# Patient Record
Sex: MALE | Race: WHITE | NOT HISPANIC OR LATINO | Employment: UNEMPLOYED | URBAN - NONMETROPOLITAN AREA
[De-identification: names, ages, dates, MRNs, and addresses within clinical notes are randomized per-mention and may not be internally consistent; named-entity substitution may affect disease eponyms.]

---

## 2019-05-07 ENCOUNTER — OFFICE VISIT (OUTPATIENT)
Dept: UROLOGY | Facility: CLINIC | Age: 54
End: 2019-05-07

## 2019-05-07 VITALS — WEIGHT: 230 LBS | BODY MASS INDEX: 34.86 KG/M2 | HEIGHT: 68 IN

## 2019-05-07 DIAGNOSIS — R33.8 URINARY RETENTION DUE TO BENIGN PROSTATIC HYPERPLASIA: Primary | ICD-10-CM

## 2019-05-07 DIAGNOSIS — N40.1 URINARY RETENTION DUE TO BENIGN PROSTATIC HYPERPLASIA: Primary | ICD-10-CM

## 2019-05-07 PROCEDURE — 99203 OFFICE O/P NEW LOW 30 MIN: CPT | Performed by: UROLOGY

## 2019-05-07 RX ORDER — GABAPENTIN 300 MG/1
300 CAPSULE ORAL 3 TIMES DAILY
COMMUNITY

## 2019-05-07 RX ORDER — LISINOPRIL 20 MG/1
20 TABLET ORAL 2 TIMES DAILY
COMMUNITY
End: 2021-08-02 | Stop reason: ALTCHOICE

## 2019-05-07 RX ORDER — METFORMIN HYDROCHLORIDE 500 MG/1
TABLET, EXTENDED RELEASE ORAL
COMMUNITY
Start: 2019-03-28 | End: 2021-08-02 | Stop reason: ALTCHOICE

## 2019-05-07 RX ORDER — PANTOPRAZOLE SODIUM 40 MG/1
40 TABLET, DELAYED RELEASE ORAL DAILY
Refills: 2 | COMMUNITY
Start: 2019-04-08

## 2019-05-07 RX ORDER — HYDROCODONE BITARTRATE AND ACETAMINOPHEN 7.5; 325 MG/1; MG/1
1 TABLET ORAL EVERY 6 HOURS PRN
COMMUNITY
End: 2021-08-02 | Stop reason: DRUGHIGH

## 2019-05-07 RX ORDER — HUMAN INSULIN 100 [IU]/ML
INJECTION, SUSPENSION SUBCUTANEOUS
Refills: 0 | COMMUNITY
Start: 2019-05-01

## 2019-05-07 RX ORDER — WARFARIN SODIUM 2.5 MG/1
2.5 TABLET ORAL
COMMUNITY

## 2019-05-07 NOTE — PROGRESS NOTES
Chief Complaint:          Chief Complaint   Patient presents with   • Difficulty Urinating       HPI:   54 y.o. male.  54-year-old white male presents with urinary retention.  This is a second time in 2 months.  He has had no pre-retention incidences such as decongestants etc.  He went to emergency room got a Prado he has no allergies he was given Flomax and Cipro but has not started it yet he has had an upper GI and colon.  He is a diabetic and atrial fibrillation with significant degenerative joint disease he has no family history of prostate cancer or him to his catheter his exam is unremarkable I will defer PSA until he said time for the inflammation to resolve in the prostatic fossa and thus give us an artificial elevation.    Past Medical History:        Past Medical History:   Diagnosis Date   • A-fib (CMS/HCC)    • Diabetes (CMS/HCC)    • Heart attack (CMS/HCC)    • Hypertension          Current Meds:     Current Outpatient Medications   Medication Sig Dispense Refill   • gabapentin (NEURONTIN) 300 MG capsule Take 300 mg by mouth 3 (Three) Times a Day.     • HYDROcodone-acetaminophen (NORCO) 7.5-325 MG per tablet Take 1 tablet by mouth Every 6 (Six) Hours As Needed for Moderate Pain .     • lisinopril (PRINIVIL,ZESTRIL) 20 MG tablet Take 20 mg by mouth 2 (Two) Times a Day.     • metFORMIN (GLUCOPHAGE) 500 MG tablet Take 500 mg by mouth 2 (Two) Times a Day With Meals.     • metFORMIN ER (GLUCOPHAGE-XR) 500 MG 24 hr tablet      • NOVOLIN N 100 UNIT/ML injection INJECT 15 TO 25 UNITS SC HS AS DIRECTED  0   • pantoprazole (PROTONIX) 40 MG EC tablet Take 40 mg by mouth Daily.  2   • warfarin (COUMADIN) 2.5 MG tablet Take 2.5 mg by mouth Daily.       No current facility-administered medications for this visit.         Allergies:      No Known Allergies     Past Surgical History:     Past Surgical History:   Procedure Laterality Date   • COLONOSCOPY     • ENDOSCOPY           Social History:     Social History      Socioeconomic History   • Marital status: Unknown     Spouse name: Not on file   • Number of children: Not on file   • Years of education: Not on file   • Highest education level: Not on file   Tobacco Use   • Smoking status: Never Smoker   • Smokeless tobacco: Never Used   Substance and Sexual Activity   • Alcohol use: No     Frequency: Never   • Drug use: No   • Sexual activity: Not Currently       Family History:     Family History   Problem Relation Age of Onset   • Diabetes Father    • Kidney disease Father    • Heart disease Mother        Review of Systems:     Review of Systems   Constitutional: Negative.    HENT: Negative.    Eyes: Negative.    Respiratory: Negative.    Cardiovascular: Negative.    Gastrointestinal: Negative.    Endocrine: Negative.    Genitourinary: Positive for difficulty urinating.   Musculoskeletal: Negative.    Allergic/Immunologic: Negative.    Neurological: Negative.    Hematological: Negative.    Psychiatric/Behavioral: Negative.        Physical Exam:     Physical Exam   Constitutional: He is oriented to person, place, and time. He appears well-developed and well-nourished.   HENT:   Head: Normocephalic and atraumatic.   Eyes: Conjunctivae and EOM are normal. Pupils are equal, round, and reactive to light.   Neck: Normal range of motion.   Cardiovascular: Normal rate, regular rhythm, normal heart sounds and intact distal pulses.   Pulmonary/Chest: Effort normal and breath sounds normal.   Abdominal: Soft. Bowel sounds are normal.   Genitourinary:   Genitourinary Comments: Obese white male with a circumcised concealed phallus bilaterally descended testes good tone but an anal stricture and a smooth firm prostate about 30 g   Musculoskeletal: Normal range of motion.   Neurological: He is alert and oriented to person, place, and time. He has normal reflexes.   Skin: Skin is warm and dry.   Psychiatric: He has a normal mood and affect. His behavior is normal. Judgment and thought  content normal.   Nursing note and vitals reviewed.      I have reviewed the following portions of the patient's history: allergies, current medications, past family history, past medical history, past social history, past surgical history, problem list and ROS and confirm it's accurate.      Procedure:       Assessment/Plan:   Urinary retention secondary to BPH-catheter removed for an appropriate voiding trial    Patient's Body mass index is 34.97 kg/m². BMI is above normal parameters. Recommendations include: educational material.          This document has been electronically signed by GANESH BUSTOS MD May 7, 2019 1:27 PM

## 2019-05-08 PROBLEM — R33.8 URINARY RETENTION DUE TO BENIGN PROSTATIC HYPERPLASIA: Status: ACTIVE | Noted: 2019-05-08

## 2019-05-08 PROBLEM — N40.1 URINARY RETENTION DUE TO BENIGN PROSTATIC HYPERPLASIA: Status: ACTIVE | Noted: 2019-05-08

## 2019-05-09 ENCOUNTER — OFFICE VISIT (OUTPATIENT)
Dept: UROLOGY | Facility: CLINIC | Age: 54
End: 2019-05-09

## 2019-05-09 VITALS — BODY MASS INDEX: 34.86 KG/M2 | WEIGHT: 230 LBS | HEIGHT: 68 IN

## 2019-05-09 DIAGNOSIS — N40.1 URINARY RETENTION DUE TO BENIGN PROSTATIC HYPERPLASIA: ICD-10-CM

## 2019-05-09 DIAGNOSIS — R33.9 INCOMPLETE EMPTYING OF BLADDER: ICD-10-CM

## 2019-05-09 DIAGNOSIS — N42.9 DISORDER OF PROSTATE: Primary | ICD-10-CM

## 2019-05-09 DIAGNOSIS — R33.8 URINARY RETENTION DUE TO BENIGN PROSTATIC HYPERPLASIA: ICD-10-CM

## 2019-05-09 PROCEDURE — 99213 OFFICE O/P EST LOW 20 MIN: CPT | Performed by: UROLOGY

## 2019-05-09 PROCEDURE — 36415 COLL VENOUS BLD VENIPUNCTURE: CPT | Performed by: UROLOGY

## 2019-05-09 PROCEDURE — 51798 US URINE CAPACITY MEASURE: CPT | Performed by: UROLOGY

## 2019-05-09 RX ORDER — FINASTERIDE 5 MG/1
5 TABLET, FILM COATED ORAL DAILY
Qty: 30 TABLET | Refills: 5 | Status: SHIPPED | OUTPATIENT
Start: 2019-05-09 | End: 2019-12-05 | Stop reason: SDUPTHER

## 2019-05-09 NOTE — PROGRESS NOTES
Chief Complaint:          Chief Complaint   Patient presents with   • Benign Prostatic Hypertrophy     follow up        HPI:   54 y.o. male  presents with urinary retention.  This is a second time in 2 months.  He has had no pre-retention incidences such as decongestants etc.  He went to emergency room got a Prado he has no allergies he was given Flomax and Cipro but has not started it yet he has had an upper GI and colon.  He is a diabetic and atrial fibrillation with significant degenerative joint disease he has no family history of prostate cancer or him to his catheter his exam is unremarkable I will defer PSA until he said time for the inflammation to resolve in the prostatic fossa and thus give us an artificial elevation.  He returns today he is voiding well he has a good stream he gets up got up one time at night have a PSA pending and then added finasteride to his current regimen I will see him back in 3 months he is to call for additional problems      Past Medical History:        Past Medical History:   Diagnosis Date   • A-fib (CMS/[facility])    • Diabetes (CMS/[facility])    • Heart attack (CMS/[facility])    • Hypertension          Current Meds:     Current Outpatient Medications   Medication Sig Dispense Refill   • gabapentin (NEURONTIN) 300 MG capsule Take 300 mg by mouth 3 (Three) Times a Day.     • HYDROcodone-acetaminophen (NORCO) 7.5-325 MG per tablet Take 1 tablet by mouth Every 6 (Six) Hours As Needed for Moderate Pain .     • lisinopril (PRINIVIL,ZESTRIL) 20 MG tablet Take 20 mg by mouth 2 (Two) Times a Day.     • metFORMIN (GLUCOPHAGE) 500 MG tablet Take 500 mg by mouth 2 (Two) Times a Day With Meals.     • metFORMIN ER (GLUCOPHAGE-XR) 500 MG 24 hr tablet      • NOVOLIN N 100 UNIT/ML injection INJECT 15 TO 25 UNITS SC HS AS DIRECTED  0   • pantoprazole (PROTONIX) 40 MG EC tablet Take 40 mg by mouth Daily.  2   • warfarin (COUMADIN) 2.5 MG tablet Take 2.5 mg by mouth Daily.       No current facility-administered  medications for this visit.         Allergies:      No Known Allergies     Past Surgical History:     Past Surgical History:   Procedure Laterality Date   • COLONOSCOPY     • ENDOSCOPY           Social History:     Social History     Socioeconomic History   • Marital status: Unknown     Spouse name: Not on file   • Number of children: Not on file   • Years of education: Not on file   • Highest education level: Not on file   Tobacco Use   • Smoking status: Never Smoker   • Smokeless tobacco: Never Used   Substance and Sexual Activity   • Alcohol use: No     Frequency: Never   • Drug use: No   • Sexual activity: Not Currently       Family History:     Family History   Problem Relation Age of Onset   • Diabetes Father    • Kidney disease Father    • Heart disease Mother        Review of Systems:     Review of Systems   Constitutional: Negative.    HENT: Negative.    Eyes: Negative.    Respiratory: Negative.    Cardiovascular: Negative.    Gastrointestinal: Negative.    Endocrine: Negative.    Musculoskeletal: Negative.    Allergic/Immunologic: Negative.    Neurological: Negative.    Hematological: Negative.    Psychiatric/Behavioral: Negative.        Physical Exam:     Physical Exam   Constitutional: He is oriented to person, place, and time. He appears well-developed and well-nourished.   HENT:   Head: Normocephalic and atraumatic.   Eyes: Conjunctivae and EOM are normal. Pupils are equal, round, and reactive to light.   Neck: Normal range of motion.   Cardiovascular: Normal rate, regular rhythm, normal heart sounds and intact distal pulses.   Pulmonary/Chest: Effort normal and breath sounds normal.   Abdominal: Soft. Bowel sounds are normal.   Musculoskeletal: Normal range of motion.   Neurological: He is alert and oriented to person, place, and time. He has normal reflexes.   Skin: Skin is warm and dry.   Psychiatric: He has a normal mood and affect. His behavior is normal. Judgment and thought content normal.    Nursing note and vitals reviewed.      I have reviewed the following portions of the patient's history: allergies, current medications, past family history, past medical history, past social history, past surgical history, problem list and ROS and confirm it's accurate.      Procedure:       Assessment/Plan:   Urinary retention secondary to BPH-no voiding well with the use of alpha blockade able to make the addition of finasteride mostly because the caliber of his stream is only have a PSA pending I will see him back in 3 months    Patient's Body mass index is 34.97 kg/m². BMI is above normal parameters. Recommendations include: educational material.          This document has been electronically signed by GANESH BUSTOS MD May 9, 2019 8:33 AM

## 2019-05-10 LAB — PSA SERPL-MCNC: 0.4 NG/ML (ref 0–4)

## 2019-12-05 DIAGNOSIS — R33.8 URINARY RETENTION DUE TO BENIGN PROSTATIC HYPERPLASIA: ICD-10-CM

## 2019-12-05 DIAGNOSIS — N40.1 URINARY RETENTION DUE TO BENIGN PROSTATIC HYPERPLASIA: ICD-10-CM

## 2019-12-06 RX ORDER — FINASTERIDE 5 MG/1
TABLET, FILM COATED ORAL
Qty: 90 TABLET | Refills: 0 | Status: SHIPPED | OUTPATIENT
Start: 2019-12-06

## 2020-02-06 DIAGNOSIS — N40.1 URINARY RETENTION DUE TO BENIGN PROSTATIC HYPERPLASIA: Primary | ICD-10-CM

## 2020-02-06 DIAGNOSIS — R33.8 URINARY RETENTION DUE TO BENIGN PROSTATIC HYPERPLASIA: Primary | ICD-10-CM

## 2020-02-07 RX ORDER — TAMSULOSIN HYDROCHLORIDE 0.4 MG/1
CAPSULE ORAL
Qty: 90 CAPSULE | Refills: 3 | Status: SHIPPED | OUTPATIENT
Start: 2020-02-07 | End: 2020-04-13

## 2020-02-10 DIAGNOSIS — R33.8 URINARY RETENTION DUE TO BENIGN PROSTATIC HYPERPLASIA: ICD-10-CM

## 2020-02-10 DIAGNOSIS — N40.1 URINARY RETENTION DUE TO BENIGN PROSTATIC HYPERPLASIA: ICD-10-CM

## 2020-02-11 RX ORDER — FINASTERIDE 5 MG/1
TABLET, FILM COATED ORAL
Qty: 90 TABLET | Refills: 0 | OUTPATIENT
Start: 2020-02-11

## 2020-04-10 DIAGNOSIS — R33.8 URINARY RETENTION DUE TO BENIGN PROSTATIC HYPERPLASIA: ICD-10-CM

## 2020-04-10 DIAGNOSIS — N40.1 URINARY RETENTION DUE TO BENIGN PROSTATIC HYPERPLASIA: ICD-10-CM

## 2020-04-13 RX ORDER — TAMSULOSIN HYDROCHLORIDE 0.4 MG/1
CAPSULE ORAL
Qty: 90 CAPSULE | Refills: 3 | Status: SHIPPED | OUTPATIENT
Start: 2020-04-13 | End: 2020-06-04

## 2020-06-03 DIAGNOSIS — R33.8 URINARY RETENTION DUE TO BENIGN PROSTATIC HYPERPLASIA: ICD-10-CM

## 2020-06-03 DIAGNOSIS — N40.1 URINARY RETENTION DUE TO BENIGN PROSTATIC HYPERPLASIA: ICD-10-CM

## 2020-06-04 RX ORDER — TAMSULOSIN HYDROCHLORIDE 0.4 MG/1
CAPSULE ORAL
Qty: 90 CAPSULE | Refills: 3 | Status: SHIPPED | OUTPATIENT
Start: 2020-06-04 | End: 2021-03-22

## 2020-08-04 ENCOUNTER — TRANSCRIBE ORDERS (OUTPATIENT)
Dept: OTHER | Facility: OTHER | Age: 55
End: 2020-08-04

## 2020-08-04 ENCOUNTER — TRANSCRIBE ORDERS (OUTPATIENT)
Dept: ADMINISTRATIVE | Facility: HOSPITAL | Age: 55
End: 2020-08-04

## 2020-08-04 DIAGNOSIS — G45.9 TIA (TRANSIENT ISCHEMIC ATTACK): Primary | ICD-10-CM

## 2020-09-04 ENCOUNTER — HOSPITAL ENCOUNTER (OUTPATIENT)
Dept: MRI IMAGING | Facility: HOSPITAL | Age: 55
Discharge: HOME | End: 2020-09-04

## 2020-09-04 ENCOUNTER — HOSPITAL ENCOUNTER (OUTPATIENT)
Dept: CARDIOLOGY | Facility: HOSPITAL | Age: 55
Discharge: HOME | End: 2020-09-04
Admitting: FAMILY MEDICINE

## 2020-09-04 DIAGNOSIS — G45.9 TIA (TRANSIENT ISCHEMIC ATTACK): ICD-10-CM

## 2020-09-04 LAB
BH CV ECHO MEAS - % IVS THICK: 14.7 %
BH CV ECHO MEAS - % LVPW THICK: 57.3 %
BH CV ECHO MEAS - ACS: 2 CM
BH CV ECHO MEAS - AO MAX PG: 6 MMHG
BH CV ECHO MEAS - AO MEAN PG: 4 MMHG
BH CV ECHO MEAS - AO ROOT AREA (BSA CORRECTED): 1.5
BH CV ECHO MEAS - AO ROOT AREA: 8.6 CM^2
BH CV ECHO MEAS - AO ROOT DIAM: 3.3 CM
BH CV ECHO MEAS - AO V2 MAX: 122 CM/SEC
BH CV ECHO MEAS - AO V2 MEAN: 95 CM/SEC
BH CV ECHO MEAS - AO V2 VTI: 30.1 CM
BH CV ECHO MEAS - BSA(HAYCOCK): 2.3 M^2
BH CV ECHO MEAS - BSA: 2.2 M^2
BH CV ECHO MEAS - BZI_BMI: 35 KILOGRAMS/M^2
BH CV ECHO MEAS - BZI_METRIC_HEIGHT: 172.7 CM
BH CV ECHO MEAS - BZI_METRIC_WEIGHT: 104.3 KG
BH CV ECHO MEAS - EDV(CUBED): 139.8 ML
BH CV ECHO MEAS - EDV(MOD-SP4): 60.7 ML
BH CV ECHO MEAS - EDV(TEICH): 128.9 ML
BH CV ECHO MEAS - EF(CUBED): 74.1 %
BH CV ECHO MEAS - EF(MOD-SP4): 59.5 %
BH CV ECHO MEAS - EF(TEICH): 65.5 %
BH CV ECHO MEAS - ESV(CUBED): 36.3 ML
BH CV ECHO MEAS - ESV(MOD-SP4): 24.6 ML
BH CV ECHO MEAS - ESV(TEICH): 44.5 ML
BH CV ECHO MEAS - FS: 36.2 %
BH CV ECHO MEAS - IVS/LVPW: 0.57
BH CV ECHO MEAS - IVSD: 0.85 CM
BH CV ECHO MEAS - IVSS: 0.98 CM
BH CV ECHO MEAS - LA DIMENSION: 2.9 CM
BH CV ECHO MEAS - LA/AO: 0.88
BH CV ECHO MEAS - LV DIASTOLIC VOL/BSA (35-75): 28 ML/M^2
BH CV ECHO MEAS - LV MASS(C)D: 241.1 GRAMS
BH CV ECHO MEAS - LV MASS(C)DI: 111.1 GRAMS/M^2
BH CV ECHO MEAS - LV MASS(C)S: 214.7 GRAMS
BH CV ECHO MEAS - LV MASS(C)SI: 99 GRAMS/M^2
BH CV ECHO MEAS - LV SYSTOLIC VOL/BSA (12-30): 11.3 ML/M^2
BH CV ECHO MEAS - LVIDD: 5.2 CM
BH CV ECHO MEAS - LVIDS: 3.3 CM
BH CV ECHO MEAS - LVLD AP4: 6.8 CM
BH CV ECHO MEAS - LVLS AP4: 6.2 CM
BH CV ECHO MEAS - LVOT AREA (M): 2.5 CM^2
BH CV ECHO MEAS - LVOT AREA: 2.5 CM^2
BH CV ECHO MEAS - LVOT DIAM: 1.8 CM
BH CV ECHO MEAS - LVPWD: 1.5 CM
BH CV ECHO MEAS - LVPWS: 2.4 CM
BH CV ECHO MEAS - MV A MAX VEL: 51.4 CM/SEC
BH CV ECHO MEAS - MV E MAX VEL: 66.4 CM/SEC
BH CV ECHO MEAS - MV E/A: 1.3
BH CV ECHO MEAS - PA ACC TIME: 0.08 SEC
BH CV ECHO MEAS - PA PR(ACCEL): 44.4 MMHG
BH CV ECHO MEAS - RAP SYSTOLE: 10 MMHG
BH CV ECHO MEAS - RVSP: 25.7 MMHG
BH CV ECHO MEAS - SI(AO): 118.7 ML/M^2
BH CV ECHO MEAS - SI(CUBED): 47.7 ML/M^2
BH CV ECHO MEAS - SI(MOD-SP4): 16.6 ML/M^2
BH CV ECHO MEAS - SI(TEICH): 38.9 ML/M^2
BH CV ECHO MEAS - SV(AO): 257.4 ML
BH CV ECHO MEAS - SV(CUBED): 103.5 ML
BH CV ECHO MEAS - SV(MOD-SP4): 36.1 ML
BH CV ECHO MEAS - SV(TEICH): 84.5 ML
BH CV ECHO MEAS - TR MAX VEL: 197.5 CM/SEC
LV EF 2D ECHO EST: 55 %
MAXIMAL PREDICTED HEART RATE: 163 BPM
STRESS TARGET HR: 139 BPM

## 2020-09-04 PROCEDURE — 93306 TTE W/DOPPLER COMPLETE: CPT

## 2020-09-04 PROCEDURE — 70551 MRI BRAIN STEM W/O DYE: CPT | Performed by: RADIOLOGY

## 2020-09-04 PROCEDURE — 93306 TTE W/DOPPLER COMPLETE: CPT | Performed by: INTERNAL MEDICINE

## 2020-09-04 PROCEDURE — 70551 MRI BRAIN STEM W/O DYE: CPT

## 2021-03-19 DIAGNOSIS — N40.1 URINARY RETENTION DUE TO BENIGN PROSTATIC HYPERPLASIA: ICD-10-CM

## 2021-03-19 DIAGNOSIS — R33.8 URINARY RETENTION DUE TO BENIGN PROSTATIC HYPERPLASIA: ICD-10-CM

## 2021-03-22 RX ORDER — TAMSULOSIN HYDROCHLORIDE 0.4 MG/1
CAPSULE ORAL
Qty: 90 CAPSULE | Refills: 3 | Status: SHIPPED | OUTPATIENT
Start: 2021-03-22 | End: 2021-12-23

## 2021-03-23 ENCOUNTER — BULK ORDERING (OUTPATIENT)
Dept: CASE MANAGEMENT | Facility: OTHER | Age: 56
End: 2021-03-23

## 2021-03-23 DIAGNOSIS — Z23 IMMUNIZATION DUE: ICD-10-CM

## 2021-08-02 ENCOUNTER — OFFICE VISIT (OUTPATIENT)
Dept: CARDIOLOGY | Facility: CLINIC | Age: 56
End: 2021-08-02

## 2021-08-02 VITALS
RESPIRATION RATE: 16 BRPM | HEART RATE: 76 BPM | WEIGHT: 223.6 LBS | HEIGHT: 68 IN | DIASTOLIC BLOOD PRESSURE: 81 MMHG | SYSTOLIC BLOOD PRESSURE: 111 MMHG | TEMPERATURE: 97.1 F | BODY MASS INDEX: 33.89 KG/M2

## 2021-08-02 DIAGNOSIS — E78.5 DYSLIPIDEMIA: ICD-10-CM

## 2021-08-02 DIAGNOSIS — R00.2 PALPITATIONS: ICD-10-CM

## 2021-08-02 DIAGNOSIS — R55 SYNCOPE AND COLLAPSE: Primary | ICD-10-CM

## 2021-08-02 DIAGNOSIS — I48.0 PAROXYSMAL ATRIAL FIBRILLATION (HCC): ICD-10-CM

## 2021-08-02 DIAGNOSIS — Z79.4 TYPE 2 DIABETES MELLITUS WITHOUT COMPLICATION, WITH LONG-TERM CURRENT USE OF INSULIN (HCC): ICD-10-CM

## 2021-08-02 DIAGNOSIS — E11.9 TYPE 2 DIABETES MELLITUS WITHOUT COMPLICATION, WITH LONG-TERM CURRENT USE OF INSULIN (HCC): ICD-10-CM

## 2021-08-02 DIAGNOSIS — I10 ESSENTIAL HYPERTENSION: ICD-10-CM

## 2021-08-02 DIAGNOSIS — R07.2 PRECORDIAL PAIN: ICD-10-CM

## 2021-08-02 DIAGNOSIS — I95.1 ORTHOSTATIC HYPOTENSION: ICD-10-CM

## 2021-08-02 PROCEDURE — 93000 ELECTROCARDIOGRAM COMPLETE: CPT | Performed by: INTERNAL MEDICINE

## 2021-08-02 PROCEDURE — 99204 OFFICE O/P NEW MOD 45 MIN: CPT | Performed by: INTERNAL MEDICINE

## 2021-08-02 RX ORDER — AMLODIPINE BESYLATE AND BENAZEPRIL HYDROCHLORIDE 10; 20 MG/1; MG/1
1 CAPSULE ORAL DAILY
COMMUNITY

## 2021-08-02 RX ORDER — DULOXETIN HYDROCHLORIDE 60 MG/1
60 CAPSULE, DELAYED RELEASE ORAL DAILY
COMMUNITY

## 2021-08-02 RX ORDER — INDAPAMIDE 2.5 MG/1
2.5 TABLET, FILM COATED ORAL EVERY MORNING
COMMUNITY

## 2021-08-02 RX ORDER — HYDROCODONE BITARTRATE AND ACETAMINOPHEN 10; 325 MG/1; MG/1
1 TABLET ORAL EVERY 8 HOURS PRN
COMMUNITY

## 2021-08-02 RX ORDER — BUPROPION HYDROCHLORIDE 300 MG/1
300 TABLET ORAL DAILY
COMMUNITY

## 2021-08-02 RX ORDER — LEVOTHYROXINE SODIUM 0.03 MG/1
25 TABLET ORAL DAILY
COMMUNITY

## 2021-08-02 RX ORDER — SOTALOL HYDROCHLORIDE 160 MG/1
160 TABLET ORAL 2 TIMES DAILY
COMMUNITY
End: 2021-12-14 | Stop reason: DRUGHIGH

## 2021-08-02 RX ORDER — ROSUVASTATIN CALCIUM 10 MG/1
10 TABLET, COATED ORAL DAILY
COMMUNITY

## 2021-08-02 NOTE — PROGRESS NOTES
Edmundo Boston MD  Joel Galo  2021    Patient Active Problem List   Diagnosis   • Urinary retention due to benign prostatic hyperplasia       Dear Edmundo Boston MD:    Subjective     Joel Galo is a 56 y.o. male with the problems as listed above, presents    Chief complaint: Recurrent episodes of dizziness and near syncope.    History of Present Illness: Mr. Galo is a pleasant 56-year-old  male with no history of known heart disease, has history of paroxysmal atrial fibrillation for which she has been on sotalol and warfarin, presents with complaints of having recurrent episodes of significant dizziness and near syncope over the last couple of months.  These episodes seem to occur mostly when he is stands up but also have occurred while he is walking.  About a week ago he reportedly had an episode of syncope which lasted for a few seconds and regained consciousness spontaneously. This apparently separately happened while he was walking to the door at his house and he got dizzy and grabbed something for support and sat down and felt better.  He has had some recent vomitings but no diarrhea.  States he drinks a lot of water. He denies any fever or chills.  He denies any shortness of breath.  He has some intermittent sharp bilateral chest pains.  His orthostatic blood pressures reveal a significant drop in his blood pressure from lying to standing with a drop of about 29 mmHg with standing.        Joel Galo  Echo Complete w/Doppler and Color Flow  Order# [id number]  Reading physician: Blake Story MD Ordering physician: Edmundo Boston MD Study date: 20   Patient Information    Patient Name   Joel Galo MRN   [id number] Legal Sex   Male  (Age)   1965 (56 y.o.)       · Estimated EF = 55%.  · Left ventricular systolic function is normal.  · Moderate Aortic vavle sclerosis present  · No significant valvular abnormality noted  · No previous echo     No Known  "Allergies:      Current Outpatient Medications:   •  amLODIPine-benazepril (LOTREL) 10-20 MG per capsule, Take 1 capsule by mouth Daily., Disp: , Rfl:   •  buPROPion XL (WELLBUTRIN XL) 300 MG 24 hr tablet, Take 300 mg by mouth Daily., Disp: , Rfl:   •  DULoxetine (CYMBALTA) 60 MG capsule, Take 60 mg by mouth Daily., Disp: , Rfl:   •  empagliflozin (Jardiance) 25 MG tablet tablet, Take  by mouth Daily., Disp: , Rfl:   •  finasteride (PROSCAR) 5 MG tablet, TAKE 1 TABLET EVERY DAY, Disp: 90 tablet, Rfl: 0  •  HYDROcodone-acetaminophen (NORCO)  MG per tablet, Take 1 tablet by mouth Every 8 (Eight) Hours As Needed for Moderate Pain ., Disp: , Rfl:   •  indapamide (LOZOL) 2.5 MG tablet, Take 2.5 mg by mouth Every Morning., Disp: , Rfl:   •  insulin regular (humuLIN R,novoLIN R) 100 UNIT/ML injection, Inject  under the skin into the appropriate area as directed 3 (Three) Times a Day Before Meals., Disp: , Rfl:   •  levothyroxine (SYNTHROID, LEVOTHROID) 25 MCG tablet, Take 25 mcg by mouth Daily., Disp: , Rfl:   •  NOVOLIN N 100 UNIT/ML injection, INJECT 15 TO 25 UNITS SC HS AS DIRECTED, Disp: , Rfl: 0  •  rosuvastatin (CRESTOR) 10 MG tablet, Take 10 mg by mouth Daily., Disp: , Rfl:   •  Sotalol HCl, AF, 160 MG tablet, Take 160 mg by mouth 2 (two) times a day., Disp: , Rfl:   •  tamsulosin (FLOMAX) 0.4 MG capsule 24 hr capsule, TAKE 1 CAPSULE EVERY DAY, Disp: 90 capsule, Rfl: 3  •  warfarin (COUMADIN) 2.5 MG tablet, Take 2.5 mg by mouth Daily., Disp: , Rfl:   •  gabapentin (NEURONTIN) 300 MG capsule, Take 300 mg by mouth 3 (Three) Times a Day., Disp: , Rfl:   •  pantoprazole (PROTONIX) 40 MG EC tablet, Take 40 mg by mouth Daily., Disp: , Rfl: 2    Past Medical History:   Diagnosis Date   • A-fib (CMS/HCC)    • Diabetes (CMS/HCC)    • Heart attack (CMS/HCC)     \"5-6 years ago\"   • Hypertension      Past Surgical History:   Procedure Laterality Date   • COLONOSCOPY     • ENDOSCOPY       Family History   Problem Relation " "Age of Onset   • Diabetes Father    • Kidney disease Father    • Heart disease Mother      Social History     Tobacco Use   • Smoking status: Never Smoker   • Smokeless tobacco: Never Used   Substance Use Topics   • Alcohol use: No   • Drug use: No     Review of Systems   Constitutional: Positive for malaise/fatigue.   HENT: Positive for hearing loss and odynophagia.    Eyes: Positive for blurred vision and double vision.   Cardiovascular: Positive for chest pain, leg swelling and palpitations.   Respiratory: Positive for shortness of breath.    Endocrine: Positive for cold intolerance and heat intolerance.   Hematologic/Lymphatic: Bruises/bleeds easily.   Skin: Positive for dry skin, itching, poor wound healing and rash.   Musculoskeletal: Positive for back pain, joint pain, muscle cramps, muscle weakness and stiffness.   Gastrointestinal: Positive for constipation, nausea and vomiting.   Genitourinary: Positive for bladder incontinence, frequency and hesitancy.   Neurological: Positive for headaches, light-headedness, numbness, paresthesias and tremors.   Psychiatric/Behavioral: Positive for depression and memory loss. The patient has insomnia and is nervous/anxious.      Objective   Blood pressure 111/81, pulse 76, temperature 97.1 °F (36.2 °C), resp. rate 16, height 172.7 cm (68\"), weight 101 kg (223 lb 9.6 oz).  Body mass index is 34 kg/m².    Vitals reviewed.   Constitutional:       Appearance: Well-developed.   Eyes:      Conjunctiva/sclera: Conjunctivae normal.   HENT:      Head: Normocephalic.   Neck:      Thyroid: No thyromegaly.      Vascular: No JVD.      Trachea: No tracheal deviation.   Pulmonary:      Effort: No respiratory distress.      Breath sounds: Normal breath sounds. No wheezing. No rales.   Cardiovascular:      PMI at left midclavicular line. Normal rate. Regular rhythm. Normal S1. Normal S2.      Murmurs: There is no murmur.      No gallop. No click. No rub.   Pulses:     Intact distal " pulses.   Edema:     Peripheral edema absent.   Abdominal:      General: Bowel sounds are normal.      Palpations: Abdomen is soft. There is no abdominal mass.      Tenderness: There is no abdominal tenderness.   Musculoskeletal:      Cervical back: Normal range of motion and neck supple. Skin:     General: Skin is warm and dry.   Neurological:      Mental Status: Alert and oriented to person, place, and time.      Cranial Nerves: No cranial nerve deficit.            ECG 12 Lead    Date/Time: 8/2/2021 9:52 AM  Performed by: Tariq Chávez MD  Authorized by: Tariq Chávez MD   Previous ECG: no previous ECG available  Rhythm: sinus rhythm  BPM: 72  Conduction: conduction normal  ST Segments: ST segments normal  T Waves: T waves normal    Clinical impression: normal ECG  Comments: QTc: 427 ms.                Assessment/Plan :   Diagnosis Plan   1. Syncope and collapse  Cardiac Event Monitor,CMP<CBC   2. Palpitations     3. Paroxysmal atrial fibrillation (CMS/HCC)     4. Orthostatic hypotension     5. Precordial pain  Stress Test With Myocardial Perfusion (1 Day)   6. Essential hypertension     7. Type 2 diabetes mellitus, with long-term current use of insulin.    8. Dyslipidemia         Recommendations:  Orders Placed This Encounter   Procedures   • Comprehensive Metabolic Panel   • CBC (No Diff)   • Cardiac Event Monitor   • Stress Test With Myocardial Perfusion (1 Day)   • ECG 12 Lead      1. We will go ahead and discontinue the indapamide which could potentially be contributing to orthostatic hypotension.  2. We will evaluate his dizziness and syncope with an event monitor.  3. We will evaluate his chest pains with a Lexiscan sestamibi study.  4. I have asked him to drink more water.  5. We will check CMP and CBC.    Return in about 4 weeks (around 8/30/2021).    As always, Edmundo Boston MD  I appreciate very much the opportunity to participate in the cardiovascular care of your patients. Please do not  hesitate to call me with any questions with regards to Joel Galo's evaluation and management.       With Best Regards,        Tariq Chávez MD, FACC    Please note that portions of this note were completed with a voice recognition program.

## 2021-08-06 ENCOUNTER — CLINICAL SUPPORT (OUTPATIENT)
Dept: CARDIOLOGY | Facility: CLINIC | Age: 56
End: 2021-08-06

## 2021-08-13 ENCOUNTER — TELEPHONE (OUTPATIENT)
Dept: CARDIOLOGY | Facility: CLINIC | Age: 56
End: 2021-08-13

## 2021-08-13 DIAGNOSIS — R55 SYNCOPE AND COLLAPSE: ICD-10-CM

## 2021-08-13 NOTE — TELEPHONE ENCOUNTER
I did not see any significant dysrhythmias during that time if he has the symptoms again he needs to check his blood pressure.

## 2021-08-13 NOTE — TELEPHONE ENCOUNTER
Called pt and he stated he did check BP and it was 113 /78. I advised him to check it every time that happens. He expressed understanding.

## 2021-08-13 NOTE — TELEPHONE ENCOUNTER
Called pt to see if he was doing okay due to him selecting he passed out on his event monitor he is wearing.   He stated that he had got really dizzy and felt like he was going to pass out but he sat down and the feeling went away.

## 2021-08-26 ENCOUNTER — APPOINTMENT (OUTPATIENT)
Dept: CARDIOLOGY | Facility: HOSPITAL | Age: 56
End: 2021-08-26

## 2021-08-26 ENCOUNTER — APPOINTMENT (OUTPATIENT)
Dept: NUCLEAR MEDICINE | Facility: HOSPITAL | Age: 56
End: 2021-08-26

## 2021-09-03 ENCOUNTER — HOSPITAL ENCOUNTER (OUTPATIENT)
Dept: NUCLEAR MEDICINE | Facility: HOSPITAL | Age: 56
Discharge: HOME | End: 2021-09-03

## 2021-09-03 ENCOUNTER — HOSPITAL ENCOUNTER (OUTPATIENT)
Dept: CARDIOLOGY | Facility: HOSPITAL | Age: 56
Discharge: HOME | End: 2021-09-03

## 2021-09-03 DIAGNOSIS — R07.2 PRECORDIAL PAIN: ICD-10-CM

## 2021-09-03 PROCEDURE — 93018 CV STRESS TEST I&R ONLY: CPT | Performed by: INTERNAL MEDICINE

## 2021-09-03 PROCEDURE — A9500 TC99M SESTAMIBI: HCPCS | Performed by: INTERNAL MEDICINE

## 2021-09-03 PROCEDURE — 0 TECHNETIUM SESTAMIBI: Performed by: INTERNAL MEDICINE

## 2021-09-03 PROCEDURE — 93017 CV STRESS TEST TRACING ONLY: CPT

## 2021-09-03 PROCEDURE — 78452 HT MUSCLE IMAGE SPECT MULT: CPT

## 2021-09-03 PROCEDURE — 25010000002 REGADENOSON 0.4 MG/5ML SOLUTION: Performed by: INTERNAL MEDICINE

## 2021-09-03 PROCEDURE — 78452 HT MUSCLE IMAGE SPECT MULT: CPT | Performed by: INTERNAL MEDICINE

## 2021-09-03 RX ADMIN — REGADENOSON 0.4 MG: 0.08 INJECTION, SOLUTION INTRAVENOUS at 09:30

## 2021-09-03 RX ADMIN — TECHNETIUM TC 99M SESTAMIBI 1 DOSE: 1 INJECTION INTRAVENOUS at 09:30

## 2021-09-03 RX ADMIN — TECHNETIUM TC 99M SESTAMIBI 1 DOSE: 1 INJECTION INTRAVENOUS at 07:58

## 2021-09-08 LAB
BH CV NUCLEAR PRIOR STUDY: 3
BH CV REST NUCLEAR ISOTOPE DOSE: 10.1 MCI
BH CV STRESS BP STAGE 1: NORMAL
BH CV STRESS BP STAGE 2: NORMAL
BH CV STRESS COMMENTS STAGE 1: NORMAL
BH CV STRESS COMMENTS STAGE 2: NORMAL
BH CV STRESS DOSE REGADENOSON STAGE 1: 0.4
BH CV STRESS DURATION MIN STAGE 1: 0
BH CV STRESS DURATION MIN STAGE 2: 4
BH CV STRESS DURATION SEC STAGE 1: 10
BH CV STRESS DURATION SEC STAGE 2: 0
BH CV STRESS HR STAGE 1: 69
BH CV STRESS HR STAGE 2: 75
BH CV STRESS NUCLEAR ISOTOPE DOSE: 30.1 MCI
BH CV STRESS PROTOCOL 1: NORMAL
BH CV STRESS RECOVERY BP: NORMAL MMHG
BH CV STRESS RECOVERY HR: 67 BPM
BH CV STRESS STAGE 1: 1
BH CV STRESS STAGE 2: 2
LV EF NUC BP: 55 %
MAXIMAL PREDICTED HEART RATE: 162 BPM
PERCENT MAX PREDICTED HR: 46.3 %
STRESS BASELINE BP: NORMAL MMHG
STRESS BASELINE HR: 63 BPM
STRESS PERCENT HR: 54 %
STRESS POST PEAK BP: NORMAL MMHG
STRESS POST PEAK HR: 75 BPM
STRESS TARGET HR: 138 BPM

## 2021-09-10 ENCOUNTER — TRANSCRIBE ORDERS (OUTPATIENT)
Dept: ADMINISTRATIVE | Facility: HOSPITAL | Age: 56
End: 2021-09-10

## 2021-09-10 DIAGNOSIS — Z01.818 OTHER SPECIFIED PRE-OPERATIVE EXAMINATION: Primary | ICD-10-CM

## 2021-09-13 ENCOUNTER — TREATMENT (OUTPATIENT)
Dept: CARDIOLOGY | Facility: CLINIC | Age: 56
End: 2021-09-13

## 2021-09-13 DIAGNOSIS — R55 SYNCOPE AND COLLAPSE: Primary | ICD-10-CM

## 2021-09-13 PROCEDURE — 93228 REMOTE 30 DAY ECG REV/REPORT: CPT | Performed by: INTERNAL MEDICINE

## 2021-09-14 ENCOUNTER — OFFICE VISIT (OUTPATIENT)
Dept: CARDIOLOGY | Facility: CLINIC | Age: 56
End: 2021-09-14

## 2021-09-14 VITALS
BODY MASS INDEX: 34.22 KG/M2 | DIASTOLIC BLOOD PRESSURE: 82 MMHG | TEMPERATURE: 97.5 F | WEIGHT: 225.8 LBS | HEART RATE: 74 BPM | RESPIRATION RATE: 16 BRPM | SYSTOLIC BLOOD PRESSURE: 132 MMHG | HEIGHT: 68 IN

## 2021-09-14 DIAGNOSIS — I10 ESSENTIAL HYPERTENSION: ICD-10-CM

## 2021-09-14 DIAGNOSIS — R94.39 ABNORMAL NUCLEAR STRESS TEST: ICD-10-CM

## 2021-09-14 DIAGNOSIS — E11.9 TYPE 2 DIABETES MELLITUS WITHOUT COMPLICATION, WITH LONG-TERM CURRENT USE OF INSULIN (HCC): ICD-10-CM

## 2021-09-14 DIAGNOSIS — R55 SYNCOPE AND COLLAPSE: ICD-10-CM

## 2021-09-14 DIAGNOSIS — Z79.4 TYPE 2 DIABETES MELLITUS WITHOUT COMPLICATION, WITH LONG-TERM CURRENT USE OF INSULIN (HCC): ICD-10-CM

## 2021-09-14 DIAGNOSIS — I48.0 PAROXYSMAL ATRIAL FIBRILLATION (HCC): ICD-10-CM

## 2021-09-14 PROCEDURE — 99214 OFFICE O/P EST MOD 30 MIN: CPT | Performed by: INTERNAL MEDICINE

## 2021-09-14 NOTE — PROGRESS NOTES
Edmundo Boston MD  Joel Galo  2021    Patient Active Problem List   Diagnosis   • Urinary retention due to benign prostatic hyperplasia       Dear Edmundo Boston MD:    Subjective     Joel Galo is a 56 y.o. male with the problems as listed above, presents    Chief Complaint   Patient presents with   • Results      stress test and event monitor findings   • Shortness of Breath     routine activity   • Med Management     verbal     History of Present Illness: Mr. Galo is a pleasant 56-year-old  male with history of paroxysmal atrial fibrillation for which she has been on sotalol and warfarin and history of recurrent episodes of dizziness and near syncope over the last few months.  He was recently evaluated with an event monitor and is here to review the results of the same.  He also was complaining of some chest pains during the last visit for which he underwent stress sestamibi study.  On today's visit he also complains of intermittent dizziness but no syncope or near syncope.  His event monitor revealed predominantly sinus rhythm with heart rate ranging from 58- 108 bpm with occasional PACs and PVCs.  Although patient heart rate documented on the event monitor he on further questioning indicated that he has had dizzy spells during the monitoring although no significant tachycardia or bradycardia arrhythmias were documented.  His Lexiscan sestamibi study revealed small size, mild to moderate degree of lateral myocardial ischemia.  He denies any chest pains since last visit.  He denies any significant dyspnea, PND, orthopnea or pedal edema.      Joel Galo  Regadenoson Stress Test With Myocardial Perfusion SPECT (Multi Study)  Order# [id number]  Reading physician: Tariq Chávez MD Ordering physician: Tariq Chávez MD Study date: 9/3/21   Patient Information    Patient Name   Joel Galo MRN   [id number] Legal Sex   Male  (Age)   1965 (56 y.o.)   Interpretation  Summary    · A pharmacological stress test was performed using regadenoson without low-level exercise.  · Findings consistent with a normal ECG stress test.  · Myocardial perfusion imaging indicates a small-sized, mild-to-moderate degree of ischemia located in the lateral wall.  · Normal LV cavity size. Normal LV wall motion noted.  · Left ventricular ejection fraction is normal. (Calculated EF = 55%).  · Impressions are consistent with a low to intermediate risk study.        Joel Galo  Echo Complete w/Doppler and Color Flow  Order# [id number]  Reading physician: Blake Story MD Ordering physician: Edmundo Boston MD Study date: 20   Patient Information    Patient Name   Joel Galo MRN   [id number] Legal Sex   Male  (Age)   1965 (56 y.o.)   Interpretation Summary    · Estimated EF = 55%.  · Left ventricular systolic function is normal.  · Moderate Aortic vavle sclerosis present  · No significant valvular abnormality noted  · No previous echo     No Known Allergies:      Current Outpatient Medications:   •  buPROPion XL (WELLBUTRIN XL) 300 MG 24 hr tablet, Take 300 mg by mouth Daily., Disp: , Rfl:   •  DULoxetine (CYMBALTA) 60 MG capsule, Take 60 mg by mouth Daily., Disp: , Rfl:   •  empagliflozin (Jardiance) 25 MG tablet tablet, Take  by mouth Daily., Disp: , Rfl:   •  finasteride (PROSCAR) 5 MG tablet, TAKE 1 TABLET EVERY DAY, Disp: 90 tablet, Rfl: 0  •  HYDROcodone-acetaminophen (NORCO)  MG per tablet, Take 1 tablet by mouth Every 8 (Eight) Hours As Needed for Moderate Pain ., Disp: , Rfl:   •  insulin regular (humuLIN R,novoLIN R) 100 UNIT/ML injection, Inject  under the skin into the appropriate area as directed 3 (Three) Times a Day Before Meals., Disp: , Rfl:   •  levothyroxine (SYNTHROID, LEVOTHROID) 25 MCG tablet, Take 25 mcg by mouth Daily., Disp: , Rfl:   •  NOVOLIN N 100 UNIT/ML injection, INJECT 15 TO 25 UNITS SC HS AS DIRECTED, Disp: , Rfl: 0  •  rosuvastatin (CRESTOR) 10 MG  "tablet, Take 10 mg by mouth Daily., Disp: , Rfl:   •  Sotalol HCl, AF, 160 MG tablet, Take 160 mg by mouth 2 (two) times a day., Disp: , Rfl:   •  tamsulosin (FLOMAX) 0.4 MG capsule 24 hr capsule, TAKE 1 CAPSULE EVERY DAY, Disp: 90 capsule, Rfl: 3  •  warfarin (COUMADIN) 2.5 MG tablet, Take 2.5 mg by mouth Daily., Disp: , Rfl:   •  amLODIPine-benazepril (LOTREL) 10-20 MG per capsule, Take 1 capsule by mouth Daily., Disp: , Rfl:   •  gabapentin (NEURONTIN) 300 MG capsule, Take 300 mg by mouth 3 (Three) Times a Day., Disp: , Rfl:   •  indapamide (LOZOL) 2.5 MG tablet, Take 2.5 mg by mouth Every Morning., Disp: , Rfl:   •  pantoprazole (PROTONIX) 40 MG EC tablet, Take 40 mg by mouth Daily., Disp: , Rfl: 2      The following portions of the patient's history were reviewed and updated as appropriate: allergies, current medications, past family history, past medical history, past social history, past surgical history and problem list.    Social History     Tobacco Use   • Smoking status: Never Smoker   • Smokeless tobacco: Never Used   Substance Use Topics   • Alcohol use: No   • Drug use: No       Review of Systems   Constitutional: Negative for chills and fever.   HENT: Negative for nosebleeds and sore throat.    Respiratory: Negative for cough, hemoptysis and wheezing.    Gastrointestinal: Negative for abdominal pain, hematemesis, hematochezia, melena, nausea and vomiting.   Genitourinary: Negative for dysuria and hematuria.   Neurological: Positive for dizziness. Negative for headaches.       Objective   Vitals:    09/14/21 1416   BP: 132/82   Pulse: 74   Resp: 16   Temp: 97.5 °F (36.4 °C)   Weight: 102 kg (225 lb 12.8 oz)   Height: 172.7 cm (68\")     Body mass index is 34.33 kg/m².    Vitals reviewed.   Constitutional:       Appearance: Well-developed.   Eyes:      Conjunctiva/sclera: Conjunctivae normal.   HENT:      Head: Normocephalic.   Neck:      Thyroid: No thyromegaly.      Vascular: No JVD.      Trachea: No " tracheal deviation.   Pulmonary:      Effort: No respiratory distress.      Breath sounds: Normal breath sounds. No wheezing. No rales.   Cardiovascular:      PMI at left midclavicular line. Normal rate. Regular rhythm. Normal S1. Normal S2.      Murmurs: There is no murmur.      No gallop. No click. No rub.   Pulses:     Intact distal pulses.   Edema:     Peripheral edema absent.   Abdominal:      General: Bowel sounds are normal.      Palpations: Abdomen is soft. There is no abdominal mass.      Tenderness: There is no abdominal tenderness.   Musculoskeletal:      Cervical back: Normal range of motion and neck supple. Skin:     General: Skin is warm and dry.   Neurological:      Mental Status: Alert and oriented to person, place, and time.      Cranial Nerves: No cranial nerve deficit.         Assessment/Plan :   Diagnosis Plan   1. Syncope and collapse     2. Paroxysmal atrial fibrillation (CMS/HCC)     3. Abnormal nuclear stress test     4. Essential hypertension     5. Type 2 diabetes mellitus without complication, with long-term current use of insulin (CMS/HCC)            Recommendations:  1. I reviewed the nuclear stress test and event monitor results with the patient.  2. For his abnormal nuclear stress test, will add low-dose aspirin 81 mg daily and continue with warfarin for stroke prevention.  Target INR of 2-2.5.  3. If he has any recurrent episodes of syncope will consider internal loop recorder.  I have discussed this with Mr. Galo and he seems agreeable.  4. If he has recurrent significant anginal symptoms then will consider further cardiac evaluation with cardiac catheterization.    Return in about 3 months (around 12/14/2021) for or sooner if needed.    As always, Edmundo Boston MD  I appreciate very much the opportunity to participate in the cardiovascular care of your patients. Please do not hesitate to call me with any questions with regards to Joel Galo's evaluation and management.        With Best Regards,        Tariq Chávez MD, [facility]    Please note that portions of this note were completed with a voice recognition program.

## 2021-12-14 ENCOUNTER — OFFICE VISIT (OUTPATIENT)
Dept: CARDIOLOGY | Facility: CLINIC | Age: 56
End: 2021-12-14

## 2021-12-14 VITALS
HEIGHT: 68 IN | HEART RATE: 78 BPM | DIASTOLIC BLOOD PRESSURE: 78 MMHG | BODY MASS INDEX: 35.01 KG/M2 | TEMPERATURE: 97.1 F | SYSTOLIC BLOOD PRESSURE: 127 MMHG | WEIGHT: 231 LBS

## 2021-12-14 DIAGNOSIS — Z87.898 HISTORY OF SYNCOPE: Primary | ICD-10-CM

## 2021-12-14 DIAGNOSIS — R42 DIZZINESS: ICD-10-CM

## 2021-12-14 DIAGNOSIS — I48.0 PAROXYSMAL ATRIAL FIBRILLATION (HCC): ICD-10-CM

## 2021-12-14 DIAGNOSIS — I10 ESSENTIAL HYPERTENSION: ICD-10-CM

## 2021-12-14 DIAGNOSIS — I20.8 CHRONIC STABLE ANGINA (HCC): ICD-10-CM

## 2021-12-14 DIAGNOSIS — R94.39 ABNORMAL NUCLEAR STRESS TEST: ICD-10-CM

## 2021-12-14 PROCEDURE — 99214 OFFICE O/P EST MOD 30 MIN: CPT | Performed by: INTERNAL MEDICINE

## 2021-12-14 PROCEDURE — 93000 ELECTROCARDIOGRAM COMPLETE: CPT | Performed by: INTERNAL MEDICINE

## 2021-12-14 RX ORDER — ASPIRIN 81 MG/1
81 TABLET ORAL DAILY
COMMUNITY

## 2021-12-14 NOTE — PROGRESS NOTES
Edmundo Boston MD  Joel Galo  1965 12/14/2021    Patient Active Problem List   Diagnosis   • Urinary retention due to benign prostatic hyperplasia       Dear Edmundo Boston MD:    Subjective     Joel Galo is a 56 y.o. male with the problems as listed above, presents    Chief complaint: Follow-up of history of syncope and abnormal nuclear stress test.    History of Present Illness: Mr. Galo is a pleasant 56-year-old  male with history of syncope for which she has had an event monitor placed in September 2021 which revealed predominantly sinus rhythm with occasional PACs and PVCs with no supraventricular ventricle tachycardia.  There were no significant bradycardia arrhythmias noted either.  He also had a abnormal Lexiscan sestamibi study on 9/3/2021 that revealed small size, mild to moderate degree of lateral myocardial ischemia.  He is here for regular cardiology follow-up.  On today's visit he denies any complaints of chest pain or shortness of breath.  He has intermittent dizziness on ambulation but notes no syncope.    No Known Allergies:      Current Outpatient Medications:   •  amLODIPine-benazepril (LOTREL) 10-20 MG per capsule, Take 1 capsule by mouth Daily., Disp: , Rfl:   •  aspirin 81 MG EC tablet, Take 81 mg by mouth Daily., Disp: , Rfl:   •  buPROPion XL (WELLBUTRIN XL) 300 MG 24 hr tablet, Take 300 mg by mouth Daily., Disp: , Rfl:   •  DULoxetine (CYMBALTA) 60 MG capsule, Take 60 mg by mouth Daily., Disp: , Rfl:   •  empagliflozin (Jardiance) 25 MG tablet tablet, Take  by mouth Daily., Disp: , Rfl:   •  finasteride (PROSCAR) 5 MG tablet, TAKE 1 TABLET EVERY DAY, Disp: 90 tablet, Rfl: 0  •  HYDROcodone-acetaminophen (NORCO)  MG per tablet, Take 1 tablet by mouth Every 8 (Eight) Hours As Needed for Moderate Pain ., Disp: , Rfl:   •  insulin regular (humuLIN R,novoLIN R) 100 UNIT/ML injection, Inject  under the skin into the appropriate area as directed 3 (Three)  "Times a Day Before Meals., Disp: , Rfl:   •  levothyroxine (SYNTHROID, LEVOTHROID) 25 MCG tablet, Take 25 mcg by mouth Daily., Disp: , Rfl:   •  NOVOLIN N 100 UNIT/ML injection, INJECT 15 TO 25 UNITS SC HS AS DIRECTED, Disp: , Rfl: 0  •  rosuvastatin (CRESTOR) 10 MG tablet, Take 10 mg by mouth Daily., Disp: , Rfl:   •  tamsulosin (FLOMAX) 0.4 MG capsule 24 hr capsule, TAKE 1 CAPSULE EVERY DAY, Disp: 90 capsule, Rfl: 3  •  warfarin (COUMADIN) 2.5 MG tablet, Take 2.5 mg by mouth Daily., Disp: , Rfl:   •  gabapentin (NEURONTIN) 300 MG capsule, Take 300 mg by mouth 3 (Three) Times a Day., Disp: , Rfl:   •  indapamide (LOZOL) 2.5 MG tablet, Take 2.5 mg by mouth Every Morning., Disp: , Rfl:   •  pantoprazole (PROTONIX) 40 MG EC tablet, Take 40 mg by mouth Daily., Disp: , Rfl: 2  •  Sotalol HCl AF 80 MG tablet, Take 1.5 tablets by mouth 2 (Two) Times a Day., Disp: 270 tablet, Rfl: 3      The following portions of the patient's history were reviewed and updated as appropriate: allergies, current medications, past family history, past medical history, past social history, past surgical history and problem list.    Social History     Tobacco Use   • Smoking status: Never Smoker   • Smokeless tobacco: Never Used   Substance Use Topics   • Alcohol use: No   • Drug use: No       Review of Systems   Constitutional: Negative for fever.   Cardiovascular: Negative for chest pain.   Respiratory: Negative for shortness of breath.    Neurological: Positive for dizziness.     Objective   Vitals:    12/14/21 1043   BP: 127/78   Pulse: 78   Temp: 97.1 °F (36.2 °C)   Weight: 105 kg (231 lb)   Height: 172.7 cm (68\")     Body mass index is 35.12 kg/m².    Vitals reviewed.   Constitutional:       Appearance: Well-developed.   Eyes:      Conjunctiva/sclera: Conjunctivae normal.   HENT:      Head: Normocephalic.   Neck:      Thyroid: No thyromegaly.      Vascular: No JVD.      Trachea: No tracheal deviation.   Pulmonary:      Effort: No " respiratory distress.      Breath sounds: Normal breath sounds. No wheezing. No rales.   Cardiovascular:      PMI at left midclavicular line. Normal rate. Regular rhythm. Normal S1. Normal S2.      Murmurs: There is no murmur.      No gallop. No click. No rub.   Pulses:     Intact distal pulses.   Edema:     Peripheral edema absent.   Abdominal:      General: Bowel sounds are normal.      Palpations: Abdomen is soft. There is no abdominal mass.      Tenderness: There is no abdominal tenderness.   Musculoskeletal:      Cervical back: Normal range of motion and neck supple. Skin:     General: Skin is warm and dry.   Neurological:      Mental Status: Alert and oriented to person, place, and time.      Cranial Nerves: No cranial nerve deficit.         Lab Results   Component Value Date    PSA 0.404 05/09/2019        ECG 12 Lead    Date/Time: 12/14/2021 12:42 PM  Performed by: Tariq Chávez MD  Authorized by: Tarqi Chávez MD   Comparison: compared with previous ECG from 8/2/2021  Similar to previous ECG  Rhythm: sinus rhythm  Conduction: conduction normal  ST Segments: ST segments normal  T Waves: T waves normal  Other findings: prolonged QTc interval  Comments: QTc: 488 ms            Assessment/Plan  :   Diagnosis Plan   1. History of syncope     2. Dizziness     3. Abnormal nuclear stress test with mild degree of small to medium size lateral myocardial ischemia on 9/3/2021.     4. Paroxysmal atrial fibrillation (HCC)     5. Chronic stable angina (HCC)     6. Essential hypertension, controlled.            Recommendations:  1. In view of recurrent dizziness, I discussed with him about the option of internal loop recorder implantation to monitor for any infrequent cardiac arrhythmias causing his dizziness and previous history of syncope.  Patient wants to think about it and decide and let us know.  2. Continue with aspirin and warfarin..  3. We will continue with sotalol but decrease the dose to 120 mg p.o. twice  daily due to prolonged QTC of 488 ms.    Return in about 3 months (around 3/14/2022) for or sooner if needed.    As always, Edmundo Boston MD  I appreciate very much the opportunity to participate in the cardiovascular care of your patients. Please do not hesitate to call me with any questions with regards to Joel Galo's evaluation and management.       With Best Regards,        Tariq Chávez MD, FACC    Please note that portions of this note were completed with a voice recognition program.

## 2021-12-16 LAB
BUN SERPL-MCNC: 19 MG/DL (ref 6–24)
BUN/CREAT SERPL: 20 (ref 9–20)
CALCIUM SERPL-MCNC: 9.4 MG/DL (ref 8.7–10.2)
CHLORIDE SERPL-SCNC: 103 MMOL/L (ref 96–106)
CO2 SERPL-SCNC: 20 MMOL/L (ref 20–29)
CREAT SERPL-MCNC: 0.97 MG/DL (ref 0.76–1.27)
GLUCOSE SERPL-MCNC: 223 MG/DL (ref 65–99)
POTASSIUM SERPL-SCNC: 4.3 MMOL/L (ref 3.5–5.2)
SODIUM SERPL-SCNC: 139 MMOL/L (ref 134–144)

## 2021-12-22 DIAGNOSIS — R33.8 URINARY RETENTION DUE TO BENIGN PROSTATIC HYPERPLASIA: ICD-10-CM

## 2021-12-22 DIAGNOSIS — N40.1 URINARY RETENTION DUE TO BENIGN PROSTATIC HYPERPLASIA: ICD-10-CM

## 2021-12-23 RX ORDER — TAMSULOSIN HYDROCHLORIDE 0.4 MG/1
CAPSULE ORAL
Qty: 90 CAPSULE | Refills: 3 | Status: SHIPPED | OUTPATIENT
Start: 2021-12-23 | End: 2022-10-25

## 2022-10-24 DIAGNOSIS — R33.8 URINARY RETENTION DUE TO BENIGN PROSTATIC HYPERPLASIA: ICD-10-CM

## 2022-10-24 DIAGNOSIS — N40.1 URINARY RETENTION DUE TO BENIGN PROSTATIC HYPERPLASIA: ICD-10-CM

## 2022-10-25 RX ORDER — TAMSULOSIN HYDROCHLORIDE 0.4 MG/1
CAPSULE ORAL
Qty: 90 CAPSULE | Refills: 3 | Status: SHIPPED | OUTPATIENT
Start: 2022-10-25

## 2023-02-21 ENCOUNTER — OFFICE VISIT (OUTPATIENT)
Dept: CARDIOLOGY | Facility: CLINIC | Age: 58
End: 2023-02-21
Payer: COMMERCIAL

## 2023-02-21 VITALS
BODY MASS INDEX: 34.43 KG/M2 | HEART RATE: 79 BPM | DIASTOLIC BLOOD PRESSURE: 80 MMHG | WEIGHT: 227.2 LBS | SYSTOLIC BLOOD PRESSURE: 115 MMHG | OXYGEN SATURATION: 97 % | HEIGHT: 68 IN

## 2023-02-21 DIAGNOSIS — I48.0 PAROXYSMAL ATRIAL FIBRILLATION: ICD-10-CM

## 2023-02-21 DIAGNOSIS — R55 SYNCOPE AND COLLAPSE: ICD-10-CM

## 2023-02-21 DIAGNOSIS — R42 DIZZINESS: Primary | ICD-10-CM

## 2023-02-21 PROCEDURE — 99214 OFFICE O/P EST MOD 30 MIN: CPT | Performed by: NURSE PRACTITIONER

## 2023-02-21 NOTE — PROGRESS NOTES
[facility]             [facility] ZIA Parks Shawn Shadell, MD  Joel Galo  1965 02/21/2023    Patient Active Problem List   Diagnosis   • Urinary retention due to benign prostatic hyperplasia   • Dizziness   • Syncope and collapse   • Paroxysmal atrial fibrillation (HCC)       Dear Edmundo Boston MD:    Subjective     Chief Complaint   Patient presents with   • Follow-up     ROUTINE       HPI:     This is a 57 y.o. male with known past medical history of syncope, paroxysmal atrial fibrillation and abnormal stress test.    Joel Galo presents today for routine cardiology follow up.  Patient states he continues to have dizziness on a daily basis with intermittent syncope from time to time.  Does have a burn on his left arm from recent syncopal episode.  He states he can get up from his chair and walk a small distance and feel okay but then when he stops he becomes very dizzy and his legs get weak and he falls.  He does not necessarily blackout or pass out on a daily basis he merely gets weak and falls.  Denies any associated palpitations, chest pain or shortness of breath.  Blood pressure controlled in the office today.  States he has been taking meclizine but feels this has not improved his symptoms.  No recent lab work on file by his PCP.  He does take Coumadin which is managed by his PCP for his atrial fibrillation.  Also takes sotalol.  He had a cardiac event monitor in August 2021 which showed normal sinus rhythm with no arrhythmias noted.  It was discussed at last visit about possible loop recorder implantation however patient wanted to think about this.    • Diagnostic Testing  1. Echocardiogram 9/2020: EF 55% moderate aortic valve sclerosis  2. Cardiac event monitor 8/2021: Predominantly normal sinus rhythm with no arrhythmias noted  3. Nuclear stress test 9/2021: Small size mild to moderate degree of ischemia in the lateral wall     All  other systems were reviewed and were negative.    Patient Active Problem List   Diagnosis   • Urinary retention due to benign prostatic hyperplasia   • Dizziness   • Syncope and collapse   • Paroxysmal atrial fibrillation (HCC)       family history includes Diabetes in his father; Heart disease in his mother; Kidney disease in his father.     reports that he has never smoked. He has never used smokeless tobacco. He reports that he does not drink alcohol and does not use drugs.    No Known Allergies      Current Outpatient Medications:   •  amLODIPine-benazepril (LOTREL) 10-20 MG per capsule, Take 1 capsule by mouth Daily., Disp: , Rfl:   •  aspirin 81 MG EC tablet, Take 81 mg by mouth Daily., Disp: , Rfl:   •  buPROPion XL (WELLBUTRIN XL) 300 MG 24 hr tablet, Take 300 mg by mouth Daily., Disp: , Rfl:   •  DULoxetine (CYMBALTA) 60 MG capsule, Take 60 mg by mouth Daily., Disp: , Rfl:   •  empagliflozin (JARDIANCE) 25 MG tablet tablet, Take  by mouth Daily., Disp: , Rfl:   •  finasteride (PROSCAR) 5 MG tablet, TAKE 1 TABLET EVERY DAY, Disp: 90 tablet, Rfl: 0  •  HYDROcodone-acetaminophen (NORCO)  MG per tablet, Take 1 tablet by mouth Every 8 (Eight) Hours As Needed for Moderate Pain ., Disp: , Rfl:   •  levothyroxine (SYNTHROID, LEVOTHROID) 25 MCG tablet, Take 25 mcg by mouth Daily., Disp: , Rfl:   •  NOVOLIN N 100 UNIT/ML injection, INJECT 15 TO 25 UNITS SC HS AS DIRECTED, Disp: , Rfl: 0  •  rosuvastatin (CRESTOR) 10 MG tablet, Take 10 mg by mouth Daily., Disp: , Rfl:   •  Sotalol HCl AF 80 MG tablet, Take 1.5 tablets by mouth 2 (Two) Times a Day., Disp: 270 tablet, Rfl: 3  •  warfarin (COUMADIN) 2.5 MG tablet, Take 2.5 mg by mouth Daily., Disp: , Rfl:   •  gabapentin (NEURONTIN) 300 MG capsule, Take 300 mg by mouth 3 (Three) Times a Day., Disp: , Rfl:   •  indapamide (LOZOL) 2.5 MG tablet, Take 2.5 mg by mouth Every Morning., Disp: , Rfl:   •  insulin regular (humuLIN R,novoLIN R) 100 UNIT/ML injection, Inject   under the skin into the appropriate area as directed 3 (Three) Times a Day Before Meals., Disp: , Rfl:   •  pantoprazole (PROTONIX) 40 MG EC tablet, Take 40 mg by mouth Daily., Disp: , Rfl: 2  •  tamsulosin (FLOMAX) 0.4 MG capsule 24 hr capsule, TAKE 1 CAPSULE EVERY DAY, Disp: 90 capsule, Rfl: 3      Physical Exam:  I have reviewed the patient's current vital signs as documented in the patient's EMR.   Vitals:    02/21/23 1006   BP: 115/80   Pulse: 79   SpO2: 97%     Body mass index is 34.55 kg/m².       02/21/23  1006   Weight: 103 kg (227 lb 3.2 oz)      Physical Exam  Constitutional:       General: He is not in acute distress.     Appearance: Normal appearance. He is well-developed.   HENT:      Head: Normocephalic and atraumatic.      Mouth/Throat:      Mouth: Mucous membranes are moist.   Eyes:      Extraocular Movements: Extraocular movements intact.      Pupils: Pupils are equal, round, and reactive to light.   Neck:      Vascular: No JVD.   Cardiovascular:      Rate and Rhythm: Normal rate and regular rhythm.      Pulses:           Dorsalis pedis pulses are 2+ on the right side and 2+ on the left side.        Posterior tibial pulses are 2+ on the right side and 2+ on the left side.      Heart sounds: Normal heart sounds. No murmur heard.    No S3 or S4 sounds.   Pulmonary:      Effort: Pulmonary effort is normal. No respiratory distress.      Breath sounds: Normal breath sounds. No wheezing.   Abdominal:      General: Bowel sounds are normal. There is no distension.      Palpations: Abdomen is soft. There is no hepatomegaly.      Tenderness: There is no abdominal tenderness.   Musculoskeletal:         General: Normal range of motion.      Cervical back: Normal range of motion and neck supple.   Skin:     General: Skin is warm and dry.      Coloration: Skin is not jaundiced or pale.   Neurological:      General: No focal deficit present.      Mental Status: He is alert and oriented to person, place, and time.  Mental status is at baseline.   Psychiatric:         Mood and Affect: Mood normal.         Behavior: Behavior normal.         Thought Content: Thought content normal.         Judgment: Judgment normal.          DATA REVIEWED:     TTE/DARIAN:  Results for orders placed during the hospital encounter of 09/04/20    Adult Transthoracic Echo Complete W/ Cont if Necessary Per Protocol    Interpretation Summary  · Estimated EF = 55%.  · Left ventricular systolic function is normal.  · Moderate Aortic vavle sclerosis present  · No significant valvular abnormality noted  · No previous echo      Laboratory evaluations:    Lab Results   Component Value Date    GLUCOSE 223 (H) 12/15/2021    BUN 19 12/15/2021    CREATININE 0.97 12/15/2021    EGFRIFNONA 86 12/15/2021    EGFRIFAFRI 99 12/15/2021    BCR 20 12/15/2021    K 4.3 12/15/2021    CO2 20 12/15/2021    CALCIUM 9.4 12/15/2021     No results found for: WBC, HGB, HCT, MCV, PLT  No results found for: CHOL, CHLPL, TRIG, HDL, LDL, LDLDIRECT  No results found for: TSH, C9TTDPF, Z1RVSZO, THYROIDAB  No results found for: HGBA1C  No results found for: ALT  No results found for: HGBA1C  Lab Results   Component Value Date    CREATININE 0.97 12/15/2021     No results found for: IRON, TIBC, FERRITIN  No results found for: INR, PROTIME        --------------------------------------------------------------------------------------------------------------------------    ASSESSMENT/PLAN:      Diagnosis Plan   1. Dizziness  US Carotid Bilateral    Adult Transthoracic Echo Complete w/ Color, Spectral and Contrast if necessary per protocol    Case Request Cath Lab: Loop insertion      2. Syncope and collapse        3. Paroxysmal atrial fibrillation (HCC)            1. Dizziness  2. Syncope  • Patient has had persistent dizziness with intermittent syncope.  Was started on meclizine by PCP which did not improve his symptoms.  Will obtain carotid ultrasound and repeat echocardiogram to rule out causes  of dizziness.  At this time it is unclear exactly what is causing patient's dizziness and syncope.  Discussed further about loop recorder insertion for longer monitoring period to evaluate for any arrhythmias which may be causing his symptoms.  He is agreeable for this therefore we will go ahead and schedule loop recorder insertion.  I have asked him to coordinate with his PCP regarding the need for holding his Coumadin prior to the procedure.    3. Paroxysmal atrial fibrillation  • Currently in normal sinus rhythm.  Continue with warfarin for stroke prevention which is managed by his PCP.  Continue sotalol at current dosing      This document has been @Electronically signed by ZIA Melnedez, 02/21/23, 9:45 AM EST.       Dictated Utilizing Dragon Dictation: Part of this note may be an electronic transcription/translation of spoken language to printed text using the Dragon Dictation System.    Follow-up appointment and medication changes provided in hand delivered After Visit Summary as well as reviewed in the room.

## 2023-02-27 ENCOUNTER — TELEPHONE (OUTPATIENT)
Dept: CARDIOLOGY | Facility: CLINIC | Age: 58
End: 2023-02-27

## 2023-02-27 NOTE — TELEPHONE ENCOUNTER
Requested.         ----- Message from ZIA Flynn sent at 2/21/2023 10:29 AM EST -----  Please request labs from PCP

## 2023-03-10 ENCOUNTER — HOSPITAL ENCOUNTER (OUTPATIENT)
Dept: CARDIOLOGY | Facility: HOSPITAL | Age: 58
Discharge: HOME | End: 2023-03-10
Payer: MEDICARE

## 2023-03-10 DIAGNOSIS — R42 DIZZINESS: ICD-10-CM

## 2023-03-10 LAB
BH CV ECHO MEAS - AO MAX PG: 4.8 MMHG
BH CV ECHO MEAS - AO MEAN PG: 2 MMHG
BH CV ECHO MEAS - AO ROOT DIAM: 2.9 CM
BH CV ECHO MEAS - AO V2 MAX: 110 CM/SEC
BH CV ECHO MEAS - AO V2 VTI: 20.6 CM
BH CV ECHO MEAS - EDV(CUBED): 64 ML
BH CV ECHO MEAS - EDV(MOD-SP4): 42.6 ML
BH CV ECHO MEAS - EF(MOD-SP4): 61.5 %
BH CV ECHO MEAS - ESV(CUBED): 42.9 ML
BH CV ECHO MEAS - ESV(MOD-SP4): 16.4 ML
BH CV ECHO MEAS - FS: 12.5 %
BH CV ECHO MEAS - IVS/LVPW: 0.94 CM
BH CV ECHO MEAS - IVSD: 1.6 CM
BH CV ECHO MEAS - LA DIMENSION: 4.3 CM
BH CV ECHO MEAS - LAT PEAK E' VEL: 9.5 CM/SEC
BH CV ECHO MEAS - LV DIASTOLIC VOL/BSA (35-75): 19.8 CM2
BH CV ECHO MEAS - LV MASS(C)D: 271 GRAMS
BH CV ECHO MEAS - LV SYSTOLIC VOL/BSA (12-30): 7.6 CM2
BH CV ECHO MEAS - LVIDD: 4 CM
BH CV ECHO MEAS - LVIDS: 3.5 CM
BH CV ECHO MEAS - LVOT AREA: 3.8 CM2
BH CV ECHO MEAS - LVOT DIAM: 2.2 CM
BH CV ECHO MEAS - LVPWD: 1.7 CM
BH CV ECHO MEAS - MED PEAK E' VEL: 7.2 CM/SEC
BH CV ECHO MEAS - MV A MAX VEL: 70.3 CM/SEC
BH CV ECHO MEAS - MV E MAX VEL: 74.2 CM/SEC
BH CV ECHO MEAS - MV E/A: 1.06
BH CV ECHO MEAS - PA ACC TIME: 0.11 SEC
BH CV ECHO MEAS - PA PR(ACCEL): 31.3 MMHG
BH CV ECHO MEAS - RAP SYSTOLE: 10 MMHG
BH CV ECHO MEAS - RVSP: 35.6 MMHG
BH CV ECHO MEAS - SI(MOD-SP4): 12.1 ML/M2
BH CV ECHO MEAS - SV(MOD-SP4): 26.2 ML
BH CV ECHO MEAS - TAPSE (>1.6): 2.18 CM
BH CV ECHO MEAS - TR MAX PG: 25.6 MMHG
BH CV ECHO MEAS - TR MAX VEL: 253 CM/SEC
BH CV ECHO MEASUREMENTS AVERAGE E/E' RATIO: 8.89
LEFT ATRIUM VOLUME INDEX: 18.9 ML/M2
MAXIMAL PREDICTED HEART RATE: 161 BPM
STRESS TARGET HR: 137 BPM

## 2023-03-10 PROCEDURE — 93880 EXTRACRANIAL BILAT STUDY: CPT | Performed by: RADIOLOGY

## 2023-03-10 PROCEDURE — 93880 EXTRACRANIAL BILAT STUDY: CPT

## 2023-03-10 PROCEDURE — 93306 TTE W/DOPPLER COMPLETE: CPT | Performed by: SPECIALIST

## 2023-03-10 PROCEDURE — 93306 TTE W/DOPPLER COMPLETE: CPT

## 2023-03-16 ENCOUNTER — TELEPHONE (OUTPATIENT)
Dept: CARDIOLOGY | Facility: CLINIC | Age: 58
End: 2023-03-16
Payer: MEDICARE

## 2023-03-16 NOTE — TELEPHONE ENCOUNTER
Hub staff attempted to follow warm transfer process and was unsuccessful     Caller: MALATHI    Relationship to patient: [employer]    Best call back number: [phone]    Patient is needing: [employer] CALLING TO GET DATE OF PATIENT'S LOOP RECORDER IMPLANT SO THEY CAN ADVISE PATIENT WHEN TO STOP BLOOD THINNERS.

## 2023-03-22 DIAGNOSIS — R42 DIZZINESS: Primary | ICD-10-CM

## 2023-03-22 DIAGNOSIS — R55 SYNCOPE AND COLLAPSE: ICD-10-CM

## 2023-03-29 NOTE — TELEPHONE ENCOUNTER
Hub can read.     Called Natasha to advise her that the loop removal is schedule for 4/5 no answer LM.

## 2023-04-05 ENCOUNTER — HOSPITAL ENCOUNTER (OUTPATIENT)
Facility: HOSPITAL | Age: 58
Setting detail: HOSPITAL OUTPATIENT SURGERY
Discharge: HOME | End: 2023-04-05
Attending: INTERNAL MEDICINE | Admitting: INTERNAL MEDICINE
Payer: MEDICARE

## 2023-04-05 VITALS
WEIGHT: 230 LBS | HEART RATE: 75 BPM | TEMPERATURE: 98.6 F | HEIGHT: 68 IN | OXYGEN SATURATION: 98 % | DIASTOLIC BLOOD PRESSURE: 94 MMHG | BODY MASS INDEX: 34.86 KG/M2 | RESPIRATION RATE: 18 BRPM | SYSTOLIC BLOOD PRESSURE: 144 MMHG

## 2023-04-05 DIAGNOSIS — R42 DIZZINESS: ICD-10-CM

## 2023-04-05 PROCEDURE — 99222 1ST HOSP IP/OBS MODERATE 55: CPT | Performed by: INTERNAL MEDICINE

## 2023-04-05 PROCEDURE — 33285 INSJ SUBQ CAR RHYTHM MNTR: CPT | Performed by: INTERNAL MEDICINE

## 2023-04-05 PROCEDURE — C1764 EVENT RECORDER, CARDIAC: HCPCS | Performed by: INTERNAL MEDICINE

## 2023-04-05 DEVICE — ICM LP/RECRD JOT/DX DM4500: Type: IMPLANTABLE DEVICE | Site: CHEST | Status: FUNCTIONAL

## 2023-04-05 RX ORDER — POLYETHYLENE GLYCOL 3350 17 G/17G
17 POWDER, FOR SOLUTION ORAL DAILY
COMMUNITY

## 2023-04-05 RX ORDER — SODIUM CHLORIDE 9 MG/ML
INJECTION, SOLUTION INTRAVENOUS
Status: DISCONTINUED | OUTPATIENT
Start: 2023-04-05 | End: 2023-04-05 | Stop reason: HOSPADM

## 2023-04-05 RX ORDER — LIDOCAINE HYDROCHLORIDE 20 MG/ML
INJECTION, SOLUTION INFILTRATION; PERINEURAL
Status: DISCONTINUED | OUTPATIENT
Start: 2023-04-05 | End: 2023-04-05 | Stop reason: HOSPADM

## 2023-04-05 NOTE — H&P
"[facility]   HISTORY AND PHYSICAL    Patient Name: Joel Galo  : 1965  MRN: [id number]  Primary Care Physician:  Edmundo Boston MD  Date of admission: 2023    Subjective   Subjective     Chief Complaint: Recurrent dizziness and syncope    History of Present Illness Mr. Galo is a 57-year-old  male who is being followed in our office for complaints of recurrent episodes of dizziness and syncope of unclear etiology.  So far his cardiac evaluation has been unremarkable.  Hence he was given the option of insertion of internal loop recorder to look for any evidence of intermittent significant tachycardia or bradycardia causing his dizziness and syncope.  The procedure and potential risks and benefits were explained to the patient and patient is agreeable.  He is brought in today for the same.    Review of Systems is mainly positive for recurrent episodes of dizziness and syncope.  No complaints of chest pains or shortness of breath.  All other systems reviewed and negative.    Personal History     Past Medical History:   Diagnosis Date   • A-fib    • Diabetes    • Heart attack     \"5-6 years ago\"   • Hypertension        Past Surgical History:   Procedure Laterality Date   • COLONOSCOPY     • ENDOSCOPY         Family History: family history includes Diabetes in his father; Heart disease in his mother; Kidney disease in his father. Otherwise pertinent FHx was reviewed and not pertinent to current issue.    Social History:  reports that he has never smoked. He has never used smokeless tobacco. He reports that he does not drink alcohol and does not use drugs.    Home Medications:  DULoxetine, HYDROcodone-acetaminophen, Sotalol HCl AF, amLODIPine-benazepril, aspirin, buPROPion XL, empagliflozin, finasteride, gabapentin, indapamide, insulin NPH, insulin regular, levothyroxine, pantoprazole, rosuvastatin, tamsulosin, and warfarin    Allergies:  No Known Allergies    Objective    Objective "     Vitals:  Per nurses notes.       Physical Exam  Constitutional:       Appearance: He is well-developed.   HENT:      Head: Normocephalic and atraumatic.   Eyes:      General:         Right eye: No discharge.         Left eye: No discharge.   Neck:      Thyroid: No thyromegaly.      Vascular: No JVD.      Trachea: No tracheal deviation.   Cardiovascular:      Chest Wall: PMI is not displaced.      Heart sounds: S1 normal and S2 normal. Murmur heard.    Systolic murmur is present with a grade of 2/6.    No friction rub. No gallop.   Pulmonary:      Effort: No respiratory distress.      Breath sounds: No stridor. No wheezing or rales.   Chest:      Chest wall: No tenderness.   Abdominal:      General: There is no distension.      Palpations: Abdomen is soft. There is no mass.      Tenderness: There is no abdominal tenderness. There is no guarding.   Skin:     General: Skin is warm and dry.      Findings: No erythema.   Neurological:      Mental Status: He is alert.       Assessment / Plan     Recurrent episodes of dizziness and syncope of unclear etiology.     Plan: Implantation of internal loop recorder.  The procedure, potential risks and benefits were explained to the patient and he expressed understanding of same and is wanting to proceed.      Tariq Chávez MD

## 2023-04-05 NOTE — NURSING NOTE
Patient/Family given discharge instructions, verbalizes understanding. Patient taken to POV per [facility] staff.

## 2023-04-26 ENCOUNTER — TELEPHONE (OUTPATIENT)
Dept: CARDIOLOGY | Facility: CLINIC | Age: 58
End: 2023-04-26
Payer: MEDICARE

## 2023-04-26 NOTE — TELEPHONE ENCOUNTER
I called Joel olaf this morning after reviewing his ILR transmission that included a patient triggered event claiming dizziness and fainted.  The episode starts as NSR with Trigeminal PACs, progresses a few seconds of A fib, and Back to NSR with Frequent PACS.  I wanted to ensure he did not loose consciousness during the episode and he stated that he never passed out, but felt as if he was going to.  He states that he is compliant with his current medication regimen, including Coumadin for A fib. He did not check his blood pressure after the incident so unsure of vital signs at the time. Other than that loop recorder sarabia show 5 episodes of A Fib RVR, longest episode lasting 11min 24sec, however patient does not report any symptoms with these episodes. He has an upcoming appointment 5/8/23. Full report is in [facility] for Dr Diana review.

## 2023-05-10 ENCOUNTER — OFFICE VISIT (OUTPATIENT)
Dept: CARDIOLOGY | Facility: CLINIC | Age: 58
End: 2023-05-10
Payer: MEDICARE

## 2023-05-10 VITALS
HEIGHT: 68 IN | OXYGEN SATURATION: 95 % | HEART RATE: 74 BPM | SYSTOLIC BLOOD PRESSURE: 117 MMHG | WEIGHT: 233.2 LBS | BODY MASS INDEX: 35.34 KG/M2 | DIASTOLIC BLOOD PRESSURE: 76 MMHG

## 2023-05-10 DIAGNOSIS — R55 SYNCOPE AND COLLAPSE: Primary | ICD-10-CM

## 2023-05-10 DIAGNOSIS — I48.0 PAROXYSMAL ATRIAL FIBRILLATION: ICD-10-CM

## 2023-05-10 DIAGNOSIS — R42 DIZZINESS: ICD-10-CM

## 2023-05-10 DIAGNOSIS — I95.1 AUTONOMIC ORTHOSTATIC HYPOTENSION: ICD-10-CM

## 2023-05-10 DIAGNOSIS — G47.33 OBSTRUCTIVE SLEEP APNEA: ICD-10-CM

## 2023-05-10 DIAGNOSIS — I49.1 PREMATURE ATRIAL CONTRACTION: ICD-10-CM

## 2023-05-10 NOTE — PROGRESS NOTES
[facility]             [facility] ZIA Parks Shawn Shadell, MD  Joel Galo  1965  05/10/2023    Patient Active Problem List   Diagnosis   • Urinary retention due to benign prostatic hyperplasia   • Dizziness   • Syncope and collapse   • Paroxysmal atrial fibrillation   • Obstructive sleep apnea   • Autonomic orthostatic hypotension       Nelyr Edmundo Boston MD:    Subjective     Chief Complaint   Patient presents with   • Follow-up     Loop recorder, echo, U/S, 8 week     • Med Management     List        HPI:     This is a 58 y.o. male with known past medical history of syncope, paroxysmal atrial fibrillation and abnormal stress test.     Joel Galo presents today for routine cardiology follow up.  Patient had loop recorder insertion with [employer] device in April 2023.  Echocardiogram showed normal LV function.  Carotid ultrasound was negative for any acute findings.  He did have a patient triggered event climbing dizziness and fainted.  Monitor correlated with normal sinus rhythm with trigeminal PACs which progresses to a few seconds of atrial fibrillation and back to normal sinus rhythm with frequent PACs.  Patient states that he has had a couple different episodes of syncope and dizziness particularly when he gets up from sitting and walks.  Is noted to have burn marks on his arm where he fell into his stove.  Orthostatic vitals were obtained which showed drop in systolic blood pressure from 118-90.         • Diagnostic Testing  1. Echocardiogram 9/2020: EF 55% moderate aortic valve sclerosis  2. Cardiac event monitor 8/2021: Predominantly normal sinus rhythm with no arrhythmias noted  3. Nuclear stress test 9/2021: Small size mild to moderate degree of ischemia in the lateral wall  4. Carotid ultrasound 3/2023: No acute findings  5. Echocardiogram 3/2023: EF 61 to 65%  6. Loop recorder insertion with Potter device 4/2023     All other systems  were reviewed and were negative.    Patient Active Problem List   Diagnosis   • Urinary retention due to benign prostatic hyperplasia   • Dizziness   • Syncope and collapse   • Paroxysmal atrial fibrillation   • Obstructive sleep apnea   • Autonomic orthostatic hypotension       family history includes Diabetes in his father; Heart disease in his mother; Kidney disease in his father.     reports that he has never smoked. He has never used smokeless tobacco. He reports that he does not drink alcohol and does not use drugs.    No Known Allergies      Current Outpatient Medications:   •  aspirin 81 MG EC tablet, Take 1 tablet by mouth Daily., Disp: , Rfl:   •  buPROPion XL (WELLBUTRIN XL) 300 MG 24 hr tablet, Take 1 tablet by mouth Daily., Disp: , Rfl:   •  DULoxetine (CYMBALTA) 60 MG capsule, Take 1 capsule by mouth Daily., Disp: , Rfl:   •  empagliflozin (JARDIANCE) 25 MG tablet tablet, Take  by mouth Daily., Disp: , Rfl:   •  finasteride (PROSCAR) 5 MG tablet, TAKE 1 TABLET EVERY DAY, Disp: 90 tablet, Rfl: 0  •  gabapentin (NEURONTIN) 300 MG capsule, Take 1 capsule by mouth 3 (Three) Times a Day., Disp: , Rfl:   •  HYDROcodone-acetaminophen (NORCO)  MG per tablet, Take 1 tablet by mouth Every 8 (Eight) Hours As Needed for Moderate Pain., Disp: , Rfl:   •  indapamide (LOZOL) 2.5 MG tablet, Take 1 tablet by mouth Every Morning., Disp: , Rfl:   •  insulin regular (humuLIN R,novoLIN R) 100 UNIT/ML injection, Inject  under the skin into the appropriate area as directed 3 (Three) Times a Day Before Meals., Disp: , Rfl:   •  levothyroxine (SYNTHROID, LEVOTHROID) 25 MCG tablet, Take 1 tablet by mouth Daily., Disp: , Rfl:   •  NOVOLIN N 100 UNIT/ML injection, INJECT 15 TO 25 UNITS SC HS AS DIRECTED, Disp: , Rfl: 0  •  pantoprazole (PROTONIX) 40 MG EC tablet, Take 1 tablet by mouth Daily., Disp: , Rfl: 2  •  polyethylene glycol (MIRALAX) 17 g packet, Take 17 g by mouth Daily., Disp: , Rfl:   •  rosuvastatin (CRESTOR) 10  MG tablet, Take 1 tablet by mouth Daily., Disp: , Rfl:   •  Sotalol HCl AF 80 MG tablet, Take 1.5 tablets by mouth 2 (Two) Times a Day., Disp: 270 tablet, Rfl: 3  •  tamsulosin (FLOMAX) 0.4 MG capsule 24 hr capsule, TAKE 1 CAPSULE EVERY DAY, Disp: 90 capsule, Rfl: 3  •  warfarin (COUMADIN) 2.5 MG tablet, Take 1 tablet by mouth Daily., Disp: , Rfl:       Physical Exam:  I have reviewed the patient's current vital signs as documented in the patient's EMR.   Vitals:    05/10/23 1018   BP: 117/76   Pulse: 74   SpO2: 95%     Body mass index is 35.46 kg/m².       05/10/23  1018   Weight: 106 kg (233 lb 3.2 oz)      Physical Exam  Constitutional:       General: He is not in acute distress.     Appearance: Normal appearance. He is well-developed.   HENT:      Head: Normocephalic and atraumatic.      Mouth/Throat:      Mouth: Mucous membranes are moist.   Eyes:      Extraocular Movements: Extraocular movements intact.      Pupils: Pupils are equal, round, and reactive to light.   Neck:      Vascular: No JVD.   Cardiovascular:      Rate and Rhythm: Normal rate and regular rhythm.      Heart sounds: Normal heart sounds. No murmur heard.    No S3 or S4 sounds.   Pulmonary:      Effort: Pulmonary effort is normal. No respiratory distress.      Breath sounds: Normal breath sounds. No wheezing.   Abdominal:      General: Bowel sounds are normal. There is no distension.      Palpations: Abdomen is soft. There is no hepatomegaly.      Tenderness: There is no abdominal tenderness.   Musculoskeletal:         General: Normal range of motion.      Cervical back: Normal range of motion and neck supple.   Skin:     General: Skin is warm and dry.      Coloration: Skin is not jaundiced or pale.   Neurological:      General: No focal deficit present.      Mental Status: He is alert and oriented to person, place, and time. Mental status is at baseline.   Psychiatric:         Mood and Affect: Mood normal.         Behavior: Behavior normal.          Thought Content: Thought content normal.         Judgment: Judgment normal.            DATA REVIEWED:     TTE/DARIAN:  Results for orders placed during the hospital encounter of 03/10/23    Adult Transthoracic Echo Complete w/ Color, Spectral and Contrast if necessary per protocol    Interpretation Summary  •  Left ventricular systolic function is normal. Left ventricular ejection fraction appears to be 61 - 65%.  •  Left ventricular wall thickness is consistent with mild to moderate concentric hypertrophy. Sigmoid-shaped ventricular septum is present.  •  Left ventricular diastolic function is consistent with (grade I) impaired relaxation.  •  Estimated right ventricular systolic pressure from tricuspid regurgitation is mildly elevated (35-45 mmHg).      Laboratory evaluations:    Lab Results   Component Value Date    GLUCOSE 223 (H) 12/15/2021    BUN 19 12/15/2021    CREATININE 0.97 12/15/2021    EGFRIFNONA 86 12/15/2021    EGFRIFAFRI 99 12/15/2021    BCR 20 12/15/2021    K 4.3 12/15/2021    CO2 20 12/15/2021    CALCIUM 9.4 12/15/2021     No results found for: WBC, HGB, HCT, MCV, PLT  No results found for: CHOL, CHLPL, TRIG, HDL, LDL, LDLDIRECT  No results found for: TSH, Q9EWNBY, Y5KZARQ, THYROIDAB  No results found for: HGBA1C  No results found for: ALT  No results found for: HGBA1C  Lab Results   Component Value Date    CREATININE 0.97 12/15/2021     No results found for: IRON, TIBC, FERRITIN  No results found for: INR, PROTIME          ECG 12 Lead    Date/Time: 5/10/2023 1:06 PM  Performed by: Blossom Parks APRN  Authorized by: Blossom Parks APRN   Comparison: compared with previous ECG   Rhythm: sinus rhythm  Ectopy: atrial premature contractions  Other findings: non-specific ST-T wave changes    Clinical impression: non-specific ECG          --------------------------------------------------------------------------------------------------------------------------    ASSESSMENT/PLAN:       Diagnosis Plan   1. Syncope and collapse        2. Paroxysmal atrial fibrillation  Ambulatory Referral to Cardiac Electrophysiology      3. Dizziness        4. Premature atrial contraction  Ambulatory Referral to Cardiac Electrophysiology      5. Obstructive sleep apnea        6. Autonomic orthostatic hypotension            1. Dizziness  2. Syncope  3. Orthostatic hypotension  • Orthostatic vitals obtained in the office today which show systolic blood pressure dropping from 118-90.  This may be contributing to patient's dizziness.  I have advised him to use compression stockings, increase water intake and also take his time when going from lying to sitting and sitting to standing or with any position change.  Temporarily stop Lotrel as this may be worsening his hypotension.    4. Frequent PACs  5. Paroxysmal atrial fibrillation  • Patient had triggered episode on loop recorder interrogation of dizziness and presyncope showing frequent PACs and intermittent runs of atrial fibrillation.  I did discuss with Dr. Pinto who recommended referral to electrophysiology given he is on high-dose antiarrhythmic with sotalol 120 mg twice daily with also his contributing symptoms of dizziness.  Referral placed to EP.  • Patient currently on Coumadin which is managed by his PCP.  I will check on cost of Eliquis or Xarelto to see if this is affordable and switch him if it is.    6.  Obstructive sleep apnea  · Unable to tolerate CPAP.      This document has been @Electronically signed by ZIA Melendez, 05/10/23, 10:26 AM EDT.       Dictated Utilizing Dragon Dictation: Part of this note may be an electronic transcription/translation of spoken language to printed text using the Dragon Dictation System.    Follow-up appointment and medication changes provided in hand delivered After Visit Summary as well as reviewed in the room.

## 2023-05-11 ENCOUNTER — TELEPHONE (OUTPATIENT)
Dept: CARDIOLOGY | Facility: CLINIC | Age: 58
End: 2023-05-11
Payer: MEDICARE

## 2023-05-11 NOTE — TELEPHONE ENCOUNTER
Called [facility] and the co-pay for the medications.   Eliquis:  $10.35  Xarelto:  $10.35 this one has to be sent in for a 90 day supply.       ----- Message from BlossomZIA Cruz sent at 5/10/2023 10:46 AM EDT -----  Please check on cost of eliquis and xarelto

## 2023-07-06 PROBLEM — K21.9 GERD (GASTROESOPHAGEAL REFLUX DISEASE): Status: ACTIVE | Noted: 2023-07-06

## 2023-07-06 PROBLEM — R42 DIZZINESS: Status: RESOLVED | Noted: 2023-02-21 | Resolved: 2023-07-06

## 2023-07-06 PROBLEM — R94.39 ABNORMAL NUCLEAR STRESS TEST: Status: ACTIVE | Noted: 2023-07-06

## 2023-07-06 PROBLEM — Z79.01 CHRONIC ANTICOAGULATION: Status: ACTIVE | Noted: 2023-07-06

## 2023-07-06 PROBLEM — E03.9 HYPOTHYROIDISM (ACQUIRED): Status: ACTIVE | Noted: 2023-07-06

## 2023-07-06 PROBLEM — E11.9 DM (DIABETES MELLITUS), TYPE 2: Status: ACTIVE | Noted: 2023-07-06

## 2023-07-06 PROBLEM — E78.2 MIXED HYPERLIPIDEMIA: Status: ACTIVE | Noted: 2023-07-06

## 2023-07-06 PROBLEM — Z79.899 LONG TERM CURRENT USE OF ANTIARRHYTHMIC DRUG: Status: ACTIVE | Noted: 2023-07-06

## 2023-08-10 ENCOUNTER — TELEPHONE (OUTPATIENT)
Dept: CARDIOLOGY | Facility: CLINIC | Age: 58
End: 2023-08-10
Payer: MEDICARE

## 2023-09-18 ENCOUNTER — TELEPHONE (OUTPATIENT)
Dept: CARDIOLOGY | Facility: CLINIC | Age: 58
End: 2023-09-18
Payer: MEDICARE

## 2023-09-18 NOTE — TELEPHONE ENCOUNTER
Caller: BRENDA LISHA    Relationship: Emergency Contact    Best call back number: [phone]     What medications are you currently taking:   Current Outpatient Medications on File Prior to Visit   Medication Sig Dispense Refill    aspirin 81 MG EC tablet Take 1 tablet by mouth Daily.      buPROPion XL (WELLBUTRIN XL) 300 MG 24 hr tablet Take 1 tablet by mouth Daily.      dabigatran etexilate (Pradaxa) 150 MG capsu Take 1 capsule by mouth 2 (Two) Times a Day. 180 capsule 3    DULoxetine (CYMBALTA) 60 MG capsule Take 1 capsule by mouth Daily.      empagliflozin (JARDIANCE) 25 MG tablet tablet Take  by mouth Daily.      finasteride (PROSCAR) 5 MG tablet TAKE 1 TABLET EVERY DAY 90 tablet 0    gabapentin (NEURONTIN) 300 MG capsule Take 1 capsule by mouth 3 (Three) Times a Day.      glimepiride (AMARYL) 2 MG tablet Take 1 tablet by mouth 2 (Two) Times a Day.      HYDROcodone-acetaminophen (NORCO)  MG per tablet Take 1 tablet by mouth Every 8 (Eight) Hours As Needed for Moderate Pain.      indapamide (LOZOL) 2.5 MG tablet Take 1 tablet by mouth Every Morning. (Patient not taking: Reported on 7/7/2023)      insulin regular (humuLIN R,novoLIN R) 100 UNIT/ML injection Inject  under the skin into the appropriate area as directed 3 (Three) Times a Day Before Meals.      levothyroxine (SYNTHROID, LEVOTHROID) 25 MCG tablet Take 1 tablet by mouth Daily.      NOVOLIN N 100 UNIT/ML injection INJECT 15 TO 25 UNITS SC HS AS DIRECTED (Patient not taking: Reported on 7/7/2023)  0    polyethylene glycol (MIRALAX) 17 g packet Take 17 g by mouth Daily.      rosuvastatin (CRESTOR) 10 MG tablet Take 1 tablet by mouth Daily.      Sotalol HCl AF 80 MG tablet Take 1.5 tablets by mouth 2 (Two) Times a Day. 270 tablet 3    tamsulosin (FLOMAX) 0.4 MG capsule 24 hr capsule TAKE 1 CAPSULE EVERY DAY 90 capsule 3     No current facility-administered medications on file prior to visit.       Which medication are you concerned about: PARDAXA/  NORRIS     Who prescribed you this medication: JENNIFER     What are your concerns: PT WAS PUT ON PRADAXA AND IT HAS BEEN CHANGED TO ELIQUIS DUE TO INSURANCE NOT APPROVING THE PARDAXA . IS THIS SAFE FOR THE PT TO TAKE? PLEASE ADVISE.

## 2023-09-18 NOTE — TELEPHONE ENCOUNTER
Patient's wife is concerned because she went to the pharmacy and they gave him Eliquis instead of Pradaxa.    She said that Blossom LIZARRAGA had prescribed the Eliquis.    I instructed her to call their office because she saw them last. I let her know that she needs to clarify with them which medication that they want her to be on because Dr. Fishman sent in Pradaxa for him to take on 7/7/23.    She agreed and verbalized understanding.

## 2023-09-19 ENCOUNTER — TELEPHONE (OUTPATIENT)
Dept: CARDIOLOGY | Facility: CLINIC | Age: 58
End: 2023-09-19
Payer: MEDICARE

## 2023-09-19 NOTE — TELEPHONE ENCOUNTER
Called patient to inform them we had not received the remote transmission from their loop recorder.  The monitor had powered itself off.  They will plug up and turn monitor back on.

## 2023-10-20 PROCEDURE — 93298 REM INTERROG DEV EVAL SCRMS: CPT | Performed by: INTERNAL MEDICINE

## 2023-10-20 PROCEDURE — G2066 INTER DEVC REMOTE 30D: HCPCS | Performed by: INTERNAL MEDICINE

## 2023-10-26 NOTE — TELEPHONE ENCOUNTER
Caller: Joel Galo    Relationship: Self    Best call back number: [phone]    Requested Prescriptions:   Requested Prescriptions     Pending Prescriptions Disp Refills    Sotalol HCl AF 80 MG tablet 270 tablet 3     Sig: Take 1.5 tablets by mouth 2 (Two) Times a Day.        Pharmacy where request should be sent: [id number] [address] AT [facility] [address] - [phone] PH - [phone] FX     Last office visit with prescribing clinician: 7/10/2023   Last telemedicine visit with prescribing clinician: Visit date not found   Next office visit with prescribing clinician: 11/9/2023     Additional details provided by patient: PATIENT IS OUT OF MEDICATION    Does the patient have less than a 3 day supply:  [x] Yes  [] No    Would you like a call back once the refill request has been completed: [] Yes [] No    If the office needs to give you a call back, can they leave a voicemail: [] Yes [] No    Sandra Alcala Rep   10/26/23 11:20 EDT

## 2023-11-16 ENCOUNTER — OFFICE VISIT (OUTPATIENT)
Dept: CARDIOLOGY | Facility: CLINIC | Age: 58
End: 2023-11-16
Payer: MEDICARE

## 2023-11-16 VITALS
SYSTOLIC BLOOD PRESSURE: 145 MMHG | RESPIRATION RATE: 18 BRPM | HEART RATE: 75 BPM | OXYGEN SATURATION: 98 % | WEIGHT: 231 LBS | HEIGHT: 68 IN | DIASTOLIC BLOOD PRESSURE: 89 MMHG | BODY MASS INDEX: 35.01 KG/M2

## 2023-11-16 DIAGNOSIS — Z79.01 CHRONIC ANTICOAGULATION: ICD-10-CM

## 2023-11-16 DIAGNOSIS — R42 DIZZINESS: ICD-10-CM

## 2023-11-16 DIAGNOSIS — I48.0 PAROXYSMAL ATRIAL FIBRILLATION: Primary | ICD-10-CM

## 2023-11-16 DIAGNOSIS — G47.33 OBSTRUCTIVE SLEEP APNEA: ICD-10-CM

## 2023-11-16 DIAGNOSIS — I95.1 AUTONOMIC ORTHOSTATIC HYPOTENSION: ICD-10-CM

## 2023-11-16 NOTE — PROGRESS NOTES
[facility]             [facility] ZIA Parks Shawn Shadell, MD Paul ADAN Iraj  1965 11/16/2023    Patient Active Problem List   Diagnosis    Urinary retention due to benign prostatic hyperplasia    Dizziness    Syncope and collapse    Paroxysmal atrial fibrillation    Obstructive sleep apnea    Autonomic orthostatic hypotension    Hypothyroidism (acquired)    GERD (gastroesophageal reflux disease)    Chronic anticoagulation    Long term current use of antiarrhythmic drug    DM (diabetes mellitus), type 2    Mixed hyperlipidemia    Abnormal nuclear stress test       Dear Edmundo Boston MD:    Subjective     Chief Complaint   Patient presents with    Follow-up     ROUTINE       HPI:     This is a 58 y.o. male with known past medical history of syncope, paroxysmal atrial fibrillation and abnormal stress test.      Joel Galo presents today for routine cardiology follow up.  Patient states he is doing overall well since his last visit.  Continues to have dizziness when he bends over or changes positions however denies any syncope.  Denies any chest pain or shortness of breath.  Is not on Eliquis due to not being able to afford Pradaxa.  Denies any bleeding issues.  Remote monitoring from October 2023 showed some PACs but overall stable.    Diagnostic Testing  Echocardiogram 9/2020: EF 55% moderate aortic valve sclerosis  Cardiac event monitor 8/2021: Predominantly normal sinus rhythm with no arrhythmias noted  Nuclear stress test 9/2021: Small size mild to moderate degree of ischemia in the lateral wall  Carotid ultrasound 3/2023: No acute findings  Echocardiogram 3/2023: EF 61 to 65%  Loop recorder insertion with [employer] device 4/2023     All other systems were reviewed and were negative.    Patient Active Problem List   Diagnosis    Urinary retention due to benign prostatic hyperplasia    Dizziness    Syncope and collapse    Paroxysmal atrial  fibrillation    Obstructive sleep apnea    Autonomic orthostatic hypotension    Hypothyroidism (acquired)    GERD (gastroesophageal reflux disease)    Chronic anticoagulation    Long term current use of antiarrhythmic drug    DM (diabetes mellitus), type 2    Mixed hyperlipidemia    Abnormal nuclear stress test       family history includes Diabetes in his father; Heart disease in his mother; Kidney disease in his father.     reports that he has never smoked. He has never used smokeless tobacco. He reports that he does not drink alcohol and does not use drugs.    No Known Allergies      Current Outpatient Medications:     apixaban (ELIQUIS) 5 MG tablet tablet, Take 1 tablet by mouth 2 (Two) Times a Day., Disp: 60 tablet, Rfl: 4    aspirin 81 MG EC tablet, Take 1 tablet by mouth Daily., Disp: , Rfl:     buPROPion XL (WELLBUTRIN XL) 300 MG 24 hr tablet, Take 1 tablet by mouth Daily., Disp: , Rfl:     DULoxetine (CYMBALTA) 60 MG capsule, Take 1 capsule by mouth Daily., Disp: , Rfl:     empagliflozin (JARDIANCE) 25 MG tablet tablet, Take  by mouth Daily., Disp: , Rfl:     finasteride (PROSCAR) 5 MG tablet, TAKE 1 TABLET EVERY DAY, Disp: 90 tablet, Rfl: 0    gabapentin (NEURONTIN) 300 MG capsule, Take 1 capsule by mouth 3 (Three) Times a Day., Disp: , Rfl:     glimepiride (AMARYL) 2 MG tablet, Take 1 tablet by mouth 2 (Two) Times a Day., Disp: , Rfl:     HYDROcodone-acetaminophen (NORCO)  MG per tablet, Take 1 tablet by mouth Every 8 (Eight) Hours As Needed for Moderate Pain., Disp: , Rfl:     insulin lispro protamine-insulin lispro (humaLOG 75-25) (75-25) 100 UNIT/ML suspension injection, Inject 32 Units under the skin into the appropriate area as directed 2 (Two) Times a Day With Meals., Disp: , Rfl:     levothyroxine (SYNTHROID, LEVOTHROID) 25 MCG tablet, Take 1 tablet by mouth Daily., Disp: , Rfl:     polyethylene glycol (MIRALAX) 17 g packet, Take 17 g by mouth Daily., Disp: , Rfl:     rosuvastatin (CRESTOR)  10 MG tablet, Take 1 tablet by mouth Daily., Disp: , Rfl:     Sotalol HCl AF 80 MG tablet, Take 1.5 tablets by mouth 2 (Two) Times a Day., Disp: 270 tablet, Rfl: 3    tamsulosin (FLOMAX) 0.4 MG capsule 24 hr capsule, TAKE 1 CAPSULE EVERY DAY, Disp: 90 capsule, Rfl: 3    indapamide (LOZOL) 2.5 MG tablet, Take 1 tablet by mouth Every Morning. (Patient not taking: Reported on 7/7/2023), Disp: , Rfl:     insulin regular (humuLIN R,novoLIN R) 100 UNIT/ML injection, Inject  under the skin into the appropriate area as directed 3 (Three) Times a Day Before Meals. (Patient not taking: Reported on 11/16/2023), Disp: , Rfl:     NOVOLIN N 100 UNIT/ML injection, INJECT 15 TO 25 UNITS SC HS AS DIRECTED (Patient not taking: Reported on 7/7/2023), Disp: , Rfl: 0      Physical Exam:  I have reviewed the patient's current vital signs as documented in the patient's EMR.   Vitals:    11/16/23 0837   BP: 145/89   Pulse: 75   Resp: 18   SpO2: 98%     Body mass index is 35.12 kg/m².       11/16/23  0837   Weight: 105 kg (231 lb)      Physical Exam  Constitutional:       General: He is not in acute distress.     Appearance: Normal appearance. He is well-developed.   HENT:      Head: Normocephalic and atraumatic.      Mouth/Throat:      Mouth: Mucous membranes are moist.   Eyes:      Extraocular Movements: Extraocular movements intact.      Pupils: Pupils are equal, round, and reactive to light.   Neck:      Vascular: No JVD.   Cardiovascular:      Rate and Rhythm: Normal rate and regular rhythm.      Heart sounds: Normal heart sounds. No murmur heard.     No S3 or S4 sounds.   Pulmonary:      Effort: Pulmonary effort is normal. No respiratory distress.      Breath sounds: Normal breath sounds. No wheezing.   Abdominal:      General: Bowel sounds are normal. There is no distension.      Palpations: Abdomen is soft. There is no hepatomegaly.      Tenderness: There is no abdominal tenderness.   Musculoskeletal:         General: Normal range  "of motion.      Cervical back: Normal range of motion and neck supple.   Skin:     General: Skin is warm and dry.      Coloration: Skin is not jaundiced or pale.   Neurological:      General: No focal deficit present.      Mental Status: He is alert and oriented to person, place, and time. Mental status is at baseline.   Psychiatric:         Mood and Affect: Mood normal.         Behavior: Behavior normal.         Thought Content: Thought content normal.         Judgment: Judgment normal.          DATA REVIEWED:     TTE/DARIAN:  Results for orders placed during the hospital encounter of 03/10/23    Adult Transthoracic Echo Complete w/ Color, Spectral and Contrast if necessary per protocol    Interpretation Summary    Left ventricular systolic function is normal. Left ventricular ejection fraction appears to be 61 - 65%.    Left ventricular wall thickness is consistent with mild to moderate concentric hypertrophy. Sigmoid-shaped ventricular septum is present.    Left ventricular diastolic function is consistent with (grade I) impaired relaxation.    Estimated right ventricular systolic pressure from tricuspid regurgitation is mildly elevated (35-45 mmHg).      Laboratory evaluations:    Lab Results   Component Value Date    GLUCOSE 223 (H) 12/15/2021    BUN 19 12/15/2021    CREATININE 0.97 12/15/2021    EGFRIFNONA 86 12/15/2021    EGFRIFAFRI 99 12/15/2021    BCR 20 12/15/2021    K 4.3 12/15/2021    CO2 20 12/15/2021    CALCIUM 9.4 12/15/2021     No results found for: \"WBC\", \"HGB\", \"HCT\", \"MCV\", \"PLT\"  No results found for: \"CHOL\", \"CHLPL\", \"TRIG\", \"HDL\", \"LDL\", \"LDLDIRECT\"  No results found for: \"TSH\", \"I5CDNKI\", \"Q6XFXYG\", \"THYROIDAB\"  No results found for: \"HGBA1C\"  No results found for: \"ALT\"  No results found for: \"HGBA1C\"  Lab Results   Component Value Date    CREATININE 0.97 12/15/2021     No results found for: \"IRON\", \"TIBC\", \"FERRITIN\"  No results found for: \"INR\", \"PROTIME\"    "     --------------------------------------------------------------------------------------------------------------------------    ASSESSMENT/PLAN:      Diagnosis Plan   1. Paroxysmal atrial fibrillation        2. Autonomic orthostatic hypotension        3. Obstructive sleep apnea        4. Chronic anticoagulation        5. Dizziness            Dizziness  Autonomic orthostatic hypotension  Continues to have intermittent dizziness particularly with position changes.  Does have history of orthostatic hypotension.  Advised him to use compression stockings, increased water intake and to take his time when going from sitting to standing or with any position change.  Also advised him to increase water intake and the importance of this.    Obstructive sleep apnea  Paroxysmal atrial fibrillation  Was recently seen by EP who started him on Pradaxa given he was on Coumadin with difficult to maintain INRs however he was unable to afford this and was placed on Eliquis with no issues.  He was also discussed about possible ablation given he is having breakthrough episodes on high-dose sotalol.  Recent remote monitoring stable.  He states he is thinking about the procedure and will call Dr. Estrada's office if he agrees to do this.  Continue with sotalol at current dosing.  Recent lab work shows stable kidney function.  Reports compliance with CPAP.      This document has been @Electronically signed by ZIA Melendez, 11/16/23, 8:41 AM EST.       Dictated Utilizing Dragon Dictation: Part of this note may be an electronic transcription/translation of spoken language to printed text using the Dragon Dictation System.    Follow-up appointment and medication changes provided in hand delivered After Visit Summary as well as reviewed in the room.

## 2023-12-05 ENCOUNTER — TELEPHONE (OUTPATIENT)
Dept: CARDIOLOGY | Facility: CLINIC | Age: 58
End: 2023-12-05
Payer: MEDICARE

## 2023-12-05 NOTE — TELEPHONE ENCOUNTER
Called patient to let them know that their insurance will be out of network for [facility] as of Jan 1, 2024 for device follow up and remote monitoring.  The last day to change their insurance plan is Dec 7th, 2023.  The patient plans to change to [employer] starting Jan 1, 2024

## 2024-01-08 ENCOUNTER — TELEPHONE (OUTPATIENT)
Dept: CARDIOLOGY | Facility: CLINIC | Age: 59
End: 2024-01-08
Payer: MEDICARE

## 2024-01-08 NOTE — TELEPHONE ENCOUNTER
I called the patient to let them know that their scheduled remote device transmission did not come through.  Patient will attempt to send one in, and will call for assistance if needed.  I left my return call number

## 2024-01-25 ENCOUNTER — TELEPHONE (OUTPATIENT)
Dept: CARDIOLOGY | Facility: CLINIC | Age: 59
End: 2024-01-25
Payer: MEDICARE

## 2024-01-25 NOTE — TELEPHONE ENCOUNTER
Per remote transmission, patient had 3 Tachy episodes with rates running 180 to 200 bpm on his loop recorder. All episodes occurred 01/24/24 between 11:00 and 12:30 pm. Longest duration was 6 mins 26 seconds. The rhythm is slightly irregular. I suspect he is having afib with rvr. He is in sinus rhythm in his presenting EGM. I called patient, no answer at either listed number. No voicemail to leave message. Called wifes listed numbers and it reports that calls can't be completed to this number. Report is in [employer] for review.

## 2024-01-26 NOTE — TELEPHONE ENCOUNTER
HUB TO RELAY  Attempted to call pt and let him know that loop recorder was showing possible afib and ask if he has been feeling symptomatic with it. If he has been he will need to follow up with EP to discuss possibility of needing ablation. Also wanted to let pt know that he will need to schedule with us and EP.

## 2024-01-29 NOTE — TELEPHONE ENCOUNTER
Pt has been symptomatic, made an appointment for 2/1, advised pt that he will need an appointment with EP and gave him a number to call.

## 2024-02-01 ENCOUNTER — OFFICE VISIT (OUTPATIENT)
Dept: CARDIOLOGY | Facility: CLINIC | Age: 59
End: 2024-02-01
Payer: MEDICARE

## 2024-02-01 VITALS
DIASTOLIC BLOOD PRESSURE: 89 MMHG | RESPIRATION RATE: 18 BRPM | WEIGHT: 229 LBS | SYSTOLIC BLOOD PRESSURE: 139 MMHG | OXYGEN SATURATION: 97 % | HEIGHT: 68 IN | BODY MASS INDEX: 34.71 KG/M2 | HEART RATE: 78 BPM

## 2024-02-01 DIAGNOSIS — I48.0 PAROXYSMAL ATRIAL FIBRILLATION: Primary | ICD-10-CM

## 2024-02-01 DIAGNOSIS — G47.33 OBSTRUCTIVE SLEEP APNEA: ICD-10-CM

## 2024-02-01 DIAGNOSIS — R00.2 PALPITATIONS: ICD-10-CM

## 2024-02-01 PROCEDURE — 1159F MED LIST DOCD IN RCRD: CPT | Performed by: NURSE PRACTITIONER

## 2024-02-01 PROCEDURE — 1160F RVW MEDS BY RX/DR IN RCRD: CPT | Performed by: NURSE PRACTITIONER

## 2024-02-01 PROCEDURE — 99214 OFFICE O/P EST MOD 30 MIN: CPT | Performed by: NURSE PRACTITIONER

## 2024-02-01 PROCEDURE — 93000 ELECTROCARDIOGRAM COMPLETE: CPT | Performed by: NURSE PRACTITIONER

## 2024-02-22 PROBLEM — R00.2 PALPITATIONS: Status: RESOLVED | Noted: 2024-02-01 | Resolved: 2024-02-22

## 2024-02-22 PROBLEM — R42 DIZZINESS: Status: RESOLVED | Noted: 2023-02-21 | Resolved: 2024-02-22

## 2024-02-23 ENCOUNTER — TELEPHONE (OUTPATIENT)
Dept: CARDIOLOGY | Facility: CLINIC | Age: 59
End: 2024-02-23

## 2024-02-23 NOTE — TELEPHONE ENCOUNTER
Name: Iraj Joel ARELLANO    Relationship: Self    Best Callback Number:     Incoming call to the Hub, requesting to  Reschedule their Device Check appointment on 2-23-24.     Per Hub workflow, further review is needed before the task can be completed.    Result of Call: Attempted warm transfer

## 2024-03-14 ENCOUNTER — TELEPHONE (OUTPATIENT)
Dept: CARDIOLOGY | Facility: CLINIC | Age: 59
End: 2024-03-14
Payer: MEDICARE

## 2024-03-14 NOTE — TELEPHONE ENCOUNTER
I called the patient to let them know that their scheduled remote device transmission did not come through.  They were not home but will try to send one once they arrive home, and will call for assistance if needed.  I left my return call number

## 2024-04-23 ENCOUNTER — TELEPHONE (OUTPATIENT)
Dept: CARDIOLOGY | Facility: CLINIC | Age: 59
End: 2024-04-23
Payer: MEDICARE

## 2024-04-24 ENCOUNTER — TELEPHONE (OUTPATIENT)
Dept: CARDIOLOGY | Facility: CLINIC | Age: 59
End: 2024-04-24
Payer: MEDICARE

## 2024-04-24 NOTE — TELEPHONE ENCOUNTER
"\"Leanna\" from [facility] called to ask if would be agreeable to dispense Sotalol HCL for a transfer rx they received from [facility] pharmacy which stated Sotalol HCL AF.   Per Blossom Parks, after discussion of this matter, I told pharmacist this would be ok.  "

## 2024-05-01 ENCOUNTER — TELEPHONE (OUTPATIENT)
Dept: CARDIOLOGY | Facility: CLINIC | Age: 59
End: 2024-05-01
Payer: MEDICARE

## 2024-05-01 ENCOUNTER — OFFICE VISIT (OUTPATIENT)
Dept: CARDIOLOGY | Facility: CLINIC | Age: 59
End: 2024-05-01
Payer: MEDICARE

## 2024-05-01 VITALS
OXYGEN SATURATION: 96 % | DIASTOLIC BLOOD PRESSURE: 88 MMHG | SYSTOLIC BLOOD PRESSURE: 134 MMHG | HEART RATE: 81 BPM | WEIGHT: 233 LBS | HEIGHT: 68 IN | BODY MASS INDEX: 35.31 KG/M2

## 2024-05-01 DIAGNOSIS — R94.39 ABNORMAL NUCLEAR STRESS TEST: Primary | ICD-10-CM

## 2024-05-01 DIAGNOSIS — I48.0 PAROXYSMAL ATRIAL FIBRILLATION: ICD-10-CM

## 2024-05-01 DIAGNOSIS — I95.1 AUTONOMIC ORTHOSTATIC HYPOTENSION: ICD-10-CM

## 2024-05-01 DIAGNOSIS — E78.2 MIXED HYPERLIPIDEMIA: ICD-10-CM

## 2024-05-01 DIAGNOSIS — Z79.899 LONG TERM CURRENT USE OF ANTIARRHYTHMIC DRUG: ICD-10-CM

## 2024-05-01 PROCEDURE — 99214 OFFICE O/P EST MOD 30 MIN: CPT | Performed by: NURSE PRACTITIONER

## 2024-05-01 PROCEDURE — 1159F MED LIST DOCD IN RCRD: CPT | Performed by: NURSE PRACTITIONER

## 2024-05-01 PROCEDURE — 1160F RVW MEDS BY RX/DR IN RCRD: CPT | Performed by: NURSE PRACTITIONER

## 2024-05-01 RX ORDER — LOSARTAN POTASSIUM 25 MG/1
25 TABLET ORAL DAILY
COMMUNITY

## 2024-05-01 RX ORDER — AMLODIPINE BESYLATE 2.5 MG/1
2.5 TABLET ORAL DAILY
COMMUNITY

## 2024-05-01 RX ORDER — ERGOCALCIFEROL (VITAMIN D2) 10 MCG
400 TABLET ORAL DAILY
COMMUNITY

## 2024-05-01 NOTE — PROGRESS NOTES
[facility]             [facility] ZIA Parks Shawn Shadell, MD  Joel Galo  1965 05/01/2024    Patient Active Problem List   Diagnosis    Urinary retention due to benign prostatic hyperplasia    Syncope and collapse    Paroxysmal atrial fibrillation    Obstructive sleep apnea    Autonomic orthostatic hypotension    Hypothyroidism (acquired)    GERD (gastroesophageal reflux disease)    Chronic anticoagulation    Long term current use of antiarrhythmic drug    DM (diabetes mellitus), type 2    Mixed hyperlipidemia    Abnormal nuclear stress test       Dear Edmundo Boston MD:    Subjective     Chief Complaint   Patient presents with    Med Management    Follow-up     Routine       HPI:     This is a 58 y.o. male with known past medical history of syncope, autonomic orthostatic hypotension, paroxysmal atrial fibrillation and abnormal stress test.        Joel Galo presents today for routine cardiology follow up.  Patient states he has been doing overall stable since his last visit.  Continues to have episodes of dizziness.  Loop recorder remote transmissions did show some intermittent episodes of atrial fibrillation with RVR.  Patient does report intermittent palpitations at times.  Blood pressure overall stable.  Is scheduled to see EP in 2 days.  Had recent lab work by PCP which is not available for review today    Diagnostic Testing  Echocardiogram 9/2020: EF 55% moderate aortic valve sclerosis  Cardiac event monitor 8/2021: Predominantly normal sinus rhythm with no arrhythmias noted  Nuclear stress test 9/2021: Small size mild to moderate degree of ischemia in the lateral wall  Carotid ultrasound 3/2023: No acute findings  Echocardiogram 3/2023: EF 61 to 65%  Loop recorder insertion with [employer] SA device 4/2023     All other systems were reviewed and were negative.    Patient Active Problem List   Diagnosis    Urinary retention due to benign  prostatic hyperplasia    Syncope and collapse    Paroxysmal atrial fibrillation    Obstructive sleep apnea    Autonomic orthostatic hypotension    Hypothyroidism (acquired)    GERD (gastroesophageal reflux disease)    Chronic anticoagulation    Long term current use of antiarrhythmic drug    DM (diabetes mellitus), type 2    Mixed hyperlipidemia    Abnormal nuclear stress test       family history includes Diabetes in his father; Heart disease in his mother; Kidney disease in his father.     reports that he has never smoked. He has never used smokeless tobacco. He reports that he does not drink alcohol and does not use drugs.    No Known Allergies      Current Outpatient Medications:     amLODIPine (NORVASC) 2.5 MG tablet, Take 1 tablet by mouth Daily., Disp: , Rfl:     apixaban (ELIQUIS) 5 MG tablet tablet, Take 1 tablet by mouth 2 (Two) Times a Day., Disp: 60 tablet, Rfl: 4    aspirin 81 MG EC tablet, Take 1 tablet by mouth Daily., Disp: , Rfl:     buPROPion XL (WELLBUTRIN XL) 300 MG 24 hr tablet, Take 1 tablet by mouth Daily., Disp: , Rfl:     DULoxetine (CYMBALTA) 60 MG capsule, Take 1 capsule by mouth Daily., Disp: , Rfl:     empagliflozin (JARDIANCE) 25 MG tablet tablet, Take  by mouth Daily., Disp: , Rfl:     finasteride (PROSCAR) 5 MG tablet, TAKE 1 TABLET EVERY DAY, Disp: 90 tablet, Rfl: 0    gabapentin (NEURONTIN) 300 MG capsule, Take 1 capsule by mouth 3 (Three) Times a Day., Disp: , Rfl:     glimepiride (AMARYL) 2 MG tablet, Take 1 tablet by mouth 2 (Two) Times a Day., Disp: , Rfl:     HYDROcodone-acetaminophen (NORCO)  MG per tablet, Take 1 tablet by mouth Every 8 (Eight) Hours As Needed for Moderate Pain., Disp: , Rfl:     indapamide (LOZOL) 2.5 MG tablet, Take 1 tablet by mouth Every Morning., Disp: , Rfl:     insulin lispro protamine-insulin lispro (humaLOG 75-25) (75-25) 100 UNIT/ML suspension injection, Inject 32 Units under the skin into the appropriate area as directed 2 (Two) Times a Day  With Meals., Disp: , Rfl:     insulin regular (humuLIN R,novoLIN R) 100 UNIT/ML injection, Inject  under the skin into the appropriate area as directed 3 (Three) Times a Day Before Meals., Disp: , Rfl:     levothyroxine (SYNTHROID, LEVOTHROID) 25 MCG tablet, Take 1 tablet by mouth Daily., Disp: , Rfl:     losartan (COZAAR) 25 MG tablet, Take 1 tablet by mouth Daily., Disp: , Rfl:     NOVOLIN N 100 UNIT/ML injection, , Disp: , Rfl: 0    polyethylene glycol (MIRALAX) 17 g packet, Take 17 g by mouth Daily., Disp: , Rfl:     rosuvastatin (CRESTOR) 10 MG tablet, Take 1 tablet by mouth Daily., Disp: , Rfl:     Sotalol HCl AF 80 MG tablet, Take 1.5 tablets by mouth 2 (Two) Times a Day., Disp: 270 tablet, Rfl: 3    tamsulosin (FLOMAX) 0.4 MG capsule 24 hr capsule, TAKE 1 CAPSULE EVERY DAY, Disp: 90 capsule, Rfl: 3    Vitamin D, Cholecalciferol, (CHOLECALCIFEROL) 10 MCG (400 UNIT) tablet, Take 1 tablet by mouth Daily., Disp: , Rfl:       Physical Exam:  I have reviewed the patient's current vital signs as documented in the patient's EMR.   Vitals:    05/01/24 0921   BP: 134/88   Pulse: 81   SpO2: 96%     Body mass index is 35.43 kg/m².       05/01/24  0921   Weight: 106 kg (233 lb)      Physical Exam  Constitutional:       General: He is not in acute distress.     Appearance: Normal appearance. He is well-developed.   HENT:      Head: Normocephalic and atraumatic.      Mouth/Throat:      Mouth: Mucous membranes are moist.   Eyes:      Extraocular Movements: Extraocular movements intact.      Pupils: Pupils are equal, round, and reactive to light.   Neck:      Vascular: No JVD.   Cardiovascular:      Rate and Rhythm: Normal rate and regular rhythm.      Heart sounds: Normal heart sounds. No murmur heard.     No S3 or S4 sounds.   Pulmonary:      Effort: Pulmonary effort is normal. No respiratory distress.      Breath sounds: Normal breath sounds. No wheezing.   Abdominal:      General: Bowel sounds are normal. There is no  "distension.      Palpations: Abdomen is soft. There is no hepatomegaly.      Tenderness: There is no abdominal tenderness.   Musculoskeletal:         General: Normal range of motion.      Cervical back: Normal range of motion and neck supple.   Skin:     General: Skin is warm and dry.      Coloration: Skin is not jaundiced or pale.   Neurological:      General: No focal deficit present.      Mental Status: He is alert and oriented to person, place, and time. Mental status is at baseline.   Psychiatric:         Mood and Affect: Mood normal.         Behavior: Behavior normal.         Thought Content: Thought content normal.         Judgment: Judgment normal.            DATA REVIEWED:     TTE/DARIAN:  Results for orders placed during the hospital encounter of 03/10/23    Adult Transthoracic Echo Complete w/ Color, Spectral and Contrast if necessary per protocol    Interpretation Summary    Left ventricular systolic function is normal. Left ventricular ejection fraction appears to be 61 - 65%.    Left ventricular wall thickness is consistent with mild to moderate concentric hypertrophy. Sigmoid-shaped ventricular septum is present.    Left ventricular diastolic function is consistent with (grade I) impaired relaxation.    Estimated right ventricular systolic pressure from tricuspid regurgitation is mildly elevated (35-45 mmHg).      Laboratory evaluations:    Lab Results   Component Value Date    GLUCOSE 223 (H) 12/15/2021    BUN 19 12/15/2021    CREATININE 0.97 12/15/2021    EGFRIFNONA 86 12/15/2021    EGFRIFAFRI 99 12/15/2021    BCR 20 12/15/2021    K 4.3 12/15/2021    CO2 20 12/15/2021    CALCIUM 9.4 12/15/2021     No results found for: \"WBC\", \"HGB\", \"HCT\", \"MCV\", \"PLT\"  No results found for: \"CHOL\", \"CHLPL\", \"TRIG\", \"HDL\", \"LDL\", \"LDLDIRECT\"  No results found for: \"TSH\", \"F5RHLSU\", \"E6OIWOO\", \"THYROIDAB\"  No results found for: \"HGBA1C\"  No results found for: \"ALT\"  No results found for: \"HGBA1C\"  Lab Results " "  Component Value Date    CREATININE 0.97 12/15/2021     No results found for: \"IRON\", \"TIBC\", \"FERRITIN\"  No results found for: \"INR\", \"PROTIME\"        --------------------------------------------------------------------------------------------------------------------------    ASSESSMENT/PLAN:      Diagnosis Plan   1. Abnormal nuclear stress test        2. Autonomic orthostatic hypotension        3. Long term current use of antiarrhythmic drug        4. Paroxysmal atrial fibrillation        5. Mixed hyperlipidemia            Paroxysmal atrial fibrillation  Patient had remote transmission from loop recorder showing intermittent episodes of atrial fibrillation with RVR. He is scheduled to have an appointment with EP on Friday for further discussion given he continues to have episodes of atrial fibrillation on high-dose sotalol.  Continue with Eliquis for stroke prevention.    Abnormal nuclear stress test  He did have mildly abnormal nuclear stress test in 2021.  Overall asymptomatic.  Continue to monitor.    3.  Hypertension with autonomic orthostatic hypotension  Overall controlled.  Labs requested from PCP.      This document has been @Electronically signed by ZIA Melendez, 05/01/24, 8:22 AM EDT.       Dictated Utilizing Dragon Dictation: Part of this note may be an electronic transcription/translation of spoken language to printed text using the Dragon Dictation System.    Follow-up appointment and medication changes provided in hand delivered After Visit Summary as well as reviewed in the room.     "

## 2024-05-01 NOTE — TELEPHONE ENCOUNTER
Requested  ----- Message from Jennifer BUI sent at 5/1/2024  9:46 AM EDT -----  Please request labs from PCP

## 2024-05-03 ENCOUNTER — OFFICE VISIT (OUTPATIENT)
Dept: CARDIOLOGY | Facility: CLINIC | Age: 59
End: 2024-05-03
Payer: MEDICARE

## 2024-05-03 VITALS
HEIGHT: 68 IN | BODY MASS INDEX: 35.16 KG/M2 | OXYGEN SATURATION: 94 % | HEART RATE: 80 BPM | WEIGHT: 232 LBS | DIASTOLIC BLOOD PRESSURE: 70 MMHG | SYSTOLIC BLOOD PRESSURE: 128 MMHG

## 2024-05-03 DIAGNOSIS — I10 PRIMARY HYPERTENSION: ICD-10-CM

## 2024-05-03 DIAGNOSIS — Z79.01 CHRONIC ANTICOAGULATION: ICD-10-CM

## 2024-05-03 DIAGNOSIS — Z79.899 LONG TERM CURRENT USE OF ANTIARRHYTHMIC DRUG: ICD-10-CM

## 2024-05-03 DIAGNOSIS — I48.0 PAROXYSMAL ATRIAL FIBRILLATION: Primary | ICD-10-CM

## 2024-05-03 RX ORDER — INSULIN HUMAN 100 [IU]/ML
INJECTION, SUSPENSION SUBCUTANEOUS
COMMUNITY
Start: 2024-04-17

## 2024-05-03 NOTE — PROGRESS NOTES
Encounter Date:05/03/2024      Patient ID: Joel Galo is a 59 y.o. male.    Edmundo Boston MD    Cheif Complaint EP: Atrial Fibrillation    PROBLEM LIST:  Patient Active Problem List    Diagnosis Date Noted    Syncope and collapse 02/21/2023     Priority: High    Paroxysmal atrial fibrillation 02/21/2023     Priority: High     Note Last Updated: 7/6/2023     Echo, 3/10/2023: EF 60-65%.  Grade 1 diastolic dysfunction.  Normal valvular morphology.      Abnormal nuclear stress test 07/06/2023     Priority: Medium     Note Last Updated: 7/6/2023     MPS, 9/3/2021: Normal LV systolic function.  MPS imaging indicates a small sized, mild to moderate degree of ischemia located in the lateral wall.  Impressions consistent with low to intermediate risk study      Autonomic orthostatic hypotension 05/10/2023     Priority: Medium    Primary hypertension 05/03/2024     Priority: Low    Chronic anticoagulation 07/06/2023     Priority: Low    Long term current use of antiarrhythmic drug 07/06/2023     Priority: Low    Obstructive sleep apnea 05/10/2023     Priority: Low    Hypothyroidism (acquired) 07/06/2023    GERD (gastroesophageal reflux disease) 07/06/2023    DM (diabetes mellitus), type 2 07/06/2023    Mixed hyperlipidemia 07/06/2023    Urinary retention due to benign prostatic hyperplasia 05/08/2019               History of Present Illness  Patient presents today for follow-up with a history of     No Known Allergies    Current Outpatient Medications   Medication Instructions    amLODIPine (NORVASC) 2.5 mg, Oral, Daily    apixaban (ELIQUIS) 5 mg, Oral, 2 Times Daily    aspirin 81 mg, Oral, Daily    buPROPion XL (WELLBUTRIN XL) 300 mg, Oral, Daily    DULoxetine (CYMBALTA) 60 mg, Oral, Daily    empagliflozin (JARDIANCE) 25 MG tablet tablet Oral, Daily    finasteride (PROSCAR) 5 MG tablet TAKE 1 TABLET EVERY DAY    gabapentin (NEURONTIN) 300 mg, Oral, 3 Times Daily    glimepiride (AMARYL) 2 mg, Oral, 2 Times  "Daily    HumuLIN 70/30 KwikPen (70-30) 100 UNIT/ML suspension pen-injector     HYDROcodone-acetaminophen (NORCO)  MG per tablet 1 tablet, Oral, Every 8 Hours PRN    indapamide (LOZOL) 2.5 mg, Oral, Every Morning    insulin lispro protamine-insulin lispro (humaLOG 75-25) (75-25) 100 UNIT/ML suspension injection 32 Units, Subcutaneous, 2 Times Daily With Meals    insulin regular (humuLIN R,novoLIN R) 100 UNIT/ML injection Subcutaneous, 3 Times Daily Before Meals    levothyroxine (SYNTHROID, LEVOTHROID) 25 mcg, Oral, Daily    losartan (COZAAR) 25 mg, Oral, Daily    NOVOLIN N 100 UNIT/ML injection     polyethylene glycol (MIRALAX) 17 g, Oral, Daily    rosuvastatin (CRESTOR) 10 mg, Oral, Daily    Sotalol HCl  mg, Oral, 2 Times Daily    tamsulosin (FLOMAX) 0.4 MG capsule 24 hr capsule TAKE 1 CAPSULE EVERY DAY    Vitamin D (Cholecalciferol) (CHOLECALCIFEROL) 400 Units, Oral, Daily       .    Objective:     /70 (BP Location: Left arm, Patient Position: Sitting, Cuff Size: Adult)   Pulse 80   Ht 172.7 cm (68\")   Wt 105 kg (232 lb)   SpO2 94%   BMI 35.28 kg/m²    Body mass index is 35.28 kg/m².     Constitutional:       Appearance: Well-developed.   Pulmonary:      Effort: Pulmonary effort is normal. No respiratory distress.      Breath sounds: Normal breath sounds. No wheezing. No rales.      Comments: Bases clear  Chest:      Chest wall: Not tender to palpatation.   Cardiovascular:      Normal rate. Regular rhythm.      Murmurs: There is no murmur.      No gallop.  No click. No rub.   Pulses:     Intact distal pulses.   Edema:     Peripheral edema absent.   Musculoskeletal: Normal range of motion.       Lab Review:           Procedures               Assessment:      Diagnosis Plan   1. Paroxysmal atrial fibrillation        2. Long term current use of antiarrhythmic drug        3. Chronic anticoagulation        4. Primary hypertension          Plan:     Advance Care Planning   {Advance Directive " Status:72076}           Stable cardiac status.  Continue current medications.   {CARDIO RETURN TO CLINIC:20259}, sooner as needed.  Thank you for allowing us to participate in the care of your patient.     ***

## 2024-05-03 NOTE — PROGRESS NOTES
"                   Cardiac Electrophysiology Outpatient Follow Up Note            [facility]    Follow Up Office Visit      Joel Galo  [id number]  05/03/2024  [unfilled]  [unfilled]    Primary Care Physician: Edmundo Boston MD    Referred By: No ref. provider found    Subjective     Chief Complaint:   Diagnoses and all orders for this visit:    1. Paroxysmal atrial fibrillation (Primary)    2. Long term current use of antiarrhythmic drug    3. Chronic anticoagulation    4. Primary hypertension      No chief complaint on file.      History of Present Illness:   Joel Galo is a 59 y.o. male who presents to my electrophysiology clinic for follow up of recurrent A-fib symptoms make him feel tired and fatigued.  It is happening more frequently.      Past Medical History:   Past Medical History:   Diagnosis Date    A-fib     Diabetes     Heart attack     \"5-6 years ago\"    Hypertension        Past Surgical History:   Past Surgical History:   Procedure Laterality Date    CARDIAC ELECTROPHYSIOLOGY PROCEDURE N/A 4/5/2023    Procedure: Loop insertion;  Surgeon: Tariq Chávez MD;  Location: [facility] INVASIVE LOCATION;  Service: Cardiovascular;  Laterality: N/A;    COLONOSCOPY      ENDOSCOPY         Family History:   Family History   Problem Relation Age of Onset    Diabetes Father     Kidney disease Father     Heart disease Mother        Social History:   Social History     Socioeconomic History    Marital status:    Tobacco Use    Smoking status: Never    Smokeless tobacco: Never   Vaping Use    Vaping status: Never Used   Substance and Sexual Activity    Alcohol use: No    Drug use: No    Sexual activity: Not Currently       Medications:     Current Outpatient Medications:     amLODIPine (NORVASC) 2.5 MG tablet, Take 1 tablet by mouth Daily., Disp: , Rfl:     apixaban (ELIQUIS) 5 MG tablet tablet, Take 1 tablet by mouth 2 (Two) Times a Day., Disp: 60 tablet, " Rfl: 4    aspirin 81 MG EC tablet, Take 1 tablet by mouth Daily., Disp: , Rfl:     buPROPion XL (WELLBUTRIN XL) 300 MG 24 hr tablet, Take 1 tablet by mouth Daily., Disp: , Rfl:     DULoxetine (CYMBALTA) 60 MG capsule, Take 1 capsule by mouth Daily., Disp: , Rfl:     empagliflozin (JARDIANCE) 25 MG tablet tablet, Take  by mouth Daily., Disp: , Rfl:     finasteride (PROSCAR) 5 MG tablet, TAKE 1 TABLET EVERY DAY, Disp: 90 tablet, Rfl: 0    gabapentin (NEURONTIN) 300 MG capsule, Take 1 capsule by mouth 3 (Three) Times a Day., Disp: , Rfl:     glimepiride (AMARYL) 2 MG tablet, Take 1 tablet by mouth 2 (Two) Times a Day., Disp: , Rfl:     HumuLIN 70/30 KwikPen (70-30) 100 UNIT/ML suspension pen-injector, , Disp: , Rfl:     HYDROcodone-acetaminophen (NORCO)  MG per tablet, Take 1 tablet by mouth Every 8 (Eight) Hours As Needed for Moderate Pain., Disp: , Rfl:     indapamide (LOZOL) 2.5 MG tablet, Take 1 tablet by mouth Every Morning., Disp: , Rfl:     insulin lispro protamine-insulin lispro (humaLOG 75-25) (75-25) 100 UNIT/ML suspension injection, Inject 32 Units under the skin into the appropriate area as directed 2 (Two) Times a Day With Meals., Disp: , Rfl:     insulin regular (humuLIN R,novoLIN R) 100 UNIT/ML injection, Inject  under the skin into the appropriate area as directed 3 (Three) Times a Day Before Meals., Disp: , Rfl:     levothyroxine (SYNTHROID, LEVOTHROID) 25 MCG tablet, Take 1 tablet by mouth Daily., Disp: , Rfl:     losartan (COZAAR) 25 MG tablet, Take 1 tablet by mouth Daily., Disp: , Rfl:     NOVOLIN N 100 UNIT/ML injection, , Disp: , Rfl: 0    polyethylene glycol (MIRALAX) 17 g packet, Take 17 g by mouth Daily., Disp: , Rfl:     rosuvastatin (CRESTOR) 10 MG tablet, Take 1 tablet by mouth Daily., Disp: , Rfl:     Sotalol HCl AF 80 MG tablet, Take 1.5 tablets by mouth 2 (Two) Times a Day., Disp: 270 tablet, Rfl: 3    tamsulosin (FLOMAX) 0.4 MG capsule 24 hr capsule, TAKE 1 CAPSULE EVERY DAY,  "Disp: 90 capsule, Rfl: 3    Vitamin D, Cholecalciferol, (CHOLECALCIFEROL) 10 MCG (400 UNIT) tablet, Take 1 tablet by mouth Daily., Disp: , Rfl:     Allergies:   No Known Allergies    Objective   Vital Signs:   Vitals:    05/03/24 1023   BP: 128/70   BP Location: Left arm   Patient Position: Sitting   Cuff Size: Adult   Pulse: 80   SpO2: 94%   Weight: 105 kg (232 lb)   Height: 172.7 cm (68\")       PHYSICAL EXAM  General appearance: Awake, alert, cooperative  Head: Normocephalic, without obvious abnormality, atraumatic  Eyes: Conjunctivae/corneas clear, EOMs intact  Neck: no adenopathy, no carotid bruit, no JVD, and thyroid: not enlarged  Lungs: clear to auscultation bilaterally and no rhonchi or crackles\", ' symmetric  Heart: regular rate and rhythm, S1, S2 normal, no murmur, click, rub or gallop  Abdomen: Soft, non-tender, bowel sounds normal,  no organomegaly  Extremities: extremities normal, atraumatic, no cyanosis or edema  Skin: Skin color, turgor normal, no rashes or lesions  Neurologic: Grossly normal     Lab Results   Component Value Date    GLUCOSE 223 (H) 12/15/2021    CALCIUM 9.4 12/15/2021     12/15/2021    K 4.3 12/15/2021    CO2 20 12/15/2021     12/15/2021    BUN 19 12/15/2021    CREATININE 0.97 12/15/2021    EGFRIFAFRI 99 12/15/2021    EGFRIFNONA 86 12/15/2021    BCR 20 12/15/2021     No results found for: \"WBC\", \"HGB\", \"HCT\", \"MCV\", \"PLT\"  No results found for: \"INR\", \"PROTIME\"  No results found for: \"TSH\", \"R7EHAAA\", \"P1KTHZG\", \"THYROIDAB\"    Cardiac Testing:     I personally viewed and interpreted the patient's EKG/Telemetry/lab data    Procedures    Tobacco Cessation: N/A  Obstructive Sleep Apnea Screening: Completed    Advance Care Planning   ACP discussion was declined by the patient. Patient does not have an advance directive, declines further assistance.       Assessment & Plan    Diagnoses and all orders for this visit:    1. Paroxysmal atrial fibrillation (Primary)    2. Long " term current use of antiarrhythmic drug    3. Chronic anticoagulation    4. Primary hypertension         Diagnosis Plan   1. Paroxysmal atrial fibrillation  Highly symptomatic recurrent paroxysmal atrial fibrillation.  He has failed antiarrhythmic drug therapy with sotalol.    We had previously discussed the option of catheter ablation.    I reviewed the specific risk-benefit profile the procedure.  I quoted the patient a 1 to 2% chance of significant procedure complication including but not limited to bleeding at the groin, cardiac perforation, tamponade, stroke, myocardial infarction, death phrenic nerve injury, pulmonary vein stenosis, catheter entrapment of the mitral valve annulus, esophageal injury as well as fistula and other potential complications.    He wishes to proceed.    Rhythmia  General anesthesia  Preop CT  Do not hold anticoagulation  Hold antiarrhythmic drug for 3 days        2. Long term current use of antiarrhythmic drug  We will stop sotalol at the time of his ablation.      3. Chronic anticoagulation  Lifelong anticoagulation recommended.      4. Primary hypertension  Blood pressure well-controlled.        Body mass index is 35.28 kg/m².    I spent 35 minutes in consultation with this patient which included more than 65% of this time in direct face-to-face counseling, physical examination and discussion of my assessment and findings and this shared decision making with the patient.  The remainder of the time not spent face-to-face was performing one, some or all of the following actions: preparing to see the patient (e.g. reviewing tests, prior clinicians' notes, etc), ordering medications, tests or procedures, coordination of care, discussion of the plan with other healthcare providers, documenting clinical information in epic as well as independently interpreting results and communication of these results to the patient family and/or caregiver(s).  Please note that this explicitly excludes  time spent on other separate billable services such as performing procedures or test interpretation, when applicable.      Follow Up:       Thank you for allowing me to participate in the care of your patient. Please to not hesitate to contact me with additional questions or concerns.      Musa Fishman DO, FACC, RS  Cardiac Electrophysiologist  [facility] / [facility]

## 2024-05-08 DIAGNOSIS — I48.0 PAROXYSMAL ATRIAL FIBRILLATION: Primary | ICD-10-CM

## 2024-05-29 ENCOUNTER — PREP FOR SURGERY (OUTPATIENT)
Dept: OTHER | Facility: HOSPITAL | Age: 59
End: 2024-05-29
Payer: MEDICARE

## 2024-05-29 DIAGNOSIS — I48.0 PAROXYSMAL ATRIAL FIBRILLATION: Primary | ICD-10-CM

## 2024-05-29 RX ORDER — ACETAMINOPHEN 325 MG/1
650 TABLET ORAL EVERY 4 HOURS PRN
OUTPATIENT
Start: 2024-05-29

## 2024-05-29 RX ORDER — ONDANSETRON 2 MG/ML
4 INJECTION INTRAMUSCULAR; INTRAVENOUS EVERY 6 HOURS PRN
OUTPATIENT
Start: 2024-05-29

## 2024-05-29 RX ORDER — SODIUM CHLORIDE 9 MG/ML
40 INJECTION, SOLUTION INTRAVENOUS AS NEEDED
OUTPATIENT
Start: 2024-05-29

## 2024-05-29 RX ORDER — NITROGLYCERIN 0.4 MG/1
0.4 TABLET SUBLINGUAL
OUTPATIENT
Start: 2024-05-29

## 2024-05-30 DIAGNOSIS — E66.01 SEVERE OBESITY (BMI 35.0-39.9) WITH COMORBIDITY: ICD-10-CM

## 2024-05-30 DIAGNOSIS — I48.0 PAROXYSMAL ATRIAL FIBRILLATION: Primary | ICD-10-CM

## 2024-06-26 ENCOUNTER — HOSPITAL ENCOUNTER (OUTPATIENT)
Dept: NUTRITION | Facility: HOSPITAL | Age: 59
Setting detail: RECURRING SERIES
Discharge: HOME | End: 2024-06-26

## 2024-06-26 PROCEDURE — 97802 MEDICAL NUTRITION INDIV IN: CPT

## 2024-06-26 NOTE — CONSULTS
[facility]          Initial 60 Minute Nutrition Visit    Date: 2024   Patient Name: Joel Galo  : 1965   MRN: [id number]   Referring Provider: Musa Fishman DO    Reason for Visit: weight loss  Visit Format: Phone    Nutrition Assessment       Social History:   Social History     Socioeconomic History    Marital status:    Tobacco Use    Smoking status: Never    Smokeless tobacco: Never   Vaping Use    Vaping status: Never Used   Substance and Sexual Activity    Alcohol use: No    Drug use: No    Sexual activity: Not Currently     Active Problem List:   Patient Active Problem List    Diagnosis     Primary hypertension [I10]     Hypothyroidism (acquired) [E03.9]     GERD (gastroesophageal reflux disease) [K21.9]     Chronic anticoagulation [Z79.01]     Long term current use of antiarrhythmic drug [Z79.899]     DM (diabetes mellitus), type 2 [E11.9]     Mixed hyperlipidemia [E78.2]     Abnormal nuclear stress test [R94.39]     Obstructive sleep apnea [G47.33]     Autonomic orthostatic hypotension [I95.1]     Syncope and collapse [R55]     Paroxysmal atrial fibrillation [I48.0]     Urinary retention due to benign prostatic hyperplasia [N40.1, R33.8]       Current Medications:   Current Outpatient Medications:     amLODIPine (NORVASC) 2.5 MG tablet, Take 1 tablet by mouth Daily., Disp: , Rfl:     apixaban (ELIQUIS) 5 MG tablet tablet, Take 1 tablet by mouth 2 (Two) Times a Day., Disp: 60 tablet, Rfl: 4    aspirin 81 MG EC tablet, Take 1 tablet by mouth Daily., Disp: , Rfl:     buPROPion XL (WELLBUTRIN XL) 300 MG 24 hr tablet, Take 1 tablet by mouth Daily., Disp: , Rfl:     DULoxetine (CYMBALTA) 60 MG capsule, Take 1 capsule by mouth Daily., Disp: , Rfl:     empagliflozin (JARDIANCE) 25 MG tablet tablet, Take  by mouth Daily., Disp: , Rfl:     finasteride (PROSCAR) 5 MG tablet, TAKE 1 TABLET EVERY DAY, Disp: 90 tablet, Rfl: 0    gabapentin (NEURONTIN) 300 MG  capsule, Take 1 capsule by mouth 3 (Three) Times a Day., Disp: , Rfl:     glimepiride (AMARYL) 2 MG tablet, Take 1 tablet by mouth 2 (Two) Times a Day., Disp: , Rfl:     HumuLIN 70/30 KwikPen (70-30) 100 UNIT/ML suspension pen-injector, , Disp: , Rfl:     HYDROcodone-acetaminophen (NORCO)  MG per tablet, Take 1 tablet by mouth Every 8 (Eight) Hours As Needed for Moderate Pain., Disp: , Rfl:     indapamide (LOZOL) 2.5 MG tablet, Take 1 tablet by mouth Every Morning., Disp: , Rfl:     insulin lispro protamine-insulin lispro (humaLOG 75-25) (75-25) 100 UNIT/ML suspension injection, Inject 32 Units under the skin into the appropriate area as directed 2 (Two) Times a Day With Meals., Disp: , Rfl:     insulin regular (humuLIN R,novoLIN R) 100 UNIT/ML injection, Inject  under the skin into the appropriate area as directed 3 (Three) Times a Day Before Meals., Disp: , Rfl:     levothyroxine (SYNTHROID, LEVOTHROID) 25 MCG tablet, Take 1 tablet by mouth Daily., Disp: , Rfl:     losartan (COZAAR) 25 MG tablet, Take 1 tablet by mouth Daily., Disp: , Rfl:     NOVOLIN N 100 UNIT/ML injection, , Disp: , Rfl: 0    polyethylene glycol (MIRALAX) 17 g packet, Take 17 g by mouth Daily., Disp: , Rfl:     rosuvastatin (CRESTOR) 10 MG tablet, Take 1 tablet by mouth Daily., Disp: , Rfl:     Sotalol HCl AF 80 MG tablet, Take 1.5 tablets by mouth 2 (Two) Times a Day., Disp: 270 tablet, Rfl: 3    tamsulosin (FLOMAX) 0.4 MG capsule 24 hr capsule, TAKE 1 CAPSULE EVERY DAY, Disp: 90 capsule, Rfl: 3    Vitamin D, Cholecalciferol, (CHOLECALCIFEROL) 10 MCG (400 UNIT) tablet, Take 1 tablet by mouth Daily., Disp: , Rfl:     Labs: n/a    Hunger Vital Sign Food Insecurity Assessment:  Within the past 12 months I/we worried whether our food would run out before I/we got money to buy more: no   Within the past 12 months the food I/we bought just didn't last and I/we didn't have money to get more: no   Use of food assistance programs (WIC, food  "stamps, food napier) no       Food & Nutrition Related History       Food Allergies: none  Food Intolerances: none  Food Behavior: n/a  Nutrition Impact Symptoms:  none  Gastrointestinal conditions that impact intake or food choices: none  Details at home: n/a  Who prepares most meals: spouse  Who does grocery shopping: spouse  How many meals are purchased from fast food/sit down restaurants per week: 2  Difficulty chewin - Normal  Difficulty swallowin - Normal  Diet requirement related to personal preference or cultural belief: diabetic diet  History of eating disorder/disordered eating habits: None  Language/communication details: English  Barriers to learning: No barriers identified at this time    24 Hour Recall:   Time Food/beverages consumed    Orange Juice    Hamburger and fries     Cereal                            Anthropometrics      Height:   Ht Readings from Last 1 Encounters:   24 172.7 cm (68\")     Weight:   Wt Readings from Last 3 Encounters:   24 105 kg (232 lb)   24 106 kg (233 lb)   24 104 kg (229 lb)     BMI: There is no height or weight on file to calculate BMI.   Weight Change: n/a     Physical Activity         Physical activity comments: Daily Activities      Estimated Needs     Estimated Energy Needs: n/a    Estimated Protein Needs: n/a     Estimated Fluid Needs: n/a     Discussion / Education      Patient is a 59 year old male referred for weight loss. Patient states he has type 2 diabetes. He states that he has received nutrition education but it has been a few year sago. He states that he does experience hypoglycemia often. He states that he does get dizzy and fatigued often. He states that he does skip meals and will mostly snack. He will eat a candy bar or a snack cake to bring his blood sugar levels back up. He states that his wife does most of the cooking. He primarily drinks water and diet soda. He states that he will occasionally walk but does not do " much exercise because he gets dizzy and fatigued quickly. Discussed the importance of consistent carbohydrate intake with consistent meals.     Education provided today primarily focused on weight management and diabetes management. Educated patient on the three macronutrients and what each do for our body. Discussed the difference between simple and complex carbohydrates and how they affect blood sugar levels. Discussed how to build a healthy plate by using the plate method. Explained the importance of portion control and balanced meals. Discussed the importance of fiber for GI health, satiety, and lowering cholesterol levels. Encouraged patient to incorporate a protein and a carbohydrate food with each meal and snack. Discussed healthy snack options and quick meal ideas. Recommended 45-60 grams of carbohydrates per meal with 25-30 grams of protein per meal. Encouraged adding physical activity to daily routine and gave examples of how to incorporate that into routine. Encouraged patient to reach out with any additional questions or concerns.     Assessment of patient engagement: Engaged    Measurement of understanding: Patient verbalized understanding    Resources Provided: 3 Macronutrients, weight management packet, high fiber foods     Goal (s)      Goal 1: consistent meals with consistent carbohydrate intake          Plan of Care     PES Statement:   Overweight / Obesity related to diet and lifestye as evidenced by BMI.     Follow Up Visit      Follow Up:   No f/u scheduled     Total of 60 minutes spent with patient on nutrition counseling. Education based on Academy of Nutrition and Dietetics guidelines. Patient was provided with RD's contact information. Thank you for this referral.

## 2024-07-24 RX ORDER — SOTALOL HYDROCHLORIDE 80 MG/1
TABLET ORAL
Qty: 270 TABLET | Refills: 0 | Status: SHIPPED | OUTPATIENT
Start: 2024-07-24

## 2024-07-25 ENCOUNTER — TELEPHONE (OUTPATIENT)
Dept: CARDIOLOGY | Facility: CLINIC | Age: 59
End: 2024-07-25
Payer: MEDICARE

## 2024-07-25 NOTE — TELEPHONE ENCOUNTER
Missed remote transmission. Called patient, he going to open application and attempt to send a symptom report to see if we can get the application transmitting again.

## 2024-07-30 ENCOUNTER — OFFICE VISIT (OUTPATIENT)
Dept: CARDIOLOGY | Facility: CLINIC | Age: 59
End: 2024-07-30
Payer: MEDICARE

## 2024-07-30 VITALS
SYSTOLIC BLOOD PRESSURE: 124 MMHG | BODY MASS INDEX: 35.58 KG/M2 | OXYGEN SATURATION: 93 % | HEART RATE: 77 BPM | DIASTOLIC BLOOD PRESSURE: 79 MMHG | WEIGHT: 234.8 LBS | HEIGHT: 68 IN

## 2024-07-30 DIAGNOSIS — R94.39 ABNORMAL NUCLEAR STRESS TEST: ICD-10-CM

## 2024-07-30 DIAGNOSIS — Z79.01 CHRONIC ANTICOAGULATION: ICD-10-CM

## 2024-07-30 DIAGNOSIS — I48.0 PAROXYSMAL ATRIAL FIBRILLATION: ICD-10-CM

## 2024-07-30 DIAGNOSIS — R07.2 PRECORDIAL PAIN: Primary | ICD-10-CM

## 2024-07-30 DIAGNOSIS — I95.1 AUTONOMIC ORTHOSTATIC HYPOTENSION: ICD-10-CM

## 2024-07-30 RX ORDER — NITROGLYCERIN 0.4 MG/1
0.4 TABLET SUBLINGUAL
OUTPATIENT
Start: 2024-07-30 | End: 2024-07-30

## 2024-07-30 RX ORDER — SODIUM CHLORIDE 0.9 % (FLUSH) 0.9 %
10 SYRINGE (ML) INJECTION AS NEEDED
OUTPATIENT
Start: 2024-07-30

## 2024-07-30 RX ORDER — METOPROLOL TARTRATE 50 MG/1
50 TABLET, FILM COATED ORAL ONCE
Qty: 1 TABLET | Refills: 0 | Status: SHIPPED | OUTPATIENT
Start: 2024-07-30 | End: 2024-07-30

## 2024-07-30 RX ORDER — SODIUM CHLORIDE 9 MG/ML
40 INJECTION, SOLUTION INTRAVENOUS AS NEEDED
OUTPATIENT
Start: 2024-07-30

## 2024-07-30 RX ORDER — SODIUM CHLORIDE 0.9 % (FLUSH) 0.9 %
10 SYRINGE (ML) INJECTION EVERY 12 HOURS SCHEDULED
OUTPATIENT
Start: 2024-07-30

## 2024-07-30 NOTE — PROGRESS NOTES
[facility]             [facility] ZIA Parks Shawn Shadell, MD  Joel Galo  1965 07/30/2024    Patient Active Problem List   Diagnosis    Urinary retention due to benign prostatic hyperplasia    Syncope and collapse    Paroxysmal atrial fibrillation    Obstructive sleep apnea    Autonomic orthostatic hypotension    Hypothyroidism (acquired)    GERD (gastroesophageal reflux disease)    Chronic anticoagulation    Long term current use of antiarrhythmic drug    DM (diabetes mellitus), type 2    Mixed hyperlipidemia    Abnormal nuclear stress test    Primary hypertension       Dear Edmundo Boston MD:    Subjective     Chief Complaint   Patient presents with    Follow-up     Abnormal pacer       HPI:     This is a 59 y.o. male with known past medical history of syncope, autonomic orthostatic hypotension, paroxysmal atrial fibrillation and abnormal stress test.        Joel Galo presents today for routine cardiology follow up.  Patient followed up with EP in May 2024 for which he was scheduled for ablation however patient said due to scheduling complex he was unable to make it to his procedure.  Continues to have some intermittent palpitations but states they are overall stable.  Continues to take sotalol.  Had loop recorder remote transmission which showed 3% A-fib burden.  Also reports persistent dizziness along with intermittent chest pain that he describes as a aching sensation in the left side of his chest that occurs for couple minutes a couple times a month.  Blood pressure controlled.  Reports recent lab work by PCP which not available for review today.    Diagnostic Testing  Echocardiogram 9/2020: EF 55% moderate aortic valve sclerosis  Cardiac event monitor 8/2021: Predominantly normal sinus rhythm with no arrhythmias noted  Nuclear stress test 9/2021: Small size mild to moderate degree of ischemia in the lateral wall  Carotid ultrasound  3/2023: No acute findings  Echocardiogram 3/2023: EF 61 to 65%  Loop recorder insertion with Potter device 4/2023     All other systems were reviewed and were negative.    Patient Active Problem List   Diagnosis    Urinary retention due to benign prostatic hyperplasia    Syncope and collapse    Paroxysmal atrial fibrillation    Obstructive sleep apnea    Autonomic orthostatic hypotension    Hypothyroidism (acquired)    GERD (gastroesophageal reflux disease)    Chronic anticoagulation    Long term current use of antiarrhythmic drug    DM (diabetes mellitus), type 2    Mixed hyperlipidemia    Abnormal nuclear stress test    Primary hypertension       family history includes Diabetes in his father; Heart disease in his mother; Kidney disease in his father.     reports that he has never smoked. He has never used smokeless tobacco. He reports that he does not drink alcohol and does not use drugs.    No Known Allergies      Current Outpatient Medications:     amLODIPine (NORVASC) 2.5 MG tablet, Take 1 tablet by mouth Daily., Disp: , Rfl:     aspirin 81 MG EC tablet, Take 1 tablet by mouth Daily., Disp: , Rfl:     buPROPion XL (WELLBUTRIN XL) 300 MG 24 hr tablet, Take 1 tablet by mouth Daily., Disp: , Rfl:     DULoxetine (CYMBALTA) 60 MG capsule, Take 1 capsule by mouth Daily., Disp: , Rfl:     empagliflozin (JARDIANCE) 25 MG tablet tablet, Take  by mouth Daily., Disp: , Rfl:     finasteride (PROSCAR) 5 MG tablet, TAKE 1 TABLET EVERY DAY, Disp: 90 tablet, Rfl: 0    gabapentin (NEURONTIN) 300 MG capsule, Take 1 capsule by mouth 3 (Three) Times a Day., Disp: , Rfl:     glimepiride (AMARYL) 2 MG tablet, Take 1 tablet by mouth 2 (Two) Times a Day., Disp: , Rfl:     HumuLIN 70/30 KwikPen (70-30) 100 UNIT/ML suspension pen-injector, , Disp: , Rfl:     HYDROcodone-acetaminophen (NORCO)  MG per tablet, Take 1 tablet by mouth Every 8 (Eight) Hours As Needed for Moderate Pain., Disp: , Rfl:     insulin lispro protamine-insulin  lispro (humaLOG 75-25) (75-25) 100 UNIT/ML suspension injection, Inject 32 Units under the skin into the appropriate area as directed 2 (Two) Times a Day With Meals., Disp: , Rfl:     insulin regular (humuLIN R,novoLIN R) 100 UNIT/ML injection, Inject  under the skin into the appropriate area as directed 3 (Three) Times a Day Before Meals., Disp: , Rfl:     levothyroxine (SYNTHROID, LEVOTHROID) 25 MCG tablet, Take 1 tablet by mouth Daily., Disp: , Rfl:     losartan (COZAAR) 25 MG tablet, Take 1 tablet by mouth Daily., Disp: , Rfl:     NOVOLIN N 100 UNIT/ML injection, , Disp: , Rfl: 0    polyethylene glycol (MIRALAX) 17 g packet, Take 17 g by mouth Daily., Disp: , Rfl:     rosuvastatin (CRESTOR) 10 MG tablet, Take 1 tablet by mouth Daily., Disp: , Rfl:     sotalol (BETAPACE) 80 MG tablet, TAKE ONE AND A HALF (1 & 1/2) TABLETS BY MOUTH TWICE DAILY, Disp: 270 tablet, Rfl: 0    Sotalol HCl AF 80 MG tablet, Take 1.5 tablets by mouth 2 (Two) Times a Day., Disp: 270 tablet, Rfl: 3    tamsulosin (FLOMAX) 0.4 MG capsule 24 hr capsule, TAKE 1 CAPSULE EVERY DAY, Disp: 90 capsule, Rfl: 3    Vitamin D, Cholecalciferol, (CHOLECALCIFEROL) 10 MCG (400 UNIT) tablet, Take 1 tablet by mouth Daily., Disp: , Rfl:     apixaban (ELIQUIS) 5 MG tablet tablet, Take 1 tablet by mouth 2 (Two) Times a Day., Disp: 60 tablet, Rfl: 4    indapamide (LOZOL) 2.5 MG tablet, Take 1 tablet by mouth Every Morning. (Patient not taking: Reported on 7/30/2024), Disp: , Rfl:     metoprolol tartrate (LOPRESSOR) 50 MG tablet, Take 1 tablet by mouth 1 (One) Time for 1 dose. Take 50 mg One (1) Hour Prior to Coronary CTA, Disp: 1 tablet, Rfl: 0      Physical Exam:  I have reviewed the patient's current vital signs as documented in the patient's EMR.   Vitals:    07/30/24 0943   BP: 124/79   Pulse: 77   SpO2: 93%     Body mass index is 35.71 kg/m².       07/30/24  0943   Weight: 107 kg (234 lb 12.8 oz)      Physical Exam  Constitutional:       General: He is not  in acute distress.     Appearance: Normal appearance. He is well-developed.   HENT:      Head: Normocephalic and atraumatic.      Mouth/Throat:      Mouth: Mucous membranes are moist.   Eyes:      Extraocular Movements: Extraocular movements intact.      Pupils: Pupils are equal, round, and reactive to light.   Neck:      Vascular: No JVD.   Cardiovascular:      Rate and Rhythm: Normal rate and regular rhythm.      Heart sounds: Normal heart sounds. No murmur heard.     No S3 or S4 sounds.   Pulmonary:      Effort: Pulmonary effort is normal. No respiratory distress.      Breath sounds: Normal breath sounds. No wheezing.   Abdominal:      General: Bowel sounds are normal. There is no distension.      Palpations: Abdomen is soft. There is no hepatomegaly.      Tenderness: There is no abdominal tenderness.   Musculoskeletal:         General: Normal range of motion.      Cervical back: Normal range of motion and neck supple.   Skin:     General: Skin is warm and dry.      Coloration: Skin is not jaundiced or pale.   Neurological:      General: No focal deficit present.      Mental Status: He is alert and oriented to person, place, and time. Mental status is at baseline.   Psychiatric:         Mood and Affect: Mood normal.         Behavior: Behavior normal.         Thought Content: Thought content normal.         Judgment: Judgment normal.            DATA REVIEWED:     TTE/DARIAN:  Results for orders placed during the hospital encounter of 03/10/23    Adult Transthoracic Echo Complete w/ Color, Spectral and Contrast if necessary per protocol    Interpretation Summary    Left ventricular systolic function is normal. Left ventricular ejection fraction appears to be 61 - 65%.    Left ventricular wall thickness is consistent with mild to moderate concentric hypertrophy. Sigmoid-shaped ventricular septum is present.    Left ventricular diastolic function is consistent with (grade I) impaired relaxation.    Estimated right  "ventricular systolic pressure from tricuspid regurgitation is mildly elevated (35-45 mmHg).      Laboratory evaluations:    Lab Results   Component Value Date    GLUCOSE 223 (H) 12/15/2021    BUN 19 12/15/2021    CREATININE 0.97 12/15/2021    EGFRIFNONA 86 12/15/2021    EGFRIFAFRI 99 12/15/2021    BCR 20 12/15/2021    K 4.3 12/15/2021    CO2 20 12/15/2021    CALCIUM 9.4 12/15/2021     No results found for: \"WBC\", \"HGB\", \"HCT\", \"MCV\", \"PLT\"  No results found for: \"CHOL\", \"CHLPL\", \"TRIG\", \"HDL\", \"LDL\", \"LDLDIRECT\"  No results found for: \"TSH\", \"C1RGHFI\", \"C1ESGUO\", \"THYROIDAB\"  No results found for: \"HGBA1C\"  No results found for: \"ALT\"  No results found for: \"HGBA1C\"  Lab Results   Component Value Date    CREATININE 0.97 12/15/2021     No results found for: \"IRON\", \"TIBC\", \"FERRITIN\"  No results found for: \"INR\", \"PROTIME\"        --------------------------------------------------------------------------------------------------------------------------    ASSESSMENT/PLAN:      Diagnosis Plan   1. Precordial pain  CT Angiogram Coronary    nitroglycerin (NITROSTAT) SL tablet 0.4 mg    No Caffeine or Nicotine 4 Hours Prior to CTA Appointment    Nothing to Eat or Drink 4 Hours Prior to CTA Appointment    Obtain Informed Consent - Computed Tomography Angiography of Chest - Angiogram of Coronary Arteries    Vital Signs Upon Arrival    Cardiac Monitoring    Verify NPO Status - Patient to Be NPO at Least 4 Hours Prior to CTA    Notify CT After Administration of metoprolol tartrate (LOPRESSOR) tablet    Notify Provider If Total Metoprolol Given Equals 300mg & Heart Rate Not At Goal    Notify Provider Prior to Administration of Nitroglycerin if Patient SBP <80    POC Creatinine    Insert Peripheral IV    Saline Lock & Maintain IV Access    sodium chloride 0.9 % flush 10 mL    sodium chloride 0.9 % flush 10 mL    sodium chloride 0.9 % infusion 40 mL    Vital Signs - See Instructions    Hold Medication Metformin (Glucophage, " Glucophage XR, Fortament, Glumetza);  Metglip (metformin/glipizide);  Glucovance (metformin/glyburide); Avandamet (metformin/rosiglitazone)    Patient May Discharge Home After Procedure Complete (If Stable)    metoprolol tartrate (LOPRESSOR) 50 MG tablet      2. Paroxysmal atrial fibrillation        3. Autonomic orthostatic hypotension        4. Chronic anticoagulation            Paroxysmal atrial fibrillation  Patient followed up with EP in May 2024 for which she was scheduled for ablation however was unable to make this appointment due to scheduling conflicts.  Had remote transmission rating which showed 3% A-fib burden.  We did discuss that if patient wants to proceed with ablation and to contact EP office to get this set up.  For now continue with sotalol and Eliquis at current dosing.    Chest pain  Patient with complaints of intermittent chest pain.  Had a mildly abnormal stress test in 2021 showing small size mild to moderate degree of ischemia in the lateral wall.  We will obtain CT coronary angiogram to evaluate for further ischemia.      This document has been @Electronically signed by ZIA Melendez, 07/30/24, 10:22 AM EDT.       Dictated Utilizing Dragon Dictation: Part of this note may be an electronic transcription/translation of spoken language to printed text using the Dragon Dictation System.    Follow-up appointment and medication changes provided in hand delivered After Visit Summary as well as reviewed in the room.

## 2024-08-02 ENCOUNTER — TELEPHONE (OUTPATIENT)
Dept: CARDIOLOGY | Facility: CLINIC | Age: 59
End: 2024-08-02
Payer: MEDICARE

## 2024-08-02 NOTE — TELEPHONE ENCOUNTER
Requested.       ----- Message from Bolssom Parks sent at 7/30/2024 10:29 AM EDT -----  Please request labs from PCP

## 2024-08-10 ENCOUNTER — HOSPITAL ENCOUNTER (OUTPATIENT)
Dept: CT IMAGING | Facility: HOSPITAL | Age: 59
Discharge: HOME | End: 2024-08-10
Payer: MEDICARE

## 2024-08-10 VITALS — DIASTOLIC BLOOD PRESSURE: 56 MMHG | SYSTOLIC BLOOD PRESSURE: 102 MMHG | HEART RATE: 66 BPM

## 2024-08-10 DIAGNOSIS — R07.2 PRECORDIAL PAIN: ICD-10-CM

## 2024-08-10 PROCEDURE — 63710000001 NITROGLYCERIN 0.4 MG SUBLINGUAL TABLET: Performed by: NURSE PRACTITIONER

## 2024-08-10 PROCEDURE — 75574 CT ANGIO HRT W/3D IMAGE: CPT

## 2024-08-10 PROCEDURE — A9270 NON-COVERED ITEM OR SERVICE: HCPCS | Performed by: NURSE PRACTITIONER

## 2024-08-10 PROCEDURE — 25510000001 IOPAMIDOL PER 1 ML: Performed by: NURSE PRACTITIONER

## 2024-08-10 RX ORDER — NITROGLYCERIN 0.4 MG/1
0.4 TABLET SUBLINGUAL
Status: DISCONTINUED | OUTPATIENT
Start: 2024-08-10 | End: 2024-08-11 | Stop reason: HOSPADM

## 2024-08-10 RX ADMIN — IOPAMIDOL 90 ML: 755 INJECTION, SOLUTION INTRAVENOUS at 10:31

## 2024-08-10 RX ADMIN — NITROGLYCERIN 0.4 MG: 0.4 TABLET, ORALLY DISINTEGRATING SUBLINGUAL at 10:18

## 2024-08-13 DIAGNOSIS — R07.2 PRECORDIAL PAIN: ICD-10-CM

## 2024-08-13 DIAGNOSIS — R94.39 ABNORMAL NUCLEAR STRESS TEST: Primary | ICD-10-CM

## 2024-08-16 ENCOUNTER — OFFICE VISIT (OUTPATIENT)
Dept: CARDIOLOGY | Facility: CLINIC | Age: 59
End: 2024-08-16
Payer: MEDICARE

## 2024-08-16 VITALS
BODY MASS INDEX: 35.43 KG/M2 | HEIGHT: 68 IN | SYSTOLIC BLOOD PRESSURE: 138 MMHG | OXYGEN SATURATION: 94 % | HEART RATE: 69 BPM | WEIGHT: 233.8 LBS | DIASTOLIC BLOOD PRESSURE: 71 MMHG

## 2024-08-16 DIAGNOSIS — I48.0 PAROXYSMAL ATRIAL FIBRILLATION: Primary | ICD-10-CM

## 2024-08-16 DIAGNOSIS — R07.9 CHRONIC CHEST PAIN WITH HIGH RISK FOR CAD: ICD-10-CM

## 2024-08-16 DIAGNOSIS — Z91.89 CHRONIC CHEST PAIN WITH HIGH RISK FOR CAD: ICD-10-CM

## 2024-08-16 DIAGNOSIS — G89.29 CHRONIC CHEST PAIN WITH HIGH RISK FOR CAD: ICD-10-CM

## 2024-08-16 DIAGNOSIS — I10 PRIMARY HYPERTENSION: ICD-10-CM

## 2024-08-16 DIAGNOSIS — E78.2 MIXED HYPERLIPIDEMIA: ICD-10-CM

## 2024-08-16 PROCEDURE — 1159F MED LIST DOCD IN RCRD: CPT | Performed by: INTERNAL MEDICINE

## 2024-08-16 PROCEDURE — 93000 ELECTROCARDIOGRAM COMPLETE: CPT | Performed by: INTERNAL MEDICINE

## 2024-08-16 PROCEDURE — 3075F SYST BP GE 130 - 139MM HG: CPT | Performed by: INTERNAL MEDICINE

## 2024-08-16 PROCEDURE — 1160F RVW MEDS BY RX/DR IN RCRD: CPT | Performed by: INTERNAL MEDICINE

## 2024-08-16 PROCEDURE — 99214 OFFICE O/P EST MOD 30 MIN: CPT | Performed by: INTERNAL MEDICINE

## 2024-08-16 PROCEDURE — 3078F DIAST BP <80 MM HG: CPT | Performed by: INTERNAL MEDICINE

## 2024-08-16 RX ORDER — ISOSORBIDE MONONITRATE 30 MG/1
30 TABLET, EXTENDED RELEASE ORAL DAILY
Qty: 30 TABLET | Refills: 11 | Status: SHIPPED | OUTPATIENT
Start: 2024-08-16

## 2024-08-16 RX ORDER — ISOSORBIDE MONONITRATE 30 MG/1
30 TABLET, EXTENDED RELEASE ORAL DAILY
Qty: 30 TABLET | Refills: 11 | Status: SHIPPED | OUTPATIENT
Start: 2024-08-16 | End: 2024-08-16

## 2024-08-16 NOTE — PROGRESS NOTES
Office Note    Subjective     Joel Galo is a 59 y.o. male who presents to day for evaluation for cath      Active Problems:  Problem List Items Addressed This Visit          Cardiac and Vasculature    Paroxysmal atrial fibrillation - Primary    Overview     Echo, 3/10/2023: EF 60-65%.  Grade 1 diastolic dysfunction.  Normal valvular morphology.         Relevant Medications    isosorbide mononitrate (IMDUR) 30 MG 24 hr tablet    Other Relevant Orders    Case Request Cath Lab: Left Heart Cath (Completed)    Mixed hyperlipidemia    Relevant Orders    Case Request Cath Lab: Left Heart Cath (Completed)    Primary hypertension    Relevant Orders    Case Request Cath Lab: Left Heart Cath (Completed)     Other Visit Diagnoses       Chronic chest pain with high risk for CAD        Relevant Orders    Case Request Cath Lab: Left Heart Cath (Completed)            HPI  Patient is a pleasant 59-year-old gentleman past medical history significant for paroxysmal atrial fibrillation, history of hypertension, history of diabetes, hyperlipidemia patient has been having some discomfort in the chest off and on and underwent a CTA coronaries on August 10, 2024.  It showed a coronary calcium score of 686 and LAD with with a 50% stenosis in the LAD and 75% in the right coronary artery, 75% stenosis in the circumflex artery noted also.    PRIOR MEDS  Current Outpatient Medications on File Prior to Visit   Medication Sig Dispense Refill    amLODIPine (NORVASC) 2.5 MG tablet Take 1 tablet by mouth Daily.      apixaban (ELIQUIS) 5 MG tablet tablet Take 1 tablet by mouth 2 (Two) Times a Day. 60 tablet 4    aspirin 81 MG EC tablet Take 1 tablet by mouth Daily.      buPROPion XL (WELLBUTRIN XL) 300 MG 24 hr tablet Take 1 tablet by mouth Daily.      DULoxetine (CYMBALTA) 60 MG capsule Take 1 capsule by mouth Daily.      empagliflozin (JARDIANCE) 25 MG tablet tablet Take  by mouth Daily.      finasteride (PROSCAR) 5 MG tablet TAKE 1 TABLET EVERY  DAY 90 tablet 0    gabapentin (NEURONTIN) 300 MG capsule Take 1 capsule by mouth 3 (Three) Times a Day.      glimepiride (AMARYL) 2 MG tablet Take 1 tablet by mouth 2 (Two) Times a Day.      HumuLIN 70/30 KwikPen (70-30) 100 UNIT/ML suspension pen-injector 45 units in the a.m. and 20mg in the p.m.      HYDROcodone-acetaminophen (NORCO)  MG per tablet Take 1 tablet by mouth Every 8 (Eight) Hours As Needed for Moderate Pain.      levothyroxine (SYNTHROID, LEVOTHROID) 25 MCG tablet Take 1 tablet by mouth Daily.      losartan (COZAAR) 25 MG tablet Take 1 tablet by mouth Daily.      polyethylene glycol (MIRALAX) 17 g packet Take 17 g by mouth Daily.      rosuvastatin (CRESTOR) 10 MG tablet Take 1 tablet by mouth Daily.      Sotalol HCl AF 80 MG tablet Take 1.5 tablets by mouth 2 (Two) Times a Day. 270 tablet 3    tamsulosin (FLOMAX) 0.4 MG capsule 24 hr capsule TAKE 1 CAPSULE EVERY DAY 90 capsule 3    Vitamin D, Cholecalciferol, (CHOLECALCIFEROL) 10 MCG (400 UNIT) tablet Take 1 tablet by mouth Daily.      [DISCONTINUED] indapamide (LOZOL) 2.5 MG tablet Take 1 tablet by mouth Every Morning. (Patient not taking: Reported on 7/30/2024)      [DISCONTINUED] insulin lispro protamine-insulin lispro (humaLOG 75-25) (75-25) 100 UNIT/ML suspension injection Inject 32 Units under the skin into the appropriate area as directed 2 (Two) Times a Day With Meals. (Patient not taking: Reported on 8/16/2024)      [DISCONTINUED] insulin regular (humuLIN R,novoLIN R) 100 UNIT/ML injection Inject  under the skin into the appropriate area as directed 3 (Three) Times a Day Before Meals. (Patient not taking: Reported on 8/16/2024)      [DISCONTINUED] metoprolol tartrate (LOPRESSOR) 50 MG tablet Take 1 tablet by mouth 1 (One) Time for 1 dose. Take 50 mg One (1) Hour Prior to Coronary CTA (Patient not taking: Reported on 8/16/2024) 1 tablet 0    [DISCONTINUED] NOVOLIN N 100 UNIT/ML injection  (Patient not taking: Reported on 8/16/2024)  0     "[DISCONTINUED] sotalol (BETAPACE) 80 MG tablet TAKE ONE AND A HALF (1 & 1/2) TABLETS BY MOUTH TWICE DAILY (Patient not taking: Reported on 8/16/2024) 270 tablet 0     No current facility-administered medications on file prior to visit.       ALLERGIES  Patient has no known allergies.    HISTORY  Past Medical History:   Diagnosis Date    A-fib     Diabetes     Heart attack     \"5-6 years ago\"    Hypertension        Social History     Socioeconomic History    Marital status:    Tobacco Use    Smoking status: Never    Smokeless tobacco: Never   Vaping Use    Vaping status: Never Used   Substance and Sexual Activity    Alcohol use: No    Drug use: No    Sexual activity: Not Currently       Family History   Problem Relation Age of Onset    Diabetes Father     Kidney disease Father     Heart disease Mother        Review of Systems   Respiratory:  Positive for chest tightness and shortness of breath.    Cardiovascular:  Positive for chest pain. Negative for palpitations.   Neurological:  Negative for dizziness, tremors, syncope, speech difficulty, weakness and light-headedness.       Objective     VITALS: /71 (BP Location: Left arm, Patient Position: Sitting, Cuff Size: Large Adult)   Pulse 69   Ht 172.7 cm (68\")   Wt 106 kg (233 lb 12.8 oz)   SpO2 94%   BMI 35.55 kg/m²     LABS:   No visits with results within 3 Month(s) from this visit.   Latest known visit with results is:   Hospital Outpatient Visit on 03/10/2023   Component Date Value Ref Range Status    Target HR (85%) 03/10/2023 137  bpm Final    Max. Pred. HR (100%) 03/10/2023 161  bpm Final    LVIDd 03/10/2023 4.0  cm Final    LVIDs 03/10/2023 3.5  cm Final    IVSd 03/10/2023 1.60  cm Final    LVPWd 03/10/2023 1.70  cm Final    FS 03/10/2023 12.5  % Final    IVS/LVPW 03/10/2023 0.94  cm Final    ESV(cubed) 03/10/2023 42.9  ml Final    LV Sys Vol (BSA corrected) 03/10/2023 7.6  cm2 Final    EDV(cubed) 03/10/2023 64.0  ml Final    LV Collazo Vol (BSA " corrected) 03/10/2023 19.8  cm2 Final    LVOT area 03/10/2023 3.8  cm2 Final    LV mass(C)d 03/10/2023 271.0  grams Final    LVOT diam 03/10/2023 2.20  cm Final    EDV(MOD-sp4) 03/10/2023 42.6  ml Final    ESV(MOD-sp4) 03/10/2023 16.4  ml Final    SV(MOD-sp4) 03/10/2023 26.2  ml Final    SVi(MOD-SP4) 03/10/2023 12.1  ml/m2 Final    EF(MOD-sp4) 03/10/2023 61.5  % Final    MV E max derian 03/10/2023 74.2  cm/sec Final    MV A max derian 03/10/2023 70.3  cm/sec Final    MV E/A 03/10/2023 1.06   Final    LA ESV Index (BP) 03/10/2023 18.9  ml/m2 Final    Med Peak E' Derian 03/10/2023 7.2  cm/sec Final    Lat Peak E' Derian 03/10/2023 9.5  cm/sec Final    Avg E/e' ratio 03/10/2023 8.89   Final    TAPSE (>1.6) 03/10/2023 2.18  cm Final    LA dimension (2D)  03/10/2023 4.3  cm Final    Ao pk derian 03/10/2023 110.0  cm/sec Final    Ao max PG 03/10/2023 4.8  mmHg Final    Ao mean PG 03/10/2023 2.00  mmHg Final    Ao V2 VTI 03/10/2023 20.6  cm Final    TR max derian 03/10/2023 253.0  cm/sec Final    TR max PG 03/10/2023 25.6  mmHg Final    RVSP(TR) 03/10/2023 35.6  mmHg Final    RAP systole 03/10/2023 10.0  mmHg Final    PA acc time 03/10/2023 0.11  sec Final    PA pr(Accel) 03/10/2023 31.3  mmHg Final    Ao root diam 03/10/2023 2.9  cm Final         IMAGING:   CT Angiogram Coronary    Result Date: 8/12/2024  1.  The LAD demonstrates such extensive mid-distal calcification that the examination is very limited with also some possible high risk plaque causing about 50% stenosis in the mid aspect of the LAD. 2.  Question proximal circumflex origin high risk plaque causing about 75% stenosis. 3.  Mixed calcific and high risk plaque of proximal RCA possibly contribute up to about 75% or greater stenosis and should be further evaluated with ICA.  ASSESSMENT:   CAD RADS N, P4 but overall concerning for significant multifocal disease in ICA should be considered. Impression.   This report was finalized on 8/12/2024 4:16 PM by Dr. Reg Garcia MD.       Results for orders placed during the hospital encounter of 09/03/21    Stress Test With Myocardial Perfusion (1 Day)    Interpretation Summary  · A pharmacological stress test was performed using regadenoson without low-level exercise.  · Findings consistent with a normal ECG stress test.  · Myocardial perfusion imaging indicates a small-sized, mild-to-moderate degree of ischemia located in the lateral wall.  · Normal LV cavity size. Normal LV wall motion noted.  · Left ventricular ejection fraction is normal. (Calculated EF = 55%).  · Impressions are consistent with a low to intermediate risk study.     No results found for this or any previous visit.          EXAM:  Constitutional:       General: Awake.      Appearance: Healthy appearance. Not in distress.   Eyes:      Conjunctiva/sclera: Conjunctivae normal.   HENT:      Head: Normocephalic and atraumatic.      Nose: Nose normal.    Mouth/Throat:      Pharynx: Oropharynx is clear.   Neck:      Thyroid: Thyroid normal.      Vascular: No carotid bruit or JVD.   Pulmonary:      Effort: Pulmonary effort is normal.      Breath sounds: Normal breath sounds.   Chest:      Chest wall: Not tender to palpatation.   Cardiovascular:      PMI at left midclavicular line. Normal rate. Regular rhythm. Normal S1 with normal intensity. Normal S2 with normal intensity.       Murmurs: There is no murmur.      No gallop.  No rub.   Pulses:     Intact distal pulses.   Edema:     Peripheral edema absent.   Abdominal:      General: Bowel sounds are normal. There is no distension.      Palpations: Abdomen is soft. There is no hepatomegaly.      Tenderness: There is no abdominal tenderness.   Musculoskeletal: Normal range of motion.      Cervical back: Normal range of motion. Skin:     General: Skin is warm and dry. There is no cyanosis.      Coloration: Skin is not jaundiced.   Neurological:      Mental Status: Alert and oriented to person, place and time.      Motor: Motor function  is intact.      Gait: Gait is intact.   Psychiatric:         Attention and Perception: Attention and perception normal.         Speech: Speech normal.         Behavior: Behavior is cooperative.         Cognition and Memory: Cognition and memory normal.         Judgment: Judgment normal.          Procedure     ECG 12 Lead    Date/Time: 8/16/2024 11:55 AM  Performed by: Julio Neff MD    Authorized by: Julio Neff MD  Comparison: compared with previous ECG from 2/1/2024  Similar to previous ECG  Comments: Sinus rhythm at 70, left axis, normal intervals             Assessment & Plan     Diagnoses and all orders for this visit:    1. Paroxysmal atrial fibrillation (Primary)  -     Case Request Cath Lab: Left Heart Cath    2. Mixed hyperlipidemia  -     Case Request Cath Lab: Left Heart Cath    3. Primary hypertension  -     Case Request Cath Lab: Left Heart Cath    4. Chronic chest pain with high risk for CAD  -     Case Request Cath Lab: Left Heart Cath    Other orders  -     ECG 12 Lead  -     Discontinue: isosorbide mononitrate (IMDUR) 30 MG 24 hr tablet; Take 1 tablet by mouth Daily.  Dispense: 30 tablet; Refill: 11  -     isosorbide mononitrate (IMDUR) 30 MG 24 hr tablet; Take 1 tablet by mouth Daily.  Dispense: 30 tablet; Refill: 11          PLAN  #1 cardiac.  Patient with multiple risk factors for heart disease.  Worsening angina symptoms with abnormal CT coronaries.  Will schedule for a cath in next couple of weeks.  Continue current medications.  Patient Eliquis would need to be on hold for at least 36 hours prior to the procedure  #2 paroxysmal atrial fibrillation.  Currently sinus rhythm.  Continue sotalol  #3 hyperlipidemia.  Continue Crestor  #4 hypertension.  Continue current medication.  Blood pressure stable           MEDS ORDERED DURING VISIT:    Medications Discontinued During This Encounter   Medication Reason    indapamide (LOZOL) 2.5 MG tablet *Therapy completed    isosorbide mononitrate  (IMDUR) 30 MG 24 hr tablet     insulin lispro protamine-insulin lispro (humaLOG 75-25) (75-25) 100 UNIT/ML suspension injection *Therapy completed    insulin regular (humuLIN R,novoLIN R) 100 UNIT/ML injection *Therapy completed    metoprolol tartrate (LOPRESSOR) 50 MG tablet *Therapy completed    NOVOLIN N 100 UNIT/ML injection *Therapy completed    sotalol (BETAPACE) 80 MG tablet *Therapy completed        New Medications Ordered This Visit   Medications    isosorbide mononitrate (IMDUR) 30 MG 24 hr tablet     Sig: Take 1 tablet by mouth Daily.     Dispense:  30 tablet     Refill:  11         Follow Up:   Return in about 6 weeks (around 9/27/2024) for Recheck.    Patient was given instructions and counseling regarding his condition or for health maintenance advice. Please see specific information pulled into the AVS if appropriate.   As always, Edmundo Boston MD  I appreciate very much the opportunity to participate in the cardiovascular care of your patients. Please do not hesitate to call me with any questions with regards to Joel Galo evaluation and management.         This document has been electronically signed by Julio Neff MD [facility], Interventional Cardiology  August 16, 2024 11:59 EDT    Dictated Utilizing Dragon Dictation: Part of this note may be an electronic transcription/translation of spoken language to printed text using the Dragon Dictation System.

## 2024-08-26 ENCOUNTER — LAB (OUTPATIENT)
Dept: LAB | Facility: HOSPITAL | Age: 59
End: 2024-08-26
Payer: MEDICARE

## 2024-08-26 DIAGNOSIS — I48.0 PAROXYSMAL ATRIAL FIBRILLATION: ICD-10-CM

## 2024-08-26 PROBLEM — R07.9 CHRONIC CHEST PAIN WITH HIGH RISK FOR CAD: Status: ACTIVE | Noted: 2024-08-16

## 2024-08-26 PROBLEM — G89.29 CHRONIC CHEST PAIN WITH HIGH RISK FOR CAD: Status: ACTIVE | Noted: 2024-08-16

## 2024-08-26 PROBLEM — Z91.89 CHRONIC CHEST PAIN WITH HIGH RISK FOR CAD: Status: ACTIVE | Noted: 2024-08-16

## 2024-08-26 LAB
ANION GAP SERPL CALCULATED.3IONS-SCNC: 10.3 MMOL/L (ref 5–15)
BUN SERPL-MCNC: 11 MG/DL (ref 8–23)
BUN/CREAT SERPL: 12.9 (ref 7–25)
CALCIUM SPEC-SCNC: 9 MG/DL (ref 8.6–10.5)
CHLORIDE SERPL-SCNC: 100 MMOL/L (ref 98–107)
CO2 SERPL-SCNC: 24.7 MMOL/L (ref 22–29)
CREAT SERPL-MCNC: 0.85 MG/DL (ref 0.76–1.27)
DEPRECATED RDW RBC AUTO: 43 FL (ref 37–54)
EGFRCR SERPLBLD CKD-EPI 2021: 98.9 ML/MIN/1.73
ERYTHROCYTE [DISTWIDTH] IN BLOOD BY AUTOMATED COUNT: 12.8 % (ref 12.3–15.4)
GLUCOSE SERPL-MCNC: 343 MG/DL (ref 65–99)
HCT VFR BLD AUTO: 43.7 % (ref 37.5–51)
HGB BLD-MCNC: 14.6 G/DL (ref 13–17.7)
MCH RBC QN AUTO: 30.7 PG (ref 26.6–33)
MCHC RBC AUTO-ENTMCNC: 33.4 G/DL (ref 31.5–35.7)
MCV RBC AUTO: 91.8 FL (ref 79–97)
PLATELET # BLD AUTO: 178 10*3/MM3 (ref 140–450)
PMV BLD AUTO: 9.5 FL (ref 6–12)
POTASSIUM SERPL-SCNC: 4.5 MMOL/L (ref 3.5–5.2)
RBC # BLD AUTO: 4.76 10*6/MM3 (ref 4.14–5.8)
SODIUM SERPL-SCNC: 135 MMOL/L (ref 136–145)
WBC NRBC COR # BLD AUTO: 6.82 10*3/MM3 (ref 3.4–10.8)

## 2024-08-26 PROCEDURE — 36415 COLL VENOUS BLD VENIPUNCTURE: CPT

## 2024-08-26 PROCEDURE — 85027 COMPLETE CBC AUTOMATED: CPT

## 2024-08-26 PROCEDURE — 80048 BASIC METABOLIC PNL TOTAL CA: CPT

## 2024-08-28 ENCOUNTER — HOSPITAL ENCOUNTER (OUTPATIENT)
Facility: HOSPITAL | Age: 59
Discharge: HOME | End: 2024-08-28
Attending: INTERNAL MEDICINE | Admitting: INTERNAL MEDICINE
Payer: MEDICARE

## 2024-08-28 VITALS
WEIGHT: 232 LBS | BODY MASS INDEX: 35.16 KG/M2 | HEART RATE: 71 BPM | SYSTOLIC BLOOD PRESSURE: 116 MMHG | DIASTOLIC BLOOD PRESSURE: 91 MMHG | OXYGEN SATURATION: 92 % | HEIGHT: 68 IN | RESPIRATION RATE: 18 BRPM | TEMPERATURE: 97.6 F

## 2024-08-28 DIAGNOSIS — I10 PRIMARY HYPERTENSION: ICD-10-CM

## 2024-08-28 DIAGNOSIS — Z91.89 CHRONIC CHEST PAIN WITH HIGH RISK FOR CAD: ICD-10-CM

## 2024-08-28 DIAGNOSIS — R07.9 CHRONIC CHEST PAIN WITH HIGH RISK FOR CAD: ICD-10-CM

## 2024-08-28 DIAGNOSIS — G89.29 CHRONIC CHEST PAIN WITH HIGH RISK FOR CAD: ICD-10-CM

## 2024-08-28 DIAGNOSIS — E78.2 MIXED HYPERLIPIDEMIA: ICD-10-CM

## 2024-08-28 DIAGNOSIS — I48.0 PAROXYSMAL ATRIAL FIBRILLATION: ICD-10-CM

## 2024-08-28 PROBLEM — I25.10 CAD (CORONARY ARTERY DISEASE): Status: ACTIVE | Noted: 2024-08-28

## 2024-08-28 LAB — CATH EF ESTIMATED: 55 %

## 2024-08-28 PROCEDURE — 25010000002 HEPARIN (PORCINE) PER 1000 UNITS: Performed by: INTERNAL MEDICINE

## 2024-08-28 PROCEDURE — S0260 H&P FOR SURGERY: HCPCS | Performed by: INTERNAL MEDICINE

## 2024-08-28 PROCEDURE — 99214 OFFICE O/P EST MOD 30 MIN: CPT | Performed by: INTERNAL MEDICINE

## 2024-08-28 PROCEDURE — 25010000002 MIDAZOLAM PER 1 MG: Performed by: INTERNAL MEDICINE

## 2024-08-28 PROCEDURE — 25510000001 IOPAMIDOL PER 1 ML: Performed by: INTERNAL MEDICINE

## 2024-08-28 PROCEDURE — C1769 GUIDE WIRE: HCPCS | Performed by: INTERNAL MEDICINE

## 2024-08-28 PROCEDURE — 93458 L HRT ARTERY/VENTRICLE ANGIO: CPT | Performed by: INTERNAL MEDICINE

## 2024-08-28 PROCEDURE — C1894 INTRO/SHEATH, NON-LASER: HCPCS | Performed by: INTERNAL MEDICINE

## 2024-08-28 PROCEDURE — 25010000002 FENTANYL CITRATE (PF) 50 MCG/ML SOLUTION: Performed by: INTERNAL MEDICINE

## 2024-08-28 PROCEDURE — 25810000003 SODIUM CHLORIDE 0.9 % SOLUTION: Performed by: INTERNAL MEDICINE

## 2024-08-28 RX ORDER — FENTANYL CITRATE 50 UG/ML
INJECTION, SOLUTION INTRAMUSCULAR; INTRAVENOUS
Status: DISCONTINUED | OUTPATIENT
Start: 2024-08-28 | End: 2024-08-28 | Stop reason: HOSPADM

## 2024-08-28 RX ORDER — SODIUM CHLORIDE 9 MG/ML
INJECTION, SOLUTION INTRAVENOUS
Status: DISCONTINUED | OUTPATIENT
Start: 2024-08-28 | End: 2024-08-28 | Stop reason: HOSPADM

## 2024-08-28 RX ORDER — MIDAZOLAM HYDROCHLORIDE 1 MG/ML
INJECTION INTRAMUSCULAR; INTRAVENOUS
Status: DISCONTINUED | OUTPATIENT
Start: 2024-08-28 | End: 2024-08-28 | Stop reason: HOSPADM

## 2024-08-28 RX ORDER — ACETAMINOPHEN 325 MG/1
650 TABLET ORAL EVERY 4 HOURS PRN
Status: DISCONTINUED | OUTPATIENT
Start: 2024-08-28 | End: 2024-08-28 | Stop reason: HOSPADM

## 2024-08-28 RX ORDER — VERAPAMIL HYDROCHLORIDE 2.5 MG/ML
INJECTION, SOLUTION INTRAVENOUS
Status: DISCONTINUED | OUTPATIENT
Start: 2024-08-28 | End: 2024-08-28 | Stop reason: HOSPADM

## 2024-08-28 RX ORDER — HEPARIN SODIUM 1000 [USP'U]/ML
INJECTION, SOLUTION INTRAVENOUS; SUBCUTANEOUS
Status: DISCONTINUED | OUTPATIENT
Start: 2024-08-28 | End: 2024-08-28 | Stop reason: HOSPADM

## 2024-08-28 RX ORDER — SODIUM CHLORIDE 9 MG/ML
1 INJECTION, SOLUTION INTRAVENOUS CONTINUOUS
Status: DISCONTINUED | OUTPATIENT
Start: 2024-08-28 | End: 2024-08-28 | Stop reason: HOSPADM

## 2024-08-28 RX ORDER — IOPAMIDOL 755 MG/ML
INJECTION, SOLUTION INTRAVASCULAR
Status: DISCONTINUED | OUTPATIENT
Start: 2024-08-28 | End: 2024-08-28 | Stop reason: HOSPADM

## 2024-08-28 RX ORDER — LIDOCAINE HYDROCHLORIDE 20 MG/ML
INJECTION, SOLUTION INFILTRATION; PERINEURAL
Status: DISCONTINUED | OUTPATIENT
Start: 2024-08-28 | End: 2024-08-28 | Stop reason: HOSPADM

## 2024-08-28 RX ORDER — NITROGLYCERIN 0.4 MG/1
0.4 TABLET SUBLINGUAL
Status: DISCONTINUED | OUTPATIENT
Start: 2024-08-28 | End: 2024-08-28 | Stop reason: HOSPADM

## 2024-08-28 NOTE — Clinical Note
Hemostasis started on the right radial artery. R-Band was used in achieving hemostasis. Radial compression device applied to vessel. Hemostasis achieved successfully. Closure device additional comment: 10ml of air

## 2024-08-28 NOTE — NURSING NOTE
Medications: Patient/Family given discharge instructions, verbalizes understanding. Patient taken to POV per [facility] staff.

## 2024-08-28 NOTE — NURSING NOTE
Order received for Cardiac Rehab Consultation.     Patient being referred to [facility] for possible further intervention. He is aware if intervention is   necessary [facility] will send a referral for Cardiac Rehab and staff will contact him. Then he will be scheduled for the program.    Information discussed with: Patient      Thank you for the referral. Please contact the Cardiac Rehab Dept. (ext. [phone]) with any further questions or concerns.

## 2024-08-28 NOTE — DISCHARGE SUMMARY
.Patient Identification:  Name:  Joel Galo  Age:  59 y.o.  Sex:  male  :  1965  MRN:  [id number]  Visit Number:  [id number]    Date of Admission: 2024  Date of Discharge:       PCP: Edmundo Boston MD    DISCHARGE DIAGNOSIS  Coronary artery disease    CONSULTS       PROCEDURES PERFORMED  Left heart cardiac catheterization with LV gram.  Patient with significant disease noted multiple vessels.  Patient will be referred for bypass surgery    HOSPITAL COURSE  Patient is a 59 y.o. male presented to [facility] complaining of chest pains with abnormal CTA coronaries.  Please see the admitting history and physical for further details.      VITAL SIGNS:      24  0949   Weight: 105 kg (232 lb)     Body mass index is 35.28 kg/m².    PHYSICAL EXAM:  Constitutional:  Well-developed and well-nourished.  No respiratory distress.      HENT:  Head: Normocephalic and atraumatic.  Mouth:  Moist mucous membranes.    Eyes:  Conjunctivae and EOM are normal.  Pupils are equal, round, and reactive to light.  No scleral icterus.  Neck:  Neck supple.  No JVD present.    Cardiovascular:  Normal rate, regular rhythm and normal heart sounds with no murmur.  Pulmonary/Chest:  No respiratory distress, no wheezes, no crackles, with normal breath sounds and good air movement.  Abdominal:  Soft.  Bowel sounds are normal.  No distension and no tenderness.   Musculoskeletal:  No edema, no tenderness, and no deformity.  No red or swollen joints anywhere.    Neurological:  Alert and oriented to person, place, and time.  No cranial nerve deficit.  No tongue deviation.  No facial droop.  No slurred speech.   Skin:  Skin is warm and dry.  No rash noted.  No pallor.   Psychiatric:  Normal mood and affect.  Behavior is normal.  Judgment and thought content normal.   Peripheral vascular:  No edema and strong pulses on all 4 extremities.    DISCHARGE DISPOSITION   Stable    DISCHARGE MEDICATIONS:     Discharge  Medications        Continue These Medications        Instructions Start Date   amLODIPine 2.5 MG tablet  Commonly known as: NORVASC   2.5 mg, Oral, Daily      apixaban 5 MG tablet tablet  Commonly known as: ELIQUIS   5 mg, Oral, 2 Times Daily      aspirin 81 MG EC tablet   81 mg, Oral, Daily      buPROPion  MG 24 hr tablet  Commonly known as: WELLBUTRIN XL   300 mg, Oral, Daily      DULoxetine 60 MG capsule  Commonly known as: CYMBALTA   60 mg, Oral, Daily      empagliflozin 25 MG tablet tablet  Commonly known as: JARDIANCE   Oral, Daily      finasteride 5 MG tablet  Commonly known as: PROSCAR   TAKE 1 TABLET EVERY DAY      gabapentin 300 MG capsule  Commonly known as: NEURONTIN   300 mg, Oral, 3 Times Daily      glimepiride 2 MG tablet  Commonly known as: AMARYL   2 mg, Oral, 2 Times Daily      HumuLIN 70/30 KwikPen (70-30) 100 UNIT/ML suspension pen-injector  Generic drug: Insulin NPH Isophane & Regular   45 units in the a.m. and 20mg in the p.m.      HYDROcodone-acetaminophen  MG per tablet  Commonly known as: NORCO   1 tablet, Oral, Every 8 Hours PRN      isosorbide mononitrate 30 MG 24 hr tablet  Commonly known as: IMDUR   30 mg, Oral, Daily      levothyroxine 25 MCG tablet  Commonly known as: SYNTHROID, LEVOTHROID   25 mcg, Oral, Daily      losartan 25 MG tablet  Commonly known as: COZAAR   25 mg, Oral, Daily      polyethylene glycol 17 g packet  Commonly known as: MIRALAX   17 g, Oral, Daily      rosuvastatin 10 MG tablet  Commonly known as: CRESTOR   10 mg, Oral, Daily      Sotalol HCl AF 80 MG tablet   120 mg, Oral, 2 Times Daily      tamsulosin 0.4 MG capsule 24 hr capsule  Commonly known as: FLOMAX   TAKE 1 CAPSULE EVERY DAY      Vitamin D (Cholecalciferol) 10 MCG (400 UNIT) tablet  Commonly known as: CHOLECALCIFEROL   400 Units, Oral, Daily                Results for orders placed during the hospital encounter of 03/10/23    Adult Transthoracic Echo Complete w/ Color, Spectral and Contrast if  necessary per protocol    Interpretation Summary    Left ventricular systolic function is normal. Left ventricular ejection fraction appears to be 61 - 65%.    Left ventricular wall thickness is consistent with mild to moderate concentric hypertrophy. Sigmoid-shaped ventricular septum is present.    Left ventricular diastolic function is consistent with (grade I) impaired relaxation.    Estimated right ventricular systolic pressure from tricuspid regurgitation is mildly elevated (35-45 mmHg).   Results for orders placed during the hospital encounter of 09/03/21    Stress Test With Myocardial Perfusion (1 Day)    Interpretation Summary  · A pharmacological stress test was performed using regadenoson without low-level exercise.  · Findings consistent with a normal ECG stress test.  · Myocardial perfusion imaging indicates a small-sized, mild-to-moderate degree of ischemia located in the lateral wall.  · Normal LV cavity size. Normal LV wall motion noted.  · Left ventricular ejection fraction is normal. (Calculated EF = 55%).  · Impressions are consistent with a low to intermediate risk study.   Results for orders placed during the hospital encounter of 09/03/21    Stress Test With Myocardial Perfusion (1 Day)    Interpretation Summary  · A pharmacological stress test was performed using regadenoson without low-level exercise.  · Findings consistent with a normal ECG stress test.  · Myocardial perfusion imaging indicates a small-sized, mild-to-moderate degree of ischemia located in the lateral wall.  · Normal LV cavity size. Normal LV wall motion noted.  · Left ventricular ejection fraction is normal. (Calculated EF = 55%).  · Impressions are consistent with a low to intermediate risk study.     Procedures   Left heart cardiac catheterization with LV gram    Your Scheduled Appointments      Sep 03, 2024 9:45 AM  Follow Up with ZIA Flynn  [facility] CARDIOLOGY ([place]) [address]  [place] [address]  [phone]   -Bring photo ID, insurance card, and list of medications to appointment  -If testing was completed outside of [facility] then patient must bring images on a disc  -Copay will be collected at time of appointment  -Established patients should arrive 10 minutes prior to appointment         Sep 06, 2024 2:00 PM  Follow Up with Musa Fishman [facility] CARDIOLOGY ([place]) [place] [address] [phone]   -Bring photo ID, insurance card, and list of medications to appointment  -If testing was completed outside of [facility] then patient must bring images on a disc  -Copay will be collected at time of appointment  -Established patients should arrive 15 minutes prior to appointment         Dec 06, 2024 1:45 PM  Follow Up with Musa Fishman DO  [facility] CARDIOLOGY ([place]) [place] [address] [phone]   -Bring photo ID, insurance card, and list of medications to appointment  -If testing was completed outside of [facility] then patient must bring images on a disc  -Copay will be collected at time of appointment  -Established patients should arrive 15 minutes prior to appointment         Jun 06, 2025 1:45 PM  Follow Up with Musa Fishman [facility] CARDIOLOGY ([place]) [place] [id number] [phone]   -Bring photo ID, insurance card, and list of medications to appointment  -If testing was completed outside of [facility] then patient must bring images on a disc  -Copay will be collected at time of appointment  -Established patients should arrive 15 minutes prior to appointment                 Follow-up Information       Blossom Parks APRN Follow up in 1 week(s).    Specialty: Cardiology  Why: As needed  Contact information:  [address] [place] [address]  [phone]               Edmundo Boston MD .    Specialty:  Family Medicine  Contact information:  [address] [facility] [place] [address] [phone]                                This document has been electronically signed by Julio Neff MD [facility], Interventional Cardiology  August 28, 2024 11:27 EDT    Please note that this discharge summary required more than 30 minutes to complete.

## 2024-08-28 NOTE — H&P
".      Patient Identification:  Name:  Joel Galo  Age:  59 y.o.  Sex:  male  :  1965  MRN:  [id number]   Visit Number:  [id number]  Primary Care Physician:  Edmundo Boston MD    Chief complaint:   Chest pains  History of presenting illness:    Patient is a 59-year-old gentleman past medical history significant for paroxysmal atrial fibrillation on Eliquis, history of hypertension, diabetes, history of hyperlipidemia patient had abnormal CTA coronaries in 2024.  Moderate disease in the LAD and 75% stenosis in the circumflex and right coronary artery noted.  Patient referred for left heart cath.    ROS: All systems reviewed and negative except as mentioned above.     ---------------------------------------------------------------------------------------------------------------------   Past Medical History:   Diagnosis Date    A-fib     Diabetes     Disease of thyroid gland     Heart attack     \"5-6 years ago\"    Hyperlipidemia     Hypertension      Past Surgical History:   Procedure Laterality Date    CARDIAC ELECTROPHYSIOLOGY PROCEDURE N/A 2023    Procedure: Loop insertion;  Surgeon: Tariq Chávez MD;  Location: [facility] INVASIVE LOCATION;  Service: Cardiovascular;  Laterality: N/A;    CATARACT EXTRACTION Left     left eye    COLONOSCOPY      ENDOSCOPY       Family History   Problem Relation Age of Onset    Diabetes Father     Kidney disease Father     Heart disease Mother      Social History     Socioeconomic History    Marital status:    Tobacco Use    Smoking status: Never    Smokeless tobacco: Never   Vaping Use    Vaping status: Never Used   Substance and Sexual Activity    Alcohol use: No    Drug use: No    Sexual activity: Not Currently     ---------------------------------------------------------------------------------------------------------------------   Allergies:  Patient has no known " allergies.  ---------------------------------------------------------------------------------------------------------------------   Prior to Admission Medications       Prescriptions Last Dose Informant Patient Reported? Taking?    amLODIPine (NORVASC) 2.5 MG tablet 8/27/2024  Yes Yes    Take 1 tablet by mouth Daily.    aspirin 81 MG EC tablet 8/27/2024  Yes Yes    Take 1 tablet by mouth Daily.    buPROPion XL (WELLBUTRIN XL) 300 MG 24 hr tablet 8/27/2024  Yes Yes    Take 1 tablet by mouth Daily.    DULoxetine (CYMBALTA) 60 MG capsule 8/27/2024  Yes Yes    Take 1 capsule by mouth Daily.    empagliflozin (JARDIANCE) 25 MG tablet tablet 8/28/2024  Yes Yes    Take  by mouth Daily.    finasteride (PROSCAR) 5 MG tablet 8/28/2024  No Yes    TAKE 1 TABLET EVERY DAY    gabapentin (NEURONTIN) 300 MG capsule 8/28/2024  Yes Yes    Take 1 capsule by mouth 3 (Three) Times a Day.    glimepiride (AMARYL) 2 MG tablet 8/28/2024  Yes Yes    Take 1 tablet by mouth 2 (Two) Times a Day.    HumuLIN 70/30 KwikPen (70-30) 100 UNIT/ML suspension pen-injector 8/27/2024  Yes Yes    45 units in the a.m. and 20mg in the p.m.    HYDROcodone-acetaminophen (NORCO)  MG per tablet 8/27/2024  Yes Yes    Take 1 tablet by mouth Every 8 (Eight) Hours As Needed for Moderate Pain.    isosorbide mononitrate (IMDUR) 30 MG 24 hr tablet 8/28/2024  No Yes    Take 1 tablet by mouth Daily.    levothyroxine (SYNTHROID, LEVOTHROID) 25 MCG tablet 8/28/2024  Yes Yes    Take 1 tablet by mouth Daily.    losartan (COZAAR) 25 MG tablet 8/28/2024  Yes Yes    Take 1 tablet by mouth Daily.    polyethylene glycol (MIRALAX) 17 g packet 8/27/2024  Yes Yes    Take 17 g by mouth Daily.    rosuvastatin (CRESTOR) 10 MG tablet 8/27/2024  Yes Yes    Take 1 tablet by mouth Daily.    Sotalol HCl AF 80 MG tablet 8/28/2024  No Yes    Take 1.5 tablets by mouth 2 (Two) Times a Day.    tamsulosin (FLOMAX) 0.4 MG capsule 24 hr capsule 8/28/2024  No Yes    TAKE 1 CAPSULE EVERY DAY     Vitamin D, Cholecalciferol, (CHOLECALCIFEROL) 10 MCG (400 UNIT) tablet 8/28/2024  Yes Yes    Take 1 tablet by mouth Daily.    apixaban (ELIQUIS) 5 MG tablet tablet 8/26/2024  No No    Take 1 tablet by mouth 2 (Two) Times a Day.          Hospital Scheduled Meds:     sodium chloride, , Last Rate: 75 mL/hr (08/28/24 1039)      ---------------------------------------------------------------------------------------------------------------------   Vital Signs:  Temp:  [97.6 °F (36.4 °C)] 97.6 °F (36.4 °C)  Heart Rate:  [71] 71  Resp:  [18] 18  BP: (125)/(81) 125/81      08/28/24  0949   Weight: 105 kg (232 lb)     Body mass index is 35.28 kg/m².  ---------------------------------------------------------------------------------------------------------------------   Physical Exam:  Constitutional:  Well-developed and well-nourished.  No respiratory distress.      HENT:  Head: Normocephalic and atraumatic.  Mouth:  Moist mucous membranes.    Eyes:  Conjunctivae and EOM are normal.  Pupils are equal, round, and reactive to light.  No scleral icterus.  Neck:  Neck supple.  No JVD present.    Cardiovascular:  Normal rate, regular rhythm and normal heart sounds with no murmur.  Pulmonary/Chest:  No respiratory distress, no wheezes, no crackles, with normal breath sounds and good air movement.  Abdominal:  Soft.  Bowel sounds are normal.  No distension and no tenderness.   Musculoskeletal:  No edema, no tenderness, and no deformity.  No red or swollen joints anywhere.    Neurological:  Alert and oriented to person, place, and time.  No cranial nerve deficit.  No tongue deviation.  No facial droop.  No slurred speech.   Skin:  Skin is warm and dry.  No rash noted.  No pallor.   Psychiatric:  Normal mood and affect.  Behavior is normal.  Judgment and thought content normal.   Peripheral vascular:  No edema and strong pulses on all 4  "extremities.  ---------------------------------------------------------------------------------------------------------------------  EKG: EKG from August 16, 2024 showed sinus rhythm, LVH  Telemetry: Sinus rhythm  I have personally looked at both the EKG and the telemetry strips.  ---------------------------------------------------------------------------------------------------------------------   Results from last 7 days   Lab Units 08/26/24  1121   WBC 10*3/mm3 6.82   HEMOGLOBIN g/dL 14.6   HEMATOCRIT % 43.7   MCV fL 91.8   MCHC g/dL 33.4   PLATELETS 10*3/mm3 178         Results from last 7 days   Lab Units 08/26/24  1121   SODIUM mmol/L 135*   POTASSIUM mmol/L 4.5   CHLORIDE mmol/L 100   CO2 mmol/L 24.7   BUN mg/dL 11   CREATININE mg/dL 0.85   CALCIUM mg/dL 9.0   GLUCOSE mg/dL 343*   Estimated Creatinine Clearance: 107.1 mL/min (by C-G formula based on SCr of 0.85 mg/dL).  No results found for: \"AMMONIA\"          Lab Results   Component Value Date    HGBA1C 12.6 (A) 08/27/2024     No results found for: \"TSH\", \"FREET4\"  No results found for: \"PREGTESTUR\", \"PREGSERUM\", \"HCG\", \"HCGQUANT\"  Pain Management Panel           No data to display              No results found for: \"BLOODCX\"  No results found for: \"URINECX\"  No results found for: \"WOUNDCX\"  No results found for: \"STOOLCX\"      ---------------------------------------------------------------------------------------------------------------------  Imaging Results (Last 7 Days)       ** No results found for the last 168 hours. **            I have personally reviewed the radiology images and read the final radiology report.  ---------------------------------------------------------------------------------------------------------------------  Assessment and Plan:   #1 cardiac with patient with multiple risk factors for heart disease.  Patient abnormal CTA coronaries.  Referred for cath.  Will schedule same.          This document has been electronically signed by " Julio Neff MD [facility], Interventional Cardiology  August 28, 2024 10:43 EDT

## 2024-08-30 PROBLEM — R94.39 ABNORMAL NUCLEAR STRESS TEST: Status: RESOLVED | Noted: 2023-07-06 | Resolved: 2024-08-30

## 2024-09-03 ENCOUNTER — OFFICE VISIT (OUTPATIENT)
Dept: CARDIOLOGY | Facility: CLINIC | Age: 59
End: 2024-09-03
Payer: MEDICARE

## 2024-09-03 VITALS
SYSTOLIC BLOOD PRESSURE: 114 MMHG | HEART RATE: 82 BPM | WEIGHT: 234.6 LBS | OXYGEN SATURATION: 96 % | DIASTOLIC BLOOD PRESSURE: 75 MMHG | RESPIRATION RATE: 18 BRPM | BODY MASS INDEX: 35.55 KG/M2 | HEIGHT: 68 IN

## 2024-09-03 DIAGNOSIS — I48.0 PAROXYSMAL ATRIAL FIBRILLATION: ICD-10-CM

## 2024-09-03 DIAGNOSIS — E78.2 MIXED HYPERLIPIDEMIA: ICD-10-CM

## 2024-09-03 DIAGNOSIS — I25.10 ASCVD (ARTERIOSCLEROTIC CARDIOVASCULAR DISEASE): Primary | ICD-10-CM

## 2024-09-03 PROCEDURE — 1159F MED LIST DOCD IN RCRD: CPT | Performed by: NURSE PRACTITIONER

## 2024-09-03 PROCEDURE — 3078F DIAST BP <80 MM HG: CPT | Performed by: NURSE PRACTITIONER

## 2024-09-03 PROCEDURE — 3074F SYST BP LT 130 MM HG: CPT | Performed by: NURSE PRACTITIONER

## 2024-09-03 PROCEDURE — 1160F RVW MEDS BY RX/DR IN RCRD: CPT | Performed by: NURSE PRACTITIONER

## 2024-09-03 PROCEDURE — 99214 OFFICE O/P EST MOD 30 MIN: CPT | Performed by: NURSE PRACTITIONER

## 2024-09-03 RX ORDER — ROSUVASTATIN CALCIUM 20 MG/1
20 TABLET, COATED ORAL DAILY
Qty: 90 TABLET | Refills: 1 | Status: SHIPPED | OUTPATIENT
Start: 2024-09-03

## 2024-09-03 NOTE — PROGRESS NOTES
[facility]             [facility] Shawn Shadell, MD  Joel Galo  1965 09/03/2024    Patient Active Problem List   Diagnosis    Urinary retention due to benign prostatic hyperplasia    Syncope and collapse    Paroxysmal atrial fibrillation    Obstructive sleep apnea    Autonomic orthostatic hypotension    Hypothyroidism (acquired)    GERD (gastroesophageal reflux disease)    Chronic anticoagulation    Long term current use of antiarrhythmic drug    DM (diabetes mellitus), type 2    Mixed hyperlipidemia    Primary hypertension    ASCVD (arteriosclerotic cardiovascular disease)       Dear Edmundo Boston MD:    Subjective     Chief Complaint   Patient presents with    Follow-up     6 week CTA fu       HPI:     This is a 59 y.o. male with known past medical history of  syncope, autonomic orthostatic hypotension, paroxysmal atrial fibrillation and abnormal stress test.       Joel Galo presents today for routine cardiology follow up.  Patient underwent left heart catheterization which showed multivessel disease.  Patient being referred for bypass surgery and is awaiting that appointment.  Blood pressure stable.  Recent lab work showed hemoglobin A1c at 12.  Does have some intermittent chest pain and shortness of breath.    Diagnostic Testing  Echocardiogram 9/2020: EF 55% moderate aortic valve sclerosis  Cardiac event monitor 8/2021: Predominantly normal sinus rhythm with no arrhythmias noted  Nuclear stress test 9/2021: Small size mild to moderate degree of ischemia in the lateral wall  Carotid ultrasound 3/2023: No acute findings  Echocardiogram 3/2023: EF 61 to 65%  Loop recorder insertion with [employer] device 4/2023  \   All other systems were reviewed and were negative.    Patient Active Problem List   Diagnosis    Urinary retention due to benign prostatic hyperplasia    Syncope and collapse    Paroxysmal atrial fibrillation     Obstructive sleep apnea    Autonomic orthostatic hypotension    Hypothyroidism (acquired)    GERD (gastroesophageal reflux disease)    Chronic anticoagulation    Long term current use of antiarrhythmic drug    DM (diabetes mellitus), type 2    Mixed hyperlipidemia    Primary hypertension    ASCVD (arteriosclerotic cardiovascular disease)       family history includes Diabetes in his father; Heart disease in his mother; Kidney disease in his father.     reports that he has never smoked. He has never used smokeless tobacco. He reports that he does not drink alcohol and does not use drugs.    No Known Allergies      Current Outpatient Medications:     amLODIPine (NORVASC) 2.5 MG tablet, Take 1 tablet by mouth Daily., Disp: , Rfl:     apixaban (ELIQUIS) 5 MG tablet tablet, Take 1 tablet by mouth 2 (Two) Times a Day., Disp: 60 tablet, Rfl: 4    aspirin 81 MG EC tablet, Take 1 tablet by mouth Daily., Disp: , Rfl:     buPROPion XL (WELLBUTRIN XL) 300 MG 24 hr tablet, Take 1 tablet by mouth Daily., Disp: , Rfl:     DULoxetine (CYMBALTA) 60 MG capsule, Take 1 capsule by mouth Daily., Disp: , Rfl:     empagliflozin (JARDIANCE) 25 MG tablet tablet, Take  by mouth Daily., Disp: , Rfl:     finasteride (PROSCAR) 5 MG tablet, TAKE 1 TABLET EVERY DAY, Disp: 90 tablet, Rfl: 0    gabapentin (NEURONTIN) 300 MG capsule, Take 1 capsule by mouth 3 (Three) Times a Day., Disp: , Rfl:     glimepiride (AMARYL) 2 MG tablet, Take 1 tablet by mouth 2 (Two) Times a Day., Disp: , Rfl:     HumuLIN 70/30 KwikPen (70-30) 100 UNIT/ML suspension pen-injector, 45 units in the a.m. and 20mg in the p.m., Disp: , Rfl:     HYDROcodone-acetaminophen (NORCO)  MG per tablet, Take 1 tablet by mouth Every 8 (Eight) Hours As Needed for Moderate Pain., Disp: , Rfl:     isosorbide mononitrate (IMDUR) 30 MG 24 hr tablet, Take 1 tablet by mouth Daily., Disp: 30 tablet, Rfl: 11    levothyroxine (SYNTHROID, LEVOTHROID) 25 MCG tablet, Take 1 tablet by mouth  Daily., Disp: , Rfl:     losartan (COZAAR) 25 MG tablet, Take 1 tablet by mouth Daily., Disp: , Rfl:     polyethylene glycol (MIRALAX) 17 g packet, Take 17 g by mouth Daily., Disp: , Rfl:     rosuvastatin (CRESTOR) 20 MG tablet, Take 1 tablet by mouth Daily., Disp: 90 tablet, Rfl: 1    Sotalol HCl AF 80 MG tablet, Take 1.5 tablets by mouth 2 (Two) Times a Day., Disp: 270 tablet, Rfl: 3    tamsulosin (FLOMAX) 0.4 MG capsule 24 hr capsule, TAKE 1 CAPSULE EVERY DAY, Disp: 90 capsule, Rfl: 3    Vitamin D, Cholecalciferol, (CHOLECALCIFEROL) 10 MCG (400 UNIT) tablet, Take 1 tablet by mouth Daily., Disp: , Rfl:       Physical Exam:  I have reviewed the patient's current vital signs as documented in the patient's EMR.   Vitals:    09/03/24 0911   BP: 114/75   Pulse: 82   Resp: 18   SpO2: 96%     Body mass index is 35.67 kg/m².       09/03/24  0911   Weight: 106 kg (234 lb 9.6 oz)      Physical Exam  Constitutional:       General: He is not in acute distress.     Appearance: Normal appearance. He is well-developed.   HENT:      Head: Normocephalic and atraumatic.      Mouth/Throat:      Mouth: Mucous membranes are moist.   Eyes:      Extraocular Movements: Extraocular movements intact.      Pupils: Pupils are equal, round, and reactive to light.   Neck:      Vascular: No JVD.   Cardiovascular:      Rate and Rhythm: Normal rate and regular rhythm.      Heart sounds: Normal heart sounds. No murmur heard.     No S3 or S4 sounds.   Pulmonary:      Effort: Pulmonary effort is normal. No respiratory distress.      Breath sounds: Normal breath sounds. No wheezing.   Abdominal:      General: Bowel sounds are normal. There is no distension.      Palpations: Abdomen is soft. There is no hepatomegaly.      Tenderness: There is no abdominal tenderness.   Musculoskeletal:         General: Normal range of motion.      Cervical back: Normal range of motion and neck supple.   Skin:     General: Skin is warm and dry.      Coloration: Skin is  "not jaundiced or pale.   Neurological:      General: No focal deficit present.      Mental Status: He is alert and oriented to person, place, and time. Mental status is at baseline.   Psychiatric:         Mood and Affect: Mood normal.         Behavior: Behavior normal.         Thought Content: Thought content normal.         Judgment: Judgment normal.          DATA REVIEWED:     TTE/DARIAN:  Results for orders placed during the hospital encounter of 03/10/23    Adult Transthoracic Echo Complete w/ Color, Spectral and Contrast if necessary per protocol    Interpretation Summary    Left ventricular systolic function is normal. Left ventricular ejection fraction appears to be 61 - 65%.    Left ventricular wall thickness is consistent with mild to moderate concentric hypertrophy. Sigmoid-shaped ventricular septum is present.    Left ventricular diastolic function is consistent with (grade I) impaired relaxation.    Estimated right ventricular systolic pressure from tricuspid regurgitation is mildly elevated (35-45 mmHg).      Laboratory evaluations:    Lab Results   Component Value Date    GLUCOSE 343 (H) 08/26/2024    BUN 11 08/26/2024    CREATININE 0.85 08/26/2024    EGFRIFNONA 86 12/15/2021    EGFRIFAFRI 99 12/15/2021    BCR 12.9 08/26/2024    K 4.5 08/26/2024    CO2 24.7 08/26/2024    CALCIUM 9.0 08/26/2024     Lab Results   Component Value Date    WBC 6.82 08/26/2024    HGB 14.6 08/26/2024    HCT 43.7 08/26/2024    MCV 91.8 08/26/2024     08/26/2024     No results found for: \"CHOL\", \"CHLPL\", \"TRIG\", \"HDL\", \"LDL\", \"LDLDIRECT\"  No results found for: \"TSH\", \"X3TLKQN\", \"P3SIPSY\", \"THYROIDAB\"  Lab Results   Component Value Date    HGBA1C 12.6 (A) 08/27/2024     No results found for: \"ALT\"  Lab Results   Component Value Date    HGBA1C 12.6 (A) 08/27/2024     Lab Results   Component Value Date    CREATININE 0.85 08/26/2024     No results found for: \"IRON\", \"TIBC\", \"FERRITIN\"  No results found for: \"INR\", \"PROTIME\"    "     --------------------------------------------------------------------------------------------------------------------------    ASSESSMENT/PLAN:      Diagnosis Plan   1. ASCVD (arteriosclerotic cardiovascular disease)        2. Paroxysmal atrial fibrillation        3. Mixed hyperlipidemia            ASCVD  Hyperlipidemia  Patient had recent left heart catheterization showing multivessel disease.  He is scheduled to see CT surgery on September 24 for discussion about bypass.  Again advised him that if chest pain worsens to seek care in the ED.  Continue aspirin, Jardiance, Imdur, losartan and Crestor  Recent lipid panel showed LDL in the 70s with triglycerides in the 700s.  Increase Crestor to 20 mg daily    Paroxysmal atrial fibrillation  Patient followed up with EP in May 2024 for which he was scheduled for ablation however was unable to make his appointment due to scheduling conflicts.  Had remote transmission which showed 3% A-fib burden.  Patient states he has decided he does not want to go through with ablation at this time.  Currently normal sinus rhythm and overall asymptomatic.  Continue with sotalol and Eliquis at current dosing        This document has been @Electronically signed by ZIA Melendez, 09/03/24, 9:19 AM EDT.       Dictated Utilizing Dragon Dictation: Part of this note may be an electronic transcription/translation of spoken language to printed text using the Dragon Dictation System.    Follow-up appointment and medication changes provided in hand delivered After Visit Summary as well as reviewed in the room.

## 2024-09-11 ENCOUNTER — TELEPHONE (OUTPATIENT)
Dept: CARDIOLOGY | Facility: CLINIC | Age: 59
End: 2024-09-11
Payer: MEDICARE

## 2024-09-11 DIAGNOSIS — I48.0 PAROXYSMAL ATRIAL FIBRILLATION: Primary | ICD-10-CM

## 2024-09-11 RX ORDER — NITROGLYCERIN 0.4 MG/1
TABLET SUBLINGUAL
Qty: 100 TABLET | Refills: 11 | Status: CANCELLED | OUTPATIENT
Start: 2024-09-11

## 2024-09-11 RX ORDER — DIGOXIN 125 MCG
125 TABLET ORAL DAILY
Qty: 30 TABLET | Refills: 3 | Status: SHIPPED | OUTPATIENT
Start: 2024-09-11

## 2024-09-11 RX ORDER — NITROGLYCERIN 0.4 MG/1
TABLET SUBLINGUAL
Qty: 30 TABLET | Refills: 3 | Status: SHIPPED | OUTPATIENT
Start: 2024-09-11

## 2024-09-11 NOTE — TELEPHONE ENCOUNTER
Called pt and advised him. He expressed understanding. He wanted to know if we could send in some nitro for him to have.

## 2024-09-11 NOTE — TELEPHONE ENCOUNTER
Received remote transmission for patient triggered symptom of shortness of breath and fainting that occurred at 3:11 a.m. Rhythm was AFIB with RVR at a rate of 130 to 150 bpm at time of symptom trigger. I called patient who reports that he had gotten up out of bed to use the restroom. He had just finished urinating when his heart started racing. He became flushed, dizzy, short of breath, and his legs felt very weak. He had to prop himself up against the bathroom wall to keep from falling.  He denies syncope. He reports it was sometime before he was able to gain his composure and make his way back to the bed. His presenting rhythm at time of transmission is afib with a rate of 100 bpm. He denies any symptoms this morning. He is taking Eliquis, Sotalol and his other medications as prescribed. He denies missing any doses. Report is in [employer] for review.

## 2024-09-24 ENCOUNTER — OFFICE VISIT (OUTPATIENT)
Dept: CARDIAC SURGERY | Facility: CLINIC | Age: 59
End: 2024-09-24
Payer: MEDICARE

## 2024-09-24 VITALS
DIASTOLIC BLOOD PRESSURE: 80 MMHG | WEIGHT: 229.2 LBS | TEMPERATURE: 96.9 F | SYSTOLIC BLOOD PRESSURE: 140 MMHG | BODY MASS INDEX: 34.74 KG/M2 | HEIGHT: 68 IN | OXYGEN SATURATION: 98 % | HEART RATE: 75 BPM

## 2024-09-24 DIAGNOSIS — I25.10 CORONARY ARTERY DISEASE INVOLVING NATIVE CORONARY ARTERY OF NATIVE HEART, UNSPECIFIED WHETHER ANGINA PRESENT: Primary | ICD-10-CM

## 2024-09-24 DIAGNOSIS — I75.89 ATHEROEMBOLISM OF OTHER SITE: ICD-10-CM

## 2024-09-24 DIAGNOSIS — I25.10 ASCVD (ARTERIOSCLEROTIC CARDIOVASCULAR DISEASE): Primary | ICD-10-CM

## 2024-09-24 RX ORDER — DOCUSATE SODIUM 100 MG/1
100 CAPSULE, LIQUID FILLED ORAL 2 TIMES DAILY
COMMUNITY

## 2024-09-26 DIAGNOSIS — I25.10 CORONARY ARTERY DISEASE INVOLVING NATIVE CORONARY ARTERY OF NATIVE HEART, UNSPECIFIED WHETHER ANGINA PRESENT: Primary | ICD-10-CM

## 2024-09-30 ENCOUNTER — TELEPHONE (OUTPATIENT)
Dept: CARDIOLOGY | Facility: CLINIC | Age: 59
End: 2024-09-30
Payer: MEDICARE

## 2024-09-30 NOTE — TELEPHONE ENCOUNTER
Patient called and wanted to let you know he is scheduled for open heart surgery the end of October.

## 2024-10-10 PROBLEM — I25.10 CORONARY ARTERY DISEASE INVOLVING NATIVE CORONARY ARTERY OF NATIVE HEART: Status: ACTIVE | Noted: 2024-09-26

## 2024-10-11 ENCOUNTER — PREP FOR SURGERY (OUTPATIENT)
Dept: OTHER | Facility: HOSPITAL | Age: 59
End: 2024-10-11
Payer: MEDICARE

## 2024-10-11 DIAGNOSIS — I25.10 ASCVD (ARTERIOSCLEROTIC CARDIOVASCULAR DISEASE): Primary | ICD-10-CM

## 2024-10-11 DIAGNOSIS — R79.9 ABNORMAL FINDING OF BLOOD CHEMISTRY, UNSPECIFIED: ICD-10-CM

## 2024-10-11 DIAGNOSIS — Z51.81 ENCOUNTER FOR THERAPEUTIC DRUG LEVEL MONITORING: ICD-10-CM

## 2024-10-11 DIAGNOSIS — R79.1 ABNORMAL COAGULATION PROFILE: ICD-10-CM

## 2024-10-15 RX ORDER — SOTALOL HYDROCHLORIDE 80 MG/1
TABLET ORAL
Qty: 270 TABLET | Refills: 0 | Status: SHIPPED | OUTPATIENT
Start: 2024-10-15

## 2024-10-17 ENCOUNTER — HOSPITAL ENCOUNTER (OUTPATIENT)
Dept: ULTRASOUND IMAGING | Facility: HOSPITAL | Age: 59
Discharge: HOME | End: 2024-10-17
Payer: MEDICARE

## 2024-10-17 ENCOUNTER — HOSPITAL ENCOUNTER (OUTPATIENT)
Dept: CARDIOLOGY | Facility: HOSPITAL | Age: 59
Discharge: HOME | End: 2024-10-17
Payer: MEDICARE

## 2024-10-17 DIAGNOSIS — I25.10 CORONARY ARTERY DISEASE INVOLVING NATIVE CORONARY ARTERY OF NATIVE HEART, UNSPECIFIED WHETHER ANGINA PRESENT: ICD-10-CM

## 2024-10-17 DIAGNOSIS — I75.89 ATHEROEMBOLISM OF OTHER SITE: ICD-10-CM

## 2024-10-17 PROCEDURE — 93306 TTE W/DOPPLER COMPLETE: CPT

## 2024-10-17 PROCEDURE — 93880 EXTRACRANIAL BILAT STUDY: CPT

## 2024-10-20 LAB
BH CV ECHO MEAS - AO MAX PG: 4.6 MMHG
BH CV ECHO MEAS - AO MEAN PG: 2 MMHG
BH CV ECHO MEAS - AO ROOT DIAM: 2.6 CM
BH CV ECHO MEAS - AO V2 MAX: 107 CM/SEC
BH CV ECHO MEAS - AO V2 VTI: 20.8 CM
BH CV ECHO MEAS - EDV(MOD-SP4): 51.5 ML
BH CV ECHO MEAS - EF(MOD-SP4): 73 %
BH CV ECHO MEAS - ESV(MOD-SP4): 13.9 ML
BH CV ECHO MEAS - LA DIMENSION: 3.9 CM
BH CV ECHO MEAS - LAT PEAK E' VEL: 9.3 CM/SEC
BH CV ECHO MEAS - LV DIASTOLIC VOL/BSA (35-75): 23.8 CM2
BH CV ECHO MEAS - LV SYSTOLIC VOL/BSA (12-30): 6.4 CM2
BH CV ECHO MEAS - LVOT AREA: 3.5 CM2
BH CV ECHO MEAS - LVOT DIAM: 2.1 CM
BH CV ECHO MEAS - MED PEAK E' VEL: 6.6 CM/SEC
BH CV ECHO MEAS - MV A MAX VEL: 63.4 CM/SEC
BH CV ECHO MEAS - MV E MAX VEL: 87 CM/SEC
BH CV ECHO MEAS - MV E/A: 1.37
BH CV ECHO MEAS - PA ACC TIME: 0.1 SEC
BH CV ECHO MEAS - SV(MOD-SP4): 37.6 ML
BH CV ECHO MEAS - SVI(MOD-SP4): 17.4 ML/M2
BH CV ECHO MEAS - TAPSE (>1.6): 1.98 CM
BH CV ECHO MEASUREMENTS AVERAGE E/E' RATIO: 10.94
LEFT ATRIUM VOLUME INDEX: 19.9 ML/M2

## 2024-10-21 RX ORDER — CHLORHEXIDINE GLUCONATE ORAL RINSE 1.2 MG/ML
15 SOLUTION DENTAL ONCE
Status: CANCELLED | OUTPATIENT
Start: 2024-10-28 | End: 2024-10-28

## 2024-10-21 RX ORDER — CHLORHEXIDINE GLUCONATE 500 MG/1
1 CLOTH TOPICAL EVERY 12 HOURS PRN
Status: CANCELLED | OUTPATIENT
Start: 2024-10-28

## 2024-10-21 RX ORDER — NITROGLYCERIN 0.4 MG/1
0.4 TABLET SUBLINGUAL
Status: CANCELLED | OUTPATIENT
Start: 2024-10-28

## 2024-10-21 RX ORDER — CHLORHEXIDINE GLUCONATE 500 MG/1
CLOTH TOPICAL EVERY 12 HOURS PRN
Status: CANCELLED | OUTPATIENT
Start: 2024-10-28

## 2024-10-21 RX ORDER — ASPIRIN 325 MG
325 TABLET ORAL NIGHTLY
Status: CANCELLED | OUTPATIENT
Start: 2024-10-25 | End: 2024-10-26

## 2024-10-21 RX ORDER — GABAPENTIN 300 MG/1
300 CAPSULE ORAL ONCE
Status: CANCELLED | OUTPATIENT
Start: 2024-10-21 | End: 2024-10-21

## 2024-10-25 ENCOUNTER — PRE-ADMISSION TESTING (OUTPATIENT)
Dept: PREADMISSION TESTING | Facility: HOSPITAL | Age: 59
End: 2024-10-25
Payer: MEDICARE

## 2024-10-25 ENCOUNTER — HOSPITAL ENCOUNTER (OUTPATIENT)
Dept: PULMONOLOGY | Facility: HOSPITAL | Age: 59
Discharge: HOME | End: 2024-10-25
Payer: MEDICARE

## 2024-10-25 ENCOUNTER — HOSPITAL ENCOUNTER (OUTPATIENT)
Dept: GENERAL RADIOLOGY | Facility: HOSPITAL | Age: 59
Discharge: HOME | End: 2024-10-25
Payer: MEDICARE

## 2024-10-25 VITALS — WEIGHT: 230.82 LBS | HEIGHT: 68 IN | BODY MASS INDEX: 34.98 KG/M2 | OXYGEN SATURATION: 93 %

## 2024-10-25 DIAGNOSIS — R79.9 ABNORMAL FINDING OF BLOOD CHEMISTRY, UNSPECIFIED: ICD-10-CM

## 2024-10-25 DIAGNOSIS — R79.1 ABNORMAL COAGULATION PROFILE: ICD-10-CM

## 2024-10-25 DIAGNOSIS — Z51.81 ENCOUNTER FOR THERAPEUTIC DRUG LEVEL MONITORING: ICD-10-CM

## 2024-10-25 DIAGNOSIS — I25.10 ASCVD (ARTERIOSCLEROTIC CARDIOVASCULAR DISEASE): ICD-10-CM

## 2024-10-25 LAB
ABO GROUP BLD: NORMAL
ALBUMIN SERPL-MCNC: 4 G/DL (ref 3.5–5.2)
ALBUMIN/GLOB SERPL: 1.3 G/DL
ALP SERPL-CCNC: 132 U/L (ref 39–117)
ALT SERPL W P-5'-P-CCNC: 16 U/L (ref 1–41)
ANION GAP SERPL CALCULATED.3IONS-SCNC: 11 MMOL/L (ref 5–15)
APTT PPP: 22 SECONDS (ref 22–39)
AST SERPL-CCNC: 24 U/L (ref 1–40)
BILIRUB SERPL-MCNC: 0.2 MG/DL (ref 0–1.2)
BLD GP AB SCN SERPL QL: NEGATIVE
BUN SERPL-MCNC: 10 MG/DL (ref 8–23)
BUN/CREAT SERPL: 11 (ref 7–25)
CALCIUM SPEC-SCNC: 9.5 MG/DL (ref 8.6–10.5)
CHLORIDE SERPL-SCNC: 99 MMOL/L (ref 98–107)
CO2 SERPL-SCNC: 26 MMOL/L (ref 22–29)
CREAT SERPL-MCNC: 0.91 MG/DL (ref 0.76–1.27)
EGFRCR SERPLBLD CKD-EPI 2021: 95.9 ML/MIN/1.73
GLOBULIN UR ELPH-MCNC: 3.1 GM/DL
GLUCOSE SERPL-MCNC: 391 MG/DL (ref 65–99)
HBA1C MFR BLD: 11.2 % (ref 4.8–5.6)
INR PPP: 1.06 (ref 0.89–1.12)
MAGNESIUM SERPL-MCNC: 2.6 MG/DL (ref 1.6–2.4)
PA ADP PRP-ACNC: 183 PRU
POTASSIUM SERPL-SCNC: 4.7 MMOL/L (ref 3.5–5.2)
PROT SERPL-MCNC: 7.1 G/DL (ref 6–8.5)
PROTHROMBIN TIME: 13.9 SECONDS (ref 12.2–14.5)
RH BLD: NEGATIVE
SODIUM SERPL-SCNC: 136 MMOL/L (ref 136–145)
T&S EXPIRATION DATE: NORMAL

## 2024-10-25 PROCEDURE — 86850 RBC ANTIBODY SCREEN: CPT

## 2024-10-25 PROCEDURE — 71046 X-RAY EXAM CHEST 2 VIEWS: CPT

## 2024-10-25 PROCEDURE — 86923 COMPATIBILITY TEST ELECTRIC: CPT

## 2024-10-25 PROCEDURE — 94010 BREATHING CAPACITY TEST: CPT

## 2024-10-25 PROCEDURE — 83036 HEMOGLOBIN GLYCOSYLATED A1C: CPT

## 2024-10-25 PROCEDURE — 83735 ASSAY OF MAGNESIUM: CPT

## 2024-10-25 PROCEDURE — 85610 PROTHROMBIN TIME: CPT

## 2024-10-25 PROCEDURE — 86900 BLOOD TYPING SEROLOGIC ABO: CPT

## 2024-10-25 PROCEDURE — 85730 THROMBOPLASTIN TIME PARTIAL: CPT

## 2024-10-25 PROCEDURE — 80053 COMPREHEN METABOLIC PANEL: CPT

## 2024-10-25 PROCEDURE — 36415 COLL VENOUS BLD VENIPUNCTURE: CPT

## 2024-10-25 PROCEDURE — 86901 BLOOD TYPING SEROLOGIC RH(D): CPT

## 2024-10-25 PROCEDURE — 85576 BLOOD PLATELET AGGREGATION: CPT

## 2024-10-25 RX ORDER — ASPIRIN 325 MG
325 TABLET ORAL NIGHTLY
Status: ACTIVE | OUTPATIENT
Start: 2024-10-25 | End: 2024-10-26

## 2024-10-25 RX ORDER — CHLORHEXIDINE GLUCONATE 500 MG/1
1 CLOTH TOPICAL EVERY 12 HOURS PRN
Status: ACTIVE | OUTPATIENT
Start: 2024-10-28

## 2024-10-25 NOTE — PAT
Unable to void during PAT visit for Urine drug screen.  Patient attempted x 3.    Please obtain in pre op.

## 2024-10-25 NOTE — PAT
Patient viewed general PAT education video as instructed in their preoperative information received from their surgeon.  Patient stated the general PAT education video was viewed in its entirety and survey completed.  Copies of PAT general education handouts (Incentive Spirometry, Meds to Beds Program, Patient Belongings, Pre-op skin preparation instructions, Blood Glucose testing, Visitor policy, Surgery FAQ, Code H) distributed to patient if not printed. Education related to the PAT pass and skin preparation for surgery (if applicable) completed in PAT as a reinforcement to PAT education video. Patient instructed to return PAT pass provided today as well as completed skin preparation sheet (if applicable) on the day of procedure.     Additionally if patient had not viewed video yet but intended to view it at home or in our waiting area, then referred them to the handout with QR code/link provided during PAT visit.  Encouraged patient/family to read [facility] general education handouts thoroughly and notify PAT staff with any questions or concerns. Patient verbalized understanding of all information and priority content.    An arrival time for procedure was not provided during PAT visit. If patient had any questions or concerns about their arrival time, they were instructed to contact their surgeon/physician.  Additionally, if the patient referred to an arrival time that was acquired from their my chart account, patient was encouraged to verify that time with their surgeon/physician. Arrival times are NOT provided in Pre Admission Testing Department.  Patient to apply Chlorhexadine wipes  to surgical area (as instructed) the night before procedure and the AM of procedure. Wipes provided.    Bactroban to be applied to each nostril during PAT visit if surgery the following day.  If surgery NOT the following day, then Bactroban supplied to patient with instructions both written and verbally to insert Bactroban into each  nares the night before surgery.    Blood bank bracelet applied to patient during Pre Admission Testing visit.  Patient instructed not to remove from arm until after procedure and they are discharged from the hospital.  Explained to patient that they may shower and get the bracelet wet, but not to immerse under water for longer periods (bathing, swimming, hand dishwashing, etc).  Patient verbalized understanding.    Instructed patient to take 325 mg of Asprin (or four tablets of the 81 mg strength) the night before heart surgery as per CT surgeon's order.  Patient and/or family verbalized understanding.    Per Anesthesia Request, patient instructed not to take their ACE/ARB medications on the AM of surgery.    Patient directed to Radiology Department for CXR after Pre Admission Testing Appointment.     Patient directed to Respiratory Department after Pre Admission Testing visit for Pulmonary Function Test.    EKG IN EPIC FROM 8/16/24. PATIENT DENIES CHEST PAIN AND SHORTNESS OF BREATH    ANTOINETTE AT DR CALZADA'S OFFICE INSTRUCTED PATIENT TO HOLD ELIQUIS AFTER 10/25/24. PATIENT VERBALIZES UNDERSTANDING    MULTIPLE DRY/SCALY PATCHES NOTED TO BILATERAL FOREARMS. PATIENT STATES HE IS HEALING FROM MITES BUT THERE ARE NO ACTIVE MITES IN HIS ARMS. NO SIGNS OF INFECTION NOTED.

## 2024-10-25 NOTE — STS RISK SCORE
Procedure Type: Isolated CABG  Perioperative Outcome Estimate %  Operative Mortality 0.67%  Morbidity & Mortality 4.28%  Stroke 0.549%  Renal Failure 0.758%  Reoperation 1.42%  Prolonged Ventilation 2.02%  Deep Sternal Wound Infection 0.344%  Long Hospital Stay (>14 days) 1.94%  Short Hospital Stay (<6 days)* 56.1%        Clinical Summary  Planned Surgery: Isolated CABG, Elective, First cardiovascular surgery  Demographics: 59 year old, male, 104kg, 173cm, BMI: 34.8 kg/m²  Lab Values: Creatinine: 0.85 mg/dL, Hematocrit: 44%, WBC Count: 7 10³/?L, Platelet Count: 485733 cells/?L  PreOp Medications: ACE Inhibitors/ARBs <= 48 hrs, Insulin diabetes control  Substance Abuse: Never smoker  Risk Factors / Comorbidities: Insulin-dependent Diabetes Mellitus, Hypertension, Family Hx of CAD  Pulmonary RF: Unknown CLD  Cardiac Status: Ejection Fraction = 60%  Coronary Artery Disease: 3 vessels diseased, Unstable Angina, MI: > 21 Days  Arrhythmia: Recent A-fib, Paroxysmal

## 2024-10-27 ENCOUNTER — ANESTHESIA EVENT (OUTPATIENT)
Dept: PERIOP | Facility: HOSPITAL | Age: 59
End: 2024-10-27
Payer: MEDICARE

## 2024-10-27 RX ORDER — FAMOTIDINE 10 MG/ML
20 INJECTION, SOLUTION INTRAVENOUS ONCE
Status: CANCELLED | OUTPATIENT
Start: 2024-10-27 | End: 2024-10-27

## 2024-10-28 ENCOUNTER — ANCILLARY PROCEDURE (OUTPATIENT)
Dept: PERIOP | Facility: HOSPITAL | Age: 59
End: 2024-10-28
Payer: MEDICARE

## 2024-10-28 ENCOUNTER — ANESTHESIA EVENT CONVERTED (OUTPATIENT)
Dept: ANESTHESIOLOGY | Facility: HOSPITAL | Age: 59
End: 2024-10-28
Payer: MEDICARE

## 2024-10-28 ENCOUNTER — HOSPITAL ENCOUNTER (INPATIENT)
Facility: HOSPITAL | Age: 59
LOS: 9 days | Discharge: NURSING, REHAB OR LONG-TERM CARE | End: 2024-11-06
Attending: THORACIC SURGERY (CARDIOTHORACIC VASCULAR SURGERY) | Admitting: THORACIC SURGERY (CARDIOTHORACIC VASCULAR SURGERY)
Payer: MEDICARE

## 2024-10-28 ENCOUNTER — APPOINTMENT (OUTPATIENT)
Dept: GENERAL RADIOLOGY | Facility: HOSPITAL | Age: 59
End: 2024-10-28
Payer: MEDICARE

## 2024-10-28 ENCOUNTER — ANESTHESIA (OUTPATIENT)
Dept: PERIOP | Facility: HOSPITAL | Age: 59
End: 2024-10-28
Payer: MEDICARE

## 2024-10-28 DIAGNOSIS — I25.10 ASCVD (ARTERIOSCLEROTIC CARDIOVASCULAR DISEASE): ICD-10-CM

## 2024-10-28 DIAGNOSIS — I25.10 CORONARY ARTERY DISEASE INVOLVING NATIVE CORONARY ARTERY OF NATIVE HEART, UNSPECIFIED WHETHER ANGINA PRESENT: Primary | ICD-10-CM

## 2024-10-28 LAB
ABO GROUP BLD: NORMAL
ALBUMIN SERPL-MCNC: 3.3 G/DL (ref 3.5–5.2)
ALBUMIN SERPL-MCNC: 4 G/DL (ref 3.5–5.2)
AMPHET+METHAMPHET UR QL: NEGATIVE
AMPHETAMINES UR QL: NEGATIVE
ANION GAP SERPL CALCULATED.3IONS-SCNC: 11 MMOL/L (ref 5–15)
ANION GAP SERPL CALCULATED.3IONS-SCNC: 11 MMOL/L (ref 5–15)
APTT PPP: 30.4 SECONDS (ref 22–39)
ARTERIAL PATENCY WRIST A: ABNORMAL
ATMOSPHERIC PRESS: ABNORMAL MM[HG]
BARBITURATES UR QL SCN: NEGATIVE
BASE EXCESS BLDA CALC-SCNC: 0.1 MMOL/L (ref 0–2)
BDY SITE: ABNORMAL
BENZODIAZ UR QL SCN: NEGATIVE
BODY TEMPERATURE: 37
BUN SERPL-MCNC: 11 MG/DL (ref 8–23)
BUN SERPL-MCNC: 11 MG/DL (ref 8–23)
BUN/CREAT SERPL: 12.8 (ref 7–25)
BUN/CREAT SERPL: 13.1 (ref 7–25)
BUPRENORPHINE SERPL-MCNC: NEGATIVE NG/ML
CA-I SERPL ISE-MCNC: 1.25 MMOL/L (ref 1.15–1.3)
CALCIUM SPEC-SCNC: 9.1 MG/DL (ref 8.6–10.5)
CALCIUM SPEC-SCNC: 9.3 MG/DL (ref 8.6–10.5)
CANNABINOIDS SERPL QL: NEGATIVE
CHLORIDE SERPL-SCNC: 109 MMOL/L (ref 98–107)
CHLORIDE SERPL-SCNC: 110 MMOL/L (ref 98–107)
CO2 BLDA-SCNC: 23.5 MMOL/L (ref 22–33)
CO2 SERPL-SCNC: 22 MMOL/L (ref 22–29)
CO2 SERPL-SCNC: 22 MMOL/L (ref 22–29)
COCAINE UR QL: NEGATIVE
COHGB MFR BLD: 1 % (ref 0–2)
CREAT SERPL-MCNC: 0.84 MG/DL (ref 0.76–1.27)
CREAT SERPL-MCNC: 0.86 MG/DL (ref 0.76–1.27)
DEPRECATED RDW RBC AUTO: 41.1 FL (ref 37–54)
DEPRECATED RDW RBC AUTO: 42.5 FL (ref 37–54)
EGFRCR SERPLBLD CKD-EPI 2021: 98.5 ML/MIN/1.73
EGFRCR SERPLBLD CKD-EPI 2021: 99.2 ML/MIN/1.73
ERYTHROCYTE [DISTWIDTH] IN BLOOD BY AUTOMATED COUNT: 12.9 % (ref 12.3–15.4)
ERYTHROCYTE [DISTWIDTH] IN BLOOD BY AUTOMATED COUNT: 13.1 % (ref 12.3–15.4)
FENTANYL UR-MCNC: NEGATIVE NG/ML
GLUCOSE BLDC GLUCOMTR-MCNC: 108 MG/DL (ref 70–130)
GLUCOSE BLDC GLUCOMTR-MCNC: 113 MG/DL (ref 70–130)
GLUCOSE BLDC GLUCOMTR-MCNC: 113 MG/DL (ref 70–130)
GLUCOSE BLDC GLUCOMTR-MCNC: 128 MG/DL (ref 70–130)
GLUCOSE BLDC GLUCOMTR-MCNC: 135 MG/DL (ref 70–130)
GLUCOSE BLDC GLUCOMTR-MCNC: 171 MG/DL (ref 70–130)
GLUCOSE BLDC GLUCOMTR-MCNC: 176 MG/DL (ref 70–130)
GLUCOSE BLDC GLUCOMTR-MCNC: 194 MG/DL (ref 70–130)
GLUCOSE BLDC GLUCOMTR-MCNC: 278 MG/DL (ref 70–130)
GLUCOSE SERPL-MCNC: 117 MG/DL (ref 65–99)
GLUCOSE SERPL-MCNC: 138 MG/DL (ref 65–99)
HCO3 BLDA-SCNC: 22.6 MMOL/L (ref 20–26)
HCT VFR BLD AUTO: 29 % (ref 37.5–51)
HCT VFR BLD AUTO: 29.7 % (ref 37.5–51)
HCT VFR BLD CALC: 29.8 % (ref 38–51)
HGB BLD-MCNC: 10 G/DL (ref 13–17.7)
HGB BLD-MCNC: 10.1 G/DL (ref 13–17.7)
HGB BLDA-MCNC: 9.7 G/DL (ref 13.5–17.5)
INHALED O2 CONCENTRATION: 100 %
INR PPP: 1.43 (ref 0.89–1.12)
MAGNESIUM SERPL-MCNC: 2.1 MG/DL (ref 1.6–2.4)
MAGNESIUM SERPL-MCNC: 2.2 MG/DL (ref 1.6–2.4)
MCH RBC QN AUTO: 30.3 PG (ref 26.6–33)
MCH RBC QN AUTO: 30.9 PG (ref 26.6–33)
MCHC RBC AUTO-ENTMCNC: 33.7 G/DL (ref 31.5–35.7)
MCHC RBC AUTO-ENTMCNC: 34.8 G/DL (ref 31.5–35.7)
MCV RBC AUTO: 88.7 FL (ref 79–97)
MCV RBC AUTO: 90 FL (ref 79–97)
METHADONE UR QL SCN: NEGATIVE
METHGB BLD QL: 0.4 % (ref 0–1.5)
MODALITY: ABNORMAL
OPIATES UR QL: POSITIVE
OXYCODONE UR QL SCN: NEGATIVE
OXYHGB MFR BLDV: ABNORMAL %
PCO2 BLDA: 28.5 MM HG (ref 35–45)
PCO2 TEMP ADJ BLD: 28.5 MM HG (ref 35–48)
PCP UR QL SCN: NEGATIVE
PEEP RESPIRATORY: 5 CM[H2O]
PH BLDA: 7.51 PH UNITS (ref 7.35–7.45)
PH, TEMP CORRECTED: 7.51 PH UNITS
PHOSPHATE SERPL-MCNC: 3.3 MG/DL (ref 2.5–4.5)
PHOSPHATE SERPL-MCNC: 3.5 MG/DL (ref 2.5–4.5)
PLATELET # BLD AUTO: 115 10*3/MM3 (ref 140–450)
PLATELET # BLD AUTO: 125 10*3/MM3 (ref 140–450)
PMV BLD AUTO: 10.1 FL (ref 6–12)
PMV BLD AUTO: 9.7 FL (ref 6–12)
PO2 BLDA: 304 MM HG (ref 83–108)
PO2 TEMP ADJ BLD: 304 MM HG (ref 83–108)
POTASSIUM SERPL-SCNC: 3.2 MMOL/L (ref 3.5–5.2)
POTASSIUM SERPL-SCNC: 4.3 MMOL/L (ref 3.5–5.2)
POTASSIUM SERPL-SCNC: 4.5 MMOL/L (ref 3.5–5.2)
PROTHROMBIN TIME: 17.6 SECONDS (ref 12.2–14.5)
PSV: 10 CMH2O
RBC # BLD AUTO: 3.27 10*6/MM3 (ref 4.14–5.8)
RBC # BLD AUTO: 3.3 10*6/MM3 (ref 4.14–5.8)
RH BLD: NEGATIVE
SET MECH RESP RATE: 14
SODIUM SERPL-SCNC: 142 MMOL/L (ref 136–145)
SODIUM SERPL-SCNC: 143 MMOL/L (ref 136–145)
TOTAL RATE: 14 BREATHS/MINUTE
TRICYCLICS UR QL SCN: NEGATIVE
VENTILATOR MODE: ABNORMAL
VT ON VENT VENT: 680 ML
WBC NRBC COR # BLD AUTO: 7.24 10*3/MM3 (ref 3.4–10.8)
WBC NRBC COR # BLD AUTO: 7.42 10*3/MM3 (ref 3.4–10.8)

## 2024-10-28 PROCEDURE — 25010000002 ACETAMINOPHEN 10 MG/ML SOLUTION: Performed by: PHYSICIAN ASSISTANT

## 2024-10-28 PROCEDURE — 33508 ENDOSCOPIC VEIN HARVEST: CPT | Performed by: THORACIC SURGERY (CARDIOTHORACIC VASCULAR SURGERY)

## 2024-10-28 PROCEDURE — 5A1221Z PERFORMANCE OF CARDIAC OUTPUT, CONTINUOUS: ICD-10-PCS | Performed by: THORACIC SURGERY (CARDIOTHORACIC VASCULAR SURGERY)

## 2024-10-28 PROCEDURE — 33533 CABG ARTERIAL SINGLE: CPT | Performed by: PHYSICIAN ASSISTANT

## 2024-10-28 PROCEDURE — 99291 CRITICAL CARE FIRST HOUR: CPT | Performed by: INTERNAL MEDICINE

## 2024-10-28 PROCEDURE — 84132 ASSAY OF SERUM POTASSIUM: CPT | Performed by: THORACIC SURGERY (CARDIOTHORACIC VASCULAR SURGERY)

## 2024-10-28 PROCEDURE — 25010000002 HEPARIN (PORCINE) PER 1000 UNITS: Performed by: ANESTHESIOLOGY

## 2024-10-28 PROCEDURE — C2618 PROBE/NEEDLE, CRYO: HCPCS | Performed by: THORACIC SURGERY (CARDIOTHORACIC VASCULAR SURGERY)

## 2024-10-28 PROCEDURE — 25810000003 LACTATED RINGERS PER 1000 ML: Performed by: ANESTHESIOLOGY

## 2024-10-28 PROCEDURE — 82330 ASSAY OF CALCIUM: CPT

## 2024-10-28 PROCEDURE — 71045 X-RAY EXAM CHEST 1 VIEW: CPT

## 2024-10-28 PROCEDURE — 25010000002 CEFAZOLIN PER 500 MG: Performed by: PHYSICIAN ASSISTANT

## 2024-10-28 PROCEDURE — 25810000003 SODIUM CHLORIDE PER 500 ML: Performed by: THORACIC SURGERY (CARDIOTHORACIC VASCULAR SURGERY)

## 2024-10-28 PROCEDURE — 25010000002 ALBUMIN HUMAN 5% PER 50 ML: Performed by: PHYSICIAN ASSISTANT

## 2024-10-28 PROCEDURE — 93318 ECHO TRANSESOPHAGEAL INTRAOP: CPT | Performed by: ANESTHESIOLOGY

## 2024-10-28 PROCEDURE — 82330 ASSAY OF CALCIUM: CPT | Performed by: PHYSICIAN ASSISTANT

## 2024-10-28 PROCEDURE — 83735 ASSAY OF MAGNESIUM: CPT | Performed by: PHYSICIAN ASSISTANT

## 2024-10-28 PROCEDURE — 25010000002 MIDAZOLAM PER 1 MG: Performed by: ANESTHESIOLOGY

## 2024-10-28 PROCEDURE — 25010000002 GLYCOPYRROLATE 1 MG/5ML SOLUTION: Performed by: ANESTHESIOLOGY

## 2024-10-28 PROCEDURE — 33259 ABLATE ATRIA W/BYPASS ADD-ON: CPT | Performed by: PHYSICIAN ASSISTANT

## 2024-10-28 PROCEDURE — 82805 BLOOD GASES W/O2 SATURATION: CPT

## 2024-10-28 PROCEDURE — 85014 HEMATOCRIT: CPT

## 2024-10-28 PROCEDURE — 25010000002 FENTANYL CITRATE (PF) 1000 MCG/20ML SOLUTION: Performed by: ANESTHESIOLOGY

## 2024-10-28 PROCEDURE — P9041 ALBUMIN (HUMAN),5%, 50ML: HCPCS | Performed by: PHYSICIAN ASSISTANT

## 2024-10-28 PROCEDURE — A4648 IMPLANTABLE TISSUE MARKER: HCPCS | Performed by: THORACIC SURGERY (CARDIOTHORACIC VASCULAR SURGERY)

## 2024-10-28 PROCEDURE — 25010000002 PROTAMINE SULFATE PER 10 MG: Performed by: ANESTHESIOLOGY

## 2024-10-28 PROCEDURE — 02580ZZ DESTRUCTION OF CONDUCTION MECHANISM, OPEN APPROACH: ICD-10-PCS | Performed by: THORACIC SURGERY (CARDIOTHORACIC VASCULAR SURGERY)

## 2024-10-28 PROCEDURE — 021209W BYPASS CORONARY ARTERY, THREE ARTERIES FROM AORTA WITH AUTOLOGOUS VENOUS TISSUE, OPEN APPROACH: ICD-10-PCS | Performed by: THORACIC SURGERY (CARDIOTHORACIC VASCULAR SURGERY)

## 2024-10-28 PROCEDURE — 33533 CABG ARTERIAL SINGLE: CPT | Performed by: THORACIC SURGERY (CARDIOTHORACIC VASCULAR SURGERY)

## 2024-10-28 PROCEDURE — 80069 RENAL FUNCTION PANEL: CPT | Performed by: PHYSICIAN ASSISTANT

## 2024-10-28 PROCEDURE — C9248 INJ, CLEVIDIPINE BUTYRATE: HCPCS | Performed by: ANESTHESIOLOGY

## 2024-10-28 PROCEDURE — 25010000002 HEPARIN (PORCINE) PER 1000 UNITS: Performed by: THORACIC SURGERY (CARDIOTHORACIC VASCULAR SURGERY)

## 2024-10-28 PROCEDURE — 84132 ASSAY OF SERUM POTASSIUM: CPT

## 2024-10-28 PROCEDURE — 33519 CABG ARTERY-VEIN THREE: CPT | Performed by: THORACIC SURGERY (CARDIOTHORACIC VASCULAR SURGERY)

## 2024-10-28 PROCEDURE — 86901 BLOOD TYPING SEROLOGIC RH(D): CPT

## 2024-10-28 PROCEDURE — 33259 ABLATE ATRIA W/BYPASS ADD-ON: CPT | Performed by: THORACIC SURGERY (CARDIOTHORACIC VASCULAR SURGERY)

## 2024-10-28 PROCEDURE — 25010000002 CEFAZOLIN PER 500 MG

## 2024-10-28 PROCEDURE — 94761 N-INVAS EAR/PLS OXIMETRY MLT: CPT

## 2024-10-28 PROCEDURE — 86900 BLOOD TYPING SEROLOGIC ABO: CPT

## 2024-10-28 PROCEDURE — 25010000002 ALBUMIN HUMAN 5% PER 50 ML: Performed by: ANESTHESIOLOGY

## 2024-10-28 PROCEDURE — 02L70CK OCCLUSION OF LEFT ATRIAL APPENDAGE WITH EXTRALUMINAL DEVICE, OPEN APPROACH: ICD-10-PCS | Performed by: THORACIC SURGERY (CARDIOTHORACIC VASCULAR SURGERY)

## 2024-10-28 PROCEDURE — P9041 ALBUMIN (HUMAN),5%, 50ML: HCPCS

## 2024-10-28 PROCEDURE — 94002 VENT MGMT INPAT INIT DAY: CPT

## 2024-10-28 PROCEDURE — 94799 UNLISTED PULMONARY SVC/PX: CPT

## 2024-10-28 PROCEDURE — 93005 ELECTROCARDIOGRAM TRACING: CPT | Performed by: PHYSICIAN ASSISTANT

## 2024-10-28 PROCEDURE — 25010000002 MORPHINE PER 10 MG: Performed by: THORACIC SURGERY (CARDIOTHORACIC VASCULAR SURGERY)

## 2024-10-28 PROCEDURE — 99221 1ST HOSP IP/OBS SF/LOW 40: CPT | Performed by: NURSE PRACTITIONER

## 2024-10-28 PROCEDURE — 82947 ASSAY GLUCOSE BLOOD QUANT: CPT

## 2024-10-28 PROCEDURE — 25010000002 PROPOFOL 10 MG/ML EMULSION: Performed by: PHYSICIAN ASSISTANT

## 2024-10-28 PROCEDURE — 25010000002 CLEVIDIPINE BUTYRATE PER 1 MG: Performed by: ANESTHESIOLOGY

## 2024-10-28 PROCEDURE — 83050 HGB METHEMOGLOBIN QUAN: CPT

## 2024-10-28 PROCEDURE — 80307 DRUG TEST PRSMV CHEM ANLYZR: CPT | Performed by: THORACIC SURGERY (CARDIOTHORACIC VASCULAR SURGERY)

## 2024-10-28 PROCEDURE — 82375 ASSAY CARBOXYHB QUANT: CPT

## 2024-10-28 PROCEDURE — 82803 BLOOD GASES ANY COMBINATION: CPT

## 2024-10-28 PROCEDURE — C1889 IMPLANT/INSERT DEVICE, NOC: HCPCS | Performed by: THORACIC SURGERY (CARDIOTHORACIC VASCULAR SURGERY)

## 2024-10-28 PROCEDURE — 85027 COMPLETE CBC AUTOMATED: CPT | Performed by: PHYSICIAN ASSISTANT

## 2024-10-28 PROCEDURE — 33519 CABG ARTERY-VEIN THREE: CPT | Performed by: PHYSICIAN ASSISTANT

## 2024-10-28 PROCEDURE — 84295 ASSAY OF SERUM SODIUM: CPT

## 2024-10-28 PROCEDURE — C1751 CATH, INF, PER/CENT/MIDLINE: HCPCS | Performed by: ANESTHESIOLOGY

## 2024-10-28 PROCEDURE — 25010000002 PAPAVERINE PER 60 MG: Performed by: THORACIC SURGERY (CARDIOTHORACIC VASCULAR SURGERY)

## 2024-10-28 PROCEDURE — 25810000003 DEXTROSE 5 % WITH KCL 20 MEQ 20 MEQ/L SOLUTION: Performed by: PHYSICIAN ASSISTANT

## 2024-10-28 PROCEDURE — 25010000002 PROPOFOL 1000 MG/ML EMULSION: Performed by: ANESTHESIOLOGY

## 2024-10-28 PROCEDURE — 85347 COAGULATION TIME ACTIVATED: CPT

## 2024-10-28 PROCEDURE — 33508 ENDOSCOPIC VEIN HARVEST: CPT | Performed by: PHYSICIAN ASSISTANT

## 2024-10-28 PROCEDURE — 82948 REAGENT STRIP/BLOOD GLUCOSE: CPT

## 2024-10-28 PROCEDURE — P9041 ALBUMIN (HUMAN),5%, 50ML: HCPCS | Performed by: ANESTHESIOLOGY

## 2024-10-28 PROCEDURE — 85730 THROMBOPLASTIN TIME PARTIAL: CPT | Performed by: PHYSICIAN ASSISTANT

## 2024-10-28 PROCEDURE — 85610 PROTHROMBIN TIME: CPT | Performed by: PHYSICIAN ASSISTANT

## 2024-10-28 PROCEDURE — 25010000002 ALBUMIN HUMAN 5% PER 50 ML

## 2024-10-28 PROCEDURE — 06BP4ZZ EXCISION OF RIGHT SAPHENOUS VEIN, PERCUTANEOUS ENDOSCOPIC APPROACH: ICD-10-PCS | Performed by: THORACIC SURGERY (CARDIOTHORACIC VASCULAR SURGERY)

## 2024-10-28 PROCEDURE — 93010 ELECTROCARDIOGRAM REPORT: CPT | Performed by: INTERNAL MEDICINE

## 2024-10-28 PROCEDURE — 02100Z9 BYPASS CORONARY ARTERY, ONE ARTERY FROM LEFT INTERNAL MAMMARY, OPEN APPROACH: ICD-10-PCS | Performed by: THORACIC SURGERY (CARDIOTHORACIC VASCULAR SURGERY)

## 2024-10-28 PROCEDURE — 25010000002 POTASSIUM CHLORIDE PER 2 MEQ: Performed by: THORACIC SURGERY (CARDIOTHORACIC VASCULAR SURGERY)

## 2024-10-28 DEVICE — LAA EXCLUSION SYSTEM, ACH235
Type: IMPLANTABLE DEVICE | Site: HEART | Status: FUNCTIONAL
Brand: ATRICLIP® FLEX GILLINOV-COSGROVE LAA EXCLUSION SYSTEM

## 2024-10-28 DEVICE — DISK-SHAPED STYLE, SILICONE (1 PER STERILE PKG)
Type: IMPLANTABLE DEVICE | Site: HEART | Status: FUNCTIONAL
Brand: SCANLAN® RADIOMARK® GRAFT MARKERS

## 2024-10-28 RX ORDER — CHLORHEXIDINE GLUCONATE ORAL RINSE 1.2 MG/ML
15 SOLUTION DENTAL ONCE
Status: COMPLETED | OUTPATIENT
Start: 2024-10-28 | End: 2024-10-28

## 2024-10-28 RX ORDER — CALCIUM CHLORIDE 100 MG/ML
INJECTION INTRAVENOUS; INTRAVENTRICULAR AS NEEDED
Status: DISCONTINUED | OUTPATIENT
Start: 2024-10-28 | End: 2024-10-28 | Stop reason: SURG

## 2024-10-28 RX ORDER — MEPERIDINE HYDROCHLORIDE 25 MG/ML
25 INJECTION INTRAMUSCULAR; INTRAVENOUS; SUBCUTANEOUS EVERY 4 HOURS PRN
Status: ACTIVE | OUTPATIENT
Start: 2024-10-28 | End: 2024-10-29

## 2024-10-28 RX ORDER — SODIUM CHLORIDE, SODIUM LACTATE, POTASSIUM CHLORIDE, CALCIUM CHLORIDE 600; 310; 30; 20 MG/100ML; MG/100ML; MG/100ML; MG/100ML
9 INJECTION, SOLUTION INTRAVENOUS CONTINUOUS
Status: ACTIVE | OUTPATIENT
Start: 2024-10-29 | End: 2024-10-30

## 2024-10-28 RX ORDER — NITROGLYCERIN 20 MG/100ML
5-200 INJECTION INTRAVENOUS CONTINUOUS PRN
Status: DISCONTINUED | OUTPATIENT
Start: 2024-10-28 | End: 2024-11-01

## 2024-10-28 RX ORDER — AMINOCAPROIC ACID 250 MG/ML
INJECTION, SOLUTION INTRAVENOUS AS NEEDED
Status: DISCONTINUED | OUTPATIENT
Start: 2024-10-28 | End: 2024-10-28 | Stop reason: SURG

## 2024-10-28 RX ORDER — ONDANSETRON 2 MG/ML
4 INJECTION INTRAMUSCULAR; INTRAVENOUS EVERY 6 HOURS PRN
Status: DISCONTINUED | OUTPATIENT
Start: 2024-10-28 | End: 2024-11-06 | Stop reason: HOSPADM

## 2024-10-28 RX ORDER — SODIUM CHLORIDE 0.9 % (FLUSH) 0.9 %
10 SYRINGE (ML) INJECTION EVERY 12 HOURS SCHEDULED
Status: DISCONTINUED | OUTPATIENT
Start: 2024-10-28 | End: 2024-10-28 | Stop reason: HOSPADM

## 2024-10-28 RX ORDER — POTASSIUM CHLORIDE, DEXTROSE MONOHYDRATE 150; 5 MG/100ML; G/100ML
60 INJECTION, SOLUTION INTRAVENOUS CONTINUOUS
Status: DISCONTINUED | OUTPATIENT
Start: 2024-10-28 | End: 2024-10-28

## 2024-10-28 RX ORDER — GABAPENTIN 100 MG/1
100 CAPSULE ORAL EVERY 8 HOURS
Status: DISPENSED | OUTPATIENT
Start: 2024-10-28 | End: 2024-11-02

## 2024-10-28 RX ORDER — CHLORHEXIDINE GLUCONATE 500 MG/1
CLOTH TOPICAL EVERY 12 HOURS PRN
Status: DISCONTINUED | OUTPATIENT
Start: 2024-10-28 | End: 2024-10-28 | Stop reason: HOSPADM

## 2024-10-28 RX ORDER — HYDROCODONE BITARTRATE AND ACETAMINOPHEN 7.5; 325 MG/1; MG/1
1 TABLET ORAL EVERY 4 HOURS PRN
Status: DISPENSED | OUTPATIENT
Start: 2024-10-28 | End: 2024-11-04

## 2024-10-28 RX ORDER — VECURONIUM BROMIDE 1 MG/ML
INJECTION, POWDER, FOR SOLUTION INTRAVENOUS AS NEEDED
Status: DISCONTINUED | OUTPATIENT
Start: 2024-10-28 | End: 2024-10-28 | Stop reason: SURG

## 2024-10-28 RX ORDER — NOREPINEPHRINE BITARTRATE 0.03 MG/ML
.02-.3 INJECTION, SOLUTION INTRAVENOUS CONTINUOUS PRN
Status: DISCONTINUED | OUTPATIENT
Start: 2024-10-28 | End: 2024-11-01

## 2024-10-28 RX ORDER — POTASSIUM CHLORIDE 29.8 MG/ML
20 INJECTION INTRAVENOUS
Status: COMPLETED | OUTPATIENT
Start: 2024-10-28 | End: 2024-10-28

## 2024-10-28 RX ORDER — SODIUM CHLORIDE, SODIUM LACTATE, POTASSIUM CHLORIDE, CALCIUM CHLORIDE 600; 310; 30; 20 MG/100ML; MG/100ML; MG/100ML; MG/100ML
INJECTION, SOLUTION INTRAVENOUS CONTINUOUS PRN
Status: DISCONTINUED | OUTPATIENT
Start: 2024-10-28 | End: 2024-10-28 | Stop reason: SURG

## 2024-10-28 RX ORDER — DEXTROSE MONOHYDRATE 25 G/50ML
10-50 INJECTION, SOLUTION INTRAVENOUS
Status: DISCONTINUED | OUTPATIENT
Start: 2024-10-28 | End: 2024-10-29

## 2024-10-28 RX ORDER — AMOXICILLIN 250 MG
2 CAPSULE ORAL 2 TIMES DAILY
Status: DISCONTINUED | OUTPATIENT
Start: 2024-10-28 | End: 2024-11-06 | Stop reason: HOSPADM

## 2024-10-28 RX ORDER — PAPAVERINE HYDROCHLORIDE 30 MG/ML
INJECTION INTRAMUSCULAR; INTRAVENOUS AS NEEDED
Status: DISCONTINUED | OUTPATIENT
Start: 2024-10-28 | End: 2024-10-28 | Stop reason: HOSPADM

## 2024-10-28 RX ORDER — ALBUTEROL SULFATE 0.83 MG/ML
2.5 SOLUTION RESPIRATORY (INHALATION) EVERY 4 HOURS PRN
Status: ACTIVE | OUTPATIENT
Start: 2024-10-28 | End: 2024-10-29

## 2024-10-28 RX ORDER — MAGNESIUM HYDROXIDE 1200 MG/15ML
LIQUID ORAL AS NEEDED
Status: DISCONTINUED | OUTPATIENT
Start: 2024-10-28 | End: 2024-10-28 | Stop reason: HOSPADM

## 2024-10-28 RX ORDER — ALBUMIN, HUMAN INJ 5% 5 %
SOLUTION INTRAVENOUS
Status: COMPLETED
Start: 2024-10-28 | End: 2024-10-28

## 2024-10-28 RX ORDER — LIDOCAINE HYDROCHLORIDE 10 MG/ML
0.5 INJECTION, SOLUTION EPIDURAL; INFILTRATION; INTRACAUDAL; PERINEURAL ONCE AS NEEDED
Status: DISCONTINUED | OUTPATIENT
Start: 2024-10-28 | End: 2024-10-28 | Stop reason: HOSPADM

## 2024-10-28 RX ORDER — POTASSIUM CHLORIDE, DEXTROSE MONOHYDRATE 150; 5 MG/100ML; G/100ML
30 INJECTION, SOLUTION INTRAVENOUS CONTINUOUS
Status: ACTIVE | OUTPATIENT
Start: 2024-10-28 | End: 2024-10-29

## 2024-10-28 RX ORDER — NICOTINE POLACRILEX 4 MG
15 LOZENGE BUCCAL
Status: DISCONTINUED | OUTPATIENT
Start: 2024-10-28 | End: 2024-10-29

## 2024-10-28 RX ORDER — FAMOTIDINE 20 MG/1
20 TABLET, FILM COATED ORAL ONCE
Status: COMPLETED | OUTPATIENT
Start: 2024-10-28 | End: 2024-10-28

## 2024-10-28 RX ORDER — PROTAMINE SULFATE 10 MG/ML
50 INJECTION, SOLUTION INTRAVENOUS ONCE
Status: DISCONTINUED | OUTPATIENT
Start: 2024-10-28 | End: 2024-11-01

## 2024-10-28 RX ORDER — SODIUM CHLORIDE 0.9 % (FLUSH) 0.9 %
10 SYRINGE (ML) INJECTION AS NEEDED
Status: DISCONTINUED | OUTPATIENT
Start: 2024-10-28 | End: 2024-10-28 | Stop reason: HOSPADM

## 2024-10-28 RX ORDER — ROCURONIUM BROMIDE 10 MG/ML
INJECTION, SOLUTION INTRAVENOUS AS NEEDED
Status: DISCONTINUED | OUTPATIENT
Start: 2024-10-28 | End: 2024-10-28 | Stop reason: SURG

## 2024-10-28 RX ORDER — MORPHINE SULFATE 2 MG/ML
2 INJECTION, SOLUTION INTRAMUSCULAR; INTRAVENOUS
Status: DISCONTINUED | OUTPATIENT
Start: 2024-10-28 | End: 2024-11-01

## 2024-10-28 RX ORDER — OXYCODONE HYDROCHLORIDE 10 MG/1
10 TABLET ORAL EVERY 4 HOURS PRN
Status: DISPENSED | OUTPATIENT
Start: 2024-10-28 | End: 2024-11-04

## 2024-10-28 RX ORDER — FENTANYL CITRATE 50 UG/ML
25 INJECTION, SOLUTION INTRAMUSCULAR; INTRAVENOUS
Status: DISCONTINUED | OUTPATIENT
Start: 2024-10-28 | End: 2024-10-29

## 2024-10-28 RX ORDER — CHLORHEXIDINE GLUCONATE ORAL RINSE 1.2 MG/ML
15 SOLUTION DENTAL EVERY 12 HOURS SCHEDULED
Status: DISCONTINUED | OUTPATIENT
Start: 2024-10-28 | End: 2024-11-01

## 2024-10-28 RX ORDER — ASPIRIN 325 MG
325 TABLET, DELAYED RELEASE (ENTERIC COATED) ORAL DAILY
Status: DISCONTINUED | OUTPATIENT
Start: 2024-10-29 | End: 2024-11-06 | Stop reason: HOSPADM

## 2024-10-28 RX ORDER — NITROGLYCERIN 0.4 MG/1
0.4 TABLET SUBLINGUAL
Status: DISCONTINUED | OUTPATIENT
Start: 2024-10-28 | End: 2024-10-28 | Stop reason: HOSPADM

## 2024-10-28 RX ORDER — PROTAMINE SULFATE 10 MG/ML
INJECTION, SOLUTION INTRAVENOUS AS NEEDED
Status: DISCONTINUED | OUTPATIENT
Start: 2024-10-28 | End: 2024-10-28 | Stop reason: SURG

## 2024-10-28 RX ORDER — FENTANYL CITRATE 0.05 MG/ML
INJECTION, SOLUTION INTRAMUSCULAR; INTRAVENOUS AS NEEDED
Status: DISCONTINUED | OUTPATIENT
Start: 2024-10-28 | End: 2024-10-28 | Stop reason: SURG

## 2024-10-28 RX ORDER — IBUPROFEN 600 MG/1
1 TABLET ORAL
Status: DISCONTINUED | OUTPATIENT
Start: 2024-10-28 | End: 2024-10-29

## 2024-10-28 RX ORDER — ACETAMINOPHEN 10 MG/ML
1000 INJECTION, SOLUTION INTRAVENOUS ONCE
Status: COMPLETED | OUTPATIENT
Start: 2024-10-28 | End: 2024-10-28

## 2024-10-28 RX ORDER — ACETAMINOPHEN 325 MG/1
650 TABLET ORAL EVERY 8 HOURS
Status: DISCONTINUED | OUTPATIENT
Start: 2024-10-28 | End: 2024-10-28

## 2024-10-28 RX ORDER — GABAPENTIN 300 MG/1
300 CAPSULE ORAL ONCE
Status: COMPLETED | OUTPATIENT
Start: 2024-10-28 | End: 2024-10-28

## 2024-10-28 RX ORDER — BISACODYL 10 MG
10 SUPPOSITORY, RECTAL RECTAL DAILY PRN
Status: DISCONTINUED | OUTPATIENT
Start: 2024-10-28 | End: 2024-11-06 | Stop reason: HOSPADM

## 2024-10-28 RX ORDER — SODIUM CHLORIDE 9 MG/ML
INJECTION, SOLUTION INTRAVENOUS AS NEEDED
Status: DISCONTINUED | OUTPATIENT
Start: 2024-10-28 | End: 2024-10-28 | Stop reason: HOSPADM

## 2024-10-28 RX ORDER — ETOMIDATE 2 MG/ML
INJECTION INTRAVENOUS AS NEEDED
Status: DISCONTINUED | OUTPATIENT
Start: 2024-10-28 | End: 2024-10-28 | Stop reason: SURG

## 2024-10-28 RX ORDER — ASPIRIN 325 MG
325 TABLET ORAL ONCE
Status: COMPLETED | OUTPATIENT
Start: 2024-10-28 | End: 2024-10-28

## 2024-10-28 RX ORDER — DEXMEDETOMIDINE HYDROCHLORIDE 4 UG/ML
.2-1.5 INJECTION, SOLUTION INTRAVENOUS CONTINUOUS PRN
Status: DISCONTINUED | OUTPATIENT
Start: 2024-10-28 | End: 2024-10-29

## 2024-10-28 RX ORDER — ACETAMINOPHEN 325 MG/1
650 TABLET ORAL EVERY 8 HOURS
Status: DISCONTINUED | OUTPATIENT
Start: 2024-10-28 | End: 2024-11-01

## 2024-10-28 RX ORDER — HEPARIN SODIUM 1000 [USP'U]/ML
INJECTION, SOLUTION INTRAVENOUS; SUBCUTANEOUS AS NEEDED
Status: DISCONTINUED | OUTPATIENT
Start: 2024-10-28 | End: 2024-10-28 | Stop reason: SURG

## 2024-10-28 RX ORDER — ALBUMIN, HUMAN INJ 5% 5 %
250 SOLUTION INTRAVENOUS AS NEEDED
Status: ACTIVE | OUTPATIENT
Start: 2024-10-28 | End: 2024-11-01

## 2024-10-28 RX ORDER — MIDAZOLAM HYDROCHLORIDE 1 MG/ML
1 INJECTION, SOLUTION INTRAMUSCULAR; INTRAVENOUS
Status: DISCONTINUED | OUTPATIENT
Start: 2024-10-28 | End: 2024-10-28 | Stop reason: HOSPADM

## 2024-10-28 RX ORDER — NOREPINEPHRINE BITARTRATE 0.03 MG/ML
INJECTION, SOLUTION INTRAVENOUS CONTINUOUS PRN
Status: DISCONTINUED | OUTPATIENT
Start: 2024-10-28 | End: 2024-10-28 | Stop reason: SURG

## 2024-10-28 RX ORDER — POLYETHYLENE GLYCOL 3350 17 G/17G
17 POWDER, FOR SOLUTION ORAL DAILY PRN
Status: DISCONTINUED | OUTPATIENT
Start: 2024-10-28 | End: 2024-11-06 | Stop reason: HOSPADM

## 2024-10-28 RX ORDER — ALBUMIN, HUMAN INJ 5% 5 %
SOLUTION INTRAVENOUS CONTINUOUS PRN
Status: DISCONTINUED | OUTPATIENT
Start: 2024-10-28 | End: 2024-10-28 | Stop reason: SURG

## 2024-10-28 RX ORDER — ATORVASTATIN CALCIUM 40 MG/1
40 TABLET, FILM COATED ORAL NIGHTLY
Status: DISCONTINUED | OUTPATIENT
Start: 2024-10-28 | End: 2024-11-04

## 2024-10-28 RX ORDER — DOPAMINE HYDROCHLORIDE 160 MG/100ML
2-20 INJECTION, SOLUTION INTRAVENOUS CONTINUOUS PRN
Status: DISCONTINUED | OUTPATIENT
Start: 2024-10-28 | End: 2024-11-01

## 2024-10-28 RX ORDER — METOPROLOL TARTRATE 1 MG/ML
2.5 INJECTION, SOLUTION INTRAVENOUS EVERY 6 HOURS SCHEDULED
Status: ACTIVE | OUTPATIENT
Start: 2024-10-28 | End: 2024-10-29

## 2024-10-28 RX ORDER — NITROGLYCERIN 0.4 MG/1
0.4 TABLET SUBLINGUAL
Status: DISCONTINUED | OUTPATIENT
Start: 2024-10-28 | End: 2024-11-06 | Stop reason: HOSPADM

## 2024-10-28 RX ORDER — DOBUTAMINE HYDROCHLORIDE 100 MG/100ML
2-20 INJECTION INTRAVENOUS CONTINUOUS PRN
Status: DISCONTINUED | OUTPATIENT
Start: 2024-10-28 | End: 2024-11-01

## 2024-10-28 RX ORDER — GLYCOPYRROLATE 0.2 MG/ML
INJECTION INTRAMUSCULAR; INTRAVENOUS AS NEEDED
Status: DISCONTINUED | OUTPATIENT
Start: 2024-10-28 | End: 2024-10-28 | Stop reason: SURG

## 2024-10-28 RX ORDER — MIDAZOLAM HYDROCHLORIDE 1 MG/ML
INJECTION, SOLUTION INTRAMUSCULAR; INTRAVENOUS AS NEEDED
Status: DISCONTINUED | OUTPATIENT
Start: 2024-10-28 | End: 2024-10-28 | Stop reason: SURG

## 2024-10-28 RX ADMIN — VECURONIUM BROMIDE 6 MG: 1 INJECTION, POWDER, LYOPHILIZED, FOR SOLUTION INTRAVENOUS at 09:09

## 2024-10-28 RX ADMIN — ATORVASTATIN CALCIUM 40 MG: 40 TABLET, FILM COATED ORAL at 21:47

## 2024-10-28 RX ADMIN — DEXMEDETOMIDINE HYDROCHLORIDE 0.4 MCG/KG/HR: 4 INJECTION, SOLUTION INTRAVENOUS at 20:02

## 2024-10-28 RX ADMIN — ETOMIDATE 18 MG: 2 INJECTION INTRAVENOUS at 07:12

## 2024-10-28 RX ADMIN — SODIUM CHLORIDE 2000 MG: 900 INJECTION INTRAVENOUS at 19:32

## 2024-10-28 RX ADMIN — OXYCODONE HYDROCHLORIDE 10 MG: 10 TABLET ORAL at 16:04

## 2024-10-28 RX ADMIN — ALBUMIN (HUMAN) 250 ML: 12.5 INJECTION, SOLUTION INTRAVENOUS at 14:23

## 2024-10-28 RX ADMIN — GABAPENTIN 100 MG: 100 CAPSULE ORAL at 23:40

## 2024-10-28 RX ADMIN — MUPIROCIN 1 APPLICATION: 20 OINTMENT TOPICAL at 06:16

## 2024-10-28 RX ADMIN — SODIUM CHLORIDE, POTASSIUM CHLORIDE, SODIUM LACTATE AND CALCIUM CHLORIDE: 600; 310; 30; 20 INJECTION, SOLUTION INTRAVENOUS at 07:12

## 2024-10-28 RX ADMIN — DEXMEDETOMIDINE HYDROCHLORIDE 0.6 MCG/KG/HR: 4 INJECTION, SOLUTION INTRAVENOUS at 23:37

## 2024-10-28 RX ADMIN — GLYCOPYRROLATE 0.4 MCG: 1 INJECTION INTRAMUSCULAR; INTRAVENOUS at 12:50

## 2024-10-28 RX ADMIN — HEPARIN SODIUM 41000 UNITS: 1000 INJECTION INTRAVENOUS; SUBCUTANEOUS at 08:59

## 2024-10-28 RX ADMIN — ASPIRIN 325 MG: 325 TABLET ORAL at 16:04

## 2024-10-28 RX ADMIN — CHLORHEXIDINE GLUCONATE ORAL RINSE 15 ML: 1.2 SOLUTION DENTAL at 21:47

## 2024-10-28 RX ADMIN — FAMOTIDINE 20 MG: 20 TABLET, FILM COATED ORAL at 06:14

## 2024-10-28 RX ADMIN — INSULIN HUMAN 3.4 UNITS/HR: 1 INJECTION, SOLUTION INTRAVENOUS at 08:13

## 2024-10-28 RX ADMIN — POTASSIUM CHLORIDE 20 MEQ: 29.8 INJECTION, SOLUTION INTRAVENOUS at 15:28

## 2024-10-28 RX ADMIN — ROCURONIUM BROMIDE 100 MG: 10 INJECTION INTRAVENOUS at 07:12

## 2024-10-28 RX ADMIN — ACETAMINOPHEN 1000 MG: 10 INJECTION INTRAVENOUS at 14:57

## 2024-10-28 RX ADMIN — FENTANYL CITRATE 250 MCG: 0.05 INJECTION, SOLUTION INTRAMUSCULAR; INTRAVENOUS at 11:39

## 2024-10-28 RX ADMIN — FENTANYL CITRATE 500 MCG: 0.05 INJECTION, SOLUTION INTRAMUSCULAR; INTRAVENOUS at 08:04

## 2024-10-28 RX ADMIN — ALBUMIN (HUMAN) 250 ML: 12.5 INJECTION, SOLUTION INTRAVENOUS at 14:11

## 2024-10-28 RX ADMIN — MIDAZOLAM 2 MG: 1 INJECTION INTRAMUSCULAR; INTRAVENOUS at 11:39

## 2024-10-28 RX ADMIN — GLYCOPYRROLATE 0.4 MCG: 1 INJECTION INTRAMUSCULAR; INTRAVENOUS at 11:40

## 2024-10-28 RX ADMIN — FENTANYL CITRATE 250 MCG: 0.05 INJECTION, SOLUTION INTRAMUSCULAR; INTRAVENOUS at 07:12

## 2024-10-28 RX ADMIN — MIDAZOLAM 3 MG: 1 INJECTION INTRAMUSCULAR; INTRAVENOUS at 07:12

## 2024-10-28 RX ADMIN — PROPOFOL 25 MCG/KG/MIN: 10 INJECTION, EMULSION INTRAVENOUS at 12:04

## 2024-10-28 RX ADMIN — CHLORHEXIDINE GLUCONATE 0.12% ORAL RINSE 15 ML: 1.2 LIQUID ORAL at 06:19

## 2024-10-28 RX ADMIN — DEXTROSE MONOHYDRATE AND POTASSIUM CHLORIDE INJECTION, SOLUTION 30 ML/HR: 5; .149 INJECTION, SOLUTION INTRAVENOUS at 14:11

## 2024-10-28 RX ADMIN — ALBUMIN (HUMAN): 12.5 INJECTION, SOLUTION INTRAVENOUS at 12:18

## 2024-10-28 RX ADMIN — POTASSIUM CHLORIDE 20 MEQ: 29.8 INJECTION, SOLUTION INTRAVENOUS at 16:43

## 2024-10-28 RX ADMIN — ACETAMINOPHEN 650 MG: 325 TABLET, FILM COATED ORAL at 21:47

## 2024-10-28 RX ADMIN — SODIUM CHLORIDE 2 G: 900 INJECTION INTRAVENOUS at 07:39

## 2024-10-28 RX ADMIN — CALCIUM CHLORIDE 1 G: 100 INJECTION INTRAVENOUS; INTRAVENTRICULAR at 11:49

## 2024-10-28 RX ADMIN — PROTAMINE SULFATE 400 MG: 10 INJECTION, SOLUTION INTRAVENOUS at 11:49

## 2024-10-28 RX ADMIN — MORPHINE SULFATE 2 MG: 2 INJECTION, SOLUTION INTRAMUSCULAR; INTRAVENOUS at 19:49

## 2024-10-28 RX ADMIN — AMINOCAPROIC ACID 10 G: 250 INJECTION, SOLUTION INTRAVENOUS at 07:40

## 2024-10-28 RX ADMIN — GABAPENTIN 100 MG: 100 CAPSULE ORAL at 16:04

## 2024-10-28 RX ADMIN — SENNOSIDES AND DOCUSATE SODIUM 2 TABLET: 50; 8.6 TABLET ORAL at 21:47

## 2024-10-28 RX ADMIN — ALBUMIN (HUMAN) 250 ML: 12.5 INJECTION, SOLUTION INTRAVENOUS at 14:14

## 2024-10-28 RX ADMIN — NOREPINEPHRINE BITARTRATE 0.02 MCG/KG/MIN: 0.03 INJECTION, SOLUTION INTRAVENOUS at 12:44

## 2024-10-28 RX ADMIN — SODIUM CHLORIDE 2 G: 900 INJECTION INTRAVENOUS at 11:39

## 2024-10-28 RX ADMIN — GABAPENTIN 300 MG: 300 CAPSULE ORAL at 06:14

## 2024-10-28 RX ADMIN — PROPOFOL 50 MCG/KG/MIN: 10 INJECTION, EMULSION INTRAVENOUS at 15:30

## 2024-10-28 RX ADMIN — MUPIROCIN 1 APPLICATION: 20 OINTMENT TOPICAL at 21:47

## 2024-10-28 RX ADMIN — CLEVIPIDINE 2 MG/HR: 0.5 EMULSION INTRAVENOUS at 08:23

## 2024-10-28 NOTE — ANESTHESIA PROCEDURE NOTES
Central Line      Patient reassessed immediately prior to procedure    Patient location during procedure: OR  Indications: vascular access  Preanesthetic Checklist  Completed: patient identified, IV checked, site marked, risks and benefits discussed, surgical consent, monitors and equipment checked, pre-op evaluation and timeout performed  Central Line Prep  Sterile Tech:cap, gloves, gown, mask and sterile barriers  Prep: chloraprep  Patient monitoring: blood pressure monitoring, continuous pulse oximetry and EKG  Central Line Procedure  Laterality:right  Location:internal jugular  Catheter Type:Cordis  Catheter Size:9 Fr  Guidance:ultrasound guided  PROCEDURE NOTE/ULTRASOUND INTERPRETATION.  Using ultrasound guidance the potential vascular sites for insertion of the catheter were visualized to determine the patency of the vessel to be used for vascular access.  After selecting the appropriate site for insertion, the needle was visualized under ultrasound being inserted into the internal jugular vein, followed by ultrasound confirmation of wire and catheter placement. There were no abnormalities seen on ultrasound; an image was taken; and the patient tolerated the procedure with no complications.   Assessment  Post procedure:biopatch applied, line sutured, occlusive dressing applied and secured with tape  Assessement:blood return through all ports, free fluid flow, chest x-ray ordered and Antoni Test  Complications:no  Patient Tolerance:patient tolerated the procedure well with no apparent complications

## 2024-10-28 NOTE — H&P
Pre-Op H&P  Joel Galo  [id number]  1965      Chief complaint: Chest pain      Subjective:  Patient is a 59 y.o.male presents for scheduled surgery by Dr. Max. He anticipates a CORONARY ARTERY BYPASS GRAFTING; ENDOSCOPIC VEIN HARVEST; RADIAL ARTERY HARVEST; ATRIAL APPENDAGE EXCLUSION LEFT WITH TRANSESOPHAGEAL ECHOCARDIOGRAM  today. He reports intermittent episodes of shortness of breath and chest pain. He had abnormal CTA coronaries. He had cardiac cath 8/28/24 that showed mid LAD 75% stenosis, distal LAD 95% stenosis, first marginal 90% stenosis, third marginal 99% stenosis, ostial RCA 70% stenosis, mid RCA 70% stenosis, and RV branch 99% stenosis. Left ventriculogram during cardiac cath noted EF 55%.       Review of Systems:  Constitutional-- No fever, chills or sweats. + fatigue.  CV-- No palpitation or syncope. +HTN, HLD, CAD, afib, chest pain  Resp-- No cough, hemoptysis. +SOB, MAGDALENA no cpap  Skin--No rashes or lesions      Allergies: No Known Allergies      Home Meds:  Medications Prior to Admission   Medication Sig Dispense Refill Last Dose/Taking    amLODIPine (NORVASC) 2.5 MG tablet Take 1 tablet by mouth Daily.   10/27/2024 Evening    aspirin 81 MG EC tablet Take 1 tablet by mouth Daily.   10/27/2024 Evening    buPROPion XL (WELLBUTRIN XL) 150 MG 24 hr tablet Take 1 tablet by mouth Daily.   10/27/2024 Evening    docusate sodium (COLACE) 100 MG capsule Take 1 capsule by mouth 2 (Two) Times a Day.   10/27/2024 Evening    DULoxetine (CYMBALTA) 60 MG capsule Take 1 capsule by mouth Daily.   10/27/2024 Morning    empagliflozin (JARDIANCE) 25 MG tablet tablet Take 1 tablet by mouth Daily.   10/27/2024 Morning    finasteride (PROSCAR) 5 MG tablet TAKE 1 TABLET EVERY DAY (Patient taking differently: Take 1 tablet by mouth Daily.) 90 tablet 0 10/27/2024 Morning    gabapentin (NEURONTIN) 300 MG capsule Take 1 capsule by mouth 3 (Three) Times a Day. TAKES 2OOMG IN AM AND 300MG IN PM USUALLY   10/27/2024     HumuLIN 70/30 KwikPen (70-30) 100 UNIT/ML suspension pen-injector 45 units in the a.m. and 20mg in the p.m.   10/27/2024    HYDROcodone-acetaminophen (NORCO)  MG per tablet Take 1 tablet by mouth Every 8 (Eight) Hours As Needed for Moderate Pain.   10/27/2024 Evening    isosorbide mononitrate (IMDUR) 30 MG 24 hr tablet Take 1 tablet by mouth Daily. 30 tablet 11 10/27/2024 Evening    levothyroxine (SYNTHROID, LEVOTHROID) 50 MCG tablet Take 1 tablet by mouth Every Morning.   10/27/2024 Morning    losartan (COZAAR) 25 MG tablet Take 1 tablet by mouth Daily.   10/27/2024 Morning    nitroglycerin (NITROSTAT) 0.4 MG SL tablet 1 under the tongue as needed for angina, may repeat q5mins for up three doses (Patient taking differently: Place 1 tablet under the tongue Every 5 (Five) Minutes As Needed for Chest Pain. 1 under the tongue as needed for angina, may repeat q5mins for up three doses) 30 tablet 3 Past Week    polyethylene glycol (MIRALAX) 17 g packet Take 17 g by mouth Daily.   10/27/2024    rosuvastatin (CRESTOR) 20 MG tablet Take 1 tablet by mouth Daily. (Patient taking differently: Take 1 tablet by mouth Every Night.) 90 tablet 1 10/27/2024 Evening    sotalol (BETAPACE) 80 MG tablet TAKE ONE AND A HALF (1 & 1/2) TABLETS BY MOUTH TWICE DAILY 270 tablet 0 10/27/2024 Evening    Sotalol HCl AF 80 MG tablet Take 1.5 tablets by mouth 2 (Two) Times a Day. 270 tablet 3 10/27/2024 at  8:30 PM    tamsulosin (FLOMAX) 0.4 MG capsule 24 hr capsule TAKE 1 CAPSULE EVERY DAY (Patient taking differently: 1 capsule Daily. Swallow whole. Do not crush or chew.) 90 capsule 3 10/27/2024 Morning    Vitamin D, Cholecalciferol, (CHOLECALCIFEROL) 10 MCG (400 UNIT) tablet Take 1 tablet by mouth Daily.   10/27/2024 Morning    apixaban (ELIQUIS) 5 MG tablet tablet Take 1 tablet by mouth 2 (Two) Times a Day. (Patient taking differently: Take 1 tablet by mouth 2 (Two) Times a Day. LAST DOSE WILL BE ON 10/25/24) 60 tablet 4 10/25/2024     "glimepiride (AMARYL) 2 MG tablet Take 1 tablet by mouth 2 (Two) Times a Day.            PMH:   Past Medical History:   Diagnosis Date    A-fib     Anxiety     Arthritis     DDD (degenerative disc disease), lumbar     Deep vein thrombosis     Depression     Diabetes     Disease of thyroid gland     HYPO    Heart attack     \"5-6 years ago\"    Hyperlipidemia     Hypertension     Kidney stone     Neuropathy     Sleep apnea     DOES NOT USE CPAP    UTI (urinary tract infection)      PSH:    Past Surgical History:   Procedure Laterality Date    CARDIAC CATHETERIZATION N/A 08/28/2024    Procedure: Left Heart Cath;  Surgeon: Julio Neff MD;  Location:  [facility];  Service: Cardiology;  Laterality: N/A;    CARDIAC ELECTROPHYSIOLOGY PROCEDURE N/A 04/05/2023    Procedure: Loop insertion;  Surgeon: Tariq Chávez MD;  Location:  [facility];  Service: Cardiovascular;  Laterality: N/A;    CATARACT EXTRACTION Bilateral     COLONOSCOPY      ENDOSCOPY      TEETH EXTRACTION         Immunization History:  Influenza: No  Pneumococcal: No  Tetanus: UTD    Social History:   Tobacco:   Social History     Tobacco Use   Smoking Status Never   Smokeless Tobacco Never      Alcohol:     Social History     Substance and Sexual Activity   Alcohol Use No         Physical Exam:/98 (BP Location: Left arm, Patient Position: Lying)   Pulse 75   Temp 98.1 °F (36.7 °C) (Temporal)   Resp 18   SpO2 93%       General Appearance:    Alert, cooperative, no distress, appears stated age   Head:    Normocephalic, without obvious abnormality, atraumatic   Lungs:     Clear to auscultation bilaterally, respirations unlabored    Heart:   Regular rate and rhythm, S1 and S2 normal    Abdomen:    Soft without tenderness   Extremities:   Extremities normal, atraumatic, no cyanosis or edema   Skin:   Skin color, texture, turgor normal, no rashes or lesions   Neurologic:   Grossly intact     Results Review: "     LABS:  Lab Results   Component Value Date    WBC 6.82 08/26/2024    HGB 14.6 08/26/2024    HCT 43.7 08/26/2024    MCV 91.8 08/26/2024     08/26/2024    GLUCOSE 391 (H) 10/25/2024    BUN 10 10/25/2024    CREATININE 0.91 10/25/2024    EGFRIFNONA 86 12/15/2021    EGFRIFAFRI 99 12/15/2021     10/25/2024    K 4.7 10/25/2024    CL 99 10/25/2024    CO2 26.0 10/25/2024    MG 2.6 (H) 10/25/2024    CALCIUM 9.5 10/25/2024    ALBUMIN 4.0 10/25/2024    AST 24 10/25/2024    ALT 16 10/25/2024    BILITOT 0.2 10/25/2024      Latest Reference Range & Units 10/25/24 13:46   Hemoglobin A1C 4.80 - 5.60 % 11.20 (H)   (H): Data is abnormally high    RADIOLOGY:  ECHO 10/17/24:  Clinical Indication    Other Reasons; Additional Reasons; Reasons Uncertain in Most Circumstances; Other (Please Comment) - CAD/ PREOP CABG   Dx: Coronary artery disease involving native coronary artery of native heart, unspecified whether angina present [I25.10 (ICD-10-CM)]     Interpretation Summary         Normal left ventricular cavity size and wall thickness noted. All left ventricular wall segments contract normally.    Left ventricular ejection fraction appears to be 61 - 65%.    The aortic valve is not well visualized. No aortic valve regurgitation or stenosis is present. The aortic valve is grossly normal in structure.    The mitral valve is structurally normal with no regurgitation or significant stenosis present.    There is no evidence of pericardial effusion. .    8/28/24 heart cath:    Indications    Paroxysmal atrial fibrillation [I48.0 (ICD-10-CM)]   Mixed hyperlipidemia [E78.2 (ICD-10-CM)]   Primary hypertension [I10 (ICD-10-CM)]   Chronic chest pain with high risk for CAD [R07.9, G89.29, Z91.89 (ICD-10-CM)]     Pre Procedure Diagnosis    Coronary artery disease    Post Procedure Diagnosis    Coronary artery disease      Conclusion         Normal systolic function.    Mid LAD lesion is 75% stenosed.    Dist LAD lesion is 95%  stenosed.    1st Mrg lesion is 90% stenosed.    3rd Mrg lesion is 99% stenosed.    Ost RCA lesion is 70% stenosed.    Mid RCA lesion is 70% stenosed.    RV Branch lesion is 99% stenosed.     1.  Cardiac.  Patient with significant coronary artery disease involving multiple vessels.  Patient will be referred for bypass surgery.    Study Result    Narrative & Impression   EXAM:    CT Angiography Heart With Intravenous Contrast     EXAM DATE:    8/10/2024 9:49 AM     CLINICAL HISTORY:    ; R07.2-Precordial pain     TECHNIQUE:    Axial computed tomographic angiography images of the coronary arteries  with intravenous contrast.  Sublingual nitroglycerine for coronary  vasodilation and IV metoprolol to reduce heart rate were administered as  needed.  This CT exam was performed using one or more of the following  dose reduction techniques:  automated exposure control, adjustment of  the mA and/or kV according to patient size, and/or use of iterative  reconstruction technique.    MIP reconstructed images were created and reviewed.     COMPARISON:    None.     FINDINGS:    LEFT MAIN CORONARY ARTERY:  Left main calcium score 48.    LEFT ANTERIOR DESCENDING ARTERY:  The LAD demonstrates such extensive  mid-distal calcification that the examination is very limited with also  some possible high risk plaque causing about 50% stenosis in the mid  aspect of the LAD.  LAD calcium score of 686.    LEFT CIRCUMFLEX ARTERY:  Question proximal circumflex origin high risk  plaque causing about 75% stenosis.  Circumflex calcium score 62.    RIGHT CORONARY ARTERY:  Mixed calcific and high risk plaque of  proximal RCA possibly contribute up to about 75% or greater stenosis and  should be further evaluated with ICA.  RCA calcium score of 331.       CARDIAC VALVES:  Unremarkable as visualized.  No evidence of  calcification in the aortic or mitral valve leaflets.    PERICARDIUM:  Unremarkable as visualized.  Contour is preserved with  no  effusion, thickening or calcification.       AORTA:  No acute findings.  No thoracic aortic aneurysm.  No  dissection.    PULMONARY ARTERIES:  Unremarkable as visualized.  No pulmonary  embolism.    LUNGS AND PLEURAL SPACES:  Unremarkable as visualized.  No mass.  No  consolidation or edema.  No pleural effusion or thickening.  No  pneumothorax.    MEDIASTINUM:  Unremarkable as visualized.  No mass.    OTHER FINDINGS:  Total calcium score of 1895.     IMPRESSION:  1.  The LAD demonstrates such extensive mid-distal calcification that  the examination is very limited with also some possible high risk plaque  causing about 50% stenosis in the mid aspect of the LAD.  2.  Question proximal circumflex origin high risk plaque causing about  75% stenosis.  3.  Mixed calcific and high risk plaque of proximal RCA possibly  contribute up to about 75% or greater stenosis and should be further  evaluated with ICA.     ASSESSMENT:    CAD RADS N, P4 but overall concerning for significant multifocal  disease in ICA should be considered. Impression.       I reviewed the patient's new clinical results.    Cancer Staging (if applicable)  Cancer Patient: __ yes __no __unknown; If yes, clinical stage T:__ N:__M:__, stage group or __N/A      Impression: Coronary artery disease involving native coronary artery of native heart       Plan: CORONARY ARTERY BYPASS GRAFTING; ENDOSCOPIC VEIN HARVEST; RADIAL ARTERY HARVEST; ATRIAL APPENDAGE EXCLUSION LEFT WITH TRANSESOPHAGEAL ECHOCARDIOGRAM       Manuela Sprague, APRN   10/28/2024   06:39 EDT

## 2024-10-28 NOTE — ANESTHESIA PROCEDURE NOTES
Intra-Op Anesthesia DARIAN    Procedure Performed: Intra-Op Anesthesia DARIAN       Start Time:        End Time:      Preanesthesia Checklist:  Patient identified, IV assessed, risks and benefits discussed, monitors and equipment assessed, procedure being performed at surgeon's request and anesthesia consent obtained.    General Procedure Information  Diagnostic Indications for Echo:  assessment of ascending aorta, assessment of surgical repair and hemodynamic monitoring  Physician Requesting Echo: Alex Max MD  Location performed:  OR  Intubated  Bite block not placed  Heart visualized  Probe Insertion:  Easy  Probe Type:  Multiplane  Modalities:  Color flow mapping, continuous wave Doppler and pulse wave Doppler    Echocardiographic and Doppler Measurements    Ventricles    Right Ventricle:  Thrombus not present.  Global function normal.    Left Ventricle:  Cavity size normal.  Diastolic dimension 1.6 cm.  Thrombus not present.  Global Function normal.  Ejection Fraction 55%.          Valves    Aortic Valve:  Annulus normal.  Stenosis not present.  Area: 2.2 cm².  Mean Gradient: 2 mmHg.  Regurgitation absent.  Leaflets normal.  Leaflet motions normal.      Mitral Valve:  Annulus normal.  Stenosis not present.  Area: 2.4 cm².  Mean Gradient: 1 mmHg.  Regurgitation trace.  Leaflets normal.  Leaflet motions normal.      Tricuspid Valve:  Annulus normal.  Stenosis not present.  Leaflets normal.  Leaflet motions normal.    Pulmonic Valve:  Annulus normal.        Aorta    Ascending Aorta:  Size normal.  Diameter 3 cm.  Dissection not present.  Plaque thickness less than 3 mm.  Mobile plaque not present.    Aortic Arch:  Size normal.  Dissection not present.  Plaque thickness less than 3 mm.  Mobile plaque not present.    Descending Aorta:  Size normal.  Dissection not present.  Plaque thickness less than 3 mm.  Mobile plaque not present.        Atria    Right Atrium:  Size dilated.  Spontaneous echo contrast not  present.  Thrombus not present.  Tumor not present.  Device present.    Left Atrium:  Size dilated.  Spontaneous echo contrast not present.  Thrombus not present.  Tumor not present.  Left atrial appendage normal.      Septa        Ventricular Septum:  Intra-ventricular septum morphology normal.      Diastolic Function Measurements:  Diastolic Dysfunction Grade=  E=  ms  A=  ms  E/A Ratio=  1.7  DT=  ms  S/D=   IVRT=    Other Findings  Pericardium:  normal  Pleural Effusion:  none  Pulmonary Arteries:  normal  Pulmonary Venous Flow:  normal    Anesthesia Information  Performed Personally  Anesthesiologist:  George Morrow MD      Echocardiogram Comments:       Informed consent was obtained preoperatively.  Yahir probe passed without difficulty.  Post CABG, MAZE and TORSTEN ligation:   EF unchanged, no NWMA, obliterartion of TORSTEN by clip

## 2024-10-28 NOTE — CONSULTS
Pulmonary / Critical Care Consult Note        Patient Name: Joel Galo  : 1965  MRN: [id number]  Primary Care Physician:  Edmundo Boston MD  Referring Physician: Alex Max MD  Date of admission: 10/28/2024    Subjective   Subjective     Reason for Consult/ Chief Complaint:   Shortness of breath/chest pain    HPI:  Joel Galo is a 59 y.o. male with medical history significant for paroxysmal A-fib, diabetes mellitus, poorly-controlled with hemoglobin A1c of 11.20, hypertension who recently presented with intermittent episodes of chest pain with associated shortness of breath.  Patient had a series of tests that culminated in a left heart cath on 24 that showed mid LAD 75% stenosis, distal LAD 95% stenosis, first marginal 90% stenosis, third marginal 99% stenosis, ostial RCA 70% stenosis, mid RCA 70% stenosis, and RV branch 99% stenosis. Left ventriculogram during cardiac cath noted EF 55%.  Today was taken in for four-vessel CABG, maze procedure as well as exclusion of atrial appendage.  Status post surgery patient remains intubated and sedated and brought to the ICU.  The intensivist was asked to evaluate the patient.  Patient seen and examined at bedside.  All the labs and diagnostic imaging studies were extensively reviewed by me and findings as above..  Review of patient's labs showed a urine toxicology screen that is positive for opiates.  Chest x-ray of 1025 showed bilateral opacity which could represent pulmonary edema versus pneumonia.  WBC count is within normal limits.  Patient is requiring minimal dose of Levophed and vasopressin      Review of system:  Review of system was not possible because patient is intubated and sedated        Personal History     Past Medical History:   Diagnosis Date    A-fib     Anxiety     Arthritis     DDD (degenerative disc disease), lumbar     Deep vein thrombosis     Depression     Diabetes     Disease of thyroid gland     HYPO    Heart attack   "   \"5-6 years ago\"    Hyperlipidemia     Hypertension     Kidney stone     Neuropathy     Sleep apnea     DOES NOT USE CPAP    UTI (urinary tract infection)        Past Surgical History:   Procedure Laterality Date    CARDIAC CATHETERIZATION N/A 08/28/2024    Procedure: Left Heart Cath;  Surgeon: Julio Neff MD;  Location:  [facility];  Service: Cardiology;  Laterality: N/A;    CARDIAC ELECTROPHYSIOLOGY PROCEDURE N/A 04/05/2023    Procedure: Loop insertion;  Surgeon: Traiq Chávez MD;  Location:  [facility];  Service: Cardiovascular;  Laterality: N/A;    CATARACT EXTRACTION Bilateral     COLONOSCOPY      ENDOSCOPY      TEETH EXTRACTION         Family History: family history includes Coronary artery disease in his father; Diabetes in his father; Heart disease in his mother; Kidney disease in his father. Otherwise pertinent FHx was reviewed and not pertinent to current issue.    Social History:  reports that he has never smoked. He has never used smokeless tobacco. He reports that he does not drink alcohol and does not use drugs.    Home Medications:  DULoxetine, HYDROcodone-acetaminophen, Insulin NPH Isophane & Regular, Sotalol HCl AF, Vitamin D (Cholecalciferol), amLODIPine, apixaban, aspirin, buPROPion XL, docusate sodium, empagliflozin, finasteride, gabapentin, glimepiride, isosorbide mononitrate, levothyroxine, losartan, nitroglycerin, polyethylene glycol, rosuvastatin, sotalol, and tamsulosin    Allergies:  No Known Allergies    Objective    Objective     Vitals:   Temp:  [98.1 °F (36.7 °C)] 98.1 °F (36.7 °C)  Heart Rate:  [75] 75  Resp:  [18] 18  BP: (141-159)/(96-98) 141/98    Physical Exam:  Vital Signs Reviewed     General: An elderly  male intubated and sedated    HEENT: Pupils equal and reactive to light     Respiratory: Air entry is diminished bilaterally    Cardiovascular: First and Second heart sounds heard     Neurological: Awake, alert and fully " oriented. No focal neurologic deficit     Gastrointestinal: Abdomen is soft, nondistended. No palpable organomegally    Extremities: No pedal edema     Skin: Intact      Result Review    Result Review:  I have personally reviewed the results from the time of this admission to 10/28/2024 13:28 EDT and agree with these findings:  [x]  Laboratory  []  Microbiology  [x]  Radiology  []  EKG/Telemetry   []  Cardiology/Vascular   []  Pathology  []  Old records  []  Other:  Most notable findings include: Reviewed    Assessment & Plan   Assessment / Plan     Active Hospital Problems:  Active Hospital Problems    Diagnosis     **Coronary artery disease involving native coronary artery of native heart     CAD (coronary artery disease)    Coronary artery disease status post CABG  Acute hypoxic respiratory failure  Paroxysmal atrial fibrillation status post maze procedure  Diabetes mellitus:,Poorly controlled  Cardiogenic shock  Hypertension  Obstructive sleep apnea  Dyslipidemia    Plan  Obtain postintubation ABG and chest x-ray  Adjust vent settings accordingly.  Titrate FiO2 to sat goal of 95% or above  Sedation to RASS goal -2  Vent weaning as per post CABG protocol  On insulin drip.  Q. hourly Accu-Check  Titrate vasopressor to MAP of 60 mmHg  Pain control with Demerol 25 mg Q4 as needed and fentanyl 25 mcg IV Q1 hourly as needed  N.p.o.  DVT prophylaxis: Will resume chemical prophylaxis when okay by CT surgery  Further plan as per surgery      The patient is critically ill in the ICU. Multidisciplinary bedside critical care rounds were performed with nursing staff, respiratory therapy, pharmacy, nutritional services, social work. I have personally reviewed the chart, labs and any pertinent imaging available.  I have spent 48 minutes of critical care time, excluding procedures, in the care of this patient.

## 2024-10-28 NOTE — PLAN OF CARE
Goal Outcome Evaluation:           Progress: improving     Pt s/p CABG w/ Dr. Max 10/28/24  Neuro: GARNICA=, +  and LE movement; sedation off since 1700, pt remains drowsy  Resp: Vent setting: Mode: SIMV VC+, RR: 14, TV: 550, FiO2: 40%, PEEP: 5; Lung sounds clear-diminished in bases; Spontaneous trial attempted x2- unsuccessful x2 d/t pt LOC/apneic episodes; CT outputs: 1/2: 330ml, 3: 140ml  Cardiac: SB-SR w/ PVCs, K+ replaced, SBP <140, 1L Albumin given  GI/: Lt NGT @ 70cm, +BS, -BM; UOP: 570  T Max: 36*C  Pain managed w/ PRNs  Family updated @ bedside    Current gtts:  Levo @ 0.01 mcg/kg/min  Insulin @ 0.5 units/hr

## 2024-10-28 NOTE — OP NOTE
DATE OF PROCEDURE: 10/28/2024     PREOPERATIVE DIAGNOSES:  1. Multivessel coronary artery disease  2. Unstable angina  3. Hypertension  4. Hyperlipidemia  5. Poorly controlled diabetes (HgbA1c 11.2)  6. Paroxysmal atrial fibrillation     POSTOPERATIVE DIAGNOSES:    1. Multivessel coronary artery disease  2. Unstable angina  3. Hypertension  4. Hyperlipidemia  5. Poorly controlled diabetes (HgbA1c 11.2)  6. Paroxysmal atrial fibrillation     PROCEDURES PERFORMED:    1. Coronary artery bypass graft x 4  2. Endoscopic right greater saphenous vein harvest    3. Varela IV Modified biatrial MAZE  4. Left atrial appendage ligation (35 mm Atricure clip)    SURGEON: Alex Max MD       ASSISTANT:  Chris Vilchis PA was responsible for performing the following activities: Retraction, Suction, Closing, Placing Dressing, and Harvesting of Vessels and their skilled assistance was necessary for the success of this case.    Circulator: Rebecca Pinon RN; Aurora Carreon RN  Perfusionist: Madisyn Moran  Scrub Person: Carmen Cherry; Christopher Aranda  Nursing Assistant: Ad Brock; Henrietta Chandler; Loree Phillisp  Assistant: Chris Vilchis PA     ANESTHESIA: General endotracheal anesthesia with Dr. George Mororw MD     ESTIMATED BLOOD LOSS: 500 mL     CROSSCLAMP TIME: 116 minutes       TOTAL CARDIOPULMONARY BYPASS TIME: 142 minutes      DISTAL BYPASS TARGETS:    1. LIMA to LAD  2. Greater saphenous vein to OM1  3. Greater saphenous vein to OM4  4. Greater saphenous vein to OM5    INDICATIONS: 59-year-old  male with a history of hypertension, hyperlipidemia, diabetes mellitus, paroxysmal atrial fibrillation and coronary artery disease who presented with intermittent chest pain and dyspnea on exertion.  He was found to have multivessel coronary artery disease and was felt to warrant surgical revascularization. The risks and benefits of surgery were discussed with the patient including  pain, bleeding, infection, renal failure, stroke and death along with the STS risk score. The patient understood these risks and wished to proceed with surgery.      DESCRIPTION OF PROCEDURE: The patient was taken to the operating room and placed under general endotracheal anesthesia. A central line, radial arterial line, and Prado catheter were placed intraoperatively. The patient was prepped and draped in the usual sterile fashion and a timeout was performed verifying the patient's name, procedure, consent, beta blockade administration and antibiotics.    The left radial artery was not harvested given the extensive calcifications on the vessel seen with ultrasound.  This was also seen on the right radial artery and is a contraindication to vessel harvest.  The right greater saphenous vein was harvested from the groin to the ankle using endoscopic technique. Subcutaneous tissues were closed with a running 3-0 Vicryl suture and 4-0 Monocryl subcuticular stitch.  Simultaneously, a median sternotomy incision was made and electrocautery was utilized to gain access to the sternum. A midline sternotomy was performed after lung desufflation and hemostasis was achieved with electrocautery.    Attention was turned to the left internal mammary artery, which was taken down using electrocautery and small clips. Dissection was performed proximally to the subclavian vein and distally to the bifurcation. The bifurcation was ligated with 2 large clips to each branch and sharply divided. This revealed excellent flow within the mammary artery which was ligated distally using small clips and irrigated with papaverine solution and placed in a soaked Ray-Carlos sponge in the left pleural space. The pericardium was opened and stay sutures were placed to create a pericardial well. Next, 3-0 Prolene sutures were placed in the ascending aorta and systemic heparin was administered. Additional cannulation sutures were placed in the right atrial  appendage, ascending aorta, and right atrium. After verification of satisfactory activated clotting time, the arterial cannula was placed and connected to the cardiopulmonary bypass circuit after being de-aired. The line was tested and a wrap was performed. The venous cannula was inserted followed by antegrade and retrograde cardioplegia lines. The vein was inspected and found to be of appropriate caliber and quality for bypass grafting. A slit within the pericardial well along the cephalad portion was created for appropriate transition of the mammary pedicle into the mediastinum. Cardiopulmonary bypass was initiated and the patient was allowed to drift in temperature and distal bypass targets were verified.  It should be noted that all coronary arteries were diffusely calcified with extensive plaques.  The coronary arteries were very small in caliber and marginal targets.    The DARIAN probe was pulled back to 20 cm.  The right and left pulmonary veins were tested and found to have electrical activity.  The right pulmonary veins were encircled and eight separate ablations were performed medial to the confluence using the radiofrequency clamp.  No electrical activity was subsequently found.  The ligament of Jonhie was divided with electrocautery.  The left pulmonary veins were encircled and eight separate ablations were performed medial to the confluence using the radiofrequency clamp.  No electrical activity was subsequently found.    Bypass flow was dropped and the aortic crossclamp was applied. Cardioplegia was administered in an antegrade fashion with immediate cessation of cardiac activity. There was an excellent septal temperature response.  The right atrium was ablated using the cryoprobe for 2 minutes using an ablation line from the superior vena cava to the right atrial appendage tip.  At this juncture there was a problem with the Cryoice probe temperature sensor and the device had a malfunction at the very  end of the cryo cycle.  A new probe and gas tank were obtained to ensure proper function.  Additional ablation was performed for 2 minutes using the cryoprobe from the inferior vena cava to the right atrial appendage tip.     The phrenic nerves were protected and the roof line to the left atrium was ablated using the cryoprobe for 2 minutes.  The floor was subsequently cryoablated for 2 minutes.  The left atrial appendage was sized at 35 mm.  The tip of the appendage was trimmed and radiofrequency ablation performed from the appendage down to the pulmonary vein ablation line for four separate ablations.  The 35 mm Atricure clip was then applied at the base of the left atrial appendage.  The tip of the appendage was ligated with two large clips.    It should be noted that the PDA was a small less than 1 mm vessel that was not an acceptable bypass target.  This was the small distal vessel seen on the cardiac catheterization instead of a posterolateral branch.  The right ventricular or acute marginal branch had diffuse calcific disease to the distal most portion of the vessel and was not an acceptable target.  At this juncture, I performed an arteriotomy on a vessel that appeared to be a posterolateral branch based on counting the obtuse marginal branches and the position of the vessel on the heart.  This was discovered to be the sixth obtuse marginal branch and not a posterolateral branch on the right coronary distribution when probing the vessel. Decision was made to graft the vessel given its proximal disease.  The sixth obtuse marginal branch was 1.25 mm in diameter and an arteriotomy was made on the proximal portion of this vessel and extended proximally and distally. An end-to-side anastomosis was performed with running 7-0 Prolene suture and tied down. Cardioplegia was given down the anastomosis via hand injection with no evidence of leak and good blood flow. Verification of vein length was obtained by filling  the heart and the vein graft was trimmed to the appropriate length. The fifth obtuse marginal branch with proximal vessel subtotal occlusion was that largest of the obtuse marginal branches measuring 1.5 mm in diameter and an arteriotomy was made on the mid portion of this vessel and extended proximally and distally. An end-to-side anastomosis was performed with running 7-0 Prolene suture and tied down. Cardioplegia was given down the anastomosis via hand injection with no evidence of leak and good blood flow. Verification of vein length was obtained by filling the heart and the vein graft was trimmed to the appropriate length. The first obtuse marginal branch was 1.5 mm in diameter and an arteriotomy was made on the mid portion of this vessel and extended proximally and distally. This vessel had to be exposed well into its travel under the myocardium due to extensive proximal calcifications.  An end-to-side anastomosis was performed with running 7-0 Prolene suture and tied down. Cardioplegia was given down the anastomosis via hand injection with no evidence of leak and good blood flow. Verification of vein length was obtained by filling the heart and the vein graft was trimmed to the appropriate length. The LAD was inspected and found to have extensive calcific disease with a porcelain vessel all the way from the mid vessel down to the very apex.  There was no break in this calcium for a mid to distal anastomosis and an endarterectomy was not felt to be reasonable given the extensive length of disease down the entire vessel.  An arteriotomy was made on the mid LAD where the artery was first free of calcium and extended proximally and distally on this 1.75 mm diameter vessel. This tapered immediately down to a 1 mm vessel that would not accommodate a probe.  The internal mammary artery was then prepared for grafting by ligating the 2 venous branches along the pedicle using small clips. The mammary artery was trimmed  proximal to the bifurcation and beveled for grafting. An end-to-side anastomosis with an 8-0 Prolene suture was constructed and tied down. The bulldog clamp was removed and there was flow within the vessel and adequate filling of the LAD. The underlying mammary fascia was secured to the epicardium using two 6-0 Prolene sutures. Three aortotomies were then made on the proximal ascending aorta and end-to-side proximal anastomoses were carried out using 6-0 Prolene suture and labeled with a radiopaque washers. A hotshot of cardioplegia was given down the ascending aorta after bulldog clamps were applied to the vein grafts. The veins were de-aired and the patient was placed in steep Trendelenburg position. The root vent was turned on high suction and cardiopulmonary bypass flow was turned down with crossclamp subsequently removed. Bypass flows were returned to normal and the bulldog clamps were removed. The heart returned to spontaneous sinus rhythm without fibrillation. The proximal and distal anastomoses were then inspected and found to be hemostatic.   The patient was subsequently weaned from cardiopulmonary bypass and decannulation was successfully carried out. All cannulation sites were reinforced with additional 4-0 Prolene suture and inspected for hemostasis. Doppler examination of bypass grafts revealed excellent flow within the mammary and vein grafts.  Ventricular pacing wires were placed and secured using 0 silk suture. Three chest tubes were then placed within the left and right pleural spaces and mediastinum. These were secured using a 0 Ethibond suture. The sternum was reapproximated with #7 stainless steel wire and the linea alba was closed with a running 0 Vicryl suture. Subcutaneous tissues were closed with a 2-0 Vicryl suture and the inferior aspect of the incision was closed with additional interrupted 2-0 Vicryl sutures in the dermal layer. The skin was reapproximated with a 4-0 Monocryl subcuticular  stitch and overlying skin glue was applied to the incision. Gauze and tape were applied to the chest tube sites and the patient was subsequently transported to cardiac ICU in stable condition, intubated.

## 2024-10-28 NOTE — ANESTHESIA POSTPROCEDURE EVALUATION
Patient: Joel Galo    Procedure Summary       Date: 10/28/24 Room / [facility]:  [facility]    Anesthesia Start: 0705 Anesthesia Stop: 1328    Procedures:       MEDIAN STERNOTOMY, CORONARY ARTERY BYPASS GRAFTING X4 UTILIZING THE LEFT INTERNAL MAMMARY ARTERY GRAFT (Chest)      ENDOSCOPIC VEIN HARVEST OF THE GREATER RIGHT SAPHENOUS VEIN      ATRIAL APPENDAGE EXCLUSION LEFT WITH TRANSESOPHAGEAL ECHOCARDIOGRAM (Chest)      MAZE PROCEDURE Diagnosis:       Coronary artery disease involving native coronary artery of native heart, unspecified whether angina present      (Coronary artery disease involving native coronary artery of native heart, unspecified whether angina present [I25.10])    Surgeons: Alex Max MD Provider: George Morrow MD    Anesthesia Type: general, [place], CVL ASA Status: 4            Anesthesia Type: general, [place], CVL    Vitals  Vitals Value Taken Time   BP     Temp     Pulse 61 10/28/24 1328   Resp     SpO2 100 % 10/28/24 1328   Vitals shown include unfiled device data.        Post Anesthesia Care and Evaluation    Patient location during evaluation: ICU  Patient participation: complete - patient cannot participate  Level of consciousness: obtunded/minimal responses  Anesthetic complications: No anesthetic complications    Cardiovascular status: acceptable and hemodynamically stable  Respiratory status: ETT, intubated and ventilator  Hydration status: acceptable

## 2024-10-28 NOTE — ANESTHESIA PREPROCEDURE EVALUATION
Anesthesia Evaluation     Patient summary reviewed and Nursing notes reviewed   NPO Solid Status: > 8 hours  NPO Liquid Status: > 2 hours           Airway   Mallampati: I  TM distance: >3 FB  Neck ROM: full  No difficulty expected  Dental    (+) poor dentition    Pulmonary     breath sounds clear to auscultation  (+) ,sleep apnea  Cardiovascular     ECG reviewed  Rhythm: regular  Rate: normal    (+) hypertension, past MI , CAD, dysrhythmias Atrial Fib, hyperlipidemia      Neuro/Psych  (+) psychiatric history Depression  GI/Hepatic/Renal/Endo    (+) GERD, renal disease-, diabetes mellitus poorly controlled using insulin, thyroid problem hypothyroidism    Musculoskeletal     (+) back pain, chronic pain, neck pain  Abdominal    Substance History      OB/GYN          Other   arthritis,         Phys Exam Other: Few remaining teeth.  Ones remaining are broken and carious.            Anesthesia Plan    ASA 4     general, [place] and CVL     Postoperative Plan: Expected vent after surgery  Anesthetic plan, risks, benefits, and alternatives have been provided, discussed and informed consent has been obtained with: patient and spouse/significant other.    CODE STATUS:

## 2024-10-28 NOTE — CONSULTS
Joel Galo  [id number]  1965   LOS: 0 days   Patient Care Team:  Edmundo Boston MD as PCP - General (Family Medicine)    Mr. Galo is a 59-year-old  white male from [place], [place].    Chief Complaint: Follow-up on CAD, hypertension, PAF    Problem List:  Coronary artery disease  Abnormal CTA coronaries 8/20/2024:Moderate disease in the LAD and 75% stenosis in the circumflex and right coronary artery noted. Patient referred for left heart cath.   Left heart catheterization 8/28/2024:Normal systolic function.  Mid LAD lesion is 75% stenosed. Dist LAD lesion is 95% stenosed. 1st Mrg lesion is 90% stenosed. 3rd Mrg lesion is 99% stenosed. Ost RCA lesion is 70% stenosed. Mid RCA lesion is 70% stenosed. RV Branch lesion is 99% stenosed.  Recommendations for CABG  Echocardiogram 10/17/2024: LVEF 61 to 65%, no significant valvular abnormalities  IntraOp DARIAN 10/28/2024: LVEF 55%, obliteration of TORSTEN by clip   CABG x 4 vessels/maze/TORSTEN ligation 10/28/2024  Paroxysmal atrial fibrillation  CHADsVasc 3, anticoagulated with Eliquis  Previously on sotalol  CABG x 4 vessels/maze/TORSTEN ligation 10/28/2024  Hypertension  Hyperlipidemia  Type 2 diabetes mellitus; hemoglobin A1c 12.6% August 2024, 11.2% October 2024  Moderate obesity: BMI 35.1  Remote dizziness/syncope of unclear etiology, now status post ILR April 2023  Hypothyroidism  Carotid artery disease: Carotid duplex 10/17/2024 showing mild to moderate plaque R>L but no occlusion, antegrade flow noted in both vertebral arteries  Surgical history:  ILR April 2023  Left cataract extraction  [facility]   CABG x 4 vessels/maze/TORSTEN ligation      No Known Allergies  Medications Prior to Admission   Medication Sig Dispense Refill Last Dose/Taking    amLODIPine (NORVASC) 2.5 MG tablet Take 1 tablet by mouth Daily.   10/27/2024 Evening    aspirin 81 MG EC tablet Take 1 tablet by mouth Daily.   10/27/2024 Evening    buPROPion XL (WELLBUTRIN XL) 150 MG 24 hr tablet Take  1 tablet by mouth Daily.   10/27/2024 Evening    docusate sodium (COLACE) 100 MG capsule Take 1 capsule by mouth 2 (Two) Times a Day.   10/27/2024 Evening    DULoxetine (CYMBALTA) 60 MG capsule Take 1 capsule by mouth Daily.   10/27/2024 Morning    empagliflozin (JARDIANCE) 25 MG tablet tablet Take 1 tablet by mouth Daily.   10/27/2024 Morning    finasteride (PROSCAR) 5 MG tablet TAKE 1 TABLET EVERY DAY (Patient taking differently: Take 1 tablet by mouth Daily.) 90 tablet 0 10/27/2024 Morning    gabapentin (NEURONTIN) 300 MG capsule Take 1 capsule by mouth 3 (Three) Times a Day. TAKES 2OOMG IN AM AND 300MG IN PM USUALLY   10/27/2024    HumuLIN 70/30 KwikPen (70-30) 100 UNIT/ML suspension pen-injector 45 units in the a.m. and 20mg in the p.m.   10/27/2024    HYDROcodone-acetaminophen (NORCO)  MG per tablet Take 1 tablet by mouth Every 8 (Eight) Hours As Needed for Moderate Pain.   10/27/2024 Evening    isosorbide mononitrate (IMDUR) 30 MG 24 hr tablet Take 1 tablet by mouth Daily. 30 tablet 11 10/27/2024 Evening    levothyroxine (SYNTHROID, LEVOTHROID) 50 MCG tablet Take 1 tablet by mouth Every Morning.   10/27/2024 Morning    losartan (COZAAR) 25 MG tablet Take 1 tablet by mouth Daily.   10/27/2024 Morning    nitroglycerin (NITROSTAT) 0.4 MG SL tablet 1 under the tongue as needed for angina, may repeat q5mins for up three doses (Patient taking differently: Place 1 tablet under the tongue Every 5 (Five) Minutes As Needed for Chest Pain. 1 under the tongue as needed for angina, may repeat q5mins for up three doses) 30 tablet 3 Past Week    polyethylene glycol (MIRALAX) 17 g packet Take 17 g by mouth Daily.   10/27/2024    rosuvastatin (CRESTOR) 20 MG tablet Take 1 tablet by mouth Daily. (Patient taking differently: Take 1 tablet by mouth Every Night.) 90 tablet 1 10/27/2024 Evening    sotalol (BETAPACE) 80 MG tablet TAKE ONE AND A HALF (1 & 1/2) TABLETS BY MOUTH TWICE DAILY 270 tablet 0 10/27/2024 Evening     Sotalol HCl AF 80 MG tablet Take 1.5 tablets by mouth 2 (Two) Times a Day. 270 tablet 3 10/27/2024 at  8:30 PM    tamsulosin (FLOMAX) 0.4 MG capsule 24 hr capsule TAKE 1 CAPSULE EVERY DAY (Patient taking differently: 1 capsule Daily. Swallow whole. Do not crush or chew.) 90 capsule 3 10/27/2024 Morning    Vitamin D, Cholecalciferol, (CHOLECALCIFEROL) 10 MCG (400 UNIT) tablet Take 1 tablet by mouth Daily.   10/27/2024 Morning    apixaban (ELIQUIS) 5 MG tablet tablet Take 1 tablet by mouth 2 (Two) Times a Day. (Patient taking differently: Take 1 tablet by mouth 2 (Two) Times a Day. LAST DOSE WILL BE ON 10/25/24) 60 tablet 4 10/25/2024    glimepiride (AMARYL) 2 MG tablet Take 1 tablet by mouth 2 (Two) Times a Day.        Scheduled Meds:acetaminophen, 1,000 mg, Intravenous, Once  acetaminophen, 650 mg, Nasogastric, Q8H  albumin human, , ,   [START ON 10/29/2024] aspirin, 325 mg, Oral, Daily  aspirin, 325 mg, Oral, Once  atorvastatin, 40 mg, Oral, Nightly  ceFAZolin, 2,000 mg, Intravenous, Q8H  chlorhexidine, 15 mL, Mouth/Throat, Q12H  gabapentin, 100 mg, Oral, Q8H  [START ON 10/29/2024] metoprolol tartrate, 12.5 mg, Oral, Q12H  metoprolol tartrate, 2.5 mg, Intravenous, Q6H  mupirocin, 1 Application, Each Nare, BID  pharmacy consult - MTM, , Does not apply, Daily  protamine, 50 mg, Intravenous, Once  senna-docusate sodium, 2 tablet, Oral, BID      Continuous Infusions:dexmedetomidine, 0.2-1.5 mcg/kg/hr  dextrose 5 % with KCl 20 mEq, 60 mL/hr  dextrose 5 % with KCl 20 mEq, 30 mL/hr  DOBUTamine, 2-20 mcg/kg/min  DOPamine, 2-20 mcg/kg/min  EPINEPHrine, 0.02-0.3 mcg/kg/min  insulin, 0-100 Units/hr  [START ON 10/29/2024] lactated ringers, 9 mL/hr  niCARdipine, 5-15 mg/hr  nitroglycerin, 5-200 mcg/min  norepinephrine, 0.02-0.3 mcg/kg/min  phenylephrine, 0.5-3 mcg/kg/min  propofol, 5-50 mcg/kg/min             History of Present Illness:   This is a 59-year-old white male who had abnormal CTA coronaries, left heart  catheterization with MV CAD with recommendations for CABG.  Patient had CABG x 4 vessels/maze/TORSTEN ligation 10/28/2024, POD #0.  Patient currently intubated and sedated so no ROS able to be obtained.  Per RN, no blood products were given intraoperatively.    Cardiac risk factors: advanced age (older than 55 for men, 65 for women), diabetes mellitus, dyslipidemia, hypertension, male gender, obesity (BMI >= 30 kg/m2), and sedentary lifestyle.    Social History     Socioeconomic History    Marital status:     Number of children: 1   Tobacco Use    Smoking status: Never    Smokeless tobacco: Never   Vaping Use    Vaping status: Never Used   Substance and Sexual Activity    Alcohol use: No    Drug use: No    Sexual activity: Not Currently     Family History   Problem Relation Age of Onset    Heart disease Mother     Coronary artery disease Father     Diabetes Father     Kidney disease Father        Review of Systems  10 point review of systems was completed, positives outlined in the HPI, and otherwise all other systems are negative.      Objective:       Physical Exam  /98 (BP Location: Left arm, Patient Position: Lying)   Pulse 62   Temp 98.1 °F (36.7 °C) (Temporal)   Resp 18   SpO2 100%   There were no vitals filed for this visit.  There is no height or weight on file to calculate BMI.    Intake/Output Summary (Last 24 hours) at 10/28/2024 1350  Last data filed at 10/28/2024 1328  Gross per 24 hour   Intake 2775 ml   Output 750 ml   Net 2025 ml       General Appearance:  Sedated and intubated, no distress, appears stated age, RIJ CVL, NG, Pr[place], right radial art line   Head:  Normocephalic, without obvious abnormality, atraumatic   Neck: Supple, symmetrical, trachea midline, no adenopathy, thyroid: not enlarged, symmetric, no tenderness/mass/nodules, no carotid bruit or JVD   Lungs:   Clear to auscultation bilaterally, respirations unlabored, mechanically ventilated, chest tubes in place   Heart:   Regular rate and rhythm with occasional PVCs, S1, S2 normal, no murmur, rub or gallop   Abdomen:   Soft, nontender, no masses, no organomegaly, bowel sounds audible x4   Extremities: No edema, normal range of motion, pedal SCDs in place   Pulses: 2+ and symmetric   Skin: Sternal incision CDI   Neurologic: Sedated and intubated       Cardiographics  EKG 10/28/2024:  Normal sinus rhythm  Prolonged QT (511 ms)  Abnormal ECG  No previous ECGs available    Imaging  Chest x-ray 10/28/2024:Postsurgical chest with lines/tubes in expected position, as above.     Lab Review   Results from last 7 days   Lab Units 10/25/24  1346   SODIUM mmol/L 136   POTASSIUM mmol/L 4.7   CHLORIDE mmol/L 99   CO2 mmol/L 26.0   BUN mg/dL 10   CREATININE mg/dL 0.91   GLUCOSE mg/dL 391*   CALCIUM mg/dL 9.5     Results from last 7 days   Lab Units 10/28/24  1339   WBC 10*3/mm3 7.42   HEMOGLOBIN g/dL 10.1*   HEMATOCRIT % 29.0*   PLATELETS 10*3/mm3 115*         Results from last 7 days   Lab Units 10/25/24  1346   HEMOGLOBIN A1C % 11.20*   Drips:  Insulin at 1.1 units/h  Propofol at 50 mcg/kg/min  Dextrose 5% with KCl 20 mEq/L at 30 mL/h    Albumin 250 mL  Levophed at 0.02 mcg/kg/min      Assessment:   Patient is status post CABG x 4 vessels/maze/TORSTEN ligation, POD #0.  Continued ICU support/treatment.  Would try and wean off Levophed gtt as blood pressure allows.          Plan:   Avoid QTc prolonging agents, repeat ECG in morning  Continued ICU support/treatment  Would try and wean off Levophed gtt as blood pressure allows.  Dr Jones to resume care in morning    Electronically signed by ZIA Dias, 10/28/24, 2:43 PM EDT.

## 2024-10-28 NOTE — ANESTHESIA PROCEDURE NOTES
Airway  Urgency: elective    Date/Time: 10/28/2024 7:12 AM  Airway not difficult    General Information and Staff    Patient location during procedure: OR    Indications and Patient Condition  Indications for airway management: airway protection    Preoxygenated: yes  MILS not maintained throughout  Mask difficulty assessment: 1 - vent by mask    Final Airway Details  Final airway type: endotracheal airway      Successful airway: ETT  Cuffed: yes   Successful intubation technique: direct laryngoscopy  Endotracheal tube insertion site: oral  Blade: Sathish  Blade size: 3  ETT size (mm): 7.5  Cormack-Lehane Classification: grade I - full view of glottis  Placement verified by: chest auscultation and capnometry   Measured from: lips  ETT/EBT  to lips (cm): 20  Number of attempts at approach: 1  Assessment: lips, teeth, and gum same as pre-op and atraumatic intubation    Additional Comments  Negative epigastric sounds, Breath sound equal bilaterally with symmetric chest rise and fall

## 2024-10-29 ENCOUNTER — APPOINTMENT (OUTPATIENT)
Dept: GENERAL RADIOLOGY | Facility: HOSPITAL | Age: 59
End: 2024-10-29
Payer: MEDICARE

## 2024-10-29 PROBLEM — I95.1 AUTONOMIC ORTHOSTATIC HYPOTENSION: Status: RESOLVED | Noted: 2023-05-10 | Resolved: 2024-10-29

## 2024-10-29 PROBLEM — N40.1 URINARY RETENTION DUE TO BENIGN PROSTATIC HYPERPLASIA: Status: RESOLVED | Noted: 2019-05-08 | Resolved: 2024-10-29

## 2024-10-29 PROBLEM — I25.10 ASCVD (ARTERIOSCLEROTIC CARDIOVASCULAR DISEASE): Status: RESOLVED | Noted: 2024-08-28 | Resolved: 2024-10-29

## 2024-10-29 PROBLEM — E78.5 HYPERLIPIDEMIA LDL GOAL <70: Status: ACTIVE | Noted: 2023-07-06

## 2024-10-29 PROBLEM — R55 SYNCOPE AND COLLAPSE: Status: RESOLVED | Noted: 2023-02-21 | Resolved: 2024-10-29

## 2024-10-29 PROBLEM — Z79.899 LONG TERM CURRENT USE OF ANTIARRHYTHMIC DRUG: Status: RESOLVED | Noted: 2023-07-06 | Resolved: 2024-10-29

## 2024-10-29 PROBLEM — I25.10 CAD (CORONARY ARTERY DISEASE): Status: RESOLVED | Noted: 2024-10-28 | Resolved: 2024-10-29

## 2024-10-29 PROBLEM — R33.8 URINARY RETENTION DUE TO BENIGN PROSTATIC HYPERPLASIA: Status: RESOLVED | Noted: 2019-05-08 | Resolved: 2024-10-29

## 2024-10-29 LAB
ACT BLD: 122 SECONDS (ref 82–152)
ACT BLD: 134 SECONDS (ref 82–152)
ACT BLD: 544 SECONDS (ref 82–152)
ACT BLD: 654 SECONDS (ref 82–152)
ACT BLD: 788 SECONDS (ref 82–152)
ALBUMIN SERPL-MCNC: 3.8 G/DL (ref 3.5–5.2)
ALBUMIN/GLOB SERPL: 2 G/DL
ALP SERPL-CCNC: 47 U/L (ref 39–117)
ALT SERPL W P-5'-P-CCNC: 8 U/L (ref 1–41)
ANION GAP SERPL CALCULATED.3IONS-SCNC: 10 MMOL/L (ref 5–15)
ARTERIAL PATENCY WRIST A: ABNORMAL
AST SERPL-CCNC: 56 U/L (ref 1–40)
ATMOSPHERIC PRESS: ABNORMAL MM[HG]
BASE EXCESS BLDA CALC-SCNC: -2.8 MMOL/L (ref 0–2)
BASE EXCESS BLDA CALC-SCNC: 1 MMOL/L (ref -5–5)
BASE EXCESS BLDA CALC-SCNC: 2 MMOL/L (ref -5–5)
BASE EXCESS BLDA CALC-SCNC: 2 MMOL/L (ref -5–5)
BASE EXCESS BLDA CALC-SCNC: 3 MMOL/L (ref -5–5)
BASE EXCESS BLDA CALC-SCNC: 3 MMOL/L (ref -5–5)
BASE EXCESS BLDA CALC-SCNC: 4 MMOL/L (ref -5–5)
BASOPHILS # BLD AUTO: 0.02 10*3/MM3 (ref 0–0.2)
BASOPHILS NFR BLD AUTO: 0.3 % (ref 0–1.5)
BDY SITE: ABNORMAL
BH BB BLOOD EXPIRATION DATE: NORMAL
BH BB BLOOD EXPIRATION DATE: NORMAL
BH BB BLOOD TYPE BARCODE: 1700
BH BB BLOOD TYPE BARCODE: 9500
BH BB DISPENSE STATUS: NORMAL
BH BB DISPENSE STATUS: NORMAL
BH BB PRODUCT CODE: NORMAL
BH BB PRODUCT CODE: NORMAL
BH BB UNIT NUMBER: NORMAL
BH BB UNIT NUMBER: NORMAL
BILIRUB SERPL-MCNC: 0.5 MG/DL (ref 0–1.2)
BODY TEMPERATURE: 37
BUN SERPL-MCNC: 12 MG/DL (ref 8–23)
BUN/CREAT SERPL: 15.6 (ref 7–25)
CA-I BLDA-SCNC: 0.89 MMOL/L (ref 1.2–1.32)
CA-I BLDA-SCNC: 1 MMOL/L (ref 1.2–1.32)
CA-I BLDA-SCNC: 1.05 MMOL/L (ref 1.2–1.32)
CA-I BLDA-SCNC: 1.08 MMOL/L (ref 1.2–1.32)
CA-I BLDA-SCNC: 1.09 MMOL/L (ref 1.2–1.32)
CA-I BLDA-SCNC: 1.09 MMOL/L (ref 1.2–1.32)
CA-I BLDA-SCNC: 1.13 MMOL/L (ref 1.2–1.32)
CA-I BLDA-SCNC: 1.17 MMOL/L (ref 1.2–1.32)
CA-I BLDA-SCNC: 1.5 MMOL/L (ref 1.2–1.32)
CALCIUM SPEC-SCNC: 8.5 MG/DL (ref 8.6–10.5)
CHLORIDE SERPL-SCNC: 108 MMOL/L (ref 98–107)
CO2 BLDA-SCNC: 24.5 MMOL/L (ref 22–33)
CO2 BLDA-SCNC: 27 MMOL/L (ref 24–29)
CO2 BLDA-SCNC: 27 MMOL/L (ref 24–29)
CO2 BLDA-SCNC: 28 MMOL/L (ref 24–29)
CO2 BLDA-SCNC: 29 MMOL/L (ref 24–29)
CO2 BLDA-SCNC: 30 MMOL/L (ref 24–29)
CO2 BLDA-SCNC: 31 MMOL/L (ref 24–29)
CO2 SERPL-SCNC: 22 MMOL/L (ref 22–29)
COHGB MFR BLD: 1.3 % (ref 0–2)
CREAT SERPL-MCNC: 0.77 MG/DL (ref 0.76–1.27)
CROSSMATCH INTERPRETATION: NORMAL
CROSSMATCH INTERPRETATION: NORMAL
DEPRECATED RDW RBC AUTO: 45 FL (ref 37–54)
EGFRCR SERPLBLD CKD-EPI 2021: 101.9 ML/MIN/1.73
EOSINOPHIL # BLD AUTO: 0.03 10*3/MM3 (ref 0–0.4)
EOSINOPHIL NFR BLD AUTO: 0.5 % (ref 0.3–6.2)
EPAP: 0
ERYTHROCYTE [DISTWIDTH] IN BLOOD BY AUTOMATED COUNT: 13.3 % (ref 12.3–15.4)
GLOBULIN UR ELPH-MCNC: 1.9 GM/DL
GLUCOSE BLDC GLUCOMTR-MCNC: 140 MG/DL (ref 70–130)
GLUCOSE BLDC GLUCOMTR-MCNC: 148 MG/DL (ref 70–130)
GLUCOSE BLDC GLUCOMTR-MCNC: 154 MG/DL (ref 70–130)
GLUCOSE BLDC GLUCOMTR-MCNC: 157 MG/DL (ref 70–130)
GLUCOSE BLDC GLUCOMTR-MCNC: 161 MG/DL (ref 70–130)
GLUCOSE BLDC GLUCOMTR-MCNC: 165 MG/DL (ref 70–130)
GLUCOSE BLDC GLUCOMTR-MCNC: 170 MG/DL (ref 70–130)
GLUCOSE BLDC GLUCOMTR-MCNC: 173 MG/DL (ref 70–130)
GLUCOSE BLDC GLUCOMTR-MCNC: 174 MG/DL (ref 70–130)
GLUCOSE BLDC GLUCOMTR-MCNC: 179 MG/DL (ref 70–130)
GLUCOSE BLDC GLUCOMTR-MCNC: 180 MG/DL (ref 70–130)
GLUCOSE BLDC GLUCOMTR-MCNC: 180 MG/DL (ref 70–130)
GLUCOSE BLDC GLUCOMTR-MCNC: 181 MG/DL (ref 70–130)
GLUCOSE BLDC GLUCOMTR-MCNC: 183 MG/DL (ref 70–130)
GLUCOSE BLDC GLUCOMTR-MCNC: 183 MG/DL (ref 70–130)
GLUCOSE BLDC GLUCOMTR-MCNC: 219 MG/DL (ref 70–130)
GLUCOSE BLDC GLUCOMTR-MCNC: 229 MG/DL (ref 70–130)
GLUCOSE BLDC GLUCOMTR-MCNC: 276 MG/DL (ref 70–130)
GLUCOSE SERPL-MCNC: 180 MG/DL (ref 65–99)
HCO3 BLDA-SCNC: 23.1 MMOL/L (ref 20–26)
HCO3 BLDA-SCNC: 25.8 MMOL/L (ref 22–26)
HCO3 BLDA-SCNC: 26 MMOL/L (ref 22–26)
HCO3 BLDA-SCNC: 26.6 MMOL/L (ref 22–26)
HCO3 BLDA-SCNC: 27.5 MMOL/L (ref 22–26)
HCO3 BLDA-SCNC: 28.2 MMOL/L (ref 22–26)
HCO3 BLDA-SCNC: 28.3 MMOL/L (ref 22–26)
HCO3 BLDA-SCNC: 28.4 MMOL/L (ref 22–26)
HCO3 BLDA-SCNC: 28.4 MMOL/L (ref 22–26)
HCO3 BLDA-SCNC: 29.2 MMOL/L (ref 22–26)
HCT VFR BLD AUTO: 31.3 % (ref 37.5–51)
HCT VFR BLD CALC: 34.1 % (ref 38–51)
HCT VFR BLDA CALC: 29 % (ref 38–51)
HCT VFR BLDA CALC: 30 % (ref 38–51)
HCT VFR BLDA CALC: 37 % (ref 38–51)
HCT VFR BLDA CALC: 38 % (ref 38–51)
HGB BLD-MCNC: 10.2 G/DL (ref 13–17.7)
HGB BLDA-MCNC: 10.2 G/DL (ref 12–17)
HGB BLDA-MCNC: 11.1 G/DL (ref 13.5–17.5)
HGB BLDA-MCNC: 12.6 G/DL (ref 12–17)
HGB BLDA-MCNC: 12.9 G/DL (ref 12–17)
HGB BLDA-MCNC: 9.9 G/DL (ref 12–17)
IMM GRANULOCYTES # BLD AUTO: 0.05 10*3/MM3 (ref 0–0.05)
IMM GRANULOCYTES NFR BLD AUTO: 0.8 % (ref 0–0.5)
INHALED O2 CONCENTRATION: 35 %
INR PPP: 1.29 (ref 0.89–1.12)
IPAP: 0
LYMPHOCYTES # BLD AUTO: 0.77 10*3/MM3 (ref 0.7–3.1)
LYMPHOCYTES NFR BLD AUTO: 11.9 % (ref 19.6–45.3)
MAGNESIUM SERPL-MCNC: 1.9 MG/DL (ref 1.6–2.4)
MAGNESIUM SERPL-MCNC: 2.4 MG/DL (ref 1.6–2.4)
MCH RBC QN AUTO: 30.1 PG (ref 26.6–33)
MCHC RBC AUTO-ENTMCNC: 32.6 G/DL (ref 31.5–35.7)
MCV RBC AUTO: 92.3 FL (ref 79–97)
METHGB BLD QL: 0.6 % (ref 0–1.5)
MODALITY: ABNORMAL
MONOCYTES # BLD AUTO: 0.48 10*3/MM3 (ref 0.1–0.9)
MONOCYTES NFR BLD AUTO: 7.4 % (ref 5–12)
NEUTROPHILS NFR BLD AUTO: 5.1 10*3/MM3 (ref 1.7–7)
NEUTROPHILS NFR BLD AUTO: 79.1 % (ref 42.7–76)
NRBC BLD AUTO-RTO: 0 /100 WBC (ref 0–0.2)
OXYHGB MFR BLDV: 91.7 % (ref 94–99)
PAW @ PEAK INSP FLOW SETTING VENT: 0 CMH2O
PCO2 BLDA: 38.9 MM HG (ref 35–45)
PCO2 BLDA: 41.7 MM HG (ref 35–45)
PCO2 BLDA: 41.8 MM HG (ref 35–45)
PCO2 BLDA: 42.7 MM HG (ref 35–45)
PCO2 BLDA: 43.8 MM HG (ref 35–45)
PCO2 BLDA: 44.1 MM HG (ref 35–45)
PCO2 BLDA: 45.3 MM HG (ref 35–45)
PCO2 BLDA: 46.1 MM HG (ref 35–45)
PCO2 BLDA: 46.9 MM HG (ref 35–45)
PCO2 BLDA: 49.3 MM HG (ref 35–45)
PCO2 TEMP ADJ BLD: 43.8 MM HG (ref 35–48)
PEEP RESPIRATORY: 5 CM[H2O]
PH BLDA: 7.33 PH UNITS (ref 7.35–7.45)
PH BLDA: 7.38 PH UNITS (ref 7.35–7.6)
PH BLDA: 7.38 PH UNITS (ref 7.35–7.6)
PH BLDA: 7.39 PH UNITS (ref 7.35–7.6)
PH BLDA: 7.4 PH UNITS (ref 7.35–7.6)
PH BLDA: 7.4 PH UNITS (ref 7.35–7.6)
PH BLDA: 7.41 PH UNITS (ref 7.35–7.6)
PH BLDA: 7.43 PH UNITS (ref 7.35–7.6)
PH, TEMP CORRECTED: 7.33 PH UNITS
PLATELET # BLD AUTO: 113 10*3/MM3 (ref 140–450)
PMV BLD AUTO: 10.4 FL (ref 6–12)
PO2 BLDA: 220 MMHG (ref 80–105)
PO2 BLDA: 341 MMHG (ref 80–105)
PO2 BLDA: 376 MMHG (ref 80–105)
PO2 BLDA: 382 MMHG (ref 80–105)
PO2 BLDA: 40 MMHG (ref 80–105)
PO2 BLDA: 403 MMHG (ref 80–105)
PO2 BLDA: 424 MMHG (ref 80–105)
PO2 BLDA: 431 MMHG (ref 80–105)
PO2 BLDA: 70.5 MM HG (ref 83–108)
PO2 BLDA: 76 MMHG (ref 80–105)
PO2 TEMP ADJ BLD: 70.5 MM HG (ref 83–108)
POTASSIUM BLDA-SCNC: 3.1 MMOL/L (ref 3.5–4.9)
POTASSIUM BLDA-SCNC: 3.2 MMOL/L (ref 3.5–4.9)
POTASSIUM BLDA-SCNC: 3.4 MMOL/L (ref 3.5–4.9)
POTASSIUM BLDA-SCNC: 3.6 MMOL/L (ref 3.5–4.9)
POTASSIUM BLDA-SCNC: 3.6 MMOL/L (ref 3.5–4.9)
POTASSIUM BLDA-SCNC: 3.8 MMOL/L (ref 3.5–4.9)
POTASSIUM BLDA-SCNC: 3.9 MMOL/L (ref 3.5–4.9)
POTASSIUM BLDA-SCNC: 3.9 MMOL/L (ref 3.5–4.9)
POTASSIUM BLDA-SCNC: 4.1 MMOL/L (ref 3.5–4.9)
POTASSIUM SERPL-SCNC: 4.7 MMOL/L (ref 3.5–5.2)
PROT SERPL-MCNC: 5.7 G/DL (ref 6–8.5)
PROTHROMBIN TIME: 16.2 SECONDS (ref 12.2–14.5)
PSV: 10 CMH2O
QT INTERVAL: 408 MS
QT INTERVAL: 500 MS
QTC INTERVAL: 452 MS
QTC INTERVAL: 511 MS
RBC # BLD AUTO: 3.39 10*6/MM3 (ref 4.14–5.8)
SAO2 % BLDA: 100 % (ref 95–98)
SAO2 % BLDA: 76 % (ref 95–98)
SAO2 % BLDA: 95 % (ref 95–98)
SODIUM BLD-SCNC: 138 MMOL/L (ref 138–146)
SODIUM BLD-SCNC: 139 MMOL/L (ref 138–146)
SODIUM BLD-SCNC: 140 MMOL/L (ref 138–146)
SODIUM BLD-SCNC: 140 MMOL/L (ref 138–146)
SODIUM BLD-SCNC: 141 MMOL/L (ref 138–146)
SODIUM BLD-SCNC: 142 MMOL/L (ref 138–146)
SODIUM SERPL-SCNC: 140 MMOL/L (ref 136–145)
TOTAL RATE: 21 BREATHS/MINUTE
UNIT  ABO: NORMAL
UNIT  ABO: NORMAL
UNIT  RH: NORMAL
UNIT  RH: NORMAL
VENTILATOR MODE: ABNORMAL
WBC NRBC COR # BLD AUTO: 6.45 10*3/MM3 (ref 3.4–10.8)

## 2024-10-29 PROCEDURE — 94799 UNLISTED PULMONARY SVC/PX: CPT

## 2024-10-29 PROCEDURE — 85025 COMPLETE CBC W/AUTO DIFF WBC: CPT | Performed by: PHYSICIAN ASSISTANT

## 2024-10-29 PROCEDURE — 85610 PROTHROMBIN TIME: CPT | Performed by: PHYSICIAN ASSISTANT

## 2024-10-29 PROCEDURE — 25010000002 MAGNESIUM SULFATE 4 GM/100ML SOLUTION: Performed by: THORACIC SURGERY (CARDIOTHORACIC VASCULAR SURGERY)

## 2024-10-29 PROCEDURE — 82805 BLOOD GASES W/O2 SATURATION: CPT

## 2024-10-29 PROCEDURE — 83735 ASSAY OF MAGNESIUM: CPT | Performed by: THORACIC SURGERY (CARDIOTHORACIC VASCULAR SURGERY)

## 2024-10-29 PROCEDURE — 82375 ASSAY CARBOXYHB QUANT: CPT

## 2024-10-29 PROCEDURE — 94003 VENT MGMT INPAT SUBQ DAY: CPT

## 2024-10-29 PROCEDURE — 93005 ELECTROCARDIOGRAM TRACING: CPT | Performed by: NURSE PRACTITIONER

## 2024-10-29 PROCEDURE — 97116 GAIT TRAINING THERAPY: CPT

## 2024-10-29 PROCEDURE — 99291 CRITICAL CARE FIRST HOUR: CPT | Performed by: INTERNAL MEDICINE

## 2024-10-29 PROCEDURE — 99232 SBSQ HOSP IP/OBS MODERATE 35: CPT | Performed by: NURSE PRACTITIONER

## 2024-10-29 PROCEDURE — 82948 REAGENT STRIP/BLOOD GLUCOSE: CPT

## 2024-10-29 PROCEDURE — 25010000002 CEFAZOLIN PER 500 MG: Performed by: PHYSICIAN ASSISTANT

## 2024-10-29 PROCEDURE — 71045 X-RAY EXAM CHEST 1 VIEW: CPT

## 2024-10-29 PROCEDURE — 25010000002 MORPHINE PER 10 MG: Performed by: THORACIC SURGERY (CARDIOTHORACIC VASCULAR SURGERY)

## 2024-10-29 PROCEDURE — 25010000002 HYDROMORPHONE PER 4 MG: Performed by: NURSE PRACTITIONER

## 2024-10-29 PROCEDURE — 25010000002 HYDROMORPHONE PER 4 MG: Performed by: INTERNAL MEDICINE

## 2024-10-29 PROCEDURE — 63710000001 INSULIN GLARGINE PER 5 UNITS: Performed by: INTERNAL MEDICINE

## 2024-10-29 PROCEDURE — 99024 POSTOP FOLLOW-UP VISIT: CPT

## 2024-10-29 PROCEDURE — 97162 PT EVAL MOD COMPLEX 30 MIN: CPT

## 2024-10-29 PROCEDURE — 83735 ASSAY OF MAGNESIUM: CPT | Performed by: PHYSICIAN ASSISTANT

## 2024-10-29 PROCEDURE — 63710000001 INSULIN LISPRO (HUMAN) PER 5 UNITS: Performed by: INTERNAL MEDICINE

## 2024-10-29 PROCEDURE — 83050 HGB METHEMOGLOBIN QUAN: CPT

## 2024-10-29 PROCEDURE — 80053 COMPREHEN METABOLIC PANEL: CPT | Performed by: PHYSICIAN ASSISTANT

## 2024-10-29 RX ORDER — DULOXETIN HYDROCHLORIDE 60 MG/1
60 CAPSULE, DELAYED RELEASE ORAL DAILY
Status: DISCONTINUED | OUTPATIENT
Start: 2024-10-29 | End: 2024-11-06 | Stop reason: HOSPADM

## 2024-10-29 RX ORDER — BUPROPION HYDROCHLORIDE 150 MG/1
150 TABLET ORAL DAILY
Status: DISCONTINUED | OUTPATIENT
Start: 2024-10-29 | End: 2024-11-06 | Stop reason: HOSPADM

## 2024-10-29 RX ORDER — MAGNESIUM SULFATE HEPTAHYDRATE 40 MG/ML
4 INJECTION, SOLUTION INTRAVENOUS ONCE
Status: COMPLETED | OUTPATIENT
Start: 2024-10-29 | End: 2024-10-29

## 2024-10-29 RX ORDER — INSULIN LISPRO 100 [IU]/ML
3-14 INJECTION, SOLUTION INTRAVENOUS; SUBCUTANEOUS
Status: DISCONTINUED | OUTPATIENT
Start: 2024-10-29 | End: 2024-11-03

## 2024-10-29 RX ORDER — DEXTROSE MONOHYDRATE 25 G/50ML
25 INJECTION, SOLUTION INTRAVENOUS
Status: DISCONTINUED | OUTPATIENT
Start: 2024-10-29 | End: 2024-11-06 | Stop reason: HOSPADM

## 2024-10-29 RX ORDER — IBUPROFEN 600 MG/1
1 TABLET ORAL
Status: DISCONTINUED | OUTPATIENT
Start: 2024-10-29 | End: 2024-11-06 | Stop reason: HOSPADM

## 2024-10-29 RX ORDER — INSULIN LISPRO 100 [IU]/ML
2-7 INJECTION, SOLUTION INTRAVENOUS; SUBCUTANEOUS
Status: DISCONTINUED | OUTPATIENT
Start: 2024-10-29 | End: 2024-10-29

## 2024-10-29 RX ORDER — NICOTINE POLACRILEX 4 MG
15 LOZENGE BUCCAL
Status: DISCONTINUED | OUTPATIENT
Start: 2024-10-29 | End: 2024-11-06 | Stop reason: HOSPADM

## 2024-10-29 RX ORDER — TAMSULOSIN HYDROCHLORIDE 0.4 MG/1
0.4 CAPSULE ORAL DAILY
Status: DISCONTINUED | OUTPATIENT
Start: 2024-10-29 | End: 2024-11-06 | Stop reason: HOSPADM

## 2024-10-29 RX ORDER — DEXTROSE MONOHYDRATE 25 G/50ML
25 INJECTION, SOLUTION INTRAVENOUS
Status: DISCONTINUED | OUTPATIENT
Start: 2024-10-29 | End: 2024-10-29

## 2024-10-29 RX ORDER — LEVOTHYROXINE SODIUM 50 UG/1
50 TABLET ORAL
Status: DISCONTINUED | OUTPATIENT
Start: 2024-10-29 | End: 2024-11-06 | Stop reason: HOSPADM

## 2024-10-29 RX ORDER — IBUPROFEN 600 MG/1
1 TABLET ORAL
Status: DISCONTINUED | OUTPATIENT
Start: 2024-10-29 | End: 2024-10-29

## 2024-10-29 RX ORDER — HYDROMORPHONE HYDROCHLORIDE 1 MG/ML
0.5 INJECTION, SOLUTION INTRAMUSCULAR; INTRAVENOUS; SUBCUTANEOUS ONCE
Status: COMPLETED | OUTPATIENT
Start: 2024-10-29 | End: 2024-10-29

## 2024-10-29 RX ORDER — NICOTINE POLACRILEX 4 MG
15 LOZENGE BUCCAL
Status: DISCONTINUED | OUTPATIENT
Start: 2024-10-29 | End: 2024-10-29

## 2024-10-29 RX ADMIN — ASPIRIN 325 MG: 325 TABLET, COATED ORAL at 08:03

## 2024-10-29 RX ADMIN — INSULIN LISPRO 3 UNITS: 100 INJECTION, SOLUTION INTRAVENOUS; SUBCUTANEOUS at 20:33

## 2024-10-29 RX ADMIN — MORPHINE SULFATE 2 MG: 2 INJECTION, SOLUTION INTRAMUSCULAR; INTRAVENOUS at 17:37

## 2024-10-29 RX ADMIN — MORPHINE SULFATE 2 MG: 2 INJECTION, SOLUTION INTRAMUSCULAR; INTRAVENOUS at 21:15

## 2024-10-29 RX ADMIN — HYDROCODONE BITARTRATE AND ACETAMINOPHEN 1 TABLET: 7.5; 325 TABLET ORAL at 20:19

## 2024-10-29 RX ADMIN — CHLORHEXIDINE GLUCONATE ORAL RINSE 15 ML: 1.2 SOLUTION DENTAL at 20:14

## 2024-10-29 RX ADMIN — SODIUM CHLORIDE 2000 MG: 900 INJECTION INTRAVENOUS at 04:10

## 2024-10-29 RX ADMIN — SODIUM CHLORIDE 2000 MG: 900 INJECTION INTRAVENOUS at 20:14

## 2024-10-29 RX ADMIN — HYDROMORPHONE HYDROCHLORIDE 0.5 MG: 1 INJECTION, SOLUTION INTRAMUSCULAR; INTRAVENOUS; SUBCUTANEOUS at 15:09

## 2024-10-29 RX ADMIN — HYDROMORPHONE HYDROCHLORIDE 0.5 MG: 1 INJECTION, SOLUTION INTRAMUSCULAR; INTRAVENOUS; SUBCUTANEOUS at 22:27

## 2024-10-29 RX ADMIN — DULOXETINE HYDROCHLORIDE 60 MG: 60 CAPSULE, DELAYED RELEASE ORAL at 10:19

## 2024-10-29 RX ADMIN — CHLORHEXIDINE GLUCONATE ORAL RINSE 15 ML: 1.2 SOLUTION DENTAL at 08:03

## 2024-10-29 RX ADMIN — MUPIROCIN 1 APPLICATION: 20 OINTMENT TOPICAL at 20:14

## 2024-10-29 RX ADMIN — TAMSULOSIN HYDROCHLORIDE 0.4 MG: 0.4 CAPSULE ORAL at 10:19

## 2024-10-29 RX ADMIN — INSULIN GLARGINE 20 UNITS: 100 INJECTION, SOLUTION SUBCUTANEOUS at 10:19

## 2024-10-29 RX ADMIN — OXYCODONE HYDROCHLORIDE 10 MG: 10 TABLET ORAL at 17:37

## 2024-10-29 RX ADMIN — LEVOTHYROXINE SODIUM 50 MCG: 0.05 TABLET ORAL at 10:18

## 2024-10-29 RX ADMIN — MORPHINE SULFATE 2 MG: 2 INJECTION, SOLUTION INTRAMUSCULAR; INTRAVENOUS at 05:08

## 2024-10-29 RX ADMIN — Medication 12.5 MG: at 08:03

## 2024-10-29 RX ADMIN — SENNOSIDES AND DOCUSATE SODIUM 2 TABLET: 50; 8.6 TABLET ORAL at 08:03

## 2024-10-29 RX ADMIN — INSULIN LISPRO 8 UNITS: 100 INJECTION, SOLUTION INTRAVENOUS; SUBCUTANEOUS at 12:22

## 2024-10-29 RX ADMIN — SENNOSIDES AND DOCUSATE SODIUM 2 TABLET: 50; 8.6 TABLET ORAL at 20:14

## 2024-10-29 RX ADMIN — MORPHINE SULFATE 2 MG: 2 INJECTION, SOLUTION INTRAMUSCULAR; INTRAVENOUS at 13:12

## 2024-10-29 RX ADMIN — ATORVASTATIN CALCIUM 40 MG: 40 TABLET, FILM COATED ORAL at 20:14

## 2024-10-29 RX ADMIN — MORPHINE SULFATE 2 MG: 2 INJECTION, SOLUTION INTRAMUSCULAR; INTRAVENOUS at 02:13

## 2024-10-29 RX ADMIN — MAGNESIUM SULFATE HEPTAHYDRATE 4 G: 40 INJECTION, SOLUTION INTRAVENOUS at 05:59

## 2024-10-29 RX ADMIN — SODIUM CHLORIDE 2000 MG: 900 INJECTION INTRAVENOUS at 12:22

## 2024-10-29 RX ADMIN — MUPIROCIN 1 APPLICATION: 20 OINTMENT TOPICAL at 08:03

## 2024-10-29 RX ADMIN — ACETAMINOPHEN 650 MG: 325 TABLET, FILM COATED ORAL at 22:27

## 2024-10-29 RX ADMIN — BUPROPION HYDROCHLORIDE 150 MG: 150 TABLET, EXTENDED RELEASE ORAL at 10:19

## 2024-10-29 RX ADMIN — HYDROCODONE BITARTRATE AND ACETAMINOPHEN 1 TABLET: 7.5; 325 TABLET ORAL at 13:18

## 2024-10-29 RX ADMIN — HYDROCODONE BITARTRATE AND ACETAMINOPHEN 1 TABLET: 7.5; 325 TABLET ORAL at 08:03

## 2024-10-29 RX ADMIN — GABAPENTIN 100 MG: 100 CAPSULE ORAL at 06:39

## 2024-10-29 RX ADMIN — OXYCODONE HYDROCHLORIDE 10 MG: 10 TABLET ORAL at 10:19

## 2024-10-29 RX ADMIN — Medication 12.5 MG: at 20:14

## 2024-10-29 RX ADMIN — ACETAMINOPHEN 650 MG: 325 TABLET, FILM COATED ORAL at 15:09

## 2024-10-29 RX ADMIN — GABAPENTIN 100 MG: 100 CAPSULE ORAL at 15:09

## 2024-10-29 RX ADMIN — MORPHINE SULFATE 2 MG: 2 INJECTION, SOLUTION INTRAMUSCULAR; INTRAVENOUS at 08:03

## 2024-10-29 RX ADMIN — INSULIN LISPRO 3 UNITS: 100 INJECTION, SOLUTION INTRAVENOUS; SUBCUTANEOUS at 17:28

## 2024-10-29 RX ADMIN — GABAPENTIN 100 MG: 100 CAPSULE ORAL at 22:27

## 2024-10-29 RX ADMIN — OXYCODONE HYDROCHLORIDE 10 MG: 10 TABLET ORAL at 04:58

## 2024-10-29 NOTE — PROGRESS NOTES
Order received for Phase II Cardiac Rehab. Patient falling asleep while staff was discussing benefits of Phase II Cardiac Rehab. Staff will follow up with patient at a later time.

## 2024-10-29 NOTE — PLAN OF CARE
Neuro  AO X4   Drowsy   Equal /movements  Precedex gtt titrated to reduce agitation for extubation, d/c'd after exubation  Respiratory  Extubated at 0248 after multiple attempts due to high RR and agitation   Clear/Dim LS  3L NC  Cardiac  Normal Sinus rhythm with infrequent PVC's   Levo titrated off at 0447  BP's remained WDL after levo off  V-wires maintained  GI/  Hypoactive BS  Adequate UOP  NG tube out after dysphagia pass and tolerating PO meds and water  Diet advanced to clears  Pain  Multiple doses of morphine for chest and back pain  One dose of mena for pain this am   Other  MT sites redressed   PT remains on insulin gtt

## 2024-10-29 NOTE — PROGRESS NOTES
"Enter Query Response Below   By virtue of being on mechanical ventilation would qualify the patient as acute respiratory failure, even  though induced by anesthesia             If applicable, please update the problem list.   Patient: Joel Galo        : 1965  Account: [id number]           Admit Date:         How to Respond to this query:       a. Click New Note     b. Answer query within the yellow box.                c. Update the Problem List, if applicable.      If you have any questions about this query contact me at: [email or web address]     :     Patient with a RA O2 sat of 93% and a RR of 18 on admission. Electively intubated for CABG surgery on 10/28. Extubated in the early am of POD 1 to 4LNC.  Pre-extubation ABG showed a PH of 7.33, PCO2 43, PO2 70, HCO3 23, Sat 91% on 35% FIO2. He has been maintained on 3-4LNC since extubation with oxygen sats ranging from 90-94% and RRs of 18-21. There has been no accessory muscle use, labored breathing, tachypnea or acute distress noted. \"Acute hypoxic respiratory failure\" is documented in the Intensivist consult note on 10/28.    After study, was acute hypoxic respiratory failure clinically supported during this admission?    Acute hypoxic respiratory failure was supported with additional clinical indicators:____________  Acute hypoxic respiratory failure was not supported  Other- specify_____________  Unable to determine    By submitting this query, we are merely seeking further clarification of documentation to accurately reflect all conditions that you are monitoring, evaluating, treating or that extend the hospitalization or utilize additional resources of care. Please utilize your independent clinical judgment when addressing the question(s) above.     This query and your response, once completed, will be entered into the legal medical record.    Sincerely,  Monica Fiore RN  Clinical Documentation Integrity Program     "

## 2024-10-29 NOTE — PLAN OF CARE
Goal Outcome Evaluation:  Plan of Care Reviewed With: patient           Outcome Evaluation: PT evaluation completed. Pt presenting below baseline levels of mobility with deficits in strength, balance and coordination, and activity tolerance warranting IP PT services to address deficits and facilitate return to baseline. Recommend IRF following d/c for best functional outcomes.    Anticipated Discharge Disposition (PT): inpatient rehabilitation facility

## 2024-10-29 NOTE — CASE MANAGEMENT/SOCIAL WORK
Discharge Planning Assessment  [facility]     Patient Name: Joel Galo  MRN: [id number]  Today's Date: 10/29/2024    Admit Date: 10/28/2024    Plan: IDP   Discharge Needs Assessment       Row Name 10/29/24 1401       Living Environment    People in Home spouse    Name(s) of People in Home Madisyn    Current Living Arrangements other (see comments)  Mobile    Potentially Unsafe Housing Conditions none    In the past 12 months has the electric, gas, oil, or water company threatened to shut off services in your home? No    Primary Care Provided by self    Provides Primary Care For no one    Family Caregiver if Needed spouse    Family Caregiver Names Madisyn    Quality of Family Relationships unable to assess    Able to Return to Prior Arrangements yes       Resource/Environmental Concerns    Resource/Environmental Concerns none    Transportation Concerns none       Transportation Needs    In the past 12 months, has lack of transportation kept you from medical appointments or from getting medications? no    In the past 12 months, has lack of transportation kept you from meetings, work, or from getting things needed for daily living? No       Food Insecurity    Within the past 12 months, you worried that your food would run out before you got the money to buy more. Never true    Within the past 12 months, the food you bought just didn't last and you didn't have money to get more. Never true       Transition Planning    Patient/Family Anticipates Transition to inpatient rehabilitation facility    Patient/Family Anticipated Services at Transition none    Transportation Anticipated family or friend will provide       Discharge Needs Assessment    Readmission Within the Last 30 Days no previous admission in last 30 days    Equipment Currently Used at Home cane, straight    Concerns to be Addressed no discharge needs identified;denies needs/concerns at this time    Do you want help finding or keeping work or a job? I do not  need or want help    Do you want help with school or training? For example, starting or completing job training or getting a high school diploma, GED or equivalent No    Anticipated Changes Related to Illness none    Equipment Needed After Discharge none                   Discharge Plan       Row Name 10/29/24 1404       Plan    Plan IDP    Patient/Family in Agreement with Plan yes    Plan Comments Spoke with patient at bedside for IDP. Lives in [place] with spouse. IADL. Cane. States uses no HH/OPPT. PCP Edmundo Boston. [place] Medicare Adv. Pt drowsy r/t recent medication for pain. Therapy currently recommeds SNF. Pt choice list left at bedside, will attempt to discuss wqith pt again tomorrow.    Final Discharge Disposition Code 01 - home or self-care                  Continued Care and Services - Admitted Since 10/28/2024    No active coordination exists for this encounter.          Demographic Summary       Row Name 10/29/24 1402       General Information    Admission Type inpatient    Arrived From home    Referral Source admission list    Reason for Consult discharge planning    Preferred Language English                   Functional Status       Row Name 10/29/24 1406       Functional Status    Usual Activity Tolerance good    Current Activity Tolerance good       Physical Activity    On average, how many days per week do you engage in moderate to strenuous exercise (like a brisk walk)? 0 days    On average, how many minutes do you engage in exercise at this level? 0 min    Number of minutes of exercise per week 0       Functional Status, IADL    Medications independent    Meal Preparation independent    Housekeeping independent    Laundry independent    Shopping independent    If for any reason you need help with day-to-day activities such as bathing, preparing meals, shopping, managing finances, etc., do you get the help you need? I get all the help I need                   Psychosocial    No documentation.                   Abuse/Neglect    No documentation.                  Legal    No documentation.                  Substance Abuse    No documentation.                  Patient Forms    No documentation.                     Aneta Grijalva RN

## 2024-10-29 NOTE — PLAN OF CARE
Goal Outcome Evaluation:  Plan of Care Reviewed With: spouse           Outcome Evaluation: Pt intact.  Pt ambulated in hallway. UOP-850ml.  CT drainage #1/2-260ml #3-90ml.  Pain meds given for discomfort.  will continue to monitor.  at 10/29/2024 1376

## 2024-10-29 NOTE — PROGRESS NOTES
Pulmonary / Critical Care Progress Note      Patient Name: Joel Galo  : 1965  MRN: [id number]  Attending:  Alex Max MD   Date of admission: 10/28/2024    Joel Galo is a 59 y.o. male with medical history significant for paroxysmal A-fib, diabetes mellitus, poorly-controlled with hemoglobin A1c of 11.20, hypertension who recently presented with intermittent episodes of chest pain with associated shortness of breath.  Patient had a series of tests that culminated in a left heart cath on 24 that showed mid LAD 75% stenosis, distal LAD 95% stenosis, first marginal 90% stenosis, third marginal 99% stenosis, ostial RCA 70% stenosis, mid RCA 70% stenosis, and RV branch 99% stenosis. Left ventriculogram during cardiac cath noted EF 55%.  Today was taken in for four-vessel CABG, maze procedure as well as exclusion of atrial appendage.  Status post surgery patient remains intubated and sedated and brought to the ICU.  The intensivist was asked to evaluate the patient.  Patient seen and examined at bedside.  All the labs and diagnostic imaging studies were extensively reviewed by me and findings as above..  Review of patient's labs showed a urine toxicology screen that is positive for opiates.  Chest x-ray of 1025 showed bilateral opacity which could represent pulmonary edema versus pneumonia.  WBC count is within normal limits.  Patient is requiring minimal dose of Levophed and vasopressin     Subjective   Subjective   Patient seen and examined at bedside.  Overnight no acute event.  He was successfully extubated earlier this morning.  Last blood gas on SIMV shows pH 7.33 pCO2 43 pO2 70 bicarb 23.  This was on 35% FiO2.  Chest x-ray this morning showed likely bibasilar atelectasis.  WBC count is within normal.  Patient complained about significant pain at the operative site.  He is currently on as needed morphine as well as Norco.  Blood sugars running 170s to 180s on the average.  Will start the  patient on Lantus/insulin sliding scale and then discontinue insulin drip.  Patient is off vasopressors and on 3 L of nasal cannula oxygen.         Objective   Objective     Vitals:   Vital signs for last 24 hours:  Temp:  [94.6 °F (34.8 °C)-100.6 °F (38.1 °C)] 100.6 °F (38.1 °C)  Heart Rate:  [57-78] 76  Resp:  [14-21] 18  BP: ()/() 138/86  Arterial Line BP: ()/(28-90) 109/60  FiO2 (%):  [35 %-100 %] 35 %    Intake/Output last 3 shifts:  I/O last 3 completed shifts:  In: 4702.8 [I.V.:2597.8; Blood:225; NG/GT:30; IV Piggyback:1850]  Out: 3155 [Urine:2295; Chest Tube:860]  Intake/Output this shift:  No intake/output data recorded.    Vent settings for last 24 hours:  Mode: standby  FiO2 (%):  [35 %-100 %] 35 %  S RR:  [10-14] 14  S VT:  [550 mL-680 mL] 550 mL  PEEP/CPAP (cm H2O):  [5 cm H20] 5 cm H20  NE SUP:  [10 cm H20] 10 cm H20  MAP (cm H2O):  [7.5-12] 9    Hemodynamic parameters for last 24 hours:  CO:  [3.9 L/min-8.7 L/min] 6.1 L/min    Physical Exam   Vital Signs Reviewed   General:  WDWN, Alert, NAD.    HEENT:  PERRL, EOMI.  OP, nares clear  Chest:  good aeration.  Skin incision looks clean.  Chest tubes in position  CV: RRR, no MGR, pulses 2+, equal.  Abd:  Soft, NT, ND, + BS, no HSM  EXT:  no clubbing, no cyanosis, no edema  Neuro:  A&Ox3, CN grossly intact, no focal deficits.  Skin: No rashes or lesions noted        Result Review    Result Review:  I have personally reviewed the results from the time of this admission to 10/29/2024 08:36 EDT and agree with these findings:  [x]  Laboratory  []  Microbiology  [x]  Radiology  []  EKG/Telemetry   []  Cardiology/Vascular   []  Pathology  []  Old records  []  Other:  Most notable findings include: Reviewed    Assessment & Plan   Assessment / Plan     Active Hospital Problems:  Active Hospital Problems    Diagnosis     **CAD s/p CABG x 4, MAZE, LAAL 10/28/24     Primary hypertension     DM (diabetes mellitus), type 2     Hyperlipidemia LDL goal  <70     Paroxysmal atrial fibrillation          Impression:  Coronary artery disease involving native coronary artery of native heart       CAD (coronary artery disease)     Coronary artery disease status post CABG  Acute hypoxic respiratory failure  Paroxysmal atrial fibrillation status post maze procedure  Diabetes mellitus:,Poorly controlled  Cardiogenic shock: Resolved  Hypertension  Obstructive sleep apnea  Dyslipidemia    Plan:  Plan  Extubated  Titrate FiO2 to a sat goal of 95%  On insulin drip.  Q. hourly Accu-Check  Off vasopressor  Pain control with Demerol 25 mg Q4 as needed and fentanyl 25 mcg IV Q1 hourly as needed  Diet when okay by CT surgery  Out of bed to chair.  PT  Start patient on Lantus 20 units subcu at daily.  Insulin sliding scale.  To discontinue insulin drip  DVT prophylaxis: Will resume chemical prophylaxis when okay by CT surgery         VTE Prophylaxis:  Mechanical VTE prophylaxis orders are present.        CODE STATUS:   Code Status (Patient has no pulse and is not breathing): CPR (Attempt to Resuscitate)  Medical Interventions (Patient has pulse or is breathing): Full Support          The patient is critically ill in the ICU. Multidisciplinary bedside critical care rounds were performed with nursing staff, respiratory therapy, pharmacy, nutritional services, social work. I have personally reviewed the chart, labs and any pertinent imaging available.  I have spent 414 minutes of critical care time, excluding procedures, in the care of this patient.

## 2024-10-29 NOTE — THERAPY EVALUATION
"Patient Name: Joel Galo  : 1965    MRN: [id number]                              Today's Date: 10/29/2024       Admit Date: 10/28/2024    Visit Dx:     ICD-10-CM ICD-9-CM   1. ASCVD (arteriosclerotic cardiovascular disease)  I25.10 429.2     440.9     Patient Active Problem List   Diagnosis    Paroxysmal atrial fibrillation    Obstructive sleep apnea    Hypothyroidism (acquired)    GERD (gastroesophageal reflux disease)    Chronic anticoagulation    DM (diabetes mellitus), type 2    Hyperlipidemia LDL goal <70    Primary hypertension    CAD s/p CABG x 4, MAZE, LAAL 10/28/24     Past Medical History:   Diagnosis Date    A-fib     Anxiety     Arthritis     DDD (degenerative disc disease), lumbar     Deep vein thrombosis     Depression     Diabetes     Disease of thyroid gland     HYPO    Heart attack     \"5-6 years ago\"    Hyperlipidemia     Hypertension     Kidney stone     Neuropathy     Sleep apnea     DOES NOT USE CPAP    UTI (urinary tract infection)      Past Surgical History:   Procedure Laterality Date    ATRIAL APPENDAGE EXCLUSION LEFT WITH TRANSESOPHAGEAL ECHOCARDIOGRAM N/A 10/28/2024    Procedure: ATRIAL APPENDAGE EXCLUSION LEFT WITH TRANSESOPHAGEAL ECHOCARDIOGRAM;  Surgeon: Alex Max MD;  Location: [facility] OR;  Service: Cardiothoracic;  Laterality: N/A;    CARDIAC CATHETERIZATION N/A 2024    Procedure: Left Heart Cath;  Surgeon: Julio Neff MD;  Location:  [facility];  Service: Cardiology;  Laterality: N/A;    CARDIAC ELECTROPHYSIOLOGY PROCEDURE N/A 2023    Procedure: Loop insertion;  Surgeon: Tariq Chávez MD;  Location:  [facility] LOCATION;  Service: Cardiovascular;  Laterality: N/A;    CATARACT EXTRACTION Bilateral     COLONOSCOPY      CORONARY ARTERY BYPASS GRAFT N/A 10/28/2024    Procedure: MEDIAN STERNOTOMY, CORONARY ARTERY BYPASS GRAFTING X4 UTILIZING THE LEFT INTERNAL MAMMARY ARTERY GRAFT, ENDOSCOPIC VEIN HARVEST OF THE GREATER RIGHT " SAPHENOUS VEIN;  Surgeon: Alex Max MD;  Location:  [facility] OR;  Service: Cardiothoracic;  Laterality: N/A;    ENDOSCOPY      MAZE PROCEDURE N/A 10/28/2024    Procedure: MAZE PROCEDURE;  Surgeon: Alex Max MD;  Location:  [facility] OR;  Service: Cardiothoracic;  Laterality: N/A;    TEETH EXTRACTION        General Information       Row Name 10/29/24 1014          Physical Therapy Time and Intention    Document Type evaluation  -ND     Mode of Treatment physical therapy  -ND       Row Name 10/29/24 1014          General Information    Patient Profile Reviewed yes  -ND     Prior Level of Function gait;transfer;bed mobility;all household mobility  Pt questionable historian secondary to drowsiness. Reports SPC use as needed at baseline.  -ND     Existing Precautions/Restrictions fall;cardiac;sternal  -ND     Barriers to Rehab medically complex;cognitive status  -ND       Row Name 10/29/24 1014          Living Environment    People in Home spouse  -ND       Row Name 10/29/24 1014          Home Main Entrance    Number of Stairs, Main Entrance other (see comments)  Ramp  -ND       Row Name 10/29/24 1014          Stairs Within Home, Primary    Stairs, Within Home, Primary Pt endorses single level home after reporting having to go up/down stairs. Will clarify at later date.  -ND     Number of Stairs, Within Home, Primary none  -ND       Row Name 10/29/24 1014          Cognition    Orientation Status (Cognition) oriented to;person;place;time;verbal cues/prompts needed for orientation  -ND       Row Name 10/29/24 1014          Safety Issues/Impairments Affecting Functional Mobility    Safety Issues Affecting Function (Mobility) awareness of need for assistance;insight into deficits/self-awareness;judgment;safety precaution awareness;safety precautions follow-through/compliance;sequencing abilities  -ND     Impairments Affecting Function (Mobility) balance;coordination;endurance/activity tolerance;pain;postural/trunk  control;strength;shortness of breath  -ND               User Key  (r) = Recorded By, (t) = Taken By, (c) = Cosigned By      Initials Name Provider Type    Cristina Cheema PT Physical Therapist                   Mobility       Row Name 10/29/24 1016          Bed Mobility    Comment, (Bed Mobility) Pt found and left sitting UIC.  -ND       Row Name 10/29/24 1016          Sit-Stand Transfer    Sit-Stand [place] (Transfers) moderate assist (50% patient effort);2 person assist;verbal cues;nonverbal cues (demo/gesture)  -ND     Assistive Device (Sit-Stand Transfers) other (see comments)  -ND     Comment, (Sit-Stand Transfer) x1 from chair, cues for hand placement to maintain sternal precautions.  -ND       Row Name 10/29/24 1016          Gait/Stairs (Locomotion)    [place] Level (Gait) moderate assist (50% patient effort);2 person assist;verbal cues;nonverbal cues (demo/gesture);1 person to manage equipment  -ND     Assistive Device (Gait) other (see comments)  BUE support on tripod monitor.  -ND     Patient was able to Ambulate yes  -ND     Distance in Feet (Gait) 40  -ND     Deviations/Abnormal Patterns (Gait) right sided deviations;base of support, wide;lesley decreased;gait speed decreased;stride length decreased  -ND     Bilateral Gait Deviations forward flexed posture;heel strike decreased  -ND     Right Sided Gait Deviations leans right  -ND     Comment, (Gait/Stairs) Pt ambulates 40' with slow lesley and forward flexed posture with progressively increasing R sided lean. Pt noted to have R sided lean in sitting but also presents with R sided lean that increases with fatigue while ambulating. Cues provided for sequencing of task and upright posture with close chair follow at all times. Pt rolled back to room in chair.  -ND               User Key  (r) = Recorded By, (t) = Taken By, (c) = Cosigned By      Initials Name Provider Type    Cristina Cheema PT Physical Therapist                    Obj/Interventions       Row Name 10/29/24 1019          Range of Motion Comprehensive    General Range of Motion bilateral lower extremity ROM WFL  -[facility] Name 10/29/24 1019          Strength Comprehensive (MMT)    General Manual Muscle Testing (MMT) Assessment lower extremity strength deficits identified  -ND     Comment, General Manual Muscle Testing (MMT) Assessment Bilateral knee extension MMT 4-/5.  -ND       Row Name 10/29/24 1019          Balance    Balance Assessment sitting static balance;sitting dynamic balance;standing static balance;standing dynamic balance  -ND     Static Sitting Balance contact guard  -ND     Dynamic Sitting Balance minimal assist  -ND     Position, Sitting Balance supported;sitting in chair  -ND     Static Standing Balance moderate assist;2-person assist;verbal cues;non-verbal cues (demo/gesture)  -ND     Dynamic Standing Balance moderate assist;2-person assist;verbal cues;non-verbal cues (demo/gesture)  -ND     Position/Device Used, Standing Balance supported  -ND     Balance Interventions sitting;standing;sit to stand;supported;static;dynamic  -ND     Comment, Balance R sided lean in sitting, standing, and while ambulating.  -ND       Row Name 10/29/24 1019          Sensory Assessment (Somatosensory)    Sensory Assessment (Somatosensory) LE sensation intact  -ND               User Key  (r) = Recorded By, (t) = Taken By, (c) = Cosigned By      Initials Name Provider Type    ND Cristina Varela, PT Physical Therapist                   Goals/Plan       [facility] Name 10/29/24 1023          Bed Mobility Goal 1 (PT)    Activity/Assistive Device (Bed Mobility Goal 1, PT) sit to supine/supine to sit  -ND     [place] Level/Cues Needed (Bed Mobility Goal 1, PT) minimum assist (75% or more patient effort)  -ND     Time Frame (Bed Mobility Goal 1, PT) short term goal (STG);3 days  -ND       Row Name 10/29/24 1023          Transfer Goal 1 (PT)    Activity/Assistive Device (Transfer Goal  1, PT) sit-to-stand/stand-to-sit;bed-to-chair/chair-to-bed  -ND     [place] Level/Cues Needed (Transfer Goal 1, PT) contact guard required  -ND     Time Frame (Transfer Goal 1, PT) long term goal (LTG);1 week  -ND       Row Name 10/29/24 1023          Gait Training Goal 1 (PT)    Activity/Assistive Device (Gait Training Goal 1, PT) gait (walking locomotion);increase endurance/gait distance;decrease fall risk  -ND     [place] Level (Gait Training Goal 1, PT) contact guard required  -ND     Distance (Gait Training Goal 1, PT) 150  -ND     Time Frame (Gait Training Goal 1, PT) long term goal (LTG);1 week  -ND       Row Name 10/29/24 1023          Therapy Assessment/Plan (PT)    Planned Therapy Interventions (PT) balance training;bed mobility training;gait training;home exercise program;patient/family education;transfer training;postural re-education;stair training;ROM (range of motion);strengthening  -ND               User Key  (r) = Recorded By, (t) = Taken By, (c) = Cosigned By      Initials Name Provider Type    ND Cristina Varela, PT Physical Therapist                   Clinical Impression       Row Name 10/29/24 1020          Pain    Pain Location chest  -ND     Pain Side/Orientation other (see comments)  Incisional  -ND     Additional Documentation Pain Scale: FACES Pre/Post-Treatment (Group)  -ND       Row Name 10/29/24 1020          Pain Scale: FACES Pre/Post-Treatment    Pain: FACES Scale, Pretreatment 4-->hurts little more  -ND     Posttreatment Pain Rating 6-->hurts even more  -ND       Row Name 10/29/24 1020          Plan of Care Review    Plan of Care Reviewed With patient  -ND     Outcome Evaluation PT evaluation completed. Pt presenting below baseline levels of mobility with deficits in strength, balance and coordination, and activity tolerance warranting IP PT services to address deficits and facilitate return to baseline. Recommend IRF following d/c for best functional outcomes.  -ND        Row Name 10/29/24 1020          Therapy Assessment/Plan (PT)    Patient/Family Therapy Goals Statement (PT) Return to baseline.  -ND     Rehab Potential (PT) fair  -ND     Criteria for Skilled Interventions Met (PT) yes;meets criteria;skilled treatment is necessary  -ND     Therapy Frequency (PT) daily  -ND     Predicted Duration of Therapy Intervention (PT) 1 week.  -ND       Row Name 10/29/24 1020          Vital Signs    Pre Systolic BP Rehab 152  -ND     Pre Treatment Diastolic BP 93  -ND     Post Systolic BP Rehab 140  -ND     Post Treatment Diastolic BP 80  -ND     Pretreatment Heart Rate (beats/min) 74  -ND     Posttreatment Heart Rate (beats/min) 76  -ND     Pre SpO2 (%) 94  -ND     O2 Delivery Pre Treatment nasal cannula  -ND     O2 Delivery Intra Treatment nasal cannula  -ND     Post SpO2 (%) 90  -ND     O2 Delivery Post Treatment nasal cannula  -ND     Pre Patient Position Sitting  -ND     Intra Patient Position Standing  -ND     Post Patient Position Sitting  -ND       Row Name 10/29/24 1020          Positioning and Restraints    Pre-Treatment Position sitting in chair/recliner  -ND     Post Treatment Position chair  -ND     In Chair notified nsg;reclined;legs elevated;call light within reach;encouraged to call for assist;exit alarm on;RUE elevated;LUE elevated;with nsg  -ND               User Key  (r) = Recorded By, (t) = Taken By, (c) = Cosigned By      Initials Name Provider Type    Cristina Cheema, PT Physical Therapist                   Outcome Measures       Row Name 10/29/24 1024          How much help from another person do you currently need...    Turning from your back to your side while in flat bed without using bedrails? 2  -ND     Moving from lying on back to sitting on the side of a flat bed without bedrails? 2  -ND     Moving to and from a bed to a chair (including a wheelchair)? 2  -ND     Standing up from a chair using your arms (e.g., wheelchair, bedside chair)? 2  -ND     Climbing  3-5 steps with a railing? 1  -ND     To walk in hospital room? 2  -ND     AM-PAC 6 Clicks Score (PT) 11  -ND     Highest Level of Mobility Goal 4 --> Transfer to chair/commode  -ND       Row Name 10/29/24 1024          Functional Assessment    Outcome Measure Options AM-PAC 6 Clicks Basic Mobility (PT)  -ND               User Key  (r) = Recorded By, (t) = Taken By, (c) = Cosigned By      Initials Name Provider Type    ND Cristina Varela PT Physical Therapist                                 Physical Therapy Education       Title: PT OT SLP Therapies (In Progress)       Topic: Physical Therapy (In Progress)       Point: Mobility training (In Progress)       Learning Progress Summary            Patient Acceptance, E, NR by ND at 10/29/2024 1024                      Point: Home exercise program (Not Started)       Learner Progress:  Not documented in this visit.              Point: Body mechanics (In Progress)       Learning Progress Summary            Patient Acceptance, E, NR by ND at 10/29/2024 1024                      Point: Precautions (In Progress)       Learning Progress Summary            Patient Acceptance, E, NR by ND at 10/29/2024 1024                                      User Key       Initials Effective Dates Name Provider Type Discipline    ND 11/16/23 -  Cristina Varela, MAEGAN Physical Therapist PT                  PT Recommendation and Plan  Planned Therapy Interventions (PT): balance training, bed mobility training, gait training, home exercise program, patient/family education, transfer training, postural re-education, stair training, ROM (range of motion), strengthening  Outcome Evaluation: PT evaluation completed. Pt presenting below baseline levels of mobility with deficits in strength, balance and coordination, and activity tolerance warranting IP PT services to address deficits and facilitate return to baseline. Recommend IRF following d/c for best functional outcomes.     Time Calculation:    PT Evaluation Complexity  History, PT Evaluation Complexity: 3 or more personal factors and/or comorbidities  Examination of Body Systems (PT Eval Complexity): total of 4 or more elements  Clinical Presentation (PT Evaluation Complexity): evolving  Clinical Decision Making (PT Evaluation Complexity): moderate complexity  Overall Complexity (PT Evaluation Complexity): moderate complexity     PT Charges       Row Name 10/29/24 1024             Time Calculation    Start Time 0843  -ND      PT Received On 10/29/24  -ND      PT Goal Re-Cert Due Date 11/08/24  -ND         Timed Charges    72152 - Gait Training Minutes  10  -ND         Untimed Charges    PT Eval/Re-eval Minutes 32  -ND         Total Minutes    Timed Charges Total Minutes 10  -ND      Untimed Charges Total Minutes 32  -ND       Total Minutes 42  -ND                User Key  (r) = Recorded By, (t) = Taken By, (c) = Cosigned By      Initials Name Provider Type    ND Cristina Varela, PT Physical Therapist                  Therapy Charges for Today       Code Description Service Date Service Provider Modifiers Qty    38638797334 HC GAIT TRAINING EA 15 MIN 10/29/2024 Cristina Varela, PT GP 1    31023492695 HC PT EVAL MOD COMPLEXITY 3 10/29/2024 Cristina Varela, PT GP 1            PT G-Codes  Outcome Measure Options: AM-PAC 6 Clicks Basic Mobility (PT)  AM-PAC 6 Clicks Score (PT): 11  PT Discharge Summary  Anticipated Discharge Disposition (PT): inpatient rehabilitation facility    Cristina Varela PT  10/29/2024

## 2024-10-29 NOTE — PROGRESS NOTES
[facility] Cardiothoracic Surgery In-Patient Progress Note     LOS: 1 day     POD # 1 s/p CABG x 4, MAZE, LAAL    Subjective  Extubated. Off of vasoactive drips. Sitting up in chair. On 3L NC.    Objective  Vital Signs  Temp:  [94.6 °F (34.8 °C)-100.6 °F (38.1 °C)] 100.6 °F (38.1 °C)  Heart Rate:  [57-78] 76  Resp:  [14-21] 18  BP: ()/() 138/86  Arterial Line BP: ()/(28-90) 109/60  FiO2 (%):  [35 %-100 %] 35 %    Physical Exam:   General Appearance: alert, appears stated age and cooperative   Lungs: clear to auscultation, respirations regular, respirations even, and respirations unlabored   Heart: regular rhythm & normal rate, normal S1, S2, no murmur, no gallop, no rub, and no click   Skin: Incision c/d/I   Sternum: Stable   Output by Drain (mL) 10/28/24 0701 - 10/28/24 1900 10/28/24 1901 - 10/29/24 0700 10/29/24 0701 - 10/29/24 0709 Range Total   Chest Tube 3 Anterior Mediastinal 140 50  190   Y Chest Tube 1 and 2 Anterior Mediastinal 330 340  670        Results     Results from last 7 days   Lab Units 10/29/24  0332   WBC 10*3/mm3 6.45   HEMOGLOBIN g/dL 10.2*   HEMATOCRIT % 31.3*   PLATELETS 10*3/mm3 113*     Results from last 7 days   Lab Units 10/29/24  0332   SODIUM mmol/L 140   POTASSIUM mmol/L 4.7   CHLORIDE mmol/L 108*   CO2 mmol/L 22.0   BUN mg/dL 12   CREATININE mg/dL 0.77   GLUCOSE mg/dL 180*   CALCIUM mg/dL 8.5*     Imaging Results (Last 24 Hours)       Procedure Component Value Units Date/Time    XR Chest 1 View [[id number]] Resulted: 10/29/24 0434     Updated: 10/29/24 0435    XR Chest 1 View [[id number]] Collected: 10/28/24 1405     Updated: 10/28/24 1412    Narrative:      XR CHEST 1 VW    Date of Exam: 10/28/2024 1:38 PM EDT    Indication: Post-Op Check Line & Tube Placement    Comparison: Chest radiograph 10/25/2024.    Findings:  Endotracheal tube tip overlies the midthoracic trachea around 2 cm above the thomas. Right IJ approach central venous catheter tip overlies the  superior vena cava. Nasogastric tube tip overlies the stomach. Mediastinal and pleural drains in place.   Sternotomy, CABG, left atrial appendage clip. Implantable cardiac device. Mildly low lung volumes. Scattered subsegmental atelectasis. No sizable pleural effusion. No distinct pneumothorax. No acute osseous abnormality.      Impression:      Impression:  Postsurgical chest with lines/tubes in expected position, as above.      Electronically Signed: Tal Pimentel MD    10/28/2024 2:09 PM EDT    Workstation ID: [id number]            Assessment  POD # 1 s/p CABG x 4, MAZE, LAAL    Plan   Continue chest tubes and wires  Wean oxygen as tolerated  Ambulate  Pulmonary toilet  ASA, statin, BB    Alex Max MD  10/29/24  07:08 EDT

## 2024-10-29 NOTE — PROGRESS NOTES
Cardiology Progress Note  Primary cardiologist: Dr. Neff    Reason for visit:    Coronary artery disease status post CABG  Paroxysmal atrial fibrillaton    IDENTIFICATION: 59-year-old gentleman who resides in [facility] Problems    Diagnosis  POA    **CAD s/p CABG x 4, MAZE, LAAL 10/28/24 [I25.10]  Yes     Priority: High     Abnormal CTA (8/20/2024):Moderate disease in the LAD and 75% stenosis in the circumflex and right coronary artery noted. Patient referred for left heart cath.   Cardiac cath (8/28/2024): Multivessel CAD.  Recommend CABG  Echo (10/17/2024): LVEF 61-65%.  No valvular abnormality  CABG/maze/left atrial pended to ligation (10/28/2024):  Intraoperative DARIAN (10/28/2024): LVEF 55%.  Obliteration of TORSTEN by clip.  LIMA to LAD.  SVG to OM1.  SVG to OM 4, SVG to OM 5       DM (diabetes mellitus), type 2 [E11.9]  Yes     Priority: High     hemoglobin A1c 12.6% August 2024, 11.2% October 2024       Primary hypertension [I10]  Yes     Priority: Medium    Paroxysmal atrial fibrillation [I48.0]  Yes     Priority: Medium     Echo (3/10/2023): EF 60-65%.  Grade 1 diastolic dysfunction.  Normal valvular morphology.  CHADsVasc 3  Previously on sotalol  Left atrial appendage clipping performed at time of CABG 10/28/2024      Hyperlipidemia LDL goal <70 [E78.5]  Yes     Priority: Low            Patient is sitting up in the chair getting ready to ambulate with physical therapy.  He is maintaining normal sinus rhythm.  IV Levophed drip is off.           Vital Sign Min/Max for last 24 hours  Temp  Min: 94.6 °F (34.8 °C)  Max: 100.6 °F (38.1 °C)   BP  Min: 84/61  Max: 142/89   Pulse  Min: 57  Max: 78   Resp  Min: 14  Max: 21   SpO2  Min: 92 %  Max: 100 %   Flow (L/min) (Oxygen Therapy)  Min: 3  Max: 4      Intake/Output Summary (Last 24 hours) at 10/29/2024 0801  Last data filed at 10/29/2024 0600  Gross per 24 hour   Intake 4602.76 ml   Output 3155 ml   Net 1447.76 ml           Physical  "Exam  Constitutional:       General: He is awake.   Cardiovascular:      Rate and Rhythm: Normal rate and regular rhythm.      Comments: Trace lower extremity edema  Pulmonary:      Effort: Pulmonary effort is normal.      Breath sounds: Decreased breath sounds present.   Skin:     General: Skin is warm and dry.   Neurological:      Mental Status: He is alert.   Psychiatric:         Behavior: Behavior is cooperative.         Tele: Normal sinus rhythm    Results Review (reviewed the patient's recent labs in the electronic medical record):     EKG (10/29/2024): Normal sinus rhythm    CXR (10/29/2024): Interval extubation.  Left atrial appendage clipping noted    ECHO (10/20/2024): EF 61-65%.  No significant valvular abnormality    Results from last 7 days   Lab Units 10/29/24  0636 10/29/24  0332 10/28/24  2254 10/28/24  1802 10/28/24  1339 10/25/24  1346   SODIUM mmol/L  --  140  --  143 142 136   POTASSIUM mmol/L  --  4.7 4.5 4.3 3.2* 4.7   CHLORIDE mmol/L  --  108*  --  110* 109* 99   BUN mg/dL  --  12  --  11 11 10   CREATININE mg/dL  --  0.77  --  0.84 0.86 0.91   MAGNESIUM mg/dL 2.4 1.9  --  2.2 2.1 2.6*         Results from last 7 days   Lab Units 10/29/24  0332 10/28/24  1802 10/28/24  1339   WBC 10*3/mm3 6.45 7.24 7.42   HEMOGLOBIN g/dL 10.2* 10.0* 10.1*   HEMATOCRIT % 31.3* 29.7* 29.0*   PLATELETS 10*3/mm3 113* 125* 115*       Lab Results   Component Value Date    HGBA1C 11.20 (H) 10/25/2024       No results found for: \"CHOL\", \"CHLPL\", \"TRIG\", \"HDL\", \"LDL\", \"LDLDIRECT\"           Coronary artery disease  Status post CABG with Dr. Max 10/28  Aspirin 325 mg daily    Paroxysmal atrial fibrillaton  Historically on sotalol and Eliquis  Maze and left atrial appendage ligation performed at time of CABG 10/28  Maintaining normal sinus rhythm    Type 2 diabetes mellitus  Historically uncontrolled with hemoglobin A1c 11 and 12  Was on Jardiance 25 mg daily at home    Hypertension/hypotension  Current BP " 138/86  Metoprolol tartrate 12.5 mg twice daily  Was on amlodipine and losartan at home but currently on hold  IV Levophed drip off    Hyperlipidemia  Lipitor 40 mg daily           Continue routine postoperative care  Continue aspirin, statin and beta-blocker  Tomorrow will start home dose Jardiance    Electronically signed by ZIA Poole, 10/29/24, 8:01 AM EDT.

## 2024-10-30 ENCOUNTER — APPOINTMENT (OUTPATIENT)
Dept: GENERAL RADIOLOGY | Facility: HOSPITAL | Age: 59
End: 2024-10-30
Payer: MEDICARE

## 2024-10-30 LAB
ANION GAP SERPL CALCULATED.3IONS-SCNC: 14 MMOL/L (ref 5–15)
BUN SERPL-MCNC: 20 MG/DL (ref 8–23)
BUN/CREAT SERPL: 22.7 (ref 7–25)
CALCIUM SPEC-SCNC: 9 MG/DL (ref 8.6–10.5)
CHLORIDE SERPL-SCNC: 112 MMOL/L (ref 98–107)
CO2 SERPL-SCNC: 19 MMOL/L (ref 22–29)
CREAT SERPL-MCNC: 0.88 MG/DL (ref 0.76–1.27)
DEPRECATED RDW RBC AUTO: 47.4 FL (ref 37–54)
EGFRCR SERPLBLD CKD-EPI 2021: 97.8 ML/MIN/1.73
ERYTHROCYTE [DISTWIDTH] IN BLOOD BY AUTOMATED COUNT: 13.6 % (ref 12.3–15.4)
GLUCOSE BLDC GLUCOMTR-MCNC: 167 MG/DL (ref 70–130)
GLUCOSE BLDC GLUCOMTR-MCNC: 198 MG/DL (ref 70–130)
GLUCOSE BLDC GLUCOMTR-MCNC: 279 MG/DL (ref 70–130)
GLUCOSE BLDC GLUCOMTR-MCNC: 294 MG/DL (ref 70–130)
GLUCOSE SERPL-MCNC: 169 MG/DL (ref 65–99)
HCT VFR BLD AUTO: 33.1 % (ref 37.5–51)
HGB BLD-MCNC: 10.6 G/DL (ref 13–17.7)
MCH RBC QN AUTO: 30.5 PG (ref 26.6–33)
MCHC RBC AUTO-ENTMCNC: 32 G/DL (ref 31.5–35.7)
MCV RBC AUTO: 95.1 FL (ref 79–97)
PLATELET # BLD AUTO: 150 10*3/MM3 (ref 140–450)
PMV BLD AUTO: 10.2 FL (ref 6–12)
POTASSIUM SERPL-SCNC: 4.6 MMOL/L (ref 3.5–5.2)
QT INTERVAL: 390 MS
QTC INTERVAL: 453 MS
RBC # BLD AUTO: 3.48 10*6/MM3 (ref 4.14–5.8)
SODIUM SERPL-SCNC: 145 MMOL/L (ref 136–145)
WBC NRBC COR # BLD AUTO: 14.3 10*3/MM3 (ref 3.4–10.8)

## 2024-10-30 PROCEDURE — 25010000002 CEFAZOLIN PER 500 MG: Performed by: PHYSICIAN ASSISTANT

## 2024-10-30 PROCEDURE — 82948 REAGENT STRIP/BLOOD GLUCOSE: CPT

## 2024-10-30 PROCEDURE — 71045 X-RAY EXAM CHEST 1 VIEW: CPT

## 2024-10-30 PROCEDURE — 85027 COMPLETE CBC AUTOMATED: CPT | Performed by: PHYSICIAN ASSISTANT

## 2024-10-30 PROCEDURE — 93005 ELECTROCARDIOGRAM TRACING: CPT | Performed by: THORACIC SURGERY (CARDIOTHORACIC VASCULAR SURGERY)

## 2024-10-30 PROCEDURE — 63710000001 INSULIN GLARGINE PER 5 UNITS: Performed by: INTERNAL MEDICINE

## 2024-10-30 PROCEDURE — 97530 THERAPEUTIC ACTIVITIES: CPT

## 2024-10-30 PROCEDURE — 63710000001 INSULIN LISPRO (HUMAN) PER 5 UNITS: Performed by: INTERNAL MEDICINE

## 2024-10-30 PROCEDURE — 97166 OT EVAL MOD COMPLEX 45 MIN: CPT

## 2024-10-30 PROCEDURE — 93010 ELECTROCARDIOGRAM REPORT: CPT | Performed by: INTERNAL MEDICINE

## 2024-10-30 PROCEDURE — 99291 CRITICAL CARE FIRST HOUR: CPT | Performed by: INTERNAL MEDICINE

## 2024-10-30 PROCEDURE — 25010000002 BUMETANIDE PER 0.5 MG: Performed by: PHYSICIAN ASSISTANT

## 2024-10-30 PROCEDURE — 80048 BASIC METABOLIC PNL TOTAL CA: CPT | Performed by: PHYSICIAN ASSISTANT

## 2024-10-30 PROCEDURE — 93005 ELECTROCARDIOGRAM TRACING: CPT | Performed by: PHYSICIAN ASSISTANT

## 2024-10-30 PROCEDURE — 25010000002 KETOROLAC TROMETHAMINE PER 15 MG: Performed by: INTERNAL MEDICINE

## 2024-10-30 PROCEDURE — 25010000002 MORPHINE PER 10 MG: Performed by: THORACIC SURGERY (CARDIOTHORACIC VASCULAR SURGERY)

## 2024-10-30 PROCEDURE — 99232 SBSQ HOSP IP/OBS MODERATE 35: CPT | Performed by: NURSE PRACTITIONER

## 2024-10-30 RX ORDER — BUMETANIDE 0.25 MG/ML
2 INJECTION, SOLUTION INTRAMUSCULAR; INTRAVENOUS ONCE
Status: COMPLETED | OUTPATIENT
Start: 2024-10-30 | End: 2024-10-30

## 2024-10-30 RX ORDER — LOSARTAN POTASSIUM 25 MG/1
25 TABLET ORAL
Status: DISCONTINUED | OUTPATIENT
Start: 2024-10-30 | End: 2024-11-03

## 2024-10-30 RX ORDER — KETOROLAC TROMETHAMINE 15 MG/ML
15 INJECTION, SOLUTION INTRAMUSCULAR; INTRAVENOUS EVERY 6 HOURS PRN
Status: DISPENSED | OUTPATIENT
Start: 2024-10-30 | End: 2024-10-31

## 2024-10-30 RX ORDER — DEXMEDETOMIDINE HYDROCHLORIDE 4 UG/ML
.2-1.5 INJECTION, SOLUTION INTRAVENOUS
Status: DISCONTINUED | OUTPATIENT
Start: 2024-10-30 | End: 2024-11-06

## 2024-10-30 RX ORDER — SOTALOL HYDROCHLORIDE 120 MG/1
120 TABLET ORAL EVERY 12 HOURS SCHEDULED
Status: DISCONTINUED | OUTPATIENT
Start: 2024-10-30 | End: 2024-11-06 | Stop reason: HOSPADM

## 2024-10-30 RX ORDER — METOPROLOL TARTRATE 25 MG/1
25 TABLET, FILM COATED ORAL EVERY 12 HOURS SCHEDULED
Status: DISCONTINUED | OUTPATIENT
Start: 2024-10-30 | End: 2024-10-30

## 2024-10-30 RX ADMIN — GABAPENTIN 100 MG: 100 CAPSULE ORAL at 22:01

## 2024-10-30 RX ADMIN — SENNOSIDES AND DOCUSATE SODIUM 2 TABLET: 50; 8.6 TABLET ORAL at 08:15

## 2024-10-30 RX ADMIN — DEXMEDETOMIDINE HYDROCHLORIDE 1 MCG/KG/HR: 400 INJECTION, SOLUTION INTRAVENOUS at 20:00

## 2024-10-30 RX ADMIN — MUPIROCIN 1 APPLICATION: 20 OINTMENT TOPICAL at 08:16

## 2024-10-30 RX ADMIN — ACETAMINOPHEN 650 MG: 325 TABLET, FILM COATED ORAL at 05:42

## 2024-10-30 RX ADMIN — CHLORHEXIDINE GLUCONATE ORAL RINSE 15 ML: 1.2 SOLUTION DENTAL at 21:00

## 2024-10-30 RX ADMIN — MORPHINE SULFATE 2 MG: 2 INJECTION, SOLUTION INTRAMUSCULAR; INTRAVENOUS at 01:38

## 2024-10-30 RX ADMIN — MUPIROCIN 1 APPLICATION: 20 OINTMENT TOPICAL at 20:55

## 2024-10-30 RX ADMIN — DEXMEDETOMIDINE HYDROCHLORIDE 0.2 MCG/KG/HR: 400 INJECTION, SOLUTION INTRAVENOUS at 15:17

## 2024-10-30 RX ADMIN — BUPROPION HYDROCHLORIDE 150 MG: 150 TABLET, EXTENDED RELEASE ORAL at 08:15

## 2024-10-30 RX ADMIN — MORPHINE SULFATE 2 MG: 2 INJECTION, SOLUTION INTRAMUSCULAR; INTRAVENOUS at 06:40

## 2024-10-30 RX ADMIN — MORPHINE SULFATE 2 MG: 2 INJECTION, SOLUTION INTRAMUSCULAR; INTRAVENOUS at 15:16

## 2024-10-30 RX ADMIN — MORPHINE SULFATE 2 MG: 2 INJECTION, SOLUTION INTRAMUSCULAR; INTRAVENOUS at 05:42

## 2024-10-30 RX ADMIN — OXYCODONE HYDROCHLORIDE 10 MG: 10 TABLET ORAL at 11:55

## 2024-10-30 RX ADMIN — INSULIN LISPRO 8 UNITS: 100 INJECTION, SOLUTION INTRAVENOUS; SUBCUTANEOUS at 18:37

## 2024-10-30 RX ADMIN — ACETAMINOPHEN 650 MG: 325 TABLET, FILM COATED ORAL at 11:16

## 2024-10-30 RX ADMIN — ATORVASTATIN CALCIUM 40 MG: 40 TABLET, FILM COATED ORAL at 20:55

## 2024-10-30 RX ADMIN — DULOXETINE HYDROCHLORIDE 60 MG: 60 CAPSULE, DELAYED RELEASE ORAL at 08:15

## 2024-10-30 RX ADMIN — BUMETANIDE 2 MG: 0.25 INJECTION INTRAMUSCULAR; INTRAVENOUS at 08:14

## 2024-10-30 RX ADMIN — OXYCODONE HYDROCHLORIDE 10 MG: 10 TABLET ORAL at 20:54

## 2024-10-30 RX ADMIN — TAMSULOSIN HYDROCHLORIDE 0.4 MG: 0.4 CAPSULE ORAL at 08:15

## 2024-10-30 RX ADMIN — SODIUM CHLORIDE 2000 MG: 900 INJECTION INTRAVENOUS at 03:40

## 2024-10-30 RX ADMIN — KETOROLAC TROMETHAMINE 15 MG: 15 INJECTION, SOLUTION INTRAMUSCULAR; INTRAVENOUS at 14:57

## 2024-10-30 RX ADMIN — ACETAMINOPHEN 650 MG: 325 TABLET, FILM COATED ORAL at 21:05

## 2024-10-30 RX ADMIN — SENNOSIDES AND DOCUSATE SODIUM 2 TABLET: 50; 8.6 TABLET ORAL at 20:55

## 2024-10-30 RX ADMIN — DEXMEDETOMIDINE HYDROCHLORIDE 1.5 MCG/KG/HR: 400 INJECTION, SOLUTION INTRAVENOUS at 21:56

## 2024-10-30 RX ADMIN — HYDROCODONE BITARTRATE AND ACETAMINOPHEN 1 TABLET: 7.5; 325 TABLET ORAL at 08:16

## 2024-10-30 RX ADMIN — MORPHINE SULFATE 2 MG: 2 INJECTION, SOLUTION INTRAMUSCULAR; INTRAVENOUS at 11:49

## 2024-10-30 RX ADMIN — SOTALOL HYDROCHLORIDE 120 MG: 120 TABLET ORAL at 12:45

## 2024-10-30 RX ADMIN — SOTALOL HYDROCHLORIDE 120 MG: 120 TABLET ORAL at 21:55

## 2024-10-30 RX ADMIN — INSULIN LISPRO 3 UNITS: 100 INJECTION, SOLUTION INTRAVENOUS; SUBCUTANEOUS at 08:15

## 2024-10-30 RX ADMIN — CHLORHEXIDINE GLUCONATE ORAL RINSE 15 ML: 1.2 SOLUTION DENTAL at 08:15

## 2024-10-30 RX ADMIN — Medication 12.5 MG: at 08:15

## 2024-10-30 RX ADMIN — GABAPENTIN 100 MG: 100 CAPSULE ORAL at 06:40

## 2024-10-30 RX ADMIN — INSULIN LISPRO 8 UNITS: 100 INJECTION, SOLUTION INTRAVENOUS; SUBCUTANEOUS at 21:00

## 2024-10-30 RX ADMIN — ASPIRIN 325 MG: 325 TABLET, COATED ORAL at 08:15

## 2024-10-30 RX ADMIN — MORPHINE SULFATE 2 MG: 2 INJECTION, SOLUTION INTRAMUSCULAR; INTRAVENOUS at 14:36

## 2024-10-30 RX ADMIN — LOSARTAN POTASSIUM 25 MG: 25 TABLET, FILM COATED ORAL at 11:16

## 2024-10-30 RX ADMIN — HYDROCODONE BITARTRATE AND ACETAMINOPHEN 1 TABLET: 7.5; 325 TABLET ORAL at 01:38

## 2024-10-30 RX ADMIN — EMPAGLIFLOZIN 25 MG: 25 TABLET, FILM COATED ORAL at 11:15

## 2024-10-30 RX ADMIN — KETOROLAC TROMETHAMINE 15 MG: 15 INJECTION, SOLUTION INTRAMUSCULAR; INTRAVENOUS at 20:54

## 2024-10-30 RX ADMIN — LEVOTHYROXINE SODIUM 50 MCG: 0.05 TABLET ORAL at 05:47

## 2024-10-30 RX ADMIN — INSULIN GLARGINE 20 UNITS: 100 INJECTION, SOLUTION SUBCUTANEOUS at 08:15

## 2024-10-30 RX ADMIN — KETOROLAC TROMETHAMINE 15 MG: 15 INJECTION, SOLUTION INTRAMUSCULAR; INTRAVENOUS at 09:26

## 2024-10-30 NOTE — THERAPY TREATMENT NOTE
"Patient Name: Joel Galo  : 1965    MRN: [id number]                              Today's Date: 10/30/2024       Admit Date: 10/28/2024    Visit Dx:     ICD-10-CM ICD-9-CM   1. ASCVD (arteriosclerotic cardiovascular disease)  I25.10 429.2     440.9     Patient Active Problem List   Diagnosis    Paroxysmal atrial fibrillation    Obstructive sleep apnea    Hypothyroidism (acquired)    GERD (gastroesophageal reflux disease)    Chronic anticoagulation    DM (diabetes mellitus), type 2    Hyperlipidemia LDL goal <70    Primary hypertension    CAD s/p CABG x 4, MAZE, LAAL 10/28/24     Past Medical History:   Diagnosis Date    A-fib     Anxiety     Arthritis     DDD (degenerative disc disease), lumbar     Deep vein thrombosis     Depression     Diabetes     Disease of thyroid gland     HYPO    Heart attack     \"5-6 years ago\"    Hyperlipidemia     Hypertension     Kidney stone     Neuropathy     Sleep apnea     DOES NOT USE CPAP    UTI (urinary tract infection)      Past Surgical History:   Procedure Laterality Date    ATRIAL APPENDAGE EXCLUSION LEFT WITH TRANSESOPHAGEAL ECHOCARDIOGRAM N/A 10/28/2024    Procedure: ATRIAL APPENDAGE EXCLUSION LEFT WITH TRANSESOPHAGEAL ECHOCARDIOGRAM;  Surgeon: Alex Max MD;  Location: [facility];  Service: Cardiothoracic;  Laterality: N/A;    CARDIAC CATHETERIZATION N/A 2024    Procedure: Left Heart Cath;  Surgeon: Julio Neff MD;  [facility]:  [facility];  Service: Cardiology;  Laterality: N/A;    CARDIAC ELECTROPHYSIOLOGY PROCEDURE N/A 2023    Procedure: Loop insertion;  Surgeon: Tariq Chávez MD;  Location:  [facility];  Service: Cardiovascular;  Laterality: N/A;    CATARACT EXTRACTION Bilateral     COLONOSCOPY      CORONARY ARTERY BYPASS GRAFT N/A 10/28/2024    Procedure: MEDIAN STERNOTOMY, CORONARY ARTERY BYPASS GRAFTING X4 UTILIZING THE LEFT INTERNAL MAMMARY ARTERY GRAFT, ENDOSCOPIC VEIN HARVEST OF THE GREATER RIGHT " SAPHENOUS VEIN;  Surgeon: Alex Max MD;  Location:  [facility];  Service: Cardiothoracic;  Laterality: N/A;    ENDOSCOPY      MAZE PROCEDURE N/A 10/28/2024    Procedure: MAZE PROCEDURE;  Surgeon: Alex Max MD;  Location:  [facility];  Service: Cardiothoracic;  Laterality: N/A;    TEETH EXTRACTION        General Information       Row Name 10/30/24 1112          Physical Therapy Time and Intention    Document Type therapy note (daily note)  -ND     Mode of Treatment physical therapy  -ND       Row Name 10/30/24 1112          General Information    Patient Profile Reviewed yes  -ND     Existing Precautions/Restrictions sternal;fall;cardiac  -ND     Barriers to Rehab medically complex;cognitive status  -ND       Row Name 10/30/24 1112          Cognition    Orientation Status (Cognition) oriented to;person;situation;disoriented to;place;time  -ND       Row Name 10/30/24 1112          Safety Issues/Impairments Affecting Functional Mobility    Safety Issues Affecting Function (Mobility) ability to follow commands;impulsivity;insight into deficits/self-awareness;judgment;problem-solving;safety precaution awareness;safety precautions follow-through/compliance;sequencing abilities  -ND     Impairments Affecting Function (Mobility) balance;cognition;coordination;endurance/activity tolerance;pain;postural/trunk control;strength  -ND     Cognitive Impairments, Mobility Safety/Performance attention;impulsivity;insight into deficits/self-awareness;judgment;problem-solving/reasoning;safety precaution awareness;safety precaution follow-through;sequencing abilities  -ND               User Key  (r) = Recorded By, (t) = Taken By, (c) = Cosigned By      Initials Name Provider Type    ND Cristina Varela PT Physical Therapist                   Mobility       Row Name 10/30/24 1113          Bed Mobility    Bed Mobility sit-supine  -ND     Sit-Supine [place] (Bed Mobility) maximum assist (25% patient effort);2 person  assist;verbal cues;nonverbal cues (demo/gesture)  -ND     Comment, (Bed Mobility) Pt found sitting UIC with OT. Pt wishes to return to bed at end of session.  -ND       Row Name 10/30/24 1113          Transfers    Comment, (Transfers) Following 1st STS pt demo's bilateral knee buckling and returns to sitting without warning. Pt endorses reason for doing so is due to pain. Cues provided t/o session for hand placement to maintain sternal precautions.  -ND       Row Name 10/30/24 1113          Bed-Chair Transfer    Bed-Chair [place] (Transfers) maximum assist (25% patient effort);2 person assist;nonverbal cues (demo/gesture);verbal cues  -ND     Assistive Device (Bed-Chair Transfers) other (see comments)  BUE support  -ND     Comment, (Bed-Chair Transfer) Pt with decreased command follow and increased time for task t/o transfer.  -ND       Row Name 10/30/24 1113          Sit-Stand Transfer    Sit-Stand [place] (Transfers) moderate assist (50% patient effort);2 person assist;verbal cues;nonverbal cues (demo/gesture)  -ND     Assistive Device (Sit-Stand Transfers) other (see comments)  BUE support on tripod monitor.  -ND     Comment, (Sit-Stand Transfer) x3 from chair: 1st attempt to returns to sitting without warning, 2nd STS with attempt to initiate ambulation but pt with decreased command follow and cued to return to sitting, 3rd attempt initiate SPT to return to EOB.  -ND       Row Name 10/30/24 1113          Gait/Stairs (Locomotion)    [place] Level (Gait) unable to assess  -ND     Comment, (Gait/Stairs) Attempted to initiate gait this date but pt with decreased command following.  -ND               User Key  (r) = Recorded By, (t) = Taken By, (c) = Cosigned By      Initials Name Provider Type    Cristina Cheema PT Physical Therapist                   Obj/Interventions       Row Name 10/30/24 1120          Balance    Balance Assessment sitting static balance;sitting dynamic balance;standing  static balance;standing dynamic balance  -ND     Static Sitting Balance minimal assist;verbal cues;non-verbal cues (demo/gesture)  -ND     Dynamic Sitting Balance moderate assist;2-person assist;verbal cues;non-verbal cues (demo/gesture)  -ND     Position, Sitting Balance unsupported;supported;sitting in chair  -ND     Static Standing Balance moderate assist;2-person assist;verbal cues;non-verbal cues (demo/gesture)  -ND     Dynamic Standing Balance maximum assist;2-person assist;verbal cues;non-verbal cues (demo/gesture)  -ND     Position/Device Used, Standing Balance supported;other (see comments)  BUE support and tripod monitor.  -ND     Balance Interventions sitting;standing;sit to stand;supported;static;dynamic  -ND     Comment, Balance R sided lean noted in sitting and standing, cues to correct to midline but pt unable to correct.  -ND               User Key  (r) = Recorded By, (t) = Taken By, (c) = Cosigned By      Initials Name Provider Type    ND Cristina Varela, PT Physical Therapist                   Goals/Plan    No documentation.                  Clinical Impression       Row Name 10/30/24 1121          Pain    Additional Documentation Pain Scale: FACES Pre/Post-Treatment (Group)  -ND       Row Name 10/30/24 1121          Pain Scale: FACES Pre/Post-Treatment    Pain: FACES Scale, Pretreatment 2-->hurts little bit  -ND     Posttreatment Pain Rating 4-->hurts little more  -ND     Pre/Posttreatment Pain Comment RN present and managing.  -ND       Row Name 10/30/24 1121          Plan of Care Review    Plan of Care Reviewed With spouse  -ND     Progress declining  -ND     Outcome Evaluation Pt limited in functional mobility this date by increased pain and decreased command following. Pt would benefit from continued skilled therapy to address strength, balance and coordination, sequencing, and activity tolerance deficits and facilitate improved functional strength and mobility. Recommend IRF following d/c  for best functional outcome.  -ND       Row Name 10/30/24 1121          Vital Signs    Pre Systolic BP Rehab 160  -ND     Pre Treatment Diastolic   -ND     Post Systolic BP Rehab 165  -ND     Post Treatment Diastolic BP 87  -ND     Pretreatment Heart Rate (beats/min) 92  -ND     Posttreatment Heart Rate (beats/min) 93  -ND     Pre SpO2 (%) 92  -ND     O2 Delivery Pre Treatment nasal cannula  -ND     O2 Delivery Intra Treatment nasal cannula  -ND     Post SpO2 (%) 91  -ND     O2 Delivery Post Treatment nasal cannula  -ND     Pre Patient Position Sitting  -ND     Intra Patient Position Standing  -ND     Post Patient Position Supine  -ND       Row Name 10/30/24 1121          Positioning and Restraints    Pre-Treatment Position sitting in chair/recliner  -ND     Post Treatment Position bed  -ND     In Bed notified nsg;supine;with nsg;call light within reach;encouraged to call for assist;exit alarm on;side rails up x3;RUE elevated;LUE elevated  -ND               User Key  (r) = Recorded By, (t) = Taken By, (c) = Cosigned By      Initials Name Provider Type    ND Cristina Varela, PT Physical Therapist                   Outcome Measures       Row Name 10/30/24 1124          How much help from another person do you currently need...    Turning from your back to your side while in flat bed without using bedrails? 2  -ND     Moving from lying on back to sitting on the side of a flat bed without bedrails? 2  -ND     Moving to and from a bed to a chair (including a wheelchair)? 2  -ND     Standing up from a chair using your arms (e.g., wheelchair, bedside chair)? 2  -ND     Climbing 3-5 steps with a railing? 1  -ND     To walk in hospital room? 1  -ND     AM-PAC 6 Clicks Score (PT) 10  -ND     Highest Level of Mobility Goal 4 --> Transfer to chair/commode  -ND       Row Name 10/30/24 1124 10/30/24 0808       Functional Assessment    Outcome Measure Options AM-PAC 6 Clicks Basic Mobility (PT)  -ND AM-PAC 6 Clicks Daily  Activity (OT)  -TA              User Key  (r) = Recorded By, (t) = Taken By, (c) = Cosigned By      Initials Name Provider Type    TA Marbin Benjamin, OT Occupational Therapist    Cristina Cheema, PT Physical Therapist                                 Physical Therapy Education       Title: PT OT SLP Therapies (In Progress)       Topic: Physical Therapy (In Progress)       Point: Mobility training (In Progress)       Learning Progress Summary            Patient Acceptance, E, NR by ND at 10/30/2024 1124    Acceptance, E, NR by ND at 10/29/2024 1024                      Point: Home exercise program (Not Started)       Learner Progress:  Not documented in this visit.              Point: Body mechanics (In Progress)       Learning Progress Summary            Patient Acceptance, E, NR by ND at 10/30/2024 1124    Acceptance, E, NR by ND at 10/29/2024 1024                      Point: Precautions (In Progress)       Learning Progress Summary            Patient Acceptance, E, NR by ND at 10/30/2024 1124    Acceptance, E, NR by ND at 10/29/2024 1024                                      User Key       Initials Effective Dates Name Provider Type Discipline    ND 11/16/23 -  Cristina Varela, PT Physical Therapist PT                  PT Recommendation and Plan  Planned Therapy Interventions (PT): balance training, bed mobility training, gait training, home exercise program, patient/family education, transfer training, postural re-education, stair training, ROM (range of motion), strengthening  Progress: declining  Outcome Evaluation: Pt limited in functional mobility this date by increased pain and decreased command following. Pt would benefit from continued skilled therapy to address strength, balance and coordination, sequencing, and activity tolerance deficits and facilitate improved functional strength and mobility. Recommend IRF following d/c for best functional outcome.     Time Calculation:   PT Evaluation  Complexity  History, PT Evaluation Complexity: 3 or more personal factors and/or comorbidities  Examination of Body Systems (PT Eval Complexity): total of 4 or more elements  Clinical Presentation (PT Evaluation Complexity): evolving  Clinical Decision Making (PT Evaluation Complexity): moderate complexity  Overall Complexity (PT Evaluation Complexity): moderate complexity     PT Charges       Row Name 10/30/24 1124             Time Calculation    Start Time 0824  -ND      PT Received On 10/30/24  -ND         Timed Charges    48628 - PT Therapeutic Activity Minutes 11 -ND         Total Minutes    Timed Charges Total Minutes 11 -ND       Total Minutes 11 -ND                User Key  (r) = Recorded By, (t) = Taken By, (c) = Cosigned By      Initials Name Provider Type    ND Cristina Varela, PT Physical Therapist                  Therapy Charges for Today       Code Description Service Date Service Provider Modifiers Qty    42895460404 HC GAIT TRAINING EA 15 MIN 10/29/2024 Cristina Varela, PT GP 1    98910084775 HC PT EVAL MOD COMPLEXITY 3 10/29/2024 Cristina Varela, PT GP 1    90413652834 HC PT THERAPEUTIC ACT EA 15 MIN 10/30/2024 Cristina Varela, PT GP 1            PT G-Codes  Outcome Measure Options: AM-PAC 6 Clicks Basic Mobility (PT)  AM-PAC 6 Clicks Score (PT): 10  AM-PAC 6 Clicks Score (OT): 9  PT Discharge Summary  Anticipated Discharge Disposition (PT): inpatient rehabilitation facility    Cristina Varela PT  10/30/2024

## 2024-10-30 NOTE — PROGRESS NOTES
Cardiology Progress Note      Reason for visit:    Coronary artery disease status post CABG  Paroxysmal atrial fibrillaton    IDENTIFICATION: 59-year-old gentleman who resides in [facility] Problems    Diagnosis  POA    **CAD s/p CABG x 4, MAZE, LAAL 10/28/24 [I25.10]  Yes     Priority: High     Abnormal CTA (8/20/2024):Moderate disease in the LAD and 75% stenosis in the circumflex and right coronary artery noted. Patient referred for left heart cath.   Cardiac cath (8/28/2024): Multivessel CAD.  Recommend CABG  Echo (10/17/2024): LVEF 61-65%.  No valvular abnormality  CABG/maze/left atrial pended to ligation (10/28/2024):  Intraoperative DARIAN (10/28/2024): LVEF 55%.  Obliteration of TORSTEN by clip.  LIMA to LAD.  SVG to OM1.  SVG to OM 4, SVG to OM 5       DM (diabetes mellitus), type 2 [E11.9]  Yes     Priority: High     hemoglobin A1c 12.6% August 2024, 11.2% October 2024       Primary hypertension [I10]  Yes     Priority: Medium    Paroxysmal atrial fibrillation [I48.0]  Yes     Priority: Medium     Echo (3/10/2023): EF 60-65%.  Grade 1 diastolic dysfunction.  Normal valvular morphology.  CHADsVasc 3  Previously on sotalol  Left atrial appendage clipping performed at time of CABG 10/28/2024      Hyperlipidemia LDL goal <70 [E78.5]  Yes     Priority: Low            Chest tubes and wires were discontinued earlier.  He is maintaining normal sinus rhythm on telemetry.  He is sitting in the bed appears drowsy.  He is oriented to self but is somewhat confused.  He was unable to tell me the year.  According to the nurse he has been receiving quite a bit of pain medication.  No family is at bedside.           Vital Sign Min/Max for last 24 hours  Temp  Min: 99.1 °F (37.3 °C)  Max: 100.4 °F (38 °C)   BP  Min: 113/83  Max: 160/110   Pulse  Min: 72  Max: 92   Resp  Min: 16  Max: 24   SpO2  Min: 91 %  Max: 96 %   Flow (L/min) (Oxygen Therapy)  Min: 3  Max: 3      Intake/Output Summary (Last 24 hours) at  "10/30/2024 1012  Last data filed at 10/30/2024 0600  Gross per 24 hour   Intake 566 ml   Output 2280 ml   Net -1714 ml           Physical Exam  Constitutional:       General: He is awake.   Cardiovascular:      Rate and Rhythm: Normal rate and regular rhythm.      Heart sounds: No murmur heard.     Comments: Trace lower extremity edema and left hand edema  Pulmonary:      Effort: Pulmonary effort is normal.      Breath sounds: Decreased breath sounds present.   Skin:     General: Skin is warm and dry.   Psychiatric:         Behavior: Behavior is cooperative.         Cognition and Memory: Memory is impaired.         Tele: Normal sinus rhythm    Results Review (reviewed the patient's recent labs in the electronic medical record):     EKG (10/30/2024): Normal sinus rhythm    CXR (10/30/2024): New small right pneumothorax      ECHO (10/20/2024): EF 61-65%.  No significant valvular abnormality     Results from last 7 days   Lab Units 10/30/24  0307 10/29/24  0636 10/29/24  0332 10/28/24  2254 10/28/24  1802 10/28/24  1339 10/25/24  1346   SODIUM mmol/L 145  --  140  --  143 142 136   POTASSIUM mmol/L 4.6  --  4.7 4.5 4.3 3.2* 4.7   CHLORIDE mmol/L 112*  --  108*  --  110* 109* 99   BUN mg/dL 20  --  12  --  11 11 10   CREATININE mg/dL 0.88  --  0.77  --  0.84 0.86 0.91   MAGNESIUM mg/dL  --  2.4 1.9  --  2.2 2.1 2.6*         Results from last 7 days   Lab Units 10/30/24  0307 10/29/24  0332 10/28/24  1802   WBC 10*3/mm3 14.30* 6.45 7.24   HEMOGLOBIN g/dL 10.6* 10.2* 10.0*   HEMATOCRIT % 33.1* 31.3* 29.7*   PLATELETS 10*3/mm3 150 113* 125*       Lab Results   Component Value Date    HGBA1C 11.20 (H) 10/25/2024       No results found for: \"CHOL\", \"CHLPL\", \"TRIG\", \"HDL\", \"LDL\", \"LDLDIRECT\"           Coronary artery disease  Status post CABG with Dr. Max 10/28  Aspirin 325 mg daily     Paroxysmal atrial fibrillaton  Historically on sotalol and Eliquis  Maze and left atrial appendage ligation performed at time of CABG " 10/28.  Intraoperative DARIAN shows obliteration of TORSTEN by clip  Maintaining normal sinus rhythm     Type 2 diabetes mellitus  Historically uncontrolled with hemoglobin A1c 11 and 12  Was on Jardiance 25 mg daily at home     Hypertension/hypotension  Current /110  Metoprolol tartrate 12.5 mg twice daily  Was on amlodipine and losartan at home but currently on hold       Hyperlipidemia  Lipitor 40 mg daily             Increase metoprolol to tartrate to 25 mg twice daily  Defer NOAC due to left atrial appendage clipping  Start home dose losartan 25 mg daily  Start home dose Jardiance 25 mg daily  Agree with dose of IV Bumex    Electronically signed by ZIA Poole, 10/30/24, 10:12 AM EDT.

## 2024-10-30 NOTE — DISCHARGE PLACEMENT REQUEST
"Joel Gutierrez (59 y.o. Male)     Aneta [phone]      Date of Birth   1965    Social Security Number       Address   [address] [place] [address]    Home Phone   [phone]    MRN   [id number]       Confucianism   Gnosticism    Marital Status                               Admission Date   10/28/24    Admission Type   Elective    Admitting Provider   Alex Max MD    Attending Provider   Alex Max MD    Department, Room/Bed   [facility]       Discharge Date       Discharge Disposition       Discharge Destination                                 Attending Provider: Alex Max MD    Allergies: No Known Allergies    Isolation: None   Infection: None   Code Status: CPR    Ht: 172.7 cm (68\")   Wt: 105 kg (230 lb 13.2 oz)    Admission Cmt: None   Principal Problem: CAD s/p CABG x 4, LAURY CLINE 10/28/24 [I25.10]                   Active Insurance as of 10/28/2024       Primary Coverage       Payor Plan Insurance Group Employer/Plan Group    ANTH MEDICARE REPLACEMENT ANTHEM MEDICARE ADVANTAGE [id number]       Payor Plan Address Payor Plan Phone Number Payor Plan Fax Number Effective Dates    [address] [phone]  1/1/2024 - None Entered    [facility] [id number]         Subscriber Name Subscriber Birth Date Member ID       JOEL GUTIERREZ 1965 [id number]                     Emergency Contacts        (Rel.) Home Phone Work Phone Mobile Phone    LISHA GUTIERREZ (Spouse) [phone] -- [phone]                 History & Physical        Manuela Sprague APRN at 10/28/24 0639       Attestation signed by Alex Max MD at 10/28/24 1720    I have reviewed this documentation and agree.  Plan for ANAND ALANIZ                    Pre-Op H&P  Jeol Gutierrez  [id number]  1965      Chief complaint: Chest pain      Subjective:  Patient is a 59 y.o.male presents for scheduled surgery by Dr. Max. He anticipates a CORONARY " ARTERY BYPASS GRAFTING; ENDOSCOPIC VEIN HARVEST; RADIAL ARTERY HARVEST; ATRIAL APPENDAGE EXCLUSION LEFT WITH TRANSESOPHAGEAL ECHOCARDIOGRAM  today. He reports intermittent episodes of shortness of breath and chest pain. He had abnormal CTA coronaries. He had cardiac cath 8/28/24 that showed mid LAD 75% stenosis, distal LAD 95% stenosis, first marginal 90% stenosis, third marginal 99% stenosis, ostial RCA 70% stenosis, mid RCA 70% stenosis, and RV branch 99% stenosis. Left ventriculogram during cardiac cath noted EF 55%.       Review of Systems:  Constitutional-- No fever, chills or sweats. + fatigue.  CV-- No palpitation or syncope. +HTN, HLD, CAD, afib, chest pain  Resp-- No cough, hemoptysis. +SOB, MAGDALENA no cpap  Skin--No rashes or lesions      Allergies: No Known Allergies      Home Meds:  Medications Prior to Admission   Medication Sig Dispense Refill Last Dose/Taking    amLODIPine (NORVASC) 2.5 MG tablet Take 1 tablet by mouth Daily.   10/27/2024 Evening    aspirin 81 MG EC tablet Take 1 tablet by mouth Daily.   10/27/2024 Evening    buPROPion XL (WELLBUTRIN XL) 150 MG 24 hr tablet Take 1 tablet by mouth Daily.   10/27/2024 Evening    docusate sodium (COLACE) 100 MG capsule Take 1 capsule by mouth 2 (Two) Times a Day.   10/27/2024 Evening    DULoxetine (CYMBALTA) 60 MG capsule Take 1 capsule by mouth Daily.   10/27/2024 Morning    empagliflozin (JARDIANCE) 25 MG tablet tablet Take 1 tablet by mouth Daily.   10/27/2024 Morning    finasteride (PROSCAR) 5 MG tablet TAKE 1 TABLET EVERY DAY (Patient taking differently: Take 1 tablet by mouth Daily.) 90 tablet 0 10/27/2024 Morning    gabapentin (NEURONTIN) 300 MG capsule Take 1 capsule by mouth 3 (Three) Times a Day. TAKES 2OOMG IN AM AND 300MG IN PM USUALLY   10/27/2024    HumuLIN 70/30 KwikPen (70-30) 100 UNIT/ML suspension pen-injector 45 units in the a.m. and 20mg in the p.m.   10/27/2024    HYDROcodone-acetaminophen (NORCO)  MG per tablet Take 1 tablet by  mouth Every 8 (Eight) Hours As Needed for Moderate Pain.   10/27/2024 Evening    isosorbide mononitrate (IMDUR) 30 MG 24 hr tablet Take 1 tablet by mouth Daily. 30 tablet 11 10/27/2024 Evening    levothyroxine (SYNTHROID, LEVOTHROID) 50 MCG tablet Take 1 tablet by mouth Every Morning.   10/27/2024 Morning    losartan (COZAAR) 25 MG tablet Take 1 tablet by mouth Daily.   10/27/2024 Morning    nitroglycerin (NITROSTAT) 0.4 MG SL tablet 1 under the tongue as needed for angina, may repeat q5mins for up three doses (Patient taking differently: Place 1 tablet under the tongue Every 5 (Five) Minutes As Needed for Chest Pain. 1 under the tongue as needed for angina, may repeat q5mins for up three doses) 30 tablet 3 Past Week    polyethylene glycol (MIRALAX) 17 g packet Take 17 g by mouth Daily.   10/27/2024    rosuvastatin (CRESTOR) 20 MG tablet Take 1 tablet by mouth Daily. (Patient taking differently: Take 1 tablet by mouth Every Night.) 90 tablet 1 10/27/2024 Evening    sotalol (BETAPACE) 80 MG tablet TAKE ONE AND A HALF (1 & 1/2) TABLETS BY MOUTH TWICE DAILY 270 tablet 0 10/27/2024 Evening    Sotalol HCl AF 80 MG tablet Take 1.5 tablets by mouth 2 (Two) Times a Day. 270 tablet 3 10/27/2024 at  8:30 PM    tamsulosin (FLOMAX) 0.4 MG capsule 24 hr capsule TAKE 1 CAPSULE EVERY DAY (Patient taking differently: 1 capsule Daily. Swallow whole. Do not crush or chew.) 90 capsule 3 10/27/2024 Morning    Vitamin D, Cholecalciferol, (CHOLECALCIFEROL) 10 MCG (400 UNIT) tablet Take 1 tablet by mouth Daily.   10/27/2024 Morning    apixaban (ELIQUIS) 5 MG tablet tablet Take 1 tablet by mouth 2 (Two) Times a Day. (Patient taking differently: Take 1 tablet by mouth 2 (Two) Times a Day. LAST DOSE WILL BE ON 10/25/24) 60 tablet 4 10/25/2024    glimepiride (AMARYL) 2 MG tablet Take 1 tablet by mouth 2 (Two) Times a Day.            PMH:   Past Medical History:   Diagnosis Date    A-fib     Anxiety     Arthritis     DDD (degenerative disc  "disease), lumbar     Deep vein thrombosis     Depression     Diabetes     Disease of thyroid gland     HYPO    Heart attack     \"5-6 years ago\"    Hyperlipidemia     Hypertension     Kidney stone     Neuropathy     Sleep apnea     DOES NOT USE CPAP    UTI (urinary tract infection)      PSH:    Past Surgical History:   Procedure Laterality Date    CARDIAC CATHETERIZATION N/A 08/28/2024    Procedure: Left Heart Cath;  Surgeon: Julio Neff MD;  [facility];  Service: Cardiology;  Laterality: N/A;    CARDIAC ELECTROPHYSIOLOGY PROCEDURE N/A 04/05/2023    Procedure: Loop insertion;  Surgeon: Tariq Chávez MD;  [facility]  [facility];  Service: Cardiovascular;  Laterality: N/A;    CATARACT EXTRACTION Bilateral     COLONOSCOPY      ENDOSCOPY      TEETH EXTRACTION         Immunization History:  Influenza: No  Pneumococcal: No  Tetanus: UTD    Social History:   Tobacco:   Social History     Tobacco Use   Smoking Status Never   Smokeless Tobacco Never      Alcohol:     Social History     Substance and Sexual Activity   Alcohol Use No         Physical Exam:/98 (BP Location: Left arm, Patient Position: Lying)   Pulse 75   Temp 98.1 °F (36.7 °C) (Temporal)   Resp 18   SpO2 93%       General Appearance:    Alert, cooperative, no distress, appears stated age   Head:    Normocephalic, without obvious abnormality, atraumatic   Lungs:     Clear to auscultation bilaterally, respirations unlabored    Heart:   Regular rate and rhythm, S1 and S2 normal    Abdomen:    Soft without tenderness   Extremities:   Extremities normal, atraumatic, no cyanosis or edema   Skin:   Skin color, texture, turgor normal, no rashes or lesions   Neurologic:   Grossly intact     Results Review:     LABS:  Lab Results   Component Value Date    WBC 6.82 08/26/2024    HGB 14.6 08/26/2024    HCT 43.7 08/26/2024    MCV 91.8 08/26/2024     08/26/2024    GLUCOSE 391 (H) 10/25/2024    BUN 10 " 10/25/2024    CREATININE 0.91 10/25/2024    EGFRIFNONA 86 12/15/2021    EGFRIFAFRI 99 12/15/2021     10/25/2024    K 4.7 10/25/2024    CL 99 10/25/2024    CO2 26.0 10/25/2024    MG 2.6 (H) 10/25/2024    CALCIUM 9.5 10/25/2024    ALBUMIN 4.0 10/25/2024    AST 24 10/25/2024    ALT 16 10/25/2024    BILITOT 0.2 10/25/2024      Latest Reference Range & Units 10/25/24 13:46   Hemoglobin A1C 4.80 - 5.60 % 11.20 (H)   (H): Data is abnormally high    RADIOLOGY:  ECHO 10/17/24:  Clinical Indication    Other Reasons; Additional Reasons; Reasons Uncertain in Most Circumstances; Other (Please Comment) - CAD/ PREOP CABG   Dx: Coronary artery disease involving native coronary artery of native heart, unspecified whether angina present [I25.10 (ICD-10-CM)]     Interpretation Summary         Normal left ventricular cavity size and wall thickness noted. All left ventricular wall segments contract normally.    Left ventricular ejection fraction appears to be 61 - 65%.    The aortic valve is not well visualized. No aortic valve regurgitation or stenosis is present. The aortic valve is grossly normal in structure.    The mitral valve is structurally normal with no regurgitation or significant stenosis present.    There is no evidence of pericardial effusion. .    8/28/24 heart cath:    Indications    Paroxysmal atrial fibrillation [I48.0 (ICD-10-CM)]   Mixed hyperlipidemia [E78.2 (ICD-10-CM)]   Primary hypertension [I10 (ICD-10-CM)]   Chronic chest pain with high risk for CAD [R07.9, G89.29, Z91.89 (ICD-10-CM)]     Pre Procedure Diagnosis    Coronary artery disease    Post Procedure Diagnosis    Coronary artery disease      Conclusion         Normal systolic function.    Mid LAD lesion is 75% stenosed.    Dist LAD lesion is 95% stenosed.    1st Mrg lesion is 90% stenosed.    3rd Mrg lesion is 99% stenosed.    Ost RCA lesion is 70% stenosed.    Mid RCA lesion is 70% stenosed.    RV Branch lesion is 99% stenosed.     1.  Cardiac.   Patient with significant coronary artery disease involving multiple vessels.  Patient will be referred for bypass surgery.    Study Result    Narrative & Impression   EXAM:    CT Angiography Heart With Intravenous Contrast     EXAM DATE:    8/10/2024 9:49 AM     CLINICAL HISTORY:    ; R07.2-Precordial pain     TECHNIQUE:    Axial computed tomographic angiography images of the coronary arteries  with intravenous contrast.  Sublingual nitroglycerine for coronary  vasodilation and IV metoprolol to reduce heart rate were administered as  needed.  This CT exam was performed using one or more of the following  dose reduction techniques:  automated exposure control, adjustment of  the mA and/or kV according to patient size, and/or use of iterative  reconstruction technique.    MIP reconstructed images were created and reviewed.     COMPARISON:    None.     FINDINGS:    LEFT MAIN CORONARY ARTERY:  Left main calcium score 48.    LEFT ANTERIOR DESCENDING ARTERY:  The LAD demonstrates such extensive  mid-distal calcification that the examination is very limited with also  some possible high risk plaque causing about 50% stenosis in the mid  aspect of the LAD.  LAD calcium score of 686.    LEFT CIRCUMFLEX ARTERY:  Question proximal circumflex origin high risk  plaque causing about 75% stenosis.  Circumflex calcium score 62.    RIGHT CORONARY ARTERY:  Mixed calcific and high risk plaque of  proximal RCA possibly contribute up to about 75% or greater stenosis and  should be further evaluated with ICA.  RCA calcium score of 331.       CARDIAC VALVES:  Unremarkable as visualized.  No evidence of  calcification in the aortic or mitral valve leaflets.    PERICARDIUM:  Unremarkable as visualized.  Contour is preserved with  no effusion, thickening or calcification.       AORTA:  No acute findings.  No thoracic aortic aneurysm.  No  dissection.    PULMONARY ARTERIES:  Unremarkable as visualized.  No pulmonary  embolism.    LUNGS AND  PLEURAL SPACES:  Unremarkable as visualized.  No mass.  No  consolidation or edema.  No pleural effusion or thickening.  No  pneumothorax.    MEDIASTINUM:  Unremarkable as visualized.  No mass.    OTHER FINDINGS:  Total calcium score of 1895.     IMPRESSION:  1.  The LAD demonstrates such extensive mid-distal calcification that  the examination is very limited with also some possible high risk plaque  causing about 50% stenosis in the mid aspect of the LAD.  2.  Question proximal circumflex origin high risk plaque causing about  75% stenosis.  3.  Mixed calcific and high risk plaque of proximal RCA possibly  contribute up to about 75% or greater stenosis and should be further  evaluated with ICA.     ASSESSMENT:    CAD RADS N, P4 but overall concerning for significant multifocal  disease in ICA should be considered. Impression.       I reviewed the patient's new clinical results.    Cancer Staging (if applicable)  Cancer Patient: __ yes __no __unknown; If yes, clinical stage T:__ N:__M:__, stage group or __N/A      Impression: Coronary artery disease involving native coronary artery of native heart       Plan: CORONARY ARTERY BYPASS GRAFTING; ENDOSCOPIC VEIN HARVEST; RADIAL ARTERY HARVEST; ATRIAL APPENDAGE EXCLUSION LEFT WITH TRANSESOPHAGEAL ECHOCARDIOGRAM       ZIA Montgomery   10/28/2024   06:39 EDT    Electronically signed by Alex Max MD at 10/28/24 1720       Prior to Admission Medications       Prescriptions Last Dose Informant Patient Reported? Taking?    amLODIPine (NORVASC) 2.5 MG tablet Past Week Self Yes Yes    Take 1 tablet by mouth Daily.    aspirin 81 MG EC tablet 10/27/2024 Self Yes Yes    Take 1 tablet by mouth Daily.    buPROPion XL (WELLBUTRIN XL) 150 MG 24 hr tablet 10/27/2024 Self Yes Yes    Take 1 tablet by mouth Daily.    docusate sodium (COLACE) 100 MG capsule 10/27/2024 Self Yes Yes    Take 1 capsule by mouth 2 (Two) Times a Day.    DULoxetine (CYMBALTA) 60 MG capsule 10/27/2024  Self Yes Yes    Take 1 capsule by mouth Daily.    empagliflozin (JARDIANCE) 25 MG tablet tablet 10/27/2024 Self Yes Yes    Take 1 tablet by mouth Daily.    finasteride (PROSCAR) 5 MG tablet  Self No Yes    TAKE 1 TABLET EVERY DAY    gabapentin (NEURONTIN) 300 MG capsule 10/27/2024 Self Yes Yes    Take 1 capsule by mouth 2 (Two) Times a Day. TAKES 200MG IN AM AND 300MG IN PM USUALLY    HumuLIN 70/30 KwikPen (70-30) 100 UNIT/ML suspension pen-injector 10/27/2024 Self Yes Yes    45 units in the a.m. and 20mg in the p.m.    HYDROcodone-acetaminophen (NORCO)  MG per tablet 10/27/2024 Self Yes Yes    Take 1 tablet by mouth Every 8 (Eight) Hours As Needed for Moderate Pain.    isosorbide mononitrate (IMDUR) 30 MG 24 hr tablet 10/27/2024 Self No Yes    Take 1 tablet by mouth Daily.    levothyroxine (SYNTHROID, LEVOTHROID) 50 MCG tablet 10/27/2024 Self Yes Yes    Take 1 tablet by mouth Every Morning.    losartan (COZAAR) 25 MG tablet 10/27/2024 Self Yes Yes    Take 1 tablet by mouth Daily.    nitroglycerin (NITROSTAT) 0.4 MG SL tablet Past Week Self No Yes    1 under the tongue as needed for angina, may repeat q5mins for up three doses    polyethylene glycol (MIRALAX) 17 g packet 10/27/2024 Self Yes Yes    Take 17 g by mouth Daily.    rosuvastatin (CRESTOR) 20 MG tablet 10/27/2024 Self No Yes    Take 1 tablet by mouth Daily.    sotalol (BETAPACE) 80 MG tablet 10/27/2024  No Yes    TAKE ONE AND A HALF (1 & 1/2) TABLETS BY MOUTH TWICE DAILY    tamsulosin (FLOMAX) 0.4 MG capsule 24 hr capsule 10/27/2024 Self No Yes    TAKE 1 CAPSULE EVERY DAY    Vitamin D, Cholecalciferol, (CHOLECALCIFEROL) 10 MCG (400 UNIT) tablet 10/27/2024 Self Yes Yes    Take 1 tablet by mouth Daily.    apixaban (ELIQUIS) 5 MG tablet tablet 10/25/2024 Self No No    Take 1 tablet by mouth 2 (Two) Times a Day.    Patient taking differently:  Take 1 tablet by mouth 2 (Two) Times a Day. LAST DOSE WILL BE ON 10/25/24    glimepiride (AMARYL) 2 MG tablet   Self Yes No    Take 1 tablet by mouth 2 (Two) Times a Day.             Physician Progress Notes (most recent note)        Leda Gtz APRN at 10/30/24 1011          Cardiology Progress Note      Reason for visit:    Coronary artery disease status post CABG  Paroxysmal atrial fibrillaton    IDENTIFICATION: 59-year-old gentleman who resides in [facility] Problems    Diagnosis  POA    **CAD s/p CABG x 4, MAZE, LAAL 10/28/24 [I25.10]  Yes     Priority: High     Abnormal CTA (8/20/2024):Moderate disease in the LAD and 75% stenosis in the circumflex and right coronary artery noted. Patient referred for left heart cath.   Cardiac cath (8/28/2024): Multivessel CAD.  Recommend CABG  Echo (10/17/2024): LVEF 61-65%.  No valvular abnormality  CABG/maze/left atrial pended to ligation (10/28/2024):  Intraoperative DARIAN (10/28/2024): LVEF 55%.  Obliteration of TORSTEN by clip.  LIMA to LAD.  SVG to OM1.  SVG to OM 4, SVG to OM 5       DM (diabetes mellitus), type 2 [E11.9]  Yes     Priority: High     hemoglobin A1c 12.6% August 2024, 11.2% October 2024       Primary hypertension [I10]  Yes     Priority: Medium    Paroxysmal atrial fibrillation [I48.0]  Yes     Priority: Medium     Echo (3/10/2023): EF 60-65%.  Grade 1 diastolic dysfunction.  Normal valvular morphology.  CHADsVasc 3  Previously on sotalol  Left atrial appendage clipping performed at time of CABG 10/28/2024      Hyperlipidemia LDL goal <70 [E78.5]  Yes     Priority: Low       Subjective     Chest tubes and wires were discontinued earlier.  He is maintaining normal sinus rhythm on telemetry.  He is sitting in the bed appears drowsy.  He is oriented to self but is somewhat confused.  He was unable to tell me the year.  According to the nurse he has been receiving quite a bit of pain medication.  No family is at bedside.        Objective   Vital Sign Min/Max for last 24 hours  Temp  Min: 99.1 °F (37.3 °C)  Max: 100.4 °F (38 °C)   BP  Min:  "113/83  Max: 160/110   Pulse  Min: 72  Max: 92   Resp  Min: 16  Max: 24   SpO2  Min: 91 %  Max: 96 %   Flow (L/min) (Oxygen Therapy)  Min: 3  Max: 3      Intake/Output Summary (Last 24 hours) at 10/30/2024 1012  Last data filed at 10/30/2024 0600  Gross per 24 hour   Intake 566 ml   Output 2280 ml   Net -1714 ml           Physical Exam  Constitutional:       General: He is awake.   Cardiovascular:      Rate and Rhythm: Normal rate and regular rhythm.      Heart sounds: No murmur heard.     Comments: Trace lower extremity edema and left hand edema  Pulmonary:      Effort: Pulmonary effort is normal.      Breath sounds: Decreased breath sounds present.   Skin:     General: Skin is warm and dry.   Psychiatric:         Behavior: Behavior is cooperative.         Cognition and Memory: Memory is impaired.         Tele: Normal sinus rhythm    Results Review (reviewed the patient's recent labs in the electronic medical record):     EKG (10/30/2024): Normal sinus rhythm    CXR (10/30/2024): New small right pneumothorax      ECHO (10/20/2024): EF 61-65%.  No significant valvular abnormality     Results from last 7 days   Lab Units 10/30/24  0307 10/29/24  0636 10/29/24  0332 10/28/24  2254 10/28/24  1802 10/28/24  1339 10/25/24  1346   SODIUM mmol/L 145  --  140  --  143 142 136   POTASSIUM mmol/L 4.6  --  4.7 4.5 4.3 3.2* 4.7   CHLORIDE mmol/L 112*  --  108*  --  110* 109* 99   BUN mg/dL 20  --  12  --  11 11 10   CREATININE mg/dL 0.88  --  0.77  --  0.84 0.86 0.91   MAGNESIUM mg/dL  --  2.4 1.9  --  2.2 2.1 2.6*         Results from last 7 days   Lab Units 10/30/24  0307 10/29/24  0332 10/28/24  1802   WBC 10*3/mm3 14.30* 6.45 7.24   HEMOGLOBIN g/dL 10.6* 10.2* 10.0*   HEMATOCRIT % 33.1* 31.3* 29.7*   PLATELETS 10*3/mm3 150 113* 125*       Lab Results   Component Value Date    HGBA1C 11.20 (H) 10/25/2024       No results found for: \"CHOL\", \"CHLPL\", \"TRIG\", \"HDL\", \"LDL\", \"LDLDIRECT\"      Assessment     Coronary artery " "disease  Status post CABG with Dr. Max 10/28  Aspirin 325 mg daily     Paroxysmal atrial fibrillaton  Historically on sotalol and Eliquis  Maze and left atrial appendage ligation performed at time of CABG 10/28.  Intraoperative DARIAN shows obliteration of TORSTEN by clip  Maintaining normal sinus rhythm     Type 2 diabetes mellitus  Historically uncontrolled with hemoglobin A1c 11 and 12  Was on Jardiance 25 mg daily at home     Hypertension/hypotension  Current /110  Metoprolol tartrate 12.5 mg twice daily  Was on amlodipine and losartan at home but currently on hold       Hyperlipidemia  Lipitor 40 mg daily        Plan     Increase metoprolol to tartrate to 25 mg twice daily  Defer NOAC due to left atrial appendage clipping  Start home dose losartan 25 mg daily  Start home dose Jardiance 25 mg daily  Agree with dose of IV Bumex    Electronically signed by ZIA Poole, 10/30/24, 10:12 AM EDT.    Electronically signed by Leda Gtz APRN at 10/30/24 1140          Physical Therapy Notes (most recent note)        Cristina Varela, PT at 10/30/24 0887  Version 1 of 1         Patient Name: Joel Galo  : 1965    MRN: [id number]                              Today's Date: 10/30/2024       Admit Date: 10/28/2024    Visit Dx:     ICD-10-CM ICD-9-CM   1. ASCVD (arteriosclerotic cardiovascular disease)  I25.10 429.2     440.9     Patient Active Problem List   Diagnosis    Paroxysmal atrial fibrillation    Obstructive sleep apnea    Hypothyroidism (acquired)    GERD (gastroesophageal reflux disease)    Chronic anticoagulation    DM (diabetes mellitus), type 2    Hyperlipidemia LDL goal <70    Primary hypertension    CAD s/p CABG x 4, MAZE, LAAL 10/28/24     Past Medical History:   Diagnosis Date    A-fib     Anxiety     Arthritis     DDD (degenerative disc disease), lumbar     Deep vein thrombosis     Depression     Diabetes     Disease of thyroid gland     HYPO    Heart attack     \"5-6 years ago\" "    Hyperlipidemia     Hypertension     Kidney stone     Neuropathy     Sleep apnea     DOES NOT USE CPAP    UTI (urinary tract infection)      Past Surgical History:   Procedure Laterality Date    ATRIAL APPENDAGE EXCLUSION LEFT WITH TRANSESOPHAGEAL ECHOCARDIOGRAM N/A 10/28/2024    Procedure: ATRIAL APPENDAGE EXCLUSION LEFT WITH TRANSESOPHAGEAL ECHOCARDIOGRAM;  Surgeon: Alex Max MD;  Location:  [facility];  Service: Cardiothoracic;  Laterality: N/A;    CARDIAC CATHETERIZATION N/A 08/28/2024    Procedure: Left Heart Cath;  Surgeon: Julio Neff MD;  [facility]:  [facility];  Service: Cardiology;  Laterality: N/A;    CARDIAC ELECTROPHYSIOLOGY PROCEDURE N/A 04/05/2023    Procedure: Loop insertion;  Surgeon: Tariq Chávez MD;  Location:  [facility];  Service: Cardiovascular;  Laterality: N/A;    CATARACT EXTRACTION Bilateral     COLONOSCOPY      CORONARY ARTERY BYPASS GRAFT N/A 10/28/2024    Procedure: MEDIAN STERNOTOMY, CORONARY ARTERY BYPASS GRAFTING X4 UTILIZING THE LEFT INTERNAL MAMMARY ARTERY GRAFT, ENDOSCOPIC VEIN HARVEST OF THE GREATER RIGHT SAPHENOUS VEIN;  Surgeon: Alex Max MD;  Location:  [facility];  Service: Cardiothoracic;  Laterality: N/A;    ENDOSCOPY      MAZE PROCEDURE N/A 10/28/2024    Procedure: MAZE PROCEDURE;  Surgeon: Alex Max MD;  Location:  [facility];  Service: Cardiothoracic;  Laterality: N/A;    TEETH EXTRACTION        General Information       Row Name 10/30/24 1112          Physical Therapy Time and Intention    Document Type therapy note (daily note)  -ND     Mode of Treatment physical therapy  -ND       Row Name 10/30/24 1112          General Information    Patient Profile Reviewed yes  -ND     Existing Precautions/Restrictions sternal;fall;cardiac  -ND     Barriers to Rehab medically complex;cognitive status  -ND       Row Name 10/30/24 1112          Cognition    Orientation Status (Cognition) oriented to;person;situation;disoriented  to;place;time  -ND       Row Name 10/30/24 1112          Safety Issues/Impairments Affecting Functional Mobility    Safety Issues Affecting Function (Mobility) ability to follow commands;impulsivity;insight into deficits/self-awareness;judgment;problem-solving;safety precaution awareness;safety precautions follow-through/compliance;sequencing abilities  -ND     Impairments Affecting Function (Mobility) balance;cognition;coordination;endurance/activity tolerance;pain;postural/trunk control;strength  -ND     Cognitive Impairments, Mobility Safety/Performance attention;impulsivity;insight into deficits/self-awareness;judgment;problem-solving/reasoning;safety precaution awareness;safety precaution follow-through;sequencing abilities  -ND               User Key  (r) = Recorded By, (t) = Taken By, (c) = Cosigned By      Initials Name Provider Type    ND Cristina Varela, PT Physical Therapist                   Mobility       Row Name 10/30/24 1113          Bed Mobility    Bed Mobility sit-supine  -ND     Sit-Supine [place] (Bed Mobility) maximum assist (25% patient effort);2 person assist;verbal cues;nonverbal cues (demo/gesture)  -ND     Comment, (Bed Mobility) Pt found sitting [facility]IC with OT. Pt wishes to return to bed at end of session.  -ND       Row Name 10/30/24 1113          Transfers    Comment, (Transfers) Following 1st STS pt demo's bilateral knee buckling and returns to sitting without warning. Pt endorses reason for doing so is due to pain. Cues provided t/o session for hand placement to maintain sternal precautions.  -ND       Row Name 10/30/24 1113          Bed-Chair Transfer    Bed-Chair [place] (Transfers) maximum assist (25% patient effort);2 person assist;nonverbal cues (demo/gesture);verbal cues  -ND     Assistive Device (Bed-Chair Transfers) other (see comments)  BUE support  -ND     Comment, (Bed-Chair Transfer) Pt with decreased command follow and increased time for task t/o transfer.  -ND        Row Name 10/30/24 1113          Sit-Stand Transfer    Sit-Stand [place] (Transfers) moderate assist (50% patient effort);2 person assist;verbal cues;nonverbal cues (demo/gesture)  -ND     Assistive Device (Sit-Stand Transfers) other (see comments)  BUE support on tripod monitor.  -ND     Comment, (Sit-Stand Transfer) x3 from chair: 1st attempt to returns to sitting without warning, 2nd STS with attempt to initiate ambulation but pt with decreased command follow and cued to return to sitting, 3rd attempt initiate SPT to return to EOB.  -ND       Row Name 10/30/24 1113          Gait/Stairs (Locomotion)    [place] Level (Gait) unable to assess  -ND     Comment, (Gait/Stairs) Attempted to initiate gait this date but pt with decreased command following.  -ND               User Key  (r) = Recorded By, (t) = Taken By, (c) = Cosigned By      Initials Name Provider Type    Cristina Cheema, PT Physical Therapist                   Obj/Interventions       Row Name 10/30/24 1120          Balance    Balance Assessment sitting static balance;sitting dynamic balance;standing static balance;standing dynamic balance  -ND     Static Sitting Balance minimal assist;verbal cues;non-verbal cues (demo/gesture)  -ND     Dynamic Sitting Balance moderate assist;2-person assist;verbal cues;non-verbal cues (demo/gesture)  -ND     Position, Sitting Balance unsupported;supported;sitting in chair  -ND     Static Standing Balance moderate assist;2-person assist;verbal cues;non-verbal cues (demo/gesture)  -ND     Dynamic Standing Balance maximum assist;2-person assist;verbal cues;non-verbal cues (demo/gesture)  -ND     Position/Device Used, Standing Balance supported;other (see comments)  BUE support and tripod monitor.  -ND     Balance Interventions sitting;standing;sit to stand;supported;static;dynamic  -ND     Comment, Balance R sided lean noted in sitting and standing, cues to correct to midline but pt unable to correct.   -ND               User Key  (r) = Recorded By, (t) = Taken By, (c) = Cosigned By      Initials Name Provider Type    ND Cristina Varela, PT Physical Therapist                   Goals/Plan    No documentation.                  Clinical Impression       Row Name 10/30/24 1121          Pain    Additional Documentation Pain Scale: FACES Pre/Post-Treatment (Group)  -ND       Row Name 10/30/24 1121          Pain Scale: FACES Pre/Post-Treatment    Pain: FACES Scale, Pretreatment 2-->hurts little bit  -ND     Posttreatment Pain Rating 4-->hurts little more  -ND     Pre/Posttreatment Pain Comment RN present and managing.  -ND       Row Name 10/30/24 1121          Plan of Care Review    Plan of Care Reviewed With spouse  -ND     Progress declining  -ND     Outcome Evaluation Pt limited in functional mobility this date by increased pain and decreased command following. Pt would benefit from continued skilled therapy to address strength, balance and coordination, sequencing, and activity tolerance deficits and facilitate improved functional strength and mobility. Recommend IRF following d/c for best functional outcome.  -ND       Row Name 10/30/24 1121          Vital Signs    Pre Systolic BP Rehab 160  -ND     Pre Treatment Diastolic   -ND     Post Systolic BP Rehab 165  -ND     Post Treatment Diastolic BP 87  -ND     Pretreatment Heart Rate (beats/min) 92  -ND     Posttreatment Heart Rate (beats/min) 93  -ND     Pre SpO2 (%) 92  -ND     O2 Delivery Pre Treatment nasal cannula  -ND     O2 Delivery Intra Treatment nasal cannula  -ND     Post SpO2 (%) 91  -ND     O2 Delivery Post Treatment nasal cannula  -ND     Pre Patient Position Sitting  -ND     Intra Patient Position Standing  -ND     Post Patient Position Supine  -ND       Row Name 10/30/24 1121          Positioning and Restraints    Pre-Treatment Position sitting in chair/recliner  -ND     Post Treatment Position bed  -ND     In Bed notified nsg;supine;with  nsg;call light within reach;encouraged to call for assist;exit alarm on;side rails up x3;RUE elevated;LUE elevated  -ND               User Key  (r) = Recorded By, (t) = Taken By, (c) = Cosigned By      Initials Name Provider Type    Cristina Cheema, MAEGAN Physical Therapist                   Outcome Measures       Row Name 10/30/24 1124          How much help from another person do you currently need...    Turning from your back to your side while in flat bed without using bedrails? 2  -ND     Moving from lying on back to sitting on the side of a flat bed without bedrails? 2  -ND     Moving to and from a bed to a chair (including a wheelchair)? 2  -ND     Standing up from a chair using your arms (e.g., wheelchair, bedside chair)? 2  -ND     Climbing 3-5 steps with a railing? 1  -ND     To walk in hospital room? 1  -ND     AM-PAC 6 Clicks Score (PT) 10  -ND     Highest Level of Mobility Goal 4 --> Transfer to chair/commode  -ND       Row Name 10/30/24 1124 10/30/24 0808       Functional Assessment    Outcome Measure Options AM-PAC 6 Clicks Basic Mobility (PT)  -ND AM-PAC 6 Clicks Daily Activity (OT)  -TA              User Key  (r) = Recorded By, (t) = Taken By, (c) = Cosigned By      Initials Name Provider Type    Marbin Elias OT Occupational Therapist    Cristina Cheema, MAEGAN Physical Therapist                                 Physical Therapy Education       Title: PT OT SLP Therapies (In Progress)       Topic: Physical Therapy (In Progress)       Point: Mobility training (In Progress)       Learning Progress Summary            Patient Acceptance, E, NR by ND at 10/30/2024 1124    Acceptance, E, NR by ND at 10/29/2024 1024                      Point: Home exercise program (Not Started)       Learner Progress:  Not documented in this visit.              Point: Body mechanics (In Progress)       Learning Progress Summary            Patient Acceptance, E, NR by ND at 10/30/2024 1124    Acceptance, E,  NR by ND at 10/29/2024 1024                      Point: Precautions (In Progress)       Learning Progress Summary            Patient Acceptance, E, NR by ND at 10/30/2024 1124    Acceptance, E, NR by ND at 10/29/2024 1024                                      User Key       Initials Effective Dates Name Provider Type [facility] 11/16/23 -  Cristina Varela, MAEGAN Physical Therapist PT                  PT Recommendation and Plan  Planned Therapy Interventions (PT): balance training, bed mobility training, gait training, home exercise program, patient/family education, transfer training, postural re-education, stair training, ROM (range of motion), strengthening  Progress: declining  Outcome Evaluation: Pt limited in functional mobility this date by increased pain and decreased command following. Pt would benefit from continued skilled therapy to address strength, balance and coordination, sequencing, and activity tolerance deficits and facilitate improved functional strength and mobility. Recommend IRF following d/c for best functional outcome.     Time Calculation:   PT Evaluation Complexity  History, PT Evaluation Complexity: 3 or more personal factors and/or comorbidities  Examination of Body Systems (PT Eval Complexity): total of 4 or more elements  Clinical Presentation (PT Evaluation Complexity): evolving  Clinical Decision Making (PT Evaluation Complexity): moderate complexity  Overall Complexity (PT Evaluation Complexity): moderate complexity     PT Charges       Row Name 10/30/24 1124             Time Calculation    Start Time 0824  -ND      PT Received On 10/30/24  -ND         Timed Charges    34814 - PT Therapeutic Activity Minutes 11 -ND         Total Minutes    Timed Charges Total Minutes 11 -ND       Total Minutes 11 -ND                User Key  (r) = Recorded By, (t) = Taken By, (c) = Cosigned By      Initials Name Provider Type    Cristina Cheema, MAEGAN Physical Therapist               "    Therapy Charges for Today       Code Description Service Date Service Provider Modifiers Qty    50758572899  GAIT TRAINING EA 15 MIN 10/29/2024 Cristina Varela, PT GP 1    49967599671 HC PT EVAL MOD COMPLEXITY 3 10/29/2024 Cristina Varela, PT GP 1    [id number] HC PT THERAPEUTIC ACT EA 15 MIN 10/30/2024 Cristina Varela, PT GP 1            PT G-Codes  Outcome Measure Options: AM-PAC 6 Clicks Basic Mobility (PT)  AM-PAC 6 Clicks Score (PT): 10  AM-PAC 6 Clicks Score (OT): 9  PT Discharge Summary  Anticipated Discharge Disposition (PT): inpatient rehabilitation facility    Cristina Varela PT  10/30/2024      Electronically signed by Cristina Varela, PT at 10/30/24 1126          Occupational Therapy Notes (most recent note)        Marbin Benjamin, OT at 10/30/24 0808          Patient Name: Joel Galo  : 1965    MRN: [id number]                              Today's Date: 10/30/2024       Admit Date: 10/28/2024    Visit Dx:     ICD-10-CM ICD-9-CM   1. ASCVD (arteriosclerotic cardiovascular disease)  I25.10 429.2     440.9     Patient Active Problem List   Diagnosis    Paroxysmal atrial fibrillation    Obstructive sleep apnea    Hypothyroidism (acquired)    GERD (gastroesophageal reflux disease)    Chronic anticoagulation    DM (diabetes mellitus), type 2    Hyperlipidemia LDL goal <70    Primary hypertension    CAD s/p CABG x 4, MAZE, LAAL 10/28/24     Past Medical History:   Diagnosis Date    A-fib     Anxiety     Arthritis     DDD (degenerative disc disease), lumbar     Deep vein thrombosis     Depression     Diabetes     Disease of thyroid gland     HYPO    Heart attack     \"5-6 years ago\"    Hyperlipidemia     Hypertension     Kidney stone     Neuropathy     Sleep apnea     DOES NOT USE CPAP    UTI (urinary tract infection)      Past Surgical History:   Procedure Laterality Date    ATRIAL APPENDAGE EXCLUSION LEFT WITH TRANSESOPHAGEAL ECHOCARDIOGRAM N/A 10/28/2024    Procedure: ATRIAL " APPENDAGE EXCLUSION LEFT WITH TRANSESOPHAGEAL ECHOCARDIOGRAM;  Surgeon: Alex Max MD;  Location:  [facility];  Service: Cardiothoracic;  Laterality: N/A;    CARDIAC CATHETERIZATION N/A 08/28/2024    Procedure: Left Heart Cath;  Surgeon: Julio Neff MD;  [facility]:  [facility];  Service: Cardiology;  Laterality: N/A;    CARDIAC ELECTROPHYSIOLOGY PROCEDURE N/A 04/05/2023    Procedure: Loop insertion;  Surgeon: Tariq Chávez MD;  Location:  [facility];  Service: Cardiovascular;  Laterality: N/A;    CATARACT EXTRACTION Bilateral     COLONOSCOPY      CORONARY ARTERY BYPASS GRAFT N/A 10/28/2024    Procedure: MEDIAN STERNOTOMY, CORONARY ARTERY BYPASS GRAFTING X4 UTILIZING THE LEFT INTERNAL MAMMARY ARTERY GRAFT, ENDOSCOPIC VEIN HARVEST OF THE GREATER RIGHT SAPHENOUS VEIN;  Surgeon: Alex Max MD;  Location:  [facility];  Service: Cardiothoracic;  Laterality: N/A;    ENDOSCOPY      MAZE PROCEDURE N/A 10/28/2024    Procedure: MAZE PROCEDURE;  Surgeon: Alex Max MD;  Location:  [facility];  Service: Cardiothoracic;  Laterality: N/A;    TEETH EXTRACTION        General Information       Row Name 10/30/24 0808          OT Time and Intention    Document Type evaluation  -TA     Mode of Treatment occupational therapy  -TA       Row Name 10/30/24 0808          General Information    Patient Profile Reviewed yes  -TA     Prior Level of Function all household mobility;gait;transfer;ADL's  Chart review indicates pt was ambulating with SPC, will clarify fx; level of ADLs when more oriented.  -TA     Existing Precautions/Restrictions cardiac;fall;sternal  -TA     Barriers to Rehab medically complex;cognitive status  -TA       Row Name 10/30/24 0808          Occupational Profile    Reason for Services/Referral (Occupational Profile) fxl decline from PLOF  -TA     Patient Goals (Occupational Profile) none stated  -TA       Row Name 10/30/24 0808          Living Environment    People in  Home spouse  -TA       Row Name 10/30/24 0808          Home Main Entrance    Number of Stairs, Main Entrance none  ramp at entrance  -TA       Row Name 10/30/24 0808          Stairs Within Home, Primary    Number of Stairs, Within Home, Primary none  -TA       Row Name 10/30/24 0808          Cognition    Orientation Status (Cognition) oriented to;person;situation;disoriented to;place;time  very lethargic and difficulty maintaining eyes open  -TA       Row Name 10/30/24 0808          Safety Issues/Impairments Affecting Functional Mobility    Safety Issues Affecting Function (Mobility) safety precautions follow-through/compliance;impulsivity;safety precaution awareness;insight into deficits/self-awareness;ability to follow commands;awareness of need for assistance  -TA     Impairments Affecting Function (Mobility) balance;cognition;coordination;endurance/activity tolerance;grasp;pain;strength  -TA               User Key  (r) = Recorded By, (t) = Taken By, (c) = Cosigned By      Initials Name Provider Type    TA Marbin Benjamin OT Occupational Therapist                     Mobility/ADL's       Row Name 10/30/24 0849          Transfers    Transfers sit-stand transfer;bed-chair transfer  -TA     Comment, (Transfers) STS x 2 trials  -TA       Row Name 10/30/24 0849          Bed-Chair Transfer    Bed-Chair [place] (Transfers) maximum assist (25% patient effort);2 person assist;verbal cues  -TA     Assistive Device (Bed-Chair Transfers) --  BUE support/gait belt  -TA       Row Name 10/30/24 0849          Sit-Stand Transfer    Sit-Stand [place] (Transfers) moderate assist (50% patient effort);2 person assist;verbal cues  -TA     Assistive Device (Sit-Stand Transfers) walker, front-wheeled  -TA       Row Name 10/30/24 0849          Activities of Daily Living    BADL Assessment/Intervention lower body dressing;grooming;toileting  -TA       Row Name 10/30/24 0849          Lower Body Dressing Assessment/Training     [place] Level (Lower Body Dressing) lower body dressing skills;dependent (less than 25% patient effort)  clinical projection of fxl level  -TA       Row Name 10/30/24 0849          Grooming Assessment/Training    [place] Level (Grooming) maximum assist (25% patient effort);wash face, hands  [place] assist  -TA     Position (Grooming) supported sitting  -TA       Row Name 10/30/24 0849          Toileting Assessment/Training    [place] Level (Toileting) toileting skills;dependent (less than 25% patient effort)  clinical projection of fxl level  -TA               User Key  (r) = Recorded By, (t) = Taken By, (c) = Cosigned By      Initials Name Provider Type    Marbin Elias, OT Occupational Therapist                   Obj/Interventions       Row Name 10/30/24 0808          Sensory Assessment (Somatosensory)    Sensory Assessment (Somatosensory) bilateral UE;sensation intact  appears grossly intact  -TA       Row Name 10/30/24 0808          Vision Assessment/Intervention    Visual Impairment/Limitations WFL;unable/difficult to assess  appears geossly intact  -TA       Row Name 10/30/24 0808          Range of Motion Comprehensive    General Range of Motion no range of motion deficits identified  -TA     Comment, General Range of Motion AAROM within sternal precautions  -TA       Row Name 10/30/24 0808          Strength Comprehensive (MMT)    General Manual Muscle Testing (MMT) Assessment upper extremity strength deficits identified  -TA       Row Name 10/30/24 0808          Upper Extremity (Manual Muscle Testing)    Comment, MMT: Upper Extremity BUE 3-/5  -TA       Row Name 10/30/24 0808          Shoulder (Therapeutic Exercise)    Shoulder (Therapeutic Exercise) AAROM (active assistive range of motion)  -TA     Shoulder AAROM (Therapeutic Exercise) bilateral;flexion;extension;5 repetitions;sitting  within sternal precautions  -TA       Row Name 10/30/24 0808          Elbow/Forearm (Therapeutic  Exercise)    Elbow/Forearm (Therapeutic Exercise) AAROM (active assistive range of motion)  -TA     Elbow/Forearm AAROM (Therapeutic Exercise) bilateral;flexion;extension;10 repetitions;sitting  -TA       Row Name 10/30/24 0808          Hand (Therapeutic Exercise)    Hand (Therapeutic Exercise) AROM (active range of motion)  -TA     Hand AROM/AAROM (Therapeutic Exercise) AAROM (active assistive range of motion);finger flexion;finger extension;10 repetitions  -TA       Row Name 10/30/24 0808          Motor Skills    Therapeutic Exercise shoulder;elbow/forearm;hand  -TA       Row Name 10/30/24 0808          Balance    Balance Assessment sitting static balance;standing static balance  -TA     Static Sitting Balance moderate assist;contact guard  fluctuating assist, tends to lean to R side  -TA     Position, Sitting Balance sitting in chair  -TA     Static Standing Balance moderate assist;2-person assist;verbal cues  -TA     Position/Device Used, Standing Balance supported;other (see comments)  tele tripod  -TA     Balance Interventions sit to stand;UE activity with balance activity;weight shifting activity;occupation based/functional task  -TA               User Key  (r) = Recorded By, (t) = Taken By, (c) = Cosigned By      Initials Name Provider Type    TA Marbin Benjamin OT Occupational Therapist                   Goals/Plan       Row Name 10/30/24 0808          Bed Mobility Goal 1 (OT)    Activity/Assistive Device (Bed Mobility Goal 1, OT) supine to sit;sit to supine  -TA     [place] Level/Cues Needed (Bed Mobility Goal 1, OT) moderate assist (50-74% patient effort);verbal cues required  -TA     Time Frame (Bed Mobility Goal 1, OT) short term goal (STG)  -TA     Progress/Outcomes (Bed Mobility Goal 1, OT) goal ongoing  -TA       Row Name 10/30/24 0808          Transfer Goal 1 (OT)    Activity/Assistive Device (Transfer Goal 1, OT) sit-to-stand/stand-to-sit;bed-to-chair/chair-to-bed;toilet;commode, bedside  without drop arms  -TA     [place] Level/Cues Needed (Transfer Goal 1, OT) moderate assist (50-74% patient effort);verbal cues required  -TA     Time Frame (Transfer Goal 1, OT) long term goal (LTG)  -TA     Progress/Outcome (Transfer Goal 1, OT) goal ongoing  -TA       Row Name 10/30/24 0808          Toileting Goal 1 (OT)    Activity/Device (Toileting Goal 1, OT) adjust/manage clothing;perform perineal hygiene  -TA     [place] Level/Cues Needed (Toileting Goal 1, OT) moderate assist (50-74% patient effort)  -TA     Time Frame (Toileting Goal 1, OT) long term goal (LTG)  -TA     Progress/Outcome (Toileting Goal 1, OT) goal ongoing  -TA       Row Name 10/30/24 0808          Therapy Assessment/Plan (OT)    Planned Therapy Interventions (OT) activity tolerance training;BADL retraining;functional balance retraining;occupation/activity based interventions;patient/caregiver education/training;ROM/therapeutic exercise;strengthening exercise;transfer/mobility retraining  -TA               User Key  (r) = Recorded By, (t) = Taken By, (c) = Cosigned By      Initials Name Provider Type    Marbin Elias OT Occupational Therapist                   Clinical Impression       Row Name 10/30/24 0808          Pain Assessment    Additional Documentation Pain Scale: FACES Pre/Post-Treatment (Group)  -TA       Row Name 10/30/24 0808          Pain Scale: FACES Pre/Post-Treatment    Pain: FACES Scale, Pretreatment 2-->hurts little bit  -TA     Posttreatment Pain Rating 2-->hurts little bit  -TA     Pre/Posttreatment Pain Comment RN present and aware  -TA       Row Name 10/30/24 0808          Plan of Care Review    Plan of Care Reviewed With patient  -TA     Outcome Evaluation Pt presents with fxl decline from PLOF, deficits in ADL performance, fxl mobility, occupational endurance. Pt limited by lethargy, confusion, impulsivity, generalized weakness, decreased balance. Pt required [place] grooming, anticipate  dependent for LBD and toileting. Pt will benefit from skilled OT services to address deficits, facilitate increased fxl I. Recommend IRF at discharge.  -TA       Row Name 10/30/24 0808          Therapy Assessment/Plan (OT)    Patient/Family Therapy Goal Statement (OT) nobe stated  -TA     Rehab Potential (OT) good  -TA     Criteria for Skilled Therapeutic Interventions Met (OT) yes;skilled treatment is necessary  -TA     Therapy Frequency (OT) daily  -TA     Predicted Duration of Therapy Intervention (OT) 1 week  -TA       Row Name 10/30/24 0808          Therapy Plan Review/Discharge Plan (OT)    Anticipated Discharge Disposition (OT) inpatient rehabilitation facility  -TA       Row Name 10/30/24 0808          Vital Signs    Pre Systolic BP Rehab 160  -TA     Pre Treatment Diastolic   -TA     Post Systolic BP Rehab 165  -TA     Post Treatment Diastolic BP 87  -TA     Pre SpO2 (%) 92  -TA     O2 Delivery Pre Treatment supplemental O2  -TA     Intra SpO2 (%) 90  -TA     O2 Delivery Intra Treatment supplemental O2  -TA     Post SpO2 (%) 91  -TA     O2 Delivery Post Treatment supplemental O2  -TA     Pre Patient Position Sitting  -TA     Intra Patient Position Standing  -TA     Post Patient Position Supine  -TA       Row Name 10/30/24 0808          Positioning and Restraints    Pre-Treatment Position sitting in chair/recliner  -TA     Post Treatment Position bed  -TA     In Bed notified nsg;supine;call light within reach;encouraged to call for assist;exit alarm on;side rails up x3;RUE elevated;LUE elevated;with nsg;legs elevated  -TA               User Key  (r) = Recorded By, (t) = Taken By, (c) = Cosigned By      Initials Name Provider Type    Marbin Elias, OT Occupational Therapist                   Outcome Measures       Row Name 10/30/24 0808          How much help from another is currently needed...    Putting on and taking off regular lower body clothing? 1  -TA     Bathing (including washing,  rinsing, and drying) 1  -TA     Toileting (which includes using toilet bed pan or urinal) 1  -TA     Putting on and taking off regular upper body clothing 2  -TA     Taking care of personal grooming (such as brushing teeth) 2  -TA     Eating meals 2  -TA     AM-PAC 6 Clicks Score (OT) 9  -TA       Row Name 10/30/24 0808          Functional Assessment    Outcome Measure Options AM-PAC 6 Clicks Daily Activity (OT)  -TA               User Key  (r) = Recorded By, (t) = Taken By, (c) = Cosigned By      Initials Name Provider Type    Marbin Elias OT Occupational Therapist                    Occupational Therapy Education       Title: PT OT SLP Therapies (In Progress)       Topic: Occupational Therapy (In Progress)       Point: ADL training (Not Started)       Description:   Instruct learner(s) on proper safety adaptation and remediation techniques during self care or transfers.   Instruct in proper use of assistive devices.                  Learner Progress:  Not documented in this visit.              Point: Home exercise program (Not Started)       Description:   Instruct learner(s) on appropriate technique for monitoring, assisting and/or progressing therapeutic exercises/activities.                  Learner Progress:  Not documented in this visit.              Point: Precautions (In Progress)       Description:   Instruct learner(s) on prescribed precautions during self-care and functional transfers.                  Learning Progress Summary            Patient Acceptance, E,D, NR by TA at 10/30/2024 0905                      Point: Body mechanics (In Progress)       Description:   Instruct learner(s) on proper positioning and spine alignment during self-care, functional mobility activities and/or exercises.                  Learning Progress Summary            Patient Acceptance, E,D, NR by TA at 10/30/2024 0905                                      User Key       Initials Effective Dates Name Provider Type  Discipline    TA 06/16/21 -  Marbin Benjamin OT Occupational Therapist OT                  OT Recommendation and Plan  Planned Therapy Interventions (OT): activity tolerance training, BADL retraining, functional balance retraining, occupation/activity based interventions, patient/caregiver education/training, ROM/therapeutic exercise, strengthening exercise, transfer/mobility retraining  Therapy Frequency (OT): daily  Plan of Care Review  Plan of Care Reviewed With: patient  Outcome Evaluation: Pt presents with fxl decline from PLOF, deficits in ADL performance, fxl mobility, occupational endurance. Pt limited by lethargy, confusion, impulsivity, generalized weakness, decreased balance. Pt required [place] Max A grooming, anticipate dependent for LBD and toileting. Pt will benefit from skilled OT services to address deficits, facilitate increased fxl I. Recommend IRF at discharge.     Time Calculation:   Evaluation Complexity (OT)  Review Occupational Profile/Medical/Therapy History Complexity: expanded/moderate complexity  Assessment, Occupational Performance/Identification of Deficit Complexity: 3-5 performance deficits  Clinical Decision Making Complexity (OT): detailed assessment/moderate complexity  Overall Complexity of Evaluation (OT): moderate complexity     Time Calculation- OT       Row Name 10/30/24 0808             Time Calculation- OT    OT Start Time 0808  ttc 0 minutes  -TA      Total Timed Code Minutes- OT 0 minute(s)  -TA      OT Received On 10/30/24  -TA      OT Goal Re-Cert Due Date 11/09/24  -TA         Untimed Charges    OT Eval/Re-eval Minutes 55  -TA         Total Minutes    Untimed Charges Total Minutes 55  -TA       Total Minutes 55  -TA                User Key  (r) = Recorded By, (t) = Taken By, (c) = Cosigned By      Initials Name Provider Type    Marbin Elias OT Occupational Therapist                  Therapy Charges for Today       Code Description Service Date Service  Provider Modifiers Qty    77689633540 HC OT EVAL MOD COMPLEXITY 4 10/30/2024 Marbin Benjamin, OT GO 1                 Marbin Benjamin OT  10/30/2024    Electronically signed by Marbin Benjamin, OT at 10/30/24 0907

## 2024-10-30 NOTE — PLAN OF CARE
Goal Outcome Evaluation:  Plan of Care Reviewed With: spouse        Progress: declining  Outcome Evaluation: Pt limited in functional mobility this date by increased pain and decreased command following. Pt would benefit from continued skilled therapy to address strength, balance and coordination, sequencing, and activity tolerance deficits and facilitate improved functional strength and mobility. Recommend IRF following d/c for best functional outcome.    Anticipated Discharge Disposition (PT): inpatient rehabilitation facility

## 2024-10-30 NOTE — CASE MANAGEMENT/SOCIAL WORK
Continued Stay Note  [facility]     Patient Name: Joel Galo  MRN: [id number]  Today's Date: 10/30/2024    Admit Date: 10/28/2024    Plan: SNF   Discharge Plan       Row Name 10/30/24 7870       Plan    Plan SNF    Patient/Family in Agreement with Plan yes    Plan Comments Discussed in MDR. Attempt made to talk to patient at bedside without success r/t continued to fall asleep. Verbal given that cm can call spouse to discuss SNF. Attempt made to x2 to contact spouse via phone without success. Referrals faxed out to facilities with 20 mile radius of home address to assess for SNF acceptance. CM will cont to follow.    Final Discharge Disposition Code 03 - skilled nursing facility (SNF)                   Discharge Codes    No documentation.                       Aneta Grijalva RN

## 2024-10-30 NOTE — PLAN OF CARE
Problem: Adult Inpatient Plan of Care  Goal: Plan of Care Review  Recent Flowsheet Documentation  Taken 10/30/2024 0808 by Marbin Benjamin, OT  Outcome Evaluation: Pt presents with fxl decline from PLOF, deficits in ADL performance, fxl mobility, occupational endurance. Pt limited by lethargy, confusion, impulsivity, generalized weakness, decreased balance. Pt required [place] Max A grooming, anticipate dependent for LBD and toileting. Pt will benefit from skilled OT services to address deficits, facilitate increased fxl I. Recommend IRF at discharge.   Goal Outcome Evaluation:  Plan of Care Reviewed With: patient           Outcome Evaluation: Pt presents with fxl decline from PLOF, deficits in ADL performance, fxl mobility, occupational endurance. Pt limited by lethargy, confusion, impulsivity, generalized weakness, decreased balance. Pt required [place] Max A grooming, anticipate dependent for LBD and toileting. Pt will benefit from skilled OT services to address deficits, facilitate increased fxl I. Recommend IRF at discharge.    Anticipated Discharge Disposition (OT): inpatient rehabilitation facility

## 2024-10-30 NOTE — THERAPY EVALUATION
"Patient Name: Joel Galo  : 1965    MRN: [id number]                              Today's Date: 10/30/2024       Admit Date: 10/28/2024    Visit Dx:     ICD-10-CM ICD-9-CM   1. ASCVD (arteriosclerotic cardiovascular disease)  I25.10 429.2     440.9     Patient Active Problem List   Diagnosis    Paroxysmal atrial fibrillation    Obstructive sleep apnea    Hypothyroidism (acquired)    GERD (gastroesophageal reflux disease)    Chronic anticoagulation    DM (diabetes mellitus), type 2    Hyperlipidemia LDL goal <70    Primary hypertension    CAD s/p CABG x 4, MAZE, LAAL 10/28/24     Past Medical History:   Diagnosis Date    A-fib     Anxiety     Arthritis     DDD (degenerative disc disease), lumbar     Deep vein thrombosis     Depression     Diabetes     Disease of thyroid gland     HYPO    Heart attack     \"5-6 years ago\"    Hyperlipidemia     Hypertension     Kidney stone     Neuropathy     Sleep apnea     DOES NOT USE CPAP    UTI (urinary tract infection)      Past Surgical History:   Procedure Laterality Date    ATRIAL APPENDAGE EXCLUSION LEFT WITH TRANSESOPHAGEAL ECHOCARDIOGRAM N/A 10/28/2024    Procedure: ATRIAL APPENDAGE EXCLUSION LEFT WITH TRANSESOPHAGEAL ECHOCARDIOGRAM;  Surgeon: Alex Max MD;  Location: [facility];  Service: Cardiothoracic;  Laterality: N/A;    CARDIAC CATHETERIZATION N/A 2024    Procedure: Left Heart Cath;  Surgeon: Julio Neff MD;  [facility]  [facility];  Service: Cardiology;  Laterality: N/A;    CARDIAC ELECTROPHYSIOLOGY PROCEDURE N/A 2023    Procedure: Loop insertion;  Surgeon: Tariq Chávez MD;  Location:  [facility];  Service: Cardiovascular;  Laterality: N/A;    CATARACT EXTRACTION Bilateral     COLONOSCOPY      CORONARY ARTERY BYPASS GRAFT N/A 10/28/2024    Procedure: MEDIAN STERNOTOMY, CORONARY ARTERY BYPASS GRAFTING X4 UTILIZING THE LEFT INTERNAL MAMMARY ARTERY GRAFT, ENDOSCOPIC VEIN HARVEST OF THE GREATER RIGHT " SAPHENOUS VEIN;  Surgeon: Alex Max MD;  Location:  [facility];  Service: Cardiothoracic;  Laterality: N/A;    ENDOSCOPY      MAZE PROCEDURE N/A 10/28/2024    Procedure: MAZE PROCEDURE;  Surgeon: Alex Max MD;  Location:  [facility];  Service: Cardiothoracic;  Laterality: N/A;    TEETH EXTRACTION        General Information       Row Name 10/30/24 08          OT Time and Intention    Document Type evaluation  -TA     Mode of Treatment occupational therapy  -TA       Row Name 10/30/24 08          General Information    Patient Profile Reviewed yes  -TA     Prior Level of Function all household mobility;gait;transfer;ADL's  Chart review indicates pt was ambulating with SPC, will clarify fx; level of ADLs when more oriented.  -TA     Existing Precautions/Restrictions cardiac;fall;sternal  -TA     Barriers to Rehab medically complex;cognitive status  -TA       Row Name 10/30/24 08          Occupational Profile    Reason for Services/Referral (Occupational Profile) fxl decline from PLOF  -TA     Patient Goals (Occupational Profile) none stated  -TA       Row Name 10/30/24 08          Living Environment    People in Home spouse  -TA       Row Name 10/30/24 [facility]          Home Main Entrance    Number of Stairs, Main Entrance none  ramp at entrance  -TA       Row Name 10/30/24 08          Stairs Within Home, Primary    Number of Stairs, Within Home, Primary none  -TA       Row Name 10/30/24 08          Cognition    Orientation Status (Cognition) oriented to;person;situation;disoriented to;place;time  very lethargic and difficulty maintaining eyes open  -TA       Row Name 10/30/24 0808          Safety Issues/Impairments Affecting Functional Mobility    Safety Issues Affecting Function (Mobility) safety precautions follow-through/compliance;impulsivity;safety precaution awareness;insight into deficits/self-awareness;ability to follow commands;awareness of need for assistance  -TA     Impairments Affecting  Function (Mobility) balance;cognition;coordination;endurance/activity tolerance;grasp;pain;strength  -TA               User Key  (r) = Recorded By, (t) = Taken By, (c) = Cosigned By      Initials Name Provider Type    Marbin Elias OT Occupational Therapist                     Mobility/ADL's       Row Name 10/30/24 0849          Transfers    Transfers sit-stand transfer;bed-chair transfer  -TA     Comment, (Transfers) STS x 2 trials  -TA       Row Name 10/30/24 0849          Bed-Chair Transfer    Bed-Chair [place] (Transfers) maximum assist (25% patient effort);2 person assist;verbal cues  -TA     Assistive Device (Bed-Chair Transfers) --  BUE support/gait belt  -TA       Row Name 10/30/24 0849          Sit-Stand Transfer    Sit-Stand [place] (Transfers) moderate assist (50% patient effort);2 person assist;verbal cues  -TA     Assistive Device (Sit-Stand Transfers) walker, front-wheeled  -TA       Row Name 10/30/24 0849          Activities of Daily Living    BADL Assessment/Intervention lower body dressing;grooming;toileting  -TA       Row Name 10/30/24 0849          Lower Body Dressing Assessment/Training    [place] Level (Lower Body Dressing) lower body dressing skills;dependent (less than 25% patient effort)  clinical projection of fxl level  -TA       Row Name 10/30/24 0849          Grooming Assessment/Training    [place] Level (Grooming) maximum assist (25% patient effort);wash face, hands  [place] assist  -TA     Position (Grooming) supported sitting  -TA       Row Name 10/30/24 0849          Toileting Assessment/Training    [place] Level (Toileting) toileting skills;dependent (less than 25% patient effort)  clinical projection of fxl level  -TA               User Key  (r) = Recorded By, (t) = Taken By, (c) = Cosigned By      Initials Name Provider Type    Marbin Elias OT Occupational Therapist                   Obj/Interventions       Row Name 10/30/24 0886           Sensory Assessment (Somatosensory)    Sensory Assessment (Somatosensory) bilateral UE;sensation intact  appears grossly intact  -TA       Row Name 10/30/24 0808          Vision Assessment/Intervention    Visual Impairment/Limitations WFL;unable/difficult to assess  appears geossly intact  -TA       Row Name 10/30/24 0808          Range of Motion Comprehensive    General Range of Motion no range of motion deficits identified  -TA     Comment, General Range of Motion AAROM within sternal precautions  -TA       Row Name 10/30/24 0808          Strength Comprehensive (MMT)    General Manual Muscle Testing (MMT) Assessment upper extremity strength deficits identified  -TA       Row Name 10/30/24 0808          Upper Extremity (Manual Muscle Testing)    Comment, MMT: Upper Extremity BUE 3-/5  -TA       Row Name 10/30/24 0808          Shoulder (Therapeutic Exercise)    Shoulder (Therapeutic Exercise) AAROM (active assistive range of motion)  -TA     Shoulder AAROM (Therapeutic Exercise) bilateral;flexion;extension;5 repetitions;sitting  within sternal precautions  -TA       Row Name 10/30/24 0808          Elbow/Forearm (Therapeutic Exercise)    Elbow/Forearm (Therapeutic Exercise) AAROM (active assistive range of motion)  -TA     Elbow/Forearm AAROM (Therapeutic Exercise) bilateral;flexion;extension;10 repetitions;sitting  -TA       Row Name 10/30/24 0808          Hand (Therapeutic Exercise)    Hand (Therapeutic Exercise) AROM (active range of motion)  -TA     Hand AROM/AAROM (Therapeutic Exercise) AAROM (active assistive range of motion);finger flexion;finger extension;10 repetitions  -TA       Row Name 10/30/24 0808          Motor Skills    Therapeutic Exercise shoulder;elbow/forearm;hand  -TA       Row Name 10/30/24 0808          Balance    Balance Assessment sitting static balance;standing static balance  -TA     Static Sitting Balance moderate assist;contact guard  fluctuating assist, tends to lean to R side  -TA      Position, Sitting Balance sitting in chair  -TA     Static Standing Balance moderate assist;2-person assist;verbal cues  -TA     Position/Device Used, Standing Balance supported;other (see comments)  tele tripod  -TA     Balance Interventions sit to stand;UE activity with balance activity;weight shifting activity;occupation based/functional task  -TA               User Key  (r) = Recorded By, (t) = Taken By, (c) = Cosigned By      Initials Name Provider Type    TA Marbin Benjamin, OT Occupational Therapist                   Goals/Plan       Row Name 10/30/24 0808          Bed Mobility Goal 1 (OT)    Activity/Assistive Device (Bed Mobility Goal 1, OT) supine to sit;sit to supine  -TA     [place] Level/Cues Needed (Bed Mobility Goal 1, OT) moderate assist (50-74% patient effort);verbal cues required  -TA     Time Frame (Bed Mobility Goal 1, OT) short term goal (STG)  -TA     Progress/Outcomes (Bed Mobility Goal 1, OT) goal ongoing  -TA       Row Name 10/30/24 0808          Transfer Goal 1 (OT)    Activity/Assistive Device (Transfer Goal 1, OT) sit-to-stand/stand-to-sit;bed-to-chair/chair-to-bed;toilet;commode, bedside without drop arms  -TA     [place] Level/Cues Needed (Transfer Goal 1, OT) moderate assist (50-74% patient effort);verbal cues required  -TA     Time Frame (Transfer Goal 1, OT) long term goal (LTG)  -TA     Progress/Outcome (Transfer Goal 1, OT) goal ongoing  -TA       Row Name 10/30/24 0808          Toileting Goal 1 (OT)    Activity/Device (Toileting Goal 1, OT) adjust/manage clothing;perform perineal hygiene  -TA     [place] Level/Cues Needed (Toileting Goal 1, OT) moderate assist (50-74% patient effort)  -TA     Time Frame (Toileting Goal 1, OT) long term goal (LTG)  -TA     Progress/Outcome (Toileting Goal 1, OT) goal ongoing  -TA       Row Name 10/30/24 0808          Therapy Assessment/Plan (OT)    Planned Therapy Interventions (OT) activity tolerance training;BADL  retraining;functional balance retraining;occupation/activity based interventions;patient/caregiver education/training;ROM/therapeutic exercise;strengthening exercise;transfer/mobility retraining  -TA               User Key  (r) = Recorded By, (t) = Taken By, (c) = Cosigned By      Initials Name Provider Type    Marbin Elias, OT Occupational Therapist                   Clinical Impression       Row Name 10/30/24 0808          Pain Assessment    Additional Documentation Pain Scale: FACES Pre/Post-Treatment (Group)  -TA       Row Name 10/30/24 0808          Pain Scale: FACES Pre/Post-Treatment    Pain: FACES Scale, Pretreatment 2-->hurts little bit  -TA     Posttreatment Pain Rating 2-->hurts little bit  -TA     Pre/Posttreatment Pain Comment RN present and aware  -TA       Row Name 10/30/24 0808          Plan of Care Review    Plan of Care Reviewed With patient  -TA     Outcome Evaluation Pt presents with fxl decline from PLOF, deficits in ADL performance, fxl mobility, occupational endurance. Pt limited by lethargy, confusion, impulsivity, generalized weakness, decreased balance. Pt required [place] Max A grooming, anticipate dependent for LBD and toileting. Pt will benefit from skilled OT services to address deficits, facilitate increased fxl I. Recommend IRF at discharge.  -TA       Row Name 10/30/24 0808          Therapy Assessment/Plan (OT)    Patient/Family Therapy Goal Statement (OT) nobe stated  -TA     Rehab Potential (OT) good  -TA     Criteria for Skilled Therapeutic Interventions Met (OT) yes;skilled treatment is necessary  -TA     Therapy Frequency (OT) daily  -TA     Predicted Duration of Therapy Intervention (OT) 1 week  -TA       Row Name 10/30/24 0808          Therapy Plan Review/Discharge Plan (OT)    Anticipated Discharge Disposition (OT) inpatient rehabilitation facility  -TA       Row Name 10/30/24 0808          Vital Signs    Pre Systolic BP Rehab 160  -TA     Pre Treatment Diastolic    -TA     Post Systolic BP Rehab 165  -TA     Post Treatment Diastolic BP 87  -TA     Pre SpO2 (%) 92  -TA     O2 Delivery Pre Treatment supplemental O2  -TA     Intra SpO2 (%) 90  -TA     O2 Delivery Intra Treatment supplemental O2  -TA     Post SpO2 (%) 91  -TA     O2 Delivery Post Treatment supplemental O2  -TA     Pre Patient Position Sitting  -TA     Intra Patient Position Standing  -TA     Post Patient Position Supine  -TA       Row Name 10/30/24 [facility]          Positioning and Restraints    Pre-Treatment Position sitting in chair/recliner  -TA     Post Treatment Position bed  -TA     In Bed notified nsg;supine;call light within reach;encouraged to call for assist;exit alarm on;side rails up x3;RUE elevated;LUE elevated;with nsg;legs elevated  -TA               User Key  (r) = Recorded By, (t) = Taken By, (c) = Cosigned By      Initials Name Provider Type    Marbin Elias OT Occupational Therapist                   Outcome Measures       Row Name 10/30/24 [facility]          How much help from another is currently needed...    Putting on and taking off regular lower body clothing? 1  -TA     Bathing (including washing, rinsing, and drying) 1  -TA     Toileting (which includes using toilet bed pan or urinal) 1  -TA     Putting on and taking off regular upper body clothing 2  -TA     Taking care of personal grooming (such as brushing teeth) 2  -TA     Eating meals 2  -TA     AM-PAC 6 Clicks Score (OT) 9  -TA       Row Name 10/30/24 [facility]          Functional Assessment    Outcome Measure Options AM-PAC 6 Clicks Daily Activity (OT)  -TA               User Key  (r) = Recorded By, (t) = Taken By, (c) = Cosigned By      Initials Name Provider Type    Marbin Elias OT Occupational Therapist                    Occupational Therapy Education       Title: PT OT SLP Therapies (In Progress)       Topic: Occupational Therapy (In Progress)       Point: ADL training (Not Started)       Description:   Instruct  learner(s) on proper safety adaptation and remediation techniques during self care or transfers.   Instruct in proper use of assistive devices.                  Learner Progress:  Not documented in this visit.              Point: Home exercise program (Not Started)       Description:   Instruct learner(s) on appropriate technique for monitoring, assisting and/or progressing therapeutic exercises/activities.                  Learner Progress:  Not documented in this visit.              Point: Precautions (In Progress)       Description:   Instruct learner(s) on prescribed precautions during self-care and functional transfers.                  Learning Progress Summary            Patient Acceptance, E,D, NR by TA at 10/30/2024 0905                      Point: Body mechanics (In Progress)       Description:   Instruct learner(s) on proper positioning and spine alignment during self-care, functional mobility activities and/or exercises.                  Learning Progress Summary            Patient Acceptance, E,D, NR by TA at 10/30/2024 0905                                      User Key       Initials Effective Dates Name Provider Type Discipline    YESY 06/16/21 -  Marbin Benjamin, OT Occupational Therapist OT                  OT Recommendation and Plan  Planned Therapy Interventions (OT): activity tolerance training, BADL retraining, functional balance retraining, occupation/activity based interventions, patient/caregiver education/training, ROM/therapeutic exercise, strengthening exercise, transfer/mobility retraining  Therapy Frequency (OT): daily  Plan of Care Review  Plan of Care Reviewed With: patient  Outcome Evaluation: Pt presents with fxl decline from PLOF, deficits in ADL performance, fxl mobility, occupational endurance. Pt limited by lethargy, confusion, impulsivity, generalized weakness, decreased balance. Pt required [place] Max A grooming, anticipate dependent for LBD and toileting. Pt will benefit from  skilled OT services to address deficits, facilitate increased fxl I. Recommend IRF at discharge.     Time Calculation:   Evaluation Complexity (OT)  Review Occupational Profile/Medical/Therapy History Complexity: expanded/moderate complexity  Assessment, Occupational Performance/Identification of Deficit Complexity: 3-5 performance deficits  Clinical Decision Making Complexity (OT): detailed assessment/moderate complexity  Overall Complexity of Evaluation (OT): moderate complexity     Time Calculation- OT       Row Name 10/30/24 0808             Time Calculation- OT    OT Start Time 0808  ttc 0 minutes  -TA      Total Timed Code Minutes- OT 0 minute(s)  -TA      OT Received On 10/30/24  -TA      OT Goal Re-Cert Due Date 11/09/24  -TA         Untimed Charges    OT Eval/Re-eval Minutes 55  -TA         Total Minutes    Untimed Charges Total Minutes 55  -TA       Total Minutes 55  -TA                User Key  (r) = Recorded By, (t) = Taken By, (c) = Cosigned By      Initials Name Provider Type    TA Marbin Benjamin, OT Occupational Therapist                  Therapy Charges for Today       Code Description Service Date Service Provider Modifiers Qty    45979529877 HC OT EVAL MOD COMPLEXITY 4 10/30/2024 Marbin Benjamin OT GO 1                 Marbin Benjamin OT  10/30/2024

## 2024-10-30 NOTE — PROGRESS NOTES
Pulmonary / Critical Care Progress Note      Patient Name: Joel Galo  : 1965  MRN: [id number]  Attending:  Alex Max MD   Date of admission: 10/28/2024    Joel Galo is a 59 y.o. male with medical history significant for paroxysmal A-fib, diabetes mellitus, poorly-controlled with hemoglobin A1c of 11.20, hypertension who recently presented with intermittent episodes of chest pain with associated shortness of breath.  Patient had a series of tests that culminated in a left heart cath on 24 that showed mid LAD 75% stenosis, distal LAD 95% stenosis, first marginal 90% stenosis, third marginal 99% stenosis, ostial RCA 70% stenosis, mid RCA 70% stenosis, and RV branch 99% stenosis. Left ventriculogram during cardiac cath noted EF 55%.  Today was taken in for four-vessel CABG, maze procedure as well as exclusion of atrial appendage.  Status post surgery patient remains intubated and sedated and brought to the ICU.  The intensivist was asked to evaluate the patient.  Patient seen and examined at bedside.  All the labs and diagnostic imaging studies were extensively reviewed by me and findings as above..  Review of patient's labs showed a urine toxicology screen that is positive for opiates.  Chest x-ray of 1025 showed bilateral opacity which could represent pulmonary edema versus pneumonia.  WBC count is within normal limits.  Patient is requiring minimal dose of Levophed and vasopressin     Subjective   Subjective   Patient seen and examined at bedside.  Overnight no acute event.  He continues to moan for pain.  He is currently on Norco 7.5/325 mg 1 tab every 4 hours as needed for pain and morphine 2 mg IV every 30 minutes as needed for pain.  CT surgery discontinued mediastinal tubes and wires.  Also Prado central line and arterial line.  Urine output was about 850 mL.    Objective   Objective     Vitals:   Vital signs for last 24 hours:  Temp:  [99.1 °F (37.3 °C)-100.4 °F (38 °C)] 100.4 °F (38  °C)  Heart Rate:  [72-92] 92  Resp:  [16-24] 20  BP: (113-160)/() 160/110  Arterial Line BP: ()/(50-72) 128/55    Intake/Output last 3 shifts:  I/O last 3 completed shifts:  In: 1027.8 [I.V.:1027.8]  Out: 4105 [Urine:3175; Chest Tube:930]  Intake/Output this shift:  No intake/output data recorded.    Vent settings for last 24 hours:       Hemodynamic parameters for last 24 hours:       Physical Exam   Vital Signs Reviewed   General:  WDWN, Alert, NAD.    HEENT:  PERRL, EOMI.  OP, nares clear  Chest:  good aeration.  Skin incision looks clean.  Chest tubes in position  CV: RRR, no MGR, pulses 2+, equal.  Abd:  Soft, NT, ND, + BS, no HSM  EXT:  no clubbing, no cyanosis, no edema  Neuro:  A&Ox3, CN grossly intact, no focal deficits.  Skin: No rashes or lesions noted        Result Review    Result Review:  I have personally reviewed the results from the time of this admission to 10/30/2024 08:28 EDT and agree with these findings:  [x]  Laboratory  []  Microbiology  [x]  Radiology  []  EKG/Telemetry   []  Cardiology/Vascular   []  Pathology  []  Old records  []  Other:  Most notable findings include: Reviewed    Assessment & Plan   Assessment / Plan     Active Hospital Problems:  Active Hospital Problems    Diagnosis     **CAD s/p CABG x 4, MAZE, LAAL 10/28/24     Primary hypertension     DM (diabetes mellitus), type 2     Hyperlipidemia LDL goal <70     Paroxysmal atrial fibrillation          Impression:  Coronary artery disease involving native coronary artery of native heart       CAD (coronary artery disease)     Coronary artery disease status post CABG  Acute hypoxic respiratory failure  Paroxysmal atrial fibrillation status post maze procedure  Diabetes mellitus:,Poorly controlled  Cardiogenic shock: Resolved  Hypertension  Obstructive sleep apnea  Dyslipidemia    Plan:  Plan  Extubated  Titrate FiO2 to a sat goal of 95%  On Lantus 20 units SQ daily.  Accu-Chek with sliding scale insulin coverage  Pain  control with Norco and morphine  Give Toradol 15 mg IV every 6 hourly as needed for pain x 24 hours  Cardiac diet advanced as tolerated  Out of bed to chair.  PT  DVT prophylaxis: Will resume chemical prophylaxis when okay by CT surgery         VTE Prophylaxis:  Mechanical VTE prophylaxis orders are present.        CODE STATUS:   Code Status (Patient has no pulse and is not breathing): CPR (Attempt to Resuscitate)  Medical Interventions (Patient has pulse or is breathing): Full Support          The patient is critically ill in the ICU. Multidisciplinary bedside critical care rounds were performed with nursing staff, respiratory therapy, pharmacy, nutritional services, social work. I have personally reviewed the chart, labs and any pertinent imaging available.  I have spent 36 minutes of critical care time, excluding procedures, in the care of this patient.

## 2024-10-30 NOTE — PROGRESS NOTES
[facility] Cardiothoracic Surgery In-Patient Progress Note     LOS: 2 days     POD # 2 s/p CABG x 4, MAZE, LAAL    Subjective  Sitting up in chair. No complaints    Objective  Vital Signs  Temp:  [99.1 °F (37.3 °C)-100.6 °F (38.1 °C)] 100.4 °F (38 °C)  Heart Rate:  [72-83] 81  Resp:  [16-24] 20  BP: (113-152)/(71-95) 147/85  Arterial Line BP: ()/(50-72) 128/55    Physical Exam:   General Appearance: alert, appears stated age and cooperative   Lungs: clear to auscultation, respirations regular, respirations even, and respirations unlabored   Heart: regular rhythm & normal rate, normal S1, S2, no murmur, no gallop, no rub, and no click   Skin: Incision c/d/I   Sternum: Stable   Output by Drain (mL) 10/29/24 0701 - 10/29/24 1900 10/29/24 1901 - 10/30/24 0700 10/30/24 0701 - 10/30/24 0721 Range Total   Chest Tube 3 Anterior Mediastinal 90 20  110   Y Chest Tube 1 and 2 Anterior Mediastinal 270 160  430        Results     Results from last 7 days   Lab Units 10/30/24  0307   WBC 10*3/mm3 14.30*   HEMOGLOBIN g/dL 10.6*   HEMATOCRIT % 33.1*   PLATELETS 10*3/mm3 150     Results from last 7 days   Lab Units 10/30/24  0307   SODIUM mmol/L 145   POTASSIUM mmol/L 4.6   CHLORIDE mmol/L 112*   CO2 mmol/L 19.0*   BUN mg/dL 20   CREATININE mg/dL 0.88   GLUCOSE mg/dL 169*   CALCIUM mg/dL 9.0     Imaging Results (Last 24 Hours)       Procedure Component Value Units Date/Time    XR Chest 1 View [[id number]] Resulted: 10/30/24 0204     Updated: 10/30/24 0303    XR Chest 1 View [[id number]] Collected: 10/29/24 0812     Updated: 10/29/24 0816    Narrative:      XR CHEST 1 VW    Date of Exam: 10/29/2024 4:34 AM EDT    Indication: Post-Op Heart Surgery    Comparison 10/28/2024.    Findings:  The ET tube has been removed. Other support lines appear stable in position. Heart and pulmonary vessels appear within normal limits for technique. There is mild left basilar atelectasis. There is implantable cardiac device. There are sternal  suture   wires and left atrial appendage clip.      Impression:      Impression:  Interval extubation. Mild left basilar atelectasis.      Electronically Signed: Tenisha David MD    10/29/2024 8:13 AM EDT    Workstation ID: [id number]          Assessment  POD # 2 s/p CABG x 4, MAZE, LAAL    Plan   D/C mediastinal chest tube and wires  Keep pleural tubes   Diuresis  D/C [place]  D/C central line  D/C A-line  Wean oxygen as tolerated  Ambulate  Pulmonary toilet  ASA, statin, BB  Transfer to telemetry    Alex Max MD  10/30/24  07:21 EDT

## 2024-10-31 ENCOUNTER — APPOINTMENT (OUTPATIENT)
Dept: GENERAL RADIOLOGY | Facility: HOSPITAL | Age: 59
End: 2024-10-31
Payer: MEDICARE

## 2024-10-31 LAB
ABO GROUP BLD: NORMAL
ANION GAP SERPL CALCULATED.3IONS-SCNC: 12 MMOL/L (ref 5–15)
BLD GP AB SCN SERPL QL: NEGATIVE
BUN SERPL-MCNC: 32 MG/DL (ref 8–23)
BUN/CREAT SERPL: 37.6 (ref 7–25)
CALCIUM SPEC-SCNC: 8.9 MG/DL (ref 8.6–10.5)
CHLORIDE SERPL-SCNC: 112 MMOL/L (ref 98–107)
CO2 SERPL-SCNC: 24 MMOL/L (ref 22–29)
CREAT SERPL-MCNC: 0.85 MG/DL (ref 0.76–1.27)
DEPRECATED RDW RBC AUTO: 45.7 FL (ref 37–54)
EGFRCR SERPLBLD CKD-EPI 2021: 98.9 ML/MIN/1.73
ERYTHROCYTE [DISTWIDTH] IN BLOOD BY AUTOMATED COUNT: 13.3 % (ref 12.3–15.4)
GLUCOSE BLDC GLUCOMTR-MCNC: 169 MG/DL (ref 70–130)
GLUCOSE BLDC GLUCOMTR-MCNC: 175 MG/DL (ref 70–130)
GLUCOSE BLDC GLUCOMTR-MCNC: 216 MG/DL (ref 70–130)
GLUCOSE SERPL-MCNC: 175 MG/DL (ref 65–99)
HCT VFR BLD AUTO: 31.9 % (ref 37.5–51)
HGB BLD-MCNC: 10.2 G/DL (ref 13–17.7)
MCH RBC QN AUTO: 29.9 PG (ref 26.6–33)
MCHC RBC AUTO-ENTMCNC: 32 G/DL (ref 31.5–35.7)
MCV RBC AUTO: 93.5 FL (ref 79–97)
PLATELET # BLD AUTO: 135 10*3/MM3 (ref 140–450)
PMV BLD AUTO: 10.1 FL (ref 6–12)
POTASSIUM SERPL-SCNC: 4.3 MMOL/L (ref 3.5–5.2)
RBC # BLD AUTO: 3.41 10*6/MM3 (ref 4.14–5.8)
RH BLD: NEGATIVE
SODIUM SERPL-SCNC: 148 MMOL/L (ref 136–145)
T&S EXPIRATION DATE: NORMAL
WBC NRBC COR # BLD AUTO: 9.97 10*3/MM3 (ref 3.4–10.8)

## 2024-10-31 PROCEDURE — 86901 BLOOD TYPING SEROLOGIC RH(D): CPT | Performed by: THORACIC SURGERY (CARDIOTHORACIC VASCULAR SURGERY)

## 2024-10-31 PROCEDURE — 86900 BLOOD TYPING SEROLOGIC ABO: CPT | Performed by: THORACIC SURGERY (CARDIOTHORACIC VASCULAR SURGERY)

## 2024-10-31 PROCEDURE — 63710000001 INSULIN GLARGINE PER 5 UNITS: Performed by: INTERNAL MEDICINE

## 2024-10-31 PROCEDURE — 97110 THERAPEUTIC EXERCISES: CPT

## 2024-10-31 PROCEDURE — 86850 RBC ANTIBODY SCREEN: CPT | Performed by: THORACIC SURGERY (CARDIOTHORACIC VASCULAR SURGERY)

## 2024-10-31 PROCEDURE — 63710000001 INSULIN LISPRO (HUMAN) PER 5 UNITS: Performed by: INTERNAL MEDICINE

## 2024-10-31 PROCEDURE — 99291 CRITICAL CARE FIRST HOUR: CPT | Performed by: INTERNAL MEDICINE

## 2024-10-31 PROCEDURE — 25010000002 KETOROLAC TROMETHAMINE PER 15 MG: Performed by: INTERNAL MEDICINE

## 2024-10-31 PROCEDURE — 93005 ELECTROCARDIOGRAM TRACING: CPT | Performed by: NURSE PRACTITIONER

## 2024-10-31 PROCEDURE — 85027 COMPLETE CBC AUTOMATED: CPT | Performed by: PHYSICIAN ASSISTANT

## 2024-10-31 PROCEDURE — 97530 THERAPEUTIC ACTIVITIES: CPT

## 2024-10-31 PROCEDURE — 80048 BASIC METABOLIC PNL TOTAL CA: CPT | Performed by: PHYSICIAN ASSISTANT

## 2024-10-31 PROCEDURE — 25010000002 MORPHINE PER 10 MG: Performed by: THORACIC SURGERY (CARDIOTHORACIC VASCULAR SURGERY)

## 2024-10-31 PROCEDURE — 93010 ELECTROCARDIOGRAM REPORT: CPT | Performed by: INTERNAL MEDICINE

## 2024-10-31 PROCEDURE — 99232 SBSQ HOSP IP/OBS MODERATE 35: CPT | Performed by: NURSE PRACTITIONER

## 2024-10-31 PROCEDURE — 71045 X-RAY EXAM CHEST 1 VIEW: CPT

## 2024-10-31 PROCEDURE — 82948 REAGENT STRIP/BLOOD GLUCOSE: CPT

## 2024-10-31 RX ADMIN — KETOROLAC TROMETHAMINE 15 MG: 15 INJECTION, SOLUTION INTRAMUSCULAR; INTRAVENOUS at 08:23

## 2024-10-31 RX ADMIN — INSULIN LISPRO 3 UNITS: 100 INJECTION, SOLUTION INTRAVENOUS; SUBCUTANEOUS at 18:18

## 2024-10-31 RX ADMIN — DEXMEDETOMIDINE HYDROCHLORIDE 1.5 MCG/KG/HR: 400 INJECTION, SOLUTION INTRAVENOUS at 03:31

## 2024-10-31 RX ADMIN — SENNOSIDES AND DOCUSATE SODIUM 2 TABLET: 50; 8.6 TABLET ORAL at 08:23

## 2024-10-31 RX ADMIN — SOTALOL HYDROCHLORIDE 120 MG: 120 TABLET ORAL at 20:48

## 2024-10-31 RX ADMIN — GABAPENTIN 100 MG: 100 CAPSULE ORAL at 21:48

## 2024-10-31 RX ADMIN — SENNOSIDES AND DOCUSATE SODIUM 2 TABLET: 50; 8.6 TABLET ORAL at 20:48

## 2024-10-31 RX ADMIN — MUPIROCIN 1 APPLICATION: 20 OINTMENT TOPICAL at 08:24

## 2024-10-31 RX ADMIN — ACETAMINOPHEN 650 MG: 325 TABLET, FILM COATED ORAL at 05:38

## 2024-10-31 RX ADMIN — BUPROPION HYDROCHLORIDE 150 MG: 150 TABLET, EXTENDED RELEASE ORAL at 08:24

## 2024-10-31 RX ADMIN — INSULIN LISPRO 3 UNITS: 100 INJECTION, SOLUTION INTRAVENOUS; SUBCUTANEOUS at 08:21

## 2024-10-31 RX ADMIN — ASPIRIN 325 MG: 325 TABLET, COATED ORAL at 08:23

## 2024-10-31 RX ADMIN — MUPIROCIN 1 APPLICATION: 20 OINTMENT TOPICAL at 20:48

## 2024-10-31 RX ADMIN — DULOXETINE HYDROCHLORIDE 60 MG: 60 CAPSULE, DELAYED RELEASE ORAL at 08:24

## 2024-10-31 RX ADMIN — CHLORHEXIDINE GLUCONATE ORAL RINSE 15 ML: 1.2 SOLUTION DENTAL at 08:23

## 2024-10-31 RX ADMIN — GABAPENTIN 100 MG: 100 CAPSULE ORAL at 08:22

## 2024-10-31 RX ADMIN — EMPAGLIFLOZIN 25 MG: 25 TABLET, FILM COATED ORAL at 08:25

## 2024-10-31 RX ADMIN — ACETAMINOPHEN 650 MG: 325 TABLET, FILM COATED ORAL at 21:49

## 2024-10-31 RX ADMIN — SOTALOL HYDROCHLORIDE 120 MG: 120 TABLET ORAL at 08:23

## 2024-10-31 RX ADMIN — OXYCODONE HYDROCHLORIDE 10 MG: 10 TABLET ORAL at 21:49

## 2024-10-31 RX ADMIN — MORPHINE SULFATE 2 MG: 2 INJECTION, SOLUTION INTRAMUSCULAR; INTRAVENOUS at 05:38

## 2024-10-31 RX ADMIN — MORPHINE SULFATE 2 MG: 2 INJECTION, SOLUTION INTRAMUSCULAR; INTRAVENOUS at 21:49

## 2024-10-31 RX ADMIN — INSULIN LISPRO 5 UNITS: 100 INJECTION, SOLUTION INTRAVENOUS; SUBCUTANEOUS at 12:31

## 2024-10-31 RX ADMIN — LOSARTAN POTASSIUM 25 MG: 25 TABLET, FILM COATED ORAL at 08:24

## 2024-10-31 RX ADMIN — DEXMEDETOMIDINE HYDROCHLORIDE 1.5 MCG/KG/HR: 400 INJECTION, SOLUTION INTRAVENOUS at 00:41

## 2024-10-31 RX ADMIN — MORPHINE SULFATE 2 MG: 2 INJECTION, SOLUTION INTRAMUSCULAR; INTRAVENOUS at 20:48

## 2024-10-31 RX ADMIN — INSULIN GLARGINE 20 UNITS: 100 INJECTION, SOLUTION SUBCUTANEOUS at 08:21

## 2024-10-31 RX ADMIN — TAMSULOSIN HYDROCHLORIDE 0.4 MG: 0.4 CAPSULE ORAL at 08:24

## 2024-10-31 RX ADMIN — GABAPENTIN 100 MG: 100 CAPSULE ORAL at 15:59

## 2024-10-31 RX ADMIN — OXYCODONE HYDROCHLORIDE 10 MG: 10 TABLET ORAL at 15:59

## 2024-10-31 RX ADMIN — OXYCODONE HYDROCHLORIDE 10 MG: 10 TABLET ORAL at 04:43

## 2024-10-31 RX ADMIN — LEVOTHYROXINE SODIUM 50 MCG: 0.05 TABLET ORAL at 05:39

## 2024-10-31 RX ADMIN — HYDROCODONE BITARTRATE AND ACETAMINOPHEN 1 TABLET: 7.5; 325 TABLET ORAL at 00:40

## 2024-10-31 RX ADMIN — ATORVASTATIN CALCIUM 40 MG: 40 TABLET, FILM COATED ORAL at 20:47

## 2024-10-31 NOTE — PLAN OF CARE
Goal Outcome Evaluation:  Plan of Care Reviewed With: patient        Progress: improving  Outcome Evaluation: Pt ambulated 10ft with modA x2 +1 and B UE support on tripod monitor. Chair follow for safety. Pt demonstrated very slow lesley with wide BAILEY and short, shuffled steps. Required max cueing for continued forward ambulation. Pt very unsteady and required total assistance to steer monitor. Pt with poor command following and inability to respond to questions. Returned to room in chair. Deferred additional mobility due to safety concerns. Continue to recommend PT skilled care and D/C to SNF.    Anticipated Discharge Disposition (PT): skilled nursing facility

## 2024-10-31 NOTE — PROGRESS NOTES
Pulmonary / Critical Care Progress Note      Patient Name: Joel Galo  : 1965  MRN: [id number]  Attending:  Alex Max MD   Date of admission: 10/28/2024    Joel Galo is a 59 y.o. male with medical history significant for paroxysmal A-fib, diabetes mellitus, poorly-controlled with hemoglobin A1c of 11.20, hypertension who recently presented with intermittent episodes of chest pain with associated shortness of breath.  Patient had a series of tests that culminated in a left heart cath on 24 that showed mid LAD 75% stenosis, distal LAD 95% stenosis, first marginal 90% stenosis, third marginal 99% stenosis, ostial RCA 70% stenosis, mid RCA 70% stenosis, and RV branch 99% stenosis. Left ventriculogram during cardiac cath noted EF 55%.  Today was taken in for four-vessel CABG, maze procedure as well as exclusion of atrial appendage.  Status post surgery patient remains intubated and sedated and brought to the ICU.  The intensivist was asked to evaluate the patient.  Patient seen and examined at bedside.  All the labs and diagnostic imaging studies were extensively reviewed by me and findings as above..  Review of patient's labs showed a urine toxicology screen that is positive for opiates.  Chest x-ray of 1025 showed bilateral opacity which could represent pulmonary edema versus pneumonia.  WBC count is within normal limits.  Patient is requiring minimal dose of Levophed and vasopressin     Subjective   Subjective   Patient seen and examined at bedside.  Overnight no acute event.  Precedex has been discontinued and patient still admitted somnolent but otherwise he is not howling out in pain unlike before.  Cardiology follow-up appreciated asked to increase metoprolol to 25 mg twice daily.  To DC pleural tubes Prado NG tube and transfer to telemetry per thoracic surgery.  Ambulation encouraged, PT/OT.      Objective   Objective     Vitals:   Vital signs for last 24 hours:  Temp:  [98.4 °F (36.9  °C)-100.8 °F (38.2 °C)] 98.6 °F (37 °C)  Heart Rate:  [] 65  Resp:  [14-24] 20  BP: (107-161)/() 116/74    Intake/Output last 3 shifts:  I/O last 3 completed shifts:  In: 830 [I.V.:830]  Out: 5455 [Urine:4725; Emesis/NG output:250; Chest Tube:480]  Intake/Output this shift:  No intake/output data recorded.    Vent settings for last 24 hours:       Hemodynamic parameters for last 24 hours:       Physical Exam   Vital Signs Reviewed   General:  WDWN, Alert, NAD.    HEENT:  PERRL, EOMI.  OP, nares clear  Chest:  good aeration.  Skin incision looks clean.  Chest tubes in position  CV: RRR, no MGR, pulses 2+, equal.  Abd:  Soft, NT, ND, + BS, no HSM  EXT:  no clubbing, no cyanosis, no edema  Neuro:  A&Ox3, CN grossly intact, no focal deficits.  Skin: No rashes or lesions noted        Result Review    Result Review:  I have personally reviewed the results from the time of this admission to 10/31/2024 08:23 EDT and agree with these findings:  [x]  Laboratory  []  Microbiology  [x]  Radiology  []  EKG/Telemetry   []  Cardiology/Vascular   []  Pathology  []  Old records  []  Other:  Most notable findings include: Reviewed    Assessment & Plan   Assessment / Plan     Active Hospital Problems:  Active Hospital Problems    Diagnosis     **CAD s/p CABG x 4, MAZE, LAAL 10/28/24     Primary hypertension     DM (diabetes mellitus), type 2     Hyperlipidemia LDL goal <70     Paroxysmal atrial fibrillation          Impression:  Coronary artery disease involving native coronary artery of native heart       CAD (coronary artery disease)     Coronary artery disease status post CABG  Acute hypoxic respiratory failure  Paroxysmal atrial fibrillation status post maze procedure  Diabetes mellitus:,Poorly controlled  Cardiogenic shock: Resolved  Hypertension  Obstructive sleep apnea  Dyslipidemia    Plan:  Plan  Extubated  Titrate FiO2 to a sat goal of 95%  On insulin drip.  Q. hourly Accu-Check  Off vasopressor  On sotalol 120  mg p.o. every 12 hourly  Pain control with Demerol 25 mg Q4 as needed and fentanyl 25 mcg IV Q1 hourly as needed  Diet when okay by CT surgery  Out of bed to chair.  PT  Start patient on Lantus 20 units subcu at daily.  Insulin sliding scale.  To discontinue insulin drip  DVT prophylaxis: Will resume chemical prophylaxis when okay by CT surgery         VTE Prophylaxis:  Mechanical VTE prophylaxis orders are present.        CODE STATUS:   Code Status (Patient has no pulse and is not breathing): CPR (Attempt to Resuscitate)  Medical Interventions (Patient has pulse or is breathing): Full Support          The patient is critically ill in the ICU. Multidisciplinary bedside critical care rounds were performed with nursing staff, respiratory therapy, pharmacy, nutritional services, social work. I have personally reviewed the chart, labs and any pertinent imaging available.  I have spent 35 minutes of critical care time, excluding procedures, in the care of this patient.

## 2024-10-31 NOTE — PROGRESS NOTES
Cardiology Progress Note      Reason for visit:    Coronary artery disease status post CABG  Paroxysmal atrial fibrillation    IDENTIFICATION: 59-year-old gentleman who resides in [facility] Hospital Problems    Diagnosis  POA    **CAD s/p CABG x 4, MAZE, LAAL 10/28/24 [I25.10]  Yes     Priority: High     Abnormal CTA (8/20/2024):Moderate disease in the LAD and 75% stenosis in the circumflex and right coronary artery noted. Patient referred for left heart cath.   Cardiac cath (8/28/2024): Multivessel CAD.  Recommend CABG  Echo (10/17/2024): LVEF 61-65%.  No valvular abnormality  CABG/maze/left atrial pended to ligation (10/28/2024):  Intraoperative DARIAN (10/28/2024): LVEF 55%.  Obliteration of TORSTEN by clip.  LIMA to LAD.  SVG to OM1.  SVG to OM 4, SVG to OM 5       DM (diabetes mellitus), type 2 [E11.9]  Yes     Priority: High     hemoglobin A1c 12.6% August 2024, 11.2% October 2024       Primary hypertension [I10]  Yes     Priority: Medium    Paroxysmal atrial fibrillation [I48.0]  Yes     Priority: Medium     Echo (3/10/2023): EF 60-65%.  Grade 1 diastolic dysfunction.  Normal valvular morphology.  CHADsVasc 3  Previously on sotalol  Left atrial appendage clipping performed at time of CABG 10/28/2024      Hyperlipidemia LDL goal <70 [E78.5]  Yes     Priority: Low            Patient is sitting up in the chair sleeping.  Arouses easily but is very drowsy and unable to stay awake during conversation.  No family is at bedside.  Difficult to ascertain his orientation level due to his drowsiness.  Yesterday I was paged that he went into atrial fibrillation with RVR.  His metoprolol was discontinued and he was started on his home dose sotalol.  He converted back to normal sinus rhythm.  He is currently in normal sinus rhythm.           Vital Sign Min/Max for last 24 hours  Temp  Min: 98.4 °F (36.9 °C)  Max: 100.8 °F (38.2 °C)   BP  Min: 107/70  Max: 161/103   Pulse  Min: 65  Max: 120   Resp  Min: 14  Max:  24   SpO2  Min: 82 %  Max: 100 %   Flow (L/min) (Oxygen Therapy)  Min: 2  Max: 2      Intake/Output Summary (Last 24 hours) at 10/31/2024 0837  Last data filed at 10/31/2024 0600  Gross per 24 hour   Intake 430 ml   Output 3605 ml   Net -3175 ml           Physical Exam  Constitutional:       Comments: Drowsy, arousable but dozes off during conversation.  Difficult to ascertain orientation   Cardiovascular:      Rate and Rhythm: Normal rate and regular rhythm.      Heart sounds: No murmur heard.  Pulmonary:      Effort: Pulmonary effort is normal.      Breath sounds: Decreased breath sounds and rhonchi present.   Musculoskeletal:      Right lower le+ Pitting Edema present.      Left lower le+ Pitting Edema present.   Skin:     General: Skin is warm and dry.   Neurological:      Mental Status: He is easily aroused.         Tele: Normal sinus rhythm     Results Review (reviewed the patient's recent labs in the electronic medical record):      EKG (10/30/2024): Normal sinus rhythm     CXR (10/31/2024): Small right apical pneumothorax.  Stable borderline cardiomegaly.  Left atrial pended closure device in place        ECHO (10/20/2024): EF 61-65%.  No significant valvular abnormality       Results from last 7 days   Lab Units 10/31/24  0343 10/30/24  0307 10/29/24  0636 10/29/24  0332 10/28/24  2254 10/28/24  1802 10/28/24  1339 10/25/24  1346   SODIUM mmol/L 148* 145  --  140  --  143 142 136   POTASSIUM mmol/L 4.3 4.6  --  4.7   < > 4.3 3.2* 4.7   CHLORIDE mmol/L 112* 112*  --  108*  --  110* 109* 99   BUN mg/dL 32* 20  --  12  --  11 11 10   CREATININE mg/dL 0.85 0.88  --  0.77  --  0.84 0.86 0.91   MAGNESIUM mg/dL  --   --  2.4 1.9  --  2.2 2.1 2.6*    < > = values in this interval not displayed.         Results from last 7 days   Lab Units 10/31/24  0343 10/30/24  0307 10/29/24  0332   WBC 10*3/mm3 9.97 14.30* 6.45   HEMOGLOBIN g/dL 10.2* 10.6* 10.2*   HEMATOCRIT % 31.9* 33.1* 31.3*   PLATELETS 10*3/mm3 135*  "150 113*       Lab Results   Component Value Date    HGBA1C 11.20 (H) 10/25/2024       No results found for: \"CHOL\", \"CHLPL\", \"TRIG\", \"HDL\", \"LDL\", \"LDLDIRECT\"           Coronary artery disease  Status post CABG with Dr. Max 10/28  Aspirin 325 mg daily     Paroxysmal atrial fibrillaton  Historically on sotalol and Eliquis  Maze and left atrial appendage ligation performed at time of CABG 10/28.  Intraoperative DARIAN shows obliteration of TORSTEN by clip  Went into atrial fibrillation with RVR 10/30/2024  Started on home dose sotalol 120 mg twice daily     Type 2 diabetes mellitus  Historically uncontrolled with hemoglobin A1c 11 and 12  Was on Jardiance 25 mg daily at home     Hypertension/hypotension  Current /80  Losartan 25 mg daily        Hyperlipidemia  Lipitor 40 mg daily                      Defer NOAC due to success of left atrial appendage clip at time of CABG  Continue aspirin, statin, Jardiance and sotalol  Back off administration of pain medication due to drowsiness  Continue to work with physical therapy  Okay for transfer to telemetry    Electronically signed by ZIA Poole, 10/31/24, 8:37 AM EDT.  "

## 2024-10-31 NOTE — THERAPY TREATMENT NOTE
"Patient Name: Joel Galo  : 1965    MRN: [id number]                              Today's Date: 10/31/2024       Admit Date: 10/28/2024    Visit Dx:     ICD-10-CM ICD-9-CM   1. ASCVD (arteriosclerotic cardiovascular disease)  I25.10 429.2     440.9     Patient Active Problem List   Diagnosis    Paroxysmal atrial fibrillation    Obstructive sleep apnea    Hypothyroidism (acquired)    GERD (gastroesophageal reflux disease)    Chronic anticoagulation    DM (diabetes mellitus), type 2    Hyperlipidemia LDL goal <70    Primary hypertension    CAD s/p CABG x 4, MAZE, LAAL 10/28/24     Past Medical History:   Diagnosis Date    A-fib     Anxiety     Arthritis     DDD (degenerative disc disease), lumbar     Deep vein thrombosis     Depression     Diabetes     Disease of thyroid gland     HYPO    Heart attack     \"5-6 years ago\"    Hyperlipidemia     Hypertension     Kidney stone     Neuropathy     Sleep apnea     DOES NOT USE CPAP    UTI (urinary tract infection)      Past Surgical History:   Procedure Laterality Date    ATRIAL APPENDAGE EXCLUSION LEFT WITH TRANSESOPHAGEAL ECHOCARDIOGRAM N/A 10/28/2024    Procedure: ATRIAL APPENDAGE EXCLUSION LEFT WITH TRANSESOPHAGEAL ECHOCARDIOGRAM;  Surgeon: Alex Max MD;  Location: [facility];  Service: Cardiothoracic;  Laterality: N/A;    CARDIAC CATHETERIZATION N/A 2024    Procedure: Left Heart Cath;  Surgeon: Julio Neff MD;  [facility]:  [facility];  Service: Cardiology;  Laterality: N/A;    CARDIAC ELECTROPHYSIOLOGY PROCEDURE N/A 2023    Procedure: Loop insertion;  Surgeon: Tariq Chávez MD;  Location:  [facility];  Service: Cardiovascular;  Laterality: N/A;    CATARACT EXTRACTION Bilateral     COLONOSCOPY      CORONARY ARTERY BYPASS GRAFT N/A 10/28/2024    Procedure: MEDIAN STERNOTOMY, CORONARY ARTERY BYPASS GRAFTING X4 UTILIZING THE LEFT INTERNAL MAMMARY ARTERY GRAFT, ENDOSCOPIC VEIN HARVEST OF THE GREATER RIGHT " SAPHENOUS VEIN;  Surgeon: Alex Max MD;  Location:  [facility];  Service: Cardiothoracic;  Laterality: N/A;    ENDOSCOPY      MAZE PROCEDURE N/A 10/28/2024    Procedure: MAZE PROCEDURE;  Surgeon: Alex Max MD;  Location:  [facility];  Service: Cardiothoracic;  Laterality: N/A;    TEETH EXTRACTION        General Information       Row Name 10/31/24 1426          Physical Therapy Time and Intention    Document Type therapy note (daily note)  -KG     Mode of Treatment physical therapy  -KG       Row Name 10/31/24 1426          General Information    Existing Precautions/Restrictions cardiac;fall;sternal;other (see comments)  NG; confusion; poor command following; lethargic with minimal or no response  -KG     Barriers to Rehab medically complex;previous functional deficit;cognitive status  -KG       Row Name 10/31/24 1426          Cognition    Orientation Status (Cognition) oriented to;person  -KG       Row Name 10/31/24 1426          Safety Issues/Impairments Affecting Functional Mobility    Safety Issues Affecting Function (Mobility) ability to follow commands;awareness of need for assistance;insight into deficits/self-awareness;judgment;safety precaution awareness;safety precautions follow-through/compliance;sequencing abilities  -KG     Impairments Affecting Function (Mobility) balance;cognition;coordination;endurance/activity tolerance;postural/trunk control;strength  -KG     Cognitive Impairments, Mobility Safety/Performance attention;awareness, need for assistance;insight into deficits/self-awareness;safety precaution awareness;safety precaution follow-through;sequencing abilities  -KG               User Key  (r) = Recorded By, (t) = Taken By, (c) = Cosigned By      Initials Name Provider Type    KG Lois Santos, PT Physical Therapist                   Mobility       Row Name 10/31/24 1427          Bed Mobility    Comment, (Bed Mobility) UIC  -KG       Row Name 10/31/24 1427          Transfers     Comment, (Transfers) VC's for sequencing and technique. Max cues for safe hand placement to maintain sternal precautions. Pt very unsteady upon initial stand.  -KG       Row Name 10/31/24 1422          Sit-Stand Transfer    Sit-Stand [place] (Transfers) moderate assist (50% patient effort);2 person assist;verbal cues  -KG       Row Name 10/31/24 1427          Gait/Stairs (Locomotion)    [place] Level (Gait) moderate assist (50% patient effort);2 person assist;1 person to manage equipment;verbal cues  -KG     Assistive Device (Gait) other (see comments)  B UE support on tripod monitor  -KG     Distance in Feet (Gait) 10  -KG     Deviations/Abnormal Patterns (Gait) bilateral deviations;base of support, wide;lesley decreased;festinating/shuffling;stride length decreased  -KG     Bilateral Gait Deviations forward flexed posture;heel strike decreased  -KG     Comment, (Gait/Stairs) Pt demonstrated step to gait pattern with very slow and very shuffled steps. Frequent pauses in between strides where pt required verbal and tactile cues to begin taking steps again. Pt very unsteady; required total assistance to steer monitor. Unable to avoid obstacles. Required max cueing for continued forward ambulation. Unable to follow commands or respond to questions. Returned to room in chair and further mobility deferred due to safety.  -KG               User Key  (r) = Recorded By, (t) = Taken By, (c) = Cosigned By      Initials Name Provider Type    KG Lois Santos, PT Physical Therapist                   Obj/Interventions       Row Name 10/31/24 1424          Balance    Balance Assessment sitting static balance;standing static balance;standing dynamic balance  -KG     Static Sitting Balance moderate assist  -KG     Position, Sitting Balance supported;sitting in chair  -KG     Static Standing Balance moderate assist;2-person assist  -KG     Dynamic Standing Balance maximum assist;2-person assist  -KG      Position/Device Used, Standing Balance supported  -KG               User Key  (r) = Recorded By, (t) = Taken By, (c) = Cosigned By      Initials Name Provider Type    KG Lois Santos, PT Physical Therapist                   Goals/Plan    No documentation.                  Clinical Impression       Row Name 10/31/24 1429          Pain    Additional Documentation Pain Scale: FACES Pre/Post-Treatment (Group)  -KG       Row Name 10/31/24 1422          Pain Scale: FACES Pre/Post-Treatment    Pain: FACES Scale, Pretreatment 2-->hurts little bit  -KG     Posttreatment Pain Rating 2-->hurts little bit  -KG       Row Name 10/31/24 1425          Plan of Care Review    Plan of Care Reviewed With patient  -KG     Progress improving  -KG     Outcome Evaluation Pt ambulated 10ft with modA x2 +1 and B UE support on tripod monitor. Chair follow for safety. Pt demonstrated very slow lesley with wide BAILEY and short, shuffled steps. Required max cueing for continued forward ambulation. Pt very unsteady and required total assistance to steer monitor. Pt with poor command following and inability to respond to questions. Returned to room in chair. Deferred additional mobility due to safety concerns. Continue to recommend PT skilled care and D/C to SNF.  -KG       Row Name 10/31/24 1421          Vital Signs    Pre Systolic BP Rehab 134  -KG     Pre Treatment Diastolic BP 88  -KG     Pretreatment Heart Rate (beats/min) 67  -KG     Posttreatment Heart Rate (beats/min) 67  -KG     Pre SpO2 (%) 94  -KG     O2 Delivery Pre Treatment room air  -KG     Post SpO2 (%) 91  -KG     O2 Delivery Post Treatment room air  -KG     Pre Patient Position Sitting  -KG     Intra Patient Position Standing  -KG     Post Patient Position Sitting  -KG       Row Name 10/31/24 1421          Positioning and Restraints    Pre-Treatment Position sitting in chair/recliner  -KG     Post Treatment Position chair  -KG     In Chair reclined;call light within  reach;encouraged to call for assist;exit alarm on;with nsg;RUE elevated;LUE elevated;legs elevated  -KG               User Key  (r) = Recorded By, (t) = Taken By, (c) = Cosigned By      Initials Name Provider Type    Lois Kamara, PT Physical Therapist                   Outcome Measures       Row Name 10/31/24 1432 10/31/24 0800       How much help from another person do you currently need...    Turning from your back to your side while in flat bed without using bedrails? 2  -KG 1  -SW    Moving from lying on back to sitting on the side of a flat bed without bedrails? 2  -KG 1  -SW    Moving to and from a bed to a chair (including a wheelchair)? 2  -KG 1  -SW    Standing up from a chair using your arms (e.g., wheelchair, bedside chair)? 2  -KG 1  -SW    Climbing 3-5 steps with a railing? 1  -KG 1  -SW    To walk in hospital room? 2  -KG 1  -SW    AM-PAC 6 Clicks Score (PT) 11  -KG 6  -SW    Highest Level of Mobility Goal 4 --> Transfer to chair/commode  -KG 2 --> Bed activities/dependent transfer  -SW      Row Name 10/31/24 1432 10/31/24 0816       Functional Assessment    Outcome Measure Options AM-PAC 6 Clicks Basic Mobility (PT)  -KG AM-PAC 6 Clicks Daily Activity (OT)  -TA              User Key  (r) = Recorded By, (t) = Taken By, (c) = Cosigned By      Initials Name Provider Type    Ming Brown RN Registered Nurse    Marbin Elias, OT Occupational Therapist    Lois Kamara, PT Physical Therapist                                 Physical Therapy Education       Title: PT OT SLP Therapies (In Progress)       Topic: Physical Therapy (In Progress)       Point: Mobility training (In Progress)       Learning Progress Summary            Patient Acceptance, E, NR by KG at 10/31/2024 0959    Acceptance, E, NR by ND at 10/30/2024 1124    Acceptance, E, NR by ND at 10/29/2024 1024                      Point: Home exercise program (In Progress)       Learning Progress Summary             Patient Acceptance, E, NR by KG at 10/31/2024 0959                      Point: Body mechanics (In Progress)       Learning Progress Summary            Patient Acceptance, E, NR by KG at 10/31/2024 0959    Acceptance, E, NR by ND at 10/30/2024 1124    Acceptance, E, NR by ND at 10/29/2024 1024                      Point: Precautions (In Progress)       Learning Progress Summary            Patient Acceptance, E, NR by KG at 10/31/2024 0959    Acceptance, E, NR by ND at 10/30/2024 1124    Acceptance, E, NR by ND at 10/29/2024 1024                                      User Key       Initials Effective Dates Name Provider Type Discipline    KG 05/22/20 -  Lois Santos PT Physical Therapist PT    ND 11/16/23 -  Cristina Varela PT Physical Therapist PT                  PT Recommendation and Plan     Progress: improving  Outcome Evaluation: Pt ambulated 10ft with modA x2 +1 and B UE support on tripod monitor. Chair follow for safety. Pt demonstrated very slow lesley with wide BAILEY and short, shuffled steps. Required max cueing for continued forward ambulation. Pt very unsteady and required total assistance to steer monitor. Pt with poor command following and inability to respond to questions. Returned to room in chair. Deferred additional mobility due to safety concerns. Continue to recommend PT skilled care and D/C to SNF.     Time Calculation:         PT Charges       Row Name 10/31/24 0959             Time Calculation    Start Time 0959  -KG      PT Received On 10/31/24  -KG      PT Goal Re-Cert Due Date 11/08/24  -KG         Time Calculation- PT    Total Timed Code Minutes- PT 15 minute(s)  -KG         Timed Charges    55460 - PT Therapeutic Activity Minutes 15  -KG         Total Minutes    Timed Charges Total Minutes 15  -KG       Total Minutes 15  -KG                User Key  (r) = Recorded By, (t) = Taken By, (c) = Cosigned By      Initials Name Provider Type    KG Lois Santos, PT Physical  Therapist                  Therapy Charges for Today       Code Description Service Date Service Provider Modifiers Qty    [id number]  PT THERAPEUTIC ACT EA 15 MIN 10/31/2024 Lois Santos, PT GP 1            PT G-Codes  Outcome Measure Options: AM-PAC 6 Clicks Basic Mobility (PT)  AM-PAC 6 Clicks Score (PT): 11  AM-PAC 6 Clicks Score (OT): 8  PT Discharge Summary  Anticipated Discharge Disposition (PT): skilled nursing facility    Yvette Santos, PT  10/31/2024

## 2024-10-31 NOTE — PROGRESS NOTES
[facility] Cardiothoracic Surgery In-Patient Progress Note     LOS: 3 days     POD # 3 s/p CABG x 4, MAZE, LAAL    Subjective  Sitting up in chair. No complaints other than pain    Objective  Vital Signs  Temp:  [98.4 °F (36.9 °C)-100.8 °F (38.2 °C)] 98.6 °F (37 °C)  Heart Rate:  [] 65  Resp:  [14-24] 20  BP: (107-161)/() 116/74  Arterial Line BP: (165)/(63) 165/63    Physical Exam:   General Appearance: alert, appears stated age and cooperative   Lungs: clear to auscultation, respirations regular, respirations even, and respirations unlabored   Heart: regular rhythm & normal rate, normal S1, S2, no murmur, no gallop, no rub, and no click   Skin: Incision c/d/I   Sternum: Stable   Output by Drain (mL) 10/30/24 0701 - 10/30/24 1900 10/30/24 1901 - 10/31/24 0700 10/31/24 0701 - 10/31/24 0737 Range Total   Y Chest Tube 1 and 2 Anterior Mediastinal 200 100  300        Results     Results from last 7 days   Lab Units 10/31/24  0343   WBC 10*3/mm3 9.97   HEMOGLOBIN g/dL 10.2*   HEMATOCRIT % 31.9*   PLATELETS 10*3/mm3 135*     Results from last 7 days   Lab Units 10/31/24  0343   SODIUM mmol/L 148*   POTASSIUM mmol/L 4.3   CHLORIDE mmol/L 112*   CO2 mmol/L 24.0   BUN mg/dL 32*   CREATININE mg/dL 0.85   GLUCOSE mg/dL 175*   CALCIUM mg/dL 8.9     Imaging Results (Last 24 Hours)       Procedure Component Value Units Date/Time    XR Chest 1 View [[id number]] Resulted: 10/31/24 0348     Updated: 10/31/24 0521    XR Chest 1 View [[id number]] Collected: 10/30/24 1526     Updated: 10/30/24 1531    Narrative:      XR CHEST 1 VW    Date of Exam: 10/30/2024 2:51 PM EDT    Indication: pneumothorax follow up    Comparison: Chest radiograph 10/30/2024    Findings:  Pleural line visualized at the right apex suggesting pneumothorax measuring up to 1.3 cm less conspicuous from prior, previously 1.8 cm. Antegrade oriented gastric tube terminates over the mid stomach. Prior median sternotomy, CABG and left atrial   appendage  closure device. Right IJ central catheter has been removed. Cardiomediastinal silhouette stable. Lung volumes are low with streaky perihilar and bibasilar opacities favoring atelectasis. Blunted costophrenic angle difficult to exclude trace   effusions. No significant effusion or left pneumothorax. No worsening edema. Degenerative related osseous changes. Loop recorder noted.      Impression:      Impression:  1. Small less conspicuous right apical pneumothorax.  2. Removed right IJ central catheter. Antegrade oriented gastric tube terminates over mid stomach.  3. Radiographically stable hypoventilation changes and presumed atelectasis.      Electronically Signed: Preston Qunin MD    10/30/2024 3:28 PM EDT    Workstation ID: [id number]          Assessment  POD # 3 s/p CABG x 4, MAZE, LAAL    Plan   D/C pleural chest tubes  Diuresis  D/C [place]  D/C NG tube  Wean oxygen as tolerated  Ambulate  Pulmonary toilet  ASA, statin, BB  Transfer to telemetry    Alex Max MD  10/31/24  07:37 EDT

## 2024-10-31 NOTE — THERAPY TREATMENT NOTE
"Patient Name: Joel Galo  : 1965    MRN: [id number]                              Today's Date: 10/31/2024       Admit Date: 10/28/2024    Visit Dx:     ICD-10-CM ICD-9-CM   1. ASCVD (arteriosclerotic cardiovascular disease)  I25.10 429.2     440.9     Patient Active Problem List   Diagnosis    Paroxysmal atrial fibrillation    Obstructive sleep apnea    Hypothyroidism (acquired)    GERD (gastroesophageal reflux disease)    Chronic anticoagulation    DM (diabetes mellitus), type 2    Hyperlipidemia LDL goal <70    Primary hypertension    CAD s/p CABG x 4, MAZE, LAAL 10/28/24     Past Medical History:   Diagnosis Date    A-fib     Anxiety     Arthritis     DDD (degenerative disc disease), lumbar     Deep vein thrombosis     Depression     Diabetes     Disease of thyroid gland     HYPO    Heart attack     \"5-6 years ago\"    Hyperlipidemia     Hypertension     Kidney stone     Neuropathy     Sleep apnea     DOES NOT USE CPAP    UTI (urinary tract infection)      Past Surgical History:   Procedure Laterality Date    ATRIAL APPENDAGE EXCLUSION LEFT WITH TRANSESOPHAGEAL ECHOCARDIOGRAM N/A 10/28/2024    Procedure: ATRIAL APPENDAGE EXCLUSION LEFT WITH TRANSESOPHAGEAL ECHOCARDIOGRAM;  Surgeon: Alex Max MD;  Location: [facility];  Service: Cardiothoracic;  Laterality: N/A;    CARDIAC CATHETERIZATION N/A 2024    Procedure: Left Heart Cath;  Surgeon: Julio Neff MD;  [facility]:  [facility];  Service: Cardiology;  Laterality: N/A;    CARDIAC ELECTROPHYSIOLOGY PROCEDURE N/A 2023    Procedure: Loop insertion;  Surgeon: Tariq Chávez MD;  Location:  [facility];  Service: Cardiovascular;  Laterality: N/A;    CATARACT EXTRACTION Bilateral     COLONOSCOPY      CORONARY ARTERY BYPASS GRAFT N/A 10/28/2024    Procedure: MEDIAN STERNOTOMY, CORONARY ARTERY BYPASS GRAFTING X4 UTILIZING THE LEFT INTERNAL MAMMARY ARTERY GRAFT, ENDOSCOPIC VEIN HARVEST OF THE GREATER RIGHT " SAPHENOUS VEIN;  Surgeon: Alex Max MD;  Location:  [facility];  Service: Cardiothoracic;  Laterality: N/A;    ENDOSCOPY      MAZE PROCEDURE N/A 10/28/2024    Procedure: MAZE PROCEDURE;  Surgeon: Alex Max MD;  Location:  [facility];  Service: Cardiothoracic;  Laterality: N/A;    TEETH EXTRACTION        General Information       Row Name 10/31/24 0816          OT Time and Intention    Document Type therapy note (daily note)  -TA     Mode of Treatment occupational therapy  -TA       Row Name 10/31/24 08          General Information    Patient Profile Reviewed yes  -TA     Existing Precautions/Restrictions cardiac;fall;sternal;other (see comments)  Current cognitive level  -TA     Barriers to Rehab medically complex;previous functional deficit;cognitive status  -TA       Row Name 10/31/24 08          Occupational Profile    Reason for Services/Referral (Occupational Profile) fxl decline from PLOF  -TA     Patient Goals (Occupational Profile) none stated  -TA       Row Name 10/31/24 0816          Cognition    Orientation Status (Cognition) oriented to;person;disoriented to;place;situation;time  Follows 40% of 1 step commands  -TA       Row Name 10/31/24 0816          Safety Issues/Impairments Affecting Functional Mobility    Safety Issues Affecting Function (Mobility) safety precautions follow-through/compliance;safety precaution awareness;insight into deficits/self-awareness;ability to follow commands;impulsivity;awareness of need for assistance;problem-solving;sequencing abilities  -TA     Impairments Affecting Function (Mobility) balance;cognition;endurance/activity tolerance;pain;strength;shortness of breath  -TA     Cognitive Impairments, Mobility Safety/Performance attention;awareness, need for assistance;impulsivity;insight into deficits/self-awareness;judgment;problem-solving/reasoning;safety precaution awareness;safety precaution follow-through  -TA               User Key  (r) = Recorded By, (t) =  Taken By, (c) = Cosigned By      Initials Name Provider Type    Marbin Elias, RAFAEL Occupational Therapist                     Mobility/ADL's       Row Name 10/31/24 0816          Bed Mobility    Comment, (Bed Mobility) Pt UIC  -TA       Row Name 10/31/24 0816          Activities of Daily Living    BADL Assessment/Intervention grooming  -TA       Row Name 10/31/24 0816          Grooming Assessment/Training    [place] Level (Grooming) wash face, hands;maximum assist (25% patient effort);nonverbal cues (demo/gesture);verbal cues  Support at R elbow to reach face and consistent VCs to advance through task  -TA     Position (Grooming) supported sitting  -TA               User Key  (r) = Recorded By, (t) = Taken By, (c) = Cosigned By      Initials Name Provider Type    Marbin Elias OT Occupational Therapist                   Obj/Interventions       Row Name 10/31/24 0816          Shoulder (Therapeutic Exercise)    Shoulder AAROM (Therapeutic Exercise) bilateral;flexion;extension;aBduction;aDduction;sitting;10 repetitions  Consistent VCs required to advance through all ex's  -TA       Row Name 10/31/24 0816          Elbow/Forearm (Therapeutic Exercise)    Elbow/Forearm (Therapeutic Exercise) AAROM (active assistive range of motion)  -TA     Elbow/Forearm AAROM (Therapeutic Exercise) bilateral;flexion;extension;supination;pronation;sitting;10 repetitions  -TA       Row Name 10/31/24 0816          Wrist (Therapeutic Exercise)    Wrist (Therapeutic Exercise) AAROM (active assistive range of motion)  -TA     Wrist AAROM (Therapeutic Exercise) bilateral;flexion;extension;10 repetitions  -TA       Row Name 10/31/24 0816          Hand (Therapeutic Exercise)    Hand (Therapeutic Exercise) AROM (active range of motion)  -TA     Hand AROM/AAROM (Therapeutic Exercise) bilateral;AROM (active range of motion);finger flexion;finger extension;10 repetitions  VCs to advance through all ex's  -TA       Row Name  10/31/24 08          Motor Skills    Therapeutic Exercise shoulder;elbow/forearm;wrist;hand  -TA       Row Name 10/31/24 08          Balance    Balance Assessment sitting static balance  -TA     Static Sitting Balance supervision  -TA     Balance Interventions UE activity with balance activity;weight shifting activity;occupation based/functional task  -TA               User Key  (r) = Recorded By, (t) = Taken By, (c) = Cosigned By      Initials Name Provider Type    Marbin Elias OT Occupational Therapist                   Goals/Plan       Row Name 10/31/24 08          Therapy Assessment/Plan (OT)    Planned Therapy Interventions (OT) activity tolerance training;BADL retraining;functional balance retraining;occupation/activity based interventions;patient/caregiver education/training;ROM/therapeutic exercise;strengthening exercise;transfer/mobility retraining  -TA               User Key  (r) = Recorded By, (t) = Taken By, (c) = Cosigned By      Initials Name Provider Type    Marbin Elias OT Occupational Therapist                   Clinical Impression       Row Name 10/31/24 [facility]          Pain Assessment    Pain Side/Orientation generalized  -TA     Pain Management Interventions exercise or physical activity utilized;premedicated for activity  -TA     Response to Pain Interventions activity participation with tolerable pain  -TA     Additional Documentation Pain Scale: FACES Pre/Post-Treatment (Group)  -TA       Row Name 10/31/24 08          Pain Scale: FACES Pre/Post-Treatment    Pain: FACES Scale, Pretreatment 2-->hurts little bit  -TA     Posttreatment Pain Rating 2-->hurts little bit  -TA       Row Name 10/31/24 08          Plan of Care Review    Plan of Care Reviewed With patient  -TA     Progress no change  -TA     Outcome Evaluation VSS; Pt continues to exhibit lethargy and difficulty maintaining eyes open; able to follow 40% 1 step commands. Pt required Mod A/support at R elbow  to reach face to wash face and consistent VCs to advance through activity. Pt completed AAROM of BUE strengthening ex's, 10 reps all ex's. Pt remains limited by LOC, presentation of s/s of delerium. Pt remains below fxl baseline and will continue to benefit from skilled OT services to address deficits, facilitate increased fxl I. Recommend IRF at discharge.  -TA       Row Name 10/31/24 0816          Therapy Assessment/Plan (OT)    Patient/Family Therapy Goal Statement (OT) none stated  -TA     Rehab Potential (OT) good  -TA     Criteria for Skilled Therapeutic Interventions Met (OT) yes;skilled treatment is necessary  -TA     Therapy Frequency (OT) daily  -TA     Predicted Duration of Therapy Intervention (OT) 1 week  -TA       Row Name 10/31/24 0816          Therapy Plan Review/Discharge Plan (OT)    Anticipated Discharge Disposition (OT) inpatient rehabilitation facility  -TA       Row Name 10/31/24 0816          Vital Signs    Pre Systolic BP Rehab --  [facility]CC;RN present and cleared pt for tx  -TA     O2 Delivery Pre Treatment supplemental O2  -TA     O2 Delivery Intra Treatment supplemental O2  -TA     O2 Delivery Post Treatment supplemental O2  -TA     Pre Patient Position Sitting  -TA     Intra Patient Position Sitting  -TA     Post Patient Position Sitting  -TA       Row Name 10/31/24 0816          Positioning and Restraints    Pre-Treatment Position sitting in chair/recliner  -TA     Post Treatment Position chair  -TA     In Chair notified nsg;reclined;call light within reach;encouraged to call for assist;exit alarm on;waffle cushion;legs elevated;with nsg  all lines intact  -TA               User Key  (r) = Recorded By, (t) = Taken By, (c) = Cosigned By      Initials Name Provider Type    Marbin Elias, OT Occupational Therapist                   Outcome Measures       Row Name 10/31/24 0816          How much help from another is currently needed...    Putting on and taking off regular lower body  clothing? 1  -TA     Bathing (including washing, rinsing, and drying) 1  -TA     Toileting (which includes using toilet bed pan or urinal) 1  -TA     Putting on and taking off regular upper body clothing 1  -TA     Taking care of personal grooming (such as brushing teeth) 2  -TA     Eating meals 2  -TA     AM-PAC 6 Clicks Score (OT) 8  -TA       Row Name 10/31/24 0816          Functional Assessment    Outcome Measure Options AM-PAC 6 Clicks Daily Activity (OT)  -TA               User Key  (r) = Recorded By, (t) = Taken By, (c) = Cosigned By      Initials Name Provider Type    Marbin Elias OT Occupational Therapist                    Occupational Therapy Education       Title: PT OT SLP Therapies (In Progress)       Topic: Occupational Therapy (In Progress)       Point: ADL training (Not Started)       Description:   Instruct learner(s) on proper safety adaptation and remediation techniques during self care or transfers.   Instruct in proper use of assistive devices.                  Learner Progress:  Not documented in this visit.              Point: Home exercise program (Not Started)       Description:   Instruct learner(s) on appropriate technique for monitoring, assisting and/or progressing therapeutic exercises/activities.                  Learner Progress:  Not documented in this visit.              Point: Precautions (In Progress)       Description:   Instruct learner(s) on prescribed precautions during self-care and functional transfers.                  Learning Progress Summary            Patient Acceptance, E,D, NR by TA at 10/30/2024 0905                      Point: Body mechanics (In Progress)       Description:   Instruct learner(s) on proper positioning and spine alignment during self-care, functional mobility activities and/or exercises.                  Learning Progress Summary            Patient Acceptance, E,D, NR by TA at 10/30/2024 0905                                      User Key        Initials Effective Dates Name Provider Type Discipline    TA 06/16/21 -  Marbin Benjamin, OT Occupational Therapist OT                  OT Recommendation and Plan  Planned Therapy Interventions (OT): activity tolerance training, BADL retraining, functional balance retraining, occupation/activity based interventions, patient/caregiver education/training, ROM/therapeutic exercise, strengthening exercise, transfer/mobility retraining  Therapy Frequency (OT): daily  Plan of Care Review  Plan of Care Reviewed With: patient  Progress: no change  Outcome Evaluation: VSS; Pt continues to exhibit lethargy and difficulty maintaining eyes open; able to follow 40% 1 step commands. Pt required Mod A/support at R elbow to reach face to wash face and consistent VCs to advance through activity. Pt completed AAROM of BUE strengthening ex's, 10 reps all ex's. Pt remains limited by LOC, presentation of s/s of delerium. Pt remains below fxl baseline and will continue to benefit from skilled OT services to address deficits, facilitate increased fxl I. Recommend IRF at discharge.     Time Calculation:   Evaluation Complexity (OT)  Review Occupational Profile/Medical/Therapy History Complexity: expanded/moderate complexity  Assessment, Occupational Performance/Identification of Deficit Complexity: 3-5 performance deficits  Clinical Decision Making Complexity (OT): detailed assessment/moderate complexity  Overall Complexity of Evaluation (OT): moderate complexity     Time Calculation- OT       Row Name 10/31/24 0816             Time Calculation- OT    OT Start Time 0816  ttc 17 minutes  -TA      Total Timed Code Minutes- OT 17 minute(s)  -TA      OT Received On 10/31/24  -TA      OT Goal Re-Cert Due Date 11/09/24  -TA         Timed Charges    05083 - OT Therapeutic Exercise Minutes 11  -TA      09871 - OT Self Care/Mgmt Minutes 6  -TA         Total Minutes    Timed Charges Total Minutes 17  -TA       Total Minutes 17  -TA                 User Key  (r) = Recorded By, (t) = Taken By, (c) = Cosigned By      Initials Name Provider Type    TA Marbin Benjamin, OT Occupational Therapist                  Therapy Charges for Today       Code Description Service Date Service Provider Modifiers Qty    [id number]  OT EVAL MOD COMPLEXITY 4 10/30/2024 Marbin Benjamin, OT GO 1    [id number]  OT THER PROC EA 15 MIN 10/31/2024 Marbin Benjamin, OT GO 1                 Marbin Benjamin OT  10/31/2024

## 2024-10-31 NOTE — CASE MANAGEMENT/SOCIAL WORK
Continued Stay Note  [facility]     Patient Name: Joel Galo  MRN: [id number]  Today's Date: 10/31/2024    Admit Date: 10/28/2024    Plan: SNF   Discharge Plan       Row Name 10/31/24 1640       Plan    Plan SNF    Patient/Family in Agreement with Plan yes    Plan Comments Spoke with spouse via home phone r/t unsucessful with cell x2 attempts. Agreeable in SNF at discharge. Followed up with referral sent out yesterday, and will follow up again tomorrow. CM will cont to follow.    Final Discharge Disposition Code 03 - skilled nursing facility (SNF)                   Discharge Codes    No documentation.                       Aneta Grijalva RN

## 2024-10-31 NOTE — PLAN OF CARE
Problem: Adult Inpatient Plan of Care  Goal: Plan of Care Review  Recent Flowsheet Documentation  Taken 10/31/2024 0816 by Marbin Benjamin, OT  Progress: no change  Outcome Evaluation:   VSS   Pt continues to exhibit lethargy and difficulty maintaining eyes open   able to follow 40% 1 step commands. Pt required Mod A/support at R elbow to reach face to wash face and consistent VCs to advance through activity. Pt completed AAROM of BUE strengthening ex's, 10 reps all ex's. Pt remains limited by LOC, presentation of s/s of delerium. Pt remains below fxl baseline and will continue to benefit from skilled OT services to address deficits, facilitate increased fxl I. Recommend IRF at discharge.   Goal Outcome Evaluation:  Plan of Care Reviewed With: patient        Progress: no change  Outcome Evaluation: VSS; Pt continues to exhibit lethargy and difficulty maintaining eyes open; able to follow 40% 1 step commands. Pt required Mod A/support at R elbow to reach face to wash face and consistent VCs to advance through activity. Pt completed AAROM of BUE strengthening ex's, 10 reps all ex's. Pt remains limited by LOC, presentation of s/s of delerium. Pt remains below fxl baseline and will continue to benefit from skilled OT services to address deficits, facilitate increased fxl I. Recommend IRF at discharge.    Anticipated Discharge Disposition (OT): inpatient rehabilitation facility

## 2024-10-31 NOTE — DISCHARGE PLACEMENT REQUEST
"Joel Gutierrez (59 y.o. Male)     Aneta [phone]      Date of Birth   1965    Social Security Number       Address   [address] [place] [address]    Home Phone   [phone]    MRN   [id number]       Mormonism   Restorationism    Marital Status                               Admission Date   10/28/24    Admission Type   Elective    Admitting Provider   Alex Max MD    Attending Provider   Alex Max MD    [facility], Room/Bed   [facility]       Discharge Date       Discharge Disposition       Discharge Destination                                 Attending Provider: Alex Max MD    Allergies: No Known Allergies    Isolation: None   Infection: None   Code Status: CPR    Ht: 172.7 cm (68\")   Wt: 105 kg (230 lb 13.2 oz)    Admission Cmt: None   Principal Problem: CAD s/p CABG x 4, LAURY CLINE 10/28/24 [I25.10]                   Active Insurance as of 10/28/2024       Primary Coverage       Payor Plan Insurance Group Employer/Plan Group    ANTH MEDICARE REPLACEMENT ANTHEM MEDICARE ADVANTAGE [id number]       Payor Plan Address Payor Plan Phone Number Payor Plan Fax Number Effective Dates    [address] [phone]  1/1/2024 - None Entered    [facility] [id number]         Subscriber Name Subscriber Birth Date Member ID       JOEL GUTIERREZ 1965 [id number]                     Emergency Contacts        (Rel.) Home Phone Work Phone Mobile Phone    LISHA GUTIERREZ (Spouse) [phone] -- [phone]                 History & Physical        Manuela Sprague APRN at 10/28/24 0639       Attestation signed by Alex Max MD at 10/28/24 1720    I have reviewed this documentation and agree.  Plan for ANAND ALANIZ                    Pre-Op H&P  Joel Gutierrez  [id number]  1965      Chief complaint: Chest pain      Subjective:  Patient is a 59 y.o.male presents for scheduled surgery by Dr. Max. He anticipates a CORONARY " ARTERY BYPASS GRAFTING; ENDOSCOPIC VEIN HARVEST; RADIAL ARTERY HARVEST; ATRIAL APPENDAGE EXCLUSION LEFT WITH TRANSESOPHAGEAL ECHOCARDIOGRAM  today. He reports intermittent episodes of shortness of breath and chest pain. He had abnormal CTA coronaries. He had cardiac cath 8/28/24 that showed mid LAD 75% stenosis, distal LAD 95% stenosis, first marginal 90% stenosis, third marginal 99% stenosis, ostial RCA 70% stenosis, mid RCA 70% stenosis, and RV branch 99% stenosis. Left ventriculogram during cardiac cath noted EF 55%.       Review of Systems:  Constitutional-- No fever, chills or sweats. + fatigue.  CV-- No palpitation or syncope. +HTN, HLD, CAD, afib, chest pain  Resp-- No cough, hemoptysis. +SOB, MAGDALENA no cpap  Skin--No rashes or lesions      Allergies: No Known Allergies      Home Meds:  Medications Prior to Admission   Medication Sig Dispense Refill Last Dose/Taking    amLODIPine (NORVASC) 2.5 MG tablet Take 1 tablet by mouth Daily.   10/27/2024 Evening    aspirin 81 MG EC tablet Take 1 tablet by mouth Daily.   10/27/2024 Evening    buPROPion XL (WELLBUTRIN XL) 150 MG 24 hr tablet Take 1 tablet by mouth Daily.   10/27/2024 Evening    docusate sodium (COLACE) 100 MG capsule Take 1 capsule by mouth 2 (Two) Times a Day.   10/27/2024 Evening    DULoxetine (CYMBALTA) 60 MG capsule Take 1 capsule by mouth Daily.   10/27/2024 Morning    empagliflozin (JARDIANCE) 25 MG tablet tablet Take 1 tablet by mouth Daily.   10/27/2024 Morning    finasteride (PROSCAR) 5 MG tablet TAKE 1 TABLET EVERY DAY (Patient taking differently: Take 1 tablet by mouth Daily.) 90 tablet 0 10/27/2024 Morning    gabapentin (NEURONTIN) 300 MG capsule Take 1 capsule by mouth 3 (Three) Times a Day. TAKES 2OOMG IN AM AND 300MG IN PM USUALLY   10/27/2024    HumuLIN 70/30 KwikPen (70-30) 100 UNIT/ML suspension pen-injector 45 units in the a.m. and 20mg in the p.m.   10/27/2024    HYDROcodone-acetaminophen (NORCO)  MG per tablet Take 1 tablet by  mouth Every 8 (Eight) Hours As Needed for Moderate Pain.   10/27/2024 Evening    isosorbide mononitrate (IMDUR) 30 MG 24 hr tablet Take 1 tablet by mouth Daily. 30 tablet 11 10/27/2024 Evening    levothyroxine (SYNTHROID, LEVOTHROID) 50 MCG tablet Take 1 tablet by mouth Every Morning.   10/27/2024 Morning    losartan (COZAAR) 25 MG tablet Take 1 tablet by mouth Daily.   10/27/2024 Morning    nitroglycerin (NITROSTAT) 0.4 MG SL tablet 1 under the tongue as needed for angina, may repeat q5mins for up three doses (Patient taking differently: Place 1 tablet under the tongue Every 5 (Five) Minutes As Needed for Chest Pain. 1 under the tongue as needed for angina, may repeat q5mins for up three doses) 30 tablet 3 Past Week    polyethylene glycol (MIRALAX) 17 g packet Take 17 g by mouth Daily.   10/27/2024    rosuvastatin (CRESTOR) 20 MG tablet Take 1 tablet by mouth Daily. (Patient taking differently: Take 1 tablet by mouth Every Night.) 90 tablet 1 10/27/2024 Evening    sotalol (BETAPACE) 80 MG tablet TAKE ONE AND A HALF (1 & 1/2) TABLETS BY MOUTH TWICE DAILY 270 tablet 0 10/27/2024 Evening    Sotalol HCl AF 80 MG tablet Take 1.5 tablets by mouth 2 (Two) Times a Day. 270 tablet 3 10/27/2024 at  8:30 PM    tamsulosin (FLOMAX) 0.4 MG capsule 24 hr capsule TAKE 1 CAPSULE EVERY DAY (Patient taking differently: 1 capsule Daily. Swallow whole. Do not crush or chew.) 90 capsule 3 10/27/2024 Morning    Vitamin D, Cholecalciferol, (CHOLECALCIFEROL) 10 MCG (400 UNIT) tablet Take 1 tablet by mouth Daily.   10/27/2024 Morning    apixaban (ELIQUIS) 5 MG tablet tablet Take 1 tablet by mouth 2 (Two) Times a Day. (Patient taking differently: Take 1 tablet by mouth 2 (Two) Times a Day. LAST DOSE WILL BE ON 10/25/24) 60 tablet 4 10/25/2024    glimepiride (AMARYL) 2 MG tablet Take 1 tablet by mouth 2 (Two) Times a Day.            PMH:   Past Medical History:   Diagnosis Date    A-fib     Anxiety     Arthritis     DDD (degenerative disc  "disease), lumbar     Deep vein thrombosis     Depression     Diabetes     Disease of thyroid gland     HYPO    Heart attack     \"5-6 years ago\"    Hyperlipidemia     Hypertension     Kidney stone     Neuropathy     Sleep apnea     DOES NOT USE CPAP    UTI (urinary tract infection)      PSH:    Past Surgical History:   Procedure Laterality Date    CARDIAC CATHETERIZATION N/A 08/28/2024    Procedure: Left Heart Cath;  Surgeon: Julio Neff MD;  Location:  [facility];  Service: Cardiology;  Laterality: N/A;    CARDIAC ELECTROPHYSIOLOGY PROCEDURE N/A 04/05/2023    Procedure: Loop insertion;  Surgeon: Tariq Chávez MD;  Location:  [facility];  Service: Cardiovascular;  Laterality: N/A;    CATARACT EXTRACTION Bilateral     COLONOSCOPY      ENDOSCOPY      TEETH EXTRACTION         Immunization History:  Influenza: No  Pneumococcal: No  Tetanus: UTD    Social History:   Tobacco:   Social History     Tobacco Use   Smoking Status Never   Smokeless Tobacco Never      Alcohol:     Social History     Substance and Sexual Activity   Alcohol Use No         Physical Exam:/98 (BP Location: Left arm, Patient Position: Lying)   Pulse 75   Temp 98.1 °F (36.7 °C) (Temporal)   Resp 18   SpO2 93%       General Appearance:    Alert, cooperative, no distress, appears stated age   Head:    Normocephalic, without obvious abnormality, atraumatic   Lungs:     Clear to auscultation bilaterally, respirations unlabored    Heart:   Regular rate and rhythm, S1 and S2 normal    Abdomen:    Soft without tenderness   Extremities:   Extremities normal, atraumatic, no cyanosis or edema   Skin:   Skin color, texture, turgor normal, no rashes or lesions   Neurologic:   Grossly intact     Results Review:     LABS:  Lab Results   Component Value Date    WBC 6.82 08/26/2024    HGB 14.6 08/26/2024    HCT 43.7 08/26/2024    MCV 91.8 08/26/2024     08/26/2024    GLUCOSE 391 (H) 10/25/2024    BUN 10 " 10/25/2024    CREATININE 0.91 10/25/2024    EGFRIFNONA 86 12/15/2021    EGFRIFAFRI 99 12/15/2021     10/25/2024    K 4.7 10/25/2024    CL 99 10/25/2024    CO2 26.0 10/25/2024    MG 2.6 (H) 10/25/2024    CALCIUM 9.5 10/25/2024    ALBUMIN 4.0 10/25/2024    AST 24 10/25/2024    ALT 16 10/25/2024    BILITOT 0.2 10/25/2024      Latest Reference Range & Units 10/25/24 13:46   Hemoglobin A1C 4.80 - 5.60 % 11.20 (H)   (H): Data is abnormally high    RADIOLOGY:  ECHO 10/17/24:  Clinical Indication    Other Reasons; Additional Reasons; Reasons Uncertain in Most Circumstances; Other (Please Comment) - CAD/ PREOP CABG   Dx: Coronary artery disease involving native coronary artery of native heart, unspecified whether angina present [I25.10 (ICD-10-CM)]     Interpretation Summary         Normal left ventricular cavity size and wall thickness noted. All left ventricular wall segments contract normally.    Left ventricular ejection fraction appears to be 61 - 65%.    The aortic valve is not well visualized. No aortic valve regurgitation or stenosis is present. The aortic valve is grossly normal in structure.    The mitral valve is structurally normal with no regurgitation or significant stenosis present.    There is no evidence of pericardial effusion. .    8/28/24 heart cath:    Indications    Paroxysmal atrial fibrillation [I48.0 (ICD-10-CM)]   Mixed hyperlipidemia [E78.2 (ICD-10-CM)]   Primary hypertension [I10 (ICD-10-CM)]   Chronic chest pain with high risk for CAD [R07.9, G89.29, Z91.89 (ICD-10-CM)]     Pre Procedure Diagnosis    Coronary artery disease    Post Procedure Diagnosis    Coronary artery disease      Conclusion         Normal systolic function.    Mid LAD lesion is 75% stenosed.    Dist LAD lesion is 95% stenosed.    1st Mrg lesion is 90% stenosed.    3rd Mrg lesion is 99% stenosed.    Ost RCA lesion is 70% stenosed.    Mid RCA lesion is 70% stenosed.    RV Branch lesion is 99% stenosed.     1.  Cardiac.   Patient with significant coronary artery disease involving multiple vessels.  Patient will be referred for bypass surgery.    Study Result    Narrative & Impression   EXAM:    CT Angiography Heart With Intravenous Contrast     EXAM DATE:    8/10/2024 9:49 AM     CLINICAL HISTORY:    ; R07.2-Precordial pain     TECHNIQUE:    Axial computed tomographic angiography images of the coronary arteries  with intravenous contrast.  Sublingual nitroglycerine for coronary  vasodilation and IV metoprolol to reduce heart rate were administered as  needed.  This CT exam was performed using one or more of the following  dose reduction techniques:  automated exposure control, adjustment of  the mA and/or kV according to patient size, and/or use of iterative  reconstruction technique.    MIP reconstructed images were created and reviewed.     COMPARISON:    None.     FINDINGS:    LEFT MAIN CORONARY ARTERY:  Left main calcium score 48.    LEFT ANTERIOR DESCENDING ARTERY:  The LAD demonstrates such extensive  mid-distal calcification that the examination is very limited with also  some possible high risk plaque causing about 50% stenosis in the mid  aspect of the LAD.  LAD calcium score of 686.    LEFT CIRCUMFLEX ARTERY:  Question proximal circumflex origin high risk  plaque causing about 75% stenosis.  Circumflex calcium score 62.    RIGHT CORONARY ARTERY:  Mixed calcific and high risk plaque of  proximal RCA possibly contribute up to about 75% or greater stenosis and  should be further evaluated with ICA.  RCA calcium score of 331.       CARDIAC VALVES:  Unremarkable as visualized.  No evidence of  calcification in the aortic or mitral valve leaflets.    PERICARDIUM:  Unremarkable as visualized.  Contour is preserved with  no effusion, thickening or calcification.       AORTA:  No acute findings.  No thoracic aortic aneurysm.  No  dissection.    PULMONARY ARTERIES:  Unremarkable as visualized.  No pulmonary  embolism.    LUNGS AND  PLEURAL SPACES:  Unremarkable as visualized.  No mass.  No  consolidation or edema.  No pleural effusion or thickening.  No  pneumothorax.    MEDIASTINUM:  Unremarkable as visualized.  No mass.    OTHER FINDINGS:  Total calcium score of 1895.     IMPRESSION:  1.  The LAD demonstrates such extensive mid-distal calcification that  the examination is very limited with also some possible high risk plaque  causing about 50% stenosis in the mid aspect of the LAD.  2.  Question proximal circumflex origin high risk plaque causing about  75% stenosis.  3.  Mixed calcific and high risk plaque of proximal RCA possibly  contribute up to about 75% or greater stenosis and should be further  evaluated with ICA.     ASSESSMENT:    CAD RADS N, P4 but overall concerning for significant multifocal  disease in ICA should be considered. Impression.       I reviewed the patient's new clinical results.    Cancer Staging (if applicable)  Cancer Patient: __ yes __no __unknown; If yes, clinical stage T:__ N:__M:__, stage group or __N/A      Impression: Coronary artery disease involving native coronary artery of native heart       Plan: CORONARY ARTERY BYPASS GRAFTING; ENDOSCOPIC VEIN HARVEST; RADIAL ARTERY HARVEST; ATRIAL APPENDAGE EXCLUSION LEFT WITH TRANSESOPHAGEAL ECHOCARDIOGRAM       ZIA Montgomery   10/28/2024   06:39 EDT    Electronically signed by Alex Max MD at 10/28/24 1720       Current Facility-Administered Medications   Medication Dose Route Frequency Provider Last Rate Last Admin    acetaminophen (TYLENOL) tablet 650 mg  650 mg Nasogastric Q8H Alex Max MD   650 mg at 10/31/24 0538    albumin human 5 % solution 250 mL  250 mL Intravenous PRN Chris Vilchis PA   250 mL at 10/28/24 1423    aspirin EC tablet 325 mg  325 mg Oral Daily Chris Vilchis PA   325 mg at 10/31/24 0823    atorvastatin (LIPITOR) tablet 40 mg  40 mg Oral Nightly Chris Vilchis PA   40 mg at 10/30/24 2055    bisacodyl (DULCOLAX)  suppository 10 mg  10 mg Rectal Daily PRN Alex Max MD        buPROPion XL (WELLBUTRIN XL) 24 hr tablet 150 mg  150 mg Oral Daily Laura Dallas MD   150 mg at 10/31/24 0824    Calcium Replacement - Follow Nurse / BPA Driven Protocol   Does not apply PRN Chris Vilchis PA        chlorhexidine (PERIDEX) 0.12 % solution 15 mL  15 mL Mouth/Throat Q12H Chris Vilchis PA   15 mL at 10/31/24 0823    dexmedetomidine (PRECEDEX) 400 mcg in 100 mL NS infusion  0.2-1.5 mcg/kg/hr Intravenous Titrated Laura Dallas MD   Stopped at 10/31/24 0530    dextrose (D50W) (25 g/50 mL) IV injection 25 g  25 g Intravenous Q15 Min PRN Laura Dallas MD        dextrose (GLUTOSE) oral gel 15 g  15 g Oral Q15 Min PRN Laura Dallas MD        DOBUTamine (DOBUTREX) 1 mg/mL in D5W infusion  2-20 mcg/kg/min Intravenous Continuous PRN Chris Vilchis PA        DOPamine 400 mg in 250 mL D5W infusion  2-20 mcg/kg/min Intravenous Continuous PRN Chris Vilchis PA        DULoxetine (CYMBALTA) DR capsule 60 mg  60 mg Oral Daily Laura Dallas MD   60 mg at 10/31/24 0824    empagliflozin (JARDIANCE) tablet 25 mg  25 mg Oral Daily Leda Gtz APRN   25 mg at 10/31/24 0825    EPINEPHrine 10 mg in 250 mL infusion  0.02-0.3 mcg/kg/min Intravenous Continuous PRN Chris Vilchis PA        gabapentin (NEURONTIN) capsule 100 mg  100 mg Oral Q8H Alex Max MD   100 mg at 10/31/24 0822    glucagon (GLUCAGEN) injection 1 mg  1 mg Intramuscular Q15 Min PRN Laura Dallas MD        HYDROcodone-acetaminophen (NORCO) 7.5-325 MG per tablet 1 tablet  1 tablet Oral Q4H PRN Alex Max MD   1 tablet at 10/31/24 0040    [START ON 11/1/2024] insulin glargine (LANTUS, SEMGLEE) injection 25 Units  25 Units Subcutaneous Daily Laura Dallas MD        Insulin Lispro (humaLOG) injection 3-14 Units  3-14 Units Subcutaneous 4x Daily AC & at Bedtime Laura Dallas MD   5 Units at 10/31/24 1231    levothyroxine (SYNTHROID, LEVOTHROID) tablet 50  mcg  50 mcg Oral Q AM Laura Dallas MD   50 mcg at 10/31/24 0539    losartan (COZAAR) tablet 25 mg  25 mg Oral Q24H Leda Gtz APRN   25 mg at 10/31/24 0824    Magnesium Cardiology Dose Replacement - Follow Nurse / BPA Driven Protocol   Does not apply PRN Chris Vilchis PA        morphine injection 2 mg  2 mg Intravenous Q30 Min PRN Alex Max MD   2 mg at 10/31/24 0538    mupirocin (BACTROBAN) 2 % nasal ointment 1 Application  1 Application Each Nare BID Chris Vilchis PA   1 Application at 10/31/24 0824    niCARdipine (CARDENE) 25mg in 250mL NS infusion  5-15 mg/hr Intravenous Continuous PRN Chris Vilchis PA        nitroglycerin (NITROSTAT) SL tablet 0.4 mg  0.4 mg Sublingual Q5 Min PRN Chris Vilchis PA        nitroglycerin (TRIDIL) 200 mcg/ml infusion  5-200 mcg/min Intravenous Continuous PRN Chris Vilchis PA        norepinephrine (LEVOPHED) 8 mg in 250 mL NS infusion (premix)  0.02-0.3 mcg/kg/min Intravenous Continuous PRN Chris Vilchis PA   Stopped at 10/29/24 0447    ondansetron (ZOFRAN) injection 4 mg  4 mg Intravenous Q6H PRN Chris Vilchis PA        oxyCODONE (ROXICODONE) immediate release tablet 10 mg  10 mg Oral Q4H PRN Alex Max MD   10 mg at 10/31/24 0443    Pharmacy Consult - MTM   Does not apply Daily Oliver Umaña RAMYA        phenylephrine (AALIYAH-SYNEPHRINE) 50 mg in sodium chloride 0.9 % 250 mL infusion  0.5-3 mcg/kg/min Intravenous Continuous PRN Chris Vilchis PA        Phosphorus Replacement - Follow Nurse / BPA Driven Protocol   Does not apply PRN Chris Vilchis PA        polyethylene glycol (MIRALAX) packet 17 g  17 g Oral Daily PRN Alex Max MD        Potassium Replacement - Follow Nurse / BPA Driven Protocol   Does not apply PRN Chris Vilchis PA        protamine injection 50 mg  50 mg Intravenous Once Chris Vilchis PA        sennosides-docusate (PERICOLACE) 8.6-50 MG per tablet 2 tablet  2 tablet Oral BID Chris Vilchis PA   2 tablet  at 10/31/24 0823    sodium bicarbonate injection 8.4% 50 mEq  50 mEq Intravenous Once PRN Chris Vilchis PA        sotalol (BETAPACE) tablet 120 mg  120 mg Oral Q12H Leda Gtz APRN   120 mg at 10/31/24 0823    tamsulosin (FLOMAX) 24 hr capsule 0.4 mg  0.4 mg Oral Daily Laura Dallas MD   0.4 mg at 10/31/24 0824     Facility-Administered Medications Ordered in Other Encounters   Medication Dose Route Frequency Provider Last Rate Last Admin    Chlorhexidine Gluconate Cloth 2 % pads 1 Application  1 Application Topical Q12H PRN Tatiana Waller APRN            Physician Progress Notes (most recent note)        Leda Gtz APRN at 10/31/24 0837          Cardiology Progress Note      Reason for visit:    Coronary artery disease status post CABG  Paroxysmal atrial fibrillation    IDENTIFICATION: 59-year-old gentleman who resides in [facility] Problems    Diagnosis  POA    **CAD s/p CABG x 4, MAZE, LAAL 10/28/24 [I25.10]  Yes     Priority: High     Abnormal CTA (8/20/2024):Moderate disease in the LAD and 75% stenosis in the circumflex and right coronary artery noted. Patient referred for left heart cath.   Cardiac cath (8/28/2024): Multivessel CAD.  Recommend CABG  Echo (10/17/2024): LVEF 61-65%.  No valvular abnormality  CABG/maze/left atrial pended to ligation (10/28/2024):  Intraoperative DARIAN (10/28/2024): LVEF 55%.  Obliteration of TORSTEN by clip.  LIMA to LAD.  SVG to OM1.  SVG to OM 4, SVG to OM 5       DM (diabetes mellitus), type 2 [E11.9]  Yes     Priority: High     hemoglobin A1c 12.6% August 2024, 11.2% October 2024       Primary hypertension [I10]  Yes     Priority: Medium    Paroxysmal atrial fibrillation [I48.0]  Yes     Priority: Medium     Echo (3/10/2023): EF 60-65%.  Grade 1 diastolic dysfunction.  Normal valvular morphology.  CHADsVasc 3  Previously on sotalol  Left atrial appendage clipping performed at time of CABG 10/28/2024      Hyperlipidemia LDL goal  <70 [E78.5]  Yes     Priority: Low       Subjective     Patient is sitting up in the chair sleeping.  Arouses easily but is very drowsy and unable to stay awake during conversation.  No family is at bedside.  Difficult to ascertain his orientation level due to his drowsiness.  Yesterday I was paged that he went into atrial fibrillation with RVR.  His metoprolol was discontinued and he was started on his home dose sotalol.  He converted back to normal sinus rhythm.  He is currently in normal sinus rhythm.        Objective   Vital Sign Min/Max for last 24 hours  Temp  Min: 98.4 °F (36.9 °C)  Max: 100.8 °F (38.2 °C)   BP  Min: 107/70  Max: 161/103   Pulse  Min: 65  Max: 120   Resp  Min: 14  Max: 24   SpO2  Min: 82 %  Max: 100 %   Flow (L/min) (Oxygen Therapy)  Min: 2  Max: 2      Intake/Output Summary (Last 24 hours) at 10/31/2024 0837  Last data filed at 10/31/2024 0600  Gross per 24 hour   Intake 430 ml   Output 3605 ml   Net -3175 ml           Physical Exam  Constitutional:       Comments: Drowsy, arousable but dozes off during conversation.  Difficult to ascertain orientation   Cardiovascular:      Rate and Rhythm: Normal rate and regular rhythm.      Heart sounds: No murmur heard.  Pulmonary:      Effort: Pulmonary effort is normal.      Breath sounds: Decreased breath sounds and rhonchi present.   Musculoskeletal:      Right lower le+ Pitting Edema present.      Left lower le+ Pitting Edema present.   Skin:     General: Skin is warm and dry.   Neurological:      Mental Status: He is easily aroused.         Tele: Normal sinus rhythm     Results Review (reviewed the patient's recent labs in the electronic medical record):      EKG (10/30/2024): Normal sinus rhythm     CXR (10/31/2024): Small right apical pneumothorax.  Stable borderline cardiomegaly.  Left atrial pended closure device in place        ECHO (10/20/2024): EF 61-65%.  No significant valvular abnormality       Results from last 7 days   Lab  "Units 10/31/24  0343 10/30/24  0307 10/29/24  0636 10/29/24  0332 10/28/24  2254 10/28/24  1802 10/28/24  1339 10/25/24  1346   SODIUM mmol/L 148* 145  --  140  --  143 142 136   POTASSIUM mmol/L 4.3 4.6  --  4.7   < > 4.3 3.2* 4.7   CHLORIDE mmol/L 112* 112*  --  108*  --  110* 109* 99   BUN mg/dL 32* 20  --  12  --  11 11 10   CREATININE mg/dL 0.85 0.88  --  0.77  --  0.84 0.86 0.91   MAGNESIUM mg/dL  --   --  2.4 1.9  --  2.2 2.1 2.6*    < > = values in this interval not displayed.         Results from last 7 days   Lab Units 10/31/24  0343 10/30/24  0307 10/29/24  0332   WBC 10*3/mm3 9.97 14.30* 6.45   HEMOGLOBIN g/dL 10.2* 10.6* 10.2*   HEMATOCRIT % 31.9* 33.1* 31.3*   PLATELETS 10*3/mm3 135* 150 113*       Lab Results   Component Value Date    HGBA1C 11.20 (H) 10/25/2024       No results found for: \"CHOL\", \"CHLPL\", \"TRIG\", \"HDL\", \"LDL\", \"LDLDIRECT\"      Assessment     Coronary artery disease  Status post CABG with Dr. Max 10/28  Aspirin 325 mg daily     Paroxysmal atrial fibrillaton  Historically on sotalol and Eliquis  Maze and left atrial appendage ligation performed at time of CABG 10/28.  Intraoperative DARIAN shows obliteration of TORSTEN by clip  Went into atrial fibrillation with RVR 10/30/2024  Started on home dose sotalol 120 mg twice daily     Type 2 diabetes mellitus  Historically uncontrolled with hemoglobin A1c 11 and 12  Was on Jardiance 25 mg daily at home     Hypertension/hypotension  Current /80  Losartan 25 mg daily        Hyperlipidemia  Lipitor 40 mg daily                 Plan     Defer NOAC due to success of left atrial appendage clip at time of CABG  Continue aspirin, statin, Jardiance and sotalol  Back off administration of pain medication due to drowsiness  Continue to work with physical therapy  Okay for transfer to telemetry    Electronically signed by ZIA Poole, 10/31/24, 8:37 AM EDT.    Electronically signed by Leda Gtz APRN at 10/31/24 0936          Physical " "Therapy Notes (most recent note)        Lilodanielleheide Lois N, PT at 10/31/24 0959  Version 1 of 1         Patient Name: Joel Galo  : 1965    MRN: [id number]                              Today's Date: 10/31/2024       Admit Date: 10/28/2024    Visit Dx:     ICD-10-CM ICD-9-CM   1. ASCVD (arteriosclerotic cardiovascular disease)  I25.10 429.2     440.9     Patient Active Problem List   Diagnosis    Paroxysmal atrial fibrillation    Obstructive sleep apnea    Hypothyroidism (acquired)    GERD (gastroesophageal reflux disease)    Chronic anticoagulation    DM (diabetes mellitus), type 2    Hyperlipidemia LDL goal <70    Primary hypertension    CAD s/p CABG x 4, MAZE, LAAL 10/28/24     Past Medical History:   Diagnosis Date    A-fib     Anxiety     Arthritis     DDD (degenerative disc disease), lumbar     Deep vein thrombosis     Depression     Diabetes     Disease of thyroid gland     HYPO    Heart attack     \"5-6 years ago\"    Hyperlipidemia     Hypertension     Kidney stone     Neuropathy     Sleep apnea     DOES NOT USE CPAP    UTI (urinary tract infection)      Past Surgical History:   Procedure Laterality Date    ATRIAL APPENDAGE EXCLUSION LEFT WITH TRANSESOPHAGEAL ECHOCARDIOGRAM N/A 10/28/2024    Procedure: ATRIAL APPENDAGE EXCLUSION LEFT WITH TRANSESOPHAGEAL ECHOCARDIOGRAM;  Surgeon: Alex Max MD;  Location: [facility];  Service: Cardiothoracic;  Laterality: N/A;    CARDIAC CATHETERIZATION N/A 2024    Procedure: Left Heart Cath;  Surgeon: Julio Neff MD;  [facility]  [facility];  Service: Cardiology;  Laterality: N/A;    CARDIAC ELECTROPHYSIOLOGY PROCEDURE N/A 2023    Procedure: Loop insertion;  Surgeon: Tariq Chávez MD;  Location:  [facility];  Service: Cardiovascular;  Laterality: N/A;    CATARACT EXTRACTION Bilateral     COLONOSCOPY      CORONARY ARTERY BYPASS GRAFT N/A 10/28/2024    Procedure: MEDIAN STERNOTOMY, CORONARY ARTERY BYPASS " GRAFTING X4 UTILIZING THE LEFT INTERNAL MAMMARY ARTERY GRAFT, ENDOSCOPIC VEIN HARVEST OF THE GREATER RIGHT SAPHENOUS VEIN;  Surgeon: Alex Max MD;  Location:  [facility];  Service: Cardiothoracic;  Laterality: N/A;    ENDOSCOPY      MAZE PROCEDURE N/A 10/28/2024    Procedure: MAZE PROCEDURE;  Surgeon: Alex Max MD;  Location:  [facility];  Service: Cardiothoracic;  Laterality: N/A;    TEETH EXTRACTION        General Information       Row Name 10/31/24 1426          Physical Therapy Time and Intention    Document Type therapy note (daily note)  -KG     Mode of Treatment physical therapy  -KG       Row Name 10/31/24 1426          General Information    Existing Precautions/Restrictions cardiac;fall;sternal;other (see comments)  NG; confusion; poor command following; lethargic with minimal or no response  -KG     Barriers to Rehab medically complex;previous functional deficit;cognitive status  -KG       Row Name 10/31/24 1426          Cognition    Orientation Status (Cognition) oriented to;person  -KG       Row Name 10/31/24 1426          Safety Issues/Impairments Affecting Functional Mobility    Safety Issues Affecting Function (Mobility) ability to follow commands;awareness of need for assistance;insight into deficits/self-awareness;judgment;safety precaution awareness;safety precautions follow-through/compliance;sequencing abilities  -KG     Impairments Affecting Function (Mobility) balance;cognition;coordination;endurance/activity tolerance;postural/trunk control;strength  -KG     Cognitive Impairments, Mobility Safety/Performance attention;awareness, need for assistance;insight into deficits/self-awareness;safety precaution awareness;safety precaution follow-through;sequencing abilities  -KG               User Key  (r) = Recorded By, (t) = Taken By, (c) = Cosigned By      Initials Name Provider Type    KG Lois Santos, PT Physical Therapist                   Mobility       Row Name 10/31/24 1424           Bed Mobility    Comment, (Bed Mobility) UIC  -KG       Row Name 10/31/24 1427          Transfers    Comment, (Transfers) VC's for sequencing and technique. Max cues for safe hand placement to maintain sternal precautions. Pt very unsteady upon initial stand.  -KG       Row Name 10/31/24 1427          Sit-Stand Transfer    Sit-Stand [place] (Transfers) moderate assist (50% patient effort);2 person assist;verbal cues  -KG       Row Name 10/31/24 1427          Gait/Stairs (Locomotion)    [place] Level (Gait) moderate assist (50% patient effort);2 person assist;1 person to manage equipment;verbal cues  -KG     Assistive Device (Gait) other (see comments)  B UE support on tripod monitor  -KG     Distance in Feet (Gait) 10  -KG     Deviations/Abnormal Patterns (Gait) bilateral deviations;base of support, wide;lesley decreased;festinating/shuffling;stride length decreased  -KG     Bilateral Gait Deviations forward flexed posture;heel strike decreased  -KG     Comment, (Gait/Stairs) Pt demonstrated step to gait pattern with very slow and very shuffled steps. Frequent pauses in between strides where pt required verbal and tactile cues to begin taking steps again. Pt very unsteady; required total assistance to steer monitor. Unable to avoid obstacles. Required max cueing for continued forward ambulation. Unable to follow commands or respond to questions. Returned to room in chair and further mobility deferred due to safety.  -KG               User Key  (r) = Recorded By, (t) = Taken By, (c) = Cosigned By      Initials Name Provider Type    KG Lois Santos, PT Physical Therapist                   Obj/Interventions       Row Name 10/31/24 1429          Balance    Balance Assessment sitting static balance;standing static balance;standing dynamic balance  -KG     Static Sitting Balance moderate assist  -KG     Position, Sitting Balance supported;sitting in chair  -KG     Static Standing Balance  moderate assist;2-person assist  -KG     Dynamic Standing Balance maximum assist;2-person assist  -KG     Position/Device Used, Standing Balance supported  -KG               User Key  (r) = Recorded By, (t) = Taken By, (c) = Cosigned By      Initials Name Provider Type    KG Lois Santos N, PT Physical Therapist                   Goals/Plan    No documentation.                  Clinical Impression       Row Name 10/31/24 1429          Pain    Additional Documentation Pain Scale: FACES Pre/Post-Treatment (Group)  -KG       Row Name 10/31/24 1429          Pain Scale: FACES Pre/Post-Treatment    Pain: FACES Scale, Pretreatment 2-->hurts little bit  -KG     Posttreatment Pain Rating 2-->hurts little bit  -KG       Row Name 10/31/24 1429          Plan of Care Review    Plan of Care Reviewed With patient  -KG     Progress improving  -KG     Outcome Evaluation Pt ambulated 10ft with modA x2 +1 and B UE support on tripod monitor. Chair follow for safety. Pt demonstrated very slow lesley with wide BAILEY and short, shuffled steps. Required max cueing for continued forward ambulation. Pt very unsteady and required total assistance to steer monitor. Pt with poor command following and inability to respond to questions. Returned to room in chair. Deferred additional mobility due to safety concerns. Continue to recommend PT skilled care and D/C to SNF.  -KG       Row Name 10/31/24 1429          Vital Signs    Pre Systolic BP Rehab 134  -KG     Pre Treatment Diastolic BP 88  -KG     Pretreatment Heart Rate (beats/min) 67  -KG     Posttreatment Heart Rate (beats/min) 67  -KG     Pre SpO2 (%) 94  -KG     O2 Delivery Pre Treatment room air  -KG     Post SpO2 (%) 91  -KG     O2 Delivery Post Treatment room air  -KG     Pre Patient Position Sitting  -KG     Intra Patient Position Standing  -KG     Post Patient Position Sitting  -KG       Row Name 10/31/24 1423          Positioning and Restraints    Pre-Treatment Position sitting  in chair/recliner  -KG     Post Treatment Position chair  -KG     In Chair reclined;call light within reach;encouraged to call for assist;exit alarm on;with nsg;RUE elevated;LUE elevated;legs elevated  -KG               User Key  (r) = Recorded By, (t) = Taken By, (c) = Cosigned By      Initials Name Provider Type    Lois Kamara, PT Physical Therapist                   Outcome Measures       Row Name 10/31/24 1432 10/31/24 0800       How much help from another person do you currently need...    Turning from your back to your side while in flat bed without using bedrails? 2  -KG 1  -SW    Moving from lying on back to sitting on the side of a flat bed without bedrails? 2  -KG 1  -SW    Moving to and from a bed to a chair (including a wheelchair)? 2  -KG 1  -SW    Standing up from a chair using your arms (e.g., wheelchair, bedside chair)? 2  -KG 1  -SW    Climbing 3-5 steps with a railing? 1  -KG 1  -SW    To walk in hospital room? 2  -KG 1  -SW    AM-PAC 6 Clicks Score (PT) 11  -KG 6  -SW    Highest Level of Mobility Goal 4 --> Transfer to chair/commode  -KG 2 --> Bed activities/dependent transfer  -SW      Row Name 10/31/24 1432 10/31/24 0816       Functional Assessment    Outcome Measure Options AM-PAC 6 Clicks Basic Mobility (PT)  -KG AM-PAC 6 Clicks Daily Activity (OT)  -TA              User Key  (r) = Recorded By, (t) = Taken By, (c) = Cosigned By      Initials Name Provider Type     Ming Nogueira, RN Registered Nurse    Marbin Elias, OT Occupational Therapist    Lois Kamara, PT Physical Therapist                                 Physical Therapy Education       Title: PT OT SLP Therapies (In Progress)       Topic: Physical Therapy (In Progress)       Point: Mobility training (In Progress)       Learning Progress Summary            Patient Acceptance, E, NR by KG at 10/31/2024 0959    Acceptance, E, NR by ND at 10/30/2024 1124    Acceptance, E, NR by ND at 10/29/2024 1024                       Point: Home exercise program (In Progress)       Learning Progress Summary            Patient Acceptance, E, NR by KG at 10/31/2024 0959                      Point: Body mechanics (In Progress)       Learning Progress Summary            Patient Acceptance, E, NR by KG at 10/31/2024 0959    Acceptance, E, NR by ND at 10/30/2024 1124    Acceptance, E, NR by ND at 10/29/2024 1024                      Point: Precautions (In Progress)       Learning Progress Summary            Patient Acceptance, E, NR by KG at 10/31/2024 0959    Acceptance, E, NR by ND at 10/30/2024 1124    Acceptance, E, NR by ND at 10/29/2024 1024                                      User Key       Initials Effective Dates Name Provider Type Discipline    KG 05/22/20 -  Lois Santos, PT Physical Therapist PT    ND 11/16/23 -  Cristina Varela PT Physical Therapist PT                  PT Recommendation and Plan     Progress: improving  Outcome Evaluation: Pt ambulated 10ft with modA x2 +1 and B UE support on tripod monitor. Chair follow for safety. Pt demonstrated very slow lesley with wide BAILEY and short, shuffled steps. Required max cueing for continued forward ambulation. Pt very unsteady and required total assistance to steer monitor. Pt with poor command following and inability to respond to questions. Returned to room in chair. Deferred additional mobility due to safety concerns. Continue to recommend PT skilled care and D/C to SNF.     Time Calculation:         PT Charges       Row Name 10/31/24 0959             Time Calculation    Start Time 0959  -KG      PT Received On 10/31/24  -KG      PT Goal Re-Cert Due Date 11/08/24  -KG         Time Calculation- PT    Total Timed Code Minutes- PT 15 minute(s)  -KG         Timed Charges    83988 - PT Therapeutic Activity Minutes 15  -KG         Total Minutes    Timed Charges Total Minutes 15  -KG       Total Minutes 15  -KG                User Key  (r) = Recorded By, (t)  "= Taken By, (c) = Cosigned By      Initials Name Provider Type    KG Lois Santos, PT Physical Therapist                  Therapy Charges for Today       Code Description Service Date Service Provider Modifiers Qty    [id number] HC PT THERAPEUTIC ACT EA 15 MIN 10/31/2024 Lois Santos, PT GP 1            PT G-Codes  Outcome Measure Options: AM-PAC 6 Clicks Basic Mobility (PT)  AM-PAC 6 Clicks Score (PT): 11  AM-PAC 6 Clicks Score (OT): 8  PT Discharge Summary  Anticipated Discharge Disposition (PT): skilled nursing facility    Yvette Santos PT  10/31/2024      Electronically signed by Lois Santos PT at 10/31/24 1436          Occupational Therapy Notes (most recent note)        Marbin Benjamin, OT at 10/31/24 0816          Patient Name: Joel Galo  : 1965    MRN: [id number]                              Today's Date: 10/31/2024       Admit Date: 10/28/2024    Visit Dx:     ICD-10-CM ICD-9-CM   1. ASCVD (arteriosclerotic cardiovascular disease)  I25.10 429.2     440.9     Patient Active Problem List   Diagnosis    Paroxysmal atrial fibrillation    Obstructive sleep apnea    Hypothyroidism (acquired)    GERD (gastroesophageal reflux disease)    Chronic anticoagulation    DM (diabetes mellitus), type 2    Hyperlipidemia LDL goal <70    Primary hypertension    CAD s/p CABG x 4, MAZE, LAAL 10/28/24     Past Medical History:   Diagnosis Date    A-fib     Anxiety     Arthritis     DDD (degenerative disc disease), lumbar     Deep vein thrombosis     Depression     Diabetes     Disease of thyroid gland     HYPO    Heart attack     \"5-6 years ago\"    Hyperlipidemia     Hypertension     Kidney stone     Neuropathy     Sleep apnea     DOES NOT USE CPAP    UTI (urinary tract infection)      Past Surgical History:   Procedure Laterality Date    ATRIAL APPENDAGE EXCLUSION LEFT WITH TRANSESOPHAGEAL ECHOCARDIOGRAM N/A 10/28/2024    Procedure: ATRIAL APPENDAGE EXCLUSION LEFT WITH " TRANSESOPHAGEAL ECHOCARDIOGRAM;  Surgeon: Alex Max MD;  Location:  [facility];  Service: Cardiothoracic;  Laterality: N/A;    CARDIAC CATHETERIZATION N/A 08/28/2024    Procedure: Left Heart Cath;  Surgeon: Julio Neff MD;  [facility];  Service: Cardiology;  Laterality: N/A;    CARDIAC ELECTROPHYSIOLOGY PROCEDURE N/A 04/05/2023    Procedure: Loop insertion;  Surgeon: Tariq Chávez MD;  [facility]:  [facility];  Service: Cardiovascular;  Laterality: N/A;    CATARACT EXTRACTION Bilateral     COLONOSCOPY      CORONARY ARTERY BYPASS GRAFT N/A 10/28/2024    Procedure: MEDIAN STERNOTOMY, CORONARY ARTERY BYPASS GRAFTING X4 UTILIZING THE LEFT INTERNAL MAMMARY ARTERY GRAFT, ENDOSCOPIC VEIN HARVEST OF THE GREATER RIGHT SAPHENOUS VEIN;  Surgeon: Alex Max MD;  Location:  [facility];  Service: Cardiothoracic;  Laterality: N/A;    ENDOSCOPY      MAZE PROCEDURE N/A 10/28/2024    Procedure: MAZE PROCEDURE;  Surgeon: Alex Max MD;  Location:  [facility];  Service: Cardiothoracic;  Laterality: N/A;    TEETH EXTRACTION        General Information       Row Name 10/31/24 0816          OT Time and Intention    Document Type therapy note (daily note)  -TA     Mode of Treatment occupational therapy  -TA       Row Name 10/31/24 0816          General Information    Patient Profile Reviewed yes  -TA     Existing Precautions/Restrictions cardiac;fall;sternal;other (see comments)  Current cognitive level  -TA     Barriers to Rehab medically complex;previous functional deficit;cognitive status  -TA       Row Name 10/31/24 0816          Occupational Profile    Reason for Services/Referral (Occupational Profile) fxl decline from PLOF  -TA     Patient Goals (Occupational Profile) none stated  -TA       Row Name 10/31/24 0816          Cognition    Orientation Status (Cognition) oriented to;person;disoriented to;place;situation;time  Follows 40% of 1 step commands  -TA       Row Name  10/31/24 0816          Safety Issues/Impairments Affecting Functional Mobility    Safety Issues Affecting Function (Mobility) safety precautions follow-through/compliance;safety precaution awareness;insight into deficits/self-awareness;ability to follow commands;impulsivity;awareness of need for assistance;problem-solving;sequencing abilities  -TA     Impairments Affecting Function (Mobility) balance;cognition;endurance/activity tolerance;pain;strength;shortness of breath  -TA     Cognitive Impairments, Mobility Safety/Performance attention;awareness, need for assistance;impulsivity;insight into deficits/self-awareness;judgment;problem-solving/reasoning;safety precaution awareness;safety precaution follow-through  -TA               User Key  (r) = Recorded By, (t) = Taken By, (c) = Cosigned By      Initials Name Provider Type    Marbin Elias OT Occupational Therapist                     Mobility/ADL's       Row Name 10/31/24 0816          Bed Mobility    Comment, (Bed Mobility) Pt UIC  -TA       Row Name 10/31/24 0816          Activities of Daily Living    BADL Assessment/Intervention grooming  -TA       Row Name 10/31/24 0816          Grooming Assessment/Training    [place] Level (Grooming) wash face, hands;maximum assist (25% patient effort);nonverbal cues (demo/gesture);verbal cues  Support at R elbow to reach face and consistent VCs to advance through task  -TA     Position (Grooming) supported sitting  -TA               User Key  (r) = Recorded By, (t) = Taken By, (c) = Cosigned By      Initials Name Provider Type    Marbin Elisa OT Occupational Therapist                   Obj/Interventions       Row Name 10/31/24 0816          Shoulder (Therapeutic Exercise)    Shoulder AAROM (Therapeutic Exercise) bilateral;flexion;extension;aBduction;aDduction;sitting;10 repetitions  Consistent VCs required to advance through all ex's  -TA       Row Name 10/31/24 0816          Elbow/Forearm  (Therapeutic Exercise)    Elbow/Forearm (Therapeutic Exercise) AAROM (active assistive range of motion)  -TA     Elbow/Forearm AAROM (Therapeutic Exercise) bilateral;flexion;extension;supination;pronation;sitting;10 repetitions  -TA       Row Name 10/31/24 0816          Wrist (Therapeutic Exercise)    Wrist (Therapeutic Exercise) AAROM (active assistive range of motion)  -TA     Wrist AAROM (Therapeutic Exercise) bilateral;flexion;extension;10 repetitions  -TA       Row Name 10/31/24 0816          Hand (Therapeutic Exercise)    Hand (Therapeutic Exercise) AROM (active range of motion)  -TA     Hand AROM/AAROM (Therapeutic Exercise) bilateral;AROM (active range of motion);finger flexion;finger extension;10 repetitions  VCs to advance through all ex's  -TA       Row Name 10/31/24 0816          Motor Skills    Therapeutic Exercise shoulder;elbow/forearm;wrist;hand  -TA       Row Name 10/31/24 0816          Balance    Balance Assessment sitting static balance  -TA     Static Sitting Balance supervision  -TA     Balance Interventions UE activity with balance activity;weight shifting activity;occupation based/functional task  -TA               User Key  (r) = Recorded By, (t) = Taken By, (c) = Cosigned By      Initials Name Provider Type    Marbin Elias OT Occupational Therapist                   Goals/Plan       Row Name 10/31/24 0816          Therapy Assessment/Plan (OT)    Planned Therapy Interventions (OT) activity tolerance training;BADL retraining;functional balance retraining;occupation/activity based interventions;patient/caregiver education/training;ROM/therapeutic exercise;strengthening exercise;transfer/mobility retraining  -TA               User Key  (r) = Recorded By, (t) = Taken By, (c) = Cosigned By      Initials Name Provider Type    Marbin Elias OT Occupational Therapist                   Clinical Impression       Row Name 10/31/24 0816          Pain Assessment    Pain  Side/Orientation generalized  -TA     Pain Management Interventions exercise or physical activity utilized;premedicated for activity  -TA     Response to Pain Interventions activity participation with tolerable pain  -TA     Additional Documentation Pain Scale: FACES Pre/Post-Treatment (Group)  -TA       Row Name 10/31/24 0816          Pain Scale: FACES Pre/Post-Treatment    Pain: FACES Scale, Pretreatment 2-->hurts little bit  -TA     Posttreatment Pain Rating 2-->hurts little bit  -TA       Row Name 10/31/24 0816          Plan of Care Review    Plan of Care Reviewed With patient  -TA     Progress no change  -TA     Outcome Evaluation VSS; Pt continues to exhibit lethargy and difficulty maintaining eyes open; able to follow 40% 1 step commands. Pt required Mod A/support at R elbow to reach face to wash face and consistent VCs to advance through activity. Pt completed AAROM of BUE strengthening ex's, 10 reps all ex's. Pt remains limited by LOC, presentation of s/s of delerium. Pt remains below fxl baseline and will continue to benefit from skilled OT services to address deficits, facilitate increased fxl I. Recommend IRF at discharge.  -TA       Row Name 10/31/24 0816          Therapy Assessment/Plan (OT)    Patient/Family Therapy Goal Statement (OT) none stated  -TA     Rehab Potential (OT) good  -TA     Criteria for Skilled Therapeutic Interventions Met (OT) yes;skilled treatment is necessary  -TA     Therapy Frequency (OT) daily  -TA     Predicted Duration of Therapy Intervention (OT) 1 week  -TA       Row Name 10/31/24 0816          Therapy Plan Review/Discharge Plan (OT)    Anticipated Discharge Disposition (OT) inpatient rehabilitation facility  -TA       Row Name 10/31/24 0816          Vital Signs    Pre Systolic BP Rehab --  [facility];RN present and cleared pt for tx  -TA     O2 Delivery Pre Treatment supplemental O2  -TA     O2 Delivery Intra Treatment supplemental O2  -TA     O2 Delivery Post Treatment  supplemental O2  -TA     Pre Patient Position Sitting  -TA     Intra Patient Position Sitting  -TA     Post Patient Position Sitting  -TA       Row Name 10/31/24 0816          Positioning and Restraints    Pre-Treatment Position sitting in chair/recliner  -TA     Post Treatment Position chair  -TA     In Chair notified nsg;reclined;call light within reach;encouraged to call for assist;exit alarm on;waffle cushion;legs elevated;with nsg  all lines intact  -TA               User Key  (r) = Recorded By, (t) = Taken By, (c) = Cosigned By      Initials Name Provider Type    Marbin Elias OT Occupational Therapist                   Outcome Measures       Row Name 10/31/24 0816          How much help from another is currently needed...    Putting on and taking off regular lower body clothing? 1  -TA     Bathing (including washing, rinsing, and drying) 1  -TA     Toileting (which includes using toilet bed pan or urinal) 1  -TA     Putting on and taking off regular upper body clothing 1  -TA     Taking care of personal grooming (such as brushing teeth) 2  -TA     Eating meals 2  -TA     AM-PAC 6 Clicks Score (OT) 8  -TA       Row Name 10/31/24 0816          Functional Assessment    Outcome Measure Options AM-PAC 6 Clicks Daily Activity (OT)  -TA               User Key  (r) = Recorded By, (t) = Taken By, (c) = Cosigned By      Initials Name Provider Type    Marbin Elias OT Occupational Therapist                    Occupational Therapy Education       Title: PT OT SLP Therapies (In Progress)       Topic: Occupational Therapy (In Progress)       Point: ADL training (Not Started)       Description:   Instruct learner(s) on proper safety adaptation and remediation techniques during self care or transfers.   Instruct in proper use of assistive devices.                  Learner Progress:  Not documented in this visit.              Point: Home exercise program (Not Started)       Description:   Instruct  learner(s) on appropriate technique for monitoring, assisting and/or progressing therapeutic exercises/activities.                  Learner Progress:  Not documented in this visit.              Point: Precautions (In Progress)       Description:   Instruct learner(s) on prescribed precautions during self-care and functional transfers.                  Learning Progress Summary            Patient Acceptance, E,D, NR by TA at 10/30/2024 0905                      Point: Body mechanics (In Progress)       Description:   Instruct learner(s) on proper positioning and spine alignment during self-care, functional mobility activities and/or exercises.                  Learning Progress Summary            Patient Acceptance, E,D, NR by TA at 10/30/2024 0905                                      User Key       Initials Effective Dates Name Provider Type Discipline    YESY 06/16/21 -  Marbin Benjamin, OT Occupational Therapist OT                  OT Recommendation and Plan  Planned Therapy Interventions (OT): activity tolerance training, BADL retraining, functional balance retraining, occupation/activity based interventions, patient/caregiver education/training, ROM/therapeutic exercise, strengthening exercise, transfer/mobility retraining  Therapy Frequency (OT): daily  Plan of Care Review  Plan of Care Reviewed With: patient  Progress: no change  Outcome Evaluation: VSS; Pt continues to exhibit lethargy and difficulty maintaining eyes open; able to follow 40% 1 step commands. Pt required Mod A/support at R elbow to reach face to wash face and consistent VCs to advance through activity. Pt completed AAROM of BUE strengthening ex's, 10 reps all ex's. Pt remains limited by LOC, presentation of s/s of delerium. Pt remains below fxl baseline and will continue to benefit from skilled OT services to address deficits, facilitate increased fxl I. Recommend IRF at discharge.     Time Calculation:   Evaluation Complexity (OT)  Review  Occupational Profile/Medical/Therapy History Complexity: expanded/moderate complexity  Assessment, Occupational Performance/Identification of Deficit Complexity: 3-5 performance deficits  Clinical Decision Making Complexity (OT): detailed assessment/moderate complexity  Overall Complexity of Evaluation (OT): moderate complexity     Time Calculation- OT       Row Name 10/31/24 0816             Time Calculation- OT    OT Start Time 0816  ttc 17 minutes  -TA      Total Timed Code Minutes- OT 17 minute(s)  -TA      OT Received On 10/31/24  -TA      OT Goal Re-Cert Due Date 11/09/24  -TA         Timed Charges    61882 - OT Therapeutic Exercise Minutes 11  -TA      04395 - OT Self Care/Mgmt Minutes 6  -TA         Total Minutes    Timed Charges Total Minutes 17  -TA       Total Minutes 17  -TA                User Key  (r) = Recorded By, (t) = Taken By, (c) = Cosigned By      Initials Name Provider Type    TA Marbin Benjamin OT Occupational Therapist                  Therapy Charges for Today       Code Description Service Date Service Provider Modifiers Qty    59338429702  OT EVAL MOD COMPLEXITY 4 10/30/2024 Marbin Benjamin OT GO 1    [id number]  OT THER PROC EA 15 MIN 10/31/2024 Marbin Benjamin OT GO 1                 Marbin Benjamin OT  10/31/2024    Electronically signed by Marbin Benjamin OT at 10/31/24 0909

## 2024-11-01 LAB
ALBUMIN SERPL-MCNC: 3.4 G/DL (ref 3.5–5.2)
ALBUMIN/GLOB SERPL: 1 G/DL
ALP SERPL-CCNC: 126 U/L (ref 39–117)
ALT SERPL W P-5'-P-CCNC: 44 U/L (ref 1–41)
ANION GAP SERPL CALCULATED.3IONS-SCNC: 16 MMOL/L (ref 5–15)
AST SERPL-CCNC: 107 U/L (ref 1–40)
BILIRUB SERPL-MCNC: 0.4 MG/DL (ref 0–1.2)
BUN SERPL-MCNC: 42 MG/DL (ref 8–23)
BUN/CREAT SERPL: 48.3 (ref 7–25)
CALCIUM SPEC-SCNC: 8.8 MG/DL (ref 8.6–10.5)
CHLORIDE SERPL-SCNC: 99 MMOL/L (ref 98–107)
CO2 SERPL-SCNC: 22 MMOL/L (ref 22–29)
CREAT SERPL-MCNC: 0.87 MG/DL (ref 0.76–1.27)
DEPRECATED RDW RBC AUTO: 46.3 FL (ref 37–54)
EGFRCR SERPLBLD CKD-EPI 2021: 98.2 ML/MIN/1.73
ERYTHROCYTE [DISTWIDTH] IN BLOOD BY AUTOMATED COUNT: 13.4 % (ref 12.3–15.4)
GLOBULIN UR ELPH-MCNC: 3.4 GM/DL
GLUCOSE BLDC GLUCOMTR-MCNC: 128 MG/DL (ref 70–130)
GLUCOSE BLDC GLUCOMTR-MCNC: 132 MG/DL (ref 70–130)
GLUCOSE BLDC GLUCOMTR-MCNC: 179 MG/DL (ref 70–130)
GLUCOSE BLDC GLUCOMTR-MCNC: 210 MG/DL (ref 70–130)
GLUCOSE BLDC GLUCOMTR-MCNC: 227 MG/DL (ref 70–130)
GLUCOSE SERPL-MCNC: 124 MG/DL (ref 65–99)
HCT VFR BLD AUTO: 33.8 % (ref 37.5–51)
HGB BLD-MCNC: 11 G/DL (ref 13–17.7)
MCH RBC QN AUTO: 30.6 PG (ref 26.6–33)
MCHC RBC AUTO-ENTMCNC: 32.5 G/DL (ref 31.5–35.7)
MCV RBC AUTO: 93.9 FL (ref 79–97)
PLATELET # BLD AUTO: 193 10*3/MM3 (ref 140–450)
PMV BLD AUTO: 10.2 FL (ref 6–12)
POTASSIUM SERPL-SCNC: 3.3 MMOL/L (ref 3.5–5.2)
PROT SERPL-MCNC: 6.8 G/DL (ref 6–8.5)
RBC # BLD AUTO: 3.6 10*6/MM3 (ref 4.14–5.8)
SODIUM SERPL-SCNC: 137 MMOL/L (ref 136–145)
WBC NRBC COR # BLD AUTO: 9.05 10*3/MM3 (ref 3.4–10.8)

## 2024-11-01 PROCEDURE — 99232 SBSQ HOSP IP/OBS MODERATE 35: CPT | Performed by: INTERNAL MEDICINE

## 2024-11-01 PROCEDURE — 82948 REAGENT STRIP/BLOOD GLUCOSE: CPT

## 2024-11-01 PROCEDURE — 63710000001 INSULIN LISPRO (HUMAN) PER 5 UNITS: Performed by: PHYSICIAN ASSISTANT

## 2024-11-01 PROCEDURE — 25010000002 HEPARIN (PORCINE) PER 1000 UNITS: Performed by: PHYSICIAN ASSISTANT

## 2024-11-01 PROCEDURE — 63710000001 INSULIN GLARGINE PER 5 UNITS: Performed by: INTERNAL MEDICINE

## 2024-11-01 PROCEDURE — 80053 COMPREHEN METABOLIC PANEL: CPT | Performed by: THORACIC SURGERY (CARDIOTHORACIC VASCULAR SURGERY)

## 2024-11-01 PROCEDURE — 99232 SBSQ HOSP IP/OBS MODERATE 35: CPT | Performed by: NURSE PRACTITIONER

## 2024-11-01 PROCEDURE — 25810000003 SODIUM CHLORIDE 0.9 % SOLUTION: Performed by: PHYSICIAN ASSISTANT

## 2024-11-01 PROCEDURE — 25010000002 FUROSEMIDE PER 20 MG: Performed by: NURSE PRACTITIONER

## 2024-11-01 PROCEDURE — 85027 COMPLETE CBC AUTOMATED: CPT | Performed by: THORACIC SURGERY (CARDIOTHORACIC VASCULAR SURGERY)

## 2024-11-01 PROCEDURE — 97530 THERAPEUTIC ACTIVITIES: CPT

## 2024-11-01 RX ORDER — FUROSEMIDE 10 MG/ML
40 INJECTION INTRAMUSCULAR; INTRAVENOUS ONCE
Status: COMPLETED | OUTPATIENT
Start: 2024-11-01 | End: 2024-11-01

## 2024-11-01 RX ORDER — POTASSIUM CHLORIDE 1.5 G/1.58G
40 POWDER, FOR SOLUTION ORAL EVERY 4 HOURS
Status: COMPLETED | OUTPATIENT
Start: 2024-11-01 | End: 2024-11-01

## 2024-11-01 RX ORDER — ACETAMINOPHEN 325 MG/1
650 TABLET ORAL EVERY 8 HOURS
Status: COMPLETED | OUTPATIENT
Start: 2024-11-01 | End: 2024-11-04

## 2024-11-01 RX ORDER — HEPARIN SODIUM 5000 [USP'U]/ML
5000 INJECTION, SOLUTION INTRAVENOUS; SUBCUTANEOUS EVERY 8 HOURS SCHEDULED
Status: DISCONTINUED | OUTPATIENT
Start: 2024-11-01 | End: 2024-11-06 | Stop reason: HOSPADM

## 2024-11-01 RX ADMIN — HEPARIN SODIUM 5000 UNITS: 5000 INJECTION INTRAVENOUS; SUBCUTANEOUS at 14:49

## 2024-11-01 RX ADMIN — POTASSIUM CHLORIDE 40 MEQ: 1.5 POWDER, FOR SOLUTION ORAL at 08:05

## 2024-11-01 RX ADMIN — ACETAMINOPHEN 650 MG: 325 TABLET, FILM COATED ORAL at 05:46

## 2024-11-01 RX ADMIN — HEPARIN SODIUM 5000 UNITS: 5000 INJECTION INTRAVENOUS; SUBCUTANEOUS at 21:23

## 2024-11-01 RX ADMIN — CHLORHEXIDINE GLUCONATE ORAL RINSE 15 ML: 1.2 SOLUTION DENTAL at 08:06

## 2024-11-01 RX ADMIN — DULOXETINE HYDROCHLORIDE 60 MG: 60 CAPSULE, DELAYED RELEASE ORAL at 08:05

## 2024-11-01 RX ADMIN — SODIUM CHLORIDE 250 ML: 9 INJECTION, SOLUTION INTRAVENOUS at 15:45

## 2024-11-01 RX ADMIN — HYDROCODONE BITARTRATE AND ACETAMINOPHEN 1 TABLET: 7.5; 325 TABLET ORAL at 20:02

## 2024-11-01 RX ADMIN — GABAPENTIN 100 MG: 100 CAPSULE ORAL at 08:04

## 2024-11-01 RX ADMIN — GABAPENTIN 100 MG: 100 CAPSULE ORAL at 21:23

## 2024-11-01 RX ADMIN — INSULIN LISPRO 5 UNITS: 100 INJECTION, SOLUTION INTRAVENOUS; SUBCUTANEOUS at 21:28

## 2024-11-01 RX ADMIN — OXYCODONE HYDROCHLORIDE 10 MG: 10 TABLET ORAL at 11:03

## 2024-11-01 RX ADMIN — FUROSEMIDE 40 MG: 10 INJECTION, SOLUTION INTRAMUSCULAR; INTRAVENOUS at 09:57

## 2024-11-01 RX ADMIN — GABAPENTIN 100 MG: 100 CAPSULE ORAL at 14:49

## 2024-11-01 RX ADMIN — EMPAGLIFLOZIN 25 MG: 25 TABLET, FILM COATED ORAL at 09:55

## 2024-11-01 RX ADMIN — MUPIROCIN 1 APPLICATION: 20 OINTMENT TOPICAL at 08:05

## 2024-11-01 RX ADMIN — SOTALOL HYDROCHLORIDE 120 MG: 120 TABLET ORAL at 20:01

## 2024-11-01 RX ADMIN — SENNOSIDES AND DOCUSATE SODIUM 2 TABLET: 50; 8.6 TABLET ORAL at 08:04

## 2024-11-01 RX ADMIN — INSULIN GLARGINE 25 UNITS: 100 INJECTION, SOLUTION SUBCUTANEOUS at 08:06

## 2024-11-01 RX ADMIN — LEVOTHYROXINE SODIUM 50 MCG: 0.05 TABLET ORAL at 05:46

## 2024-11-01 RX ADMIN — ACETAMINOPHEN 650 MG: 325 TABLET ORAL at 21:23

## 2024-11-01 RX ADMIN — INSULIN LISPRO 3 UNITS: 100 INJECTION, SOLUTION INTRAVENOUS; SUBCUTANEOUS at 17:39

## 2024-11-01 RX ADMIN — LOSARTAN POTASSIUM 25 MG: 25 TABLET, FILM COATED ORAL at 08:05

## 2024-11-01 RX ADMIN — SOTALOL HYDROCHLORIDE 120 MG: 120 TABLET ORAL at 08:05

## 2024-11-01 RX ADMIN — BUPROPION HYDROCHLORIDE 150 MG: 150 TABLET, EXTENDED RELEASE ORAL at 08:04

## 2024-11-01 RX ADMIN — ATORVASTATIN CALCIUM 40 MG: 40 TABLET, FILM COATED ORAL at 20:01

## 2024-11-01 RX ADMIN — SENNOSIDES AND DOCUSATE SODIUM 2 TABLET: 50; 8.6 TABLET ORAL at 20:02

## 2024-11-01 RX ADMIN — ASPIRIN 325 MG: 325 TABLET, COATED ORAL at 08:04

## 2024-11-01 RX ADMIN — ACETAMINOPHEN 650 MG: 325 TABLET, FILM COATED ORAL at 14:49

## 2024-11-01 RX ADMIN — MUPIROCIN 1 APPLICATION: 20 OINTMENT TOPICAL at 20:02

## 2024-11-01 RX ADMIN — INSULIN LISPRO 5 UNITS: 100 INJECTION, SOLUTION INTRAVENOUS; SUBCUTANEOUS at 12:24

## 2024-11-01 RX ADMIN — POTASSIUM CHLORIDE 40 MEQ: 1.5 POWDER, FOR SOLUTION ORAL at 12:24

## 2024-11-01 RX ADMIN — TAMSULOSIN HYDROCHLORIDE 0.4 MG: 0.4 CAPSULE ORAL at 08:04

## 2024-11-01 NOTE — CASE MANAGEMENT/SOCIAL WORK
Continued Stay Note  [facility]     Patient Name: Joel Galo  MRN: [id number]  Today's Date: 11/1/2024    Admit Date: 10/28/2024    Plan: SNF   Discharge Plan       Row Name 11/01/24 1512       Plan    Plan SNF    Patient/Family in Agreement with Plan yes    Plan Comments Discussed in MDR no family at bedside. Pt able to participate better with therapy today. refaxed referrals with progress and updated therapy recs. CM will cont to follow.    Final Discharge Disposition Code 03 - skilled nursing facility (SNF)                   Discharge Codes    No documentation.                       Aneta Grijalva RN

## 2024-11-01 NOTE — PROGRESS NOTES
[facility] Cardiothoracic Surgery In-Patient Progress Note     LOS: 4 days     POD # 4 s/p CABG x 4, MAZE, LAAL    Subjective  Sitting up in chair. No complaints other than pain.  Brief A.fib overnight that converted back into NSR.  On 3 L NC    Objective  Vital Signs  Temp:  [97.8 °F (36.6 °C)-98 °F (36.7 °C)] 97.8 °F (36.6 °C)  Heart Rate:  [59-85] 73  Resp:  [18-28] 18  BP: ()/(61-96) 120/68    Physical Exam:   General Appearance: alert, appears stated age and cooperative   Lungs: clear to auscultation, respirations regular, respirations even, and respirations unlabored   Heart: regular rhythm & normal rate, normal S1, S2, no murmur, no gallop, no rub, and no click   Skin: Incision c/d/I   Sternum: Stable   Output by Drain (mL) 10/31/24 0701 - 10/31/24 1900 10/31/24 1901 - 11/01/24 0700 11/01/24 0701 - 11/01/24 0735 Range Total   Patient has no LDAs of requested type attached.        Results     Results from last 7 days   Lab Units 11/01/24  0526   WBC 10*3/mm3 9.05   HEMOGLOBIN g/dL 11.0*   HEMATOCRIT % 33.8*   PLATELETS 10*3/mm3 193     Results from last 7 days   Lab Units 11/01/24  0526   SODIUM mmol/L 137   POTASSIUM mmol/L 3.3*   CHLORIDE mmol/L 99   CO2 mmol/L 22.0   BUN mg/dL 42*   CREATININE mg/dL 0.87   GLUCOSE mg/dL 124*   CALCIUM mg/dL 8.8     Imaging Results (Last 24 Hours)       Procedure Component Value Units Date/Time    XR Chest 1 View [[id number]] Collected: 10/31/24 0801     Updated: 10/31/24 0806    Narrative:      XR CHEST 1 VW    Date of Exam: 10/31/2024 3:48 AM EDT    Indication: Follow up for pneumothorax    Comparison: 10/30/2024    Findings:  Small right apical pneumothorax is stable. There is mild atelectasis of the lung bases. NG tube remains in place. There is stable borderline cardiomegaly. There are sternal suture wires. There is a left atrial appendage closure device.      Impression:      Impression:  No significant change.      Electronically Signed: Tenisha David MD     10/31/2024 8:03 AM EDT    Workstation ID: [id number]          Assessment  POD # 4 s/p CABG x 4, MAZE, LAAL    Plan   Replete K  Wean oxygen as tolerated  Ambulate  Pulmonary toilet  ASA, statin, BB  Transfer to telemetry    Alex Max MD  11/01/24  07:35 EDT

## 2024-11-01 NOTE — DISCHARGE PLACEMENT REQUEST
"Joel Gutierrez (59 y.o. Male)     Mian [phone]      Date of Birth   1965    Social Security Number       Address   [address] [place] [address]    Home Phone   [phone]    MRN   [id number]       Scientologist   Bahai    Marital Status                               Admission Date   10/28/24    Admission Type   Elective    Admitting Provider   Alex Max MD    Attending Provider   Alex Max MD    Department, Room/Bed   [address], S484/1       Discharge Date       Discharge Disposition       Discharge Destination                                 Attending Provider: Alex Max MD    Allergies: No Known Allergies    Isolation: None   Infection: None   Code Status: CPR    Ht: 172.7 cm (67.99\")   Wt: 104 kg (230 lb)    Admission Cmt: None   Principal Problem: CAD s/p CABG x 4, LAURY CLINE 10/28/24 [I25.10]                   Active Insurance as of 10/28/2024       Primary Coverage       Payor Plan Insurance Group Employer/Plan Group    [facility] MEDICARE REPLACEMENT ANTH MEDICARE ADVANTAGE [id number]       Payor Plan Address Payor Plan Phone Number Payor Plan Fax Number Effective Dates    [address] [phone]  1/1/2024 - None Entered    [facility] [id number]         Subscriber Name Subscriber Birth Date Member ID       JOEL GUTIERREZ 1965 [id number]                     Emergency Contacts        (Rel.) Home Phone Work Phone Mobile Phone    LISHA GUTIERREZ (Spouse) [phone] -- [phone]                 History & Physical        Manuela Sprague APRN at 10/28/24 0639       Attestation signed by Alex Max MD at 10/28/24 1720    I have reviewed this documentation and agree.  Plan for CABGANAND                    Pre-Op H&P  Joel ADAN Gutierrez  [id number]  1965      Chief complaint: Chest pain      Subjective:  Patient is a 59 y.o.male presents for scheduled surgery by Dr. Max. He anticipates a CORONARY ARTERY BYPASS " GRAFTING; ENDOSCOPIC VEIN HARVEST; RADIAL ARTERY HARVEST; ATRIAL APPENDAGE EXCLUSION LEFT WITH TRANSESOPHAGEAL ECHOCARDIOGRAM  today. He reports intermittent episodes of shortness of breath and chest pain. He had abnormal CTA coronaries. He had cardiac cath 8/28/24 that showed mid LAD 75% stenosis, distal LAD 95% stenosis, first marginal 90% stenosis, third marginal 99% stenosis, ostial RCA 70% stenosis, mid RCA 70% stenosis, and RV branch 99% stenosis. Left ventriculogram during cardiac cath noted EF 55%.       Review of Systems:  Constitutional-- No fever, chills or sweats. + fatigue.  CV-- No palpitation or syncope. +HTN, HLD, CAD, afib, chest pain  Resp-- No cough, hemoptysis. +SOB, MAGDALENA no cpap  Skin--No rashes or lesions      Allergies: No Known Allergies      Home Meds:  Medications Prior to Admission   Medication Sig Dispense Refill Last Dose/Taking    amLODIPine (NORVASC) 2.5 MG tablet Take 1 tablet by mouth Daily.   10/27/2024 Evening    aspirin 81 MG EC tablet Take 1 tablet by mouth Daily.   10/27/2024 Evening    buPROPion XL (WELLBUTRIN XL) 150 MG 24 hr tablet Take 1 tablet by mouth Daily.   10/27/2024 Evening    docusate sodium (COLACE) 100 MG capsule Take 1 capsule by mouth 2 (Two) Times a Day.   10/27/2024 Evening    DULoxetine (CYMBALTA) 60 MG capsule Take 1 capsule by mouth Daily.   10/27/2024 Morning    empagliflozin (JARDIANCE) 25 MG tablet tablet Take 1 tablet by mouth Daily.   10/27/2024 Morning    finasteride (PROSCAR) 5 MG tablet TAKE 1 TABLET EVERY DAY (Patient taking differently: Take 1 tablet by mouth Daily.) 90 tablet 0 10/27/2024 Morning    gabapentin (NEURONTIN) 300 MG capsule Take 1 capsule by mouth 3 (Three) Times a Day. TAKES 2OOMG IN AM AND 300MG IN PM USUALLY   10/27/2024    HumuLIN 70/30 KwikPen (70-30) 100 UNIT/ML suspension pen-injector 45 units in the a.m. and 20mg in the p.m.   10/27/2024    HYDROcodone-acetaminophen (NORCO)  MG per tablet Take 1 tablet by mouth Every 8  (Eight) Hours As Needed for Moderate Pain.   10/27/2024 Evening    isosorbide mononitrate (IMDUR) 30 MG 24 hr tablet Take 1 tablet by mouth Daily. 30 tablet 11 10/27/2024 Evening    levothyroxine (SYNTHROID, LEVOTHROID) 50 MCG tablet Take 1 tablet by mouth Every Morning.   10/27/2024 Morning    losartan (COZAAR) 25 MG tablet Take 1 tablet by mouth Daily.   10/27/2024 Morning    nitroglycerin (NITROSTAT) 0.4 MG SL tablet 1 under the tongue as needed for angina, may repeat q5mins for up three doses (Patient taking differently: Place 1 tablet under the tongue Every 5 (Five) Minutes As Needed for Chest Pain. 1 under the tongue as needed for angina, may repeat q5mins for up three doses) 30 tablet 3 Past Week    polyethylene glycol (MIRALAX) 17 g packet Take 17 g by mouth Daily.   10/27/2024    rosuvastatin (CRESTOR) 20 MG tablet Take 1 tablet by mouth Daily. (Patient taking differently: Take 1 tablet by mouth Every Night.) 90 tablet 1 10/27/2024 Evening    sotalol (BETAPACE) 80 MG tablet TAKE ONE AND A HALF (1 & 1/2) TABLETS BY MOUTH TWICE DAILY 270 tablet 0 10/27/2024 Evening    Sotalol HCl AF 80 MG tablet Take 1.5 tablets by mouth 2 (Two) Times a Day. 270 tablet 3 10/27/2024 at  8:30 PM    tamsulosin (FLOMAX) 0.4 MG capsule 24 hr capsule TAKE 1 CAPSULE EVERY DAY (Patient taking differently: 1 capsule Daily. Swallow whole. Do not crush or chew.) 90 capsule 3 10/27/2024 Morning    Vitamin D, Cholecalciferol, (CHOLECALCIFEROL) 10 MCG (400 UNIT) tablet Take 1 tablet by mouth Daily.   10/27/2024 Morning    apixaban (ELIQUIS) 5 MG tablet tablet Take 1 tablet by mouth 2 (Two) Times a Day. (Patient taking differently: Take 1 tablet by mouth 2 (Two) Times a Day. LAST DOSE WILL BE ON 10/25/24) 60 tablet 4 10/25/2024    glimepiride (AMARYL) 2 MG tablet Take 1 tablet by mouth 2 (Two) Times a Day.            PMH:   Past Medical History:   Diagnosis Date    A-fib     Anxiety     Arthritis     DDD (degenerative disc disease), lumbar   "   Deep vein thrombosis     Depression     Diabetes     Disease of thyroid gland     HYPO    Heart attack     \"5-6 years ago\"    Hyperlipidemia     Hypertension     Kidney stone     Neuropathy     Sleep apnea     DOES NOT USE CPAP    UTI (urinary tract infection)      PSH:    Past Surgical History:   Procedure Laterality Date    CARDIAC CATHETERIZATION N/A 08/28/2024    Procedure: Left Heart Cath;  Surgeon: Julio Neff MD;  Location:  [facility];  Service: Cardiology;  Laterality: N/A;    CARDIAC ELECTROPHYSIOLOGY PROCEDURE N/A 04/05/2023    Procedure: Loop insertion;  Surgeon: Tariq Chávez MD;  Location:  [facility];  Service: Cardiovascular;  Laterality: N/A;    CATARACT EXTRACTION Bilateral     COLONOSCOPY      ENDOSCOPY      TEETH EXTRACTION         Immunization History:  Influenza: No  Pneumococcal: No  Tetanus: UTD    Social History:   Tobacco:   Social History     Tobacco Use   Smoking Status Never   Smokeless Tobacco Never      Alcohol:     Social History     Substance and Sexual Activity   Alcohol Use No         Physical Exam:/98 (BP Location: Left arm, Patient Position: Lying)   Pulse 75   Temp 98.1 °F (36.7 °C) (Temporal)   Resp 18   SpO2 93%       General Appearance:    Alert, cooperative, no distress, appears stated age   Head:    Normocephalic, without obvious abnormality, atraumatic   Lungs:     Clear to auscultation bilaterally, respirations unlabored    Heart:   Regular rate and rhythm, S1 and S2 normal    Abdomen:    Soft without tenderness   Extremities:   Extremities normal, atraumatic, no cyanosis or edema   Skin:   Skin color, texture, turgor normal, no rashes or lesions   Neurologic:   Grossly intact     Results Review:     LABS:  Lab Results   Component Value Date    WBC 6.82 08/26/2024    HGB 14.6 08/26/2024    HCT 43.7 08/26/2024    MCV 91.8 08/26/2024     08/26/2024    GLUCOSE 391 (H) 10/25/2024    BUN 10 10/25/2024    CREATININE " 0.91 10/25/2024    EGFRIFNONA 86 12/15/2021    EGFRIFAFRI 99 12/15/2021     10/25/2024    K 4.7 10/25/2024    CL 99 10/25/2024    CO2 26.0 10/25/2024    MG 2.6 (H) 10/25/2024    CALCIUM 9.5 10/25/2024    ALBUMIN 4.0 10/25/2024    AST 24 10/25/2024    ALT 16 10/25/2024    BILITOT 0.2 10/25/2024      Latest Reference Range & Units 10/25/24 13:46   Hemoglobin A1C 4.80 - 5.60 % 11.20 (H)   (H): Data is abnormally high    RADIOLOGY:  ECHO 10/17/24:  Clinical Indication    Other Reasons; Additional Reasons; Reasons Uncertain in Most Circumstances; Other (Please Comment) - CAD/ PREOP CABG   Dx: Coronary artery disease involving native coronary artery of native heart, unspecified whether angina present [I25.10 (ICD-10-CM)]     Interpretation Summary         Normal left ventricular cavity size and wall thickness noted. All left ventricular wall segments contract normally.    Left ventricular ejection fraction appears to be 61 - 65%.    The aortic valve is not well visualized. No aortic valve regurgitation or stenosis is present. The aortic valve is grossly normal in structure.    The mitral valve is structurally normal with no regurgitation or significant stenosis present.    There is no evidence of pericardial effusion. .    8/28/24 heart cath:    Indications    Paroxysmal atrial fibrillation [I48.0 (ICD-10-CM)]   Mixed hyperlipidemia [E78.2 (ICD-10-CM)]   Primary hypertension [I10 (ICD-10-CM)]   Chronic chest pain with high risk for CAD [R07.9, G89.29, Z91.89 (ICD-10-CM)]     Pre Procedure Diagnosis    Coronary artery disease    Post Procedure Diagnosis    Coronary artery disease      Conclusion         Normal systolic function.    Mid LAD lesion is 75% stenosed.    Dist LAD lesion is 95% stenosed.    1st Mrg lesion is 90% stenosed.    3rd Mrg lesion is 99% stenosed.    Ost RCA lesion is 70% stenosed.    Mid RCA lesion is 70% stenosed.    RV Branch lesion is 99% stenosed.     1.  Cardiac.  Patient with significant  coronary artery disease involving multiple vessels.  Patient will be referred for bypass surgery.    Study Result    Narrative & Impression   EXAM:    CT Angiography Heart With Intravenous Contrast     EXAM DATE:    8/10/2024 9:49 AM     CLINICAL HISTORY:    ; R07.2-Precordial pain     TECHNIQUE:    Axial computed tomographic angiography images of the coronary arteries  with intravenous contrast.  Sublingual nitroglycerine for coronary  vasodilation and IV metoprolol to reduce heart rate were administered as  needed.  This CT exam was performed using one or more of the following  dose reduction techniques:  automated exposure control, adjustment of  the mA and/or kV according to patient size, and/or use of iterative  reconstruction technique.    MIP reconstructed images were created and reviewed.     COMPARISON:    None.     FINDINGS:    LEFT MAIN CORONARY ARTERY:  Left main calcium score 48.    LEFT ANTERIOR DESCENDING ARTERY:  The LAD demonstrates such extensive  mid-distal calcification that the examination is very limited with also  some possible high risk plaque causing about 50% stenosis in the mid  aspect of the LAD.  LAD calcium score of 686.    LEFT CIRCUMFLEX ARTERY:  Question proximal circumflex origin high risk  plaque causing about 75% stenosis.  Circumflex calcium score 62.    RIGHT CORONARY ARTERY:  Mixed calcific and high risk plaque of  proximal RCA possibly contribute up to about 75% or greater stenosis and  should be further evaluated with ICA.  RCA calcium score of 331.       CARDIAC VALVES:  Unremarkable as visualized.  No evidence of  calcification in the aortic or mitral valve leaflets.    PERICARDIUM:  Unremarkable as visualized.  Contour is preserved with  no effusion, thickening or calcification.       AORTA:  No acute findings.  No thoracic aortic aneurysm.  No  dissection.    PULMONARY ARTERIES:  Unremarkable as visualized.  No pulmonary  embolism.    LUNGS AND PLEURAL SPACES:   Unremarkable as visualized.  No mass.  No  consolidation or edema.  No pleural effusion or thickening.  No  pneumothorax.    MEDIASTINUM:  Unremarkable as visualized.  No mass.    OTHER FINDINGS:  Total calcium score of 1895.     IMPRESSION:  1.  The LAD demonstrates such extensive mid-distal calcification that  the examination is very limited with also some possible high risk plaque  causing about 50% stenosis in the mid aspect of the LAD.  2.  Question proximal circumflex origin high risk plaque causing about  75% stenosis.  3.  Mixed calcific and high risk plaque of proximal RCA possibly  contribute up to about 75% or greater stenosis and should be further  evaluated with ICA.     ASSESSMENT:    CAD RADS N, P4 but overall concerning for significant multifocal  disease in ICA should be considered. Impression.       I reviewed the patient's new clinical results.    Cancer Staging (if applicable)  Cancer Patient: __ yes __no __unknown; If yes, clinical stage T:__ N:__M:__, stage group or __N/A      Impression: Coronary artery disease involving native coronary artery of native heart       Plan: CORONARY ARTERY BYPASS GRAFTING; ENDOSCOPIC VEIN HARVEST; RADIAL ARTERY HARVEST; ATRIAL APPENDAGE EXCLUSION LEFT WITH TRANSESOPHAGEAL ECHOCARDIOGRAM       ZIA Montgomery   10/28/2024   06:39 EDT    Electronically signed by Alex Max MD at 10/28/24 1720       Current Facility-Administered Medications   Medication Dose Route Frequency Provider Last Rate Last Admin    acetaminophen (TYLENOL) tablet 650 mg  650 mg Nasogastric Q8H Whitney Btuler PA-C   650 mg at 11/01/24 1449    albumin human 5 % solution 250 mL  250 mL Intravenous PRN Whitney Butler PA-C   250 mL at 10/28/24 1423    aspirin EC tablet 325 mg  325 mg Oral Daily Whitney Butler PA-C   325 mg at 11/01/24 0804    atorvastatin (LIPITOR) tablet 40 mg  40 mg Oral Nightly Whitney Butler PA-C   40 mg at 10/31/24 2047    bisacodyl (DULCOLAX) suppository  10 mg  10 mg Rectal Daily PRN Whitney Butler PA-C        buPROPion XL (WELLBUTRIN XL) 24 hr tablet 150 mg  150 mg Oral Daily Whitney Butler PA-C   150 mg at 11/01/24 0804    Calcium Replacement - Follow Nurse / BPA Driven Protocol   Does not apply PRN Whitney Butler PA-C        dexmedetomidine (PRECEDEX) 400 mcg in 100 mL NS infusion  0.2-1.5 mcg/kg/hr Intravenous Titrated Tatiana Waller APRN   Stopped at 10/31/24 0530    dextrose (D50W) (25 g/50 mL) IV injection 25 g  25 g Intravenous Q15 Min PRN Whitney Butler PA-C        dextrose (GLUTOSE) oral gel 15 g  15 g Oral Q15 Min PRN Whitney Butler PA-C        DULoxetine (CYMBALTA) DR capsule 60 mg  60 mg Oral Daily Whitney Butler PA-C   60 mg at 11/01/24 0805    empagliflozin (JARDIANCE) tablet 25 mg  25 mg Oral Daily Tatiana Waller APRN   25 mg at 11/01/24 0955    gabapentin (NEURONTIN) capsule 100 mg  100 mg Oral Q8H Whitney Butler PA-C   100 mg at 11/01/24 1449    glucagon (GLUCAGEN) injection 1 mg  1 mg Intramuscular Q15 Min PRN Whitney Butler PA-C        heparin (porcine) 5000 UNIT/ML injection 5,000 Units  5,000 Units Subcutaneous Q8H Whitney Butler PA-C   5,000 Units at 11/01/24 1449    HYDROcodone-acetaminophen (NORCO) 7.5-325 MG per tablet 1 tablet  1 tablet Oral Q4H PRN Whitney Butler PA-C   1 tablet at 10/31/24 0040    insulin glargine (LANTUS, SEMGLEE) injection 25 Units  25 Units Subcutaneous Daily Tatiana Waller APRN   25 Units at 11/01/24 0806    Insulin Lispro (humaLOG) injection 3-14 Units  3-14 Units Subcutaneous 4x Daily AC & at Bedtime Whitney Butler PA-C   5 Units at 11/01/24 1224    levothyroxine (SYNTHROID, LEVOTHROID) tablet 50 mcg  50 mcg Oral Q AM Whitney Butler PA-C   50 mcg at 11/01/24 0546    losartan (COZAAR) tablet 25 mg  25 mg Oral Q24H Tatiana Waller APRN   25 mg at 11/01/24 0805    Magnesium Cardiology Dose Replacement - Follow Nurse / BPA Driven Protocol   Does not apply PRN Whitney Butler PA-C         mupirocin (BACTROBAN) 2 % nasal ointment 1 Application  1 Application Each Nare BID Whitney Butler PA-C   1 Application at 24 0805    nitroglycerin (NITROSTAT) SL tablet 0.4 mg  0.4 mg Sublingual Q5 Min PRN Whitney Butler PA-C        ondansetron (ZOFRAN) injection 4 mg  4 mg Intravenous Q6H PRN Whitney Butler PA-C        oxyCODONE (ROXICODONE) immediate release tablet 10 mg  10 mg Oral Q4H PRN Whitney Butler PA-C   10 mg at 24 1103    Pharmacy Consult - MTM   Does not apply Daily Whitney Butler PA-C        Phosphorus Replacement - Follow Nurse / BPA Driven Protocol   Does not apply PRN Whitney Butler PA-C        polyethylene glycol (MIRALAX) packet 17 g  17 g Oral Daily PRN Whitney Butler PA-C        Potassium Replacement - Follow Nurse / BPA Driven Protocol   Does not apply PRN Whitney Butler PA-C        sennosides-docusate (PERICOLACE) 8.6-50 MG per tablet 2 tablet  2 tablet Oral BID Whitney Butler PA-C   2 tablet at 24 0804    sodium bicarbonate injection 8.4% 50 mEq  50 mEq Intravenous Once PRN Whitney Butler PA-C        sotalol (BETAPACE) tablet 120 mg  120 mg Oral Q12H Tatiana Waller APRN   120 mg at 24 0805    tamsulosin (FLOMAX) 24 hr capsule 0.4 mg  0.4 mg Oral Daily Whitney Butler PA-C   0.4 mg at 24 0804     Facility-Administered Medications Ordered in Other Encounters   Medication Dose Route Frequency Provider Last Rate Last Admin    Chlorhexidine Gluconate Cloth 2 % pads 1 Application  1 Application Topical Q12H PRN Tatiana Waller APRN            Physician Progress Notes (most recent note)        Luara Dallas MD at 24 1011            Pulmonary / Critical Care Progress Note      Patient Name: Joel Galo  : 1965  MRN: [id number]  Attending:  Alex Max MD   Date of admission: 10/28/2024    Joel Galo is a 59 y.o. male with medical history significant for paroxysmal A-fib, diabetes mellitus, poorly-controlled with hemoglobin  A1c of 11.20, hypertension who recently presented with intermittent episodes of chest pain with associated shortness of breath.  Patient had a series of tests that culminated in a left heart cath on 8/28/24 that showed mid LAD 75% stenosis, distal LAD 95% stenosis, first marginal 90% stenosis, third marginal 99% stenosis, ostial RCA 70% stenosis, mid RCA 70% stenosis, and RV branch 99% stenosis. Left ventriculogram during cardiac cath noted EF 55%.  Today was taken in for four-vessel CABG, maze procedure as well as exclusion of atrial appendage.  Status post surgery patient remains intubated and sedated and brought to the ICU.  The intensivist was asked to evaluate the patient.  Patient seen and examined at bedside.  All the labs and diagnostic imaging studies were extensively reviewed by me and findings as above..  Review of patient's labs showed a urine toxicology screen that is positive for opiates.  Chest x-ray of 1025 showed bilateral opacity which could represent pulmonary edema versus pneumonia.  WBC count is within normal limits.  Patient is requiring minimal dose of Levophed and vasopressin     Subjective   Subjective   Patient seen and examined at bedside.  Overnight no acute event.  Patient is much more comfortable today.  I met him this morning having breakfast and feeding himself.  He has been ordered for transfer to telemetry floor.    Objective   Objective     Vitals:   Vital signs for last 24 hours:  Temp:  [97.8 °F (36.6 °C)-98 °F (36.7 °C)] 98 °F (36.7 °C)  Heart Rate:  [59-85] 76  Resp:  [18-28] 24  BP: ()/(61-96) 143/84    Intake/Output last 3 shifts:  I/O last 3 completed shifts:  In: 970 [P.O.:540; I.V.:430]  Out: 2625 [Urine:2225; Emesis/NG output:300; Chest Tube:100]  Intake/Output this shift:  I/O this shift:  In: 480 [P.O.:480]  Out: -     Vent settings for last 24 hours:       Hemodynamic parameters for last 24 hours:       Physical Exam   Vital Signs Reviewed   General:  WDWN,  Alert, NAD.    HEENT:  PERRL, EOMI.  OP, nares clear  Chest:  good aeration.  Skin incision looks clean.  Chest tubes in position  CV: RRR, no MGR, pulses 2+, equal.  Abd:  Soft, NT, ND, + BS, no HSM  EXT:  no clubbing, no cyanosis, no edema  Neuro:  A&Ox3, CN grossly intact, no focal deficits.  Skin: No rashes or lesions noted        Result Review    Result Review:  I have personally reviewed the results from the time of this admission to 11/1/2024 10:11 EDT and agree with these findings:  [x]  Laboratory  []  Microbiology  [x]  Radiology  []  EKG/Telemetry   []  Cardiology/Vascular   []  Pathology  []  Old records  []  Other:  Most notable findings include: Reviewed    Assessment & Plan   Assessment / Plan     Active Hospital Problems:  Active Hospital Problems    Diagnosis     **CAD s/p CABG x 4, MAZE, LAAL 10/28/24     Primary hypertension     DM (diabetes mellitus), type 2     Hyperlipidemia LDL goal <70     Paroxysmal atrial fibrillation          Impression:  Coronary artery disease involving native coronary artery of native heart       CAD (coronary artery disease)     Coronary artery disease status post CABG  Acute hypoxic respiratory failure  Paroxysmal atrial fibrillation status post maze procedure  Diabetes mellitus:,Poorly controlled  Cardiogenic shock: Resolved  Hypertension  Obstructive sleep apnea  Dyslipidemia    Plan:    Titrate FiO2 to a sat goal of 95%  Off insulin drip.  Started on Lantus 25 units SQ daily   Q. hourly Accu-Check sliding scale insulin carb  Off vasopressor  On sotalol 120 mg p.o. every 12 hourly  Pain control with Demerol 25 mg Q4 as needed and fentanyl 25 mcg IV Q1 hourly as needed  Diet when okay by CT surgery  Out of bed to chair.  PT  DVT prophylaxis: Resume chemical prophylaxis when okay by CT surgery  Transfer to telemetry       VTE Prophylaxis:  Mechanical VTE prophylaxis orders are present.        CODE STATUS:   Code Status (Patient has no pulse and is not breathing): CPR  "(Attempt to Resuscitate)  Medical Interventions (Patient has pulse or is breathing): Full Support          The patient is critically ill in the ICU. Multidisciplinary bedside critical care rounds were performed with nursing staff, respiratory therapy, pharmacy, nutritional services, social work. I have personally reviewed the chart, labs and any pertinent imaging available.  I have spent 34 minutes of critical care time, excluding procedures, in the care of this patient.              Electronically signed by Laura Dallas MD at 24 1013          Physical Therapy Notes (most recent note)        Suzi Don PT at 24 0850  Version 1 of 1         Patient Name: Joel Galo  : 1965    MRN: [id number]                              Today's Date: 2024       Admit Date: 10/28/2024    Visit Dx:     ICD-10-CM ICD-9-CM   1. ASCVD (arteriosclerotic cardiovascular disease)  I25.10 429.2     440.9     Patient Active Problem List   Diagnosis    Paroxysmal atrial fibrillation    Obstructive sleep apnea    Hypothyroidism (acquired)    GERD (gastroesophageal reflux disease)    Chronic anticoagulation    DM (diabetes mellitus), type 2    Hyperlipidemia LDL goal <70    Primary hypertension    CAD s/p CABG x 4, MAZE, LAAL 10/28/24     Past Medical History:   Diagnosis Date    A-fib     Anxiety     Arthritis     DDD (degenerative disc disease), lumbar     Deep vein thrombosis     Depression     Diabetes     Disease of thyroid gland     HYPO    Heart attack     \"5-6 years ago\"    Hyperlipidemia     Hypertension     Kidney stone     Neuropathy     Sleep apnea     DOES NOT USE CPAP    UTI (urinary tract infection)      Past Surgical History:   Procedure Laterality Date    ATRIAL APPENDAGE EXCLUSION LEFT WITH TRANSESOPHAGEAL ECHOCARDIOGRAM N/A 10/28/2024    Procedure: ATRIAL APPENDAGE EXCLUSION LEFT WITH TRANSESOPHAGEAL ECHOCARDIOGRAM;  Surgeon: Alex Max MD;  Location: [facility];  Service: " Cardiothoracic;  Laterality: N/A;    CARDIAC CATHETERIZATION N/A 08/28/2024    Procedure: Left Heart Cath;  Surgeon: Julio Neff MD;  Location:  [facility];  Service: Cardiology;  Laterality: N/A;    CARDIAC ELECTROPHYSIOLOGY PROCEDURE N/A 04/05/2023    Procedure: Loop insertion;  Surgeon: Tariq Chávez MD;  Location:  [facility];  Service: Cardiovascular;  Laterality: N/A;    CATARACT EXTRACTION Bilateral     COLONOSCOPY      CORONARY ARTERY BYPASS GRAFT N/A 10/28/2024    Procedure: MEDIAN STERNOTOMY, CORONARY ARTERY BYPASS GRAFTING X4 UTILIZING THE LEFT INTERNAL MAMMARY ARTERY GRAFT, ENDOSCOPIC VEIN HARVEST OF THE GREATER RIGHT SAPHENOUS VEIN;  Surgeon: Alex Max MD;  Location:  [facility];  Service: Cardiothoracic;  Laterality: N/A;    ENDOSCOPY      MAZE PROCEDURE N/A 10/28/2024    Procedure: MAZE PROCEDURE;  Surgeon: Alex Max MD;  Location:  [facility];  Service: Cardiothoracic;  Laterality: N/A;    TEETH EXTRACTION        General Information       Row Name 11/01/24 1313          Physical Therapy Time and Intention    Document Type therapy note (daily note)  -HARISH     Mode of Treatment physical therapy  -HARISH       Row Name 11/01/24 1313          General Information    Patient Profile Reviewed yes  -HARISH     Prior Level of Function independent:;bed mobility;ADL's;gait;transfer  -HARISH     Existing Precautions/Restrictions cardiac;fall;sternal  -HARISH     Barriers to Rehab medically complex  -HARISH       Row Name 11/01/24 1313          Living Environment    People in Home spouse  -HARISH       Row Name 11/01/24 1313          Home Main Entrance    Number of Stairs, Main Entrance none  -HARISH       Row Name 11/01/24 1313          Cognition    Orientation Status (Cognition) oriented to;person;place;situation;verbal cues/prompts needed for orientation;time  -HARISH       Row Name 11/01/24 1313          Safety Issues/Impairments Affecting Functional Mobility    Safety Issues Affecting  Function (Mobility) safety precautions follow-through/compliance;safety precaution awareness  -HARISH     Impairments Affecting Function (Mobility) balance;endurance/activity tolerance;shortness of breath;strength;pain  -HARISH               User Key  (r) = Recorded By, (t) = Taken By, (c) = Cosigned By      Initials Name Provider Type    Suzi Higgins PT Physical Therapist                   Mobility       Row Name 11/01/24 1314          Bed Mobility    Comment, (Bed Mobility) patient is OOB and rdturns to the chair  -HARISH       Row Name 11/01/24 1314          Transfers    Comment, (Transfers) patient transfers on and off commode needs cues for sternal precautions  -HARISH       Row Name 11/01/24 1314          Bed-Chair Transfer    Bed-Chair [place] (Transfers) minimum assist (75% patient effort);2 person assist  -HARISH     Assistive Device (Bed-Chair Transfers) walker, front-wheeled  -HARISH       Row Name 11/01/24 1314          Sit-Stand Transfer    Sit-Stand [place] (Transfers) minimum assist (75% patient effort);2 person assist  -HARISH     Assistive Device (Sit-Stand Transfers) walker, front-wheeled  -HARISH       Row Name 11/01/24 1314          Gait/Stairs (Locomotion)    [place] Level (Gait) moderate assist (50% patient effort);2 person assist  -HARISH     Assistive Device (Gait) walker, front-wheeled  -HARISH     Distance in Feet (Gait) 70  -HARISH     Deviations/Abnormal Patterns (Gait) base of support, wide;festinating/shuffling;stride length decreased;lesley decreased  -HARISH     Bilateral Gait Deviations forward flexed posture;heel strike decreased  -HARISH     Comment, (Gait/Stairs) step to gait pattern progressing to step through with verbal cues for increased step length patient tends to increase trunk flexion with fatigue needs encouragement to increase activity  -HARISH               User Key  (r) = Recorded By, (t) = Taken By, (c) = Cosigned By      Initials Name Provider Type    Suzi Higgins PT Physical Therapist                    Obj/Interventions       Row Name 11/01/24 1317          Range of Motion Comprehensive    General Range of Motion no range of motion deficits identified  -HARISH       Row Name 11/01/24 1317          Balance    Balance Assessment sitting static balance;sitting dynamic balance;standing static balance;standing dynamic balance  -HARISH     Static Sitting Balance contact guard  -HARISH     Dynamic Sitting Balance contact guard  -HARISH     Position, Sitting Balance unsupported;sitting in chair  -HARISH     Static Standing Balance minimal assist  -HARISH     Dynamic Standing Balance minimal assist  -HARISH     Position/Device Used, Standing Balance supported;walker, front-wheeled  -HARISH               User Key  (r) = Recorded By, (t) = Taken By, (c) = Cosigned By      Initials Name Provider Type    Suzi Higgins, PT Physical Therapist                   Goals/Plan       Row Name 11/01/24 1321          Therapy Assessment/Plan (PT)    Planned Therapy Interventions (PT) balance training;gait training;home exercise program;bed mobility training;transfer training;strengthening  -HARISH               User Key  (r) = Recorded By, (t) = Taken By, (c) = Cosigned By      Initials Name Provider Type    Suzi Higgins, PT Physical Therapist                   Clinical Impression       Row Name 11/01/24 1318          Pain    Pretreatment Pain Rating 4/10  -HARISH     Posttreatment Pain Rating 6/10  -HARISH     Pain Location chest  -HARISH     Pain Management Interventions activity modification encouraged;exercise or physical activity utilized;nursing notified;premedicated for activity  -HARISH     Response to Pain Interventions activity level improved;mobility function improved;activity participation with tolerable pain  -HARISH       Row Name 11/01/24 1318          Plan of Care Review    Plan of Care Reviewed With patient  -HARISH     Progress improving  -HARISH     Outcome Evaluation patient was able to increase ambulation to 70 ft with min assist of 2. patiet  needs incouragement to increase activity he requires less assist for all activity today patient has stable vitals with activity anticipate patient to need SNF placement at D/C  -HARISH       Row Name 11/01/24 1318          Therapy Assessment/Plan (PT)    Patient/Family Therapy Goals Statement (PT) feel better  -HARISH     Rehab Potential (PT) fair  -HARISH     Criteria for Skilled Interventions Met (PT) yes;skilled treatment is necessary  -HARISH     Therapy Frequency (PT) daily  -HARISH       Row Name 11/01/24 1318          Vital Signs    Pre Systolic BP Rehab 147  -HARISH     Pre Treatment Diastolic BP 84  -HARISH     Post Systolic BP Rehab 110  -HARISH     Post Treatment Diastolic BP 72  -HARISH     Pretreatment Heart Rate (beats/min) 73  -HARISH     Posttreatment Heart Rate (beats/min) 84  -HARISH     Pre SpO2 (%) 95  -HARISH     O2 Delivery Pre Treatment nasal cannula  -HARISH     Post SpO2 (%) 97  -HARISH     O2 Delivery Post Treatment nasal cannula  -HARISH     Pre Patient Position Sitting  -HARISH     Intra Patient Position Standing  -HARISH     Post Patient Position Sitting  -HARISH       Row Name 11/01/24 1318          Positioning and Restraints    Pre-Treatment Position sitting in chair/recliner  -HARISH     Post Treatment Position bsc  -HARISH     On BS commode notified nsg;sitting;call light within reach;encouraged to call for assist;with other staff  -HARISH               User Key  (r) = Recorded By, (t) = Taken By, (c) = Cosigned By      Initials Name Provider Type    Suzi Higgins, PT Physical Therapist                   Outcome Measures       Row Name 11/01/24 1322 11/01/24 1030       How much help from another person do you currently need...    Turning from your back to your side while in flat bed without using bedrails? 3  -HARISH 3  -MM    Moving from lying on back to sitting on the side of a flat bed without bedrails? 3  -HARISH 3  -MM    Moving to and from a bed to a chair (including a wheelchair)? 3  -HARISH 3  -MM    Standing up from a chair using your arms (e.g., wheelchair,  bedside chair)? 3  -HARISH 2  -MM    Climbing 3-5 steps with a railing? 2  -HARISH 2  -MM    To walk in hospital room? 3  -HARISH 3  -MM    AM-PAC 6 Clicks Score (PT) 17  -HARISH 16  -MM    Highest Level of Mobility Goal 5 --> Static standing  -HARISH 5 --> Static standing  -MM      Row Name 11/01/24 0800          How much help from another person do you currently need...    Turning from your back to your side while in flat bed without using bedrails? 2  -SW     Moving from lying on back to sitting on the side of a flat bed without bedrails? 2  -SW     Moving to and from a bed to a chair (including a wheelchair)? 2  -SW     Standing up from a chair using your arms (e.g., wheelchair, bedside chair)? 2  -SW     Climbing 3-5 steps with a railing? 2  -SW     To walk in hospital room? 2  -SW     AM-PAC 6 Clicks Score (PT) 12  -SW     Highest Level of Mobility Goal 4 --> Transfer to chair/commode  -SW               User Key  (r) = Recorded By, (t) = Taken By, (c) = Cosigned By      Initials Name Provider Type    Suzi Higgins, PT Physical Therapist    SW Ming Nogueira, RN Registered Nurse    Richelle Romero RN Registered Nurse                                 Physical Therapy Education       Title: PT OT SLP Therapies (In Progress)       Topic: Physical Therapy (In Progress)       Point: Mobility training (In Progress)       Learning Progress Summary            Patient Acceptance, E, NR by HARISH at 11/1/2024 0850    Acceptance, E, NR by KG at 10/31/2024 0959    Acceptance, E, NR by ND at 10/30/2024 1124    Acceptance, E, NR by ND at 10/29/2024 1024                      Point: Home exercise program (In Progress)       Learning Progress Summary            Patient Acceptance, E, NR by HARISH at 11/1/2024 0850    Acceptance, E, NR by KG at 10/31/2024 0959                      Point: Body mechanics (In Progress)       Learning Progress Summary            Patient Acceptance, E, NR by HARISH at 11/1/2024 0850    Acceptance, E, NR by KG at  10/31/2024 0959    Acceptance, E, NR by ND at 10/30/2024 1124    Acceptance, E, NR by ND at 10/29/2024 1024                      Point: Precautions (In Progress)       Learning Progress Summary            Patient Acceptance, E, NR by HARISH at 11/1/2024 0850    Acceptance, E, NR by KG at 10/31/2024 0959    Acceptance, E, NR by ND at 10/30/2024 1124    Acceptance, E, NR by ND at 10/29/2024 1024                                      User Key       Initials Effective Dates Name Provider Type Discipline    HARISH 02/03/23 -  Suzi Don, PT Physical Therapist PT    KG 05/22/20 -  Lois Santos PT Physical Therapist PT    ND 11/16/23 -  Cristina Varela PT Physical Therapist PT                  PT Recommendation and Plan  Planned Therapy Interventions (PT): balance training, gait training, home exercise program, bed mobility training, transfer training, strengthening  Progress: improving  Outcome Evaluation: patient was able to increase ambulation to 70 ft with min assist of 2. patiet needs incouragement to increase activity he requires less assist for all activity today patient has stable vitals with activity anticipate patient to need SNF placement at D/C     Time Calculation:         PT Charges       Row Name 11/01/24 1323             Time Calculation    Start Time 0850  -HARISH      PT Received On 11/01/24  -HARISH      PT Goal Re-Cert Due Date 11/08/24  -HARISH         Time Calculation- PT    Total Timed Code Minutes- PT 23 minute(s)  -HARISH         Timed Charges    75055 - PT Therapeutic Activity Minutes 23  -HARISH         Total Minutes    Timed Charges Total Minutes 23  -HARISH       Total Minutes 23  -HARISH                User Key  (r) = Recorded By, (t) = Taken By, (c) = Cosigned By      Initials Name Provider Type    Suzi Higgins, PT Physical Therapist                  Therapy Charges for Today       Code Description Service Date Service Provider Modifiers Qty    13782545671 HC PT THERAPEUTIC ACT EA 15 MIN 11/1/2024  "Suzi Don, PT GP 2            PT G-Codes  Outcome Measure Options: AM-PAC 6 Clicks Basic Mobility (PT)  AM-PAC 6 Clicks Score (PT): 17  AM-PAC 6 Clicks Score (OT): 8  PT Discharge Summary  Anticipated Discharge Disposition (PT): skilled nursing facility    Suzi Don, PT  2024      Electronically signed by Suzi Don PT at 24 1323          Occupational Therapy Notes (most recent note)        Marbin Benjamin, OT at 10/31/24 0816          Patient Name: Joel Galo  : 1965    MRN: [id number]                              Today's Date: 10/31/2024       Admit Date: 10/28/2024    Visit Dx:     ICD-10-CM ICD-9-CM   1. ASCVD (arteriosclerotic cardiovascular disease)  I25.10 429.2     440.9     Patient Active Problem List   Diagnosis    Paroxysmal atrial fibrillation    Obstructive sleep apnea    Hypothyroidism (acquired)    GERD (gastroesophageal reflux disease)    Chronic anticoagulation    DM (diabetes mellitus), type 2    Hyperlipidemia LDL goal <70    Primary hypertension    CAD s/p CABG x 4, MAZE, LAAL 10/28/24     Past Medical History:   Diagnosis Date    A-fib     Anxiety     Arthritis     DDD (degenerative disc disease), lumbar     Deep vein thrombosis     Depression     Diabetes     Disease of thyroid gland     HYPO    Heart attack     \"5-6 years ago\"    Hyperlipidemia     Hypertension     Kidney stone     Neuropathy     Sleep apnea     DOES NOT USE CPAP    UTI (urinary tract infection)      Past Surgical History:   Procedure Laterality Date    ATRIAL APPENDAGE EXCLUSION LEFT WITH TRANSESOPHAGEAL ECHOCARDIOGRAM N/A 10/28/2024    Procedure: ATRIAL APPENDAGE EXCLUSION LEFT WITH TRANSESOPHAGEAL ECHOCARDIOGRAM;  Surgeon: Alex Max MD;  Location: [facility];  Service: Cardiothoracic;  Laterality: N/A;    CARDIAC CATHETERIZATION N/A 2024    Procedure: Left Heart Cath;  Surgeon: Julio Neff MD;  [facility]  [facility];  Service: Cardiology;  " Laterality: N/A;    CARDIAC ELECTROPHYSIOLOGY PROCEDURE N/A 04/05/2023    Procedure: Loop insertion;  Surgeon: Tariq Chávez MD;  [facility]:  [facility];  Service: Cardiovascular;  Laterality: N/A;    CATARACT EXTRACTION Bilateral     COLONOSCOPY      CORONARY ARTERY BYPASS GRAFT N/A 10/28/2024    Procedure: MEDIAN STERNOTOMY, CORONARY ARTERY BYPASS GRAFTING X4 UTILIZING THE LEFT INTERNAL MAMMARY ARTERY GRAFT, ENDOSCOPIC VEIN HARVEST OF THE GREATER RIGHT SAPHENOUS VEIN;  Surgeon: Alex aMx MD;  Location:  [facility];  Service: Cardiothoracic;  Laterality: N/A;    ENDOSCOPY      MAZE PROCEDURE N/A 10/28/2024    Procedure: MAZE PROCEDURE;  Surgeon: Alex Max MD;  Location:  [facility];  Service: Cardiothoracic;  Laterality: N/A;    TEETH EXTRACTION        General Information       Row Name 10/31/24 0816          OT Time and Intention    Document Type therapy note (daily note)  -TA     Mode of Treatment occupational therapy  -TA       Row Name 10/31/24 08          General Information    Patient Profile Reviewed yes  -TA     Existing Precautions/Restrictions cardiac;fall;sternal;other (see comments)  Current cognitive level  -TA     Barriers to Rehab medically complex;previous functional deficit;cognitive status  -TA       Row Name 10/31/24 0816          Occupational Profile    Reason for Services/Referral (Occupational Profile) fxl decline from PLOF  -TA     Patient Goals (Occupational Profile) none stated  -TA       Row Name 10/31/24 0816          Cognition    Orientation Status (Cognition) oriented to;person;disoriented to;place;situation;time  Follows 40% of 1 step commands  -TA       Row Name 10/31/24 0816          Safety Issues/Impairments Affecting Functional Mobility    Safety Issues Affecting Function (Mobility) safety precautions follow-through/compliance;safety precaution awareness;insight into deficits/self-awareness;ability to follow commands;impulsivity;awareness of need for  assistance;problem-solving;sequencing abilities  -TA     Impairments Affecting Function (Mobility) balance;cognition;endurance/activity tolerance;pain;strength;shortness of breath  -TA     Cognitive Impairments, Mobility Safety/Performance attention;awareness, need for assistance;impulsivity;insight into deficits/self-awareness;judgment;problem-solving/reasoning;safety precaution awareness;safety precaution follow-through  -TA               User Key  (r) = Recorded By, (t) = Taken By, (c) = Cosigned By      Initials Name Provider Type    Marbin Elias OT Occupational Therapist                     Mobility/ADL's       Row Name 10/31/24 0816          Bed Mobility    Comment, (Bed Mobility) Pt UIC  -TA       Row Name 10/31/24 0816          Activities of Daily Living    BADL Assessment/Intervention grooming  -TA       Row Name 10/31/24 0816          Grooming Assessment/Training    [place] Level (Grooming) wash face, hands;maximum assist (25% patient effort);nonverbal cues (demo/gesture);verbal cues  Support at R elbow to reach face and consistent VCs to advance through task  -TA     Position (Grooming) supported sitting  -TA               User Key  (r) = Recorded By, (t) = Taken By, (c) = Cosigned By      Initials Name Provider Type    Marbin Elias OT Occupational Therapist                   Obj/Interventions       Row Name 10/31/24 0816          Shoulder (Therapeutic Exercise)    Shoulder AAROM (Therapeutic Exercise) bilateral;flexion;extension;aBduction;aDduction;sitting;10 repetitions  Consistent VCs required to advance through all ex's  -TA       Row Name 10/31/24 0816          Elbow/Forearm (Therapeutic Exercise)    Elbow/Forearm (Therapeutic Exercise) AAROM (active assistive range of motion)  -TA     Elbow/Forearm AAROM (Therapeutic Exercise) bilateral;flexion;extension;supination;pronation;sitting;10 repetitions  -TA       Row Name 10/31/24 0816          Wrist (Therapeutic Exercise)     Wrist (Therapeutic Exercise) AAROM (active assistive range of motion)  -TA     Wrist AAROM (Therapeutic Exercise) bilateral;flexion;extension;10 repetitions  -TA       Row Name 10/31/24 0816          Hand (Therapeutic Exercise)    Hand (Therapeutic Exercise) AROM (active range of motion)  -TA     Hand AROM/AAROM (Therapeutic Exercise) bilateral;AROM (active range of motion);finger flexion;finger extension;10 repetitions  VCs to advance through all ex's  -TA       Row Name 10/31/24 0816          Motor Skills    Therapeutic Exercise shoulder;elbow/forearm;wrist;hand  -TA       Row Name 10/31/24 0816          Balance    Balance Assessment sitting static balance  -TA     Static Sitting Balance supervision  -TA     Balance Interventions UE activity with balance activity;weight shifting activity;occupation based/functional task  -TA               User Key  (r) = Recorded By, (t) = Taken By, (c) = Cosigned By      Initials Name Provider Type    Marbin Elias OT Occupational Therapist                   Goals/Plan       Row Name 10/31/24 0816          Therapy Assessment/Plan (OT)    Planned Therapy Interventions (OT) activity tolerance training;BADL retraining;functional balance retraining;occupation/activity based interventions;patient/caregiver education/training;ROM/therapeutic exercise;strengthening exercise;transfer/mobility retraining  -TA               User Key  (r) = Recorded By, (t) = Taken By, (c) = Cosigned By      Initials Name Provider Type    Marbin Elias OT Occupational Therapist                   Clinical Impression       Row Name 10/31/24 0816          Pain Assessment    Pain Side/Orientation generalized  -TA     Pain Management Interventions exercise or physical activity utilized;premedicated for activity  -TA     Response to Pain Interventions activity participation with tolerable pain  -TA     Additional Documentation Pain Scale: FACES Pre/Post-Treatment (Group)  -TA       Row Name  10/31/24 0816          Pain Scale: FACES Pre/Post-Treatment    Pain: FACES Scale, Pretreatment 2-->hurts little bit  -TA     Posttreatment Pain Rating 2-->hurts little bit  -TA       Row Name 10/31/24 0816          Plan of Care Review    Plan of Care Reviewed With patient  -TA     Progress no change  -TA     Outcome Evaluation VSS; Pt continues to exhibit lethargy and difficulty maintaining eyes open; able to follow 40% 1 step commands. Pt required Mod A/support at R elbow to reach face to wash face and consistent VCs to advance through activity. Pt completed AAROM of BUE strengthening ex's, 10 reps all ex's. Pt remains limited by LOC, presentation of s/s of delerium. Pt remains below fxl baseline and will continue to benefit from skilled OT services to address deficits, facilitate increased fxl I. Recommend IRF at discharge.  -TA       Row Name 10/31/24 0816          Therapy Assessment/Plan (OT)    Patient/Family Therapy Goal Statement (OT) none stated  -TA     Rehab Potential (OT) good  -TA     Criteria for Skilled Therapeutic Interventions Met (OT) yes;skilled treatment is necessary  -TA     Therapy Frequency (OT) daily  -TA     Predicted Duration of Therapy Intervention (OT) 1 week  -TA       Row Name 10/31/24 0816          Therapy Plan Review/Discharge Plan (OT)    Anticipated Discharge Disposition (OT) inpatient rehabilitation facility  -TA       Row Name 10/31/24 0816          Vital Signs    Pre Systolic BP Rehab --  V[facility];RN present and cleared pt for tx  -TA     O2 Delivery Pre Treatment supplemental O2  -TA     O2 Delivery Intra Treatment supplemental O2  -TA     O2 Delivery Post Treatment supplemental O2  -TA     Pre Patient Position Sitting  -TA     Intra Patient Position Sitting  -TA     Post Patient Position Sitting  -TA       Row Name 10/31/24 0816          Positioning and Restraints    Pre-Treatment Position sitting in chair/recliner  -TA     Post Treatment Position chair  -TA     In Chair notified  nsg;reclined;call light within reach;encouraged to call for assist;exit alarm on;waffle cushion;legs elevated;with nsg  all lines intact  -TA               User Key  (r) = Recorded By, (t) = Taken By, (c) = Cosigned By      Initials Name Provider Type    Marbin Elias OT Occupational Therapist                   Outcome Measures       Row Name 10/31/24 0816          How much help from another is currently needed...    Putting on and taking off regular lower body clothing? 1  -TA     Bathing (including washing, rinsing, and drying) 1  -TA     Toileting (which includes using toilet bed pan or urinal) 1  -TA     Putting on and taking off regular upper body clothing 1  -TA     Taking care of personal grooming (such as brushing teeth) 2  -TA     Eating meals 2  -TA     AM-PAC 6 Clicks Score (OT) 8  -TA       Row Name 10/31/24 0816          Functional Assessment    Outcome Measure Options AM-PAC 6 Clicks Daily Activity (OT)  -TA               User Key  (r) = Recorded By, (t) = Taken By, (c) = Cosigned By      Initials Name Provider Type    Marbin Elias OT Occupational Therapist                    Occupational Therapy Education       Title: PT OT SLP Therapies (In Progress)       Topic: Occupational Therapy (In Progress)       Point: ADL training (Not Started)       Description:   Instruct learner(s) on proper safety adaptation and remediation techniques during self care or transfers.   Instruct in proper use of assistive devices.                  Learner Progress:  Not documented in this visit.              Point: Home exercise program (Not Started)       Description:   Instruct learner(s) on appropriate technique for monitoring, assisting and/or progressing therapeutic exercises/activities.                  Learner Progress:  Not documented in this visit.              Point: Precautions (In Progress)       Description:   Instruct learner(s) on prescribed precautions during self-care and functional  transfers.                  Learning Progress Summary            Patient Acceptance, E,D, NR by TA at 10/30/2024 0905                      Point: Body mechanics (In Progress)       Description:   Instruct learner(s) on proper positioning and spine alignment during self-care, functional mobility activities and/or exercises.                  Learning Progress Summary            Patient Acceptance, E,D, NR by TA at 10/30/2024 0905                                      User Key       Initials Effective Dates Name Provider Type Discipline    YESY 06/16/21 -  Marbin Benjamin, OT Occupational Therapist OT                  OT Recommendation and Plan  Planned Therapy Interventions (OT): activity tolerance training, BADL retraining, functional balance retraining, occupation/activity based interventions, patient/caregiver education/training, ROM/therapeutic exercise, strengthening exercise, transfer/mobility retraining  Therapy Frequency (OT): daily  Plan of Care Review  Plan of Care Reviewed With: patient  Progress: no change  Outcome Evaluation: VSS; Pt continues to exhibit lethargy and difficulty maintaining eyes open; able to follow 40% 1 step commands. Pt required Mod A/support at R elbow to reach face to wash face and consistent VCs to advance through activity. Pt completed AAROM of BUE strengthening ex's, 10 reps all ex's. Pt remains limited by LOC, presentation of s/s of delerium. Pt remains below fxl baseline and will continue to benefit from skilled OT services to address deficits, facilitate increased fxl I. Recommend IRF at discharge.     Time Calculation:   Evaluation Complexity (OT)  Review Occupational Profile/Medical/Therapy History Complexity: expanded/moderate complexity  Assessment, Occupational Performance/Identification of Deficit Complexity: 3-5 performance deficits  Clinical Decision Making Complexity (OT): detailed assessment/moderate complexity  Overall Complexity of Evaluation (OT): moderate  complexity     Time Calculation- OT       Row Name 10/31/24 0816             Time Calculation- OT    OT Start Time 0816  ttc 17 minutes  -TA      Total Timed Code Minutes- OT 17 minute(s)  -TA      OT Received On 10/31/24  -TA      OT Goal Re-Cert Due Date 11/09/24  -TA         Timed Charges    57958 - OT Therapeutic Exercise Minutes 11  -TA      32627 - OT Self Care/Mgmt Minutes 6  -TA         Total Minutes    Timed Charges Total Minutes 17  -TA       Total Minutes 17  -TA                User Key  (r) = Recorded By, (t) = Taken By, (c) = Cosigned By      Initials Name Provider Type    TA Marbin Benjamin, OT Occupational Therapist                  Therapy Charges for Today       Code Description Service Date Service Provider Modifiers Qty    04971976185 HC OT EVAL MOD COMPLEXITY 4 10/30/2024 Marbin Benjamin OT GO 1    [id number]  OT THER PROC EA 15 MIN 10/31/2024 Marbin Benjamin OT GO 1                 Marbin Benjamin OT  10/31/2024    Electronically signed by Marbin Benjamin OT at 10/31/24 0909

## 2024-11-01 NOTE — NURSING NOTE
Phase II referral is received. At the time of consult, in patient's room, he is busy with nursing staff. We will follow up.

## 2024-11-01 NOTE — PROGRESS NOTES
Cardiology Progress Note      Reason for visit:    Coronary artery disease status post CABG  Paroxysmal atrial fibrillaton    IDENTIFICATION: 59-year-old gentleman who resides in [facility] Problems    Diagnosis  POA    **CAD s/p CABG x 4, MAZE, LAAL 10/28/24 [I25.10]  Yes     Priority: High     Abnormal CTA (8/20/2024):Moderate disease in the LAD and 75% stenosis in the circumflex and right coronary artery noted. Patient referred for left heart cath.   Cardiac cath (8/28/2024): Multivessel CAD.  Recommend CABG  Echo (10/17/2024): LVEF 61-65%.  No valvular abnormality  CABG/maze/left atrial pended to ligation (10/28/2024):  Intraoperative DARIAN (10/28/2024): LVEF 55%.  Obliteration of TORSTEN by clip.  LIMA to LAD.  SVG to OM1.  SVG to OM 4, SVG to OM 5       DM (diabetes mellitus), type 2 [E11.9]  Yes     Priority: High     hemoglobin A1c 12.6% August 2024, 11.2% October 2024       Primary hypertension [I10]  Yes     Priority: Medium    Paroxysmal atrial fibrillation [I48.0]  Yes     Priority: Medium     Echo (3/10/2023): EF 60-65%.  Grade 1 diastolic dysfunction.  Normal valvular morphology.  CHADsVasc 3  Previously on sotalol  Left atrial appendage clipping performed at time of CABG 10/28/2024      Hyperlipidemia LDL goal <70 [E78.5]  Yes     Priority: Low            Patient had intermittent atrial fibrillation yesterday with rates of 100-1 20.  He did not sustain this and converted to normal sinus rhythm.  He has not had any episodes since yesterday.  He is in normal sinus rhythm this morning.  He is sitting up in the chair without complaints.  He denies chest pain.  He is more awake, alert and oriented today.           Vital Sign Min/Max for last 24 hours  Temp  Min: 97.8 °F (36.6 °C)  Max: 98 °F (36.7 °C)   BP  Min: 98/61  Max: 149/96   Pulse  Min: 59  Max: 85   Resp  Min: 18  Max: 28   SpO2  Min: 89 %  Max: 95 %   Flow (L/min) (Oxygen Therapy)  Min: 2  Max: 2      Intake/Output Summary  "(Last 24 hours) at 11/1/2024 0823  Last data filed at 11/1/2024 0800  Gross per 24 hour   Intake 1020 ml   Output 1300 ml   Net -280 ml           Physical Exam  Constitutional:       General: He is awake.   Cardiovascular:      Rate and Rhythm: Normal rate and regular rhythm.      Heart sounds: No murmur heard.     Comments: Trace lower extremity  Pulmonary:      Effort: Pulmonary effort is normal.      Breath sounds: Decreased breath sounds present.   Skin:     General: Skin is warm and dry.   Neurological:      Mental Status: He is alert.   Psychiatric:         Behavior: Behavior is cooperative.         Tele: Normal sinus rhythm     Results Review (reviewed the patient's recent labs in the electronic medical record):      EKG (10/30/2024): Normal sinus rhythm     CXR (10/31/2024): Small right apical pneumothorax.  Stable borderline cardiomegaly.  Left atrial pended closure device in place        ECHO (10/20/2024): EF 61-65%.  No significant valvular abnormality       Results from last 7 days   Lab Units 11/01/24  0526 10/31/24  0343 10/30/24  0307 10/29/24  0636 10/29/24  0332 10/28/24  2254 10/28/24  1802 10/28/24  1339 10/25/24  1346   SODIUM mmol/L 137 148* 145  --  140  --  143 142 136   POTASSIUM mmol/L 3.3* 4.3 4.6  --  4.7   < > 4.3 3.2* 4.7   CHLORIDE mmol/L 99 112* 112*  --  108*  --  110* 109* 99   BUN mg/dL 42* 32* 20  --  12  --  11 11 10   CREATININE mg/dL 0.87 0.85 0.88  --  0.77  --  0.84 0.86 0.91   MAGNESIUM mg/dL  --   --   --  2.4 1.9  --  2.2 2.1 2.6*    < > = values in this interval not displayed.         Results from last 7 days   Lab Units 11/01/24  0526 10/31/24  0343 10/30/24  0307   WBC 10*3/mm3 9.05 9.97 14.30*   HEMOGLOBIN g/dL 11.0* 10.2* 10.6*   HEMATOCRIT % 33.8* 31.9* 33.1*   PLATELETS 10*3/mm3 193 135* 150       Lab Results   Component Value Date    HGBA1C 11.20 (H) 10/25/2024       No results found for: \"CHOL\", \"CHLPL\", \"TRIG\", \"HDL\", \"LDL\", \"LDLDIRECT\"           Coronary artery " disease  Status post CABG with Dr. Max 10/28  Aspirin 325 mg daily     Paroxysmal atrial fibrillaton  Historically on sotalol and Eliquis  Maze and left atrial appendage ligation performed at time of CABG 10/28.  Intraoperative DARIAN shows obliteration of TORSTEN by clip  Went into atrial fibrillation with RVR 10/30/2024  Started on home dose sotalol 120 mg twice daily on 10/30/2024  Patient had episodes of intermittent atrial fibrillation on 10/31/2024 but not sustaining fast rate     Type 2 diabetes mellitus  Historically uncontrolled with hemoglobin A1c 11 and 12  Jardiance 25 mg daily     Hypertension/hypotension  Current /84  Losartan 25 mg daily        Hyperlipidemia  Lipitor 40 mg daily                Give 40 mg IV Lasix x 1 today  We will defer NOAC due to left atrial appendage ligation performed at time of CABG  Continue aspirin, statin  Continue sotalol for rhythm management  Continue to work with physical therapy.  Okay for transfer to telemetry    Electronically signed by ZIA Poole, 11/01/24, 8:23 AM EDT.

## 2024-11-01 NOTE — PROGRESS NOTES
Pulmonary / Critical Care Progress Note      Patient Name: Joel Galo  : 1965  MRN: [id number]  Attending:  Alex Max MD   Date of admission: 10/28/2024    Joel Galo is a 59 y.o. male with medical history significant for paroxysmal A-fib, diabetes mellitus, poorly-controlled with hemoglobin A1c of 11.20, hypertension who recently presented with intermittent episodes of chest pain with associated shortness of breath.  Patient had a series of tests that culminated in a left heart cath on 24 that showed mid LAD 75% stenosis, distal LAD 95% stenosis, first marginal 90% stenosis, third marginal 99% stenosis, ostial RCA 70% stenosis, mid RCA 70% stenosis, and RV branch 99% stenosis. Left ventriculogram during cardiac cath noted EF 55%.  Today was taken in for four-vessel CABG, maze procedure as well as exclusion of atrial appendage.  Status post surgery patient remains intubated and sedated and brought to the ICU.  The intensivist was asked to evaluate the patient.  Patient seen and examined at bedside.  All the labs and diagnostic imaging studies were extensively reviewed by me and findings as above..  Review of patient's labs showed a urine toxicology screen that is positive for opiates.  Chest x-ray of 1025 showed bilateral opacity which could represent pulmonary edema versus pneumonia.  WBC count is within normal limits.  Patient is requiring minimal dose of Levophed and vasopressin     Subjective   Subjective   Patient seen and examined at bedside.  Overnight no acute event.  Patient is much more comfortable today.  I met him this morning having breakfast and feeding himself.  He has been ordered for transfer to telemetry floor.    Objective   Objective     Vitals:   Vital signs for last 24 hours:  Temp:  [97.8 °F (36.6 °C)-98 °F (36.7 °C)] 98 °F (36.7 °C)  Heart Rate:  [59-85] 76  Resp:  [18-28] 24  BP: ()/(61-96) 143/84    Intake/Output last 3 shifts:  I/O last 3 completed  shifts:  In: 970 [P.O.:540; I.V.:430]  Out: 2625 [Urine:2225; Emesis/NG output:300; Chest Tube:100]  Intake/Output this shift:  I/O this shift:  In: 480 [P.O.:480]  Out: -     Vent settings for last 24 hours:       Hemodynamic parameters for last 24 hours:       Physical Exam   Vital Signs Reviewed   General:  WDWN, Alert, NAD.    HEENT:  PERRL, EOMI.  OP, nares clear  Chest:  good aeration.  Skin incision looks clean.  Chest tubes in position  CV: RRR, no MGR, pulses 2+, equal.  Abd:  Soft, NT, ND, + BS, no HSM  EXT:  no clubbing, no cyanosis, no edema  Neuro:  A&Ox3, CN grossly intact, no focal deficits.  Skin: No rashes or lesions noted        Result Review    Result Review:  I have personally reviewed the results from the time of this admission to 11/1/2024 10:11 EDT and agree with these findings:  [x]  Laboratory  []  Microbiology  [x]  Radiology  []  EKG/Telemetry   []  Cardiology/Vascular   []  Pathology  []  Old records  []  Other:  Most notable findings include: Reviewed    Assessment & Plan   Assessment / Plan     Active Hospital Problems:  Active Hospital Problems    Diagnosis     **CAD s/p CABG x 4, LAURY CLINE 10/28/24     Primary hypertension     DM (diabetes mellitus), type 2     Hyperlipidemia LDL goal <70     Paroxysmal atrial fibrillation          Impression:  Coronary artery disease involving native coronary artery of native heart       CAD (coronary artery disease)     Coronary artery disease status post CABG  Acute hypoxic respiratory failure  Paroxysmal atrial fibrillation status post maze procedure  Diabetes mellitus:,Poorly controlled  Cardiogenic shock: Resolved  Hypertension  Obstructive sleep apnea  Dyslipidemia    Plan:    Titrate FiO2 to a sat goal of 95%  Off insulin drip.  Started on Lantus 25 units SQ daily   Q. hourly Accu-Check sliding scale insulin carb  Off vasopressor  On sotalol 120 mg p.o. every 12 hourly  Pain control with Demerol 25 mg Q4 as needed and fentanyl 25 mcg IV Q1  hourly as needed  Diet when okay by CT surgery  Out of bed to chair.  PT  DVT prophylaxis: Resume chemical prophylaxis when okay by CT surgery  Transfer to telemetry       VTE Prophylaxis:  Mechanical VTE prophylaxis orders are present.        CODE STATUS:   Code Status (Patient has no pulse and is not breathing): CPR (Attempt to Resuscitate)  Medical Interventions (Patient has pulse or is breathing): Full Support          The patient is critically ill in the ICU. Multidisciplinary bedside critical care rounds were performed with nursing staff, respiratory therapy, pharmacy, nutritional services, social work. I have personally reviewed the chart, labs and any pertinent imaging available.  I have spent 34 minutes of critical care time, excluding procedures, in the care of this patient.

## 2024-11-01 NOTE — THERAPY TREATMENT NOTE
"Patient Name: Joel Galo  : 1965    MRN: [id number]                              Today's Date: 2024       Admit Date: 10/28/2024    Visit Dx:     ICD-10-CM ICD-9-CM   1. ASCVD (arteriosclerotic cardiovascular disease)  I25.10 429.2     440.9     Patient Active Problem List   Diagnosis    Paroxysmal atrial fibrillation    Obstructive sleep apnea    Hypothyroidism (acquired)    GERD (gastroesophageal reflux disease)    Chronic anticoagulation    DM (diabetes mellitus), type 2    Hyperlipidemia LDL goal <70    Primary hypertension    CAD s/p CABG x 4, MAZE, LAAL 10/28/24     Past Medical History:   Diagnosis Date    A-fib     Anxiety     Arthritis     DDD (degenerative disc disease), lumbar     Deep vein thrombosis     Depression     Diabetes     Disease of thyroid gland     HYPO    Heart attack     \"5-6 years ago\"    Hyperlipidemia     Hypertension     Kidney stone     Neuropathy     Sleep apnea     DOES NOT USE CPAP    UTI (urinary tract infection)      Past Surgical History:   Procedure Laterality Date    ATRIAL APPENDAGE EXCLUSION LEFT WITH TRANSESOPHAGEAL ECHOCARDIOGRAM N/A 10/28/2024    Procedure: ATRIAL APPENDAGE EXCLUSION LEFT WITH TRANSESOPHAGEAL ECHOCARDIOGRAM;  Surgeon: Alex Max MD;  Location: [facility] OR;  Service: Cardiothoracic;  Laterality: N/A;    CARDIAC CATHETERIZATION N/A 2024    Procedure: Left Heart Cath;  Surgeon: Julio Neff MD;  [facility]:  [facility];  Service: Cardiology;  Laterality: N/A;    CARDIAC ELECTROPHYSIOLOGY PROCEDURE N/A 2023    Procedure: Loop insertion;  Surgeon: Tariq Chávez MD;  Location:  [facility];  Service: Cardiovascular;  Laterality: N/A;    CATARACT EXTRACTION Bilateral     COLONOSCOPY      CORONARY ARTERY BYPASS GRAFT N/A 10/28/2024    Procedure: MEDIAN STERNOTOMY, CORONARY ARTERY BYPASS GRAFTING X4 UTILIZING THE LEFT INTERNAL MAMMARY ARTERY GRAFT, ENDOSCOPIC VEIN HARVEST OF THE GREATER RIGHT " SAPHENOUS VEIN;  Surgeon: Alex Max MD;  Location:  [facility];  Service: Cardiothoracic;  Laterality: N/A;    ENDOSCOPY      MAZE PROCEDURE N/A 10/28/2024    Procedure: MAZE PROCEDURE;  Surgeon: Alex Max MD;  Location:  [facility];  Service: Cardiothoracic;  Laterality: N/A;    TEETH EXTRACTION        General Information       Row Name 11/01/24 1313          Physical Therapy Time and Intention    Document Type therapy note (daily note)  -HARISH     Mode of Treatment physical therapy  -HARISH       Row Name 11/01/24 1313          General Information    Patient Profile Reviewed yes  -HARISH     Prior Level of Function independent:;bed mobility;ADL's;gait;transfer  -HARISH     Existing Precautions/Restrictions cardiac;fall;sternal  -HARISH     Barriers to Rehab medically complex  -HARISH       Row Name 11/01/24 1313          Living Environment    People in Home spouse  -       Row Name 11/01/24 1313          Home Main Entrance    Number of Stairs, Main Entrance none  -HARISH       Row Name 11/01/24 1313          Cognition    Orientation Status (Cognition) oriented to;person;place;situation;verbal cues/prompts needed for orientation;time  -HARISH       Row Name 11/01/24 1313          Safety Issues/Impairments Affecting Functional Mobility    Safety Issues Affecting Function (Mobility) safety precautions follow-through/compliance;safety precaution awareness  -HARISH     Impairments Affecting Function (Mobility) balance;endurance/activity tolerance;shortness of breath;strength;pain  -HARISH               User Key  (r) = Recorded By, (t) = Taken By, (c) = Cosigned By      Initials Name Provider Type    Suzi Higgins PT Physical Therapist                   Mobility       Row Name 11/01/24 1314          Bed Mobility    Comment, (Bed Mobility) patient is OOB and rdturns to the chair  -HARISH       Row Name 11/01/24 1314          Transfers    Comment, (Transfers) patient transfers on and off commode needs cues for sternal precautions  -HARISH       Row  Name 11/01/24 1314          Bed-Chair Transfer    Bed-Chair Pender (Transfers) minimum assist (75% patient effort);2 person assist  -HARISH     Assistive Device (Bed-Chair Transfers) walker, front-wheeled  -HARISH       Row Name 11/01/24 1314          Sit-Stand Transfer    Sit-Stand Pender (Transfers) minimum assist (75% patient effort);2 person assist  -HARISH     Assistive Device (Sit-Stand Transfers) walker, front-wheeled  -HARISH       Row Name 11/01/24 1314          Gait/Stairs (Locomotion)    Pender Level (Gait) moderate assist (50% patient effort);2 person assist  -HARISH     Assistive Device (Gait) walker, front-wheeled  -HARISH     Distance in Feet (Gait) 70  -HARISH     Deviations/Abnormal Patterns (Gait) base of support, wide;festinating/shuffling;stride length decreased;lesley decreased  -HARISH     Bilateral Gait Deviations forward flexed posture;heel strike decreased  -HARISH     Comment, (Gait/Stairs) step to gait pattern progressing to step through with verbal cues for increased step length patient tends to increase trunk flexion with fatigue needs encouragement to increase activity  -HARISH               User Key  (r) = Recorded By, (t) = Taken By, (c) = Cosigned By      Initials Name Provider Type    HARISH Suzi Don PT Physical Therapist                   Obj/Interventions       Row Name 11/01/24 1317          Range of Motion Comprehensive    General Range of Motion no range of motion deficits identified  -HARISH       Row Name 11/01/24 1317          Balance    Balance Assessment sitting static balance;sitting dynamic balance;standing static balance;standing dynamic balance  -HARISH     Static Sitting Balance contact guard  -HARISH     Dynamic Sitting Balance contact guard  -HARISH     Position, Sitting Balance unsupported;sitting in chair  -HARISH     Static Standing Balance minimal assist  -HARISH     Dynamic Standing Balance minimal assist  -HARISH     Position/Device Used, Standing Balance supported;walker, front-wheeled  -HARISH                User Key  (r) = Recorded By, (t) = Taken By, (c) = Cosigned By      Initials Name Provider Type    Suzi Higgins PT Physical Therapist                   Goals/Plan       Row Name 11/01/24 1321          Therapy Assessment/Plan (PT)    Planned Therapy Interventions (PT) balance training;gait training;home exercise program;bed mobility training;transfer training;strengthening  -HARISH               User Key  (r) = Recorded By, (t) = Taken By, (c) = Cosigned By      Initials Name Provider Type    Suzi Higgins PT Physical Therapist                   Clinical Impression       Row Name 11/01/24 1318          Pain    Pretreatment Pain Rating 4/10  -HARISH     Posttreatment Pain Rating 6/10  -HARISH     Pain Location chest  -HARISH     Pain Management Interventions activity modification encouraged;exercise or physical activity utilized;nursing notified;premedicated for activity  -HARISH     Response to Pain Interventions activity level improved;mobility function improved;activity participation with tolerable pain  -HARISH       Row Name 11/01/24 1318          Plan of Care Review    Plan of Care Reviewed With patient  -HARISH     Progress improving  -HARISH     Outcome Evaluation patient was able to increase ambulation to 70 ft with min assist of 2. patiet needs incouragement to increase activity he requires less assist for all activity today patient has stable vitals with activity anticipate patient to need SNF placement at D/C  -       Row Name 11/01/24 1318          Therapy Assessment/Plan (PT)    Patient/Family Therapy Goals Statement (PT) feel better  -HARISH     Rehab Potential (PT) fair  -HARISH     Criteria for Skilled Interventions Met (PT) yes;skilled treatment is necessary  -HARISH     Therapy Frequency (PT) daily  -HARISH       Row Name 11/01/24 1318          Vital Signs    Pre Systolic BP Rehab 147  -HARISH     Pre Treatment Diastolic BP 84  -HARISH     Post Systolic BP Rehab 110  -HARISH     Post Treatment Diastolic BP 72  -HARISH     Pretreatment  Heart Rate (beats/min) 73  -HARISH     Posttreatment Heart Rate (beats/min) 84  -HARISH     Pre SpO2 (%) 95  -HARISH     O2 Delivery Pre Treatment nasal cannula  -HARISH     Post SpO2 (%) 97  -HARISH     O2 Delivery Post Treatment nasal cannula  -HARISH     Pre Patient Position Sitting  -HARISH     Intra Patient Position Standing  -HARISH     Post Patient Position Sitting  -HARISH       Row Name 11/01/24 1318          Positioning and Restraints    Pre-Treatment Position sitting in chair/recliner  -HARISH     Post Treatment Position bsc  -HARISH     On BS commode notified nsg;sitting;call light within reach;encouraged to call for assist;with other staff  -HARISH               User Key  (r) = Recorded By, (t) = Taken By, (c) = Cosigned By      Initials Name Provider Type    Suzi Higgins, PT Physical Therapist                   Outcome Measures       Row Name 11/01/24 1322 11/01/24 1030       How much help from another person do you currently need...    Turning from your back to your side while in flat bed without using bedrails? 3  -HARISH 3  -MM    Moving from lying on back to sitting on the side of a flat bed without bedrails? 3  -HARISH 3  -MM    Moving to and from a bed to a chair (including a wheelchair)? 3  -HARISH 3  -MM    Standing up from a chair using your arms (e.g., wheelchair, bedside chair)? 3  -HARISH 2  -MM    Climbing 3-5 steps with a railing? 2  -HARISH 2  -MM    To walk in hospital room? 3  -HARISH 3  -MM    AM-PAC 6 Clicks Score (PT) 17  -HARISH 16  -MM    Highest Level of Mobility Goal 5 --> Static standing  -HARISH 5 --> Static standing  -MM      Row Name 11/01/24 0800          How much help from another person do you currently need...    Turning from your back to your side while in flat bed without using bedrails? 2  -SW     Moving from lying on back to sitting on the side of a flat bed without bedrails? 2  -SW     Moving to and from a bed to a chair (including a wheelchair)? 2  -SW     Standing up from a chair using your arms (e.g., wheelchair, bedside chair)? 2   -SW     Climbing 3-5 steps with a railing? 2  -SW     To walk in hospital room? 2  -SW     AM-PAC 6 Clicks Score (PT) 12  -SW     Highest Level of Mobility Goal 4 --> Transfer to chair/commode  -SW               User Key  (r) = Recorded By, (t) = Taken By, (c) = Cosigned By      Initials Name Provider Type    Suzi Higgins, PT Physical Therapist    SW Ming Nogueira, RN Registered Nurse    Richelle Romero RN Registered Nurse                                 Physical Therapy Education       Title: PT OT SLP Therapies (In Progress)       Topic: Physical Therapy (In Progress)       Point: Mobility training (In Progress)       Learning Progress Summary            Patient Acceptance, E, NR by HARISH at 11/1/2024 0850    Acceptance, E, NR by KG at 10/31/2024 0959    Acceptance, E, NR by ND at 10/30/2024 1124    Acceptance, E, NR by ND at 10/29/2024 1024                      Point: Home exercise program (In Progress)       Learning Progress Summary            Patient Acceptance, E, NR by AHRISH at 11/1/2024 0850    Acceptance, E, NR by KG at 10/31/2024 0959                      Point: Body mechanics (In Progress)       Learning Progress Summary            Patient Acceptance, E, NR by HARISH at 11/1/2024 0850    Acceptance, E, NR by KG at 10/31/2024 0959    Acceptance, E, NR by ND at 10/30/2024 1124    Acceptance, E, NR by ND at 10/29/2024 1024                      Point: Precautions (In Progress)       Learning Progress Summary            Patient Acceptance, E, NR by HARISH at 11/1/2024 0850    Acceptance, E, NR by KG at 10/31/2024 0959    Acceptance, E, NR by ND at 10/30/2024 1124    Acceptance, E, NR by ND at 10/29/2024 1024                                      User Key       Initials Effective Dates Name Provider Type Discipline    HARISH 02/03/23 -  Suzi Don, PT Physical Therapist PT    KG 05/22/20 -  Lois Santos, PT Physical Therapist PT    ND 11/16/23 -  Cristina Varela, PT Physical Therapist PT                   PT Recommendation and Plan  Planned Therapy Interventions (PT): balance training, gait training, home exercise program, bed mobility training, transfer training, strengthening  Progress: improving  Outcome Evaluation: patient was able to increase ambulation to 70 ft with min assist of 2. patiet needs incouragement to increase activity he requires less assist for all activity today patient has stable vitals with activity anticipate patient to need SNF placement at D/C     Time Calculation:         PT Charges       Row Name 11/01/24 1323             Time Calculation    Start Time 0850  -HARISH      PT Received On 11/01/24  -HARISH      PT Goal Re-Cert Due Date 11/08/24  -HARISH         Time Calculation- PT    Total Timed Code Minutes- PT 23 minute(s)  -HARISH         Timed Charges    06737 - PT Therapeutic Activity Minutes 23  -HARISH         Total Minutes    Timed Charges Total Minutes 23  -HARISH       Total Minutes 23  -HARISH                User Key  (r) = Recorded By, (t) = Taken By, (c) = Cosigned By      Initials Name Provider Type    Suzi Higgins, PT Physical Therapist                  Therapy Charges for Today       Code Description Service Date Service Provider Modifiers Qty    [id number]  PT THERAPEUTIC ACT EA 15 MIN 11/1/2024 Suzi Don PT GP 2            PT G-Codes  Outcome Measure Options: AM-PAC 6 Clicks Basic Mobility (PT)  AM-PAC 6 Clicks Score (PT): 17  AM-PAC 6 Clicks Score (OT): 8  PT Discharge Summary  Anticipated Discharge Disposition (PT): skilled nursing facility    Suzi Don, MAEGAN  11/1/2024

## 2024-11-01 NOTE — PLAN OF CARE
Goal Outcome Evaluation:  Plan of Care Reviewed With: patient        Progress: improving  Outcome Evaluation: patient was able to increase ambulation to 70 ft with min assist of 2. patiet needs incouragement to increase activity he requires less assist for all activity today patient has stable vitals with activity anticipate patient to need SNF placement at D/C    Anticipated Discharge Disposition (PT): skilled nursing facility

## 2024-11-02 ENCOUNTER — APPOINTMENT (OUTPATIENT)
Dept: GENERAL RADIOLOGY | Facility: HOSPITAL | Age: 59
End: 2024-11-02
Payer: MEDICARE

## 2024-11-02 LAB
ANION GAP SERPL CALCULATED.3IONS-SCNC: 16 MMOL/L (ref 5–15)
BUN SERPL-MCNC: 40 MG/DL (ref 8–23)
BUN/CREAT SERPL: 48.2 (ref 7–25)
CALCIUM SPEC-SCNC: 8.2 MG/DL (ref 8.6–10.5)
CHLORIDE SERPL-SCNC: 100 MMOL/L (ref 98–107)
CO2 SERPL-SCNC: 18 MMOL/L (ref 22–29)
CREAT SERPL-MCNC: 0.83 MG/DL (ref 0.76–1.27)
DEPRECATED RDW RBC AUTO: 46 FL (ref 37–54)
EGFRCR SERPLBLD CKD-EPI 2021: 99.6 ML/MIN/1.73
ERYTHROCYTE [DISTWIDTH] IN BLOOD BY AUTOMATED COUNT: 13.4 % (ref 12.3–15.4)
GLUCOSE BLDC GLUCOMTR-MCNC: 126 MG/DL (ref 70–130)
GLUCOSE BLDC GLUCOMTR-MCNC: 185 MG/DL (ref 70–130)
GLUCOSE BLDC GLUCOMTR-MCNC: 194 MG/DL (ref 70–130)
GLUCOSE BLDC GLUCOMTR-MCNC: 234 MG/DL (ref 70–130)
GLUCOSE SERPL-MCNC: 112 MG/DL (ref 65–99)
HCT VFR BLD AUTO: 31.8 % (ref 37.5–51)
HGB BLD-MCNC: 10.2 G/DL (ref 13–17.7)
MCH RBC QN AUTO: 30.3 PG (ref 26.6–33)
MCHC RBC AUTO-ENTMCNC: 32.1 G/DL (ref 31.5–35.7)
MCV RBC AUTO: 94.4 FL (ref 79–97)
PLATELET # BLD AUTO: 175 10*3/MM3 (ref 140–450)
PMV BLD AUTO: 9.9 FL (ref 6–12)
POTASSIUM SERPL-SCNC: 3.5 MMOL/L (ref 3.5–5.2)
POTASSIUM SERPL-SCNC: 3.8 MMOL/L (ref 3.5–5.2)
RBC # BLD AUTO: 3.37 10*6/MM3 (ref 4.14–5.8)
SODIUM SERPL-SCNC: 134 MMOL/L (ref 136–145)
WBC NRBC COR # BLD AUTO: 7.21 10*3/MM3 (ref 3.4–10.8)

## 2024-11-02 PROCEDURE — 71045 X-RAY EXAM CHEST 1 VIEW: CPT

## 2024-11-02 PROCEDURE — 84132 ASSAY OF SERUM POTASSIUM: CPT | Performed by: THORACIC SURGERY (CARDIOTHORACIC VASCULAR SURGERY)

## 2024-11-02 PROCEDURE — 99232 SBSQ HOSP IP/OBS MODERATE 35: CPT | Performed by: INTERNAL MEDICINE

## 2024-11-02 PROCEDURE — 82948 REAGENT STRIP/BLOOD GLUCOSE: CPT

## 2024-11-02 PROCEDURE — 25010000002 HEPARIN (PORCINE) PER 1000 UNITS: Performed by: PHYSICIAN ASSISTANT

## 2024-11-02 PROCEDURE — 63710000001 INSULIN GLARGINE PER 5 UNITS

## 2024-11-02 PROCEDURE — 80048 BASIC METABOLIC PNL TOTAL CA: CPT

## 2024-11-02 PROCEDURE — 85027 COMPLETE CBC AUTOMATED: CPT

## 2024-11-02 PROCEDURE — 99222 1ST HOSP IP/OBS MODERATE 55: CPT | Performed by: UROLOGY

## 2024-11-02 PROCEDURE — 63710000001 INSULIN LISPRO (HUMAN) PER 5 UNITS: Performed by: PHYSICIAN ASSISTANT

## 2024-11-02 RX ORDER — POTASSIUM CHLORIDE 1500 MG/1
40 TABLET, EXTENDED RELEASE ORAL EVERY 4 HOURS
Status: COMPLETED | OUTPATIENT
Start: 2024-11-02 | End: 2024-11-02

## 2024-11-02 RX ORDER — POTASSIUM CHLORIDE 1500 MG/1
20 TABLET, EXTENDED RELEASE ORAL ONCE
Status: COMPLETED | OUTPATIENT
Start: 2024-11-02 | End: 2024-11-02

## 2024-11-02 RX ADMIN — ACETAMINOPHEN 650 MG: 325 TABLET ORAL at 15:21

## 2024-11-02 RX ADMIN — SENNOSIDES AND DOCUSATE SODIUM 2 TABLET: 50; 8.6 TABLET ORAL at 10:41

## 2024-11-02 RX ADMIN — ASPIRIN 325 MG: 325 TABLET, COATED ORAL at 10:38

## 2024-11-02 RX ADMIN — BUPROPION HYDROCHLORIDE 150 MG: 150 TABLET, EXTENDED RELEASE ORAL at 10:37

## 2024-11-02 RX ADMIN — POTASSIUM CHLORIDE 20 MEQ: 1500 TABLET, EXTENDED RELEASE ORAL at 05:58

## 2024-11-02 RX ADMIN — INSULIN LISPRO 3 UNITS: 100 INJECTION, SOLUTION INTRAVENOUS; SUBCUTANEOUS at 21:31

## 2024-11-02 RX ADMIN — HEPARIN SODIUM 5000 UNITS: 5000 INJECTION INTRAVENOUS; SUBCUTANEOUS at 21:31

## 2024-11-02 RX ADMIN — EMPAGLIFLOZIN 25 MG: 25 TABLET, FILM COATED ORAL at 10:37

## 2024-11-02 RX ADMIN — LEVOTHYROXINE SODIUM 50 MCG: 0.05 TABLET ORAL at 05:58

## 2024-11-02 RX ADMIN — MUPIROCIN 1 APPLICATION: 20 OINTMENT TOPICAL at 10:39

## 2024-11-02 RX ADMIN — HYDROCODONE BITARTRATE AND ACETAMINOPHEN 1 TABLET: 7.5; 325 TABLET ORAL at 11:08

## 2024-11-02 RX ADMIN — HEPARIN SODIUM 5000 UNITS: 5000 INJECTION INTRAVENOUS; SUBCUTANEOUS at 05:58

## 2024-11-02 RX ADMIN — DULOXETINE HYDROCHLORIDE 60 MG: 60 CAPSULE, DELAYED RELEASE ORAL at 10:36

## 2024-11-02 RX ADMIN — ATORVASTATIN CALCIUM 40 MG: 40 TABLET, FILM COATED ORAL at 21:31

## 2024-11-02 RX ADMIN — TAMSULOSIN HYDROCHLORIDE 0.4 MG: 0.4 CAPSULE ORAL at 10:36

## 2024-11-02 RX ADMIN — LOSARTAN POTASSIUM 25 MG: 25 TABLET, FILM COATED ORAL at 10:36

## 2024-11-02 RX ADMIN — ACETAMINOPHEN 650 MG: 325 TABLET ORAL at 05:58

## 2024-11-02 RX ADMIN — ACETAMINOPHEN 650 MG: 325 TABLET ORAL at 21:31

## 2024-11-02 RX ADMIN — POTASSIUM CHLORIDE 40 MEQ: 1500 TABLET, EXTENDED RELEASE ORAL at 18:05

## 2024-11-02 RX ADMIN — GABAPENTIN 100 MG: 100 CAPSULE ORAL at 05:59

## 2024-11-02 RX ADMIN — OXYCODONE HYDROCHLORIDE 10 MG: 10 TABLET ORAL at 15:25

## 2024-11-02 RX ADMIN — POTASSIUM CHLORIDE 40 MEQ: 1500 TABLET, EXTENDED RELEASE ORAL at 15:13

## 2024-11-02 RX ADMIN — HYDROCODONE BITARTRATE AND ACETAMINOPHEN 1 TABLET: 7.5; 325 TABLET ORAL at 03:18

## 2024-11-02 RX ADMIN — INSULIN GLARGINE 25 UNITS: 100 INJECTION, SOLUTION SUBCUTANEOUS at 10:39

## 2024-11-02 RX ADMIN — INSULIN LISPRO 5 UNITS: 100 INJECTION, SOLUTION INTRAVENOUS; SUBCUTANEOUS at 18:05

## 2024-11-02 RX ADMIN — SOTALOL HYDROCHLORIDE 120 MG: 120 TABLET ORAL at 22:48

## 2024-11-02 RX ADMIN — SOTALOL HYDROCHLORIDE 120 MG: 120 TABLET ORAL at 15:14

## 2024-11-02 RX ADMIN — HEPARIN SODIUM 5000 UNITS: 5000 INJECTION INTRAVENOUS; SUBCUTANEOUS at 15:20

## 2024-11-02 NOTE — CONSULTS
"[facility]   HISTORY AND PHYSICAL    Patient Name: Joel Galo  : 1965  MRN: [id number]  Primary Care Physician:  Edmundo Boston MD  Date of admission: 10/28/2024    Subjective   Subjective     Chief Complaint: Urinary retention    HPI:    Joel Galo is a 59 y.o. male currently postoperative day 5 from four-vessel CABG, maze procedure with cardiothoracic surgery.  Patient has had postoperative urinary retention requiring In-N-Out catheterization.  He has been initiated on Flomax by primary team.  Patient required catheterization this morning with greater than 1200 mL of fluid drained from the urinary bladder.    Interview with patient's prior urologic history he has seen and followed with urologist at [facility] for prior episodes of urinary retention.  He had 2X episodes of urinary retention after procedural intervention in 2019.  He has had subsequent medical therapy dating back to  with urology.    Patient reports both storage and voiding urinary symptoms at baseline prior to presentation for surgical intervention with cardiothoracic surgery.  Denies gross hematuria.  He reports prior history of UTI.    Review of Systems   The following systems were reviewed and negative;  constitution, eyes, ENT, respiratory, cardiovascular, gastrointestinal, genitourinary, hematologic / lymphatic, musculoskeletal, neurological, and allergies / immunologic.    Personal History     Past Medical History:   Diagnosis Date    A-fib     Anxiety     Arthritis     DDD (degenerative disc disease), lumbar     Deep vein thrombosis     Depression     Diabetes     Disease of thyroid gland     HYPO    Heart attack     \"5-6 years ago\"    Hyperlipidemia     Hypertension     Kidney stone     Neuropathy     Sleep apnea     DOES NOT USE CPAP    UTI (urinary tract infection)        Past Surgical History:   Procedure Laterality Date    ATRIAL APPENDAGE EXCLUSION LEFT WITH TRANSESOPHAGEAL ECHOCARDIOGRAM N/A 10/28/2024    " Procedure: ATRIAL APPENDAGE EXCLUSION LEFT WITH TRANSESOPHAGEAL ECHOCARDIOGRAM;  Surgeon: Alex Max MD;  Location:  [facility];  Service: Cardiothoracic;  Laterality: N/A;    CARDIAC CATHETERIZATION N/A 08/28/2024    Procedure: Left Heart Cath;  Surgeon: Julio Neff MD;  [facility];  Service: Cardiology;  Laterality: N/A;    CARDIAC ELECTROPHYSIOLOGY PROCEDURE N/A 04/05/2023    Procedure: Loop insertion;  Surgeon: Tariq Chávez MD;  [facility]  [facility];  Service: Cardiovascular;  Laterality: N/A;    CATARACT EXTRACTION Bilateral     COLONOSCOPY      CORONARY ARTERY BYPASS GRAFT N/A 10/28/2024    Procedure: MEDIAN STERNOTOMY, CORONARY ARTERY BYPASS GRAFTING X4 UTILIZING THE LEFT INTERNAL MAMMARY ARTERY GRAFT, ENDOSCOPIC VEIN HARVEST OF THE GREATER RIGHT SAPHENOUS VEIN;  Surgeon: Alex Max MD;  Location:  [facility];  Service: Cardiothoracic;  Laterality: N/A;    ENDOSCOPY      MAZE PROCEDURE N/A 10/28/2024    Procedure: MAZE PROCEDURE;  Surgeon: Alex Max MD;  Location:  [facility];  Service: Cardiothoracic;  Laterality: N/A;    TEETH EXTRACTION         Family History: family history includes Coronary artery disease in his father; Diabetes in his father; Heart disease in his mother; Kidney disease in his father. Otherwise pertinent FHx was reviewed and not pertinent to current issue.    Social History:  reports that he has never smoked. He has never used smokeless tobacco. He reports that he does not drink alcohol and does not use drugs.    Home Medications:  DULoxetine, HYDROcodone-acetaminophen, Insulin NPH Isophane & Regular, Vitamin D (Cholecalciferol), amLODIPine, apixaban, aspirin, buPROPion XL, docusate sodium, empagliflozin, finasteride, gabapentin, glimepiride, isosorbide mononitrate, levothyroxine, losartan, nitroglycerin, polyethylene glycol, rosuvastatin, sotalol, and tamsulosin      Allergies:  No Known Allergies    Objective   Objective      Vitals:   Temp:  [97.5 °F (36.4 °C)-98.2 °F (36.8 °C)] 97.9 °F (36.6 °C)  Heart Rate:  [58-67] 67  Resp:  [18-20] 18  BP: ()/(53-84) 124/74  Flow (L/min) (Oxygen Therapy):  [1.5] 1.5  Physical Exam    Constitutional: Awake in bed, alert    Eyes: PERRLA, sclerae anicteric, no conjunctival injection   HENT: Normocephalic, atraumatic, mucous membranes moist   Neck: Supple, trachea midline   Respiratory:Equal chest rise, non-labored respirations    Cardiovascular: Perfused, no edema   Gastrointestinal: Soft, nontender, non-distended   Musculoskeletal: No bilateral ankle edema, no clubbing or cyanosis to extremities   Genitourinary: Fully catheter in place with clear yellow urine   Psychiatric: Appropriate affect, cooperative   Neurologic: Oriented x 3, Cranial Nerves grossly intact, speech clear   Skin: No rashes     Result Review    Result Review:  I have personally reviewed the results from the time of this admission to 11/2/2024 12:07 EDT and agree with these findings:  [x]  Laboratory  []  Microbiology  []  Radiology  []  EKG/Telemetry   []  Cardiology/Vascular   []  Pathology  [x]  Old records  [x]  Other:    Most notable findings include: Creatinine 0.83, hemoglobin 10.2, 31.8.    Assessment & Plan   Assessment / Plan     Brief Patient Summary:  Joel Galo is a 59 y.o. male who is currently postoperative day 5 from four-vessel CABG, maze procedure with cardiothoracic surgery.  Urology consulted for postoperative urinary retention.  In chart review and discussion with patient he has a prior history of postprocedural urinary retention, last episode in 2019.  He follows with urology in [place]. [place].  He has been having intermittent In-N-Out catheterization most recently this morning with 1200 mL of urine drained from the urinary bladder.  We recommend placement of Prado catheter to allow bladder decompression would recommend against continued In-N-Out catheterization.  Would recommend catheter in  place at this time and at discharge, maintain catheter in place 5 to 7 days, follow-up as an outpatient for voiding trial.  Continue with alpha-blocker therapy which has been initiated.  We discussed follow-up in [place] vs. [place], patient reports that it is difficult for him to travel to [place] and he would like to be followed with established urologist in [place] for voiding trial.  This should be coordinated at time of discharge.    Active Hospital Problems:  Active Hospital Problems    Diagnosis     **CAD s/p CABG x 4, MAZE, LAAL 10/28/24     Primary hypertension     DM (diabetes mellitus), type 2     Hyperlipidemia LDL goal <70     Paroxysmal atrial fibrillation          VTE Prophylaxis:  Pharmacologic & mechanical VTE prophylaxis orders are present.        CODE STATUS:    Code Status (Patient has no pulse and is not breathing): CPR (Attempt to Resuscitate)  Medical Interventions (Patient has pulse or is breathing): Full Support    Admission Status: Per primary team    Electronically signed by Fabricio Pepe MD, 11/02/24, 12:03 PM EDT.

## 2024-11-02 NOTE — PLAN OF CARE
Problem: Adult Inpatient Plan of Care  Goal: Absence of Hospital-Acquired Illness or Injury  Intervention: Identify and Manage Fall Risk  Recent Flowsheet Documentation  Taken 11/2/2024 0200 by Dilshad Victor RN  Safety Promotion/Fall Prevention:   safety round/check completed   assistive device/personal items within reach  Taken 11/1/2024 2000 by Dilshad Victor RN  Safety Promotion/Fall Prevention:   safety round/check completed   clutter free environment maintained   assistive device/personal items within reach   lighting adjusted  Intervention: Prevent Skin Injury  Recent Flowsheet Documentation  Taken 11/2/2024 0200 by Dilshad Victor RN  Body Position: position changed independently  Taken 11/1/2024 2000 by Dilshad Victor RN  Body Position:   tilted   left  Skin Protection:   transparent dressing maintained   incontinence pads utilized  Taken 11/1/2024 1942 by Dilshad Victor RN  Skin Protection:   transparent dressing maintained   incontinence pads utilized  Intervention: Prevent Infection  Recent Flowsheet Documentation  Taken 11/1/2024 2000 by Dilshad Victor RN  Infection Prevention:   hand hygiene promoted   single patient room provided  Taken 11/1/2024 1942 by Dilshad Victor RN  Infection Prevention:   hand hygiene promoted   rest/sleep promoted  Goal: Optimal Comfort and Wellbeing  Intervention: Monitor Pain and Promote Comfort  Recent Flowsheet Documentation  Taken 11/1/2024 2122 by Dilshad Victor RN  Pain Management Interventions:   pain medication given   care clustered   pillow support provided   position adjusted  Taken 11/1/2024 1942 by Dilshad Victor RN  Pain Management Interventions: pain medication given  Intervention: Provide Person-Centered Care  Recent Flowsheet Documentation  Taken 11/1/2024 1942 by Dilshad Victor RN  Trust Relationship/Rapport:   care explained   choices provided   emotional support provided   empathic listening provided   questions answered   questions encouraged    reassurance provided   thoughts/feelings acknowledged     Problem: Cardiovascular Surgery  Goal: Optimal Cerebral Tissue Perfusion  Intervention: Protect and Optimize Cerebral Perfusion  Recent Flowsheet Documentation  Taken 11/2/2024 0200 by Dilshad Victor RN  Head of Bed (HOB) Positioning: HOB elevated  Taken 11/1/2024 2000 by Dilshad Victor RN  Head of Bed (HOB) Positioning:   HOB lowered   HOB elevated  Goal: Absence of Infection Signs and Symptoms  Intervention: Prevent or Manage Infection  Recent Flowsheet Documentation  Taken 11/1/2024 2000 by Dilshad Victor RN  Infection Prevention:   hand hygiene promoted   single patient room provided  Taken 11/1/2024 1942 by Dilshad Victor RN  Infection Prevention:   hand hygiene promoted   rest/sleep promoted  Goal: Anesthesia/Sedation Recovery  Intervention: Optimize Anesthesia Recovery  Recent Flowsheet Documentation  Taken 11/2/2024 0200 by Dilshad Victor RN  Safety Promotion/Fall Prevention:   safety round/check completed   assistive device/personal items within reach  Taken 11/1/2024 2000 by Dilshad Victor RN  Safety Promotion/Fall Prevention:   safety round/check completed   clutter free environment maintained   assistive device/personal items within reach   lighting adjusted  Goal: Acceptable Pain Control  Intervention: Prevent or Manage Pain  Recent Flowsheet Documentation  Taken 11/1/2024 2122 by Dilshad Victor RN  Pain Management Interventions:   pain medication given   care clustered   pillow support provided   position adjusted  Taken 11/1/2024 1942 by Dilshad Victor RN  Pain Management Interventions: pain medication given  Goal: Effective Oxygenation and Ventilation  Intervention: Promote Airway Secretion Clearance  Recent Flowsheet Documentation  Taken 11/1/2024 1942 by Dilshad Victor RN  Cough And Deep Breathing: done with encouragement     Problem: Skin Injury Risk Increased  Goal: Skin Health and Integrity  Intervention: Optimize Skin Protection  Recent  Flowsheet Documentation  Taken 11/2/2024 0200 by Dilshad Victor RN  Head of Bed (HOB) Positioning: HOB elevated  Taken 11/1/2024 2000 by Dilshad Victor RN  Pressure Reduction Techniques:   heels elevated off bed   weight shift assistance provided  Head of Bed (HOB) Positioning:   HOB lowered   HOB elevated  Pressure Reduction Devices: pressure-redistributing mattress utilized  Skin Protection:   transparent dressing maintained   incontinence pads utilized  Taken 11/1/2024 1942 by Dilshad Victor RN  Pressure Reduction Techniques:   heels elevated off bed   frequent weight shift encouraged   weight shift assistance provided  Pressure Reduction Devices: pressure-redistributing mattress utilized  Skin Protection:   transparent dressing maintained   incontinence pads utilized     Problem: Mechanical Ventilation Invasive  Goal: Effective Communication  Intervention: Ensure Effective Communication  Recent Flowsheet Documentation  Taken 11/1/2024 1942 by Dilshad Victor RN  Trust Relationship/Rapport:   care explained   choices provided   emotional support provided   empathic listening provided   questions answered   questions encouraged   reassurance provided   thoughts/feelings acknowledged  Goal: Mechanical Ventilation Liberation  Intervention: Promote Extubation and Mechanical Ventilation Liberation  Recent Flowsheet Documentation  Taken 11/1/2024 1942 by Dilshad Victor RN  Medication Review/Management: medications reviewed  Goal: Absence of Device-Related Skin and Tissue Injury  Intervention: Maintain Skin and Tissue Health  Recent Flowsheet Documentation  Taken 11/1/2024 2000 by Dilshad Victor RN  Device Skin Pressure Protection: tubing/devices free from skin contact  Taken 11/1/2024 1942 by Dilshad Victor RN  Device Skin Pressure Protection:   tubing/devices free from skin contact   pressure points protected   absorbent pad utilized/changed  Goal: Absence of Ventilator-Induced Lung Injury  Intervention: Prevent  Ventilator-Associated Pneumonia  Recent Flowsheet Documentation  Taken 11/2/2024 0200 by Dilshad Victor RN  Head of Bed (HOB) Positioning: HOB elevated  Taken 11/1/2024 2000 by Dilshad Victor RN  Head of Bed (HOB) Positioning:   HOB lowered   HOB elevated     Problem: Fall Injury Risk  Goal: Absence of Fall and Fall-Related Injury  Intervention: Identify and Manage Contributors  Recent Flowsheet Documentation  Taken 11/1/2024 1942 by Dilshad Victor RN  Medication Review/Management: medications reviewed  Intervention: Promote Injury-Free Environment  Recent Flowsheet Documentation  Taken 11/2/2024 0200 by Dilshad Victor RN  Safety Promotion/Fall Prevention:   safety round/check completed   assistive device/personal items within reach  Taken 11/1/2024 2000 by Dilshad Victor RN  Safety Promotion/Fall Prevention:   safety round/check completed   clutter free environment maintained   assistive device/personal items within reach   lighting adjusted     Problem: Comorbidity Management  Goal: Maintenance of Asthma Control  Intervention: Maintain Asthma Symptom Control  Recent Flowsheet Documentation  Taken 11/1/2024 1942 by Dilshad Victor RN  Medication Review/Management: medications reviewed  Goal: Maintenance of Behavioral Health Symptom Control  Intervention: Maintain Behavioral Health Symptom Control  Recent Flowsheet Documentation  Taken 11/1/2024 1942 by Dilshad Victor RN  Medication Review/Management: medications reviewed  Goal: Maintenance of COPD Symptom Control  Intervention: Maintain COPD (Chronic Obstructive Pulmonary Disease) Symptom Control  Recent Flowsheet Documentation  Taken 11/1/2024 1942 by Dilshad Victor RN  Medication Review/Management: medications reviewed  Goal: Blood Glucose Level Within Target Range  Intervention: Monitor and Manage Glycemia  Recent Flowsheet Documentation  Taken 11/1/2024 1942 by Dilshad Victor RN  Medication Review/Management: medications reviewed  Goal: Maintenance of Heart  Failure Symptom Control  Intervention: Maintain Heart Failure Management  Recent Flowsheet Documentation  Taken 11/1/2024 1942 by Dilshad Victor RN  Medication Review/Management: medications reviewed  Goal: Blood Pressure in Desired Range  Intervention: Maintain Blood Pressure Management  Recent Flowsheet Documentation  Taken 11/1/2024 1942 by Dilshad Victor RN  Medication Review/Management: medications reviewed  Goal: Maintenance of Osteoarthritis Symptom Control  Intervention: Maintain Osteoarthritis Symptom Control  Recent Flowsheet Documentation  Taken 11/1/2024 1942 by Dilshad Victor RN  Medication Review/Management: medications reviewed  Goal: Bariatric Home Regimen Maintained  Intervention: Maintain and Manage Postbariatric Surgery Care  Recent Flowsheet Documentation  Taken 11/1/2024 1942 by Dilshad Victor RN  Medication Review/Management: medications reviewed  Goal: Maintenance of Seizure Control  Intervention: Maintain Seizure Symptom Control  Recent Flowsheet Documentation  Taken 11/1/2024 1942 by Dilshad Victor RN  Medication Review/Management: medications reviewed   Goal Outcome Evaluation:

## 2024-11-02 NOTE — PROGRESS NOTES
[facility] Cardiothoracic Surgery In-Patient Progress Note     LOS: 5 days     POD #5 s/p CABG x 4, MAZE, LAAL    Subjective  Hard for him to ambulate needs 1-2 assist    Objective  Vital Signs  Temp:  [97.5 °F (36.4 °C)-98.2 °F (36.8 °C)] 97.9 °F (36.6 °C)  Heart Rate:  [58-67] 62  Resp:  [18-20] 18  BP: ()/(53-84) 115/74    Physical Exam:   General Appearance: alert, appears stated age and cooperative   Lungs: clear to auscultation, respirations regular, respirations even, and respirations unlabored   Heart: regular rhythm & normal rate, normal S1, S2, no murmur, no gallop, no rub, and no click   Skin: Incision c/d/I   Sternum: Stable   Output by Drain (mL) 11/01/24 0701 - 11/01/24 1900 11/01/24 1901 - 11/02/24 0700 11/02/24 0701 - 11/02/24 0826 Range Total   Patient has no LDAs of requested type attached.        Results     Results from last 7 days   Lab Units 11/02/24  0721   WBC 10*3/mm3 7.21   HEMOGLOBIN g/dL 10.2*   HEMATOCRIT % 31.8*   PLATELETS 10*3/mm3 175     Results from last 7 days   Lab Units 11/02/24  0741 11/02/24  0352   SODIUM mmol/L  --  134*   POTASSIUM mmol/L 3.5 3.8   CHLORIDE mmol/L  --  100   CO2 mmol/L  --  18.0*   BUN mg/dL  --  40*   CREATININE mg/dL  --  0.83   GLUCOSE mg/dL  --  112*   CALCIUM mg/dL  --  8.2*     Imaging Results (Last 24 Hours)       Procedure Component Value Units Date/Time    XR Chest 1 View [[id number]] Resulted: 11/02/24 0237     Updated: 11/02/24 0430          Assessment  POD 5 s/p CABG x 4, MAZE, LAAL  Urinary retention requiring In-N-Out catheterization  Plan   Urology consult for urinary retention  Wean oxygen as tolerated  Ambulate physical therapy he still requires 1 or 2 assist to get up out of bed  We would like to get him ready to discharge  Pulmonary toilet  ASA, statin, BB      VÍCTOR MattsonC  11/02/24  08:26 EDT

## 2024-11-02 NOTE — PROGRESS NOTES
"[facility]  IP Progress Note      Chief Complaint: Follow-up of CAD/paroxysmal atrial fibrillation hypertension/dyslipidemia    Subjective   Resting in bed, reports a little soreness in his chest.  States that he has been walking without difficulty.  Off oxygen.  No shortness of breath.    Objective     Blood pressure 115/74, pulse 62, temperature 97.9 °F (36.6 °C), temperature source Oral, resp. rate 18, height 172.7 cm (67.99\"), weight 104 kg (230 lb), SpO2 93%.     Intake/Output Summary (Last 24 hours) at 11/2/2024 0809  Last data filed at 11/2/2024 0625  Gross per 24 hour   Intake 840 ml   Output 1850 ml   Net -1010 ml       Physical Exam:  General: No acute distress.   Neck: no JVD.  Chest:No respiratory distress, breath sounds are normal. No wheezes,  rhonchi or rales.  Cardiovascular: Normal S1 and S2, no murmur, gallop or rub.    Extremities: No significant edema.    Results Review:     I reviewed the patient's new clinical results.    Results from last 7 days   Lab Units 11/02/24  0721   WBC 10*3/mm3 7.21   HEMOGLOBIN g/dL 10.2*   HEMATOCRIT % 31.8*   PLATELETS 10*3/mm3 175     Results from last 7 days   Lab Units 11/02/24  0352 11/01/24  0526   SODIUM mmol/L 134* 137   POTASSIUM mmol/L 3.8 3.3*   CHLORIDE mmol/L 100 99   CO2 mmol/L 18.0* 22.0   BUN mg/dL 40* 42*   CREATININE mg/dL 0.83 0.87   CALCIUM mg/dL 8.2* 8.8   BILIRUBIN mg/dL  --  0.4   ALK PHOS U/L  --  126*   ALT (SGPT) U/L  --  44*   AST (SGOT) U/L  --  107*   GLUCOSE mg/dL 112* 124*     Results from last 7 days   Lab Units 11/02/24  0352   SODIUM mmol/L 134*   POTASSIUM mmol/L 3.8   CHLORIDE mmol/L 100   CO2 mmol/L 18.0*   BUN mg/dL 40*   CREATININE mg/dL 0.83   GLUCOSE mg/dL 112*   CALCIUM mg/dL 8.2*     Results from last 7 days   Lab Units 10/29/24  0332 10/28/24  1339   INR  1.29* 1.43*     acetaminophen, 650 mg, Oral, Q8H  aspirin, 325 mg, Oral, Daily  atorvastatin, 40 mg, Oral, Nightly  buPROPion XL, " 150 mg, Oral, Daily  DULoxetine, 60 mg, Oral, Daily  empagliflozin, 25 mg, Oral, Daily  gabapentin, 100 mg, Oral, Q8H  heparin (porcine), 5,000 Units, Subcutaneous, Q8H  insulin glargine, 25 Units, Subcutaneous, Daily  insulin lispro, 3-14 Units, Subcutaneous, 4x Daily AC & at Bedtime  levothyroxine, 50 mcg, Oral, Q AM  losartan, 25 mg, Oral, Q24H  mupirocin, 1 Application, Each Nare, BID  pharmacy consult - [place], , Does not apply, Daily  senna-docusate sodium, 2 tablet, Oral, BID  sotalol, 120 mg, Oral, Q12H  tamsulosin, 0.4 mg, Oral, Daily       Tele: Sinus Rythym      Assessment:  Multivessel coronary artery disease, normal preoperative EF.  Now status post CABG x 3 by Dr. Max October 28, 2024.  Paroxysmal atrial fibrillation, status post maze and left atrial appendage ligation along with CABG.  Had preoperative atrial fibrillation, now back in sinus rhythm on his home dose of sotalol  Hypertension, controlled  Dyslipidemia, on statin therapy  Type 2 diabetes.    Plan:  Continue current medical management including aspirin, statin, sotalol and losartan, QT interval is normal.  Increase activities as tolerated.  Home when all agree.    Damien Mosher MD, FACC, [facility]

## 2024-11-03 ENCOUNTER — APPOINTMENT (OUTPATIENT)
Dept: GENERAL RADIOLOGY | Facility: HOSPITAL | Age: 59
End: 2024-11-03
Payer: MEDICARE

## 2024-11-03 LAB
ANION GAP SERPL CALCULATED.3IONS-SCNC: 15 MMOL/L (ref 5–15)
BUN SERPL-MCNC: 29 MG/DL (ref 8–23)
BUN/CREAT SERPL: 51.8 (ref 7–25)
CALCIUM SPEC-SCNC: 8.1 MG/DL (ref 8.6–10.5)
CHLORIDE SERPL-SCNC: 103 MMOL/L (ref 98–107)
CO2 SERPL-SCNC: 20 MMOL/L (ref 22–29)
CREAT SERPL-MCNC: 0.56 MG/DL (ref 0.76–1.27)
DEPRECATED RDW RBC AUTO: 47 FL (ref 37–54)
EGFRCR SERPLBLD CKD-EPI 2021: 112.1 ML/MIN/1.73
ERYTHROCYTE [DISTWIDTH] IN BLOOD BY AUTOMATED COUNT: 13.3 % (ref 12.3–15.4)
GLUCOSE BLDC GLUCOMTR-MCNC: 100 MG/DL (ref 70–130)
GLUCOSE BLDC GLUCOMTR-MCNC: 164 MG/DL (ref 70–130)
GLUCOSE BLDC GLUCOMTR-MCNC: 187 MG/DL (ref 70–130)
GLUCOSE BLDC GLUCOMTR-MCNC: 208 MG/DL (ref 70–130)
GLUCOSE SERPL-MCNC: 190 MG/DL (ref 65–99)
HCT VFR BLD AUTO: 31.6 % (ref 37.5–51)
HGB BLD-MCNC: 9.7 G/DL (ref 13–17.7)
MCH RBC QN AUTO: 30.1 PG (ref 26.6–33)
MCHC RBC AUTO-ENTMCNC: 30.7 G/DL (ref 31.5–35.7)
MCV RBC AUTO: 98.1 FL (ref 79–97)
PLATELET # BLD AUTO: 207 10*3/MM3 (ref 140–450)
PMV BLD AUTO: 9.7 FL (ref 6–12)
POTASSIUM SERPL-SCNC: 4.5 MMOL/L (ref 3.5–5.2)
QT INTERVAL: 360 MS
QT INTERVAL: 446 MS
QTC INTERVAL: 477 MS
QTC INTERVAL: 484 MS
RBC # BLD AUTO: 3.22 10*6/MM3 (ref 4.14–5.8)
SODIUM SERPL-SCNC: 138 MMOL/L (ref 136–145)
WBC NRBC COR # BLD AUTO: 8.96 10*3/MM3 (ref 3.4–10.8)

## 2024-11-03 PROCEDURE — 63710000001 INSULIN GLARGINE PER 5 UNITS: Performed by: INTERNAL MEDICINE

## 2024-11-03 PROCEDURE — 63710000001 INSULIN LISPRO (HUMAN) PER 5 UNITS: Performed by: INTERNAL MEDICINE

## 2024-11-03 PROCEDURE — 25010000002 ONDANSETRON PER 1 MG: Performed by: PHYSICIAN ASSISTANT

## 2024-11-03 PROCEDURE — 80048 BASIC METABOLIC PNL TOTAL CA: CPT

## 2024-11-03 PROCEDURE — 99232 SBSQ HOSP IP/OBS MODERATE 35: CPT

## 2024-11-03 PROCEDURE — 80061 LIPID PANEL: CPT | Performed by: INTERNAL MEDICINE

## 2024-11-03 PROCEDURE — 85027 COMPLETE CBC AUTOMATED: CPT

## 2024-11-03 PROCEDURE — 99024 POSTOP FOLLOW-UP VISIT: CPT | Performed by: PHYSICIAN ASSISTANT

## 2024-11-03 PROCEDURE — 25010000002 HEPARIN (PORCINE) PER 1000 UNITS: Performed by: PHYSICIAN ASSISTANT

## 2024-11-03 PROCEDURE — 82948 REAGENT STRIP/BLOOD GLUCOSE: CPT

## 2024-11-03 PROCEDURE — 71045 X-RAY EXAM CHEST 1 VIEW: CPT

## 2024-11-03 PROCEDURE — 99231 SBSQ HOSP IP/OBS SF/LOW 25: CPT | Performed by: INTERNAL MEDICINE

## 2024-11-03 RX ORDER — LOSARTAN POTASSIUM 50 MG/1
50 TABLET ORAL
Status: DISCONTINUED | OUTPATIENT
Start: 2024-11-04 | End: 2024-11-04

## 2024-11-03 RX ORDER — INSULIN LISPRO 100 [IU]/ML
2-9 INJECTION, SOLUTION INTRAVENOUS; SUBCUTANEOUS
Status: DISCONTINUED | OUTPATIENT
Start: 2024-11-03 | End: 2024-11-06 | Stop reason: HOSPADM

## 2024-11-03 RX ORDER — INSULIN LISPRO 100 [IU]/ML
2 INJECTION, SOLUTION INTRAVENOUS; SUBCUTANEOUS
Status: DISCONTINUED | OUTPATIENT
Start: 2024-11-03 | End: 2024-11-06 | Stop reason: HOSPADM

## 2024-11-03 RX ADMIN — HEPARIN SODIUM 5000 UNITS: 5000 INJECTION INTRAVENOUS; SUBCUTANEOUS at 05:37

## 2024-11-03 RX ADMIN — DULOXETINE HYDROCHLORIDE 60 MG: 60 CAPSULE, DELAYED RELEASE ORAL at 08:34

## 2024-11-03 RX ADMIN — SENNOSIDES AND DOCUSATE SODIUM 2 TABLET: 50; 8.6 TABLET ORAL at 21:45

## 2024-11-03 RX ADMIN — ATORVASTATIN CALCIUM 40 MG: 40 TABLET, FILM COATED ORAL at 21:47

## 2024-11-03 RX ADMIN — ACETAMINOPHEN 650 MG: 325 TABLET ORAL at 05:35

## 2024-11-03 RX ADMIN — HEPARIN SODIUM 5000 UNITS: 5000 INJECTION INTRAVENOUS; SUBCUTANEOUS at 21:46

## 2024-11-03 RX ADMIN — INSULIN LISPRO 2 UNITS: 100 INJECTION, SOLUTION INTRAVENOUS; SUBCUTANEOUS at 17:47

## 2024-11-03 RX ADMIN — EMPAGLIFLOZIN 25 MG: 25 TABLET, FILM COATED ORAL at 08:35

## 2024-11-03 RX ADMIN — INSULIN LISPRO 4 UNITS: 100 INJECTION, SOLUTION INTRAVENOUS; SUBCUTANEOUS at 21:46

## 2024-11-03 RX ADMIN — ACETAMINOPHEN 650 MG: 325 TABLET ORAL at 21:46

## 2024-11-03 RX ADMIN — INSULIN LISPRO 2 UNITS: 100 INJECTION, SOLUTION INTRAVENOUS; SUBCUTANEOUS at 12:47

## 2024-11-03 RX ADMIN — LOSARTAN POTASSIUM 25 MG: 25 TABLET, FILM COATED ORAL at 08:34

## 2024-11-03 RX ADMIN — ACETAMINOPHEN 650 MG: 325 TABLET ORAL at 14:08

## 2024-11-03 RX ADMIN — LEVOTHYROXINE SODIUM 50 MCG: 0.05 TABLET ORAL at 05:35

## 2024-11-03 RX ADMIN — INSULIN GLARGINE 20 UNITS: 100 INJECTION, SOLUTION SUBCUTANEOUS at 08:37

## 2024-11-03 RX ADMIN — SOTALOL HYDROCHLORIDE 120 MG: 120 TABLET ORAL at 08:34

## 2024-11-03 RX ADMIN — HEPARIN SODIUM 5000 UNITS: 5000 INJECTION INTRAVENOUS; SUBCUTANEOUS at 14:08

## 2024-11-03 RX ADMIN — HYDROCODONE BITARTRATE AND ACETAMINOPHEN 1 TABLET: 7.5; 325 TABLET ORAL at 08:34

## 2024-11-03 RX ADMIN — INSULIN LISPRO 2 UNITS: 100 INJECTION, SOLUTION INTRAVENOUS; SUBCUTANEOUS at 08:37

## 2024-11-03 RX ADMIN — INSULIN LISPRO 2 UNITS: 100 INJECTION, SOLUTION INTRAVENOUS; SUBCUTANEOUS at 12:46

## 2024-11-03 RX ADMIN — ONDANSETRON 4 MG: 2 INJECTION INTRAMUSCULAR; INTRAVENOUS at 10:31

## 2024-11-03 RX ADMIN — SENNOSIDES AND DOCUSATE SODIUM 2 TABLET: 50; 8.6 TABLET ORAL at 08:34

## 2024-11-03 RX ADMIN — TAMSULOSIN HYDROCHLORIDE 0.4 MG: 0.4 CAPSULE ORAL at 08:34

## 2024-11-03 RX ADMIN — ASPIRIN 325 MG: 325 TABLET, COATED ORAL at 08:35

## 2024-11-03 RX ADMIN — BUPROPION HYDROCHLORIDE 150 MG: 150 TABLET, EXTENDED RELEASE ORAL at 08:34

## 2024-11-03 RX ADMIN — SOTALOL HYDROCHLORIDE 120 MG: 120 TABLET ORAL at 21:46

## 2024-11-03 RX ADMIN — OXYCODONE HYDROCHLORIDE 10 MG: 10 TABLET ORAL at 02:15

## 2024-11-03 NOTE — PLAN OF CARE
Problem: Adult Inpatient Plan of Care  Goal: Absence of Hospital-Acquired Illness or Injury  Intervention: Identify and Manage Fall Risk  Recent Flowsheet Documentation  Taken 11/3/2024 0215 by Dilshad Victor RN  Safety Promotion/Fall Prevention:   safety round/check completed   assistive device/personal items within reach  Taken 11/3/2024 0000 by Dilshad Victor RN  Safety Promotion/Fall Prevention:   safety round/check completed   clutter free environment maintained   assistive device/personal items within reach  Taken 11/2/2024 2032 by Dilshad Victor RN  Safety Promotion/Fall Prevention:   safety round/check completed   clutter free environment maintained   assistive device/personal items within reach  Intervention: Prevent Skin Injury  Recent Flowsheet Documentation  Taken 11/3/2024 0215 by Dilshad Victor RN  Body Position: position changed independently  Taken 11/3/2024 0000 by Dilshad Victor RN  Body Position: position changed independently  Taken 11/2/2024 2032 by Dilshad Victor RN  Body Position: position changed independently  Skin Protection:   incontinence pads utilized   transparent dressing maintained  Intervention: Prevent Infection  Recent Flowsheet Documentation  Taken 11/3/2024 0215 by Dilshad Victor RN  Infection Prevention: rest/sleep promoted  Goal: Optimal Comfort and Wellbeing  Intervention: Provide Person-Centered Care  Recent Flowsheet Documentation  Taken 11/2/2024 2032 by Dilshad Victor RN  Trust Relationship/Rapport:   care explained   choices provided   emotional support provided   questions encouraged   thoughts/feelings acknowledged     Problem: Cardiovascular Surgery  Goal: Optimal Cerebral Tissue Perfusion  Intervention: Protect and Optimize Cerebral Perfusion  Recent Flowsheet Documentation  Taken 11/3/2024 0215 by Dilshad Victor RN  Head of Bed (HOB) Positioning: HOB lowered  Taken 11/3/2024 0000 by Dilshad Victor RN  Head of Bed (HOB) Positioning: HOB elevated  Taken 11/2/2024  2032 by Dilshad Victor RN  Head of Bed (HOB) Positioning: HOB elevated  Goal: Absence of Infection Signs and Symptoms  Intervention: Prevent or Manage Infection  Recent Flowsheet Documentation  Taken 11/3/2024 0215 by Dilshad Victor RN  Infection Prevention: rest/sleep promoted  Goal: Anesthesia/Sedation Recovery  Intervention: Optimize Anesthesia Recovery  Recent Flowsheet Documentation  Taken 11/3/2024 0215 by Dilshad Victor RN  Safety Promotion/Fall Prevention:   safety round/check completed   assistive device/personal items within reach  Taken 11/3/2024 0000 by Dilshad Victor RN  Safety Promotion/Fall Prevention:   safety round/check completed   clutter free environment maintained   assistive device/personal items within reach  Taken 11/2/2024 2032 by Dilshad Victor RN  Safety Promotion/Fall Prevention:   safety round/check completed   clutter free environment maintained   assistive device/personal items within reach  Goal: Acceptable Pain Control  Intervention: Prevent or Manage Pain  Recent Flowsheet Documentation  Taken 11/2/2024 2032 by Dilshad Victor RN  Diversional Activities:   television   smartphone  Goal: Effective Urinary Elimination  Intervention: Monitor and Manage Urinary Retention  Recent Flowsheet Documentation  Taken 11/2/2024 2032 by Dilshad Victor RN  Urinary Elimination Promotion: catheter patency maintained     Problem: Skin Injury Risk Increased  Goal: Skin Health and Integrity  Intervention: Optimize Skin Protection  Recent Flowsheet Documentation  Taken 11/3/2024 0215 by Dilshad Victor RN  Head of Bed (HOB) Positioning: HOB lowered  Taken 11/3/2024 0000 by Dilshad Victor RN  Head of Bed (HOB) Positioning: HOB elevated  Taken 11/2/2024 2258 by Dilshad Victor RN  Activity Management: ambulated outside room  Taken 11/2/2024 2032 by Dilshad Victor RN  Pressure Reduction Techniques:   frequent weight shift encouraged   positioned off wounds   heels elevated off bed  Head of Bed (HOB)  Positioning: HOB elevated  Pressure Reduction Devices: pressure-redistributing mattress utilized  Skin Protection:   incontinence pads utilized   transparent dressing maintained     Problem: Mechanical Ventilation Invasive  Goal: Effective Communication  Intervention: Ensure Effective Communication  Recent Flowsheet Documentation  Taken 11/2/2024 2032 by Dilshad Victor RN  Trust Relationship/Rapport:   care explained   choices provided   emotional support provided   questions encouraged   thoughts/feelings acknowledged  Diversional Activities:   television   smartphone  Goal: Absence of Device-Related Skin and Tissue Injury  Intervention: Maintain Skin and Tissue Health  Recent Flowsheet Documentation  Taken 11/2/2024 2032 by Dilshad Victor RN  Device Skin Pressure Protection: tubing/devices free from skin contact  Goal: Absence of Ventilator-Induced Lung Injury  Intervention: Prevent Ventilator-Associated Pneumonia  Recent Flowsheet Documentation  Taken 11/3/2024 0215 by Dilshad Victor RN  Head of Bed (HOB) Positioning: HOB lowered  Taken 11/3/2024 0000 by Dilshad Victor RN  Head of Bed (HOB) Positioning: HOB elevated  Taken 11/2/2024 2032 by Dilshad Victor RN  Head of Bed (HOB) Positioning: HOB elevated     Problem: Fall Injury Risk  Goal: Absence of Fall and Fall-Related Injury  Intervention: Promote Injury-Free Environment  Recent Flowsheet Documentation  Taken 11/3/2024 0215 by Dilshad Victor RN  Safety Promotion/Fall Prevention:   safety round/check completed   assistive device/personal items within reach  Taken 11/3/2024 0000 by Dilshad Victor RN  Safety Promotion/Fall Prevention:   safety round/check completed   clutter free environment maintained   assistive device/personal items within reach  Taken 11/2/2024 2032 by Dilshad Victor RN  Safety Promotion/Fall Prevention:   safety round/check completed   clutter free environment maintained   assistive device/personal items within reach     Problem: Comorbidity  Management  Goal: Maintenance of Osteoarthritis Symptom Control  Intervention: Maintain Osteoarthritis Symptom Control  Recent Flowsheet Documentation  Taken 11/2/2024 3214 by Dilshad Victor, RN  Activity Management: ambulated outside room  Assistive Device Utilized:   gait belt   walker   Goal Outcome Evaluation:

## 2024-11-03 NOTE — PROGRESS NOTES
[facility] Cardiothoracic Surgery In-Patient Progress Note     LOS: 6 days     POD # 6 s/p CABG x 4, MAZE, LAAL    Subjective  Hard for him to ambulate needs 1-2 assist    Objective  Vital Signs  Temp:  [97.8 °F (36.6 °C)-98.9 °F (37.2 °C)] 98.9 °F (37.2 °C)  Heart Rate:  [61-73] 64  Resp:  [18] 18  BP: (109-164)/(74-92) 163/92    Physical Exam:   General Appearance: alert, appears stated age and cooperative   Lungs: clear to auscultation, respirations regular, respirations even, and respirations unlabored   Heart: regular rhythm & normal rate, normal S1, S2, no murmur, no gallop, no rub, and no click   Skin: Incision c/d/I   Sternum: Stable   Output by Drain (mL) 11/02/24 0701 - 11/02/24 1900 11/02/24 1901 - 11/03/24 0700 11/03/24 0701 - 11/03/24 0844 Range Total   Requested LDAs do not have output data documented.        Results     Results from last 7 days   Lab Units 11/02/24  0721   WBC 10*3/mm3 7.21   HEMOGLOBIN g/dL 10.2*   HEMATOCRIT % 31.8*   PLATELETS 10*3/mm3 175     Results from last 7 days   Lab Units 11/02/24  0741 11/02/24  0352   SODIUM mmol/L  --  134*   POTASSIUM mmol/L 3.5 3.8   CHLORIDE mmol/L  --  100   CO2 mmol/L  --  18.0*   BUN mg/dL  --  40*   CREATININE mg/dL  --  0.83   GLUCOSE mg/dL  --  112*   CALCIUM mg/dL  --  8.2*     Imaging Results (Last 24 Hours)       Procedure Component Value Units Date/Time    XR Chest 1 View [[id number]] Resulted: 11/03/24 0055     Updated: 11/03/24 0336    XR Chest 1 View [[id number]] Collected: 11/02/24 0920     Updated: 11/02/24 0927    Narrative:      XR CHEST 1 VW    Date of Exam: 11/2/2024 2:36 AM EDT    Indication: postop    Comparison: CXR 10/31/2024    Findings:  The heart is unchanged in size. The pulmonary vascularity does not appear congested.    Small right apical pneumothorax seen on 10/31/2024 is not evident today. Subsegmental atelectasis at the lung bases is improved.    Changes of coronary artery bypass are again appreciated. Atrial  appendage closure device remains in place. Precordial loop recorder remains evident.    NG tube seen previously is no longer evident.    Moderate to marked degenerative changes are seen in the glenohumeral joints.      Impression:      Impression:  Subsegmental atelectasis at the lung bases is improved in comparison to 10/31/2024.    The small right apical pneumothorax seen on 10/31/2024 is not evident today.    Post coronary artery bypass.      Electronically Signed: Marc Dimas MD    11/2/2024 9:23 AM EDT    Workstation ID: [id number]          Assessment  POD 6 s/p CABG x 4, MAZE, LAAL  Still requiring 2 person assist for ambulation  Plan   Home soon  Urology following urinary retention  Wean oxygen as tolerated  Ambulate physical therapy he still requires  2 assist to get up out of bed   rehab evaluation  Pulmonary toilet  ASA, statin, BB      Americo Catalan PA-C  11/03/24  08:44 EST

## 2024-11-03 NOTE — PROGRESS NOTES
"[facility] at [facility]  IP Progress Note      Chief Complaint: Follow-up of CAD/paroxysmal atrial fibrillation hypertension/dyslipidemia    Subjective   No acute events overnight. No chest pain or shortness of breath. Having difficulty with ambulation. States he is willing to go to rehab if it is in [place].    Objective     Blood pressure 163/92, pulse 64, temperature 98.9 °F (37.2 °C), temperature source Oral, resp. rate 18, height 172.7 cm (67.99\"), weight 104 kg (230 lb), SpO2 93%.     Intake/Output Summary (Last 24 hours) at 11/3/2024 0809  Last data filed at 11/3/2024 0500  Gross per 24 hour   Intake 1080 ml   Output 2300 ml   Net -1220 ml       Physical Exam:  General: No acute distress.   Neck: no JVD.  Chest:No respiratory distress, breath sounds are normal. No wheezes,  rhonchi or rales.  Cardiovascular: Normal S1 and S2, no murmur, gallop or rub.    Extremities: No significant edema.    Results Review:     I reviewed the patient's new clinical results.    Results from last 7 days   Lab Units 11/02/24  0721   WBC 10*3/mm3 7.21   HEMOGLOBIN g/dL 10.2*   HEMATOCRIT % 31.8*   PLATELETS 10*3/mm3 175     Results from last 7 days   Lab Units 11/02/24  0741 11/02/24  0352 11/01/24  0526   SODIUM mmol/L  --  134* 137   POTASSIUM mmol/L 3.5 3.8 3.3*   CHLORIDE mmol/L  --  100 99   CO2 mmol/L  --  18.0* 22.0   BUN mg/dL  --  40* 42*   CREATININE mg/dL  --  0.83 0.87   CALCIUM mg/dL  --  8.2* 8.8   BILIRUBIN mg/dL  --   --  0.4   ALK PHOS U/L  --   --  126*   ALT (SGPT) U/L  --   --  44*   AST (SGOT) U/L  --   --  107*   GLUCOSE mg/dL  --  112* 124*     Results from last 7 days   Lab Units 11/02/24  0741 11/02/24  0352   SODIUM mmol/L  --  134*   POTASSIUM mmol/L 3.5 3.8   CHLORIDE mmol/L  --  100   CO2 mmol/L  --  18.0*   BUN mg/dL  --  40*   CREATININE mg/dL  --  0.83   GLUCOSE mg/dL  --  112*   CALCIUM mg/dL  --  8.2*     Results from last 7 days   Lab Units 10/29/24  0332 " 10/28/24  1339   INR  1.29* 1.43*     acetaminophen, 650 mg, Oral, Q8H  aspirin, 325 mg, Oral, Daily  atorvastatin, 40 mg, Oral, Nightly  buPROPion XL, 150 mg, Oral, Daily  DULoxetine, 60 mg, Oral, Daily  empagliflozin, 25 mg, Oral, Daily  heparin (porcine), 5,000 Units, Subcutaneous, Q8H  insulin glargine, 20 Units, Subcutaneous, Daily  Insulin Lispro, 2 Units, Subcutaneous, TID With Meals  insulin lispro, 2-9 Units, Subcutaneous, 4x Daily AC & at Bedtime  levothyroxine, 50 mcg, Oral, Q AM  losartan, 25 mg, Oral, Q24H  pharmacy consult - MTM, , Does not apply, Daily  senna-docusate sodium, 2 tablet, Oral, BID  sotalol, 120 mg, Oral, Q12H  tamsulosin, 0.4 mg, Oral, Daily       Tele: Sinus Rythym      Assessment:  Multivessel coronary artery disease, normal preoperative EF.  Now status post CABG x 3 by Dr. Max October 28, 2024.  Paroxysmal atrial fibrillation, status post maze and left atrial appendage ligation along with CABG.  Had preoperative atrial fibrillation, now back in sinus rhythm on his home dose of sotalol  Hypertension, controlled  Dyslipidemia, on statin therapy  Type 2 diabetes.    Plan:  Continue aspirin and statin for CAD.   Continue sotalol for rhythm management. No anticoagulation as he is s/p LAAL.   Increase losartan to 50mg daily for better BP control.   Will benefit from rehab at discharge.         Nena Charles PA-C

## 2024-11-03 NOTE — PROGRESS NOTES
[facility]  PROGRESS NOTE    Patient Name: Joel Galo  : 1965  MRN: [id number]    Date of Admission: 10/28/2024  Primary Care Physician: Edmundo Boston MD    Subjective   Subjective     CC:  Mvcad, afib    HPI:  Feels ok, generally weak but no fever or chills, stable post-op chest wall pain. No n/v/d      Objective   Objective     Vital Signs:   Temp:  [97.8 °F (36.6 °C)-98.7 °F (37.1 °C)] 98.7 °F (37.1 °C)  Heart Rate:  [61-73] 64  Resp:  [18] 18  BP: (109-164)/(74-91) 164/91     Physical Exam:  Constitutional:Alert, oriented x 3, nontoxic appearing  Psych:Normal/appropriate affect  HEENT:NCAT, oropharynx clear  Neck: neck supple, full range of motion  Neuro: Face symmetric, speech clear, equal , moves all extremities  Cardiac: rrr  Resp: ctab  GI: abd soft, nontender, obese  Skin: midline sternal incision well healing  Musculoskeletal/extremities: no cyanosis of extremities; no significant ankle edema          Results Reviewed:  LAB RESULTS:      Lab 24  0721 24  0526 10/31/24  0343 10/30/24  0307 10/29/24  0332 10/28/24  1802 10/28/24  1339   WBC 7.21 9.05 9.97 14.30* 6.45   < > 7.42   HEMOGLOBIN 10.2* 11.0* 10.2* 10.6* 10.2*   < > 10.1*   HEMATOCRIT 31.8* 33.8* 31.9* 33.1* 31.3*   < > 29.0*   PLATELETS 175 193 135* 150 113*   < > 115*   NEUTROS ABS  --   --   --   --  5.10  --   --    IMMATURE GRANS (ABS)  --   --   --   --  0.05  --   --    LYMPHS ABS  --   --   --   --  0.77  --   --    MONOS ABS  --   --   --   --  0.48  --   --    EOS ABS  --   --   --   --  0.03  --   --    MCV 94.4 93.9 93.5 95.1 92.3   < > 88.7   PROTIME  --   --   --   --  16.2*  --  17.6*   APTT  --   --   --   --   --   --  30.4    < > = values in this interval not displayed.         Lab 24  0741 24  0352 24  0526 10/31/24  0343 10/30/24  0307 10/29/24  0636 10/29/24  0332 10/28/24  2254 10/28/24  1802 10/28/24  1339   SODIUM  --  134* 137  148* 145  --  140  --  143 142   POTASSIUM 3.5 3.8 3.3* 4.3 4.6  --  4.7   < > 4.3 3.2*   CHLORIDE  --  100 99 112* 112*  --  108*  --  110* 109*   CO2  --  18.0* 22.0 24.0 19.0*  --  22.0  --  22.0 22.0   ANION GAP  --  16.0* 16.0* 12.0 14.0  --  10.0  --  11.0 11.0   BUN  --  40* 42* 32* 20  --  12  --  11 11   CREATININE  --  0.83 0.87 0.85 0.88  --  0.77  --  0.84 0.86   EGFR  --  99.6 98.2 98.9 97.8  --  101.9  --  99.2 98.5   GLUCOSE  --  112* 124* 175* 169*  --  180*  --  138* 117*   CALCIUM  --  8.2* 8.8 8.9 9.0  --  8.5*  --  9.1 9.3   IONIZED CALCIUM  --   --   --   --   --   --   --   --   --  1.25   MAGNESIUM  --   --   --   --   --  2.4 1.9  --  2.2 2.1   PHOSPHORUS  --   --   --   --   --   --   --   --  3.5 3.3    < > = values in this interval not displayed.         Lab 11/01/24  0526 10/29/24  0332 10/28/24  1802 10/28/24  1339   TOTAL PROTEIN 6.8 5.7*  --   --    ALBUMIN 3.4* 3.8 4.0 3.3*   GLOBULIN 3.4 1.9  --   --    ALT (SGPT) 44* 8  --   --    AST (SGOT) 107* 56*  --   --    BILIRUBIN 0.4 0.5  --   --    ALK PHOS 126* 47  --   --          Lab 10/29/24  0332 10/28/24  1339   PROTIME 16.2* 17.6*   INR 1.29* 1.43*             Lab 10/31/24  1052   ABO TYPING B   RH TYPING Negative   ANTIBODY SCREEN Negative         Lab 10/29/24  0246 10/28/24  1435 10/28/24  1158   PH, ARTERIAL 7.331* 7.508* 7.43   PCO2, ARTERIAL 43.8 28.5*  --    PO2 ART 70.5* 304.0*  --    FIO2 35 100  --    HCO3 ART 23.1 22.6  --    BASE EXCESS ART -2.8* 0.1  --    CARBOXYHEMOGLOBIN 1.3 1.0  --      Brief Urine Lab Results       None            Microbiology Results Abnormal       None            XR Chest 1 View    Result Date: 11/2/2024  XR CHEST 1 VW Date of Exam: 11/2/2024 2:36 AM EDT Indication: postop Comparison: CXR 10/31/2024 Findings: The heart is unchanged in size. The pulmonary vascularity does not appear congested. Small right apical pneumothorax seen on 10/31/2024 is not evident today. Subsegmental atelectasis at the  lung bases is improved. Changes of coronary artery bypass are again appreciated. Atrial appendage closure device remains in place. Precordial loop recorder remains evident. NG tube seen previously is no longer evident. Moderate to marked degenerative changes are seen in the glenohumeral joints.     Impression: Impression: Subsegmental atelectasis at the lung bases is improved in comparison to 10/31/2024. The small right apical pneumothorax seen on 10/31/2024 is not evident today. Post coronary artery bypass. Electronically Signed: Marc Dimas MD  11/2/2024 9:23 AM EDT  Workstation ID: [id number]     Results for orders placed in visit on 10/28/24    Intra-Op Anesthesia DARIAN    Narrative  Intra-Op Anesthesia DARIAN    Procedure Performed: Intra-Op Anesthesia DARIAN  Start Time:  End Time:    Preanesthesia Checklist:  Patient identified, IV assessed, risks and benefits discussed, monitors and equipment assessed, procedure being performed at surgeon's request and anesthesia consent obtained.    General Procedure Information  Diagnostic Indications for Echo:  assessment of ascending aorta, assessment of surgical repair and hemodynamic monitoring  Physician Requesting Echo: Alex Max MD  Location performed:  OR  Intubated  Bite block not placed  Heart visualized  Probe Insertion:  Easy  Probe Type:  Multiplane  Modalities:  Color flow mapping, continuous wave Doppler and pulse wave Doppler    Echocardiographic and Doppler Measurements    Ventricles    Right Ventricle:  Thrombus not present.  Global function normal.  Left Ventricle:  Cavity size normal.  Diastolic dimension 1.6 cm.  Thrombus not present.  Global Function normal.  Ejection Fraction 55%.        Valves    Aortic Valve:  Annulus normal.  Stenosis not present.  Area: 2.2 cm².  Mean Gradient: 2 mmHg.  Regurgitation absent.  Leaflets normal.  Leaflet motions normal.    Mitral Valve:  Annulus normal.  Stenosis not present.  Area: 2.4 cm².  Mean Gradient: 1 mmHg.   Regurgitation trace.  Leaflets normal.  Leaflet motions normal.    Tricuspid Valve:  Annulus normal.  Stenosis not present.  Leaflets normal.  Leaflet motions normal.  Pulmonic Valve:  Annulus normal.      Aorta    Ascending Aorta:  Size normal.  Diameter 3 cm.  Dissection not present.  Plaque thickness less than 3 mm.  Mobile plaque not present.  Aortic Arch:  Size normal.  Dissection not present.  Plaque thickness less than 3 mm.  Mobile plaque not present.  Descending Aorta:  Size normal.  Dissection not present.  Plaque thickness less than 3 mm.  Mobile plaque not present.      Atria    Right Atrium:  Size dilated.  Spontaneous echo contrast not present.  Thrombus not present.  Tumor not present.  Device present.    Left Atrium:  Size dilated.  Spontaneous echo contrast not present.  Thrombus not present.  Tumor not present.  Left atrial appendage normal.      Septa        Ventricular Septum:  Intra-ventricular septum morphology normal.    Diastolic Function Measurements:  Diastolic Dysfunction Grade=  E=  ms  A=  ms  E/A Ratio=  1.7  DT=  ms  S/D=  IVRT=    Other Findings  Pericardium:  normal  Pleural Effusion:  none  Pulmonary Arteries:  normal  Pulmonary Venous Flow:  normal    Anesthesia Information  Performed Personally  Anesthesiologist:  George Morrow MD      Echocardiogram Comments:  Informed consent was obtained preoperatively.  Yahir probe passed without difficulty.  Post CABG, MAZE and TORSTEN ligation:   EF unchanged, no NWMA, obliterartion of TORSTEN by clip      Current medications:  Scheduled Meds:acetaminophen, 650 mg, Oral, Q8H  aspirin, 325 mg, Oral, Daily  atorvastatin, 40 mg, Oral, Nightly  buPROPion XL, 150 mg, Oral, Daily  DULoxetine, 60 mg, Oral, Daily  empagliflozin, 25 mg, Oral, Daily  heparin (porcine), 5,000 Units, Subcutaneous, Q8H  insulin glargine, 25 Units, Subcutaneous, Daily  insulin lispro, 3-14 Units, Subcutaneous, 4x Daily AC & at Bedtime  levothyroxine, 50 mcg, Oral, Q  AM  losartan, 25 mg, Oral, Q24H  pharmacy consult - [place], , Does not apply, Daily  senna-docusate sodium, 2 tablet, Oral, BID  sotalol, 120 mg, Oral, Q12H  tamsulosin, 0.4 mg, Oral, Daily      Continuous Infusions:dexmedetomidine, 0.2-1.5 mcg/kg/hr, Last Rate: Stopped (10/31/24 0530)      PRN Meds:.  bisacodyl    Calcium Replacement - Follow Nurse / BPA Driven Protocol    dextrose    dextrose    glucagon (human recombinant)    HYDROcodone-acetaminophen    Magnesium Cardiology Dose Replacement - Follow Nurse / BPA Driven Protocol    nitroglycerin    ondansetron    oxyCODONE    Phosphorus Replacement - Follow Nurse / BPA Driven Protocol    polyethylene glycol    Potassium Replacement - Follow Nurse / BPA Driven Protocol    sodium bicarbonate    Assessment & Plan   Assessment & Plan     Active Hospital Problems    Diagnosis  POA    **CAD s/p CABG x 4, MAZE, LAAL 10/28/24 [I25.10]  Yes    Primary hypertension [I10]  Yes    DM (diabetes mellitus), type 2 [E11.9]  Yes    Hyperlipidemia LDL goal <70 [E78.5]  Yes    Paroxysmal atrial fibrillation [I48.0]  Yes      Resolved Hospital Problems   No resolved problems to display.        Brief Hospital Course to date:  Joel Galo is a 59 y.o. male w/ DM, HL, hypothyroidism, neuropathy & depression who is s/p CABG & TORSTEN ligation for multivessel cad & afib on 10/28/24. Transferred out of icu and hospitalists following for medical co-management & DM management    Multivessel CAD  Parox afib  HTN  HL  **s/p CABG x 3 x TORSTEN ligation 10/28/24  -post-op management per primary ct surgery team  -cards following: continue asa, sotalol, losartan, statin, jardiance    Urinary retention  -Urology consulted (Dr. Pepe), recommends [place]  x 5-7 days (per 11/2/24 note), continue flomax, follow up w/ local urologist (near [place]) for voiding trial after discharge    Insulin dep DM2 (A1c 11.2)  -on 70/30 regimen at home, admits to dietary non-compliance  -decrease lantus to 20 units sq nightly;  add humalog 2 units tid meals, change sliding scale to medium dose; adjust insulins prn    Neuropathy  Depression  -continue cymbalta & wellbutrin    Hypothyroidism  -continue levothyrosine    Dispo: plan appears to be SNF at discharge which seems reasonable      Expected discharge date/ time has not been documented.     VTE Prophylaxis:  Pharmacologic & mechanical VTE prophylaxis orders are present.         AM-PAC 6 Clicks Score (PT): 17 (11/02/24 2032)    CODE STATUS:   Code Status and Medical Interventions: CPR (Attempt to Resuscitate); Full Support   Ordered at: 10/28/24 1337     Code Status (Patient has no pulse and is not breathing):    CPR (Attempt to Resuscitate)     Medical Interventions (Patient has pulse or is breathing):    Full Support       Jason Moreno MD  11/03/24

## 2024-11-04 ENCOUNTER — TRANSCRIBE ORDERS (OUTPATIENT)
Dept: CARDIAC REHAB | Facility: HOSPITAL | Age: 59
End: 2024-11-04
Payer: MEDICARE

## 2024-11-04 ENCOUNTER — APPOINTMENT (OUTPATIENT)
Dept: GENERAL RADIOLOGY | Facility: HOSPITAL | Age: 59
End: 2024-11-04
Payer: MEDICARE

## 2024-11-04 DIAGNOSIS — Z95.1 S/P CABG (CORONARY ARTERY BYPASS GRAFT): Primary | ICD-10-CM

## 2024-11-04 PROBLEM — E11.65 TYPE 2 DIABETES MELLITUS WITH HYPERGLYCEMIA, WITHOUT LONG-TERM CURRENT USE OF INSULIN: Status: ACTIVE | Noted: 2023-07-06

## 2024-11-04 LAB
ANION GAP SERPL CALCULATED.3IONS-SCNC: 14 MMOL/L (ref 5–15)
BUN SERPL-MCNC: 20 MG/DL (ref 8–23)
BUN/CREAT SERPL: 33.3 (ref 7–25)
CALCIUM SPEC-SCNC: 9.1 MG/DL (ref 8.6–10.5)
CHLORIDE SERPL-SCNC: 102 MMOL/L (ref 98–107)
CHOLEST SERPL-MCNC: 102 MG/DL (ref 0–200)
CO2 SERPL-SCNC: 24 MMOL/L (ref 22–29)
CREAT SERPL-MCNC: 0.6 MG/DL (ref 0.76–1.27)
DEPRECATED RDW RBC AUTO: 45.5 FL (ref 37–54)
EGFRCR SERPLBLD CKD-EPI 2021: 109.8 ML/MIN/1.73
ERYTHROCYTE [DISTWIDTH] IN BLOOD BY AUTOMATED COUNT: 13.4 % (ref 12.3–15.4)
GLUCOSE BLDC GLUCOMTR-MCNC: 118 MG/DL (ref 70–130)
GLUCOSE BLDC GLUCOMTR-MCNC: 137 MG/DL (ref 70–130)
GLUCOSE BLDC GLUCOMTR-MCNC: 148 MG/DL (ref 70–130)
GLUCOSE BLDC GLUCOMTR-MCNC: 156 MG/DL (ref 70–130)
GLUCOSE SERPL-MCNC: 131 MG/DL (ref 65–99)
HCT VFR BLD AUTO: 30.1 % (ref 37.5–51)
HDLC SERPL-MCNC: 18 MG/DL (ref 40–60)
HGB BLD-MCNC: 9.7 G/DL (ref 13–17.7)
LDLC SERPL CALC-MCNC: 36 MG/DL (ref 0–100)
LDLC/HDLC SERPL: 1.16 {RATIO}
MCH RBC QN AUTO: 30.3 PG (ref 26.6–33)
MCHC RBC AUTO-ENTMCNC: 32.2 G/DL (ref 31.5–35.7)
MCV RBC AUTO: 94.1 FL (ref 79–97)
PLATELET # BLD AUTO: 269 10*3/MM3 (ref 140–450)
PMV BLD AUTO: 9.5 FL (ref 6–12)
POTASSIUM SERPL-SCNC: 4.1 MMOL/L (ref 3.5–5.2)
RBC # BLD AUTO: 3.2 10*6/MM3 (ref 4.14–5.8)
SODIUM SERPL-SCNC: 140 MMOL/L (ref 136–145)
TRIGL SERPL-MCNC: 316 MG/DL (ref 0–150)
VLDLC SERPL-MCNC: 48 MG/DL (ref 5–40)
WBC NRBC COR # BLD AUTO: 8.88 10*3/MM3 (ref 3.4–10.8)

## 2024-11-04 PROCEDURE — 99024 POSTOP FOLLOW-UP VISIT: CPT

## 2024-11-04 PROCEDURE — 25010000002 FUROSEMIDE PER 20 MG: Performed by: NURSE PRACTITIONER

## 2024-11-04 PROCEDURE — 99232 SBSQ HOSP IP/OBS MODERATE 35: CPT | Performed by: NURSE PRACTITIONER

## 2024-11-04 PROCEDURE — 97116 GAIT TRAINING THERAPY: CPT

## 2024-11-04 PROCEDURE — 80048 BASIC METABOLIC PNL TOTAL CA: CPT

## 2024-11-04 PROCEDURE — 85027 COMPLETE CBC AUTOMATED: CPT

## 2024-11-04 PROCEDURE — 63710000001 INSULIN GLARGINE PER 5 UNITS: Performed by: INTERNAL MEDICINE

## 2024-11-04 PROCEDURE — 25010000002 HEPARIN (PORCINE) PER 1000 UNITS: Performed by: PHYSICIAN ASSISTANT

## 2024-11-04 PROCEDURE — 63710000001 INSULIN LISPRO (HUMAN) PER 5 UNITS: Performed by: INTERNAL MEDICINE

## 2024-11-04 PROCEDURE — 82948 REAGENT STRIP/BLOOD GLUCOSE: CPT

## 2024-11-04 PROCEDURE — 99231 SBSQ HOSP IP/OBS SF/LOW 25: CPT | Performed by: UROLOGY

## 2024-11-04 PROCEDURE — 97110 THERAPEUTIC EXERCISES: CPT

## 2024-11-04 PROCEDURE — 71045 X-RAY EXAM CHEST 1 VIEW: CPT

## 2024-11-04 PROCEDURE — 97535 SELF CARE MNGMENT TRAINING: CPT

## 2024-11-04 RX ORDER — ROSUVASTATIN CALCIUM 20 MG/1
20 TABLET, COATED ORAL NIGHTLY
Status: DISCONTINUED | OUTPATIENT
Start: 2024-11-04 | End: 2024-11-06 | Stop reason: HOSPADM

## 2024-11-04 RX ORDER — HYDROCHLOROTHIAZIDE 25 MG/1
12.5 TABLET ORAL DAILY
Status: DISCONTINUED | OUTPATIENT
Start: 2024-11-04 | End: 2024-11-05

## 2024-11-04 RX ORDER — FUROSEMIDE 10 MG/ML
40 INJECTION INTRAMUSCULAR; INTRAVENOUS ONCE
Status: COMPLETED | OUTPATIENT
Start: 2024-11-04 | End: 2024-11-04

## 2024-11-04 RX ORDER — LOSARTAN POTASSIUM 25 MG/1
50 TABLET ORAL DAILY
Qty: 90 TABLET | Refills: 1 | Status: CANCELLED | OUTPATIENT
Start: 2024-11-04

## 2024-11-04 RX ORDER — HYDROCHLOROTHIAZIDE 12.5 MG/1
12.5 TABLET ORAL DAILY
Qty: 90 TABLET | Refills: 1 | Status: CANCELLED | OUTPATIENT
Start: 2024-11-05

## 2024-11-04 RX ADMIN — SACUBITRIL AND VALSARTAN 1 TABLET: 49; 51 TABLET, FILM COATED ORAL at 21:52

## 2024-11-04 RX ADMIN — INSULIN LISPRO 2 UNITS: 100 INJECTION, SOLUTION INTRAVENOUS; SUBCUTANEOUS at 13:09

## 2024-11-04 RX ADMIN — SENNOSIDES AND DOCUSATE SODIUM 2 TABLET: 50; 8.6 TABLET ORAL at 21:51

## 2024-11-04 RX ADMIN — ACETAMINOPHEN 650 MG: 325 TABLET ORAL at 05:36

## 2024-11-04 RX ADMIN — SENNOSIDES AND DOCUSATE SODIUM 2 TABLET: 50; 8.6 TABLET ORAL at 10:18

## 2024-11-04 RX ADMIN — ASPIRIN 325 MG: 325 TABLET, COATED ORAL at 10:06

## 2024-11-04 RX ADMIN — ACETAMINOPHEN 650 MG: 325 TABLET ORAL at 13:11

## 2024-11-04 RX ADMIN — LEVOTHYROXINE SODIUM 50 MCG: 0.05 TABLET ORAL at 05:36

## 2024-11-04 RX ADMIN — DULOXETINE HYDROCHLORIDE 60 MG: 60 CAPSULE, DELAYED RELEASE ORAL at 10:18

## 2024-11-04 RX ADMIN — SOTALOL HYDROCHLORIDE 120 MG: 120 TABLET ORAL at 21:52

## 2024-11-04 RX ADMIN — INSULIN LISPRO 2 UNITS: 100 INJECTION, SOLUTION INTRAVENOUS; SUBCUTANEOUS at 10:19

## 2024-11-04 RX ADMIN — INSULIN LISPRO 2 UNITS: 100 INJECTION, SOLUTION INTRAVENOUS; SUBCUTANEOUS at 18:02

## 2024-11-04 RX ADMIN — TAMSULOSIN HYDROCHLORIDE 0.4 MG: 0.4 CAPSULE ORAL at 10:06

## 2024-11-04 RX ADMIN — SOTALOL HYDROCHLORIDE 120 MG: 120 TABLET ORAL at 10:06

## 2024-11-04 RX ADMIN — BUPROPION HYDROCHLORIDE 150 MG: 150 TABLET, EXTENDED RELEASE ORAL at 10:06

## 2024-11-04 RX ADMIN — EMPAGLIFLOZIN 25 MG: 25 TABLET, FILM COATED ORAL at 10:05

## 2024-11-04 RX ADMIN — HEPARIN SODIUM 5000 UNITS: 5000 INJECTION INTRAVENOUS; SUBCUTANEOUS at 21:52

## 2024-11-04 RX ADMIN — FUROSEMIDE 40 MG: 10 INJECTION, SOLUTION INTRAMUSCULAR; INTRAVENOUS at 13:08

## 2024-11-04 RX ADMIN — HYDROCHLOROTHIAZIDE 12.5 MG: 25 TABLET ORAL at 10:05

## 2024-11-04 RX ADMIN — ROSUVASTATIN 20 MG: 20 TABLET, FILM COATED ORAL at 21:52

## 2024-11-04 RX ADMIN — LOSARTAN POTASSIUM 50 MG: 50 TABLET, FILM COATED ORAL at 10:07

## 2024-11-04 RX ADMIN — HEPARIN SODIUM 5000 UNITS: 5000 INJECTION INTRAVENOUS; SUBCUTANEOUS at 13:09

## 2024-11-04 RX ADMIN — INSULIN GLARGINE 20 UNITS: 100 INJECTION, SOLUTION SUBCUTANEOUS at 10:19

## 2024-11-04 RX ADMIN — INSULIN LISPRO 2 UNITS: 100 INJECTION, SOLUTION INTRAVENOUS; SUBCUTANEOUS at 13:08

## 2024-11-04 NOTE — PLAN OF CARE
Problem: Adult Inpatient Plan of Care  Goal: Plan of Care Review  Outcome: Progressing  Goal: Patient-Specific Goal (Individualized)  Outcome: Progressing  Goal: Absence of Hospital-Acquired Illness or Injury  Outcome: Progressing  Intervention: Identify and Manage Fall Risk  Recent Flowsheet Documentation  Taken 11/4/2024 0600 by Peggy Roman RN  Safety Promotion/Fall Prevention: safety round/check completed  Taken 11/4/2024 0400 by Peggy Roman RN  Safety Promotion/Fall Prevention:   nonskid shoes/slippers when out of bed   safety round/check completed   fall prevention program maintained   lighting adjusted   clutter free environment maintained   assistive device/personal items within reach  Taken 11/4/2024 0200 by Peggy Roman RN  Safety Promotion/Fall Prevention: safety round/check completed  Taken 11/4/2024 0000 by Peggy Roman RN  Safety Promotion/Fall Prevention:   nonskid shoes/slippers when out of bed   safety round/check completed   assistive device/personal items within reach   clutter free environment maintained  Taken 11/3/2024 2200 by Peggy Roman RN  Safety Promotion/Fall Prevention:   activity supervised   clutter free environment maintained   fall prevention program maintained   nonskid shoes/slippers when out of bed   room organization consistent   safety round/check completed  Taken 11/3/2024 2000 by Peggy Roman RN  Safety Promotion/Fall Prevention:   activity supervised   lighting adjusted   nonskid shoes/slippers when out of bed   room organization consistent   safety round/check completed   toileting scheduled  Intervention: Prevent Skin Injury  Recent Flowsheet Documentation  Taken 11/4/2024 0600 by Peggy Roman RN  Body Position: (up in chair)   position changed independently   other (see comments)  Taken 11/4/2024 0400 by Peggy Roman RN  Body Position: position changed independently  Taken 11/4/2024 0200 by Peggy Roman RN  Body Position:  position changed independently  Taken 11/4/2024 0000 by Peggy Roman RN  Body Position: position changed independently  Taken 11/3/2024 2000 by Peggy Roman RN  Body Position: position changed independently  Skin Protection: incontinence pads utilized  Intervention: Prevent and Manage VTE (Venous Thromboembolism) Risk  Recent Flowsheet Documentation  Taken 11/3/2024 2000 by Peggy Roman RN  VTE Prevention/Management:   SCDs (sequential compression devices) off   patient refused intervention  Intervention: Prevent Infection  Recent Flowsheet Documentation  Taken 11/3/2024 2000 by Peggy Roman RN  Infection Prevention:   environmental surveillance performed   rest/sleep promoted   single patient room provided  Goal: Optimal Comfort and Wellbeing  Outcome: Progressing  Intervention: Monitor Pain and Promote Comfort  Recent Flowsheet Documentation  Taken 11/3/2024 2000 by Peggy Roman RN  Pain Management Interventions:   diversional activity provided   care clustered  Goal: Readiness for Transition of Care  Outcome: Progressing     Problem: Cardiovascular Surgery  Goal: Improved Activity Tolerance  Outcome: Progressing  Goal: Optimal Coping with Heart Surgery  Outcome: Progressing  Goal: Absence of Bleeding  Outcome: Progressing  Goal: Effective Bowel Elimination  Outcome: Progressing  Goal: Effective Cardiac Function  Outcome: Progressing  Goal: Optimal Cerebral Tissue Perfusion  Outcome: Progressing  Intervention: Protect and Optimize Cerebral Perfusion  Recent Flowsheet Documentation  Taken 11/4/2024 0600 by Peggy Roman RN  Head of Bed (HOB) Positioning: HOB elevated  Taken 11/4/2024 0400 by Peggy Roman RN  Head of Bed (HOB) Positioning: HOB lowered  Taken 11/4/2024 0200 by Peggy Roman RN  Head of Bed (HOB) Positioning: HOB lowered  Taken 11/4/2024 0000 by Peggy Roman RN  Head of Bed (HOB) Positioning: HOB elevated  Taken 11/3/2024 2000 by Peggy Roman RN  Head of  Bed (HOB) Positioning: HOB elevated  Goal: Fluid and Electrolyte Balance  Outcome: Progressing  Goal: Blood Glucose Level Within Target Range  Outcome: Progressing  Goal: Absence of Infection Signs and Symptoms  Outcome: Progressing  Intervention: Prevent or Manage Infection  Recent Flowsheet Documentation  Taken 11/3/2024 2000 by Peggy Roman RN  Infection Prevention:   environmental surveillance performed   rest/sleep promoted   single patient room provided  Goal: Anesthesia/Sedation Recovery  Outcome: Progressing  Intervention: Optimize Anesthesia Recovery  Recent Flowsheet Documentation  Taken 11/4/2024 0600 by Peggy Roman RN  Safety Promotion/Fall Prevention: safety round/check completed  Taken 11/4/2024 0400 by Peggy Roman RN  Safety Promotion/Fall Prevention:   nonskid shoes/slippers when out of bed   safety round/check completed   fall prevention program maintained   lighting adjusted   clutter free environment maintained   assistive device/personal items within reach  Taken 11/4/2024 0200 by Peggy Roman RN  Safety Promotion/Fall Prevention: safety round/check completed  Taken 11/4/2024 0000 by Peggy Roman RN  Safety Promotion/Fall Prevention:   nonskid shoes/slippers when out of bed   safety round/check completed   assistive device/personal items within reach   clutter free environment maintained  Taken 11/3/2024 2200 by Peggy Roman RN  Safety Promotion/Fall Prevention:   activity supervised   clutter free environment maintained   fall prevention program maintained   nonskid shoes/slippers when out of bed   room organization consistent   safety round/check completed  Taken 11/3/2024 2000 by Peggy Roman RN  Safety Promotion/Fall Prevention:   activity supervised   lighting adjusted   nonskid shoes/slippers when out of bed   room organization consistent   safety round/check completed   toileting scheduled  Administration (IS):   proper technique demonstrated    self-administered  Goal: Acceptable Pain Control  Outcome: Progressing  Intervention: Prevent or Manage Pain  Recent Flowsheet Documentation  Taken 11/3/2024 2000 by Peggy Roman RN  Pain Management Interventions:   diversional activity provided   care clustered  Goal: Nausea and Vomiting Relief  Outcome: Progressing  Goal: Effective Urinary Elimination  Outcome: Progressing  Goal: Effective Oxygenation and Ventilation  Outcome: Progressing  Intervention: Promote Airway Secretion Clearance  Recent Flowsheet Documentation  Taken 11/3/2024 2000 by Peggy Roman RN  Administration (IS):   proper technique demonstrated   self-administered  Cough And Deep Breathing: done with encouragement     Problem: Skin Injury Risk Increased  Goal: Skin Health and Integrity  Outcome: Progressing  Intervention: Optimize Skin Protection  Recent Flowsheet Documentation  Taken 11/4/2024 0600 by Peggy Roman RN  Head of Bed (HOB) Positioning: HOB elevated  Taken 11/4/2024 0400 by Peggy Roman RN  Head of Bed (HOB) Positioning: HOB lowered  Taken 11/4/2024 0200 by Peggy Roman RN  Head of Bed (HOB) Positioning: HOB lowered  Taken 11/4/2024 0000 by Peggy Roman RN  Head of Bed (HOB) Positioning: HOB elevated  Taken 11/3/2024 2000 by Peggy Roman RN  Activity Management: ambulated to bathroom  Pressure Reduction Techniques: frequent weight shift encouraged  Head of Bed (HOB) Positioning: HOB elevated  Pressure Reduction Devices:   pressure-redistributing mattress utilized   positioning supports utilized   heel offloading device utilized  Skin Protection: incontinence pads utilized     Problem: Mechanical Ventilation Invasive  Goal: Effective Communication  Outcome: Progressing  Goal: Optimal Device Function  Outcome: Progressing  Goal: Mechanical Ventilation Liberation  Outcome: Progressing  Goal: Optimal Nutrition Delivery  Outcome: Progressing  Goal: Absence of Device-Related Skin and Tissue  Injury  Outcome: Progressing  Intervention: Maintain Skin and Tissue Health  Recent Flowsheet Documentation  Taken 11/3/2024 2000 by Peggy Roman RN  Device Skin Pressure Protection: pressure points protected  Goal: Absence of Ventilator-Induced Lung Injury  Outcome: Progressing  Intervention: Prevent Ventilator-Associated Pneumonia  Recent Flowsheet Documentation  Taken 11/4/2024 0600 by Peggy Roman RN  Head of Bed (HOB) Positioning: HOB elevated  Taken 11/4/2024 0400 by Peggy Roman RN  Head of Bed (HOB) Positioning: HOB lowered  Taken 11/4/2024 0200 by Peggy Roman RN  Head of Bed (HOB) Positioning: HOB lowered  Taken 11/4/2024 0000 by Peggy Roman RN  Head of Bed (HOB) Positioning: HOB elevated  Taken 11/3/2024 2000 by Peggy Roman RN  Head of Bed (HOB) Positioning: HOB elevated     Problem: Fall Injury Risk  Goal: Absence of Fall and Fall-Related Injury  Outcome: Progressing  Intervention: Promote Injury-Free Environment  Recent Flowsheet Documentation  Taken 11/4/2024 0600 by Peggy Roman RN  Safety Promotion/Fall Prevention: safety round/check completed  Taken 11/4/2024 0400 by Peggy Roman RN  Safety Promotion/Fall Prevention:   nonskid shoes/slippers when out of bed   safety round/check completed   fall prevention program maintained   lighting adjusted   clutter free environment maintained   assistive device/personal items within reach  Taken 11/4/2024 0200 by Peggy Roman RN  Safety Promotion/Fall Prevention: safety round/check completed  Taken 11/4/2024 0000 by Peggy Roman RN  Safety Promotion/Fall Prevention:   nonskid shoes/slippers when out of bed   safety round/check completed   assistive device/personal items within reach   clutter free environment maintained  Taken 11/3/2024 2200 by Peggy Roman RN  Safety Promotion/Fall Prevention:   activity supervised   clutter free environment maintained   fall prevention program maintained   nonskid  shoes/slippers when out of bed   room organization consistent   safety round/check completed  Taken 11/3/2024 2000 by Peggy Roman, RN  Safety Promotion/Fall Prevention:   activity supervised   lighting adjusted   nonskid shoes/slippers when out of bed   room organization consistent   safety round/check completed   toileting scheduled     Problem: Comorbidity Management  Goal: Maintenance of Asthma Control  Outcome: Progressing  Goal: Maintenance of Behavioral Health Symptom Control  Outcome: Progressing  Goal: Maintenance of COPD Symptom Control  Outcome: Progressing  Goal: Blood Glucose Level Within Target Range  Outcome: Progressing  Goal: Maintenance of Heart Failure Symptom Control  Outcome: Progressing  Goal: Blood Pressure in Desired Range  Outcome: Progressing  Goal: Maintenance of Osteoarthritis Symptom Control  Outcome: Progressing  Intervention: Maintain Osteoarthritis Symptom Control  Recent Flowsheet Documentation  Taken 11/3/2024 2000 by Peggy Roman, RN  Activity Management: ambulated to bathroom  Assistive Device Utilized:   gait belt   walker  Goal: Bariatric Home Regimen Maintained  Outcome: Progressing  Goal: Maintenance of Seizure Control  Outcome: Progressing   Goal Outcome Evaluation:

## 2024-11-04 NOTE — PROGRESS NOTES
[facility]   Urology Progress Note    Patient Name: Joel Galo  : 1965  MRN: [id number]  Primary Care Physician:  Edmundo Boston MD  Date of admission: 10/28/2024    Subjective   Subjective     Chief Complaint: Urinary retention, hematuria    HPI:  Contacted by primary team due to concern for hematuria.  Per discussion with nursing, catheter had become red yesterday evening but today appears much better.  Nurse saw small clot in the tubing but it has otherwise been draining without issue.  Patient himself reports no new complaints.  He states that he has had hematuria in setting of catheter in the past but he has not had any hematuria outside of that setting.    Review of Systems   All systems were reviewed and negative except for: above    Objective   Objective     Vitals:   Temp:  [97.6 °F (36.4 °C)-98.7 °F (37.1 °C)] 98.6 °F (37 °C)  Heart Rate:  [63-70] 70  Resp:  [18] 18  BP: (112-160)/(74-96) 160/88  Physical Exam    Constitutional: Awake in bed, alert   Eyes: PERRLA, sclerae anicteric, no conjunctival injection   HENT: Normocephalic, atraumatic, mucous membranes moist   Neck: Supple, trachea midline   Respiratory: Equal chest rise, non-labored respirations    Cardiovascular: RRR, palpable radial pulses bilaterally   Gastrointestinal: Soft, nontender, non-distended   Genitourinary: Prado catheter in place draining clear dark yellow urine   Musculoskeletal: No lower extremity edema bilaterally, no clubbing or cyanosis to extremities   Psychiatric: Appropriate affect, cooperative   Neurologic: Oriented x 3,  Cranial Nerves grossly intact, speech clear   Skin: No rashes     Result Review    Result Review:  I have personally reviewed the results from the time of this admission to 2024 12:42 EST and agree with these findings:  [x]  Laboratory  []  Microbiology  []  Radiology  []  EKG/Telemetry   []  Cardiology/Vascular   []  Pathology  [x]  Old records  []  Other:    Most notable findings  include: Normal Cr    Assessment & Plan   Assessment / Plan     Brief Patient Summary:  Joel Galo is a 59 y.o. male who is status/post four-vessel CABG, maze procedure with cardiothoracic surgery.  Urology had been consulted for postoperative urinary retention.  In chart review and discussion with patient he has a prior history of postprocedural urinary retention, last episode in 2019.  He follows with urology in [place], Dr. Alfaro.  During current admission, he had required in and out catheterization, and ultimately was recommended catheter placement by urology.  We recommended to keep catheter in place for 5 to 7 days and follow-up as an outpatient for voiding trial.  Can continue with alpha-blocker therapy.  Patient would like to follow-up with his local urologist in [place] for voiding trial.  This can be coordinated at time of discharge.    Urology was contacted today due to concern for hematuria.  Catheter on evaluation this morning revealed clear dark yellow urine.  Catheter irrigates easily without issue.  This is likely secondary to catheter irritation.        Active Hospital Problems:  Active Hospital Problems    Diagnosis     **CAD s/p CABG x 4, MAZE, LAAL 10/28/24     Primary hypertension     DM (diabetes mellitus), type 2     Hyperlipidemia LDL goal <70     Paroxysmal atrial fibrillation        Plan:   - As above  - Can irrigate as needed if concern for clots  - Avoid putting catheter on tension       VTE Prophylaxis:  Pharmacologic & mechanical VTE prophylaxis orders are present.        CODE STATUS:   Code Status (Patient has no pulse and is not breathing): CPR (Attempt to Resuscitate)  Medical Interventions (Patient has pulse or is breathing): Full Support    Disposition:  Per primary team    Electronically signed by Juan Pablo Oneill MD, 11/04/24, 12:42 PM EST.

## 2024-11-04 NOTE — PROGRESS NOTES
[facility] Medicine Services  PROGRESS NOTE    Patient Name: Joel Galo  : 1965  MRN: [id number]    Date of Admission: 10/28/2024  Primary Care Physician: Edmundo Boston MD    Subjective   Subjective     CC:  MV CAD, Afib     HPI:  Patient is sitting up in chair in NAD. Pain is controlled. Tolerating diet.       Objective   Objective     Vital Signs:   Temp:  [97.6 °F (36.4 °C)-98.7 °F (37.1 °C)] 98.7 °F (37.1 °C)  Heart Rate:  [63-68] 65  Resp:  [18] 18  BP: (112-155)/(74-96) 151/96  Flow (L/min) (Oxygen Therapy):  [2] 2     Physical Exam:  Constitutional: No acute distress, awake, alert  HENT: NCAT, mucous membranes moist  Respiratory: Clear to auscultation bilaterally, respiratory effort normal room air 94%  Cardiovascular: RRR, no murmurs, rubs, or gallops  Gastrointestinal: Positive bowel sounds, soft, nontender, nondistended  Musculoskeletal: No bilateral ankle edema  Psychiatric: Appropriate affect, cooperative  Neurologic: Oriented x 3, strength symmetric in all extremities, Cranial Nerves grossly intact to confrontation, speech clear  Skin: No rashes, midsternal incision andi, right LE EVH andi   Prado to BSD old blood noted in tube      Results Reviewed:  LAB RESULTS:      Lab 24  1228 24  0721 24  0526 10/31/24  0343 10/30/24  0307 10/29/24  0332 10/28/24  1802 10/28/24  1339   WBC 8.96 7.21 9.05 9.97 14.30* 6.45   < > 7.42   HEMOGLOBIN 9.7* 10.2* 11.0* 10.2* 10.6* 10.2*   < > 10.1*   HEMATOCRIT 31.6* 31.8* 33.8* 31.9* 33.1* 31.3*   < > 29.0*   PLATELETS 207 175 193 135* 150 113*   < > 115*   NEUTROS ABS  --   --   --   --   --  5.10  --   --    IMMATURE GRANS (ABS)  --   --   --   --   --  0.05  --   --    LYMPHS ABS  --   --   --   --   --  0.77  --   --    MONOS ABS  --   --   --   --   --  0.48  --   --    EOS ABS  --   --   --   --   --  0.03  --   --    MCV 98.1* 94.4 93.9 93.5 95.1 92.3   < > 88.7   PROTIME  --   --   --   --   --  16.2*   --  17.6*   APTT  --   --   --   --   --   --   --  30.4    < > = values in this interval not displayed.         Lab 11/03/24  1228 11/02/24  0741 11/02/24  0352 11/01/24  0526 10/31/24  0343 10/30/24  0307 10/29/24  0636 10/29/24  0332 10/28/24  2254 10/28/24  1802 10/28/24  1339   SODIUM 138  --  134* 137 148* 145  --  140  --  143 142   POTASSIUM 4.5 3.5 3.8 3.3* 4.3 4.6  --  4.7   < > 4.3 3.2*   CHLORIDE 103  --  100 99 112* 112*  --  108*  --  110* 109*   CO2 20.0*  --  18.0* 22.0 24.0 19.0*  --  22.0  --  22.0 22.0   ANION GAP 15.0  --  16.0* 16.0* 12.0 14.0  --  10.0  --  11.0 11.0   BUN 29*  --  40* 42* 32* 20  --  12  --  11 11   CREATININE 0.56*  --  0.83 0.87 0.85 0.88  --  0.77  --  0.84 0.86   EGFR 112.1  --  99.6 98.2 98.9 97.8  --  101.9  --  99.2 98.5   GLUCOSE 190*  --  112* 124* 175* 169*  --  180*  --  138* 117*   CALCIUM 8.1*  --  8.2* 8.8 8.9 9.0  --  8.5*  --  9.1 9.3   IONIZED CALCIUM  --   --   --   --   --   --   --   --   --   --  1.25   MAGNESIUM  --   --   --   --   --   --  2.4 1.9  --  2.2 2.1   PHOSPHORUS  --   --   --   --   --   --   --   --   --  3.5 3.3    < > = values in this interval not displayed.         Lab 11/01/24  0526 10/29/24  0332 10/28/24  1802 10/28/24  1339   TOTAL PROTEIN 6.8 5.7*  --   --    ALBUMIN 3.4* 3.8 4.0 3.3*   GLOBULIN 3.4 1.9  --   --    ALT (SGPT) 44* 8  --   --    AST (SGOT) 107* 56*  --   --    BILIRUBIN 0.4 0.5  --   --    ALK PHOS 126* 47  --   --          Lab 10/29/24  0332 10/28/24  1339   PROTIME 16.2* 17.6*   INR 1.29* 1.43*             Lab 10/31/24  1052   ABO TYPING B   RH TYPING Negative   ANTIBODY SCREEN Negative         Lab 10/29/24  0246 10/28/24  1435 10/28/24  1158   PH, ARTERIAL 7.331* 7.508* 7.43   PCO2, ARTERIAL 43.8 28.5*  --    PO2 ART 70.5* 304.0*  --    FIO2 35 100  --    HCO3 ART 23.1 22.6  --    BASE EXCESS ART -2.8* 0.1  --    CARBOXYHEMOGLOBIN 1.3 1.0  --      Brief Urine Lab Results       None            Microbiology Results  Abnormal       None            XR Chest 1 View    Result Date: 11/4/2024  XR CHEST 1 VW Date of Exam: 11/4/2024 3:56 AM EST Indication: postop Comparison: Chest x-ray 11/3/2024 Findings: Stable cardiomegaly. The lungs are grossly clear. No pleural effusion or pneumothorax.     Impression: Impression: No acute cardiopulmonary findings. Electronically Signed: Enrique Vazquez MD  11/4/2024 7:57 AM EST  Workstation ID: [id number]    XR Chest 1 View    Result Date: 11/3/2024  XR CHEST 1 VW Date of Exam: 11/3/2024 12:54 AM EDT Indication: postop Comparison: Chest x-ray 11/2/2024 Findings: Redemonstration of prior CABG and loop recorder device. Stable cardiomegaly. Similar interstitial prominence without focal consolidation. No definite pneumothorax. Similar haziness of the right costophrenic angle which could reflect small right pleural effusion.     Impression: Impression: No significant interval change. Electronically Signed: Enrique Vazquez MD  11/3/2024 10:34 AM EST  Workstation ID: [id number]     Results for orders placed in visit on 10/28/24    Intra-Op Anesthesia DARIAN    Narrative  Intra-Op Anesthesia DARIAN    Procedure Performed: Intra-Op Anesthesia DARIAN  Start Time:  End Time:    Preanesthesia Checklist:  Patient identified, IV assessed, risks and benefits discussed, monitors and equipment assessed, procedure being performed at surgeon's request and anesthesia consent obtained.    General Procedure Information  Diagnostic Indications for Echo:  assessment of ascending aorta, assessment of surgical repair and hemodynamic monitoring  Physician Requesting Echo: Alex Max MD  Location performed:  OR  Intubated  Bite block not placed  Heart visualized  Probe Insertion:  Easy  Probe Type:  Multiplane  Modalities:  Color flow mapping, continuous wave Doppler and pulse wave Doppler    Echocardiographic and Doppler Measurements    Ventricles    Right Ventricle:  Thrombus not present.  Global function normal.  Left  Ventricle:  Cavity size normal.  Diastolic dimension 1.6 cm.  Thrombus not present.  Global Function normal.  Ejection Fraction 55%.        Valves    Aortic Valve:  Annulus normal.  Stenosis not present.  Area: 2.2 cm².  Mean Gradient: 2 mmHg.  Regurgitation absent.  Leaflets normal.  Leaflet motions normal.    Mitral Valve:  Annulus normal.  Stenosis not present.  Area: 2.4 cm².  Mean Gradient: 1 mmHg.  Regurgitation trace.  Leaflets normal.  Leaflet motions normal.    Tricuspid Valve:  Annulus normal.  Stenosis not present.  Leaflets normal.  Leaflet motions normal.  Pulmonic Valve:  Annulus normal.      Aorta    Ascending Aorta:  Size normal.  Diameter 3 cm.  Dissection not present.  Plaque thickness less than 3 mm.  Mobile plaque not present.  Aortic Arch:  Size normal.  Dissection not present.  Plaque thickness less than 3 mm.  Mobile plaque not present.  Descending Aorta:  Size normal.  Dissection not present.  Plaque thickness less than 3 mm.  Mobile plaque not present.      Atria    Right Atrium:  Size dilated.  Spontaneous echo contrast not present.  Thrombus not present.  Tumor not present.  Device present.    Left Atrium:  Size dilated.  Spontaneous echo contrast not present.  Thrombus not present.  Tumor not present.  Left atrial appendage normal.      Septa        Ventricular Septum:  Intra-ventricular septum morphology normal.    Diastolic Function Measurements:  Diastolic Dysfunction Grade=  E=  ms  A=  ms  E/A Ratio=  1.7  DT=  ms  S/D=  IVRT=    Other Findings  Pericardium:  normal  Pleural Effusion:  none  Pulmonary Arteries:  normal  Pulmonary Venous Flow:  normal    Anesthesia Information  Performed Personally  Anesthesiologist:  George Morrow MD      Echocardiogram Comments:  Informed consent was obtained preoperatively.  Yahir probe passed without difficulty.  Post CABG, MAZE and TORSTEN ligation:   EF unchanged, no NWMA, obliterartion of TORSTEN by clip      Current medications:  Scheduled  Meds:acetaminophen, 650 mg, Oral, Q8H  aspirin, 325 mg, Oral, Daily  atorvastatin, 40 mg, Oral, Nightly  buPROPion XL, 150 mg, Oral, Daily  DULoxetine, 60 mg, Oral, Daily  empagliflozin, 25 mg, Oral, Daily  heparin (porcine), 5,000 Units, Subcutaneous, Q8H  hydroCHLOROthiazide Oral, 12.5 mg, Oral, Daily  insulin glargine, 20 Units, Subcutaneous, Daily  Insulin Lispro, 2 Units, Subcutaneous, TID With Meals  insulin lispro, 2-9 Units, Subcutaneous, 4x Daily AC & at Bedtime  levothyroxine, 50 mcg, Oral, Q AM  losartan, 50 mg, Oral, Q24H  pharmacy consult - MTM, , Does not apply, Daily  senna-docusate sodium, 2 tablet, Oral, BID  sotalol, 120 mg, Oral, Q12H  tamsulosin, 0.4 mg, Oral, Daily      Continuous Infusions:dexmedetomidine, 0.2-1.5 mcg/kg/hr, Last Rate: Stopped (10/31/24 0530)      PRN Meds:.  bisacodyl    Calcium Replacement - Follow Nurse / BPA Driven Protocol    dextrose    dextrose    glucagon (human recombinant)    HYDROcodone-acetaminophen    Magnesium Cardiology Dose Replacement - Follow Nurse / BPA Driven Protocol    nitroglycerin    ondansetron    oxyCODONE    Phosphorus Replacement - Follow Nurse / BPA Driven Protocol    polyethylene glycol    Potassium Replacement - Follow Nurse / BPA Driven Protocol    sodium bicarbonate    Assessment & Plan   Assessment & Plan     Active Hospital Problems    Diagnosis  POA    **CAD s/p CABG x 4, MAZE, LAAL 10/28/24 [I25.10]  Yes    Primary hypertension [I10]  Yes    DM (diabetes mellitus), type 2 [E11.9]  Yes    Hyperlipidemia LDL goal <70 [E78.5]  Yes    Paroxysmal atrial fibrillation [I48.0]  Yes      Resolved Hospital Problems   No resolved problems to display.        Brief Hospital Course to date:  Jeol Galo is a 59 y.o. male  w/ DM, HL, hypothyroidism, neuropathy & depression who is s/p CABG & TORSTEN ligation for multivessel cad & afib on 10/28/24. Transferred out of icu and hospitalists following for medical co-management & DM management    Plan was partially  entered by my partner and I have reviewed and updated as appropriate on 11/4/24     Multivessel CAD  Parox afib  HTN  HL  **s/p CABG x 3 x TORSTEN ligation 10/28/24  -post-op management per primary ct surgery team  -cards following: continue asa, sotalol, losartan, statin, jardiance     Urinary retention  -Urology consulted (Dr. Pepe), recommends [place]  x 5-7 days (per 11/2/24 note), continue flomax, follow up w/ local urologist (near [place]) for voiding trial after discharge     Insulin dep DM2 (A1c 11.2)  -on 70/30 regimen at home, admits to dietary non-compliance  - lantus to 20 units sq nightly; humalog 2 units tid meals, change sliding scale to medium dose; adjust insulins prn     Neuropathy  Depression  -continue cymbalta & wellbutrin     Hypothyroidism  -continue levothyrosine     Dispo: plan appears to be SNF at discharge which seems reasonable       Expected Discharge Location and Transportation: rehab   Expected Discharge   Expected Discharge Date: 11/6/2024; Expected Discharge Time:      VTE Prophylaxis:  Pharmacologic & mechanical VTE prophylaxis orders are present.         AM-PAC 6 Clicks Score (PT): 17 (11/04/24 0155)    CODE STATUS:   Code Status and Medical Interventions: CPR (Attempt to Resuscitate); Full Support   Ordered at: 10/28/24 2335     Code Status (Patient has no pulse and is not breathing):    CPR (Attempt to Resuscitate)     Medical Interventions (Patient has pulse or is breathing):    Full Support       Jackelin Adams, ZIA  11/04/24

## 2024-11-04 NOTE — THERAPY TREATMENT NOTE
"Patient Name: Joel Galo  : 1965    MRN: [id number]                              Today's Date: 2024       Admit Date: 10/28/2024    Visit Dx:     ICD-10-CM ICD-9-CM   1. ASCVD (arteriosclerotic cardiovascular disease)  I25.10 429.2     440.9     Patient Active Problem List   Diagnosis    Paroxysmal atrial fibrillation    Obstructive sleep apnea    Hypothyroidism (acquired)    GERD (gastroesophageal reflux disease)    Chronic anticoagulation    DM (diabetes mellitus), type 2    Hyperlipidemia LDL goal <70    Primary hypertension    CAD s/p CABG x 4, MAZE, LAAL 10/28/24     Past Medical History:   Diagnosis Date    A-fib     Anxiety     Arthritis     DDD (degenerative disc disease), lumbar     Deep vein thrombosis     Depression     Diabetes     Disease of thyroid gland     HYPO    Heart attack     \"5-6 years ago\"    Hyperlipidemia     Hypertension     Kidney stone     Neuropathy     Sleep apnea     DOES NOT USE CPAP    UTI (urinary tract infection)      Past Surgical History:   Procedure Laterality Date    ATRIAL APPENDAGE EXCLUSION LEFT WITH TRANSESOPHAGEAL ECHOCARDIOGRAM N/A 10/28/2024    Procedure: ATRIAL APPENDAGE EXCLUSION LEFT WITH TRANSESOPHAGEAL ECHOCARDIOGRAM;  Surgeon: Alex Max MD;  Location: [facility] OR;  Service: Cardiothoracic;  Laterality: N/A;    CARDIAC CATHETERIZATION N/A 2024    Procedure: Left Heart Cath;  Surgeon: Julio Neff MD;  Location:  [facility];  Service: Cardiology;  Laterality: N/A;    CARDIAC ELECTROPHYSIOLOGY PROCEDURE N/A 2023    Procedure: Loop insertion;  Surgeon: Tariq Chávez MD;  Location:  [facility];  Service: Cardiovascular;  Laterality: N/A;    CATARACT EXTRACTION Bilateral     COLONOSCOPY      CORONARY ARTERY BYPASS GRAFT N/A 10/28/2024    Procedure: MEDIAN STERNOTOMY, CORONARY ARTERY BYPASS GRAFTING X4 UTILIZING THE LEFT INTERNAL MAMMARY ARTERY GRAFT, ENDOSCOPIC VEIN HARVEST OF THE GREATER RIGHT " SAPHENOUS VEIN;  Surgeon: Alex Max MD;  Location:  [facility];  Service: Cardiothoracic;  Laterality: N/A;    ENDOSCOPY      MAZE PROCEDURE N/A 10/28/2024    Procedure: MAZE PROCEDURE;  Surgeon: Alex Max MD;  Location:  [facility] OR;  Service: Cardiothoracic;  Laterality: N/A;    TEETH EXTRACTION        General Information       Row Name 11/04/24 1202          OT Time and Intention    Document Type therapy note (daily note)  -HARISH     Mode of Treatment occupational therapy  -HARISH       Row Name 11/04/24 1202          General Information    Patient Profile Reviewed yes  -HARISH     Existing Precautions/Restrictions cardiac;fall;sternal;other (see comments)  Prado catheter  -HARISH     Barriers to Rehab medically complex  -HARISH       Row Name 11/04/24 1202          Cognition    Orientation Status (Cognition) oriented x 3  -HARISH       Row Name 11/04/24 1202          Safety Issues/Impairments Affecting Functional Mobility    Safety Issues Affecting Function (Mobility) insight into deficits/self-awareness;problem-solving;safety precaution awareness  -HARISH     Impairments Affecting Function (Mobility) balance;endurance/activity tolerance;postural/trunk control;shortness of breath;strength  -HARISH     Cognitive Impairments, Mobility Safety/Performance awareness, need for assistance;insight into deficits/self-awareness;judgment;problem-solving/reasoning;safety precaution awareness;safety precaution follow-through  -HARISH               User Key  (r) = Recorded By, (t) = Taken By, (c) = Cosigned By      Initials Name Provider Type    Nena Cuadra OT Occupational Therapist                     Mobility/ADL's       Row Name 11/04/24 1203          Bed Mobility    Bed Mobility supine-sit;sit-supine  -HARISH     Supine-Sit [facility] (Bed Mobility) minimum assist (75% patient effort);1 person assist;verbal cues  -HARISH     Sit-Supine [facility] (Bed Mobility) contact guard;verbal cues  -HARISH     Assistive Device (Bed Mobility) head of bed elevated   -HARISH     Comment, (Bed Mobility) assist for trunk control for supine to sit; cues for sequencing  -HARISH       Row Name 11/04/24 1203          Transfers    Transfers sit-stand transfer  -HARISH       Row Name 11/04/24 1203          Sit-Stand Transfer    Sit-Stand [place] (Transfers) minimum assist (75% patient effort);verbal cues  -HARISH     Assistive Device (Sit-Stand Transfers) walker, front-wheeled  -HARISH     Comment, (Sit-Stand Transfer) cues for sternal precautions  -HARISH       Row Name 11/04/24 1203          Activities of Daily Living    BADL Assessment/Intervention grooming;toileting  -HARISH       Row Name 11/04/24 1203          Grooming Assessment/Training    [place] Level (Grooming) hair care, combing/brushing;oral care regimen;wash face, hands;supervision  -HARISH     Position (Grooming) sink side;supported standing  -HARISH       Row Name 11/04/24 1203          Toileting Assessment/Training    [place] Level (Toileting) adjust/manage clothing;perform perineal hygiene;moderate assist (50% patient effort)  -HARISH     Assistive Devices (Toileting) commode, bedside without drop arms  -HARISH     Position (Toileting) supported standing  -HARISH     Comment, (Toileting) Pt able to manage clothing with Siri for standing balance, Dep for hygiene  -HARISH               User Key  (r) = Recorded By, (t) = Taken By, (c) = Cosigned By      Initials Name Provider Type    Nena Cuadra OT Occupational Therapist                   Obj/Interventions       Row Name 11/04/24 1209          Balance    Dynamic Standing Balance minimal assist  -HARISH     Position/Device Used, Standing Balance supported;walker, front-wheeled  -HARISH     Balance Interventions standing;dynamic;occupation based/functional task  -HARISH     Comment, Balance Siri for clothing management, Dep for toileting hygiene in standing  -HARISH               User Key  (r) = Recorded By, (t) = Taken By, (c) = Cosigned By      Initials Name Provider Type    Nena Cuadra OT Occupational  Therapist                   Goals/Plan    No documentation.                  Clinical Impression       Row Name 11/04/24 1210          Pain Assessment    Pain Side/Orientation generalized  -HARISH     Pain Management Interventions exercise or physical activity utilized  -HARISH     Response to Pain Interventions activity participation with tolerable pain  -HARISH     Pre/Posttreatment Pain Comment RN aware and managing  -       Row Name 11/04/24 1210          Pain Scale: FACES Pre/Post-Treatment    Pain: FACES Scale, Pretreatment 2-->hurts little bit  -HARISH     Posttreatment Pain Rating 2-->hurts little bit  -HARISH       Row Name 11/04/24 1210          Plan of Care Review    Plan of Care Reviewed With patient  -HARISH     Progress improving  -HARISH     Outcome Evaluation Pt continues to present below baseline for ADL's, limited by generalized weakness, decreased activity tolerance, and balance deficits. Pt complete grooming tasks standing sink side with SUP, ModA for toileting tasks, CG to Siri for functional transfers. Continue to recommend IRF at discharge for optimal outcomes.  -       Row Name 11/04/24 1210          Therapy Plan Review/Discharge Plan (OT)    Anticipated Discharge Disposition (OT) inpatient rehabilitation facility  -       Row Name 11/04/24 1210          Vital Signs    Pre Systolic BP Rehab 160  -HARISH     Pre Treatment Diastolic BP 88  -HARISH     Post Systolic BP Rehab 152  -HARISH     Post Treatment Diastolic BP 95  -HARISH     Posttreatment Heart Rate (beats/min) 72  -HARISH     Pre SpO2 (%) 95  -HARISH     O2 Delivery Pre Treatment room air  -HARISH     O2 Delivery Intra Treatment room air  -HARISH     Post SpO2 (%) 92  -HARISH     O2 Delivery Post Treatment room air  -HARISH     Pre Patient Position Supine  -HARISH     Intra Patient Position Standing  -HARISH     Post Patient Position Supine  -HARISH       Row Name 11/04/24 1210          Positioning and Restraints    Pre-Treatment Position in bed  -HARISH     Post Treatment Position bed  -HARISH     In Bed notified  nsg;fowlers;call light within reach;encouraged to call for assist;exit alarm on  -HARISH               User Key  (r) = Recorded By, (t) = Taken By, (c) = Cosigned By      Initials Name Provider Type    Nena Cuadra OT Occupational Therapist                   Outcome Measures       Row Name 11/04/24 1215          How much help from another is currently needed...    Putting on and taking off regular lower body clothing? 1  -HARISH     Bathing (including washing, rinsing, and drying) 2  -HARISH     Toileting (which includes using toilet bed pan or urinal) 2  -HARISH     Putting on and taking off regular upper body clothing 3  -HARISH     Taking care of personal grooming (such as brushing teeth) 3  -HARISH     Eating meals 3  -HARISH     AM-PAC 6 Clicks Score (OT) 14  -HARISH       Row Name 11/04/24 0959          How much help from another person do you currently need...    Turning from your back to your side while in flat bed without using bedrails? 3  -LO     Moving from lying on back to sitting on the side of a flat bed without bedrails? 3  -LO     Moving to and from a bed to a chair (including a wheelchair)? 3  -LO     Standing up from a chair using your arms (e.g., wheelchair, bedside chair)? 3  -LO     Climbing 3-5 steps with a railing? 2  -LO     To walk in hospital room? 3  -LO     AM-PAC 6 Clicks Score (PT) 17  -LO     Highest Level of Mobility Goal 5 --> Static standing  -LO       Row Name 11/04/24 1215 11/04/24 0959       Functional Assessment    Outcome Measure Options AM-PAC 6 Clicks Daily Activity (OT)  -HARISH AM-PAC 6 Clicks Basic Mobility (PT)  -LO              User Key  (r) = Recorded By, (t) = Taken By, (c) = Cosigned By      Initials Name Provider Type    Nena Cuadra OT Occupational Therapist    Bettye Hancock PT Physical Therapist                    Occupational Therapy Education       Title: PT OT SLP Therapies (In Progress)       Topic: Occupational Therapy (In Progress)       Point: ADL training (Done)        Description:   Instruct learner(s) on proper safety adaptation and remediation techniques during self care or transfers.   Instruct in proper use of assistive devices.                  Learning Progress Summary            Patient Acceptance, E, VU,NR,DU by HARISH at 11/4/2024 1215    Comment: BADL's; transfer training; benefits of activity                      Point: Home exercise program (Not Started)       Description:   Instruct learner(s) on appropriate technique for monitoring, assisting and/or progressing therapeutic exercises/activities.                  Learner Progress:  Not documented in this visit.              Point: Precautions (Done)       Description:   Instruct learner(s) on prescribed precautions during self-care and functional transfers.                  Learning Progress Summary            Patient Acceptance, E, VU,NR,DU by HARISH at 11/4/2024 1215    Comment: BADL's; transfer training; benefits of activity    Acceptance, E,D, NR by TA at 10/30/2024 0905                      Point: Body mechanics (Done)       Description:   Instruct learner(s) on proper positioning and spine alignment during self-care, functional mobility activities and/or exercises.                  Learning Progress Summary            Patient Acceptance, E, VU,NR,DU by HARISH at 11/4/2024 1215    Comment: BADL's; transfer training; benefits of activity    Acceptance, E,D, NR by TA at 10/30/2024 0905                                      User Key       Initials Effective Dates Name Provider Type Discipline    YESY 06/16/21 -  Marbin Benjamin, OT Occupational Therapist OT    HARISH 06/16/21 -  Nena Schulz OT Occupational Therapist OT                  OT Recommendation and Plan     Plan of Care Review  Plan of Care Reviewed With: patient  Progress: improving  Outcome Evaluation: Pt continues to present below baseline for ADL's, limited by generalized weakness, decreased activity tolerance, and balance deficits. Pt complete grooming tasks  standing sink side with SUP, ModA for toileting tasks, CG to Siri for functional transfers. Continue to recommend IRF at discharge for optimal outcomes.     Time Calculation:         Time Calculation- OT       Row Name 11/04/24 1136 11/04/24 0923          Time Calculation- OT    OT Start Time 1136  -HARISH --     OT Received On 11/04/24 -JB --        Timed Charges    36600 - Gait Training Minutes  -- 15  -LO     03130 - OT Self Care/Mgmt Minutes 24 -JB --        Total Minutes    Timed Charges Total Minutes 24 -HARISH 15  -LO      Total Minutes 24 -HARISH 15  -LO               User Key  (r) = Recorded By, (t) = Taken By, (c) = Cosigned By      Initials Name Provider Type    Nena Cuadra, OT Occupational Therapist    LO Bettye Handley, MAEGAN Physical Therapist                  Therapy Charges for Today       Code Description Service Date Service Provider Modifiers Qty    [id number] HC OT SELF CARE/MGMT/TRAIN EA 15 MIN 11/4/2024 Nena Schulz OT GO 2                 Nena Schulz OT  11/4/2024

## 2024-11-04 NOTE — CASE MANAGEMENT/SOCIAL WORK
Continued Stay Note  [facility]     Patient Name: Joel Galo  MRN: [id number]  Today's Date: 11/4/2024    Admit Date: 10/28/2024    Plan: [facility]   Discharge Plan       Row Name 11/04/24 1530       Plan    Plan [facility]    Patient/Family in Agreement with Plan yes    Plan Comments Spoke with patient at bedside to discuss discharge plans. Therapy recommending SNF at discharge and patient is agreeable. Spoke with Evelin at [facility] who states they can offer a bed and start precert for insurance approval tomorrow, 11/5. Patient was agreeable with this plan. CM will continue to follow and assist with discharge planning.    Final Discharge Disposition Code 03 - skilled nursing facility (SNF)                   Discharge Codes    No documentation.                 Expected Discharge Date and Time       Expected Discharge Date Expected Discharge Time    Nov 6, 2024               Ramin Germain RN

## 2024-11-04 NOTE — DISCHARGE PLACEMENT REQUEST
"Iraj Joel ARELLANO (59 y.o. Male)     Ramin Germain [phone]      Date of Birth   1965    Social Security Number       Address   [address] [place] [address]    Home Phone   [phone]    MRN   [id number]       Congregation   Zoroastrian    Marital Status                               Admission Date   10/28/24    Admission Type   Elective    Admitting Provider   Alex Max MD    Attending Provider   Alex Max MD    Department, Room/Bed   [address], S484/1       Discharge Date       Discharge Disposition       Discharge Destination                                 Attending Provider: Alex Max MD    Allergies: No Known Allergies    Isolation: None   Infection: None   Code Status: CPR    Ht: 172.7 cm (67.99\")   Wt: 104 kg (230 lb)    Admission Cmt: None   Principal Problem: CAD s/p CABG x 4, LAURY CLINE 10/28/24 [I25.10]                   Active Insurance as of 10/28/2024       Primary Coverage       Payor Plan Insurance Group Employer/Plan Group    ANTHEM MEDICARE REPLACEMENT ANTHEM MEDICARE ADVANTAGE [id number]       Payor Plan Address Payor Plan Phone Number Payor Plan Fax Number Effective Dates    [address] [phone]  1/1/2024 - None Entered    [facility] [id number]         Subscriber Name Subscriber Birth Date Member ID       JOEL GUTIERREZ 1965 [id number]                     Emergency Contacts        (Rel.) Home Phone Work Phone Mobile Phone    LISHA GUTIERREZ (Spouse) [phone] -- [phone]                 History & Physical        Manuela Sprague APRN at 10/28/24 0639       Attestation signed by Alex Max MD at 10/28/24 1720    I have reviewed this documentation and agree.  Plan for CABGANAND                    Pre-Op H&P  Joel Gutierrez  [id number]  1965      Chief complaint: Chest pain      Subjective:  Patient is a 59 y.o.male presents for scheduled surgery by Dr. Max. He anticipates a " CORONARY ARTERY BYPASS GRAFTING; ENDOSCOPIC VEIN HARVEST; RADIAL ARTERY HARVEST; ATRIAL APPENDAGE EXCLUSION LEFT WITH TRANSESOPHAGEAL ECHOCARDIOGRAM  today. He reports intermittent episodes of shortness of breath and chest pain. He had abnormal CTA coronaries. He had cardiac cath 8/28/24 that showed mid LAD 75% stenosis, distal LAD 95% stenosis, first marginal 90% stenosis, third marginal 99% stenosis, ostial RCA 70% stenosis, mid RCA 70% stenosis, and RV branch 99% stenosis. Left ventriculogram during cardiac cath noted EF 55%.       Review of Systems:  Constitutional-- No fever, chills or sweats. + fatigue.  CV-- No palpitation or syncope. +HTN, HLD, CAD, afib, chest pain  Resp-- No cough, hemoptysis. +SOB, MAGDALENA no cpap  Skin--No rashes or lesions      Allergies: No Known Allergies      Home Meds:  Medications Prior to Admission   Medication Sig Dispense Refill Last Dose/Taking    amLODIPine (NORVASC) 2.5 MG tablet Take 1 tablet by mouth Daily.   10/27/2024 Evening    aspirin 81 MG EC tablet Take 1 tablet by mouth Daily.   10/27/2024 Evening    buPROPion XL (WELLBUTRIN XL) 150 MG 24 hr tablet Take 1 tablet by mouth Daily.   10/27/2024 Evening    docusate sodium (COLACE) 100 MG capsule Take 1 capsule by mouth 2 (Two) Times a Day.   10/27/2024 Evening    DULoxetine (CYMBALTA) 60 MG capsule Take 1 capsule by mouth Daily.   10/27/2024 Morning    empagliflozin (JARDIANCE) 25 MG tablet tablet Take 1 tablet by mouth Daily.   10/27/2024 Morning    finasteride (PROSCAR) 5 MG tablet TAKE 1 TABLET EVERY DAY (Patient taking differently: Take 1 tablet by mouth Daily.) 90 tablet 0 10/27/2024 Morning    gabapentin (NEURONTIN) 300 MG capsule Take 1 capsule by mouth 3 (Three) Times a Day. TAKES 2OOMG IN AM AND 300MG IN PM USUALLY   10/27/2024    HumuLIN 70/30 KwikPen (70-30) 100 UNIT/ML suspension pen-injector 45 units in the a.m. and 20mg in the p.m.   10/27/2024    HYDROcodone-acetaminophen (NORCO)  MG per tablet Take 1  tablet by mouth Every 8 (Eight) Hours As Needed for Moderate Pain.   10/27/2024 Evening    isosorbide mononitrate (IMDUR) 30 MG 24 hr tablet Take 1 tablet by mouth Daily. 30 tablet 11 10/27/2024 Evening    levothyroxine (SYNTHROID, LEVOTHROID) 50 MCG tablet Take 1 tablet by mouth Every Morning.   10/27/2024 Morning    losartan (COZAAR) 25 MG tablet Take 1 tablet by mouth Daily.   10/27/2024 Morning    nitroglycerin (NITROSTAT) 0.4 MG SL tablet 1 under the tongue as needed for angina, may repeat q5mins for up three doses (Patient taking differently: Place 1 tablet under the tongue Every 5 (Five) Minutes As Needed for Chest Pain. 1 under the tongue as needed for angina, may repeat q5mins for up three doses) 30 tablet 3 Past Week    polyethylene glycol (MIRALAX) 17 g packet Take 17 g by mouth Daily.   10/27/2024    rosuvastatin (CRESTOR) 20 MG tablet Take 1 tablet by mouth Daily. (Patient taking differently: Take 1 tablet by mouth Every Night.) 90 tablet 1 10/27/2024 Evening    sotalol (BETAPACE) 80 MG tablet TAKE ONE AND A HALF (1 & 1/2) TABLETS BY MOUTH TWICE DAILY 270 tablet 0 10/27/2024 Evening    Sotalol HCl AF 80 MG tablet Take 1.5 tablets by mouth 2 (Two) Times a Day. 270 tablet 3 10/27/2024 at  8:30 PM    tamsulosin (FLOMAX) 0.4 MG capsule 24 hr capsule TAKE 1 CAPSULE EVERY DAY (Patient taking differently: 1 capsule Daily. Swallow whole. Do not crush or chew.) 90 capsule 3 10/27/2024 Morning    Vitamin D, Cholecalciferol, (CHOLECALCIFEROL) 10 MCG (400 UNIT) tablet Take 1 tablet by mouth Daily.   10/27/2024 Morning    apixaban (ELIQUIS) 5 MG tablet tablet Take 1 tablet by mouth 2 (Two) Times a Day. (Patient taking differently: Take 1 tablet by mouth 2 (Two) Times a Day. LAST DOSE WILL BE ON 10/25/24) 60 tablet 4 10/25/2024    glimepiride (AMARYL) 2 MG tablet Take 1 tablet by mouth 2 (Two) Times a Day.            PMH:   Past Medical History:   Diagnosis Date    A-fib     Anxiety     Arthritis     DDD  "(degenerative disc disease), lumbar     Deep vein thrombosis     Depression     Diabetes     Disease of thyroid gland     HYPO    Heart attack     \"5-6 years ago\"    Hyperlipidemia     Hypertension     Kidney stone     Neuropathy     Sleep apnea     DOES NOT USE CPAP    UTI (urinary tract infection)      PSH:    Past Surgical History:   Procedure Laterality Date    CARDIAC CATHETERIZATION N/A 08/28/2024    Procedure: Left Heart Cath;  Surgeon: Julio Neff MD;  [facility];  Service: Cardiology;  Laterality: N/A;    CARDIAC ELECTROPHYSIOLOGY PROCEDURE N/A 04/05/2023    Procedure: Loop insertion;  Surgeon: Tariq Chávez MD;  [facility]  [facility];  Service: Cardiovascular;  Laterality: N/A;    CATARACT EXTRACTION Bilateral     COLONOSCOPY      ENDOSCOPY      TEETH EXTRACTION         Immunization History:  Influenza: No  Pneumococcal: No  Tetanus: UTD    Social History:   Tobacco:   Social History     Tobacco Use   Smoking Status Never   Smokeless Tobacco Never      Alcohol:     Social History     Substance and Sexual Activity   Alcohol Use No         Physical Exam:/98 (BP Location: Left arm, Patient Position: Lying)   Pulse 75   Temp 98.1 °F (36.7 °C) (Temporal)   Resp 18   SpO2 93%       General Appearance:    Alert, cooperative, no distress, appears stated age   Head:    Normocephalic, without obvious abnormality, atraumatic   Lungs:     Clear to auscultation bilaterally, respirations unlabored    Heart:   Regular rate and rhythm, S1 and S2 normal    Abdomen:    Soft without tenderness   Extremities:   Extremities normal, atraumatic, no cyanosis or edema   Skin:   Skin color, texture, turgor normal, no rashes or lesions   Neurologic:   Grossly intact     Results Review:     LABS:  Lab Results   Component Value Date    WBC 6.82 08/26/2024    HGB 14.6 08/26/2024    HCT 43.7 08/26/2024    MCV 91.8 08/26/2024     08/26/2024    GLUCOSE 391 (H) " 10/25/2024    BUN 10 10/25/2024    CREATININE 0.91 10/25/2024    EGFRIFNONA 86 12/15/2021    EGFRIFAFRI 99 12/15/2021     10/25/2024    K 4.7 10/25/2024    CL 99 10/25/2024    CO2 26.0 10/25/2024    MG 2.6 (H) 10/25/2024    CALCIUM 9.5 10/25/2024    ALBUMIN 4.0 10/25/2024    AST 24 10/25/2024    ALT 16 10/25/2024    BILITOT 0.2 10/25/2024      Latest Reference Range & Units 10/25/24 13:46   Hemoglobin A1C 4.80 - 5.60 % 11.20 (H)   (H): Data is abnormally high    RADIOLOGY:  ECHO 10/17/24:  Clinical Indication    Other Reasons; Additional Reasons; Reasons Uncertain in Most Circumstances; Other (Please Comment) - CAD/ PREOP CABG   Dx: Coronary artery disease involving native coronary artery of native heart, unspecified whether angina present [I25.10 (ICD-10-CM)]     Interpretation Summary         Normal left ventricular cavity size and wall thickness noted. All left ventricular wall segments contract normally.    Left ventricular ejection fraction appears to be 61 - 65%.    The aortic valve is not well visualized. No aortic valve regurgitation or stenosis is present. The aortic valve is grossly normal in structure.    The mitral valve is structurally normal with no regurgitation or significant stenosis present.    There is no evidence of pericardial effusion. .    8/28/24 heart cath:    Indications    Paroxysmal atrial fibrillation [I48.0 (ICD-10-CM)]   Mixed hyperlipidemia [E78.2 (ICD-10-CM)]   Primary hypertension [I10 (ICD-10-CM)]   Chronic chest pain with high risk for CAD [R07.9, G89.29, Z91.89 (ICD-10-CM)]     Pre Procedure Diagnosis    Coronary artery disease    Post Procedure Diagnosis    Coronary artery disease      Conclusion         Normal systolic function.    Mid LAD lesion is 75% stenosed.    Dist LAD lesion is 95% stenosed.    1st Mrg lesion is 90% stenosed.    3rd Mrg lesion is 99% stenosed.    Ost RCA lesion is 70% stenosed.    Mid RCA lesion is 70% stenosed.    RV Branch lesion is 99%  stenosed.     1.  Cardiac.  Patient with significant coronary artery disease involving multiple vessels.  Patient will be referred for bypass surgery.    Study Result    Narrative & Impression   EXAM:    CT Angiography Heart With Intravenous Contrast     EXAM DATE:    8/10/2024 9:49 AM     CLINICAL HISTORY:    ; R07.2-Precordial pain     TECHNIQUE:    Axial computed tomographic angiography images of the coronary arteries  with intravenous contrast.  Sublingual nitroglycerine for coronary  vasodilation and IV metoprolol to reduce heart rate were administered as  needed.  This CT exam was performed using one or more of the following  dose reduction techniques:  automated exposure control, adjustment of  the mA and/or kV according to patient size, and/or use of iterative  reconstruction technique.    MIP reconstructed images were created and reviewed.     COMPARISON:    None.     FINDINGS:    LEFT MAIN CORONARY ARTERY:  Left main calcium score 48.    LEFT ANTERIOR DESCENDING ARTERY:  The LAD demonstrates such extensive  mid-distal calcification that the examination is very limited with also  some possible high risk plaque causing about 50% stenosis in the mid  aspect of the LAD.  LAD calcium score of 686.    LEFT CIRCUMFLEX ARTERY:  Question proximal circumflex origin high risk  plaque causing about 75% stenosis.  Circumflex calcium score 62.    RIGHT CORONARY ARTERY:  Mixed calcific and high risk plaque of  proximal RCA possibly contribute up to about 75% or greater stenosis and  should be further evaluated with ICA.  RCA calcium score of 331.       CARDIAC VALVES:  Unremarkable as visualized.  No evidence of  calcification in the aortic or mitral valve leaflets.    PERICARDIUM:  Unremarkable as visualized.  Contour is preserved with  no effusion, thickening or calcification.       AORTA:  No acute findings.  No thoracic aortic aneurysm.  No  dissection.    PULMONARY ARTERIES:  Unremarkable as visualized.  No  pulmonary  embolism.    LUNGS AND PLEURAL SPACES:  Unremarkable as visualized.  No mass.  No  consolidation or edema.  No pleural effusion or thickening.  No  pneumothorax.    MEDIASTINUM:  Unremarkable as visualized.  No mass.    OTHER FINDINGS:  Total calcium score of 1895.     IMPRESSION:  1.  The LAD demonstrates such extensive mid-distal calcification that  the examination is very limited with also some possible high risk plaque  causing about 50% stenosis in the mid aspect of the LAD.  2.  Question proximal circumflex origin high risk plaque causing about  75% stenosis.  3.  Mixed calcific and high risk plaque of proximal RCA possibly  contribute up to about 75% or greater stenosis and should be further  evaluated with ICA.     ASSESSMENT:    CAD RADS N, P4 but overall concerning for significant multifocal  disease in ICA should be considered. Impression.       I reviewed the patient's new clinical results.    Cancer Staging (if applicable)  Cancer Patient: __ yes __no __unknown; If yes, clinical stage T:__ N:__M:__, stage group or __N/A      Impression: Coronary artery disease involving native coronary artery of native heart       Plan: CORONARY ARTERY BYPASS GRAFTING; ENDOSCOPIC VEIN HARVEST; RADIAL ARTERY HARVEST; ATRIAL APPENDAGE EXCLUSION LEFT WITH TRANSESOPHAGEAL ECHOCARDIOGRAM       Manuela Sprague, APRN   10/28/2024   06:39 EDT    Electronically signed by Alex Max MD at 10/28/24 1720       Vital Signs (last day)       Date/Time Temp Temp src Pulse Resp BP Patient Position SpO2    11/04/24 0700 98.7 (37.1) Oral 65 18 147/84 Sitting 95    11/04/24 0429 97.9 (36.6) Oral -- 18 155/88 Lying --    11/03/24 2350 97.9 (36.6) Oral 66 18 147/91 Lying 92    11/03/24 1930 97.6 (36.4) Oral 68 18 112/74 Lying 93    11/03/24 1602 98.7 (37.1) Oral 63 18 136/79 Lying 91    11/03/24 1400 -- -- 67 -- -- -- 92    11/03/24 1200 -- -- 68 -- -- -- 97    11/03/24 1058 -- -- 68 -- -- -- 95    11/03/24 1057 -- -- 68 --  -- -- 87    11/03/24 1033 98.6 (37) Oral 74 18 141/84 Lying 94    11/03/24 1000 -- -- 135 -- -- -- 94    11/03/24 0826 -- -- 67 -- 157/88 -- 95    11/03/24 0800 -- -- 67 -- -- -- 95    11/03/24 0706 98.9 (37.2) Oral -- 18 163/92 Lying --    11/03/24 0438 98.7 (37.1) Oral 64 18 164/91 Lying 93    11/03/24 0200 -- -- 61 -- -- -- 97    11/03/24 0000 -- -- 63 -- -- -- 93          Current Facility-Administered Medications   Medication Dose Route Frequency Provider Last Rate Last Admin    acetaminophen (TYLENOL) tablet 650 mg  650 mg Oral Q8H PrebbKaleb allen RPH   650 mg at 11/04/24 0536    aspirin EC tablet 325 mg  325 mg Oral Daily Whitney Butler PA-C   325 mg at 11/03/24 0835    atorvastatin (LIPITOR) tablet 40 mg  40 mg Oral Nightly Whitney Butler PA-C   40 mg at 11/03/24 2147    bisacodyl (DULCOLAX) suppository 10 mg  10 mg Rectal Daily PRN Whitney Butler PA-C        buPROPion XL (WELLBUTRIN XL) 24 hr tablet 150 mg  150 mg Oral Daily Whitney Butler PA-C   150 mg at 11/03/24 0834    Calcium Replacement - Follow Nurse / BPA Driven Protocol   Does not apply PRN Whitney Butler PA-C        dexmedetomidine (PRECEDEX) 400 mcg in 100 mL NS infusion  0.2-1.5 mcg/kg/hr Intravenous Titrated Tatiana Waller APRN   Stopped at 10/31/24 0530    dextrose (D50W) (25 g/50 mL) IV injection 25 g  25 g Intravenous Q15 Min PRN Whitney Butler PA-C        dextrose (GLUTOSE) oral gel 15 g  15 g Oral Q15 Min PRN Whitney Butler PA-C        DULoxetine (CYMBALTA) DR capsule 60 mg  60 mg Oral Daily Whitney Butler PA-C   60 mg at 11/03/24 0834    empagliflozin (JARDIANCE) tablet 25 mg  25 mg Oral Daily Tatiana Waller APRN   25 mg at 11/03/24 0835    glucagon (GLUCAGEN) injection 1 mg  1 mg Intramuscular Q15 Min PRN Whitney Butler PA-C        heparin (porcine) 5000 UNIT/ML injection 5,000 Units  5,000 Units Subcutaneous Q8H Whitney Butler PA-C   5,000 Units at 11/03/24 2146    hydroCHLOROthiazide tablet 12.5 mg  12.5 mg Oral  Daily Leda Gtz APRN        HYDROcodone-acetaminophen (NORCO) 7.5-325 MG per tablet 1 tablet  1 tablet Oral Q4H PRN Whitney Butler PA-C   1 tablet at 11/03/24 0834    insulin glargine (LANTUS, SEMGLEE) injection 20 Units  20 Units Subcutaneous Daily Jason Moreno MD   20 Units at 11/03/24 0837    Insulin Lispro (humaLOG) injection 2 Units  2 Units Subcutaneous TID With Meals Jason Moreno MD   2 Units at 11/03/24 1747    Insulin Lispro (humaLOG) injection 2-9 Units  2-9 Units Subcutaneous 4x Daily AC & at Bedtime Jason Moreno MD   4 Units at 11/03/24 2146    levothyroxine (SYNTHROID, LEVOTHROID) tablet 50 mcg  50 mcg Oral Q AM Whitney Butler PA-C   50 mcg at 11/04/24 0536    losartan (COZAAR) tablet 50 mg  50 mg Oral Q24H Nena Charles PA-C        Magnesium Cardiology Dose Replacement - Follow Nurse / BPA Driven Protocol   Does not apply PRN Whitney Butler PA-C        nitroglycerin (NITROSTAT) SL tablet 0.4 mg  0.4 mg Sublingual Q5 Min PRN Whitney Butler PA-C        ondansetron (ZOFRAN) injection 4 mg  4 mg Intravenous Q6H PRN Whitney Butler PA-C   4 mg at 11/03/24 1031    oxyCODONE (ROXICODONE) immediate release tablet 10 mg  10 mg Oral Q4H PRN Whitney Butler PA-C   10 mg at 11/03/24 0215    Pharmacy Consult - MTM   Does not apply Daily Whitney Butler PA-C        Phosphorus Replacement - Follow Nurse / BPA Driven Protocol   Does not apply PRN Whitney Butler PA-C        polyethylene glycol (MIRALAX) packet 17 g  17 g Oral Daily PRN Whitney Butler PA-C        Potassium Replacement - Follow Nurse / BPA Driven Protocol   Does not apply PRN Whitney Butler PA-C        sennosides-docusate (PERICOLACE) 8.6-50 MG per tablet 2 tablet  2 tablet Oral BID Whitney Butler PA-C   2 tablet at 11/03/24 2145    sodium bicarbonate injection 8.4% 50 mEq  50 mEq Intravenous Once PRN hWitney Butler PA-C        sotalol (BETAPACE) tablet 120 mg  120 mg Oral Q12H Tatiana Waller,  ZIA   120 mg at 11/03/24 2146    tamsulosin (FLOMAX) 24 hr capsule 0.4 mg  0.4 mg Oral Daily Whitney Butler PA-C   0.4 mg at 11/03/24 0834     Facility-Administered Medications Ordered in Other Encounters   Medication Dose Route Frequency Provider Last Rate Last Admin    Chlorhexidine Gluconate Cloth 2 % pads 1 Application  1 Application Topical Q12H PRN Tatiana Waller APRN            Physician Progress Notes (last 24 hours)        Tatiana Waller APRN at 11/04/24 0735          [facility] Cardiothoracic Surgery In-Patient Progress Note     LOS: 7 days     POD # 7 s/p CABG x 4, MAZE, LAAL    Subjective  Denies chest pain or dyspnea. Hematuria overnight.  On 2 L saturations 95%.    Objective  Vital Signs  Temp:  [97.6 °F (36.4 °C)-98.7 °F (37.1 °C)] 98.7 °F (37.1 °C)  Heart Rate:  [] 65  Resp:  [18] 18  BP: (112-157)/(74-91) 147/84    Physical Exam:   General Appearance: alert, appears stated age and cooperative   Lungs: clear to auscultation, respirations regular, respirations even, and respirations unlabored   Heart: regular rhythm & normal rate, normal S1, S2, no murmur, no gallop, no rub, and no click   Skin: Incision c/d/I   Sternum: Stable      Results     Results from last 7 days   Lab Units 11/03/24  1228   WBC 10*3/mm3 8.96   HEMOGLOBIN g/dL 9.7*   HEMATOCRIT % 31.6*   PLATELETS 10*3/mm3 207     Results from last 7 days   Lab Units 11/03/24  1228   SODIUM mmol/L 138   POTASSIUM mmol/L 4.5   CHLORIDE mmol/L 103   CO2 mmol/L 20.0*   BUN mg/dL 29*   CREATININE mg/dL 0.56*   GLUCOSE mg/dL 190*   CALCIUM mg/dL 8.1*     Imaging Results (Last 24 Hours)       Procedure Component Value Units Date/Time    XR Chest 1 View [[id number]] Resulted: 11/04/24 0357     Updated: 11/04/24 0544    XR Chest 1 View [[id number]] Collected: 11/03/24 1033     Updated: 11/03/24 1037    Narrative:      XR CHEST 1 VW    Date of Exam: 11/3/2024 12:54 AM EDT    Indication: postop    Comparison: Chest x-ray  "2024    Findings:  Redemonstration of prior CABG and loop recorder device. Stable cardiomegaly. Similar interstitial prominence without focal consolidation. No definite pneumothorax. Similar haziness of the right costophrenic angle which could reflect small right pleural   effusion.      Impression:      Impression:  No significant interval change.      Electronically Signed: Enrique Vazquez MD    11/3/2024 10:34 AM EST    Workstation ID: [id number]          Assessment  POD # 7 s/p CABG x 4, MAZE, LAAL    Plan   Urology consult  Wean oxygen as tolerated  PT/OT  Pulmonary toilet  ASA, statin, BB  Case management to work on SNF/rehab placement      ZIA Cole  24  07:35 EST    Electronically signed by Tatiana Waller APRN at 24 0832          Physical Therapy Notes (most recent note)        Suzi Don, PT at 24 0850  Version 1 of 1         Patient Name: Joel Galo  : 1965    MRN: [id number]                              Today's Date: 2024       Admit Date: 10/28/2024    Visit Dx:     ICD-10-CM ICD-9-CM   1. ASCVD (arteriosclerotic cardiovascular disease)  I25.10 429.2     440.9     Patient Active Problem List   Diagnosis    Paroxysmal atrial fibrillation    Obstructive sleep apnea    Hypothyroidism (acquired)    GERD (gastroesophageal reflux disease)    Chronic anticoagulation    DM (diabetes mellitus), type 2    Hyperlipidemia LDL goal <70    Primary hypertension    CAD s/p CABG x 4, MAZE, LAAL 10/28/24     Past Medical History:   Diagnosis Date    A-fib     Anxiety     Arthritis     DDD (degenerative disc disease), lumbar     Deep vein thrombosis     Depression     Diabetes     Disease of thyroid gland     HYPO    Heart attack     \"5-6 years ago\"    Hyperlipidemia     Hypertension     Kidney stone     Neuropathy     Sleep apnea     DOES NOT USE CPAP    UTI (urinary tract infection)      Past Surgical History:   Procedure Laterality Date    ATRIAL APPENDAGE " EXCLUSION LEFT WITH TRANSESOPHAGEAL ECHOCARDIOGRAM N/A 10/28/2024    Procedure: ATRIAL APPENDAGE EXCLUSION LEFT WITH TRANSESOPHAGEAL ECHOCARDIOGRAM;  Surgeon: Alex Max MD;  Location:  [facility];  Service: Cardiothoracic;  Laterality: N/A;    CARDIAC CATHETERIZATION N/A 08/28/2024    Procedure: Left Heart Cath;  Surgeon: Julio Neff MD;  [facility]:  [facility];  Service: Cardiology;  Laterality: N/A;    CARDIAC ELECTROPHYSIOLOGY PROCEDURE N/A 04/05/2023    Procedure: Loop insertion;  Surgeon: Tariq Chávez MD;  [facility]:  [facility];  Service: Cardiovascular;  Laterality: N/A;    CATARACT EXTRACTION Bilateral     COLONOSCOPY      CORONARY ARTERY BYPASS GRAFT N/A 10/28/2024    Procedure: MEDIAN STERNOTOMY, CORONARY ARTERY BYPASS GRAFTING X4 UTILIZING THE LEFT INTERNAL MAMMARY ARTERY GRAFT, ENDOSCOPIC VEIN HARVEST OF THE GREATER RIGHT SAPHENOUS VEIN;  Surgeon: Alex Max MD;  Location:  [facility];  Service: Cardiothoracic;  Laterality: N/A;    ENDOSCOPY      MAZE PROCEDURE N/A 10/28/2024    Procedure: MAZE PROCEDURE;  Surgeon: Alex Max MD;  Location:  [facility];  Service: Cardiothoracic;  Laterality: N/A;    TEETH EXTRACTION        General Information       Row Name 11/01/24 1313          Physical Therapy Time and Intention    Document Type therapy note (daily note)  -HARISH     Mode of Treatment physical therapy  -HARISH       Row Name 11/01/24 1313          General Information    Patient Profile Reviewed yes  -HARISH     Prior Level of Function independent:;bed mobility;ADL's;gait;transfer  -HARISH     Existing Precautions/Restrictions cardiac;fall;sternal  -HARISH     Barriers to Rehab medically complex  -HARISH       Row Name 11/01/24 1313          Living Environment    People in Home spouse  -HARISH       Row Name 11/01/24 1313          Home Main Entrance    Number of Stairs, Main Entrance none  -HARISH       Row Name 11/01/24 1313          Cognition    Orientation Status (Cognition)  oriented to;person;place;situation;verbal cues/prompts needed for orientation;time  -HARISH       Row Name 11/01/24 1313          Safety Issues/Impairments Affecting Functional Mobility    Safety Issues Affecting Function (Mobility) safety precautions follow-through/compliance;safety precaution awareness  -HARISH     Impairments Affecting Function (Mobility) balance;endurance/activity tolerance;shortness of breath;strength;pain  -HARISH               User Key  (r) = Recorded By, (t) = Taken By, (c) = Cosigned By      Initials Name Provider Type    Suzi Higgins PT Physical Therapist                   Mobility       Row Name 11/01/24 1314          Bed Mobility    Comment, (Bed Mobility) patient is OOB and rdturns to the chair  -HARISH       Row Name 11/01/24 1314          Transfers    Comment, (Transfers) patient transfers on and off commode needs cues for sternal precautions  -HARISH       Row Name 11/01/24 1314          Bed-Chair Transfer    Bed-Chair [place] (Transfers) minimum assist (75% patient effort);2 person assist  -HARISH     Assistive Device (Bed-Chair Transfers) walker, front-wheeled  -HARISH       Row Name 11/01/24 1314          Sit-Stand Transfer    Sit-Stand [place] (Transfers) minimum assist (75% patient effort);2 person assist  -HARISH     Assistive Device (Sit-Stand Transfers) walker, front-wheeled  -HARISH       Row Name 11/01/24 1314          Gait/Stairs (Locomotion)    [place] Level (Gait) moderate assist (50% patient effort);2 person assist  -HARISH     Assistive Device (Gait) walker, front-wheeled  -HARISH     Distance in Feet (Gait) 70  -HARISH     Deviations/Abnormal Patterns (Gait) base of support, wide;festinating/shuffling;stride length decreased;lesley decreased  -HARISH     Bilateral Gait Deviations forward flexed posture;heel strike decreased  -HARISH     Comment, (Gait/Stairs) step to gait pattern progressing to step through with verbal cues for increased step length patient tends to increase trunk flexion with  fatigue needs encouragement to increase activity  -HARISH               User Key  (r) = Recorded By, (t) = Taken By, (c) = Cosigned By      Initials Name Provider Type    Suzi Higgins PT Physical Therapist                   Obj/Interventions       Row Name 11/01/24 1317          Range of Motion Comprehensive    General Range of Motion no range of motion deficits identified  -HARISH       Row Name 11/01/24 1317          Balance    Balance Assessment sitting static balance;sitting dynamic balance;standing static balance;standing dynamic balance  -HARISH     Static Sitting Balance contact guard  -HARISH     Dynamic Sitting Balance contact guard  -HARISH     Position, Sitting Balance unsupported;sitting in chair  -HARISH     Static Standing Balance minimal assist  -HARISH     Dynamic Standing Balance minimal assist  -HARISH     Position/Device Used, Standing Balance supported;walker, front-wheeled  -HARISH               User Key  (r) = Recorded By, (t) = Taken By, (c) = Cosigned By      Initials Name Provider Type    Suzi Higgins PT Physical Therapist                   Goals/Plan       Row Name 11/01/24 1321          Therapy Assessment/Plan (PT)    Planned Therapy Interventions (PT) balance training;gait training;home exercise program;bed mobility training;transfer training;strengthening  -HARISH               User Key  (r) = Recorded By, (t) = Taken By, (c) = Cosigned By      Initials Name Provider Type    Suzi Higgins PT Physical Therapist                   Clinical Impression       Row Name 11/01/24 1318          Pain    Pretreatment Pain Rating 4/10  -HARISH     Posttreatment Pain Rating 6/10  -HARISH     Pain Location chest  -HARISH     Pain Management Interventions activity modification encouraged;exercise or physical activity utilized;nursing notified;premedicated for activity  -HARISH     Response to Pain Interventions activity level improved;mobility function improved;activity participation with tolerable pain  -HARISH       Row Name  11/01/24 1318          Plan of Care Review    Plan of Care Reviewed With patient  -HARISH     Progress improving  -HARISH     Outcome Evaluation patient was able to increase ambulation to 70 ft with min assist of 2. patiet needs incouragement to increase activity he requires less assist for all activity today patient has stable vitals with activity anticipate patient to need SNF placement at D/C  -       Row Name 11/01/24 1318          Therapy Assessment/Plan (PT)    Patient/Family Therapy Goals Statement (PT) feel better  -HARISH     Rehab Potential (PT) fair  -HARISH     Criteria for Skilled Interventions Met (PT) yes;skilled treatment is necessary  -HARISH     Therapy Frequency (PT) daily  -HARISH       Row Name 11/01/24 1318          Vital Signs    Pre Systolic BP Rehab 147  -HARISH     Pre Treatment Diastolic BP 84  -HARISH     Post Systolic BP Rehab 110  -HARISH     Post Treatment Diastolic BP 72  -HARISH     Pretreatment Heart Rate (beats/min) 73  -HARISH     Posttreatment Heart Rate (beats/min) 84  -HARISH     Pre SpO2 (%) 95  -HARISH     O2 Delivery Pre Treatment nasal cannula  -HARISH     Post SpO2 (%) 97  -HARISH     O2 Delivery Post Treatment nasal cannula  -HARISH     Pre Patient Position Sitting  -HARISH     Intra Patient Position Standing  -HARISH     Post Patient Position Sitting  -HARISH       Row Name 11/01/24 1318          Positioning and Restraints    Pre-Treatment Position sitting in chair/recliner  -HARISH     Post Treatment Position bsc  -HARISH     On BS commode notified nsg;sitting;call light within reach;encouraged to call for assist;with other staff  -HARISH               User Key  (r) = Recorded By, (t) = Taken By, (c) = Cosigned By      Initials Name Provider Type    Suzi Higgins, PT Physical Therapist                   Outcome Measures       Row Name 11/01/24 1322 11/01/24 1030       How much help from another person do you currently need...    Turning from your back to your side while in flat bed without using bedrails? 3  -HARISH 3  -MM    Moving from lying on  back to sitting on the side of a flat bed without bedrails? 3  -HARISH 3  -MM    Moving to and from a bed to a chair (including a wheelchair)? 3  -HARISH 3  -MM    Standing up from a chair using your arms (e.g., wheelchair, bedside chair)? 3  -HARISH 2  -MM    Climbing 3-5 steps with a railing? 2  -HARISH 2  -MM    To walk in hospital room? 3  -HARISH 3  -MM    AM-PAC 6 Clicks Score (PT) 17  -HARISH 16  -MM    Highest Level of Mobility Goal 5 --> Static standing  -HARISH 5 --> Static standing  -MM      Row Name 11/01/24 0800          How much help from another person do you currently need...    Turning from your back to your side while in flat bed without using bedrails? 2  -SW     Moving from lying on back to sitting on the side of a flat bed without bedrails? 2  -SW     Moving to and from a bed to a chair (including a wheelchair)? 2  -SW     Standing up from a chair using your arms (e.g., wheelchair, bedside chair)? 2  -SW     Climbing 3-5 steps with a railing? 2  -SW     To walk in hospital room? 2  -SW     AM-PAC 6 Clicks Score (PT) 12  -SW     Highest Level of Mobility Goal 4 --> Transfer to chair/commode  -SW               User Key  (r) = Recorded By, (t) = Taken By, (c) = Cosigned By      Initials Name Provider Type    Suzi Higgins, PT Physical Therapist    Ming Brown RN Registered Nurse    Richelle Romero RN Registered Nurse                                 Physical Therapy Education       Title: PT OT SLP Therapies (In Progress)       Topic: Physical Therapy (In Progress)       Point: Mobility training (In Progress)       Learning Progress Summary            Patient Acceptance, E, NR by HARISH at 11/1/2024 0850    Acceptance, E, NR by KG at 10/31/2024 0959    Acceptance, E, NR by ND at 10/30/2024 1124    Acceptance, E, NR by ND at 10/29/2024 1024                      Point: Home exercise program (In Progress)       Learning Progress Summary            Patient Acceptance, E, NR by HARISH at 11/1/2024 0850    Acceptance, E,  NR by KG at 10/31/2024 0959                      Point: Body mechanics (In Progress)       Learning Progress Summary            Patient Acceptance, E, NR by HARISH at 11/1/2024 0850    Acceptance, E, NR by KG at 10/31/2024 0959    Acceptance, E, NR by ND at 10/30/2024 1124    Acceptance, E, NR by ND at 10/29/2024 1024                      Point: Precautions (In Progress)       Learning Progress Summary            Patient Acceptance, E, NR by HARISH at 11/1/2024 0850    Acceptance, E, NR by KG at 10/31/2024 0959    Acceptance, E, NR by ND at 10/30/2024 1124    Acceptance, E, NR by ND at 10/29/2024 1024                                      User Key       Initials Effective Dates Name Provider Type Discipline    HARISH 02/03/23 -  Suzi Don, PT Physical Therapist PT    KG 05/22/20 -  Lois Santos, PT Physical Therapist PT    ND 11/16/23 -  Cristina Varela, PT Physical Therapist PT                  PT Recommendation and Plan  Planned Therapy Interventions (PT): balance training, gait training, home exercise program, bed mobility training, transfer training, strengthening  Progress: improving  Outcome Evaluation: patient was able to increase ambulation to 70 ft with min assist of 2. patiet needs incouragement to increase activity he requires less assist for all activity today patient has stable vitals with activity anticipate patient to need SNF placement at D/C     Time Calculation:         PT Charges       Row Name 11/01/24 1323             Time Calculation    Start Time 0850  -HARISH      PT Received On 11/01/24  -HARISH      PT Goal Re-Cert Due Date 11/08/24  -HARISH         Time Calculation- PT    Total Timed Code Minutes- PT 23 minute(s)  -HARISH         Timed Charges    33725 - PT Therapeutic Activity Minutes 23  -HARISH         Total Minutes    Timed Charges Total Minutes 23  -HARISH       Total Minutes 23  -HARISH                User Key  (r) = Recorded By, (t) = Taken By, (c) = Cosigned By      Initials Name Provider Type    HARISH  "Suzi Don, PT Physical Therapist                  Therapy Charges for Today       Code Description Service Date Service Provider Modifiers Qty    23300912126 HC PT THERAPEUTIC ACT EA 15 MIN 2024 Suzi Don, PT GP 2            PT G-Codes  Outcome Measure Options: AM-PAC 6 Clicks Basic Mobility (PT)  AM-PAC 6 Clicks Score (PT): 17  AM-PAC 6 Clicks Score (OT): 8  PT Discharge Summary  Anticipated Discharge Disposition (PT): skilled nursing facility    Suzi Don, MAEGAN  2024      Electronically signed by Suzi Don PT at 24 1323          Occupational Therapy Notes (most recent note)        Marbin Benjamin, OT at 10/31/24 0816          Patient Name: Joel Galo  : 1965    MRN: [id number]                              Today's Date: 10/31/2024       Admit Date: 10/28/2024    Visit Dx:     ICD-10-CM ICD-9-CM   1. ASCVD (arteriosclerotic cardiovascular disease)  I25.10 429.2     440.9     Patient Active Problem List   Diagnosis    Paroxysmal atrial fibrillation    Obstructive sleep apnea    Hypothyroidism (acquired)    GERD (gastroesophageal reflux disease)    Chronic anticoagulation    DM (diabetes mellitus), type 2    Hyperlipidemia LDL goal <70    Primary hypertension    CAD s/p CABG x 4, MAZE, LAAL 10/28/24     Past Medical History:   Diagnosis Date    A-fib     Anxiety     Arthritis     DDD (degenerative disc disease), lumbar     Deep vein thrombosis     Depression     Diabetes     Disease of thyroid gland     HYPO    Heart attack     \"5-6 years ago\"    Hyperlipidemia     Hypertension     Kidney stone     Neuropathy     Sleep apnea     DOES NOT USE CPAP    UTI (urinary tract infection)      Past Surgical History:   Procedure Laterality Date    ATRIAL APPENDAGE EXCLUSION LEFT WITH TRANSESOPHAGEAL ECHOCARDIOGRAM N/A 10/28/2024    Procedure: ATRIAL APPENDAGE EXCLUSION LEFT WITH TRANSESOPHAGEAL ECHOCARDIOGRAM;  Surgeon: Alex Max MD;  Location:  [facility];  " Service: Cardiothoracic;  Laterality: N/A;    CARDIAC CATHETERIZATION N/A 08/28/2024    Procedure: Left Heart Cath;  Surgeon: Julio Neff MD;  [facility]:  [facility];  Service: Cardiology;  Laterality: N/A;    CARDIAC ELECTROPHYSIOLOGY PROCEDURE N/A 04/05/2023    Procedure: Loop insertion;  Surgeon: Tariq Chávez MD;  Location:  [facility];  Service: Cardiovascular;  Laterality: N/A;    CATARACT EXTRACTION Bilateral     COLONOSCOPY      CORONARY ARTERY BYPASS GRAFT N/A 10/28/2024    Procedure: MEDIAN STERNOTOMY, CORONARY ARTERY BYPASS GRAFTING X4 UTILIZING THE LEFT INTERNAL MAMMARY ARTERY GRAFT, ENDOSCOPIC VEIN HARVEST OF THE GREATER RIGHT SAPHENOUS VEIN;  Surgeon: Alex Max MD;  Location:  [facility];  Service: Cardiothoracic;  Laterality: N/A;    ENDOSCOPY      MAZE PROCEDURE N/A 10/28/2024    Procedure: MAZE PROCEDURE;  Surgeon: Alex Mxa MD;  Location:  [facility];  Service: Cardiothoracic;  Laterality: N/A;    TEETH EXTRACTION        General Information       Row Name 10/31/24 0816          OT Time and Intention    Document Type therapy note (daily note)  -TA     Mode of Treatment occupational therapy  -TA       Row Name 10/31/24 0816          General Information    Patient Profile Reviewed yes  -TA     Existing Precautions/Restrictions cardiac;fall;sternal;other (see comments)  Current cognitive level  -TA     Barriers to Rehab medically complex;previous functional deficit;cognitive status  -TA       Row Name 10/31/24 0816          Occupational Profile    Reason for Services/Referral (Occupational Profile) fxl decline from PLOF  -TA     Patient Goals (Occupational Profile) none stated  -TA       Row Name 10/31/24 0816          Cognition    Orientation Status (Cognition) oriented to;person;disoriented to;place;situation;time  Follows 40% of 1 step commands  -TA       Row Name 10/31/24 0816          Safety Issues/Impairments Affecting Functional Mobility    Safety  Issues Affecting Function (Mobility) safety precautions follow-through/compliance;safety precaution awareness;insight into deficits/self-awareness;ability to follow commands;impulsivity;awareness of need for assistance;problem-solving;sequencing abilities  -TA     Impairments Affecting Function (Mobility) balance;cognition;endurance/activity tolerance;pain;strength;shortness of breath  -TA     Cognitive Impairments, Mobility Safety/Performance attention;awareness, need for assistance;impulsivity;insight into deficits/self-awareness;judgment;problem-solving/reasoning;safety precaution awareness;safety precaution follow-through  -TA               User Key  (r) = Recorded By, (t) = Taken By, (c) = Cosigned By      Initials Name Provider Type    Marbin Elias OT Occupational Therapist                     Mobility/ADL's       Row Name 10/31/24 0816          Bed Mobility    Comment, (Bed Mobility) Pt UIC  -TA       Row Name 10/31/24 0816          Activities of Daily Living    BADL Assessment/Intervention grooming  -TA       Row Name 10/31/24 0816          Grooming Assessment/Training    [place] Level (Grooming) wash face, hands;maximum assist (25% patient effort);nonverbal cues (demo/gesture);verbal cues  Support at R elbow to reach face and consistent VCs to advance through task  -TA     Position (Grooming) supported sitting  -TA               User Key  (r) = Recorded By, (t) = Taken By, (c) = Cosigned By      Initials Name Provider Type    Marbin Elias OT Occupational Therapist                   Obj/Interventions       Row Name 10/31/24 0816          Shoulder (Therapeutic Exercise)    Shoulder AAROM (Therapeutic Exercise) bilateral;flexion;extension;aBduction;aDduction;sitting;10 repetitions  Consistent VCs required to advance through all ex's  -TA       Row Name 10/31/24 0816          Elbow/Forearm (Therapeutic Exercise)    Elbow/Forearm (Therapeutic Exercise) AAROM (active assistive range of  motion)  -TA     Elbow/Forearm AAROM (Therapeutic Exercise) bilateral;flexion;extension;supination;pronation;sitting;10 repetitions  -TA       Row Name 10/31/24 0816          Wrist (Therapeutic Exercise)    Wrist (Therapeutic Exercise) AAROM (active assistive range of motion)  -TA     Wrist AAROM (Therapeutic Exercise) bilateral;flexion;extension;10 repetitions  -TA       Row Name 10/31/24 0816          Hand (Therapeutic Exercise)    Hand (Therapeutic Exercise) AROM (active range of motion)  -TA     Hand AROM/AAROM (Therapeutic Exercise) bilateral;AROM (active range of motion);finger flexion;finger extension;10 repetitions  VCs to advance through all ex's  -TA       Row Name 10/31/24 0816          Motor Skills    Therapeutic Exercise shoulder;elbow/forearm;wrist;hand  -TA       Row Name 10/31/24 0816          Balance    Balance Assessment sitting static balance  -TA     Static Sitting Balance supervision  -TA     Balance Interventions UE activity with balance activity;weight shifting activity;occupation based/functional task  -TA               User Key  (r) = Recorded By, (t) = Taken By, (c) = Cosigned By      Initials Name Provider Type    Marbin Elias OT Occupational Therapist                   Goals/Plan       Row Name 10/31/24 0816          Therapy Assessment/Plan (OT)    Planned Therapy Interventions (OT) activity tolerance training;BADL retraining;functional balance retraining;occupation/activity based interventions;patient/caregiver education/training;ROM/therapeutic exercise;strengthening exercise;transfer/mobility retraining  -TA               User Key  (r) = Recorded By, (t) = Taken By, (c) = Cosigned By      Initials Name Provider Type    Marbin Elias OT Occupational Therapist                   Clinical Impression       Row Name 10/31/24 0816          Pain Assessment    Pain Side/Orientation generalized  -TA     Pain Management Interventions exercise or physical activity  utilized;premedicated for activity  -TA     Response to Pain Interventions activity participation with tolerable pain  -TA     Additional Documentation Pain Scale: FACES Pre/Post-Treatment (Group)  -TA       Row Name 10/31/24 0816          Pain Scale: FACES Pre/Post-Treatment    Pain: FACES Scale, Pretreatment 2-->hurts little bit  -TA     Posttreatment Pain Rating 2-->hurts little bit  -TA       Row Name 10/31/24 0816          Plan of Care Review    Plan of Care Reviewed With patient  -TA     Progress no change  -TA     Outcome Evaluation VSS; Pt continues to exhibit lethargy and difficulty maintaining eyes open; able to follow 40% 1 step commands. Pt required Mod A/support at R elbow to reach face to wash face and consistent VCs to advance through activity. Pt completed AAROM of BUE strengthening ex's, 10 reps all ex's. Pt remains limited by LOC, presentation of s/s of delerium. Pt remains below fxl baseline and will continue to benefit from skilled OT services to address deficits, facilitate increased fxl I. Recommend IRF at discharge.  -TA       Row Name 10/31/24 0816          Therapy Assessment/Plan (OT)    Patient/Family Therapy Goal Statement (OT) none stated  -TA     Rehab Potential (OT) good  -TA     Criteria for Skilled Therapeutic Interventions Met (OT) yes;skilled treatment is necessary  -TA     Therapy Frequency (OT) daily  -TA     Predicted Duration of Therapy Intervention (OT) 1 week  -TA       Row Name 10/31/24 0816          Therapy Plan Review/Discharge Plan (OT)    Anticipated Discharge Disposition (OT) inpatient rehabilitation facility  -TA       Row Name 10/31/24 0816          Vital Signs    Pre Systolic BP Rehab --  [facility];RN present and cleared pt for tx  -TA     O2 Delivery Pre Treatment supplemental O2  -TA     O2 Delivery Intra Treatment supplemental O2  -TA     O2 Delivery Post Treatment supplemental O2  -TA     Pre Patient Position Sitting  -TA     Intra Patient Position Sitting  -TA     Post  Patient Position Sitting  -TA       Row Name 10/31/24 0816          Positioning and Restraints    Pre-Treatment Position sitting in chair/recliner  -TA     Post Treatment Position chair  -TA     In Chair notified nsg;reclined;call light within reach;encouraged to call for assist;exit alarm on;waffle cushion;legs elevated;with nsg  all lines intact  -TA               User Key  (r) = Recorded By, (t) = Taken By, (c) = Cosigned By      Initials Name Provider Type    Marbin Elias OT Occupational Therapist                   Outcome Measures       Row Name 10/31/24 0816          How much help from another is currently needed...    Putting on and taking off regular lower body clothing? 1  -TA     Bathing (including washing, rinsing, and drying) 1  -TA     Toileting (which includes using toilet bed pan or urinal) 1  -TA     Putting on and taking off regular upper body clothing 1  -TA     Taking care of personal grooming (such as brushing teeth) 2  -TA     Eating meals 2  -TA     AM-PAC 6 Clicks Score (OT) 8  -TA       Row Name 10/31/24 0816          Functional Assessment    Outcome Measure Options AM-PAC 6 Clicks Daily Activity (OT)  -TA               User Key  (r) = Recorded By, (t) = Taken By, (c) = Cosigned By      Initials Name Provider Type    Marbin Elias OT Occupational Therapist                    Occupational Therapy Education       Title: PT OT SLP Therapies (In Progress)       Topic: Occupational Therapy (In Progress)       Point: ADL training (Not Started)       Description:   Instruct learner(s) on proper safety adaptation and remediation techniques during self care or transfers.   Instruct in proper use of assistive devices.                  Learner Progress:  Not documented in this visit.              Point: Home exercise program (Not Started)       Description:   Instruct learner(s) on appropriate technique for monitoring, assisting and/or progressing therapeutic exercises/activities.                   Learner Progress:  Not documented in this visit.              Point: Precautions (In Progress)       Description:   Instruct learner(s) on prescribed precautions during self-care and functional transfers.                  Learning Progress Summary            Patient Acceptance, E,D, NR by TA at 10/30/2024 0905                      Point: Body mechanics (In Progress)       Description:   Instruct learner(s) on proper positioning and spine alignment during self-care, functional mobility activities and/or exercises.                  Learning Progress Summary            Patient Acceptance, E,D, NR by TA at 10/30/2024 0905                                      User Key       Initials Effective Dates Name Provider Type Discipline    YESY 06/16/21 -  Marbin Benjamin, OT Occupational Therapist OT                  OT Recommendation and Plan  Planned Therapy Interventions (OT): activity tolerance training, BADL retraining, functional balance retraining, occupation/activity based interventions, patient/caregiver education/training, ROM/therapeutic exercise, strengthening exercise, transfer/mobility retraining  Therapy Frequency (OT): daily  Plan of Care Review  Plan of Care Reviewed With: patient  Progress: no change  Outcome Evaluation: VSS; Pt continues to exhibit lethargy and difficulty maintaining eyes open; able to follow 40% 1 step commands. Pt required Mod A/support at R elbow to reach face to wash face and consistent VCs to advance through activity. Pt completed AAROM of BUE strengthening ex's, 10 reps all ex's. Pt remains limited by LOC, presentation of s/s of delerium. Pt remains below fxl baseline and will continue to benefit from skilled OT services to address deficits, facilitate increased fxl I. Recommend IRF at discharge.     Time Calculation:   Evaluation Complexity (OT)  Review Occupational Profile/Medical/Therapy History Complexity: expanded/moderate complexity  Assessment, Occupational  Performance/Identification of Deficit Complexity: 3-5 performance deficits  Clinical Decision Making Complexity (OT): detailed assessment/moderate complexity  Overall Complexity of Evaluation (OT): moderate complexity     Time Calculation- OT       Row Name 10/31/24 0816             Time Calculation- OT    OT Start Time 0816  ttc 17 minutes  -TA      Total Timed Code Minutes- OT 17 minute(s)  -TA      OT Received On 10/31/24  -TA      OT Goal Re-Cert Due Date 11/09/24  -TA         Timed Charges    38540 - OT Therapeutic Exercise Minutes 11  -TA      46529 - OT Self Care/Mgmt Minutes 6  -TA         Total Minutes    Timed Charges Total Minutes 17  -TA       Total Minutes 17  -TA                User Key  (r) = Recorded By, (t) = Taken By, (c) = Cosigned By      Initials Name Provider Type    TA Marbin Benjamin OT Occupational Therapist                  Therapy Charges for Today       Code Description Service Date Service Provider Modifiers Qty    63373359997  OT EVAL MOD COMPLEXITY 4 10/30/2024 Marbin Benjamin OT GO 1    [id number]  OT THER PROC EA 15 MIN 10/31/2024 Marbin Benjamin OT GO 1                 Marbin Benjamin OT  10/31/2024    Electronically signed by Marbin Benjamin OT at 10/31/24 0909

## 2024-11-04 NOTE — PROGRESS NOTES
"          Clinical Nutrition Assessment     Patient Name: Joel Galo  YOB: 1965  MRN: [id number]  Date of Encounter: 11/04/24 14:12 EST  Admission date: 10/28/2024  Reason for Visit: LOS    Assessment   Nutrition Assessment   Admission Diagnosis:  Coronary artery disease involving native coronary artery of native heart, unspecified whether angina present [I25.10]  CAD (coronary artery disease) [I25.10]    Problem List:    CAD s/p CABG x 4, MAZE, LAAL 10/28/24    Paroxysmal atrial fibrillation    DM (diabetes mellitus), type 2    Hyperlipidemia LDL goal <70    Primary hypertension      PMH:   He  has a past medical history of A-fib, Anxiety, Arthritis, DDD (degenerative disc disease), lumbar, Deep vein thrombosis, Depression, Diabetes, Disease of thyroid gland, Heart attack, Hyperlipidemia, Hypertension, Kidney stone, Neuropathy, Sleep apnea, and UTI (urinary tract infection).    PSH:  He  has a past surgical history that includes Esophagogastroduodenoscopy; Colonoscopy; Cardiac electrophysiology procedure (N/A, 04/05/2023); Cataract extraction (Bilateral); Cardiac catheterization (N/A, 08/28/2024); Teeth Extraction; Coronary artery bypass graft (N/A, 10/28/2024); Atrial Appendage Exclusion Left (N/A, 10/28/2024); and Maze Procedure (N/A, 10/28/2024).    Applicable Nutrition History:   T2DM  CAD s/p CABG x4 on 10/28    Anthropometrics     Height: Height: 172.7 cm (67.99\")  Last Filed Weight: Weight: 104 kg (230 lb) (11/01/24 1224)  Method:    BMI: BMI (Calculated): 35    UBW:  225-230# per patient   Weight      Weight (kg) Weight (lbs) Weight Method   11/16/2023 104.781 kg  231 lb     2/1/2024 103.874 kg  229 lb     5/1/2024 105.688 kg  233 lb     5/3/2024 105.235 kg  232 lb     7/30/2024 106.505 kg  234 lb 12.8 oz     8/16/2024 106.051 kg  233 lb 12.8 oz     8/28/2024 105.235 kg  232 lb  Stated    9/3/2024 106.414 kg  234 lb 9.6 oz     9/24/2024 103.964 kg  229 lb 3.2 oz     10/25/2024 104.7 kg  230 " "lb 13.2 oz  Standing scale    11/1/2024 104.327 kg  230 lb       Weight change: No significant changes    Nutrition Focused Physical Exam    Date:  11/04/24        Pt does not meet criteria for malnutrition diagnosis, at this time.      Subjective   Reported/Observed/Food/Nutrition Related History:     11/04/24  Patient reported a decrease in appetite a few months ago. However, also stated he follows a \"see food\" diet. Endorsed eating well PTA. Doesn't like the food here so he hasn't been eating normally. Endorsed meal orders are being taken. Agreeable to trial boost glucose javier BID. Is doing well with current diet modifications. Declined swallowing difficulties. NKFA.    Current Nutrition Prescription   PO: Diet: Regular/House; Texture: Mechanical Ground (NDD 2); Fluid Consistency: Thin (IDDSI 0)  Oral Nutrition Supplement: n/a  Intake: Last three meals documented: 25, 0, 25% PO intake    Assessment & Plan   Nutrition Diagnosis   Date:  11/04/24             Updated:    Problem Inadequate oral intake    Etiology S/p CABG x4   Signs/Symptoms >50% avg PO intake   Status: New    Goal:   Nutrition to support treatment and Increase intake      Nutrition Intervention      Follow treatment progress, Care plan reviewed, Advise alternate selection, Advised available snacks, Interview for preferences, Encourage intake, Supplement provided    Nutrition POC:  Ordering boost glucose javier BID to optimize nutrition  Encourage PO/ONS as able    Monitoring/Evaluation:   Per protocol, PO intake, Supplement intake, Weight, Symptoms, POC/GOC    Sophie Brock MS,RD,LD  Time Spent: 30min  "

## 2024-11-04 NOTE — PROGRESS NOTES
[facility] Cardiothoracic Surgery In-Patient Progress Note     LOS: 7 days     POD # 7 s/p CABG x 4, MAZE, LAAL    Subjective  Denies chest pain or dyspnea. Hematuria overnight.  On 2 L NC.  In NSR.      Objective  Vital Signs  Temp:  [97.6 °F (36.4 °C)-98.7 °F (37.1 °C)] 98.7 °F (37.1 °C)  Heart Rate:  [] 65  Resp:  [18] 18  BP: (112-157)/(74-91) 147/84    Physical Exam:   General Appearance: alert, appears stated age and cooperative   Lungs: clear to auscultation, respirations regular, respirations even, and respirations unlabored   Heart: regular rhythm & normal rate, normal S1, S2, no murmur, no gallop, no rub, and no click   Skin: Incision c/d/I   Sternum: Stable      Results     Results from last 7 days   Lab Units 11/03/24  1228   WBC 10*3/mm3 8.96   HEMOGLOBIN g/dL 9.7*   HEMATOCRIT % 31.6*   PLATELETS 10*3/mm3 207     Results from last 7 days   Lab Units 11/03/24  1228   SODIUM mmol/L 138   POTASSIUM mmol/L 4.5   CHLORIDE mmol/L 103   CO2 mmol/L 20.0*   BUN mg/dL 29*   CREATININE mg/dL 0.56*   GLUCOSE mg/dL 190*   CALCIUM mg/dL 8.1*     Imaging Results (Last 24 Hours)       Procedure Component Value Units Date/Time    XR Chest 1 View [[id number]] Resulted: 11/04/24 0357     Updated: 11/04/24 0544    XR Chest 1 View [[id number]] Collected: 11/03/24 1033     Updated: 11/03/24 1037    Narrative:      XR CHEST 1 VW    Date of Exam: 11/3/2024 12:54 AM EDT    Indication: postop    Comparison: Chest x-ray 11/2/2024    Findings:  Redemonstration of prior CABG and loop recorder device. Stable cardiomegaly. Similar interstitial prominence without focal consolidation. No definite pneumothorax. Similar haziness of the right costophrenic angle which could reflect small right pleural   effusion.      Impression:      Impression:  No significant interval change.      Electronically Signed: Enrique Vazquez MD    11/3/2024 10:34 AM EST    Workstation ID: [id number]          Assessment  POD # 7 s/p CABG x 4, MAZE,  LAAL    Plan   Urology consult for hematuria  Trim facial hair  Wean oxygen as tolerated  PT/OT  Pulmonary toilet  ASA, statin, BB  Case management to work on SNF/rehab placement    Alex Max MD  11/04/24  07:35 EST

## 2024-11-04 NOTE — PROGRESS NOTES
Cardiology Progress Note    Primary cardiologist: Dr. Neff    Reason for visit:    Coronary artery disease status post CABG  Paroxysmal atrial fibrillation    IDENTIFICATION: Patient is a 59-year-old gentleman who resides in [facility] Problems    Diagnosis  POA    **CAD s/p CABG x 4, MAZE, LAAL 10/28/24 [I25.10]  Yes     Priority: High     Abnormal CTA (8/20/2024):Moderate disease in the LAD and 75% stenosis in the circumflex and right coronary artery noted. Patient referred for left heart cath.   Cardiac cath (8/28/2024): Multivessel CAD.  Recommend CABG  Echo (10/17/2024): LVEF 61-65%.  No valvular abnormality  CABG/maze/left atrial pended to ligation (10/28/2024):  Intraoperative DARIAN (10/28/2024): LVEF 55%.  Obliteration of TORSTEN by clip.  LIMA to LAD.  SVG to OM1.  SVG to OM 4, SVG to OM 5       DM (diabetes mellitus), type 2 [E11.9]  Yes     Priority: High     hemoglobin A1c 12.6% August 2024, 11.2% October 2024       Primary hypertension [I10]  Yes     Priority: Medium    Paroxysmal atrial fibrillation [I48.0]  Yes     Priority: Medium     Echo (3/10/2023): EF 60-65%.  Grade 1 diastolic dysfunction.  Normal valvular morphology.  CHADsVasc 3  Loop recorder inserted 2023  3% burden A-fib noted on loop recorder 7/2024  Left atrial appendage clipping performed at time of CABG 10/28/2024  Evaluated by EP 5/2024 with plans for future PVA      Hyperlipidemia LDL goal <70 [E78.5]  Yes     Priority: Low            Patient is sitting up in the chair without complaints.  He is currently in normal sinus rhythm.  He is awaiting bed placement for rehab.           Vital Sign Min/Max for last 24 hours  Temp  Min: 97.6 °F (36.4 °C)  Max: 98.7 °F (37.1 °C)   BP  Min: 112/74  Max: 160/88   Pulse  Min: 63  Max: 70   Resp  Min: 18  Max: 18   SpO2  Min: 91 %  Max: 95 %   No data recorded      Intake/Output Summary (Last 24 hours) at 11/4/2024 1322  Last data filed at 11/4/2024 0429  Gross per 24 hour  "  Intake 100 ml   Output 1300 ml   Net -1200 ml           Physical Exam  Constitutional:       General: He is awake.   Cardiovascular:      Rate and Rhythm: Normal rate and regular rhythm.      Heart sounds: No murmur heard.  Pulmonary:      Effort: Pulmonary effort is normal.      Breath sounds: Normal breath sounds.   Musculoskeletal:      Right lower le+ Pitting Edema present.      Left lower le+ Pitting Edema present.   Skin:     General: Skin is warm and dry.   Neurological:      Mental Status: He is alert.   Psychiatric:         Behavior: Behavior is cooperative.         Tele: Normal sinus rhythm    Results Review (reviewed the patient's recent labs in the electronic medical record):     EKG (10/31/2024): Normal sinus rhythm    CXR (2024): No acute cardiopulmonary finding    ECHO (10/20/2024): LVEF 61-65%.  No valvular abnormality    Results from last 7 days   Lab Units 24  1228 24  0741 24  0352 24  0526 10/31/24  0343 10/30/24  0307 10/29/24  0636 10/29/24  0332 10/28/24  2254 10/28/24  1802   SODIUM mmol/L 138  --  134* 137 148* 145  --  140  --  143   POTASSIUM mmol/L 4.5 3.5 3.8 3.3* 4.3 4.6  --  4.7   < > 4.3   CHLORIDE mmol/L 103  --  100 99 112* 112*  --  108*  --  110*   BUN mg/dL 29*  --  40* 42* 32* 20  --  12  --  11   CREATININE mg/dL 0.56*  --  0.83 0.87 0.85 0.88  --  0.77  --  0.84   MAGNESIUM mg/dL  --   --   --   --   --   --  2.4 1.9  --  2.2    < > = values in this interval not displayed.         Results from last 7 days   Lab Units 24  1228 24  0721 24  0526   WBC 10*3/mm3 8.96 7.21 9.05   HEMOGLOBIN g/dL 9.7* 10.2* 11.0*   HEMATOCRIT % 31.6* 31.8* 33.8*   PLATELETS 10*3/mm3 207 175 193       Lab Results   Component Value Date    HGBA1C 11.20 (H) 10/25/2024       No results found for: \"CHOL\", \"CHLPL\", \"TRIG\", \"HDL\", \"LDL\", \"LDLDIRECT\"           Coronary artery disease  Status post CABG with Dr. Max 10/28  Aspirin 325 mg daily   "   Paroxysmal atrial fibrillaton  Historically on sotalol and Eliquis  Maze and left atrial appendage ligation performed at time of CABG 10/28.  Intraoperative DARIAN shows obliteration of TORSTEN by clip  Went into atrial fibrillation with RVR 10/30/2024  Started on home dose sotalol 120 mg twice daily on 10/30/2024  Patient had episodes of intermittent atrial fibrillation on 10/31/2024 but not sustaining fast rate  Had loop recorder inserted in 2023  Saw Dr. Fishman in May 2024 for persistent A-fib and ablation was discussed.  Would recommend he follow back up at already scheduled appointment in December     Type 2 diabetes mellitus  Historically uncontrolled with hemoglobin A1c 11 and 12  Jardiance 25 mg daily     Hypertension/hypotension  Current /84  Losartan 50 mg daily        Hyperlipidemia  Lipitor 40 mg daily                Add hydrochlorothiazide 12.5 mg daily for better blood pressure control  Give 40 mg IV Lasix x 1 for lower extremity edema  Change Lipitor to home dose Crestor 20 mg daily  Continue aspirin and statin  Continue sotalol for rhythm management.  Currently maintaining NSR.  Will need to follow-up with EP as outpatient at already scheduled appointment in December  Will defer Eliquis due to left atrial pended clipping performed at time of CABG however will likely need Eliquis restarted at discharge  Awaiting rehab placement    Electronically signed by ZIA Poole, 11/04/24, 8:47 AM EST.

## 2024-11-04 NOTE — PROGRESS NOTES
Pt. Referred for Phase II Cardiac Rehab. Staff discussed benefits of exercise, program protocol, and educational material provided. Teach back verified.  Permission granted from patient for staff to fax referral information to outlying program at this time.  Staff faxed referral info to [facility] in [place].

## 2024-11-04 NOTE — PLAN OF CARE
Goal Outcome Evaluation:  Plan of Care Reviewed With: patient        Progress: improving  Outcome Evaluation: Pt continues to present below baseline for ADL's, limited by generalized weakness, decreased activity tolerance, and balance deficits. Pt complete grooming tasks standing sink side with SUP, ModA for toileting tasks, CG to Siri for functional transfers. Continue to recommend IRF at discharge for optimal outcomes.    Anticipated Discharge Disposition (OT): inpatient rehabilitation facility

## 2024-11-04 NOTE — PLAN OF CARE
Goal Outcome Evaluation:  Plan of Care Reviewed With: patient        Progress: improving  Outcome Evaluation: Patient demonstrating improvements in functional mobility this date evidenced by reduced assistance levels required for transfers and gait. However, patient continues to demonstrate deficits in functional endurance with early fatigue during gait and balance through demonstrating losses of balance during ambulation this date. Patient will continue to benefit from skilled IP PT services to address impairments for return to PLOF. Cont IP PT POC.    Anticipated Discharge Disposition (PT): skilled nursing facility

## 2024-11-04 NOTE — THERAPY TREATMENT NOTE
"Patient Name: Joel Galo  : 1965    MRN: [id number]                              Today's Date: 2024       Admit Date: 10/28/2024    Visit Dx:     ICD-10-CM ICD-9-CM   1. ASCVD (arteriosclerotic cardiovascular disease)  I25.10 429.2     440.9     Patient Active Problem List   Diagnosis    Paroxysmal atrial fibrillation    Obstructive sleep apnea    Hypothyroidism (acquired)    GERD (gastroesophageal reflux disease)    Chronic anticoagulation    DM (diabetes mellitus), type 2    Hyperlipidemia LDL goal <70    Primary hypertension    CAD s/p CABG x 4, MAZE, LAAL 10/28/24     Past Medical History:   Diagnosis Date    A-fib     Anxiety     Arthritis     DDD (degenerative disc disease), lumbar     Deep vein thrombosis     Depression     Diabetes     Disease of thyroid gland     HYPO    Heart attack     \"5-6 years ago\"    Hyperlipidemia     Hypertension     Kidney stone     Neuropathy     Sleep apnea     DOES NOT USE CPAP    UTI (urinary tract infection)      Past Surgical History:   Procedure Laterality Date    ATRIAL APPENDAGE EXCLUSION LEFT WITH TRANSESOPHAGEAL ECHOCARDIOGRAM N/A 10/28/2024    Procedure: ATRIAL APPENDAGE EXCLUSION LEFT WITH TRANSESOPHAGEAL ECHOCARDIOGRAM;  Surgeon: Alex Max MD;  Location: [facility] OR;  Service: Cardiothoracic;  Laterality: N/A;    CARDIAC CATHETERIZATION N/A 2024    Procedure: Left Heart Cath;  Surgeon: Julio Neff MD;  [facility]:  [facility];  Service: Cardiology;  Laterality: N/A;    CARDIAC ELECTROPHYSIOLOGY PROCEDURE N/A 2023    Procedure: Loop insertion;  Surgeon: Tariq Chávez MD;  Location:  [facility]ATION;  Service: Cardiovascular;  Laterality: N/A;    CATARACT EXTRACTION Bilateral     COLONOSCOPY      CORONARY ARTERY BYPASS GRAFT N/A 10/28/2024    Procedure: MEDIAN STERNOTOMY, CORONARY ARTERY BYPASS GRAFTING X4 UTILIZING THE LEFT INTERNAL MAMMARY ARTERY GRAFT, ENDOSCOPIC VEIN HARVEST OF THE GREATER RIGHT " SAPHENOUS VEIN;  Surgeon: Alex Max MD;  Location:  [facility];  Service: Cardiothoracic;  Laterality: N/A;    ENDOSCOPY      MAZE PROCEDURE N/A 10/28/2024    Procedure: MAZE PROCEDURE;  Surgeon: Alex Max MD;  Location:  [facility] OR;  Service: Cardiothoracic;  Laterality: N/A;    TEETH EXTRACTION        General Information       Row Name 11/04/24 0946          Physical Therapy Time and Intention    Document Type therapy note (daily note)  -LO     Mode of Treatment physical therapy  -       Row Name 11/04/24 0946          General Information    Patient Profile Reviewed yes  -LO     Existing Precautions/Restrictions cardiac;fall;sternal  [place] catheter  -LO     Barriers to Rehab medically complex  -LO       Row Name 11/04/24 0946          Cognition    Orientation Status (Cognition) oriented to;person;place;situation;verbal cues/prompts needed for orientation;time  -LO       Row Name 11/04/24 0946          Safety Issues/Impairments Affecting Functional Mobility    Safety Issues Affecting Function (Mobility) safety precaution awareness;safety precautions follow-through/compliance  -LO     Impairments Affecting Function (Mobility) balance;endurance/activity tolerance;shortness of breath;strength;pain  -LO     Cognitive Impairments, Mobility Safety/Performance awareness, need for assistance;insight into deficits/self-awareness;safety precaution awareness;safety precaution follow-through  -LO               User Key  (r) = Recorded By, (t) = Taken By, (c) = Cosigned By      Initials Name Provider Type    LO Bettye Handley, PT Physical Therapist                   Mobility       Row Name 11/04/24 0948          Bed Mobility    Comment, (Bed Mobility) UIC  -LO       Row Name 11/04/24 0948          Transfers    Comment, (Transfers) recliner<>FWW CGA with following sternal precautions  -LO       Row Name 11/04/24 0948          Sit-Stand Transfer    Sit-Stand [place] (Transfers) contact guard  -     Assistive Device  (Sit-Stand Transfers) walker, front-wheeled  -LO       Row Name 11/04/24 0948          Gait/Stairs (Locomotion)    Angelina Level (Gait) minimum assist (75% patient effort)  -LO     Assistive Device (Gait) other (see comments)  tripod walker  -LO     Distance in Feet (Gait) 75  -LO     Deviations/Abnormal Patterns (Gait) base of support, wide;festinating/shuffling;stride length decreased;lesley decreased  -LO     Bilateral Gait Deviations forward flexed posture;heel strike decreased  -LO     Right Sided Gait Deviations leans right  -LO     Comment, (Gait/Stairs) Ambulates with tripod walker this date with 2 episodes of loss of balance. Distance limited by fatigue and weakness.  -LO               User Key  (r) = Recorded By, (t) = Taken By, (c) = Cosigned By      Initials Name Provider Type    Bettye Hancock, MAEGAN Physical Therapist                   Obj/Interventions       Row Name 11/04/24 0952          Motor Skills    Therapeutic Exercise other (see comments)  B ankle DF/PF, quad sets, glut sets  -LO       Row Name 11/04/24 0952          Balance    Balance Assessment sitting static balance;sitting dynamic balance;standing static balance;standing dynamic balance  -LO     Static Sitting Balance standby assist  -LO     Dynamic Sitting Balance contact guard  -LO     Position, Sitting Balance unsupported;sitting in chair  -LO     Static Standing Balance contact guard  -LO     Dynamic Standing Balance minimal assist  -LO     Position/Device Used, Standing Balance supported;walker, front-wheeled  -LO     Comment, Balance FWW Meme for safety during gait. 2 episodes for loss of balance this date as patient cont to demonstrate a lateral R lean  -LO               User Key  (r) = Recorded By, (t) = Taken By, (c) = Cosigned By      Initials Name Provider Type    Bettye Hancock, MAEGAN Physical Therapist                   Goals/Plan    No documentation.                  Clinical Impression       Row Name 11/04/24 0938           Pain    Pretreatment Pain Rating 0/10 - no pain  -LO     Posttreatment Pain Rating 0/10 - no pain  -LO       Row Name 11/04/24 0955          Plan of Care Review    Plan of Care Reviewed With patient  -LO     Progress improving  -LO     Outcome Evaluation Patient demonstrating improvements in functional mobility this date evidenced by reduced assistance levels required for transfers and gait. However, patient continues to demonstrate deficits in functional endurance with early fatigue during gait and balance through demonstrating losses of balance during ambulation this date. Patient will continue to benefit from skilled IP PT services to address impairments for return to PLOF. Cont IP PT POC.  -       Row Name 11/04/24 0955          Therapy Assessment/Plan (PT)    Rehab Potential (PT) good  -LO     Criteria for Skilled Interventions Met (PT) yes;skilled treatment is necessary  -LO     Therapy Frequency (PT) daily  -       Row Name 11/04/24 0955          Vital Signs    Post Systolic BP Rehab 151  -LO     Post Treatment Diastolic BP 96  -LO     Pretreatment Heart Rate (beats/min) 71  -LO     O2 Delivery Pre Treatment room air  -LO     O2 Delivery Intra Treatment room air  -LO     Post SpO2 (%) 94  -LO     O2 Delivery Post Treatment room air  -LO     Pre Patient Position Standing  -LO     Intra Patient Position Standing  -LO     Post Patient Position Sitting  -LO       Row Name 11/04/24 0955          Positioning and Restraints    Pre-Treatment Position standing in room  with nsg tech  -LO     Post Treatment Position chair  -LO     In Chair notified nsg;reclined;call light within reach;encouraged to call for assist;exit alarm on;waffle cushion  -               User Key  (r) = Recorded By, (t) = Taken By, (c) = Cosigned By      Initials Name Provider Type    Bettye Hancock, PT Physical Therapist                   Outcome Measures       Row Name 11/04/24 0959          How much help from another person do you  currently need...    Turning from your back to your side while in flat bed without using bedrails? 3  -LO     Moving from lying on back to sitting on the side of a flat bed without bedrails? 3  -LO     Moving to and from a bed to a chair (including a wheelchair)? 3  -LO     Standing up from a chair using your arms (e.g., wheelchair, bedside chair)? 3  -LO     Climbing 3-5 steps with a railing? 2  -LO     To walk in hospital room? 3  -LO     AM-PAC 6 Clicks Score (PT) 17  -LO     Highest Level of Mobility Goal 5 --> Static standing  -LO       Row Name 11/04/24 0959          Functional Assessment    Outcome Measure Options AM-PAC 6 Clicks Basic Mobility (PT)  -LO               User Key  (r) = Recorded By, (t) = Taken By, (c) = Cosigned By      Initials Name Provider Type    Bettye Hancock, MAEGAN Physical Therapist                                 Physical Therapy Education       Title: PT OT SLP Therapies (In Progress)       Topic: Physical Therapy (Done)       Point: Mobility training (Done)       Learning Progress Summary            Patient Acceptance, E, VU,NR by LO at 11/4/2024 0923    Comment: PT POC    Acceptance, E, NR by HARISH at 11/1/2024 0850    Acceptance, E, NR by KG at 10/31/2024 0959    Acceptance, E, NR by ND at 10/30/2024 1124    Acceptance, E, NR by ND at 10/29/2024 1024                      Point: Home exercise program (Done)       Learning Progress Summary            Patient Acceptance, E, VU,NR by LO at 11/4/2024 0923    Comment: PT POC    Acceptance, E, NR by HARISH at 11/1/2024 0850    Acceptance, E, NR by KG at 10/31/2024 0959                      Point: Body mechanics (Done)       Learning Progress Summary            Patient Acceptance, E, VU,NR by LO at 11/4/2024 0923    Comment: PT POC    Acceptance, E, NR by HARISH at 11/1/2024 0850    Acceptance, E, NR by KG at 10/31/2024 0959    Acceptance, E, NR by ND at 10/30/2024 1124    Acceptance, E, NR by ND at 10/29/2024 1024                      Point:  Precautions (Done)       Learning Progress Summary            Patient Acceptance, E, VU,NR by LO at 11/4/2024 0923    Comment: PT POC    Acceptance, E, NR by HARISH at 11/1/2024 0850    Acceptance, E, NR by KG at 10/31/2024 0959    Acceptance, E, NR by ND at 10/30/2024 1124    Acceptance, E, NR by ND at 10/29/2024 1024                                      User Key       Initials Effective Dates Name Provider Type Discipline    HARISH 02/03/23 -  Suzi Don, PT Physical Therapist PT    KG 05/22/20 -  Lois Santos, PT Physical Therapist PT    LO 06/16/21 -  Bettye Handley, PT Physical Therapist PT    ND 11/16/23 -  Cristina Varela, PT Physical Therapist PT                  PT Recommendation and Plan     Progress: improving  Outcome Evaluation: Patient demonstrating improvements in functional mobility this date evidenced by reduced assistance levels required for transfers and gait. However, patient continues to demonstrate deficits in functional endurance with early fatigue during gait and balance through demonstrating losses of balance during ambulation this date. Patient will continue to benefit from skilled IP PT services to address impairments for return to PLOF. Cont IP PT POC.     Time Calculation:         PT Charges       Row Name 11/04/24 0923             Time Calculation    Start Time 0923  -LO      PT Received On 11/04/24  -LO      PT Goal Re-Cert Due Date 11/08/24  -LO         Timed Charges    33816 - PT Therapeutic Exercise Minutes 8  -LO      60022 - Gait Training Minutes  15  -LO      81538 - PT Therapeutic Activity Minutes 8  -LO         Total Minutes    Timed Charges Total Minutes 31  -LO       Total Minutes 31  -LO                User Key  (r) = Recorded By, (t) = Taken By, (c) = Cosigned By      Initials Name Provider Type    Bettye Hancock, PT Physical Therapist                  Therapy Charges for Today       Code Description Service Date Service Provider Modifiers Qty    30596853515   PT THER PROC EA 15 MIN 11/4/2024 Bettye Handley, PT GP 1    [id number] HC GAIT TRAINING EA 15 MIN 11/4/2024 Bettye Handley, PT GP 1            PT G-Codes  Outcome Measure Options: AM-PAC 6 Clicks Basic Mobility (PT)  AM-PAC 6 Clicks Score (PT): 17  AM-PAC 6 Clicks Score (OT): 8  PT Discharge Summary  Anticipated Discharge Disposition (PT): skilled nursing facility    Bettye Handley, PT  11/4/2024

## 2024-11-05 ENCOUNTER — APPOINTMENT (OUTPATIENT)
Dept: GENERAL RADIOLOGY | Facility: HOSPITAL | Age: 59
End: 2024-11-05
Payer: MEDICARE

## 2024-11-05 LAB
ANION GAP SERPL CALCULATED.3IONS-SCNC: 22 MMOL/L (ref 5–15)
BUN SERPL-MCNC: 18 MG/DL (ref 8–23)
BUN/CREAT SERPL: 30 (ref 7–25)
CALCIUM SPEC-SCNC: 8.9 MG/DL (ref 8.6–10.5)
CHLORIDE SERPL-SCNC: 99 MMOL/L (ref 98–107)
CO2 SERPL-SCNC: 17 MMOL/L (ref 22–29)
CREAT SERPL-MCNC: 0.6 MG/DL (ref 0.76–1.27)
DEPRECATED RDW RBC AUTO: 44 FL (ref 37–54)
EGFRCR SERPLBLD CKD-EPI 2021: 109.8 ML/MIN/1.73
ERYTHROCYTE [DISTWIDTH] IN BLOOD BY AUTOMATED COUNT: 13.5 % (ref 12.3–15.4)
GLUCOSE BLDC GLUCOMTR-MCNC: 111 MG/DL (ref 70–130)
GLUCOSE BLDC GLUCOMTR-MCNC: 130 MG/DL (ref 70–130)
GLUCOSE BLDC GLUCOMTR-MCNC: 154 MG/DL (ref 70–130)
GLUCOSE BLDC GLUCOMTR-MCNC: 183 MG/DL (ref 70–130)
GLUCOSE SERPL-MCNC: 117 MG/DL (ref 65–99)
HCT VFR BLD AUTO: 34.3 % (ref 37.5–51)
HGB BLD-MCNC: 11.6 G/DL (ref 13–17.7)
MCH RBC QN AUTO: 30.4 PG (ref 26.6–33)
MCHC RBC AUTO-ENTMCNC: 33.8 G/DL (ref 31.5–35.7)
MCV RBC AUTO: 89.8 FL (ref 79–97)
PLATELET # BLD AUTO: 233 10*3/MM3 (ref 140–450)
PMV BLD AUTO: 9.6 FL (ref 6–12)
POTASSIUM SERPL-SCNC: 4.5 MMOL/L (ref 3.5–5.2)
RBC # BLD AUTO: 3.82 10*6/MM3 (ref 4.14–5.8)
SODIUM SERPL-SCNC: 138 MMOL/L (ref 136–145)
WBC NRBC COR # BLD AUTO: 11.01 10*3/MM3 (ref 3.4–10.8)

## 2024-11-05 PROCEDURE — 80048 BASIC METABOLIC PNL TOTAL CA: CPT

## 2024-11-05 PROCEDURE — 85027 COMPLETE CBC AUTOMATED: CPT

## 2024-11-05 PROCEDURE — 82948 REAGENT STRIP/BLOOD GLUCOSE: CPT

## 2024-11-05 PROCEDURE — 63710000001 INSULIN GLARGINE PER 5 UNITS: Performed by: INTERNAL MEDICINE

## 2024-11-05 PROCEDURE — 25010000002 HEPARIN (PORCINE) PER 1000 UNITS: Performed by: PHYSICIAN ASSISTANT

## 2024-11-05 PROCEDURE — 99024 POSTOP FOLLOW-UP VISIT: CPT

## 2024-11-05 PROCEDURE — 99232 SBSQ HOSP IP/OBS MODERATE 35: CPT | Performed by: NURSE PRACTITIONER

## 2024-11-05 PROCEDURE — 63710000001 INSULIN LISPRO (HUMAN) PER 5 UNITS: Performed by: INTERNAL MEDICINE

## 2024-11-05 PROCEDURE — 71045 X-RAY EXAM CHEST 1 VIEW: CPT

## 2024-11-05 RX ORDER — AMLODIPINE BESYLATE 5 MG/1
5 TABLET ORAL
Status: DISCONTINUED | OUTPATIENT
Start: 2024-11-05 | End: 2024-11-06

## 2024-11-05 RX ORDER — TERAZOSIN 2 MG/1
2 CAPSULE ORAL NIGHTLY
Status: DISCONTINUED | OUTPATIENT
Start: 2024-11-05 | End: 2024-11-05

## 2024-11-05 RX ORDER — HYDROCHLOROTHIAZIDE 25 MG/1
25 TABLET ORAL DAILY
Status: DISCONTINUED | OUTPATIENT
Start: 2024-11-05 | End: 2024-11-06 | Stop reason: HOSPADM

## 2024-11-05 RX ADMIN — HEPARIN SODIUM 5000 UNITS: 5000 INJECTION INTRAVENOUS; SUBCUTANEOUS at 15:15

## 2024-11-05 RX ADMIN — HEPARIN SODIUM 5000 UNITS: 5000 INJECTION INTRAVENOUS; SUBCUTANEOUS at 21:54

## 2024-11-05 RX ADMIN — ROSUVASTATIN 20 MG: 20 TABLET, FILM COATED ORAL at 21:54

## 2024-11-05 RX ADMIN — SACUBITRIL AND VALSARTAN 1 TABLET: 49; 51 TABLET, FILM COATED ORAL at 08:35

## 2024-11-05 RX ADMIN — INSULIN GLARGINE 20 UNITS: 100 INJECTION, SOLUTION SUBCUTANEOUS at 08:37

## 2024-11-05 RX ADMIN — SOTALOL HYDROCHLORIDE 120 MG: 120 TABLET ORAL at 21:54

## 2024-11-05 RX ADMIN — HEPARIN SODIUM 5000 UNITS: 5000 INJECTION INTRAVENOUS; SUBCUTANEOUS at 06:27

## 2024-11-05 RX ADMIN — TAMSULOSIN HYDROCHLORIDE 0.4 MG: 0.4 CAPSULE ORAL at 08:35

## 2024-11-05 RX ADMIN — BUPROPION HYDROCHLORIDE 150 MG: 150 TABLET, EXTENDED RELEASE ORAL at 08:34

## 2024-11-05 RX ADMIN — HYDROCHLOROTHIAZIDE 25 MG: 25 TABLET ORAL at 08:36

## 2024-11-05 RX ADMIN — AMLODIPINE BESYLATE 5 MG: 5 TABLET ORAL at 12:58

## 2024-11-05 RX ADMIN — INSULIN LISPRO 2 UNITS: 100 INJECTION, SOLUTION INTRAVENOUS; SUBCUTANEOUS at 12:56

## 2024-11-05 RX ADMIN — INSULIN LISPRO 2 UNITS: 100 INJECTION, SOLUTION INTRAVENOUS; SUBCUTANEOUS at 08:36

## 2024-11-05 RX ADMIN — EMPAGLIFLOZIN 25 MG: 25 TABLET, FILM COATED ORAL at 08:35

## 2024-11-05 RX ADMIN — SACUBITRIL AND VALSARTAN 1 TABLET: 49; 51 TABLET, FILM COATED ORAL at 21:54

## 2024-11-05 RX ADMIN — DULOXETINE HYDROCHLORIDE 60 MG: 60 CAPSULE, DELAYED RELEASE ORAL at 08:34

## 2024-11-05 RX ADMIN — INSULIN LISPRO 2 UNITS: 100 INJECTION, SOLUTION INTRAVENOUS; SUBCUTANEOUS at 17:30

## 2024-11-05 RX ADMIN — INSULIN LISPRO 2 UNITS: 100 INJECTION, SOLUTION INTRAVENOUS; SUBCUTANEOUS at 21:54

## 2024-11-05 RX ADMIN — ASPIRIN 325 MG: 325 TABLET, COATED ORAL at 08:36

## 2024-11-05 RX ADMIN — SOTALOL HYDROCHLORIDE 120 MG: 120 TABLET ORAL at 08:35

## 2024-11-05 RX ADMIN — LEVOTHYROXINE SODIUM 50 MCG: 0.05 TABLET ORAL at 06:27

## 2024-11-05 NOTE — NURSING NOTE
Uneventful shift. Urine improved in color-urology irrigated [place]. Patient would not allow RN to trim beard short, however, he did let the RN trim about an inch or so off but refused anything further to be cut. RN educated patient about surgeons wish due to risk of infection.

## 2024-11-05 NOTE — PROGRESS NOTES
[facility] Cardiothoracic Surgery In-Patient Progress Note     LOS: 8 days     POD # 8 s/p CABG x 4, MAZE, LAAL    Subjective  No complaints.  No acute events.    Objective  Vital Signs  Temp:  [98 °F (36.7 °C)-99.3 °F (37.4 °C)] 99.3 °F (37.4 °C)  Heart Rate:  [65-96] 96  Resp:  [18] 18  BP: (118-160)/(70-96) 160/93    Physical Exam:   General Appearance: alert, appears stated age and cooperative   Lungs: clear to auscultation, respirations regular, respirations even, and respirations unlabored   Heart: regular rhythm & normal rate, normal S1, S2, no murmur, no gallop, no rub, and no click   Skin: Incision c/d/I   Sternum: Stable      Results     Results from last 7 days   Lab Units 11/05/24  0606   WBC 10*3/mm3 11.01*   HEMOGLOBIN g/dL 11.6*   HEMATOCRIT % 34.3*   PLATELETS 10*3/mm3 233     Results from last 7 days   Lab Units 11/05/24  0606   SODIUM mmol/L 138   POTASSIUM mmol/L 4.5   CHLORIDE mmol/L 99   CO2 mmol/L 17.0*   BUN mg/dL 18   CREATININE mg/dL 0.60*   GLUCOSE mg/dL 117*   CALCIUM mg/dL 8.9     Imaging Results (Last 24 Hours)       Procedure Component Value Units Date/Time    XR Chest 1 View [[id number]] Collected: 11/05/24 0743     Updated: 11/05/24 0748    Narrative:      XR CHEST 1 VW    Date of Exam: 11/5/2024 3:39 AM EST    Indication: postop    Comparison 11/4/2024.    Findings:  Loop recorder over the left chest is again seen. Heart and pulmonary vessels appear within normal limits. There are sternal suture wires. There is mild linear atelectasis of the upper lobes, similar. There are no new infiltrates or effusions.      Impression:      Impression:  No significant interval change.      Electronically Signed: Tenisha David MD    11/5/2024 7:44 AM EST    Workstation ID: [id number]    XR Chest 1 View [[id number]] Collected: 11/04/24 0754     Updated: 11/04/24 0800    Narrative:      XR CHEST 1 VW    Date of Exam: 11/4/2024 3:56 AM EST    Indication: postop    Comparison: Chest x-ray  11/3/2024    Findings:  Stable cardiomegaly. The lungs are grossly clear. No pleural effusion or pneumothorax.      Impression:      Impression:  No acute cardiopulmonary findings.      Electronically Signed: Enrique Vazquez MD    11/4/2024 7:57 AM EST    Workstation ID: [id number]          Assessment  POD # 8 s/p CABG x 4, MAZE, LAAL    Plan   Trim facial hair  Wean oxygen as tolerated  PT/OT  Pulmonary toilet  ASA, statin, BB  Case management to work on SNF/rehab placement-medically ready    Alex Max MD  11/05/24  07:58 EST   [place]

## 2024-11-05 NOTE — CASE MANAGEMENT/SOCIAL WORK
Case Management Discharge Note      Final Note: I have met with Mr. Galo at bedside to discuss likley discharge for tomorrow.  Per Evelin Yuan with [facility] has received insurance approval and will have a bed available tomorrow.  He remains agreeable to this bed offer and prefers wheelchair transportation to be arranged.  I have updated correct pharmacy info in [employer] ([facility] [place]).  I have left message with Evelin at [facility] with the transport time. CM will fax the discharge summary when available to [phone].  Nurse to please call report to [phone].  [facility] wheelchair transportation will arrive at bedside at 11am on 11/6/2024 to provide transport.  Mr. Galo denies any additional discharge needs at this time.         Selected Continued Care - Admitted Since 10/28/2024       Destination Coordination complete.      Service Provider Services Address Phone Fax Patient Preferred    [facility] [address] [place] [address] [phone] --       Internal Comment last updated by Aneta Grijalva RN 10/31/2024 1453    Message left 10/31-Evelin                            Transportation Services  W/C [facility]: Other ([facility])    Final Discharge Disposition Code: 03 - skilled nursing facility (SNF)

## 2024-11-05 NOTE — PROGRESS NOTES
[facility] Medicine Services  PROGRESS NOTE    Patient Name: Joel Galo  : 1965  MRN: [id number]    Date of Admission: 10/28/2024  Primary Care Physician: Edmundo Boston MD    Subjective   Subjective     CC:  MV CAD, Afib     HPI:  No new issues overnight  Some nausea after breakfast      Objective   Objective     Vital Signs:   Temp:  [98 °F (36.7 °C)-99.3 °F (37.4 °C)] 98.2 °F (36.8 °C)  Heart Rate:  [66-96] 70  Resp:  [18] 18  BP: (118-161)/(70-98) 161/98     Physical Exam:  Constitutional: No acute distress, awake, alert  HENT: NCAT, mucous membranes moist  Respiratory: Clear to auscultation bilaterally, respiratory effort normal   Cardiovascular: RRR, no murmurs, rubs, or gallops  Gastrointestinal: Positive bowel sounds, soft, nontender, nondistended  Musculoskeletal: No bilateral ankle edema  Psychiatric: Appropriate affect, cooperative  Neurologic: Oriented x 3, GARNICA, speech clear  Skin: No rashes noted. Midline chest incision intact      Results Reviewed:  LAB RESULTS:      Lab 24  0606 24  1725 24  1228 24  0721 24  0526   WBC 11.01* 8.88 8.96 7.21 9.05   HEMOGLOBIN 11.6* 9.7* 9.7* 10.2* 11.0*   HEMATOCRIT 34.3* 30.1* 31.6* 31.8* 33.8*   PLATELETS 233 269 207 175 193   MCV 89.8 94.1 98.1* 94.4 93.9         Lab 24  0606 24  1725 24  1228 24  0741 24  0352 24  0526   SODIUM 138 140 138  --  134* 137   POTASSIUM 4.5 4.1 4.5 3.5 3.8 3.3*   CHLORIDE 99 102 103  --  100 99   CO2 17.0* 24.0 20.0*  --  18.0* 22.0   ANION GAP 22.0* 14.0 15.0  --  16.0* 16.0*   BUN 18 20 29*  --  40* 42*   CREATININE 0.60* 0.60* 0.56*  --  0.83 0.87   EGFR 109.8 109.8 112.1  --  99.6 98.2   GLUCOSE 117* 131* 190*  --  112* 124*   CALCIUM 8.9 9.1 8.1*  --  8.2* 8.8         Lab 24  0526   TOTAL PROTEIN 6.8   ALBUMIN 3.4*   GLOBULIN 3.4   ALT (SGPT) 44*   AST (SGOT) 107*   BILIRUBIN 0.4   ALK PHOS 126*               Lab  11/03/24  1228   CHOLESTEROL 102   LDL CHOL 36   HDL CHOL 18*   TRIGLYCERIDES 316*         Lab 10/31/24  1052   ABO TYPING B   RH TYPING Negative   ANTIBODY SCREEN Negative           Brief Urine Lab Results       None            Microbiology Results Abnormal       None            XR Chest 1 View    Result Date: 11/5/2024  XR CHEST 1 VW Date of Exam: 11/5/2024 3:39 AM EST Indication: postop Comparison 11/4/2024. Findings: Loop recorder over the left chest is again seen. Heart and pulmonary vessels appear within normal limits. There are sternal suture wires. There is mild linear atelectasis of the upper lobes, similar. There are no new infiltrates or effusions.     Impression: Impression: No significant interval change. Electronically Signed: Tenisha David MD  11/5/2024 7:44 AM EST  Workstation ID: [id number]    XR Chest 1 View    Result Date: 11/4/2024  XR CHEST 1 VW Date of Exam: 11/4/2024 3:56 AM EST Indication: postop Comparison: Chest x-ray 11/3/2024 Findings: Stable cardiomegaly. The lungs are grossly clear. No pleural effusion or pneumothorax.     Impression: Impression: No acute cardiopulmonary findings. Electronically Signed: Enrique Vazquez MD  11/4/2024 7:57 AM EST  Workstation ID: [id number]     Results for orders placed in visit on 10/28/24    Intra-Op Anesthesia DARIAN    Narrative  Intra-Op Anesthesia DARIAN    Procedure Performed: Intra-Op Anesthesia DARIAN  Start Time:  End Time:    Preanesthesia Checklist:  Patient identified, IV assessed, risks and benefits discussed, monitors and equipment assessed, procedure being performed at surgeon's request and anesthesia consent obtained.    General Procedure Information  Diagnostic Indications for Echo:  assessment of ascending aorta, assessment of surgical repair and hemodynamic monitoring  Physician Requesting Echo: Alex Max MD  Location performed:  OR  Intubated  Bite block not placed  Heart visualized  Probe Insertion:  Easy  Probe Type:   Multiplane  Modalities:  Color flow mapping, continuous wave Doppler and pulse wave Doppler    Echocardiographic and Doppler Measurements    Ventricles    Right Ventricle:  Thrombus not present.  Global function normal.  Left Ventricle:  Cavity size normal.  Diastolic dimension 1.6 cm.  Thrombus not present.  Global Function normal.  Ejection Fraction 55%.        Valves    Aortic Valve:  Annulus normal.  Stenosis not present.  Area: 2.2 cm².  Mean Gradient: 2 mmHg.  Regurgitation absent.  Leaflets normal.  Leaflet motions normal.    Mitral Valve:  Annulus normal.  Stenosis not present.  Area: 2.4 cm².  Mean Gradient: 1 mmHg.  Regurgitation trace.  Leaflets normal.  Leaflet motions normal.    Tricuspid Valve:  Annulus normal.  Stenosis not present.  Leaflets normal.  Leaflet motions normal.  Pulmonic Valve:  Annulus normal.      Aorta    Ascending Aorta:  Size normal.  Diameter 3 cm.  Dissection not present.  Plaque thickness less than 3 mm.  Mobile plaque not present.  Aortic Arch:  Size normal.  Dissection not present.  Plaque thickness less than 3 mm.  Mobile plaque not present.  Descending Aorta:  Size normal.  Dissection not present.  Plaque thickness less than 3 mm.  Mobile plaque not present.      Atria    Right Atrium:  Size dilated.  Spontaneous echo contrast not present.  Thrombus not present.  Tumor not present.  Device present.    Left Atrium:  Size dilated.  Spontaneous echo contrast not present.  Thrombus not present.  Tumor not present.  Left atrial appendage normal.      Septa        Ventricular Septum:  Intra-ventricular septum morphology normal.    Diastolic Function Measurements:  Diastolic Dysfunction Grade=  E=  ms  A=  ms  E/A Ratio=  1.7  DT=  ms  S/D=  IVRT=    Other Findings  Pericardium:  normal  Pleural Effusion:  none  Pulmonary Arteries:  normal  Pulmonary Venous Flow:  normal    Anesthesia Information  Performed Personally  Anesthesiologist:  George Morrow MD      Echocardiogram  Comments:  Informed consent was obtained preoperatively.  Yahir probe passed without difficulty.  Post CABG, MAZE and TORSTEN ligation:   EF unchanged, no NWMA, obliterartion of TORSTEN by clip      Current medications:  Scheduled Meds:amLODIPine, 5 mg, Oral, Q24H  aspirin, 325 mg, Oral, Daily  buPROPion XL, 150 mg, Oral, Daily  DULoxetine, 60 mg, Oral, Daily  empagliflozin, 25 mg, Oral, Daily  heparin (porcine), 5,000 Units, Subcutaneous, Q8H  hydroCHLOROthiazide Oral, 25 mg, Oral, Daily  insulin glargine, 20 Units, Subcutaneous, Daily  Insulin Lispro, 2 Units, Subcutaneous, TID With Meals  insulin lispro, 2-9 Units, Subcutaneous, 4x Daily AC & at Bedtime  levothyroxine, 50 mcg, Oral, Q AM  pharmacy consult - MTM, , Does not apply, Daily  rosuvastatin, 20 mg, Oral, Nightly  sacubitril-valsartan, 1 tablet, Oral, Q12H  senna-docusate sodium, 2 tablet, Oral, BID  sotalol, 120 mg, Oral, Q12H  tamsulosin, 0.4 mg, Oral, Daily      Continuous Infusions:dexmedetomidine, 0.2-1.5 mcg/kg/hr, Last Rate: Stopped (10/31/24 0530)      PRN Meds:.  bisacodyl    Calcium Replacement - Follow Nurse / BPA Driven Protocol    dextrose    dextrose    glucagon (human recombinant)    Magnesium Cardiology Dose Replacement - Follow Nurse / BPA Driven Protocol    nitroglycerin    ondansetron    Phosphorus Replacement - Follow Nurse / BPA Driven Protocol    polyethylene glycol    Potassium Replacement - Follow Nurse / BPA Driven Protocol    sodium bicarbonate    Assessment & Plan   Assessment & Plan     Active Hospital Problems    Diagnosis  POA    **CAD s/p CABG x 4, MAZE, LAAL 10/28/24 [I25.10]  Yes    Primary hypertension [I10]  Yes    Type 2 diabetes mellitus with hyperglycemia, without long-term current use of insulin [E11.65]  Yes    Hyperlipidemia LDL goal <70 [E78.5]  Yes    Paroxysmal atrial fibrillation [I48.0]  Yes      Resolved Hospital Problems   No resolved problems to display.        Brief Hospital Course to date:  Joel Galo is a 59  y.o. male  w/ DM, HL, hypothyroidism, neuropathy & depression who is s/p CABG & TORSTEN ligation for multivessel cad & afib on 10/28/24. Transferred out of icu and hospitalists following for medical co-management & DM management    Plan was partially entered by my partner and I have reviewed and updated as appropriate on 11/4/24     Multivessel CAD  Parox afib  HTN  HL  **s/p CABG x 3 x TORSTEN ligation 10/28/24  -post-op management per primary ct surgery team  -cards following: continue asa, sotalol, entresto, norvasc, statin, jardiance, HCTZ     Urinary retention  -Urology consulted (Dr. Pepe), recommends [place] x 5-7 days (per 11/2/24 note), continue flomax, follow up w/ local urologist (near [place]) for voiding trial after discharge     Insulin dep DM2 (A1c 11.2)  -on 70/30 regimen at home, admits to dietary non-compliance  - lantus to 20 units sq nightly; humalog 2 units tid meals, change sliding scale to medium dose; adjust insulins prn    Latest Reference Range & Units 11/05/24 06:06 11/05/24 07:28 11/05/24 11:28   Glucose 70 - 130 mg/dL 117 (H) 111 183 (H)     Neuropathy  Depression  -continue cymbalta & wellbutrin     Hypothyroidism  -continue levothyrosine          Expected Discharge Location and Transportation: rehab pending insurance  Expected Discharge   Expected Discharge Date: 11/6/2024; Expected Discharge Time:      VTE Prophylaxis:  Pharmacologic & mechanical VTE prophylaxis orders are present.         AM-PAC 6 Clicks Score (PT): 17 (11/04/24 2000)    CODE STATUS:   Code Status and Medical Interventions: CPR (Attempt to Resuscitate); Full Support   Ordered at: 10/28/24 4989     Code Status (Patient has no pulse and is not breathing):    CPR (Attempt to Resuscitate)     Medical Interventions (Patient has pulse or is breathing):    Full Support       ZIA Molina  11/05/24

## 2024-11-05 NOTE — PROGRESS NOTES
Cardiology Progress Note  Primary cardiologist: Dr. Neff    Reason for visit:    Coronary artery disease status post CABG  Paroxysmal atrial fibrillaton    IDENTIFICATION: Patient is a 59-year-old gentleman who resides in [facility] Hospital Problems    Diagnosis  POA    **CAD s/p CABG x 4, MAZE, LAAL 10/28/24 [I25.10]  Yes     Priority: High     Abnormal CTA (8/20/2024): Moderate disease in the LAD and 75% stenosis in the circumflex and right coronary artery noted.   Cardiac cath (8/28/2024): Multivessel CAD.    Echo (10/17/2024): LVEF 62%.  No valvular abnormality  CABG/maze/left atrial appendage clip (10/28/2024): LIMA to LAD, SVG to OM1, SVG to OM 4, SVG to OM 5      Type 2 diabetes mellitus with hyperglycemia, without long-term current use of insulin [E11.65]  Yes     Priority: High     hemoglobin A1c 12.6% August 2024, 11.2% October 2024       Primary hypertension [I10]  Yes     Priority: Medium     Target blood pressure <130/80 mmHg      Paroxysmal atrial fibrillation [I48.0]  Yes     Priority: Medium     Echo (3/10/2023): EF 60-65%.  Grade 1 diastolic dysfunction.  Normal valvular morphology.  CHADsVasc 3  Loop recorder inserted 2023  3% burden A-fib noted on loop recorder 7/2024  Left atrial appendage clipping performed at time of CABG 10/28/2024  Evaluated by EP 5/2024 with plans for future PVA      Hyperlipidemia LDL goal <70 [E78.5]  Yes     Priority: Low     High intensity statin therapy indicated given the presence of CAD              Patient is sitting up in the chair breathing easy on room air.  He denies any chest pain or shortness of breath.  He is maintaining normal sinus rhythm.  He has been having hematuria in his Prado catheter for which urology has evaluated.           Vital Sign Min/Max for last 24 hours  Temp  Min: 98 °F (36.7 °C)  Max: 99.3 °F (37.4 °C)   BP  Min: 118/70  Max: 160/93   Pulse  Min: 65  Max: 96   Resp  Min: 18  Max: 18   SpO2  Min: 92 %  Max: 96 %   No data  recorded      Intake/Output Summary (Last 24 hours) at 11/5/2024 0747  Last data filed at 11/5/2024 0446  Gross per 24 hour   Intake 300 ml   Output 4155 ml   Net -3855 ml           Physical Exam  Constitutional:       General: He is awake.   Cardiovascular:      Rate and Rhythm: Normal rate and regular rhythm.      Comments: Lower extremity edema  Pulmonary:      Effort: Pulmonary effort is normal.   Skin:     General: Skin is warm and dry.   Neurological:      Mental Status: He is alert.   Psychiatric:         Behavior: Behavior is cooperative.         Tele: Normal sinus rhythm     Results Review (reviewed the patient's recent labs in the electronic medical record):      EKG (10/31/2024): Normal sinus rhythm     CXR (11/5/2024): Loop recorder over left chest seen again.  Mild atelectasis upper lobes.  No new infiltrates or effusions     ECHO (10/20/2024): LVEF 61-65%.  No valvular abnormality    Results from last 7 days   Lab Units 11/05/24  0606 11/04/24  1725 11/03/24  1228 11/02/24  0741 11/02/24  0352 11/01/24  0526 10/31/24  0343 10/30/24  0307   SODIUM mmol/L 138 140 138  --  134* 137 148* 145   POTASSIUM mmol/L 4.5 4.1 4.5   < > 3.8 3.3* 4.3 4.6   CHLORIDE mmol/L 99 102 103  --  100 99 112* 112*   BUN mg/dL 18 20 29*  --  40* 42* 32* 20   CREATININE mg/dL 0.60* 0.60* 0.56*  --  0.83 0.87 0.85 0.88    < > = values in this interval not displayed.         Results from last 7 days   Lab Units 11/05/24  0606 11/04/24  1725 11/03/24  1228   WBC 10*3/mm3 11.01* 8.88 8.96   HEMOGLOBIN g/dL 11.6* 9.7* 9.7*   HEMATOCRIT % 34.3* 30.1* 31.6*   PLATELETS 10*3/mm3 233 269 207       Lab Results   Component Value Date    HGBA1C 11.20 (H) 10/25/2024       Lab Results   Component Value Date    CHOL 102 11/03/2024    TRIG 316 (H) 11/03/2024    HDL 18 (L) 11/03/2024    LDL 36 11/03/2024              Coronary artery disease  Status post CABG with Dr. Max 10/28  Aspirin 325 mg daily     Paroxysmal atrial  fibrillaton  Historically on sotalol and Eliquis  Maze and left atrial appendage ligation performed at time of CABG 10/28.  Intraoperative DARIAN shows obliteration of TORSTEN by clip  Went into atrial fibrillation with RVR 10/30/2024  Started on home dose sotalol 120 mg twice daily on 10/30/2024  Patient had episodes of intermittent atrial fibrillation on 10/31/2024 but not sustaining fast rate  Had loop recorder inserted in 2023  Saw Dr. Fishman in May 2024 for persistent A-fib and ablation was discussed.     Type 2 diabetes mellitus  Historically uncontrolled with hemoglobin A1c 11 and 12  Jardiance 25 mg daily     Hypertension/hypotension  Current /93  Entresto 49/51 mg 1 tablet twice daily  HCTZ 12.5 mg daily        Hyperlipidemia  Lipitor 40 mg daily                Increase hydrochlorothiazide 25 mg daily for better blood pressure control  Start amlodipine 5 mg daily  Continue Entresto, aspirin and statin  Continue sotalol for rhythm management of atrial fibrillation  Defer NOAC due to left atrial pended ligation performed at time of CABG  Okay for discharge to rehab bed when available  Patient should follow-up with his primary cardiologist in 4 weeks    Electronically signed by ZIA Poole, 11/05/24, 7:47 AM EST.

## 2024-11-06 ENCOUNTER — APPOINTMENT (OUTPATIENT)
Dept: GENERAL RADIOLOGY | Facility: HOSPITAL | Age: 59
End: 2024-11-06
Payer: MEDICARE

## 2024-11-06 VITALS
WEIGHT: 231.4 LBS | TEMPERATURE: 98.8 F | HEIGHT: 68 IN | DIASTOLIC BLOOD PRESSURE: 97 MMHG | SYSTOLIC BLOOD PRESSURE: 145 MMHG | OXYGEN SATURATION: 96 % | BODY MASS INDEX: 35.07 KG/M2 | RESPIRATION RATE: 18 BRPM | HEART RATE: 85 BPM

## 2024-11-06 LAB
ANION GAP SERPL CALCULATED.3IONS-SCNC: 18 MMOL/L (ref 5–15)
BUN SERPL-MCNC: 15 MG/DL (ref 8–23)
BUN/CREAT SERPL: 25.4 (ref 7–25)
CALCIUM SPEC-SCNC: 9 MG/DL (ref 8.6–10.5)
CHLORIDE SERPL-SCNC: 97 MMOL/L (ref 98–107)
CO2 SERPL-SCNC: 20 MMOL/L (ref 22–29)
CREAT SERPL-MCNC: 0.59 MG/DL (ref 0.76–1.27)
DEPRECATED RDW RBC AUTO: 45.5 FL (ref 37–54)
EGFRCR SERPLBLD CKD-EPI 2021: 110.4 ML/MIN/1.73
ERYTHROCYTE [DISTWIDTH] IN BLOOD BY AUTOMATED COUNT: 13.6 % (ref 12.3–15.4)
GLUCOSE BLDC GLUCOMTR-MCNC: 131 MG/DL (ref 70–130)
GLUCOSE SERPL-MCNC: 123 MG/DL (ref 65–99)
HCT VFR BLD AUTO: 34.2 % (ref 37.5–51)
HGB BLD-MCNC: 11 G/DL (ref 13–17.7)
MCH RBC QN AUTO: 29.6 PG (ref 26.6–33)
MCHC RBC AUTO-ENTMCNC: 32.2 G/DL (ref 31.5–35.7)
MCV RBC AUTO: 92.2 FL (ref 79–97)
PLATELET # BLD AUTO: 354 10*3/MM3 (ref 140–450)
PMV BLD AUTO: 9.2 FL (ref 6–12)
POTASSIUM SERPL-SCNC: 4 MMOL/L (ref 3.5–5.2)
RBC # BLD AUTO: 3.71 10*6/MM3 (ref 4.14–5.8)
SODIUM SERPL-SCNC: 135 MMOL/L (ref 136–145)
WBC NRBC COR # BLD AUTO: 9.65 10*3/MM3 (ref 3.4–10.8)

## 2024-11-06 PROCEDURE — 63710000001 INSULIN LISPRO (HUMAN) PER 5 UNITS: Performed by: INTERNAL MEDICINE

## 2024-11-06 PROCEDURE — 99232 SBSQ HOSP IP/OBS MODERATE 35: CPT | Performed by: NURSE PRACTITIONER

## 2024-11-06 PROCEDURE — 99024 POSTOP FOLLOW-UP VISIT: CPT

## 2024-11-06 PROCEDURE — 82948 REAGENT STRIP/BLOOD GLUCOSE: CPT

## 2024-11-06 PROCEDURE — 85027 COMPLETE CBC AUTOMATED: CPT

## 2024-11-06 PROCEDURE — 25010000002 HEPARIN (PORCINE) PER 1000 UNITS: Performed by: PHYSICIAN ASSISTANT

## 2024-11-06 PROCEDURE — 63710000001 INSULIN GLARGINE PER 5 UNITS: Performed by: INTERNAL MEDICINE

## 2024-11-06 PROCEDURE — 80048 BASIC METABOLIC PNL TOTAL CA: CPT

## 2024-11-06 PROCEDURE — 71045 X-RAY EXAM CHEST 1 VIEW: CPT

## 2024-11-06 RX ORDER — TERAZOSIN 2 MG/1
2 CAPSULE ORAL NIGHTLY
Status: DISCONTINUED | OUTPATIENT
Start: 2024-11-06 | End: 2024-11-06

## 2024-11-06 RX ORDER — HYDROCHLOROTHIAZIDE 25 MG/1
25 TABLET ORAL DAILY
Qty: 90 TABLET | Refills: 0 | Status: SHIPPED | OUTPATIENT
Start: 2024-11-07

## 2024-11-06 RX ORDER — INSULIN LISPRO 100 [IU]/ML
2-9 INJECTION, SOLUTION INTRAVENOUS; SUBCUTANEOUS
Start: 2024-11-06

## 2024-11-06 RX ORDER — HYDROCODONE BITARTRATE AND ACETAMINOPHEN 10; 325 MG/1; MG/1
1 TABLET ORAL ONCE AS NEEDED
Status: COMPLETED | OUTPATIENT
Start: 2024-11-06 | End: 2024-11-06

## 2024-11-06 RX ORDER — ASPIRIN 325 MG
325 TABLET, DELAYED RELEASE (ENTERIC COATED) ORAL DAILY
Qty: 90 TABLET | Refills: 0 | Status: SHIPPED | OUTPATIENT
Start: 2024-11-07

## 2024-11-06 RX ORDER — SCOLOPAMINE TRANSDERMAL SYSTEM 1 MG/1
1 PATCH, EXTENDED RELEASE TRANSDERMAL
Status: DISCONTINUED | OUTPATIENT
Start: 2024-11-06 | End: 2024-11-06 | Stop reason: HOSPADM

## 2024-11-06 RX ORDER — AMLODIPINE BESYLATE 10 MG/1
10 TABLET ORAL
Qty: 90 TABLET | Refills: 0 | Status: SHIPPED | OUTPATIENT
Start: 2024-11-06

## 2024-11-06 RX ORDER — SOTALOL HYDROCHLORIDE 120 MG/1
120 TABLET ORAL 2 TIMES DAILY
Qty: 90 TABLET | Refills: 1 | Status: SHIPPED | OUTPATIENT
Start: 2024-11-06

## 2024-11-06 RX ORDER — AMLODIPINE BESYLATE 10 MG/1
10 TABLET ORAL
Status: DISCONTINUED | OUTPATIENT
Start: 2024-11-06 | End: 2024-11-06 | Stop reason: HOSPADM

## 2024-11-06 RX ORDER — INSULIN LISPRO 100 [IU]/ML
2 INJECTION, SOLUTION INTRAVENOUS; SUBCUTANEOUS
Start: 2024-11-06

## 2024-11-06 RX ADMIN — BUPROPION HYDROCHLORIDE 150 MG: 150 TABLET, EXTENDED RELEASE ORAL at 09:05

## 2024-11-06 RX ADMIN — INSULIN GLARGINE 20 UNITS: 100 INJECTION, SOLUTION SUBCUTANEOUS at 09:06

## 2024-11-06 RX ADMIN — HYDROCODONE BITARTRATE AND ACETAMINOPHEN 1 TABLET: 10; 325 TABLET ORAL at 09:18

## 2024-11-06 RX ADMIN — TAMSULOSIN HYDROCHLORIDE 0.4 MG: 0.4 CAPSULE ORAL at 09:05

## 2024-11-06 RX ADMIN — DULOXETINE HYDROCHLORIDE 60 MG: 60 CAPSULE, DELAYED RELEASE ORAL at 09:05

## 2024-11-06 RX ADMIN — ASPIRIN 325 MG: 325 TABLET, COATED ORAL at 09:05

## 2024-11-06 RX ADMIN — LEVOTHYROXINE SODIUM 50 MCG: 0.05 TABLET ORAL at 05:34

## 2024-11-06 RX ADMIN — SENNOSIDES AND DOCUSATE SODIUM 2 TABLET: 50; 8.6 TABLET ORAL at 09:05

## 2024-11-06 RX ADMIN — INSULIN LISPRO 2 UNITS: 100 INJECTION, SOLUTION INTRAVENOUS; SUBCUTANEOUS at 09:06

## 2024-11-06 RX ADMIN — SOTALOL HYDROCHLORIDE 120 MG: 120 TABLET ORAL at 09:05

## 2024-11-06 RX ADMIN — EMPAGLIFLOZIN 25 MG: 25 TABLET, FILM COATED ORAL at 09:05

## 2024-11-06 RX ADMIN — HYDROCHLOROTHIAZIDE 25 MG: 25 TABLET ORAL at 09:05

## 2024-11-06 RX ADMIN — SACUBITRIL AND VALSARTAN 1 TABLET: 49; 51 TABLET, FILM COATED ORAL at 09:05

## 2024-11-06 RX ADMIN — HEPARIN SODIUM 5000 UNITS: 5000 INJECTION INTRAVENOUS; SUBCUTANEOUS at 05:34

## 2024-11-06 NOTE — PROGRESS NOTES
Cardiology Progress Note    Primary cardiologist Dr. Neff  Reason for visit:    Coronary artery disease status post CABG  Uncontrolled hypertension  Paroxysmal atrial fibrillation    IDENTIFICATION: Patient is a 59-year-old gentleman who resides in [facility]    Diagnosis  POA    **CAD s/p CABG x 4, MAZE, LAAL 10/28/24 [I25.10]  Yes     Priority: High     Abnormal CTA (8/20/2024): Moderate disease in the LAD and 75% stenosis in the circumflex and right coronary artery noted.   Cardiac cath (8/28/2024): Multivessel CAD.    Echo (10/17/2024): LVEF 62%.  No valvular abnormality  CABG/maze/left atrial appendage clip (10/28/2024): LIMA to LAD, SVG to OM1, SVG to OM 4, SVG to OM 5      Type 2 diabetes mellitus with hyperglycemia, without long-term current use of insulin [E11.65]  Yes     Priority: High     hemoglobin A1c 12.6% August 2024, 11.2% October 2024       Primary hypertension [I10]  Yes     Priority: Medium     Target blood pressure <130/80 mmHg      Paroxysmal atrial fibrillation [I48.0]  Yes     Priority: Medium     Echo (3/10/2023): EF 60-65%.  Grade 1 diastolic dysfunction.  Normal valvular morphology.  CHADsVasc 3  Loop recorder inserted 2023  3% burden A-fib noted on loop recorder 7/2024  Left atrial appendage clipping performed at time of CABG 10/28/2024  Evaluated by EP 5/2024 with plans for future PVA      Hyperlipidemia LDL goal <70 [E78.5]  Yes     Priority: Low     High intensity statin therapy indicated given the presence of CAD              Patient sitting up in the chair breathing easy on room air.  He denies chest pain or shortness of breath.  He has a bed at rehab and is scheduled to leave at 11 AM today.           Vital Sign Min/Max for last 24 hours  Temp  Min: 97.5 °F (36.4 °C)  Max: 98.8 °F (37.1 °C)   BP  Min: 132/88  Max: 161/98   Pulse  Min: 67  Max: 77   Resp  Min: 18  Max: 18   SpO2  Min: 93 %  Max: 95 %   No data recorded      Intake/Output Summary  (Last 24 hours) at 11/6/2024 0832  Last data filed at 11/5/2024 2000  Gross per 24 hour   Intake --   Output 1850 ml   Net -1850 ml           Physical Exam  Constitutional:       General: He is awake.   Cardiovascular:      Rate and Rhythm: Normal rate and regular rhythm.      Heart sounds: No murmur heard.     Comments: Trace lower extremity edema  Pulmonary:      Effort: Pulmonary effort is normal.      Breath sounds: Decreased breath sounds present.   Skin:     General: Skin is warm and dry.   Neurological:      Mental Status: He is alert.   Psychiatric:         Behavior: Behavior is cooperative.         Tele: Normal sinus rhythm     Results Review (reviewed the patient's recent labs in the electronic medical record):      EKG (10/31/2024): Normal sinus rhythm     CXR (11/6/2024): No acute process.  No pneumothorax or pleural effusion     ECHO (10/20/2024): LVEF 61-65%.  No valvular abnormality       Results from last 7 days   Lab Units 11/06/24  0534 11/05/24  0606 11/04/24  1725 11/03/24  1228 11/02/24  0741 11/02/24  0352 11/01/24  0526 10/31/24  0343   SODIUM mmol/L 135* 138 140 138  --  134* 137 148*   POTASSIUM mmol/L 4.0 4.5 4.1 4.5   < > 3.8 3.3* 4.3   CHLORIDE mmol/L 97* 99 102 103  --  100 99 112*   BUN mg/dL 15 18 20 29*  --  40* 42* 32*   CREATININE mg/dL 0.59* 0.60* 0.60* 0.56*  --  0.83 0.87 0.85    < > = values in this interval not displayed.         Results from last 7 days   Lab Units 11/06/24  0534 11/05/24  0606 11/04/24  1725   WBC 10*3/mm3 9.65 11.01* 8.88   HEMOGLOBIN g/dL 11.0* 11.6* 9.7*   HEMATOCRIT % 34.2* 34.3* 30.1*   PLATELETS 10*3/mm3 354 233 269       Lab Results   Component Value Date    HGBA1C 11.20 (H) 10/25/2024       Lab Results   Component Value Date    CHOL 102 11/03/2024    TRIG 316 (H) 11/03/2024    HDL 18 (L) 11/03/2024    LDL 36 11/03/2024              Coronary artery disease  Status post CABG with Dr. Max 10/28  Aspirin 325 mg daily     Paroxysmal atrial  fibrillaton  Historically on sotalol and Eliquis  Maze and left atrial appendage ligation performed at time of CABG 10/28.  Intraoperative DARIAN shows obliteration of TORSTEN by clip  Went into atrial fibrillation with RVR 10/30/2024  Started on home dose sotalol 120 mg twice daily on 10/30/2024  Patient had episodes of intermittent atrial fibrillation on 10/31/2024 but not sustaining fast rate  Had loop recorder inserted in 2023  Saw Dr. Fishman in May 2024 for persistent A-fib and ablation was discussed.  Maintaining normal sinus rhythm     Type 2 diabetes mellitus  Historically uncontrolled with hemoglobin A1c 11 and 12  Jardiance 25 mg daily     Hypertension/hypotension  Current /97  Entresto 49/51 mg 1 tablet twice daily  HCTZ 25 mg daily  Amlodipine 5 mg daily        Hyperlipidemia  Lipitor 40 mg daily                   Increase amlodipine 10 mg daily  Continue aspirin, statin, hydrochlorothiazide and Entresto  Okay for discharge to rehab facility  Deferred NOAC due to left atrial pended ligation performed at time of CABG.  Intraoperative DARIAN confirmed obliteration of TORSTEN by clip  Continue sotalol for rhythm management.  Follow-up with Dr. Fishman with EP for already scheduled appointment  Patient will follow-up with primary cardiologist Dr. Neff in 4 weeks  Please call cardiology with any further questions    Electronically signed by ZIA Poole, 11/06/24, 8:32 AM EST.

## 2024-11-06 NOTE — PROGRESS NOTES
[facility] Medicine Services  PROGRESS NOTE    Patient Name: Joel Galo  : 1965  MRN: [id number]    Date of Admission: 10/28/2024  Primary Care Physician: Edmundo Boston MD    Subjective   Subjective     CC:  MV CAD, Afib     HPI:  Reports some dizziness, but states this is a chronic issue  Agreeable to rehab, ok for dc from hospitalist standpoint      Objective   Objective     Vital Signs:   Temp:  [97.5 °F (36.4 °C)-98.8 °F (37.1 °C)] 98.8 °F (37.1 °C)  Heart Rate:  [73-85] 85  Resp:  [18] 18  BP: (145-154)/(96-97) 145/97     Physical Exam:  Constitutional: No acute distress, awake, alert  HENT: NCAT, mucous membranes moist  Respiratory: Clear to auscultation bilaterally, respiratory effort normal   Cardiovascular: RRR, no murmurs, rubs, or gallops  Gastrointestinal: Positive bowel sounds, soft, nontender, nondistended  Musculoskeletal: No bilateral ankle edema  Psychiatric: Appropriate affect, cooperative  Neurologic: Oriented x 3, GARNICA, speech clear  Skin: No rashes noted. Midline chest/ RLE incisions intact      Results Reviewed:  LAB RESULTS:      Lab 24  0534 24  0606 24  1725 24  1228 24  0721   WBC 9.65 11.01* 8.88 8.96 7.21   HEMOGLOBIN 11.0* 11.6* 9.7* 9.7* 10.2*   HEMATOCRIT 34.2* 34.3* 30.1* 31.6* 31.8*   PLATELETS 354 233 269 207 175   MCV 92.2 89.8 94.1 98.1* 94.4         Lab 24  0534 24  0606 24  1725 24  1228 24  0741 24  0352   SODIUM 135* 138 140 138  --  134*   POTASSIUM 4.0 4.5 4.1 4.5 3.5 3.8   CHLORIDE 97* 99 102 103  --  100   CO2 20.0* 17.0* 24.0 20.0*  --  18.0*   ANION GAP 18.0* 22.0* 14.0 15.0  --  16.0*   BUN 15 18 20 29*  --  40*   CREATININE 0.59* 0.60* 0.60* 0.56*  --  0.83   EGFR 110.4 109.8 109.8 112.1  --  99.6   GLUCOSE 123* 117* 131* 190*  --  112*   CALCIUM 9.0 8.9 9.1 8.1*  --  8.2*         Lab 24  0526   TOTAL PROTEIN 6.8   ALBUMIN 3.4*   GLOBULIN 3.4   ALT (SGPT)  44*   AST (SGOT) 107*   BILIRUBIN 0.4   ALK PHOS 126*               Lab 11/03/24  1228   CHOLESTEROL 102   LDL CHOL 36   HDL CHOL 18*   TRIGLYCERIDES 316*         Lab 10/31/24  1052   ABO TYPING B   RH TYPING Negative   ANTIBODY SCREEN Negative           Brief Urine Lab Results       None            Microbiology Results Abnormal       None            XR Chest 1 View    Result Date: 11/6/2024  XR CHEST 1 VW Date of Exam: 11/6/2024 2:17 AM EST Indication: postop Comparison: 11/5/2024 Findings: Postsurgical changes of prior sternotomy and CABG. Left atrial appendage clip noted. Stable mild elevation of the right hemidiaphragm. No pneumothorax. No pleural effusion. No significant consolidation. Loop recorder stable.     Impression: Impression: No acute process. Electronically Signed: Walter Torrez MD  11/6/2024 7:33 AM EST  Workstation ID: [id number]    XR Chest 1 View    Result Date: 11/5/2024  XR CHEST 1 VW Date of Exam: 11/5/2024 3:39 AM EST Indication: postop Comparison 11/4/2024. Findings: Loop recorder over the left chest is again seen. Heart and pulmonary vessels appear within normal limits. There are sternal suture wires. There is mild linear atelectasis of the upper lobes, similar. There are no new infiltrates or effusions.     Impression: Impression: No significant interval change. Electronically Signed: Tenisha David MD  11/5/2024 7:44 AM EST  Workstation ID: [id number]     Results for orders placed in visit on 10/28/24    Intra-Op Anesthesia DARIAN    Narrative  Intra-Op Anesthesia DARIAN    Procedure Performed: Intra-Op Anesthesia DARIAN  Start Time:  End Time:    Preanesthesia Checklist:  Patient identified, IV assessed, risks and benefits discussed, monitors and equipment assessed, procedure being performed at surgeon's request and anesthesia consent obtained.    General Procedure Information  Diagnostic Indications for Echo:  assessment of ascending aorta, assessment of surgical repair and hemodynamic  monitoring  Physician Requesting Echo: Alex Max MD  Location performed:  OR  Intubated  Bite block not placed  Heart visualized  Probe Insertion:  Easy  Probe Type:  Multiplane  Modalities:  Color flow mapping, continuous wave Doppler and pulse wave Doppler    Echocardiographic and Doppler Measurements    Ventricles    Right Ventricle:  Thrombus not present.  Global function normal.  Left Ventricle:  Cavity size normal.  Diastolic dimension 1.6 cm.  Thrombus not present.  Global Function normal.  Ejection Fraction 55%.        Valves    Aortic Valve:  Annulus normal.  Stenosis not present.  Area: 2.2 cm².  Mean Gradient: 2 mmHg.  Regurgitation absent.  Leaflets normal.  Leaflet motions normal.    Mitral Valve:  Annulus normal.  Stenosis not present.  Area: 2.4 cm².  Mean Gradient: 1 mmHg.  Regurgitation trace.  Leaflets normal.  Leaflet motions normal.    Tricuspid Valve:  Annulus normal.  Stenosis not present.  Leaflets normal.  Leaflet motions normal.  Pulmonic Valve:  Annulus normal.      Aorta    Ascending Aorta:  Size normal.  Diameter 3 cm.  Dissection not present.  Plaque thickness less than 3 mm.  Mobile plaque not present.  Aortic Arch:  Size normal.  Dissection not present.  Plaque thickness less than 3 mm.  Mobile plaque not present.  Descending Aorta:  Size normal.  Dissection not present.  Plaque thickness less than 3 mm.  Mobile plaque not present.      Atria    Right Atrium:  Size dilated.  Spontaneous echo contrast not present.  Thrombus not present.  Tumor not present.  Device present.    Left Atrium:  Size dilated.  Spontaneous echo contrast not present.  Thrombus not present.  Tumor not present.  Left atrial appendage normal.      Septa        Ventricular Septum:  Intra-ventricular septum morphology normal.    Diastolic Function Measurements:  Diastolic Dysfunction Grade=  E=  ms  A=  ms  E/A Ratio=  1.7  DT=  ms  S/D=  IVRT=    Other Findings  Pericardium:  normal  Pleural Effusion:   none  Pulmonary Arteries:  normal  Pulmonary Venous Flow:  normal    Anesthesia Information  Performed Personally  Anesthesiologist:  George Morrow MD      Echocardiogram Comments:  Informed consent was obtained preoperatively.  Yahir probe passed without difficulty.  Post CABG, MAZE and TORSTEN ligation:   EF unchanged, no NWMA, obliterartion of TORSTEN by clip      Current medications:  Scheduled Meds:    Continuous Infusions:No current facility-administered medications for this encounter.    PRN Meds:.    Assessment & Plan   Assessment & Plan     Active Hospital Problems    Diagnosis  POA    **CAD s/p CABG x 4, MAZE, LAAL 10/28/24 [I25.10]  Yes    Primary hypertension [I10]  Yes    Type 2 diabetes mellitus with hyperglycemia, without long-term current use of insulin [E11.65]  Yes    Hyperlipidemia LDL goal <70 [E78.5]  Yes    Paroxysmal atrial fibrillation [I48.0]  Yes      Resolved Hospital Problems   No resolved problems to display.        Brief Hospital Course to date:  Joel Galo is a 59 y.o. male  w/ DM, HL, hypothyroidism, neuropathy & depression who is s/p CABG & TORSTEN ligation for multivessel cad & afib on 10/28/24. Transferred out of icu and hospitalists following for medical co-management & DM management    Plan was partially entered by my partner and I have reviewed and updated as appropriate on 11/4/24     Multivessel CAD  Parox afib  HTN  HL  **s/p CABG x 3 x TORSTEN ligation 10/28/24  -post-op management per primary ct surgery team  -cards following: continue asa, sotalol, entresto, norvasc, statin, jardiance, HCTZ     Urinary retention  -Urology consulted (Dr. Pepe), recommends [place] x 5-7 days (per 11/2/24 note), continue flomax, follow up w/ local urologist (near [place]) for voiding trial after discharge     Insulin dep DM2 (A1c 11.2)  -on 70/30 regimen at home, admits to dietary non-compliance  -continue lantus to 20 units sq nightly; humalog 2 units tid meals, change sliding scale to medium dose; adjust  insulins prn    Latest Reference Range & Units 11/05/24 20:04 11/06/24 05:34 11/06/24 07:27   Glucose 70 - 130 mg/dL 154 (H) 123 (H) 131 (H)     Neuropathy  Depression  -continue cymbalta & wellbutrin     Hypothyroidism  -continue levothyrosine          Expected Discharge Location and Transportation: rehab   Expected Discharge   Expected Discharge Date: 11/6/2024; Expected Discharge Time:      VTE Prophylaxis:  Mechanical VTE prophylaxis orders are present.         AM-PAC 6 Clicks Score (PT): 19 (11/06/24 0800)    CODE STATUS:   Code Status and Medical Interventions: CPR (Attempt to Resuscitate); Full Support   Ordered at: 10/28/24 7077     Code Status (Patient has no pulse and is not breathing):    CPR (Attempt to Resuscitate)     Medical Interventions (Patient has pulse or is breathing):    Full Support       Saritha Terrazas, APRN  11/06/24

## 2024-11-06 NOTE — PLAN OF CARE
Problem: Adult Inpatient Plan of Care  Goal: Plan of Care Review  Outcome: Progressing  Goal: Patient-Specific Goal (Individualized)  Outcome: Progressing  Goal: Absence of Hospital-Acquired Illness or Injury  Outcome: Progressing  Intervention: Identify and Manage Fall Risk  Recent Flowsheet Documentation  Taken 11/6/2024 0400 by Peggy Roman RN  Safety Promotion/Fall Prevention: safety round/check completed  Taken 11/6/2024 0200 by Peggy Roman RN  Safety Promotion/Fall Prevention: safety round/check completed  Taken 11/6/2024 0000 by Peggy Roman RN  Safety Promotion/Fall Prevention: safety round/check completed  Taken 11/5/2024 2200 by Peggy Roman RN  Safety Promotion/Fall Prevention: safety round/check completed  Taken 11/5/2024 2000 by Peggy Roman RN  Safety Promotion/Fall Prevention:   activity supervised   assistive device/personal items within reach   muscle strengthening facilitated   nonskid shoes/slippers when out of bed   room organization consistent   safety round/check completed   fall prevention program maintained   gait belt   lighting adjusted  Intervention: Prevent Skin Injury  Recent Flowsheet Documentation  Taken 11/6/2024 0400 by Peggy Roman RN  Body Position: position changed independently  Taken 11/6/2024 0200 by Peggy Roman RN  Body Position: position changed independently  Taken 11/5/2024 2200 by Peggy Roman RN  Body Position: position changed independently  Taken 11/5/2024 2000 by Peggy Roman RN  Body Position: position changed independently  Skin Protection:   silicone foam dressing in place   skin sealant/moisture barrier applied  Intervention: Prevent and Manage VTE (Venous Thromboembolism) Risk  Recent Flowsheet Documentation  Taken 11/5/2024 2000 by Peggy Roman RN  VTE Prevention/Management: (see mar) other (see comments)  Goal: Optimal Comfort and Wellbeing  Outcome: Progressing  Goal: Readiness for Transition of  Care  Outcome: Progressing     Problem: Cardiovascular Surgery  Goal: Improved Activity Tolerance  Outcome: Progressing  Goal: Optimal Coping with Heart Surgery  Outcome: Progressing  Goal: Absence of Bleeding  Outcome: Progressing  Goal: Effective Bowel Elimination  Outcome: Progressing  Goal: Effective Cardiac Function  Outcome: Progressing  Goal: Optimal Cerebral Tissue Perfusion  Outcome: Progressing  Intervention: Protect and Optimize Cerebral Perfusion  Recent Flowsheet Documentation  Taken 11/6/2024 0400 by Peggy Roman RN  Head of Bed (HOB) Positioning: HOB elevated  Taken 11/6/2024 0200 by Peggy Roman RN  Head of Bed (HOB) Positioning: HOB elevated  Taken 11/5/2024 2200 by Peggy Roman RN  Head of Bed (HOB) Positioning: HOB elevated  Taken 11/5/2024 2000 by Peggy Roman RN  Head of Bed (HOB) Positioning: HOB elevated  Goal: Fluid and Electrolyte Balance  Outcome: Progressing  Goal: Blood Glucose Level Within Target Range  Outcome: Progressing  Goal: Absence of Infection Signs and Symptoms  Outcome: Progressing  Goal: Anesthesia/Sedation Recovery  Outcome: Progressing  Intervention: Optimize Anesthesia Recovery  Recent Flowsheet Documentation  Taken 11/6/2024 0400 by Peggy Roman RN  Safety Promotion/Fall Prevention: safety round/check completed  Taken 11/6/2024 0200 by Peggy Roman RN  Safety Promotion/Fall Prevention: safety round/check completed  Taken 11/6/2024 0000 by Peggy Roman RN  Safety Promotion/Fall Prevention: safety round/check completed  Taken 11/5/2024 2200 by Peggy Roman RN  Safety Promotion/Fall Prevention: safety round/check completed  Taken 11/5/2024 2100 by Peggy Roman RN  Administration (IS): refused  Taken 11/5/2024 2000 by Peggy Roman RN  Patient Tolerance (IS): fair  Safety Promotion/Fall Prevention:   activity supervised   assistive device/personal items within reach   muscle strengthening facilitated   nonskid shoes/slippers  when out of bed   room organization consistent   safety round/check completed   fall prevention program maintained   gait belt   lighting adjusted  Administration (IS): self-administered  Level Incentive Spirometer (mL): 1000  Number of Repetitions (IS): 5  Goal: Acceptable Pain Control  Outcome: Progressing  Goal: Nausea and Vomiting Relief  Outcome: Progressing  Goal: Effective Urinary Elimination  Outcome: Progressing  Goal: Effective Oxygenation and Ventilation  Outcome: Progressing  Intervention: Promote Airway Secretion Clearance  Recent Flowsheet Documentation  Taken 11/5/2024 2100 by Peggy Roman RN  Administration (IS): refused  Taken 11/5/2024 2000 by Peggy Roman RN  Patient Tolerance (IS): fair  Administration (IS): self-administered  Level Incentive Spirometer (mL): 1000  Cough And Deep Breathing: done independently per patient  Number of Repetitions (IS): 5     Problem: Skin Injury Risk Increased  Goal: Skin Health and Integrity  Outcome: Progressing  Intervention: Optimize Skin Protection  Recent Flowsheet Documentation  Taken 11/6/2024 0400 by Peggy Roman RN  Head of Bed (HOB) Positioning: HOB elevated  Taken 11/6/2024 0200 by Peggy Roman RN  Head of Bed (HOB) Positioning: HOB elevated  Taken 11/5/2024 2200 by Peggy Roman RN  Head of Bed (HOB) Positioning: HOB elevated  Taken 11/5/2024 2000 by Peggy Roman RN  Activity Management: ambulated to bathroom  Pressure Reduction Techniques:   frequent weight shift encouraged   positioned off wounds  Head of Bed (HOB) Positioning: HOB elevated  Pressure Reduction Devices:   foam padding utilized   heel offloading device utilized   positioning supports utilized   pressure-redistributing mattress utilized  Skin Protection:   silicone foam dressing in place   skin sealant/moisture barrier applied     Problem: Mechanical Ventilation Invasive  Goal: Effective Communication  Outcome: Progressing  Goal: Optimal Device  Function  Outcome: Progressing  Goal: Mechanical Ventilation Liberation  Outcome: Progressing  Goal: Optimal Nutrition Delivery  Outcome: Progressing  Goal: Absence of Device-Related Skin and Tissue Injury  Outcome: Progressing  Intervention: Maintain Skin and Tissue Health  Recent Flowsheet Documentation  Taken 11/5/2024 2000 by Peggy Roman RN  Device Skin Pressure Protection:   absorbent pad utilized/changed   tubing/devices free from skin contact  Goal: Absence of Ventilator-Induced Lung Injury  Outcome: Progressing  Intervention: Prevent Ventilator-Associated Pneumonia  Recent Flowsheet Documentation  Taken 11/6/2024 0400 by Peggy Roman RN  Head of Bed (HOB) Positioning: HOB elevated  Taken 11/6/2024 0200 by Peggy Roman RN  Head of Bed (HOB) Positioning: HOB elevated  Taken 11/5/2024 2200 by Peggy Roman RN  Head of Bed (HOB) Positioning: HOB elevated  Taken 11/5/2024 2000 by Peggy Roman RN  Head of Bed (HOB) Positioning: HOB elevated     Problem: Fall Injury Risk  Goal: Absence of Fall and Fall-Related Injury  Outcome: Progressing  Intervention: Promote Injury-Free Environment  Recent Flowsheet Documentation  Taken 11/6/2024 0400 by Peggy Roman RN  Safety Promotion/Fall Prevention: safety round/check completed  Taken 11/6/2024 0200 by Peggy Roman RN  Safety Promotion/Fall Prevention: safety round/check completed  Taken 11/6/2024 0000 by Peggy Roman RN  Safety Promotion/Fall Prevention: safety round/check completed  Taken 11/5/2024 2200 by Peggy Roman RN  Safety Promotion/Fall Prevention: safety round/check completed  Taken 11/5/2024 2000 by Peggy Roman RN  Safety Promotion/Fall Prevention:   activity supervised   assistive device/personal items within reach   muscle strengthening facilitated   nonskid shoes/slippers when out of bed   room organization consistent   safety round/check completed   fall prevention program maintained   gait belt   lighting  adjusted     Problem: Comorbidity Management  Goal: Maintenance of Asthma Control  Outcome: Progressing  Goal: Maintenance of Behavioral Health Symptom Control  Outcome: Progressing  Goal: Maintenance of COPD Symptom Control  Outcome: Progressing  Goal: Blood Glucose Level Within Target Range  Outcome: Progressing  Goal: Maintenance of Heart Failure Symptom Control  Outcome: Progressing  Goal: Blood Pressure in Desired Range  Outcome: Progressing  Goal: Maintenance of Osteoarthritis Symptom Control  Outcome: Progressing  Intervention: Maintain Osteoarthritis Symptom Control  Recent Flowsheet Documentation  Taken 11/5/2024 2000 by Peggy Roman RN  Activity Management: ambulated to bathroom  Goal: Bariatric Home Regimen Maintained  Outcome: Progressing  Goal: Maintenance of Seizure Control  Outcome: Progressing   Goal Outcome Evaluation:

## 2024-11-06 NOTE — PROGRESS NOTES
[facility] Cardiothoracic Surgery In-Patient Progress Note     LOS: 9 days     POD # 9 s/p CABG x 4, MAZE, LAAL    Subjective  Dizzy with ambulation overnight. VSS on 2L.    Objective  Vital Signs  Temp:  [97.5 °F (36.4 °C)-98.2 °F (36.8 °C)] 98 °F (36.7 °C)  Heart Rate:  [67-77] 75  Resp:  [18] 18  BP: (132-161)/(88-98) 154/96    Physical Exam:   General Appearance: alert, appears stated age and cooperative   Lungs: clear to auscultation, respirations regular, respirations even, and respirations unlabored   Heart: regular rhythm & normal rate, normal S1, S2, no murmur, no gallop, no rub, and no click   Skin: Incision c/d/I   Sternum: Stable    Ext: Trace edema bilaterally  Results     Results from last 7 days   Lab Units 11/06/24  0534   WBC 10*3/mm3 9.65   HEMOGLOBIN g/dL 11.0*   HEMATOCRIT % 34.2*   PLATELETS 10*3/mm3 354     Results from last 7 days   Lab Units 11/06/24  0534   SODIUM mmol/L 135*   POTASSIUM mmol/L 4.0   CHLORIDE mmol/L 97*   CO2 mmol/L 20.0*   BUN mg/dL 15   CREATININE mg/dL 0.59*   GLUCOSE mg/dL 123*   CALCIUM mg/dL 9.0     Imaging Results (Last 24 Hours)       Procedure Component Value Units Date/Time    XR Chest 1 View [[id number]] Resulted: 11/06/24 0217     Updated: 11/06/24 0227    XR Chest 1 View [[id number]] Collected: 11/05/24 0743     Updated: 11/05/24 0748    Narrative:      XR CHEST 1 VW    Date of Exam: 11/5/2024 3:39 AM EST    Indication: postop    Comparison 11/4/2024.    Findings:  Loop recorder over the left chest is again seen. Heart and pulmonary vessels appear within normal limits. There are sternal suture wires. There is mild linear atelectasis of the upper lobes, similar. There are no new infiltrates or effusions.      Impression:      Impression:  No significant interval change.      Electronically Signed: Tenisha David MD    11/5/2024 7:44 AM EST    Workstation ID: [id number]          Assessment  POD # 9 s/p CABG x 4, MAZE, LAAL    Plan   Wean oxygen as  tolerated  Diuresis  Check orthostatic vitals  [place] as per urology  PT/OT  Pulmonary toilet  ASA, statin, BB  Case management to work on SNF/rehab placement-transferring at 11 AM    Alex Max MD  11/06/24  07:12 EST

## 2024-11-06 NOTE — DISCHARGE SUMMARY
[facility] Cardiothoracic Surgery  DISCHARGE SUMMARY    Patient Name: Joel Galo  : 1965  MRN: [id number]    Date of Admission: 10/28/2024  4:58 AM  Date of Discharge:  2024  Primary Care Physician: Edmundo Boston MD  Referred By: Blossom Parks*    IP Care Team:  Patient Care Team:  Edmundo Boston MD as PCP - General (Family Medicine)  Julio Neff MD as Consulting Physician (Cardiology)  Blossom Parks APRN as Nurse Practitioner (Cardiology)    Consults       Date and Time Order Name Status Description    2024  8:33 AM Inpatient Urology Consult      2024  9:10 AM Inpatient Urology Consult Completed     10/28/2024  1:37 PM Inpatient Intensivist Consult - For Vent Management      10/28/2024  1:34 PM Inpatient Consult to Cardiology Completed             Hospital Course     Presenting Problem: Coronary artery disease    Active Hospital Problems    Diagnosis  POA    **CAD s/p CABG x 4, MAZE, LAAL 10/28/24 [I25.10]  Yes    Primary hypertension [I10]  Yes    Type 2 diabetes mellitus with hyperglycemia, without long-term current use of insulin [E11.65]  Yes    Hyperlipidemia LDL goal <70 [E78.5]  Yes    Paroxysmal atrial fibrillation [I48.0]  Yes      Resolved Hospital Problems   No resolved problems to display.        Procedures Performed:  Procedure(s):  MEDIAN STERNOTOMY, CORONARY ARTERY BYPASS GRAFTING X4 UTILIZING THE LEFT INTERNAL MAMMARY ARTERY GRAFT, ENDOSCOPIC VEIN HARVEST OF THE GREATER RIGHT SAPHENOUS VEIN  ATRIAL APPENDAGE EXCLUSION LEFT WITH TRANSESOPHAGEAL ECHOCARDIOGRAM  MAZE PROCEDURE       Hospital Course:  Joel Galo is a 59 y.o. male who was taken to the operating room on 10/28/2024 for scheduled CABG x 4 with EVH of the right greater saphenous vein, MAZE, and LAAL (# 35 mm AtriCure clip) with Dr. Max. He was sent to ICU in stable condition and was extubated per ICU protocol. He was evaluated by PT/OT and was deemed  appropriate for rehab. He was discharged to rehab in stable condition on POD # 9, his hospital course is below.    CAD s/p CABG x 4, MAZE, LAAL:  10/30: Mediastinal chest tube, pacing wires, central line, and Art line were removed. Transfer to telemetry orders were placed.  10/31: Pleural chest tubes, Prado catheter, and NG tube were removed.  Transfer to telemetry orders were placed.  11/2: Transferred to telemetry floor  Asa, Statin, BB    Paroxysmal A-fib:  Previously on sotalol and Eliquis  S/p MAZE/LAAL  Went into A-fib with RVR on 10/30  Converted to SR    Urinary retention:  Urology consulted on 11/2 (Dr. Pepe)  Recommended to maintain Prado catheter for 5 to 7 days (hopefully D/C 11/8 or 11/9 after voiding trial)  Continue Flomax    Hematuria (resolved):  Urology was reconsulted due to hematuria  Urology irrigated Prado catheter at bedside, likely due to catheter irritation      Discharge Follow Up Recommendations for outpatient labs/diagnostics:  Follow-up with [facility] Valve center in 1 week  Follow-up with Urology in 7-10 days at [facility]  Follow-up with [facility] with EP as scheduled on 12/6  Follow-up with Cardiology in 1 month  Follow-up with CT Surgery in 6 weeks        DO NOT REMOVE SUTURES AND/OR STAPLES THIS WILL BE ADDRESSED AT CT SURGERY FOLLOW-UP  If you have any questions or concerns please reach out to our office at [phone]    Day of Discharge     HPI:   Joel Galo is a 59 y.o. male referred to Dr. Max for consideration of CABG per Cardiology, Dr. Neff.  PMH:  PAF on Eliquis (loop recorder in place), HTN, uncontrolled type 2 DM (HgA1C 12.6%), hyperlipidemia, and CAD.  Patient recently developed angina episodes with abnormal CTA coronaries.  His coronary calcium score was 686.  Subsequent cardiac cath demonstrated mid LAD 75% stenosis, distal LAD 95% stenosis, first marginal 90% stenosis, third marginal 99% stenosis, ostial RCA 70% stenosis, mid RCA 70% stenosis, and RV branch 99%  stenosis.  Left ventriculogram during cardiac cath noted EF 55%.  However, last transthoracic echocardiogram was performed March 2023.  TTE March 2023 demonstrated EF 61 to 65% without significant valvular disease and mild to moderate concentric hypertrophy with sigmoid shaped ventricular septum.  Patient reports intermittent non-exertional chest pain but worsening CHRISTIANSEN over the past 6-12 months.  He denies history of chest wall radiation, upper extremity or wrist surgeries, lower extremity trauma, lower extremity vein stripping or other procedures.       Vital Signs:   Temp:  [97.5 °F (36.4 °C)-98.8 °F (37.1 °C)] 98.8 °F (37.1 °C)  Heart Rate:  [67-77] 74  Resp:  [18] 18  BP: (132-161)/(88-98) 145/97      Physical Exam:  General Appearance: alert, appears stated age and cooperative              Lungs: clear to auscultation, respirations regular, respirations even, and respirations unlabored              Heart: regular rhythm & normal rate, normal S1, S2, no murmur, no gallop, no rub, and no click              Skin: Incision c/d/I              Sternum: Stable               Ext: Trace edema bilaterally    Pertinent  and/or Most Recent Results     LAB RESULTS:          Lab 11/06/24  0534 11/05/24  0606 11/04/24  1725 11/03/24  1228 11/02/24  0721   WBC 9.65 11.01* 8.88 8.96 7.21   HEMOGLOBIN 11.0* 11.6* 9.7* 9.7* 10.2*   HEMATOCRIT 34.2* 34.3* 30.1* 31.6* 31.8*   PLATELETS 354 233 269 207 175   MCV 92.2 89.8 94.1 98.1* 94.4         Lab 11/06/24  0534 11/05/24  0606 11/04/24  1725 11/03/24  1228 11/02/24  0741 11/02/24  0352   SODIUM 135* 138 140 138  --  134*   POTASSIUM 4.0 4.5 4.1 4.5 3.5 3.8   CHLORIDE 97* 99 102 103  --  100   CO2 20.0* 17.0* 24.0 20.0*  --  18.0*   ANION GAP 18.0* 22.0* 14.0 15.0  --  16.0*   BUN 15 18 20 29*  --  40*   CREATININE 0.59* 0.60* 0.60* 0.56*  --  0.83   EGFR 110.4 109.8 109.8 112.1  --  99.6   GLUCOSE 123* 117* 131* 190*  --  112*   CALCIUM 9.0 8.9 9.1 8.1*  --  8.2*         Lab  11/01/24  0526   TOTAL PROTEIN 6.8   ALBUMIN 3.4*   GLOBULIN 3.4   ALT (SGPT) 44*   AST (SGOT) 107*   BILIRUBIN 0.4   ALK PHOS 126*             Lab 11/03/24  1228   CHOLESTEROL 102   LDL CHOL 36   HDL CHOL 18*   TRIGLYCERIDES 316*         Lab 10/31/24  1052   ABO TYPING B   RH TYPING Negative   ANTIBODY SCREEN Negative         Brief Urine Lab Results       None          Microbiology Results (last 10 days)       ** No results found for the last 240 hours. **            XR Chest 1 View    Result Date: 11/6/2024  XR CHEST 1 VW Date of Exam: 11/6/2024 2:17 AM EST Indication: postop Comparison: 11/5/2024 Findings: Postsurgical changes of prior sternotomy and CABG. Left atrial appendage clip noted. Stable mild elevation of the right hemidiaphragm. No pneumothorax. No pleural effusion. No significant consolidation. Loop recorder stable.     Impression: No acute process. Electronically Signed: Walter Torrez MD  11/6/2024 7:33 AM EST  Workstation ID: [id number]    XR Chest 1 View    Result Date: 11/5/2024  XR CHEST 1 VW Date of Exam: 11/5/2024 3:39 AM EST Indication: postop Comparison 11/4/2024. Findings: Loop recorder over the left chest is again seen. Heart and pulmonary vessels appear within normal limits. There are sternal suture wires. There is mild linear atelectasis of the upper lobes, similar. There are no new infiltrates or effusions.     Impression: No significant interval change. Electronically Signed: Tenisha David MD  11/5/2024 7:44 AM EST  Workstation ID: [id number]    XR Chest 1 View    Result Date: 11/4/2024  XR CHEST 1 VW Date of Exam: 11/4/2024 3:56 AM EST Indication: postop Comparison: Chest x-ray 11/3/2024 Findings: Stable cardiomegaly. The lungs are grossly clear. No pleural effusion or pneumothorax.     Impression: No acute cardiopulmonary findings. Electronically Signed: Enrique Vazquez MD  11/4/2024 7:57 AM EST  Workstation ID: [id number]    XR Chest 1 View    Result Date: 11/3/2024  XR CHEST 1 VW Date of  Exam: 11/3/2024 12:54 AM EDT Indication: postop Comparison: Chest x-ray 11/2/2024 Findings: Redemonstration of prior CABG and loop recorder device. Stable cardiomegaly. Similar interstitial prominence without focal consolidation. No definite pneumothorax. Similar haziness of the right costophrenic angle which could reflect small right pleural effusion.     Impression: No significant interval change. Electronically Signed: Enrique Vazquez MD  11/3/2024 10:34 AM EST  Workstation ID: [id number]    XR Chest 1 View    Result Date: 11/2/2024  XR CHEST 1 VW Date of Exam: 11/2/2024 2:36 AM EDT Indication: postop Comparison: CXR 10/31/2024 Findings: The heart is unchanged in size. The pulmonary vascularity does not appear congested. Small right apical pneumothorax seen on 10/31/2024 is not evident today. Subsegmental atelectasis at the lung bases is improved. Changes of coronary artery bypass are again appreciated. Atrial appendage closure device remains in place. Precordial loop recorder remains evident. NG tube seen previously is no longer evident. Moderate to marked degenerative changes are seen in the glenohumeral joints.     Impression: Subsegmental atelectasis at the lung bases is improved in comparison to 10/31/2024. The small right apical pneumothorax seen on 10/31/2024 is not evident today. Post coronary artery bypass. Electronically Signed: Marc Dimas MD  11/2/2024 9:23 AM EDT  Workstation ID: [id number]    XR Chest 1 View    Result Date: 10/31/2024  XR CHEST 1 VW Date of Exam: 10/31/2024 3:48 AM EDT Indication: Follow up for pneumothorax Comparison: 10/30/2024 Findings: Small right apical pneumothorax is stable. There is mild atelectasis of the lung bases. NG tube remains in place. There is stable borderline cardiomegaly. There are sternal suture wires. There is a left atrial appendage closure device.     Impression: No significant change. Electronically Signed: Tenisha David MD  10/31/2024 8:03 AM EDT   Workstation ID: [id number]    XR Chest 1 View    Result Date: 10/30/2024  XR CHEST 1 VW Date of Exam: 10/30/2024 2:51 PM EDT Indication: pneumothorax follow up Comparison: Chest radiograph 10/30/2024 Findings: Pleural line visualized at the right apex suggesting pneumothorax measuring up to 1.3 cm less conspicuous from prior, previously 1.8 cm. Antegrade oriented gastric tube terminates over the mid stomach. Prior median sternotomy, CABG and left atrial appendage closure device. Right IJ central catheter has been removed. Cardiomediastinal silhouette stable. Lung volumes are low with streaky perihilar and bibasilar opacities favoring atelectasis. Blunted costophrenic angle difficult to exclude trace effusions. No significant effusion or left pneumothorax. No worsening edema. Degenerative related osseous changes. Loop recorder noted.     Impression: 1. Small less conspicuous right apical pneumothorax. 2. Removed right IJ central catheter. Antegrade oriented gastric tube terminates over mid stomach. 3. Radiographically stable hypoventilation changes and presumed atelectasis. Electronically Signed: Preston Quinn MD  10/30/2024 3:28 PM EDT  Workstation ID: [id number]    XR Chest 1 View    Result Date: 10/30/2024  XR CHEST 1 VW Date of Exam: 10/30/2024 2:03 AM EDT Indication: Post-Op Heart Surgery Comparison: Chest radiograph 10/29/2024. Findings: Removal of nasogastric tube. Mediastinal and bilateral pleural drains in unchanged position. Right IJ approach catheter in unchanged position. Sternotomy, CABG, left atrial appendage clip. Cardiomediastinal silhouette is unchanged. Low lung volumes. Scattered subsegmental atelectasis, similar. No sizable pleural effusion. New small right pneumothorax. No distinct left pneumothorax.     Impression: New small right pneumothorax. Removal of nasogastric tube with unchanged position of remaining lines/tubes, including right pleural drain. Low lung volumes with scattered subsegmental  atelectasis, similar to yesterday's exam. Electronically Signed: Tal Pimentel MD  10/30/2024 7:33 AM EDT  Workstation ID: [id number]    XR Chest 1 View    Result Date: 10/29/2024  XR CHEST 1 VW Date of Exam: 10/29/2024 4:34 AM EDT Indication: Post-Op Heart Surgery Comparison 10/28/2024. Findings: The ET tube has been removed. Other support lines appear stable in position. Heart and pulmonary vessels appear within normal limits for technique. There is mild left basilar atelectasis. There is implantable cardiac device. There are sternal suture wires and left atrial appendage clip.     Impression: Interval extubation. Mild left basilar atelectasis. Electronically Signed: Tenihsa David MD  10/29/2024 8:13 AM EDT  Workstation ID: [id number]    XR Chest 1 View    Result Date: 10/28/2024  XR CHEST 1 VW Date of Exam: 10/28/2024 1:38 PM EDT Indication: Post-Op Check Line & Tube Placement Comparison: Chest radiograph 10/25/2024. Findings: Endotracheal tube tip overlies the midthoracic trachea around 2 cm above the thomas. Right IJ approach central venous catheter tip overlies the superior vena cava. Nasogastric tube tip overlies the stomach. Mediastinal and pleural drains in place. Sternotomy, CABG, left atrial appendage clip. Implantable cardiac device. Mildly low lung volumes. Scattered subsegmental atelectasis. No sizable pleural effusion. No distinct pneumothorax. No acute osseous abnormality.     Impression: Postsurgical chest with lines/tubes in expected position, as above. Electronically Signed: Tal Pimentel MD  10/28/2024 2:09 PM EDT  Workstation ID: [id number]    Central Line    Result Date: 10/28/2024  George Morrow MD     10/28/2024  8:39 AM Central Line Patient reassessed immediately prior to procedure Patient location during procedure: OR Indications: vascular access Preanesthetic Checklist Completed: patient identified, IV checked, site marked, risks and benefits discussed, surgical consent, monitors and  equipment checked, pre-op evaluation and timeout performed Central Line Prep Sterile Tech:cap, gloves, gown, mask and sterile barriers Prep: chloraprep Patient monitoring: blood pressure monitoring, continuous pulse oximetry and EKG Central Line Procedure Laterality:right Location:internal jugular Catheter Type:Cordis Catheter Size:9 Fr Guidance:ultrasound guided PROCEDURE NOTE/ULTRASOUND INTERPRETATION.  Using ultrasound guidance the potential vascular sites for insertion of the catheter were visualized to determine the patency of the vessel to be used for vascular access.  After selecting the appropriate site for insertion, the needle was visualized under ultrasound being inserted into the internal jugular vein, followed by ultrasound confirmation of wire and catheter placement. There were no abnormalities seen on ultrasound; an image was taken; and the patient tolerated the procedure with no complications. Assessment Post procedure:biopatch applied, line sutured, occlusive dressing applied and secured with tape Assessement:blood return through all ports, free fluid flow, chest x-ray ordered and Antoni Test Complications:no Patient Tolerance:patient tolerated the procedure well with no apparent complications     Arterial Line    Result Date: 10/28/2024  George Morrow MD     10/28/2024  8:39 AM Arterial Line Patient reassessed immediately prior to procedure Patient location during procedure: pre-op Line placed for hemodynamic monitoring. Preanesthetic Checklist Completed: patient identified, IV checked, site marked, risks and benefits discussed, surgical consent, monitors and equipment checked, pre-op evaluation and timeout performed Arterial Line Prep  Sterile Tech: cap, gloves and sterile barriers Prep: ChloraPrep Patient monitoring: blood pressure monitoring, continuous pulse oximetry and EKG Arterial Line Procedure Laterality:right Location:  radial artery Catheter size: 20 G Guidance: ultrasound guided  Number of attempts: 1 Successful placement: yes Post Assessment Dressing Type: line sutured, occlusive dressing applied, secured with tape and wrist guard applied. Complications no Circ/Move/Sens Assessment: normal and unchanged. Patient Tolerance: patient tolerated the procedure well with no apparent complications              Results for orders placed in visit on 10/28/24    Intra-Op Anesthesia DARIAN    Narrative  Intra-Op Anesthesia DARIAN    Procedure Performed: Intra-Op Anesthesia DARIAN  Start Time:  End Time:    Preanesthesia Checklist:  Patient identified, IV assessed, risks and benefits discussed, monitors and equipment assessed, procedure being performed at surgeon's request and anesthesia consent obtained.    General Procedure Information  Diagnostic Indications for Echo:  assessment of ascending aorta, assessment of surgical repair and hemodynamic monitoring  Physician Requesting Echo: Alex Max MD  Location performed:  OR  Intubated  Bite block not placed  Heart visualized  Probe Insertion:  Easy  Probe Type:  Multiplane  Modalities:  Color flow mapping, continuous wave Doppler and pulse wave Doppler    Echocardiographic and Doppler Measurements    Ventricles    Right Ventricle:  Thrombus not present.  Global function normal.  Left Ventricle:  Cavity size normal.  Diastolic dimension 1.6 cm.  Thrombus not present.  Global Function normal.  Ejection Fraction 55%.        Valves    Aortic Valve:  Annulus normal.  Stenosis not present.  Area: 2.2 cm².  Mean Gradient: 2 mmHg.  Regurgitation absent.  Leaflets normal.  Leaflet motions normal.    Mitral Valve:  Annulus normal.  Stenosis not present.  Area: 2.4 cm².  Mean Gradient: 1 mmHg.  Regurgitation trace.  Leaflets normal.  Leaflet motions normal.    Tricuspid Valve:  Annulus normal.  Stenosis not present.  Leaflets normal.  Leaflet motions normal.  Pulmonic Valve:  Annulus normal.      Aorta    Ascending Aorta:  Size normal.  Diameter 3 cm.  Dissection not  present.  Plaque thickness less than 3 mm.  Mobile plaque not present.  Aortic Arch:  Size normal.  Dissection not present.  Plaque thickness less than 3 mm.  Mobile plaque not present.  Descending Aorta:  Size normal.  Dissection not present.  Plaque thickness less than 3 mm.  Mobile plaque not present.      Atria    Right Atrium:  Size dilated.  Spontaneous echo contrast not present.  Thrombus not present.  Tumor not present.  Device present.    Left Atrium:  Size dilated.  Spontaneous echo contrast not present.  Thrombus not present.  Tumor not present.  Left atrial appendage normal.      Septa        Ventricular Septum:  Intra-ventricular septum morphology normal.    Diastolic Function Measurements:  Diastolic Dysfunction Grade=  E=  ms  A=  ms  E/A Ratio=  1.7  DT=  ms  S/D=  IVRT=    Other Findings  Pericardium:  normal  Pleural Effusion:  none  Pulmonary Arteries:  normal  Pulmonary Venous Flow:  normal    Anesthesia Information  Performed Personally  Anesthesiologist:  George Morrow MD      Echocardiogram Comments:  Informed consent was obtained preoperatively.  Yahir probe passed without difficulty.  Post CABG, MAZE and TORSTEN ligation:   EF unchanged, no NWMA, obliterartion of TORSTEN by clip          Discharge Details        Discharge Medications        New Medications        Instructions Start Date   hydroCHLOROthiazide 25 MG tablet   25 mg, Oral, Daily   Start Date: November 7, 2024     insulin glargine 100 UNIT/ML injection  Commonly known as: LANTUS, SEMGLEE   20 Units, Subcutaneous, Daily   Start Date: November 7, 2024     Insulin Lispro 100 UNIT/ML injection  Commonly known as: humaLOG   2-9 Units, Subcutaneous, 4 Times Daily Before Meals & Nightly      Insulin Lispro 100 UNIT/ML injection  Commonly known as: humaLOG   2 Units, Subcutaneous, 3 Times Daily With Meals      sacubitril-valsartan 49-51 MG tablet  Commonly known as: ENTRESTO   1 tablet, Oral, Every 12 Hours Scheduled             Changes to  Medications        Instructions Start Date   amLODIPine 10 MG tablet  Commonly known as: NORVASC  What changed:   medication strength  how much to take  when to take this   10 mg, Oral, Every 24 Hours Scheduled      aspirin 325 MG EC tablet  What changed:   medication strength  how much to take   325 mg, Oral, Daily   Start Date: November 7, 2024     sotalol 120 MG tablet  Commonly known as: BETAPACE  What changed:   medication strength  See the new instructions.   120 mg, Oral, 2 Times Daily             Continue These Medications        Instructions Start Date   buPROPion  MG 24 hr tablet  Commonly known as: WELLBUTRIN XL   150 mg, Daily      docusate sodium 100 MG capsule  Commonly known as: COLACE   100 mg, 2 Times Daily      DULoxetine 60 MG capsule  Commonly known as: CYMBALTA   60 mg, Daily      empagliflozin 25 MG tablet tablet  Commonly known as: JARDIANCE   25 mg, Daily      finasteride 5 MG tablet  Commonly known as: PROSCAR   TAKE 1 TABLET EVERY DAY      gabapentin 300 MG capsule  Commonly known as: NEURONTIN   300 mg, Oral, 2 Times Daily, TAKES 200MG IN AM AND 300MG IN PM USUALLY      HYDROcodone-acetaminophen  MG per tablet  Commonly known as: NORCO   1 tablet, Every 8 Hours PRN      levothyroxine 50 MCG tablet  Commonly known as: SYNTHROID, LEVOTHROID   50 mcg, Every Early Morning      nitroglycerin 0.4 MG SL tablet  Commonly known as: NITROSTAT   1 under the tongue as needed for angina, may repeat q5mins for up three doses      polyethylene glycol 17 g packet  Commonly known as: MIRALAX   17 g, Daily      rosuvastatin 20 MG tablet  Commonly known as: CRESTOR   20 mg, Oral, Daily      tamsulosin 0.4 MG capsule 24 hr capsule  Commonly known as: FLOMAX   TAKE 1 CAPSULE EVERY DAY      Vitamin D (Cholecalciferol) 10 MCG (400 UNIT) tablet  Commonly known as: CHOLECALCIFEROL   400 Units, Daily             Stop These Medications      apixaban 5 MG tablet tablet  Commonly known as: ELIQUIS      glimepiride 2 MG tablet  Commonly known as: AMARYL     HumuLIN 70/30 KwikPen (70-30) 100 UNIT/ML suspension pen-injector  Generic drug: Insulin NPH Isophane & Regular     isosorbide mononitrate 30 MG 24 hr tablet  Commonly known as: IMDUR     losartan 25 MG tablet  Commonly known as: COZAAR              No Known Allergies      Discharge Disposition:  Rehab Facility or Unit (DC - External)    Diet:  Hospital:  Diet Order   Procedures    Diet: Regular/House; Texture: Mechanical Ground (NDD 2); Fluid Consistency: Thin (IDDSI 0)       Activity:  Activity Instructions       Activity as Tolerated      Bathing Restrictions      You may shower    Type of Restriction: Bathing    Bathing Restrictions: No Tub Bath    Driving Restrictions      Type of Restriction: Driving    Driving Restrictions: No Driving While Taking Narcotics    Lifting Restrictions      Type of Restriction: Lifting    Lifting Restrictions: Lifting Restriction (Indicate Limit)    Weight Limit (Pounds): 10    Length of Lifting Restriction: until seen at next follow-up appointment            Restrictions or Other Recommendations:  No driving until seen by your cardiac surgeon at your follow up appointment. Do not drive while taking narcotics. Get up and get dressed each morning; do not stay in bed. Walking is the best form of exercise because it increases circulation throughout the body and to the heart muscle. Get plenty of sleep at night (8-10 hours). It is important for your breast bone (sternum) to heal properly after surgery.   Please follow these guidelines:  -No lifting, pulling, or pushing greater than 10 lbs for 12 weeks.   -Do not push or pull with your arms, especially when rising from a chair   -Have someone help you get up or roll to your side, and push off with your elbow to get out of bed.  -Avoid activities that cause you to strain or pull on your chest, such as driving a  or tractor, raking, vacuuming, moving heavy items,  "shoveling, opening tight jars or swinging a golf club.  -If you have \"clicking\" in your sternum, avoid activities that cause clicking until the sternum heals.  -Canes or walkers may be used only for balance. Do not place your full weight on any of these devices until the chest is completely healed (usually 12 weeks).       CODE STATUS:    Code Status and Medical Interventions: CPR (Attempt to Resuscitate); Full Support   Ordered at: 10/28/24 1337     Code Status (Patient has no pulse and is not breathing):    CPR (Attempt to Resuscitate)     Medical Interventions (Patient has pulse or is breathing):    Full Support       Future Appointments   Date Time Provider Department Center   12/3/2024  9:45 AM Blossom Parks APRN [facility]   12/6/2024  1:45 PM Musa Fishman DO [facility]   6/6/2025  1:45 PM Musa Fishman DO [facility]       Additional Instructions for the Follow-ups that You Need to Schedule       Call MD With Problems / Concerns   As directed      Instructions:   Please call the [facility] Surgery office with any questions or concerns you may have [phone]    Order Comments: Instructions: Please call the [place] Surgery office with any questions or concerns you may have [phone]         Discharge Follow-up with PCP   As directed       Currently Documented PCP:    Edmundo Boston MD    PCP Phone Number:    [phone]     Follow Up Details: Follow Up With PCP Within 7-14 Days        Discharge Follow-up with Specialty: Cardiology; 1 Month   As directed      Specialty: Cardiology   Follow Up: 1 Month        Discharge Follow-up with Specialty: Cardiothoracic Surgery   As directed      Follow Up Details: Follow Up in 6 weeks   Specialty: Cardiothoracic Surgery        Discharge Follow-up with Specialty: [facility]; 1 Week   As directed      Specialty: [facility]   Follow Up: 1 Week   Follow Up Details: Follow Up With [facility] Within 7 Days of " Discharge. If Discharged on a Weekend, Schedule Follow Up For The Following Friday at 0900.        Discharge Follow-up with Specialty: Urology; 1 Week   As directed      Specialty: Urology   Follow Up: 1 Week   Follow Up Details: Follow-up with urology in 1 week        Cardiac Rehab Evaluation and Enrollment   Nov 11, 2024      Reason for Consult: Education                Discharge Education:  Post-Op Education:  Patient was advised on responsible use of opioids in the post-surgical setting.  Patient was advised these medications are highly addictive.  Patient advised on proper disposal of unused opioid medication and was urged to discard any unused opioid medication. I reviewed the patient's sternal precautions and outlined the physical activity suitable for each benchmark at the 6-week 3-month and 1 year intervals.  Wound Care:  Patient educated regarding care of post-operative wounds.  Patient is to wash with plain soap and water and pat dry.  Patient is not to use salves on the incision sites.  Patient instructed regarding the signs and symptoms of infection and when to call the office with any concerns.    Special Instructions:   1.  Keep incisions clean and dry at all times. Take a shower daily. Do not take a tub bath or use hot tubs until seen by the cardiac surgeon in your follow-up visit. Clean  your body and incisions daily with Dial soap and water. Always use a clean washcloth. Do not scrub your incision(s). Do not re-use dressings on your incision(s). Do not use any lotions, creams, oils, powders, antibiotic ointment (i.e., Neosporin), peroxide, alcohol, or iodine UNLESS told to do by the cardiac surgeon.  Patient may shower, but no tub baths until CT Surgery followup.   2.  No lifting over 10 lbs until CT Surgery follow up.  3.  No driving until released to do so by the surgeon.      Surgical Leg Precautions:   -Avoid sitting in one position or standing for long periods of time.  -Do not cross your  legs.  -Keep your legs elevated while sitting  -Do not use any lotions, creams, oils, powders, antibiotic ointment (i.e. Neosporin), peroxide, alcohol, or iodine unless specifically prescribed by the cardiac surgeon.  -If elastic stockings (DAVID hose) were prescribed to you, wear the stockings while you are up for at least two weeks after discharge. The stockings help decrease swelling. However, remove stockings at bedtime. Wash the stockings with mild soap and water, and make sure they are completely dry before you put them back on.    When to Call the Cardiac Surgeon:  -Increased swelling, redness, tenderness, and drainage  -Increased warmth in the skin around an incision  -Large amount of clear or pinkish drainage  -Sudden increased amount of drainage  -White, yellow, or greenish drainage  -Odor, which may be foul or sweet, coming from an incision  -An opening in your incisions  -Temperature higher than 101 degrees Fahrenheit   -Temperature higher than 100 degrees Fahrenheit two times within 24 hours  -Do not hesitate to call the cardiac surgery nurse navigator or cardiac surgeon if you have concerns or questions.     *The [facility] cardiac surgery nurse navigator is available Monday-Friday 8 a.m. to 4:30 p.m. [phone]  Call 911:  -Chest pain (pain similar to what you experienced prior to surgery)  -Fainting spells  -Bright red stool  -Vomiting bright red blood  -Shortness of breath not relieved by rest  -Sudden numbness or weakness in arms or legs  -Sudden, severe headache  -Heart rate faster than 150 beats/minute with shortness of breath or a new irregular heart rate    Tatiana Waller, ZIA  11/06/24  10:04 EST    Time: I spent 47 minutes on this discharge activity which included: face-to-face encounter with the patient, reviewing the data in the system, coordination of the care with the nursing staff as well as consultants, documentation, and entering orders.

## 2024-11-06 NOTE — DISCHARGE PLACEMENT REQUEST
"IrajJoel martinez (59 y.o. Male)     Ramin Germain -[phone]      Date of Birth   1965    Social Security Number       Address   [address] [place] [address]    Home Phone   [phone]    MRN   [id number]       Worship   [employer]    Marital Status                               Admission Date   10/28/24    Admission Type   Elective    Admitting Provider   Alex Max MD    Attending Provider   Alex Max MD    Department, Room/Bed   [address], S484/1       Discharge Date       Discharge Disposition   Rehab Facility or Unit (DC - External)    Discharge Destination                                 Attending Provider: Alex Max MD    Allergies: No Known Allergies    Isolation: None   Infection: None   Code Status: CPR    Ht: 172.7 cm (67.99\")   Wt: 105 kg (231 lb 6.4 oz)    Admission Cmt: None   Principal Problem: CAD s/p CABG x 4, MAZE, LAAL 10/28/24 [I25.10]                   Active Insurance as of 10/28/2024       Primary Coverage       Payor Plan Insurance Group Employer/Plan Group    ANTHEM MEDICARE REPLACEMENT ANTHEM MEDICARE ADVANTAGE [id number]       Payor Plan Address Payor Plan Phone Number Payor Plan Fax Number Effective Dates    [address] [phone]  2024 - None Entered    [facility] [id number]         Subscriber Name Subscriber Birth Date Member ID       JOEL GUTIERREZ 1965 [id number]                     Emergency Contacts        (Rel.) Home Phone Work Phone Mobile Phone    LISHA GUTIERREZ (Spouse) [phone] -- [phone]                 Discharge Summary        Tatiana Waller APRN at 24 [phone]              [facility] Cardiothoracic Surgery  DISCHARGE SUMMARY    Patient Name: Joel Gutierrez  : 1965  MRN: [id number]    Date of Admission: 10/28/2024  4:58 AM  Date of Discharge:  2024  Primary Care Physician: Edmundo Boston MD  Referred By: Blossom Parks*    IP " Care Team:  Patient Care Team:  Edmundo Boston MD as PCP - General (Family Medicine)  Julio Neff MD as Consulting Physician (Cardiology)  Blossom Parks APRN as Nurse Practitioner (Cardiology)    Consults       Date and Time Order Name Status Description    11/4/2024  8:33 AM Inpatient Urology Consult      11/2/2024  9:10 AM Inpatient Urology Consult Completed     10/28/2024  1:37 PM Inpatient Intensivist Consult - For Vent Management      10/28/2024  1:34 PM Inpatient Consult to Cardiology Completed             Hospital Course     Presenting Problem: Coronary artery disease    Active Hospital Problems    Diagnosis  POA    **CAD s/p CABG x 4, MAZE, LAAL 10/28/24 [I25.10]  Yes    Primary hypertension [I10]  Yes    Type 2 diabetes mellitus with hyperglycemia, without long-term current use of insulin [E11.65]  Yes    Hyperlipidemia LDL goal <70 [E78.5]  Yes    Paroxysmal atrial fibrillation [I48.0]  Yes      Resolved Hospital Problems   No resolved problems to display.        Procedures Performed:  Procedure(s):  MEDIAN STERNOTOMY, CORONARY ARTERY BYPASS GRAFTING X4 UTILIZING THE LEFT INTERNAL MAMMARY ARTERY GRAFT, ENDOSCOPIC VEIN HARVEST OF THE GREATER RIGHT SAPHENOUS VEIN  ATRIAL APPENDAGE EXCLUSION LEFT WITH TRANSESOPHAGEAL ECHOCARDIOGRAM  MAZE PROCEDURE       Hospital Course:  Joel Galo is a 59 y.o. male who was taken to the operating room on 10/28/2024 for scheduled CABG x 4 with EVH of the right greater saphenous vein, MAZE, and LAAL (# 35 mm AtriCure clip) with Dr. Max. He was sent to ICU in stable condition and was extubated per ICU protocol. He was evaluated by PT/OT and was deemed appropriate for rehab. He was discharged to rehab in stable condition on POD # 9, his hospital course is below.    CAD s/p CABG x 4, MAZE, LAAL:  10/30: Mediastinal chest tube, pacing wires, central line, and Art line were removed. Transfer to telemetry orders were placed.  10/31: Pleural chest tubes,  Prado catheter, and NG tube were removed.  Transfer to telemetry orders were placed.  11/2: Transferred to telemetry floor  Asa, Statin, BB    Paroxysmal A-fib:  Previously on sotalol and Eliquis  S/p MAZE/LAAL  Went into A-fib with RVR on 10/30  Converted to SR    Urinary retention:  Urology consulted on 11/2 (Dr. Pepe)  Recommended to maintain Prado catheter for 5 to 7 days (hopefully D/C 11/8 or 11/9 after voiding trial)  Continue Flomax    Hematuria (resolved):  Urology was reconsulted due to hematuria  Urology irrigated Pardo catheter at bedside, likely due to catheter irritation      Discharge Follow Up Recommendations for outpatient labs/diagnostics:  Follow-up with [facility] center in 1 week  Follow-up with Urology in 7-10 days at [place]  Follow-up with [facility] with EP as scheduled on 12/6  Follow-up with Cardiology in 1 month  Follow-up with CT Surgery in 6 weeks        DO NOT REMOVE SUTURES AND/OR STAPLES THIS WILL BE ADDRESSED AT CT SURGERY FOLLOW-UP  If you have any questions or concerns please reach out to our office at [phone]    Day of Discharge     HPI:   Joel Galo is a 59 y.o. male referred to Dr. Max for consideration of CABG per Cardiology, Dr. Neff.  PMH:  PAF on Eliquis (loop recorder in place), HTN, uncontrolled type 2 DM (HgA1C 12.6%), hyperlipidemia, and CAD.  Patient recently developed angina episodes with abnormal CTA coronaries.  His coronary calcium score was 686.  Subsequent cardiac cath demonstrated mid LAD 75% stenosis, distal LAD 95% stenosis, first marginal 90% stenosis, third marginal 99% stenosis, ostial RCA 70% stenosis, mid RCA 70% stenosis, and RV branch 99% stenosis.  Left ventriculogram during cardiac cath noted EF 55%.  However, last transthoracic echocardiogram was performed March 2023.  TTE March 2023 demonstrated EF 61 to 65% without significant valvular disease and mild to moderate concentric hypertrophy with sigmoid shaped ventricular septum.  Patient  reports intermittent non-exertional chest pain but worsening CHRISTIANSEN over the past 6-12 months.  He denies history of chest wall radiation, upper extremity or wrist surgeries, lower extremity trauma, lower extremity vein stripping or other procedures.       Vital Signs:   Temp:  [97.5 °F (36.4 °C)-98.8 °F (37.1 °C)] 98.8 °F (37.1 °C)  Heart Rate:  [67-77] 74  Resp:  [18] 18  BP: (132-161)/(88-98) 145/97      Physical Exam:  General Appearance: alert, appears stated age and cooperative              Lungs: clear to auscultation, respirations regular, respirations even, and respirations unlabored              Heart: regular rhythm & normal rate, normal S1, S2, no murmur, no gallop, no rub, and no click              Skin: Incision c/d/I              Sternum: Stable               Ext: Trace edema bilaterally    Pertinent  and/or Most Recent Results     LAB RESULTS:          Lab 11/06/24  0534 11/05/24  0606 11/04/24  1725 11/03/24  1228 11/02/24  0721   WBC 9.65 11.01* 8.88 8.96 7.21   HEMOGLOBIN 11.0* 11.6* 9.7* 9.7* 10.2*   HEMATOCRIT 34.2* 34.3* 30.1* 31.6* 31.8*   PLATELETS 354 233 269 207 175   MCV 92.2 89.8 94.1 98.1* 94.4         Lab 11/06/24  0534 11/05/24  0606 11/04/24  1725 11/03/24  1228 11/02/24  0741 11/02/24  0352   SODIUM 135* 138 140 138  --  134*   POTASSIUM 4.0 4.5 4.1 4.5 3.5 3.8   CHLORIDE 97* 99 102 103  --  100   CO2 20.0* 17.0* 24.0 20.0*  --  18.0*   ANION GAP 18.0* 22.0* 14.0 15.0  --  16.0*   BUN 15 18 20 29*  --  40*   CREATININE 0.59* 0.60* 0.60* 0.56*  --  0.83   EGFR 110.4 109.8 109.8 112.1  --  99.6   GLUCOSE 123* 117* 131* 190*  --  112*   CALCIUM 9.0 8.9 9.1 8.1*  --  8.2*         Lab 11/01/24  0526   TOTAL PROTEIN 6.8   ALBUMIN 3.4*   GLOBULIN 3.4   ALT (SGPT) 44*   AST (SGOT) 107*   BILIRUBIN 0.4   ALK PHOS 126*             Lab 11/03/24  1228   CHOLESTEROL 102   LDL CHOL 36   HDL CHOL 18*   TRIGLYCERIDES 316*         Lab 10/31/24  1052   ABO TYPING B   RH TYPING Negative   ANTIBODY SCREEN  Negative         Brief Urine Lab Results       None          Microbiology Results (last 10 days)       ** No results found for the last 240 hours. **            XR Chest 1 View    Result Date: 11/6/2024  XR CHEST 1 VW Date of Exam: 11/6/2024 2:17 AM EST Indication: postop Comparison: 11/5/2024 Findings: Postsurgical changes of prior sternotomy and CABG. Left atrial appendage clip noted. Stable mild elevation of the right hemidiaphragm. No pneumothorax. No pleural effusion. No significant consolidation. Loop recorder stable.     Impression: No acute process. Electronically Signed: Walter Torrez MD  11/6/2024 7:33 AM EST  Workstation ID: [id number]    XR Chest 1 View    Result Date: 11/5/2024  XR CHEST 1 VW Date of Exam: 11/5/2024 3:39 AM EST Indication: postop Comparison 11/4/2024. Findings: Loop recorder over the left chest is again seen. Heart and pulmonary vessels appear within normal limits. There are sternal suture wires. There is mild linear atelectasis of the upper lobes, similar. There are no new infiltrates or effusions.     Impression: No significant interval change. Electronically Signed: Tenisha David MD  11/5/2024 7:44 AM EST  Workstation ID: [id number]    XR Chest 1 View    Result Date: 11/4/2024  XR CHEST 1 VW Date of Exam: 11/4/2024 3:56 AM EST Indication: postop Comparison: Chest x-ray 11/3/2024 Findings: Stable cardiomegaly. The lungs are grossly clear. No pleural effusion or pneumothorax.     Impression: No acute cardiopulmonary findings. Electronically Signed: Enrique Vazquez MD  11/4/2024 7:57 AM EST  Workstation ID: [id number]    XR Chest 1 View    Result Date: 11/3/2024  XR CHEST 1 VW Date of Exam: 11/3/2024 12:54 AM EDT Indication: postop Comparison: Chest x-ray 11/2/2024 Findings: Redemonstration of prior CABG and loop recorder device. Stable cardiomegaly. Similar interstitial prominence without focal consolidation. No definite pneumothorax. Similar haziness of the right costophrenic angle which  could reflect small right pleural effusion.     Impression: No significant interval change. Electronically Signed: Enrique Vazquez MD  11/3/2024 10:34 AM EST  Workstation ID: [id number]    XR Chest 1 View    Result Date: 11/2/2024  XR CHEST 1 VW Date of Exam: 11/2/2024 2:36 AM EDT Indication: postop Comparison: CXR 10/31/2024 Findings: The heart is unchanged in size. The pulmonary vascularity does not appear congested. Small right apical pneumothorax seen on 10/31/2024 is not evident today. Subsegmental atelectasis at the lung bases is improved. Changes of coronary artery bypass are again appreciated. Atrial appendage closure device remains in place. Precordial loop recorder remains evident. NG tube seen previously is no longer evident. Moderate to marked degenerative changes are seen in the glenohumeral joints.     Impression: Subsegmental atelectasis at the lung bases is improved in comparison to 10/31/2024. The small right apical pneumothorax seen on 10/31/2024 is not evident today. Post coronary artery bypass. Electronically Signed: Marc Dimas MD  11/2/2024 9:23 AM EDT  Workstation ID: [id number]    XR Chest 1 View    Result Date: 10/31/2024  XR CHEST 1 VW Date of Exam: 10/31/2024 3:48 AM EDT Indication: Follow up for pneumothorax Comparison: 10/30/2024 Findings: Small right apical pneumothorax is stable. There is mild atelectasis of the lung bases. NG tube remains in place. There is stable borderline cardiomegaly. There are sternal suture wires. There is a left atrial appendage closure device.     Impression: No significant change. Electronically Signed: Tenisha David MD  10/31/2024 8:03 AM EDT  Workstation ID: [id number]    XR Chest 1 View    Result Date: 10/30/2024  XR CHEST 1 VW Date of Exam: 10/30/2024 2:51 PM EDT Indication: pneumothorax follow up Comparison: Chest radiograph 10/30/2024 Findings: Pleural line visualized at the right apex suggesting pneumothorax measuring up to 1.3 cm less conspicuous from  prior, previously 1.8 cm. Antegrade oriented gastric tube terminates over the mid stomach. Prior median sternotomy, CABG and left atrial appendage closure device. Right IJ central catheter has been removed. Cardiomediastinal silhouette stable. Lung volumes are low with streaky perihilar and bibasilar opacities favoring atelectasis. Blunted costophrenic angle difficult to exclude trace effusions. No significant effusion or left pneumothorax. No worsening edema. Degenerative related osseous changes. Loop recorder noted.     Impression: 1. Small less conspicuous right apical pneumothorax. 2. Removed right IJ central catheter. Antegrade oriented gastric tube terminates over mid stomach. 3. Radiographically stable hypoventilation changes and presumed atelectasis. Electronically Signed: Preston Quinn MD  10/30/2024 3:28 PM EDT  Workstation ID: [id number]    XR Chest 1 View    Result Date: 10/30/2024  XR CHEST 1 VW Date of Exam: 10/30/2024 2:03 AM EDT Indication: Post-Op Heart Surgery Comparison: Chest radiograph 10/29/2024. Findings: Removal of nasogastric tube. Mediastinal and bilateral pleural drains in unchanged position. Right IJ approach catheter in unchanged position. Sternotomy, CABG, left atrial appendage clip. Cardiomediastinal silhouette is unchanged. Low lung volumes. Scattered subsegmental atelectasis, similar. No sizable pleural effusion. New small right pneumothorax. No distinct left pneumothorax.     Impression: New small right pneumothorax. Removal of nasogastric tube with unchanged position of remaining lines/tubes, including right pleural drain. Low lung volumes with scattered subsegmental atelectasis, similar to yesterday's exam. Electronically Signed: Tal Pimentel MD  10/30/2024 7:33 AM EDT  Workstation ID: [id number]    XR Chest 1 View    Result Date: 10/29/2024  XR CHEST 1 VW Date of Exam: 10/29/2024 4:34 AM EDT Indication: Post-Op Heart Surgery Comparison 10/28/2024. Findings: The ET tube has been  removed. Other support lines appear stable in position. Heart and pulmonary vessels appear within normal limits for technique. There is mild left basilar atelectasis. There is implantable cardiac device. There are sternal suture wires and left atrial appendage clip.     Impression: Interval extubation. Mild left basilar atelectasis. Electronically Signed: Tenisha David MD  10/29/2024 8:13 AM EDT  Workstation ID: [id number]    XR Chest 1 View    Result Date: 10/28/2024  XR CHEST 1 VW Date of Exam: 10/28/2024 1:38 PM EDT Indication: Post-Op Check Line & Tube Placement Comparison: Chest radiograph 10/25/2024. Findings: Endotracheal tube tip overlies the midthoracic trachea around 2 cm above the thomas. Right IJ approach central venous catheter tip overlies the superior vena cava. Nasogastric tube tip overlies the stomach. Mediastinal and pleural drains in place. Sternotomy, CABG, left atrial appendage clip. Implantable cardiac device. Mildly low lung volumes. Scattered subsegmental atelectasis. No sizable pleural effusion. No distinct pneumothorax. No acute osseous abnormality.     Impression: Postsurgical chest with lines/tubes in expected position, as above. Electronically Signed: Tal Pimentel MD  10/28/2024 2:09 PM EDT  Workstation ID: [id number]    Central Line    Result Date: 10/28/2024  George Morrow MD     10/28/2024  8:39 AM Central Line Patient reassessed immediately prior to procedure Patient location during procedure: OR Indications: vascular access Preanesthetic Checklist Completed: patient identified, IV checked, site marked, risks and benefits discussed, surgical consent, monitors and equipment checked, pre-op evaluation and timeout performed Central Line Prep Sterile Tech:cap, gloves, gown, mask and sterile barriers Prep: chloraprep Patient monitoring: blood pressure monitoring, continuous pulse oximetry and EKG Central Line Procedure Laterality:right Location:internal jugular Catheter Type:Cordis  Catheter Size:9 Fr Guidance:ultrasound guided PROCEDURE NOTE/ULTRASOUND INTERPRETATION.  Using ultrasound guidance the potential vascular sites for insertion of the catheter were visualized to determine the patency of the vessel to be used for vascular access.  After selecting the appropriate site for insertion, the needle was visualized under ultrasound being inserted into the internal jugular vein, followed by ultrasound confirmation of wire and catheter placement. There were no abnormalities seen on ultrasound; an image was taken; and the patient tolerated the procedure with no complications. Assessment Post procedure:biopatch applied, line sutured, occlusive dressing applied and secured with tape Assessement:blood return through all ports, free fluid flow, chest x-ray ordered and Antoni Test Complications:no Patient Tolerance:patient tolerated the procedure well with no apparent complications     Arterial Line    Result Date: 10/28/2024  George Morrow MD     10/28/2024  8:39 AM Arterial Line Patient reassessed immediately prior to procedure Patient location during procedure: pre-op Line placed for hemodynamic monitoring. Preanesthetic Checklist Completed: patient identified, IV checked, site marked, risks and benefits discussed, surgical consent, monitors and equipment checked, pre-op evaluation and timeout performed Arterial Line Prep  Sterile Tech: cap, gloves and sterile barriers Prep: ChloraPrep Patient monitoring: blood pressure monitoring, continuous pulse oximetry and EKG Arterial Line Procedure Laterality:right Location:  radial artery Catheter size: 20 G Guidance: ultrasound guided Number of attempts: 1 Successful placement: yes Post Assessment Dressing Type: line sutured, occlusive dressing applied, secured with tape and wrist guard applied. Complications no Circ/Move/Sens Assessment: normal and unchanged. Patient Tolerance: patient tolerated the procedure well with no apparent complications               Results for orders placed in visit on 10/28/24    Intra-Op Anesthesia DARIAN    Narrative  Intra-Op Anesthesia DARIAN    Procedure Performed: Intra-Op Anesthesia DARIAN  Start Time:  End Time:    Preanesthesia Checklist:  Patient identified, IV assessed, risks and benefits discussed, monitors and equipment assessed, procedure being performed at surgeon's request and anesthesia consent obtained.    General Procedure Information  Diagnostic Indications for Echo:  assessment of ascending aorta, assessment of surgical repair and hemodynamic monitoring  Physician Requesting Echo: Alex Max MD  Location performed:  OR  Intubated  Bite block not placed  Heart visualized  Probe Insertion:  Easy  Probe Type:  Multiplane  Modalities:  Color flow mapping, continuous wave Doppler and pulse wave Doppler    Echocardiographic and Doppler Measurements    Ventricles    Right Ventricle:  Thrombus not present.  Global function normal.  Left Ventricle:  Cavity size normal.  Diastolic dimension 1.6 cm.  Thrombus not present.  Global Function normal.  Ejection Fraction 55%.        Valves    Aortic Valve:  Annulus normal.  Stenosis not present.  Area: 2.2 cm².  Mean Gradient: 2 mmHg.  Regurgitation absent.  Leaflets normal.  Leaflet motions normal.    Mitral Valve:  Annulus normal.  Stenosis not present.  Area: 2.4 cm².  Mean Gradient: 1 mmHg.  Regurgitation trace.  Leaflets normal.  Leaflet motions normal.    Tricuspid Valve:  Annulus normal.  Stenosis not present.  Leaflets normal.  Leaflet motions normal.  Pulmonic Valve:  Annulus normal.      Aorta    Ascending Aorta:  Size normal.  Diameter 3 cm.  Dissection not present.  Plaque thickness less than 3 mm.  Mobile plaque not present.  Aortic Arch:  Size normal.  Dissection not present.  Plaque thickness less than 3 mm.  Mobile plaque not present.  Descending Aorta:  Size normal.  Dissection not present.  Plaque thickness less than 3 mm.  Mobile plaque not  present.      Atria    Right Atrium:  Size dilated.  Spontaneous echo contrast not present.  Thrombus not present.  Tumor not present.  Device present.    Left Atrium:  Size dilated.  Spontaneous echo contrast not present.  Thrombus not present.  Tumor not present.  Left atrial appendage normal.      Septa        Ventricular Septum:  Intra-ventricular septum morphology normal.    Diastolic Function Measurements:  Diastolic Dysfunction Grade=  E=  ms  A=  ms  E/A Ratio=  1.7  DT=  ms  S/D=  IVRT=    Other Findings  Pericardium:  normal  Pleural Effusion:  none  Pulmonary Arteries:  normal  Pulmonary Venous Flow:  normal    Anesthesia Information  Performed Personally  Anesthesiologist:  George Morrow MD      Echocardiogram Comments:  Informed consent was obtained preoperatively.  Yahir probe passed without difficulty.  Post CABG, MAZE and TORSTEN ligation:   EF unchanged, no NWMA, obliterartion of TORSTEN by clip          Discharge Details        Discharge Medications        New Medications        Instructions Start Date   hydroCHLOROthiazide 25 MG tablet   25 mg, Oral, Daily   Start Date: November 7, 2024     insulin glargine 100 UNIT/ML injection  Commonly known as: LANTUS, SEMGLEE   20 Units, Subcutaneous, Daily   Start Date: November 7, 2024     Insulin Lispro 100 UNIT/ML injection  Commonly known as: humaLOG   2-9 Units, Subcutaneous, 4 Times Daily Before Meals & Nightly      Insulin Lispro 100 UNIT/ML injection  Commonly known as: humaLOG   2 Units, Subcutaneous, 3 Times Daily With Meals      sacubitril-valsartan 49-51 MG tablet  Commonly known as: ENTRESTO   1 tablet, Oral, Every 12 Hours Scheduled             Changes to Medications        Instructions Start Date   amLODIPine 10 MG tablet  Commonly known as: NORVASC  What changed:   medication strength  how much to take  when to take this   10 mg, Oral, Every 24 Hours Scheduled      aspirin 325 MG EC tablet  What changed:   medication strength  how much to take    325 mg, Oral, Daily   Start Date: November 7, 2024     sotalol 120 MG tablet  Commonly known as: BETAPACE  What changed:   medication strength  See the new instructions.   120 mg, Oral, 2 Times Daily             Continue These Medications        Instructions Start Date   buPROPion  MG 24 hr tablet  Commonly known as: WELLBUTRIN XL   150 mg, Daily      docusate sodium 100 MG capsule  Commonly known as: COLACE   100 mg, 2 Times Daily      DULoxetine 60 MG capsule  Commonly known as: CYMBALTA   60 mg, Daily      empagliflozin 25 MG tablet tablet  Commonly known as: JARDIANCE   25 mg, Daily      finasteride 5 MG tablet  Commonly known as: PROSCAR   TAKE 1 TABLET EVERY DAY      gabapentin 300 MG capsule  Commonly known as: NEURONTIN   300 mg, Oral, 2 Times Daily, TAKES 200MG IN AM AND 300MG IN PM USUALLY      HYDROcodone-acetaminophen  MG per tablet  Commonly known as: NORCO   1 tablet, Every 8 Hours PRN      levothyroxine 50 MCG tablet  Commonly known as: SYNTHROID, LEVOTHROID   50 mcg, Every Early Morning      nitroglycerin 0.4 MG SL tablet  Commonly known as: NITROSTAT   1 under the tongue as needed for angina, may repeat q5mins for up three doses      polyethylene glycol 17 g packet  Commonly known as: MIRALAX   17 g, Daily      rosuvastatin 20 MG tablet  Commonly known as: CRESTOR   20 mg, Oral, Daily      tamsulosin 0.4 MG capsule 24 hr capsule  Commonly known as: FLOMAX   TAKE 1 CAPSULE EVERY DAY      Vitamin D (Cholecalciferol) 10 MCG (400 UNIT) tablet  Commonly known as: CHOLECALCIFEROL   400 Units, Daily             Stop These Medications      apixaban 5 MG tablet tablet  Commonly known as: ELIQUIS     glimepiride 2 MG tablet  Commonly known as: AMARYL     HumuLIN 70/30 KwikPen (70-30) 100 UNIT/ML suspension pen-injector  Generic drug: Insulin NPH Isophane & Regular     isosorbide mononitrate 30 MG 24 hr tablet  Commonly known as: IMDUR     losartan 25 MG tablet  Commonly known as: COZAAR         "      No Known Allergies      Discharge Disposition:  Rehab Facility or Unit (DC - External)    Diet:  Hospital:  Diet Order   Procedures    Diet: Regular/House; Texture: Mechanical Ground (NDD 2); Fluid Consistency: Thin (IDDSI 0)       Activity:  Activity Instructions       Activity as Tolerated      Bathing Restrictions      You may shower    Type of Restriction: Bathing    Bathing Restrictions: No Tub Bath    Driving Restrictions      Type of Restriction: Driving    Driving Restrictions: No Driving While Taking Narcotics    Lifting Restrictions      Type of Restriction: Lifting    Lifting Restrictions: Lifting Restriction (Indicate Limit)    Weight Limit (Pounds): 10    Length of Lifting Restriction: until seen at next follow-up appointment            Restrictions or Other Recommendations:  No driving until seen by your cardiac surgeon at your follow up appointment. Do not drive while taking narcotics. Get up and get dressed each morning; do not stay in bed. Walking is the best form of exercise because it increases circulation throughout the body and to the heart muscle. Get plenty of sleep at night (8-10 hours). It is important for your breast bone (sternum) to heal properly after surgery.   Please follow these guidelines:  -No lifting, pulling, or pushing greater than 10 lbs for 12 weeks.   -Do not push or pull with your arms, especially when rising from a chair   -Have someone help you get up or roll to your side, and push off with your elbow to get out of bed.  -Avoid activities that cause you to strain or pull on your chest, such as driving a  or tractor, raking, vacuuming, moving heavy items, shoveling, opening tight jars or swinging a golf club.  -If you have \"clicking\" in your sternum, avoid activities that cause clicking until the sternum heals.  -Canes or walkers may be used only for balance. Do not place your full weight on any of these devices until the chest is completely healed (usually " 12 weeks).       CODE STATUS:    Code Status and Medical Interventions: CPR (Attempt to Resuscitate); Full Support   Ordered at: 10/28/24 1337     Code Status (Patient has no pulse and is not breathing):    CPR (Attempt to Resuscitate)     Medical Interventions (Patient has pulse or is breathing):    Full Support       Future Appointments   Date Time Provider Department Center   12/3/2024  9:45 AM Blossom Parks APRN [facility]   12/6/2024  1:45 PM Musa Fishman,  [facility]   6/6/2025  1:45 PM Musa Fishman,  [facility]       Additional Instructions for the Follow-ups that You Need to Schedule       Call MD With Problems / Concerns   As directed      Instructions:   Please call the [facility] office with any questions or concerns you may have [phone]    Order Comments: Instructions: Please call the [facility] Surgery office with any questions or concerns you may have [phone]         Discharge Follow-up with PCP   As directed       Currently Documented PCP:    Edmundo Boston MD    PCP Phone Number:    [phone]     Follow Up Details: Follow Up With PCP Within 7-14 Days        Discharge Follow-up with Specialty: Cardiology; 1 Month   As directed      Specialty: Cardiology   Follow Up: 1 Month        Discharge Follow-up with Specialty: Cardiothoracic Surgery   As directed      Follow Up Details: Follow Up in 6 weeks   Specialty: Cardiothoracic Surgery        Discharge Follow-up with Specialty: [facility]; 1 Week   As directed      Specialty: [facility]   Follow Up: 1 Week   Follow Up Details: Follow Up With [facility] Within 7 Days of Discharge. If Discharged on a Weekend, Schedule Follow Up For The Following Friday at 0900.        Discharge Follow-up with Specialty: Urology; 1 Week   As directed      Specialty: Urology   Follow Up: 1 Week   Follow Up Details: Follow-up with urology in 1 week        Cardiac Rehab Evaluation and Enrollment    Nov 11, 2024      Reason for Consult: Education                Discharge Education:  Post-Op Education:  Patient was advised on responsible use of opioids in the post-surgical setting.  Patient was advised these medications are highly addictive.  Patient advised on proper disposal of unused opioid medication and was urged to discard any unused opioid medication. I reviewed the patient's sternal precautions and outlined the physical activity suitable for each benchmark at the 6-week 3-month and 1 year intervals.  Wound Care:  Patient educated regarding care of post-operative wounds.  Patient is to wash with plain soap and water and pat dry.  Patient is not to use salves on the incision sites.  Patient instructed regarding the signs and symptoms of infection and when to call the office with any concerns.    Special Instructions:   1.  Keep incisions clean and dry at all times. Take a shower daily. Do not take a tub bath or use hot tubs until seen by the cardiac surgeon in your follow-up visit. Clean  your body and incisions daily with Dial soap and water. Always use a clean washcloth. Do not scrub your incision(s). Do not re-use dressings on your incision(s). Do not use any lotions, creams, oils, powders, antibiotic ointment (i.e., Neosporin), peroxide, alcohol, or iodine UNLESS told to do by the cardiac surgeon.  Patient may shower, but no tub baths until CT Surgery followup.   2.  No lifting over 10 lbs until CT Surgery follow up.  3.  No driving until released to do so by the surgeon.      Surgical Leg Precautions:   -Avoid sitting in one position or standing for long periods of time.  -Do not cross your legs.  -Keep your legs elevated while sitting  -Do not use any lotions, creams, oils, powders, antibiotic ointment (i.e. Neosporin), peroxide, alcohol, or iodine unless specifically prescribed by the cardiac surgeon.  -If elastic stockings (DAVID hose) were prescribed to you, wear the stockings while you are up for  at least two weeks after discharge. The stockings help decrease swelling. However, remove stockings at bedtime. Wash the stockings with mild soap and water, and make sure they are completely dry before you put them back on.    When to Call the Cardiac Surgeon:  -Increased swelling, redness, tenderness, and drainage  -Increased warmth in the skin around an incision  -Large amount of clear or pinkish drainage  -Sudden increased amount of drainage  -White, yellow, or greenish drainage  -Odor, which may be foul or sweet, coming from an incision  -An opening in your incisions  -Temperature higher than 101 degrees Fahrenheit   -Temperature higher than 100 degrees Fahrenheit two times within 24 hours  -Do not hesitate to call the cardiac surgery nurse navigator or cardiac surgeon if you have concerns or questions.     *The [facility] cardiac surgery nurse navigator is available Monday-Friday 8 a.m. to 4:30 p.m. [phone]  Call 911:  -Chest pain (pain similar to what you experienced prior to surgery)  -Fainting spells  -Bright red stool  -Vomiting bright red blood  -Shortness of breath not relieved by rest  -Sudden numbness or weakness in arms or legs  -Sudden, severe headache  -Heart rate faster than 150 beats/minute with shortness of breath or a new irregular heart rate    ZIA Cole  11/06/24  10:04 EST    Time: I spent 47 minutes on this discharge activity which included: face-to-face encounter with the patient, reviewing the data in the system, coordination of the care with the nursing staff as well as consultants, documentation, and entering orders.      Electronically signed by Tatiana Waller APRN at 11/06/24 1005

## 2024-11-07 NOTE — PAYOR COMM NOTE
"Ref# [id number]   Discharge Summary    SAMANTHA Cruz, RN  Utilization Review  Phone [phone]  Fax [phone]    [facility]  [address] [place] [address]           Joel Gutierrez (59 y.o. Male)       Date of Birth   1965    Social Security Number       Address   [address] [place] [address]    Home Phone   [phone]    MRN   [id number]       [employer]   Jehovah's witness    Marital Status                               Admission Date   10/28/24    Admission Type   Elective    Admitting Provider   Alex Max MD    Attending Provider       Department, Room/Bed   [address], S484/1       Discharge Date   2024    Discharge Disposition   Rehab Facility or Unit (DC - External)    Discharge Destination                                 Attending Provider: (none)   Allergies: No Known Allergies    Isolation: None   Infection: None   Code Status: Prior    Ht: 172.7 cm (67.99\")   Wt: 105 kg (231 lb 6.4 oz)    Admission Cmt: None   Principal Problem: CAD s/p CABG x 4, MAZE, LAAL 10/28/24 [I25.10]                   Active Insurance as of 10/28/2024       Primary Coverage       Payor Plan Insurance Group Employer/Plan Group    ANTHEM MEDICARE REPLACEMENT ANTHEM MEDICARE ADVANTAGE [id number]       Payor Plan Address Payor Plan Phone Number Payor Plan Fax Number Effective Dates    [address] [phone]  2024 - None Entered    [facility] [id number]         Subscriber Name Subscriber Birth Date Member ID       JOEL GUTIERREZ ADAN 1965 [id number]                     Emergency Contacts        (Rel.) Home Phone Work Phone Mobile Phone    LISHA GUTIERREZ (Spouse) [phone] -- [phone]                 Discharge Summary        Tatiana Waller APRN at 24 0938              [facility] Cardiothoracic Surgery  DISCHARGE SUMMARY    Patient Name: Joel Gutierrez  : 1965  MRN: [id number]    Date of " Admission: 10/28/2024  4:58 AM  Date of Discharge:  11/6/2024  Primary Care Physician: Edmundo Boston MD  Referred By: Blossom Parks*    IP Care Team:  Patient Care Team:  Edmundo Boston MD as PCP - General (Family Medicine)  Julio Neff MD as Consulting Physician (Cardiology)  lBossom Parks APRN as Nurse Practitioner (Cardiology)    Consults       Date and Time Order Name Status Description    11/4/2024  8:33 AM Inpatient Urology Consult      11/2/2024  9:10 AM Inpatient Urology Consult Completed     10/28/2024  1:37 PM Inpatient Intensivist Consult - For Vent Management      10/28/2024  1:34 PM Inpatient Consult to Cardiology Completed             Hospital Course     Presenting Problem: Coronary artery disease    Active Hospital Problems    Diagnosis  POA    **CAD s/p CABG x 4, MAZE, LAAL 10/28/24 [I25.10]  Yes    Primary hypertension [I10]  Yes    Type 2 diabetes mellitus with hyperglycemia, without long-term current use of insulin [E11.65]  Yes    Hyperlipidemia LDL goal <70 [E78.5]  Yes    Paroxysmal atrial fibrillation [I48.0]  Yes      Resolved Hospital Problems   No resolved problems to display.        Procedures Performed:  Procedure(s):  MEDIAN STERNOTOMY, CORONARY ARTERY BYPASS GRAFTING X4 UTILIZING THE LEFT INTERNAL MAMMARY ARTERY GRAFT, ENDOSCOPIC VEIN HARVEST OF THE GREATER RIGHT SAPHENOUS VEIN  ATRIAL APPENDAGE EXCLUSION LEFT WITH TRANSESOPHAGEAL ECHOCARDIOGRAM  MAZE PROCEDURE       Hospital Course:  Joel Galo is a 59 y.o. male who was taken to the operating room on 10/28/2024 for scheduled CABG x 4 with EVH of the right greater saphenous vein, MAZE, and LAAL (# 35 mm AtriCure clip) with Dr. Max. He was sent to ICU in stable condition and was extubated per ICU protocol. He was evaluated by PT/OT and was deemed appropriate for rehab. He was discharged to rehab in stable condition on POD # 9, his hospital course is below.    CAD s/p CABG x 4, MAZE,  LAAL:  10/30: Mediastinal chest tube, pacing wires, central line, and Art line were removed. Transfer to telemetry orders were placed.  10/31: Pleural chest tubes, Prado catheter, and NG tube were removed.  Transfer to telemetry orders were placed.  11/2: Transferred to telemetry floor  Asa, Statin, BB    Paroxysmal A-fib:  Previously on sotalol and Eliquis  S/p MAZE/LAAL  Went into A-fib with RVR on 10/30  Converted to SR    Urinary retention:  Urology consulted on 11/2 (Dr. Pepe)  Recommended to maintain Prado catheter for 5 to 7 days (hopefully D/C 11/8 or 11/9 after voiding trial)  Continue Flomax    Hematuria (resolved):  Urology was reconsulted due to hematuria  Urology irrigated Prado catheter at bedside, likely due to catheter irritation      Discharge Follow Up Recommendations for outpatient labs/diagnostics:  Follow-up with [facility] Valve center in 1 week  Follow-up with Urology in 7-10 days at [place]  Follow-up with [facility] with EP as scheduled on 12/6  Follow-up with Cardiology in 1 month  Follow-up with CT Surgery in 6 weeks        DO NOT REMOVE SUTURES AND/OR STAPLES THIS WILL BE ADDRESSED AT CT SURGERY FOLLOW-UP  If you have any questions or concerns please reach out to our office at [phone]    Day of Discharge     HPI:   Joel Galo is a 59 y.o. male referred to Dr. Max for consideration of CABG per Cardiology, Dr. Neff.  PMH:  PAF on Eliquis (loop recorder in place), HTN, uncontrolled type 2 DM (HgA1C 12.6%), hyperlipidemia, and CAD.  Patient recently developed angina episodes with abnormal CTA coronaries.  His coronary calcium score was 686.  Subsequent cardiac cath demonstrated mid LAD 75% stenosis, distal LAD 95% stenosis, first marginal 90% stenosis, third marginal 99% stenosis, ostial RCA 70% stenosis, mid RCA 70% stenosis, and RV branch 99% stenosis.  Left ventriculogram during cardiac cath noted EF 55%.  However, last transthoracic echocardiogram was performed March 2023.  TTE March  2023 demonstrated EF 61 to 65% without significant valvular disease and mild to moderate concentric hypertrophy with sigmoid shaped ventricular septum.  Patient reports intermittent non-exertional chest pain but worsening CHRISTIANSEN over the past 6-12 months.  He denies history of chest wall radiation, upper extremity or wrist surgeries, lower extremity trauma, lower extremity vein stripping or other procedures.       Vital Signs:   Temp:  [97.5 °F (36.4 °C)-98.8 °F (37.1 °C)] 98.8 °F (37.1 °C)  Heart Rate:  [67-77] 74  Resp:  [18] 18  BP: (132-161)/(88-98) 145/97      Physical Exam:  General Appearance: alert, appears stated age and cooperative              Lungs: clear to auscultation, respirations regular, respirations even, and respirations unlabored              Heart: regular rhythm & normal rate, normal S1, S2, no murmur, no gallop, no rub, and no click              Skin: Incision c/d/I              Sternum: Stable               Ext: Trace edema bilaterally    Pertinent  and/or Most Recent Results     LAB RESULTS:          Lab 11/06/24  0534 11/05/24  0606 11/04/24  1725 11/03/24  1228 11/02/24  0721   WBC 9.65 11.01* 8.88 8.96 7.21   HEMOGLOBIN 11.0* 11.6* 9.7* 9.7* 10.2*   HEMATOCRIT 34.2* 34.3* 30.1* 31.6* 31.8*   PLATELETS 354 233 269 207 175   MCV 92.2 89.8 94.1 98.1* 94.4         Lab 11/06/24  0534 11/05/24  0606 11/04/24  1725 11/03/24  1228 11/02/24  0741 11/02/24  0352   SODIUM 135* 138 140 138  --  134*   POTASSIUM 4.0 4.5 4.1 4.5 3.5 3.8   CHLORIDE 97* 99 102 103  --  100   CO2 20.0* 17.0* 24.0 20.0*  --  18.0*   ANION GAP 18.0* 22.0* 14.0 15.0  --  16.0*   BUN 15 18 20 29*  --  40*   CREATININE 0.59* 0.60* 0.60* 0.56*  --  0.83   EGFR 110.4 109.8 109.8 112.1  --  99.6   GLUCOSE 123* 117* 131* 190*  --  112*   CALCIUM 9.0 8.9 9.1 8.1*  --  8.2*         Lab 11/01/24  0526   TOTAL PROTEIN 6.8   ALBUMIN 3.4*   GLOBULIN 3.4   ALT (SGPT) 44*   AST (SGOT) 107*   BILIRUBIN 0.4   ALK PHOS 126*             Lab  11/03/24  1228   CHOLESTEROL 102   LDL CHOL 36   HDL CHOL 18*   TRIGLYCERIDES 316*         Lab 10/31/24  1052   ABO TYPING B   RH TYPING Negative   ANTIBODY SCREEN Negative         Brief Urine Lab Results       None          Microbiology Results (last 10 days)       ** No results found for the last 240 hours. **            XR Chest 1 View    Result Date: 11/6/2024  XR CHEST 1 VW Date of Exam: 11/6/2024 2:17 AM EST Indication: postop Comparison: 11/5/2024 Findings: Postsurgical changes of prior sternotomy and CABG. Left atrial appendage clip noted. Stable mild elevation of the right hemidiaphragm. No pneumothorax. No pleural effusion. No significant consolidation. Loop recorder stable.     Impression: No acute process. Electronically Signed: Walter Torrez MD  11/6/2024 7:33 AM EST  Workstation ID: [id number]    XR Chest 1 View    Result Date: 11/5/2024  XR CHEST 1 VW Date of Exam: 11/5/2024 3:39 AM EST Indication: postop Comparison 11/4/2024. Findings: Loop recorder over the left chest is again seen. Heart and pulmonary vessels appear within normal limits. There are sternal suture wires. There is mild linear atelectasis of the upper lobes, similar. There are no new infiltrates or effusions.     Impression: No significant interval change. Electronically Signed: Tenisha David MD  11/5/2024 7:44 AM EST  Workstation ID: [id number]    XR Chest 1 View    Result Date: 11/4/2024  XR CHEST 1 VW Date of Exam: 11/4/2024 3:56 AM EST Indication: postop Comparison: Chest x-ray 11/3/2024 Findings: Stable cardiomegaly. The lungs are grossly clear. No pleural effusion or pneumothorax.     Impression: No acute cardiopulmonary findings. Electronically Signed: Enrique Vazquez MD  11/4/2024 7:57 AM EST  Workstation ID: [id number]    XR Chest 1 View    Result Date: 11/3/2024  XR CHEST 1 VW Date of Exam: 11/3/2024 12:54 AM EDT Indication: postop Comparison: Chest x-ray 11/2/2024 Findings: Redemonstration of prior CABG and loop recorder device.  Stable cardiomegaly. Similar interstitial prominence without focal consolidation. No definite pneumothorax. Similar haziness of the right costophrenic angle which could reflect small right pleural effusion.     Impression: No significant interval change. Electronically Signed: Enrique Vazquez MD  11/3/2024 10:34 AM EST  Workstation ID: [id number]    XR Chest 1 View    Result Date: 11/2/2024  XR CHEST 1 VW Date of Exam: 11/2/2024 2:36 AM EDT Indication: postop Comparison: CXR 10/31/2024 Findings: The heart is unchanged in size. The pulmonary vascularity does not appear congested. Small right apical pneumothorax seen on 10/31/2024 is not evident today. Subsegmental atelectasis at the lung bases is improved. Changes of coronary artery bypass are again appreciated. Atrial appendage closure device remains in place. Precordial loop recorder remains evident. NG tube seen previously is no longer evident. Moderate to marked degenerative changes are seen in the glenohumeral joints.     Impression: Subsegmental atelectasis at the lung bases is improved in comparison to 10/31/2024. The small right apical pneumothorax seen on 10/31/2024 is not evident today. Post coronary artery bypass. Electronically Signed: Marc Dimas MD  11/2/2024 9:23 AM EDT  Workstation ID: [id number]    XR Chest 1 View    Result Date: 10/31/2024  XR CHEST 1 VW Date of Exam: 10/31/2024 3:48 AM EDT Indication: Follow up for pneumothorax Comparison: 10/30/2024 Findings: Small right apical pneumothorax is stable. There is mild atelectasis of the lung bases. NG tube remains in place. There is stable borderline cardiomegaly. There are sternal suture wires. There is a left atrial appendage closure device.     Impression: No significant change. Electronically Signed: Tenisha David MD  10/31/2024 8:03 AM EDT  Workstation ID: [id number]    XR Chest 1 View    Result Date: 10/30/2024  XR CHEST 1 VW Date of Exam: 10/30/2024 2:51 PM EDT Indication: pneumothorax follow  up Comparison: Chest radiograph 10/30/2024 Findings: Pleural line visualized at the right apex suggesting pneumothorax measuring up to 1.3 cm less conspicuous from prior, previously 1.8 cm. Antegrade oriented gastric tube terminates over the mid stomach. Prior median sternotomy, CABG and left atrial appendage closure device. Right IJ central catheter has been removed. Cardiomediastinal silhouette stable. Lung volumes are low with streaky perihilar and bibasilar opacities favoring atelectasis. Blunted costophrenic angle difficult to exclude trace effusions. No significant effusion or left pneumothorax. No worsening edema. Degenerative related osseous changes. Loop recorder noted.     Impression: 1. Small less conspicuous right apical pneumothorax. 2. Removed right IJ central catheter. Antegrade oriented gastric tube terminates over mid stomach. 3. Radiographically stable hypoventilation changes and presumed atelectasis. Electronically Signed: Preston Quinn MD  10/30/2024 3:28 PM EDT  Workstation ID: [id number]    XR Chest 1 View    Result Date: 10/30/2024  XR CHEST 1 VW Date of Exam: 10/30/2024 2:03 AM EDT Indication: Post-Op Heart Surgery Comparison: Chest radiograph 10/29/2024. Findings: Removal of nasogastric tube. Mediastinal and bilateral pleural drains in unchanged position. Right IJ approach catheter in unchanged position. Sternotomy, CABG, left atrial appendage clip. Cardiomediastinal silhouette is unchanged. Low lung volumes. Scattered subsegmental atelectasis, similar. No sizable pleural effusion. New small right pneumothorax. No distinct left pneumothorax.     Impression: New small right pneumothorax. Removal of nasogastric tube with unchanged position of remaining lines/tubes, including right pleural drain. Low lung volumes with scattered subsegmental atelectasis, similar to yesterday's exam. Electronically Signed: Tal Pimentel MD  10/30/2024 7:33 AM EDT  Workstation ID: [id number]    XR Chest 1  View    Result Date: 10/29/2024  XR CHEST 1 VW Date of Exam: 10/29/2024 4:34 AM EDT Indication: Post-Op Heart Surgery Comparison 10/28/2024. Findings: The ET tube has been removed. Other support lines appear stable in position. Heart and pulmonary vessels appear within normal limits for technique. There is mild left basilar atelectasis. There is implantable cardiac device. There are sternal suture wires and left atrial appendage clip.     Impression: Interval extubation. Mild left basilar atelectasis. Electronically Signed: Tenisha David MD  10/29/2024 8:13 AM EDT  Workstation ID: [id number]    XR Chest 1 View    Result Date: 10/28/2024  XR CHEST 1 VW Date of Exam: 10/28/2024 1:38 PM EDT Indication: Post-Op Check Line & Tube Placement Comparison: Chest radiograph 10/25/2024. Findings: Endotracheal tube tip overlies the midthoracic trachea around 2 cm above the thomas. Right IJ approach central venous catheter tip overlies the superior vena cava. Nasogastric tube tip overlies the stomach. Mediastinal and pleural drains in place. Sternotomy, CABG, left atrial appendage clip. Implantable cardiac device. Mildly low lung volumes. Scattered subsegmental atelectasis. No sizable pleural effusion. No distinct pneumothorax. No acute osseous abnormality.     Impression: Postsurgical chest with lines/tubes in expected position, as above. Electronically Signed: Tal Pimentel MD  10/28/2024 2:09 PM EDT  Workstation ID: [id number]    Central Line    Result Date: 10/28/2024  George Morrow MD     10/28/2024  8:39 AM Central Line Patient reassessed immediately prior to procedure Patient location during procedure: OR Indications: vascular access Preanesthetic Checklist Completed: patient identified, IV checked, site marked, risks and benefits discussed, surgical consent, monitors and equipment checked, pre-op evaluation and timeout performed Central Line Prep Sterile Tech:cap, gloves, gown, mask and sterile barriers Prep: chloraprep  Patient monitoring: blood pressure monitoring, continuous pulse oximetry and EKG Central Line Procedure Laterality:right Location:internal jugular Catheter Type:Cordis Catheter Size:9 Fr Guidance:ultrasound guided PROCEDURE NOTE/ULTRASOUND INTERPRETATION.  Using ultrasound guidance the potential vascular sites for insertion of the catheter were visualized to determine the patency of the vessel to be used for vascular access.  After selecting the appropriate site for insertion, the needle was visualized under ultrasound being inserted into the internal jugular vein, followed by ultrasound confirmation of wire and catheter placement. There were no abnormalities seen on ultrasound; an image was taken; and the patient tolerated the procedure with no complications. Assessment Post procedure:biopatch applied, line sutured, occlusive dressing applied and secured with tape Assessement:blood return through all ports, free fluid flow, chest x-ray ordered and Antoni Test Complications:no Patient Tolerance:patient tolerated the procedure well with no apparent complications     Arterial Line    Result Date: 10/28/2024  George Morrow MD     10/28/2024  8:39 AM Arterial Line Patient reassessed immediately prior to procedure Patient location during procedure: pre-op Line placed for hemodynamic monitoring. Preanesthetic Checklist Completed: patient identified, IV checked, site marked, risks and benefits discussed, surgical consent, monitors and equipment checked, pre-op evaluation and timeout performed Arterial Line Prep  Sterile Tech: cap, gloves and sterile barriers Prep: ChloraPrep Patient monitoring: blood pressure monitoring, continuous pulse oximetry and EKG Arterial Line Procedure Laterality:right Location:  radial artery Catheter size: 20 G Guidance: ultrasound guided Number of attempts: 1 Successful placement: yes Post Assessment Dressing Type: line sutured, occlusive dressing applied, secured with tape and wrist guard  applied. Complications no Circ/Move/Sens Assessment: normal and unchanged. Patient Tolerance: patient tolerated the procedure well with no apparent complications              Results for orders placed in visit on 10/28/24    Intra-Op Anesthesia DARIAN    Narrative  Intra-Op Anesthesia DARIAN    Procedure Performed: Intra-Op Anesthesia DARIAN  Start Time:  End Time:    Preanesthesia Checklist:  Patient identified, IV assessed, risks and benefits discussed, monitors and equipment assessed, procedure being performed at surgeon's request and anesthesia consent obtained.    General Procedure Information  Diagnostic Indications for Echo:  assessment of ascending aorta, assessment of surgical repair and hemodynamic monitoring  Physician Requesting Echo: Alex Max MD  Location performed:  OR  Intubated  Bite block not placed  Heart visualized  Probe Insertion:  Easy  Probe Type:  Multiplane  Modalities:  Color flow mapping, continuous wave Doppler and pulse wave Doppler    Echocardiographic and Doppler Measurements    Ventricles    Right Ventricle:  Thrombus not present.  Global function normal.  Left Ventricle:  Cavity size normal.  Diastolic dimension 1.6 cm.  Thrombus not present.  Global Function normal.  Ejection Fraction 55%.        Valves    Aortic Valve:  Annulus normal.  Stenosis not present.  Area: 2.2 cm².  Mean Gradient: 2 mmHg.  Regurgitation absent.  Leaflets normal.  Leaflet motions normal.    Mitral Valve:  Annulus normal.  Stenosis not present.  Area: 2.4 cm².  Mean Gradient: 1 mmHg.  Regurgitation trace.  Leaflets normal.  Leaflet motions normal.    Tricuspid Valve:  Annulus normal.  Stenosis not present.  Leaflets normal.  Leaflet motions normal.  Pulmonic Valve:  Annulus normal.      Aorta    Ascending Aorta:  Size normal.  Diameter 3 cm.  Dissection not present.  Plaque thickness less than 3 mm.  Mobile plaque not present.  Aortic Arch:  Size normal.  Dissection not present.  Plaque thickness less than 3  mm.  Mobile plaque not present.  Descending Aorta:  Size normal.  Dissection not present.  Plaque thickness less than 3 mm.  Mobile plaque not present.      Atria    Right Atrium:  Size dilated.  Spontaneous echo contrast not present.  Thrombus not present.  Tumor not present.  Device present.    Left Atrium:  Size dilated.  Spontaneous echo contrast not present.  Thrombus not present.  Tumor not present.  Left atrial appendage normal.      Septa        Ventricular Septum:  Intra-ventricular septum morphology normal.    Diastolic Function Measurements:  Diastolic Dysfunction Grade=  E=  ms  A=  ms  E/A Ratio=  1.7  DT=  ms  S/D=  IVRT=    Other Findings  Pericardium:  normal  Pleural Effusion:  none  Pulmonary Arteries:  normal  Pulmonary Venous Flow:  normal    Anesthesia Information  Performed Personally  Anesthesiologist:  George Morrow MD      Echocardiogram Comments:  Informed consent was obtained preoperatively.  Yahir probe passed without difficulty.  Post CABG, MAZE and TORSTEN ligation:   EF unchanged, no NWMA, obliterartion of TORSTEN by clip          Discharge Details        Discharge Medications        New Medications        Instructions Start Date   hydroCHLOROthiazide 25 MG tablet   25 mg, Oral, Daily   Start Date: November 7, 2024     insulin glargine 100 UNIT/ML injection  Commonly known as: LANTUS, SEMGLEE   20 Units, Subcutaneous, Daily   Start Date: November 7, 2024     Insulin Lispro 100 UNIT/ML injection  Commonly known as: humaLOG   2-9 Units, Subcutaneous, 4 Times Daily Before Meals & Nightly      Insulin Lispro 100 UNIT/ML injection  Commonly known as: humaLOG   2 Units, Subcutaneous, 3 Times Daily With Meals      sacubitril-valsartan 49-51 MG tablet  Commonly known as: ENTRESTO   1 tablet, Oral, Every 12 Hours Scheduled             Changes to Medications        Instructions Start Date   amLODIPine 10 MG tablet  Commonly known as: NORVASC  What changed:   medication strength  how much to take  when  to take this   10 mg, Oral, Every 24 Hours Scheduled      aspirin 325 MG EC tablet  What changed:   medication strength  how much to take   325 mg, Oral, Daily   Start Date: November 7, 2024     sotalol 120 MG tablet  Commonly known as: BETAPACE  What changed:   medication strength  See the new instructions.   120 mg, Oral, 2 Times Daily             Continue These Medications        Instructions Start Date   buPROPion  MG 24 hr tablet  Commonly known as: WELLBUTRIN XL   150 mg, Daily      docusate sodium 100 MG capsule  Commonly known as: COLACE   100 mg, 2 Times Daily      DULoxetine 60 MG capsule  Commonly known as: CYMBALTA   60 mg, Daily      empagliflozin 25 MG tablet tablet  Commonly known as: JARDIANCE   25 mg, Daily      finasteride 5 MG tablet  Commonly known as: PROSCAR   TAKE 1 TABLET EVERY DAY      gabapentin 300 MG capsule  Commonly known as: NEURONTIN   300 mg, Oral, 2 Times Daily, TAKES 200MG IN AM AND 300MG IN PM USUALLY      HYDROcodone-acetaminophen  MG per tablet  Commonly known as: NORCO   1 tablet, Every 8 Hours PRN      levothyroxine 50 MCG tablet  Commonly known as: SYNTHROID, LEVOTHROID   50 mcg, Every Early Morning      nitroglycerin 0.4 MG SL tablet  Commonly known as: NITROSTAT   1 under the tongue as needed for angina, may repeat q5mins for up three doses      polyethylene glycol 17 g packet  Commonly known as: MIRALAX   17 g, Daily      rosuvastatin 20 MG tablet  Commonly known as: CRESTOR   20 mg, Oral, Daily      tamsulosin 0.4 MG capsule 24 hr capsule  Commonly known as: FLOMAX   TAKE 1 CAPSULE EVERY DAY      Vitamin D (Cholecalciferol) 10 MCG (400 UNIT) tablet  Commonly known as: CHOLECALCIFEROL   400 Units, Daily             Stop These Medications      apixaban 5 MG tablet tablet  Commonly known as: ELIQUIS     glimepiride 2 MG tablet  Commonly known as: AMARYL     HumuLIN 70/30 KwikPen (70-30) 100 UNIT/ML suspension pen-injector  Generic drug: Insulin NPH Isophane &  "Regular     isosorbide mononitrate 30 MG 24 hr tablet  Commonly known as: IMDUR     losartan 25 MG tablet  Commonly known as: COZAAR              No Known Allergies      Discharge Disposition:  Rehab Facility or Unit (DC - External)    Diet:  Hospital:  Diet Order   Procedures    Diet: Regular/House; Texture: Mechanical Ground (NDD 2); Fluid Consistency: Thin (IDDSI 0)       Activity:  Activity Instructions       Activity as Tolerated      Bathing Restrictions      You may shower    Type of Restriction: Bathing    Bathing Restrictions: No Tub Bath    Driving Restrictions      Type of Restriction: Driving    Driving Restrictions: No Driving While Taking Narcotics    Lifting Restrictions      Type of Restriction: Lifting    Lifting Restrictions: Lifting Restriction (Indicate Limit)    Weight Limit (Pounds): 10    Length of Lifting Restriction: until seen at next follow-up appointment            Restrictions or Other Recommendations:  No driving until seen by your cardiac surgeon at your follow up appointment. Do not drive while taking narcotics. Get up and get dressed each morning; do not stay in bed. Walking is the best form of exercise because it increases circulation throughout the body and to the heart muscle. Get plenty of sleep at night (8-10 hours). It is important for your breast bone (sternum) to heal properly after surgery.   Please follow these guidelines:  -No lifting, pulling, or pushing greater than 10 lbs for 12 weeks.   -Do not push or pull with your arms, especially when rising from a chair   -Have someone help you get up or roll to your side, and push off with your elbow to get out of bed.  -Avoid activities that cause you to strain or pull on your chest, such as driving a  or tractor, raking, vacuuming, moving heavy items, shoveling, opening tight jars or swinging a golf club.  -If you have \"clicking\" in your sternum, avoid activities that cause clicking until the sternum heals.  -Canes or " walkers may be used only for balance. Do not place your full weight on any of these devices until the chest is completely healed (usually 12 weeks).       CODE STATUS:    Code Status and Medical Interventions: CPR (Attempt to Resuscitate); Full Support   Ordered at: 10/28/24 1337     Code Status (Patient has no pulse and is not breathing):    CPR (Attempt to Resuscitate)     Medical Interventions (Patient has pulse or is breathing):    Full Support       Future Appointments   Date Time Provider Department Center   12/3/2024  9:45 AM Blossom Parks APRN [facility]   12/6/2024  1:45 PM Musa Fishman,  [facility]   6/6/2025  1:45 PM Musa Fishman,  [facility]       Additional Instructions for the Follow-ups that You Need to Schedule       Call MD With Problems / Concerns   As directed      Instructions:   Please call the [facility] Surgery office with any questions or concerns you may have [phone]    Order Comments: Instructions: Please call the [place] Surgery office with any questions or concerns you may have [phone]         Discharge Follow-up with PCP   As directed       Currently Documented PCP:    Edmundo Boston MD    PCP Phone Number:    [phone]     Follow Up Details: Follow Up With PCP Within 7-14 Days        Discharge Follow-up with Specialty: Cardiology; 1 Month   As directed      Specialty: Cardiology   Follow Up: 1 Month        Discharge Follow-up with Specialty: Cardiothoracic Surgery   As directed      Follow Up Details: Follow Up in 6 weeks   Specialty: Cardiothoracic Surgery        Discharge Follow-up with Specialty: [facility]; 1 Week   As directed      Specialty: [facility]   Follow Up: 1 Week   Follow Up Details: Follow Up With [facility] Center Within 7 Days of Discharge. If Discharged on a Weekend, Schedule Follow Up For The Following Friday at 0900.        Discharge Follow-up with Specialty: Urology; 1 Week   As directed       Specialty: Urology   Follow Up: 1 Week   Follow Up Details: Follow-up with urology in 1 week        Cardiac Rehab Evaluation and Enrollment   Nov 11, 2024      Reason for Consult: Education                Discharge Education:  Post-Op Education:  Patient was advised on responsible use of opioids in the post-surgical setting.  Patient was advised these medications are highly addictive.  Patient advised on proper disposal of unused opioid medication and was urged to discard any unused opioid medication. I reviewed the patient's sternal precautions and outlined the physical activity suitable for each benchmark at the 6-week 3-month and 1 year intervals.  Wound Care:  Patient educated regarding care of post-operative wounds.  Patient is to wash with plain soap and water and pat dry.  Patient is not to use salves on the incision sites.  Patient instructed regarding the signs and symptoms of infection and when to call the office with any concerns.    Special Instructions:   1.  Keep incisions clean and dry at all times. Take a shower daily. Do not take a tub bath or use hot tubs until seen by the cardiac surgeon in your follow-up visit. Clean  your body and incisions daily with Dial soap and water. Always use a clean washcloth. Do not scrub your incision(s). Do not re-use dressings on your incision(s). Do not use any lotions, creams, oils, powders, antibiotic ointment (i.e., Neosporin), peroxide, alcohol, or iodine UNLESS told to do by the cardiac surgeon.  Patient may shower, but no tub baths until CT Surgery followup.   2.  No lifting over 10 lbs until CT Surgery follow up.  3.  No driving until released to do so by the surgeon.      Surgical Leg Precautions:   -Avoid sitting in one position or standing for long periods of time.  -Do not cross your legs.  -Keep your legs elevated while sitting  -Do not use any lotions, creams, oils, powders, antibiotic ointment (i.e. Neosporin), peroxide, alcohol, or iodine unless  specifically prescribed by the cardiac surgeon.  -If elastic stockings (DAVID hose) were prescribed to you, wear the stockings while you are up for at least two weeks after discharge. The stockings help decrease swelling. However, remove stockings at bedtime. Wash the stockings with mild soap and water, and make sure they are completely dry before you put them back on.    When to Call the Cardiac Surgeon:  -Increased swelling, redness, tenderness, and drainage  -Increased warmth in the skin around an incision  -Large amount of clear or pinkish drainage  -Sudden increased amount of drainage  -White, yellow, or greenish drainage  -Odor, which may be foul or sweet, coming from an incision  -An opening in your incisions  -Temperature higher than 101 degrees Fahrenheit   -Temperature higher than 100 degrees Fahrenheit two times within 24 hours  -Do not hesitate to call the cardiac surgery nurse navigator or cardiac surgeon if you have concerns or questions.     *The [facility] cardiac surgery nurse navigator is available Monday-Friday 8 a.m. to 4:30 p.m. [phone]  Call 911:  -Chest pain (pain similar to what you experienced prior to surgery)  -Fainting spells  -Bright red stool  -Vomiting bright red blood  -Shortness of breath not relieved by rest  -Sudden numbness or weakness in arms or legs  -Sudden, severe headache  -Heart rate faster than 150 beats/minute with shortness of breath or a new irregular heart rate    ZIA Cole  11/06/24  10:04 EST    Time: I spent 47 minutes on this discharge activity which included: face-to-face encounter with the patient, reviewing the data in the system, coordination of the care with the nursing staff as well as consultants, documentation, and entering orders.      Electronically signed by Tatiana Waller APRN at 11/06/24 1005       Discharge Order (From admission, onward)       Start     Ordered    11/06/24 0956  Discharge patient  Once        Expected  Discharge Date: 11/06/24   Discharge Disposition: Rehab Facility or Unit (DC - External)   Physician of Record for Attribution - Please select from Treatment Team: DEU CALZADA [[id number]]   Review needed by CMO to determine Physician of Record: No      Question Answer Comment   Physician of Record for Attribution - Please select from Treatment Team EDU CALZADA    Review needed by CMO to determine Physician of Record No        11/06/24 1000

## 2024-11-13 ENCOUNTER — TELEPHONE (OUTPATIENT)
Dept: CARDIAC SURGERY | Facility: CLINIC | Age: 59
End: 2024-11-13
Payer: MEDICARE

## 2024-11-13 NOTE — TELEPHONE ENCOUNTER
Caller: adelia jernigan    Relationship: Other    Best call back number: [phone]    What is the best time to reach you: ANY    Who are you requesting to speak with (clinical staff, provider,  specific staff member): CLINICAL    Do you know the name of the person who called: ADELIA AT [facility]    What was the call regarding: PT INCISION IS LOOKING INFECTED. APRN AT REHAB PLACED PT ON ANTIBIOTICS CIFCIEL 500MG BID FOR 10 DAYS    Is it okay if the provider responds through MyChart: NO

## 2024-11-13 NOTE — TELEPHONE ENCOUNTER
I spoke with Tricia, nurse at [facility] in [place] regarding incision. She states that scab has came off of  midline sternal incision and now area has yellow tinge exudate.When patient arrived at nursing facility- chest tube site was swollen and inflamed. This has improved but area is still irritated. Patient was prescribed Cefprozil 500mg bid   I spoke with ZIA Allred-  clean incisions with betadine bid, leave open to air, send photo of incision.    I spoke with Tricia- there is no way of sending photo of incisions. She said she will contact our office if there is any change

## 2024-11-20 ENCOUNTER — OFFICE VISIT (OUTPATIENT)
Dept: CARDIOLOGY | Facility: HOSPITAL | Age: 59
End: 2024-11-20
Payer: MEDICARE

## 2024-11-20 ENCOUNTER — OFFICE VISIT (OUTPATIENT)
Dept: CARDIAC SURGERY | Facility: CLINIC | Age: 59
End: 2024-11-20
Payer: MEDICARE

## 2024-11-20 VITALS
SYSTOLIC BLOOD PRESSURE: 98 MMHG | WEIGHT: 216 LBS | OXYGEN SATURATION: 93 % | HEIGHT: 68 IN | HEART RATE: 73 BPM | BODY MASS INDEX: 32.74 KG/M2 | DIASTOLIC BLOOD PRESSURE: 64 MMHG

## 2024-11-20 VITALS
HEIGHT: 68 IN | SYSTOLIC BLOOD PRESSURE: 88 MMHG | WEIGHT: 216 LBS | BODY MASS INDEX: 32.74 KG/M2 | DIASTOLIC BLOOD PRESSURE: 56 MMHG | TEMPERATURE: 97.3 F | HEART RATE: 74 BPM | OXYGEN SATURATION: 94 %

## 2024-11-20 DIAGNOSIS — I48.0 PAROXYSMAL ATRIAL FIBRILLATION: ICD-10-CM

## 2024-11-20 DIAGNOSIS — I25.10 CORONARY ARTERY DISEASE INVOLVING NATIVE CORONARY ARTERY OF NATIVE HEART WITHOUT ANGINA PECTORIS: Primary | ICD-10-CM

## 2024-11-20 DIAGNOSIS — E78.5 HYPERLIPIDEMIA LDL GOAL <70: ICD-10-CM

## 2024-11-20 DIAGNOSIS — I10 PRIMARY HYPERTENSION: ICD-10-CM

## 2024-11-20 PROCEDURE — 99024 POSTOP FOLLOW-UP VISIT: CPT | Performed by: PHYSICIAN ASSISTANT

## 2024-11-20 RX ORDER — FUROSEMIDE 20 MG/1
20 TABLET ORAL DAILY
COMMUNITY

## 2024-11-20 RX ORDER — AMLODIPINE BESYLATE 5 MG/1
5 TABLET ORAL
Start: 2024-11-20

## 2024-11-20 RX ORDER — CEFPROZIL 250 MG/1
500 TABLET, FILM COATED ORAL 2 TIMES DAILY
COMMUNITY

## 2024-11-20 RX ORDER — LACTULOSE 10 G/15ML
30 SOLUTION ORAL 2 TIMES DAILY PRN
COMMUNITY

## 2024-11-20 NOTE — PATIENT INSTRUCTIONS
Due to hypotension, STOP hydrochlorothiazide, and DECREASE amlodipine from 10mg daily to 5mg daily.

## 2024-11-20 NOTE — PROGRESS NOTES
[facility] Cardiothoracic Surgery Office Follow Up Note     Date of Encounter: 2024     Name: Joel Galo  : 1965     Referred By: No ref. provider found  PCP: Edmundo Boston MD    Chief Complaint:    Chief Complaint   Patient presents with    Coronary Artery Disease     Hospital follow up s/p CABG,MAZE and LAAL 10/28/24.       Subjective      History of Present Illness:    Joel Galo is a 59 y.o. male with PMH of PAF on Eliquis (loop recorder in place), HTN, uncontrolled type 2 DM (HgA1C 12.6%), hyperlipidemia, and CAD. He underwent CABG x 4 with EVH of the right greater saphenous vein, MAZE, and LAAL (# 35 mm AtriCure clip) with Dr. Max on 10/28/24.  He was discharged to rehab and was seen by the Heart [facility] Valve clinic today with some concern of sternal incision infection.     He states the incision just became red yesterday. He denies drainage. He says the nurses at rehab have been using iodine to clean the wound daily. He was started on some antibiotics.   He still has his chest tube sutures.  He denies chest pain, shortness of breath, fever/chills, N/V/D, cough, lower extremity swelling.       Review of Systems:  Review of Systems   Constitutional: Negative. Negative for chills, decreased appetite, diaphoresis, fever, malaise/fatigue, night sweats and weight loss.   HENT:  Negative for congestion, hoarse voice and sore throat.    Cardiovascular:  Positive for dyspnea on exertion. Negative for chest pain, irregular heartbeat, leg swelling, near-syncope, orthopnea, palpitations, paroxysmal nocturnal dyspnea and syncope.   Respiratory:  Positive for cough. Negative for hemoptysis, shortness of breath, sleep disturbances due to breathing, snoring, sputum production and wheezing.    Hematologic/Lymphatic: Negative for adenopathy and bleeding problem. Bruises/bleeds easily.   Skin:  Positive for poor wound healing. Negative for color change, dry skin, itching and rash.    Musculoskeletal:  Positive for back pain, falls and joint pain. Negative for muscle weakness.   Gastrointestinal:  Positive for nausea. Negative for abdominal pain, anorexia, constipation, diarrhea and vomiting.   Genitourinary: Negative.  Negative for dysuria and frequency.   Neurological:  Positive for dizziness, headaches, light-headedness and loss of balance. Negative for difficulty with concentration, numbness, paresthesias, seizures and weakness.   Psychiatric/Behavioral:  Positive for depression. Negative for altered mental status and memory loss. The patient is nervous/anxious. The patient does not have insomnia.    Allergic/Immunologic: Negative.  Negative for persistent infections.       I have reviewed the following portions of the patient's history: problem list, current medications, allergies, past surgical history, past medical history, past social history, past family history, and ROS and confirm it's accurate.    Allergies:  No Known Allergies    Medications:      Current Outpatient Medications:     amLODIPine (NORVASC) 5 MG tablet, Take 1 tablet by mouth Daily., Disp: , Rfl:     aspirin 325 MG EC tablet, Take 1 tablet by mouth Daily., Disp: 90 tablet, Rfl: 0    buPROPion XL (WELLBUTRIN XL) 150 MG 24 hr tablet, Take 1 tablet by mouth Daily., Disp: , Rfl:     cefprozil (CEFZIL) 250 MG tablet, Take 2 tablets by mouth 2 (Two) Times a Day., Disp: , Rfl:     docusate sodium (COLACE) 100 MG capsule, Take 1 capsule by mouth 2 (Two) Times a Day., Disp: , Rfl:     DULoxetine (CYMBALTA) 60 MG capsule, Take 1 capsule by mouth Daily., Disp: , Rfl:     empagliflozin (JARDIANCE) 25 MG tablet tablet, Take 1 tablet by mouth Daily., Disp: , Rfl:     finasteride (PROSCAR) 5 MG tablet, TAKE 1 TABLET EVERY DAY, Disp: 90 tablet, Rfl: 0    furosemide (LASIX) 20 MG tablet, Take 1 tablet by mouth Daily., Disp: , Rfl:     gabapentin (NEURONTIN) 300 MG capsule, Take 1 capsule by mouth 2 (Two) Times a Day. TAKES 200MG IN AM  AND 300MG IN PM USUALLY (Patient taking differently: Take 200 mg by mouth 2 (Two) Times a Day. TAKES 200MG IN AM AND 300MG IN PM USUALLY), Disp: , Rfl:     HYDROcodone-acetaminophen (NORCO)  MG per tablet, Take 1 tablet by mouth Every 8 (Eight) Hours As Needed for Moderate Pain., Disp: , Rfl:     insulin glargine (LANTUS, SEMGLEE) 100 UNIT/ML injection, Inject 20 Units under the skin into the appropriate area as directed Daily., Disp: , Rfl:     Insulin Lispro (humaLOG) 100 UNIT/ML injection, Inject 2-9 Units under the skin into the appropriate area as directed 4 (Four) Times a Day Before Meals & at Bedtime., Disp: , Rfl:     Insulin Lispro (humaLOG) 100 UNIT/ML injection, Inject 2 Units under the skin into the appropriate area as directed 3 (Three) Times a Day With Meals., Disp: , Rfl:     lactulose (CHRONULAC) 10 GM/15ML solution, Take 45 mL by mouth 2 (Two) Times a Day As Needed., Disp: , Rfl:     levothyroxine (SYNTHROID, LEVOTHROID) 50 MCG tablet, Take 1 tablet by mouth Every Morning., Disp: , Rfl:     nitroglycerin (NITROSTAT) 0.4 MG SL tablet, 1 under the tongue as needed for angina, may repeat q5mins for up three doses, Disp: 30 tablet, Rfl: 3    polyethylene glycol (MIRALAX) 17 g packet, Take 17 g by mouth Daily., Disp: , Rfl:     rosuvastatin (CRESTOR) 20 MG tablet, Take 1 tablet by mouth Daily., Disp: 90 tablet, Rfl: 1    sacubitril-valsartan (ENTRESTO) 49-51 MG tablet, Take 1 tablet by mouth Every 12 (Twelve) Hours., Disp: 180 tablet, Rfl: 0    sotalol (BETAPACE) 120 MG tablet, Take 1 tablet by mouth 2 (Two) Times a Day., Disp: 90 tablet, Rfl: 1    tamsulosin (FLOMAX) 0.4 MG capsule 24 hr capsule, TAKE 1 CAPSULE EVERY DAY, Disp: 90 capsule, Rfl: 3    Vitamin D, Cholecalciferol, (CHOLECALCIFEROL) 10 MCG (400 UNIT) tablet, Take 1 tablet by mouth Daily., Disp: , Rfl:   No current facility-administered medications for this visit.    Facility-Administered Medications Ordered in Other Visits:      "Chlorhexidine Gluconate Cloth 2 % pads 1 Application, 1 Application, Topical, Q12H PRN, Tatiana Waller APRN    History:   Past Medical History:   Diagnosis Date    A-fib     Anxiety     Arthritis     DDD (degenerative disc disease), lumbar     Deep vein thrombosis     Depression     Diabetes     Disease of thyroid gland     HYPO    Heart attack     \"5-6 years ago\"    Hyperlipidemia     Hypertension     Kidney stone     Neuropathy     Sleep apnea     DOES NOT USE CPAP    UTI (urinary tract infection)        Past Surgical History:   Procedure Laterality Date    ATRIAL APPENDAGE EXCLUSION LEFT WITH TRANSESOPHAGEAL ECHOCARDIOGRAM N/A 10/28/2024    Procedure: ATRIAL APPENDAGE EXCLUSION LEFT WITH TRANSESOPHAGEAL ECHOCARDIOGRAM;  Surgeon: Alex Max MD;  Location:  [facility];  Service: Cardiothoracic;  Laterality: N/A;    CARDIAC CATHETERIZATION N/A 08/28/2024    Procedure: Left Heart Cath;  Surgeon: Julio Neff MD;  [facility]  [facility];  Service: Cardiology;  Laterality: N/A;    CARDIAC ELECTROPHYSIOLOGY PROCEDURE N/A 04/05/2023    Procedure: Loop insertion;  Surgeon: Tariq Chávez MD;  Location:  [facility];  Service: Cardiovascular;  Laterality: N/A;    CATARACT EXTRACTION Bilateral     COLONOSCOPY      CORONARY ARTERY BYPASS GRAFT N/A 10/28/2024    Procedure: MEDIAN STERNOTOMY, CORONARY ARTERY BYPASS GRAFTING X4 UTILIZING THE LEFT INTERNAL MAMMARY ARTERY GRAFT, ENDOSCOPIC VEIN HARVEST OF THE GREATER RIGHT SAPHENOUS VEIN;  Surgeon: Alex Max MD;  Location:  [facility];  Service: Cardiothoracic;  Laterality: N/A;    ENDOSCOPY      MAZE PROCEDURE N/A 10/28/2024    Procedure: MAZE PROCEDURE;  Surgeon: Alex Max MD;  Location:  [facility];  Service: Cardiothoracic;  Laterality: N/A;    TEETH EXTRACTION         Social History     Socioeconomic History    Marital status:     Number of children: 1   Tobacco Use    Smoking status: Never     Passive exposure: Never " "   Smokeless tobacco: Never   Vaping Use    Vaping status: Never Used   Substance and Sexual Activity    Alcohol use: No    Drug use: No    Sexual activity: Not Currently        Family History   Problem Relation Age of Onset    Heart disease Mother     Coronary artery disease Father     Diabetes Father     Kidney disease Father        Objective     Physical Exam:  Vitals:    11/20/24 1131 11/20/24 1132   BP: (!) 84/58 (!) 88/56   BP Location: Left arm Right arm   Patient Position: Sitting Sitting   Pulse: 74    Temp: 97.3 °F (36.3 °C)    SpO2: 94%    Weight: 98 kg (216 lb)    Height: 172.7 cm (68\")  Comment: patient reports       Body mass index is 32.84 kg/m².    Physical Exam  Vitals reviewed.   Constitutional:       Appearance: Normal appearance.   Cardiovascular:      Rate and Rhythm: Normal rate and regular rhythm.      Pulses: Normal pulses.      Heart sounds: Normal heart sounds.   Abdominal:      General: Abdomen is flat.      Palpations: Abdomen is soft.   Musculoskeletal:      Comments: EVH site is healing. Below the incision, within the EVH tunnel, there is firmness which is consistent with EVH hematoma   Skin:     Capillary Refill: Capillary refill takes less than 2 seconds.   Neurological:      Mental Status: He is alert and oriented to person, place, and time.   Psychiatric:         Mood and Affect: Mood normal.         Behavior: Behavior normal.         Imaging/Labs:  No new imaging to review       Assessment / Plan      Assessment / Plan:  There are no diagnoses linked to this encounter.   CAD s/p CABG x 4, MAZE, LAAL on 10/28/24 with Dr. Max  Concern for sternotomy incision infection at rehab facility, patient was started on Cefprozil 250mg daily.   He was seen at the [facility] Valve clinic today and they asked if we could take a look at his sternotomy incision  The incision is erythematous, but I could not express any drainage. I have a low suspicion for infection, but it is fine to continue " antibiotics as prescribed. I also provided the patient with iodine swabs to be used once daily  Of note, it does not appear that this incision has been cleaned well since surgery. It is ok for the patient to have a shower and let Dial soap and water run over the incision. No tub baths or hot tubs.   The leg EVH incision is satisfactory. There is some firmness in the tunnel which is to be expected following EVH. This will resolve over time.     Follow Up:   12/10 for regularly scheduled appointment  Or sooner for any further concerns or worsening sign and symptoms. If unable to reach us in the office please dial 911 or go to the nearest emergency department.    Faby Olvera PA-C   [facility] Cardiothoracic Surgery      Time Spent: I spent 30 minutes caring for Joel on this date of service. This time includes time spent by me in the following activities: preparing for the visit, reviewing tests, obtaining and/or reviewing a separately obtained history, performing a medically appropriate examination and/or evaluation, counseling and educating the patient/family/caregiver, ordering medications, tests, or procedures, referring and communicating with other health care professionals, documenting information in the medical record, independently interpreting results and communicating that information with the patient/family/caregiver, and care coordination.

## 2024-11-20 NOTE — PROGRESS NOTES
Chief Complaint  Post-op Open Heart Surgery    Subjective      History of Present Illness {CC  Problem List  Visit  Diagnosis   Encounters  Notes  Medications  Labs  Result Review Imaging  Media :23}     Joel Galo, 59 y.o. male with past medical history significant for paroxysmal atrial fibrillation on Eliquis with loop recorder in place, hypertension, poorly controlled type 2 diabetes, hyperlipidemia, and coronary artery disease.  Presents to [facility] for Post-op Open Heart Surgery  Patient presented to [facility] on 10/28/2024 with a chief complaint of coronary artery disease.  Recently, patient had been experiencing anginal episodes.  He underwent coronary CTA which was found to be abnormal.  This calcium score was 686.  Subsequent cardiac catheterization revealed multivessel CAD.  On 10/28/2024, patient underwent CABG x 4 with EVH of right greater saphenous, maze, and LAAL with atricure clip per Dr. Max.  Patient was ultimately discharged on 11/6/2024.    Since time of discharge from our facility, patient has been a resident of [place] [facility] facility.  He states that his pain has been well-controlled.  He does endorse some mild dyspnea which has been unchanged recently.  Patient states he has had approximately 3 falls since time of discharge.  He is unsure if these are related to syncope.  He denies palpitations or additional episodes of atrial fibrillation.  He also denies any lower extremity edema.  Patient states he believes he has been having low blood pressure while at nursing facility in addition to low blood pressure reading noted today.    Patient states that since time of surgery, he has had decreased appetite.  He is drinking approximately 3 to 4 cups of water per day.  Patient is not being weighed regularly at current facility.          Objective     Vital Signs:   Vitals:    11/20/24 1044 11/20/24 1119   BP: (!) 87/52 98/64   BP  "Location: Left arm Left arm   Patient Position: Sitting    Pulse: 73    SpO2: 93%    Weight: 98 kg (216 lb)    Height: 172.7 cm (68\")      Body mass index is 32.84 kg/m².  Physical Exam  Vitals and nursing note reviewed.   Constitutional:       Appearance: Normal appearance.   HENT:      Head: Normocephalic.   Eyes:      Pupils: Pupils are equal, round, and reactive to light.   Cardiovascular:      Rate and Rhythm: Normal rate and regular rhythm.      Pulses: Normal pulses.      Heart sounds: Normal heart sounds. No murmur heard.  Pulmonary:      Effort: Pulmonary effort is normal.      Breath sounds: Normal breath sounds.   Abdominal:      General: Bowel sounds are normal.      Palpations: Abdomen is soft.   Musculoskeletal:         General: Normal range of motion.      Cervical back: Normal range of motion.      Right lower leg: No edema.      Left lower leg: No edema.   Skin:     General: Skin is warm and dry.      Capillary Refill: Capillary refill takes less than 2 seconds.      Coloration: Skin is pale.      Comments: Midsternal incision with edges well-approximated.  The incision is noted to be more discolored/red towards bottom of incision.  MT sites also with redness noted.  Right leg EVH site appears to have been painted with Betadine.  Edges are well-approximated.   Neurological:      Mental Status: He is alert and oriented to person, place, and time.   Psychiatric:         Mood and Affect: Mood normal.         Thought Content: Thought content normal.                Data Reviewed:{ Labs  Result Review  Imaging  Med Tab  Media :23}     Lab Results   Component Value Date    WBC 9.65 11/06/2024    HGB 11.0 (L) 11/06/2024    HCT 34.2 (L) 11/06/2024    MCV 92.2 11/06/2024     11/06/2024      Lab Results   Component Value Date    GLUCOSE 123 (H) 11/06/2024    BUN 15 11/06/2024    CREATININE 0.59 (L) 11/06/2024     (L) 11/06/2024    K 4.0 11/06/2024    CL 97 (L) 11/06/2024    CALCIUM 9.0 " 11/06/2024    PROTEINTOT 6.8 11/01/2024    ALBUMIN 3.4 (L) 11/01/2024    ALT 44 (H) 11/01/2024     (H) 11/01/2024    ALKPHOS 126 (H) 11/01/2024    BILITOT 0.4 11/01/2024    GLOB 3.4 11/01/2024    AGRATIO 1.0 11/01/2024    BCR 25.4 (H) 11/06/2024    ANIONGAP 18.0 (H) 11/06/2024    EGFR 110.4 11/06/2024      Lab Results   Component Value Date    CHOL 102 11/03/2024    TRIG 316 (H) 11/03/2024    HDL 18 (L) 11/03/2024    LDL 36 11/03/2024    XR Chest 1 View (11/06/2024 02:27)   Adult Transthoracic Echo Complete w/ Color, Spectral and Contrast if necessary per protocol (03/10/2023 13:45)  ECG 12 Lead Rhythm Change (10/31/2024 09:06)  ECG 12 Lead Rhythm Change (10/30/2024 12:00)  Adult Transthoracic Echo Complete W/ Cont if Necessary Per Protocol (10/17/2024 12:11)  Cardiac Catheterization/Vascular Study (08/28/2024 11:06)  CT Angiogram Coronary (08/10/2024 10:30)    Assessment & Plan   Assessment and Plan {CC Problem List  Visit Diagnosis  ROS  Review (Popup)  Health Maintenance  Quality  BestPractice  Medications  SmartSets  SnapShot Encounters  Media :23}     1. Coronary artery disease involving native coronary artery of native heart without angina pectoris  -On 10/28/2024, patient underwent CABG x 4 with EVH of right greater saphenous, maze, and LAAL with atricure clip per Dr. Max.  -Postoperatively, patient was discharged to [facility]  -Per documentation, patient was initiated on Lasix in addition to his hydrochlorothiazide on 11/18.  -Current medication regimen including rosuvastatin 20 mg daily, and aspirin 325.  Recommend to continue  -Patient to be evaluated today by CT surgery for further assessment of incision.  -Patient to continue to monitor for signs and symptoms of infection including fever, or worsening redness of incision  -Of note, per documentation there was mention of concern for infection on 11/13 of patient's incision according to telephone note.   APRN at rehab facility placed patient on antibiotics for total of 10 days which is still ongoing.  CT surgery requested a picture of incision to be sent which was not completed.  Recommendations per CT surgery were to clean incision with Betadine twice daily, and leave open to air.  -Currently on cefprozil 500 mg twice daily for concern for wound infection.  Further recommendations to be made per CT surgery.    2. Paroxysmal atrial fibrillation  -Patient status post maze, and LAAL on 10/28 per Dr. Max  -Patient denies any additional episodes of atrial fibrillation since time of discharge  -Recommend to continue current regimen of sotalol at current dose    3. Primary hypertension  -Patient noted to be significantly hypotensive during today's evaluation.  He states that he has been having hypotension at his facility as well.  At this time, we will discontinue hydrochlorothiazide.  Will also decrease dose of amlodipine from 10 mg to 5 mg daily  -Patient encouraged to improve hydration  -He is also encouraged to make position changes slowly  -Suspect that recent episodes of falls at nursing facility could be secondary to orthostatic hypotension  -For now, continue Lasix 20 mg daily, and Entresto 49-51 mg twice daily in addition to decreased dose of amlodipine, and current dose of sotalol.  -Patient currently with orders for repeat BMP to be completed at rehab facility    4. Hyperlipidemia LDL goal <70  -Recommend to continue current dose of rosuvastatin      Patient to keep all upcoming appointments as scheduled including cardiology, EP, and CT surgery.  He may reach out to heart and valve as needed, or if symptoms change or worsen.    Follow Up {Instructions Charge Capture  Follow-up Communications :23}     Return if symptoms worsen or fail to improve.      Patient was given instructions and counseling regarding his condition or for health maintenance advice. Please see specific information pulled into the AVS  if appropriate.  Patient was instructed to call the [facility] with any questions, concerns, or worsening symptoms.    Dictated Utilizing Dragon Dictation   Please note that portions of this note were completed with a voice recognition program.   Part of this note may be an electronic transcription/translation of spoken language to printed text using the Dragon Dictation System.

## 2024-12-02 ENCOUNTER — TELEPHONE (OUTPATIENT)
Dept: CARDIOLOGY | Facility: CLINIC | Age: 59
End: 2024-12-02
Payer: MEDICARE

## 2024-12-02 NOTE — TELEPHONE ENCOUNTER
Caller: Joel Galo    Relationship: Self    Best call back number: [phone]     What is the best time to reach you: ANYTIME    Who are you requesting to speak with (clinical staff, provider,  specific staff member): PROVIDER    Do you know the name of the person who called: NA    What was the call regarding: PT WILL NEED ASSISTANCE GETTING INSIDE TOMORROW FOR HIS APPT. LET PT KNOW TO CALL AND LET US KNOW WHAT CAR HE IS IN SO A NURSE CAN COME OUT AND ASSIST WITH A WHEELCHAIR. HE VERBALIZED UNDERSTANDING AND HAD NO FURTHER QUESTIONS.

## 2024-12-03 ENCOUNTER — OFFICE VISIT (OUTPATIENT)
Dept: CARDIOLOGY | Facility: CLINIC | Age: 59
End: 2024-12-03
Payer: MEDICARE

## 2024-12-03 VITALS
BODY MASS INDEX: 32.74 KG/M2 | HEART RATE: 80 BPM | DIASTOLIC BLOOD PRESSURE: 70 MMHG | SYSTOLIC BLOOD PRESSURE: 102 MMHG | WEIGHT: 216 LBS | HEIGHT: 68 IN

## 2024-12-03 DIAGNOSIS — I48.0 PAROXYSMAL ATRIAL FIBRILLATION: ICD-10-CM

## 2024-12-03 DIAGNOSIS — E78.5 HYPERLIPIDEMIA LDL GOAL <70: ICD-10-CM

## 2024-12-03 DIAGNOSIS — I25.10 CORONARY ARTERY DISEASE INVOLVING NATIVE CORONARY ARTERY OF NATIVE HEART WITHOUT ANGINA PECTORIS: Primary | ICD-10-CM

## 2024-12-03 DIAGNOSIS — I10 PRIMARY HYPERTENSION: ICD-10-CM

## 2024-12-03 PROCEDURE — 1159F MED LIST DOCD IN RCRD: CPT | Performed by: NURSE PRACTITIONER

## 2024-12-03 PROCEDURE — 3078F DIAST BP <80 MM HG: CPT | Performed by: NURSE PRACTITIONER

## 2024-12-03 PROCEDURE — 3074F SYST BP LT 130 MM HG: CPT | Performed by: NURSE PRACTITIONER

## 2024-12-03 PROCEDURE — 1160F RVW MEDS BY RX/DR IN RCRD: CPT | Performed by: NURSE PRACTITIONER

## 2024-12-03 PROCEDURE — 99214 OFFICE O/P EST MOD 30 MIN: CPT | Performed by: NURSE PRACTITIONER

## 2024-12-03 RX ORDER — SACUBITRIL AND VALSARTAN 24; 26 MG/1; MG/1
1 TABLET, FILM COATED ORAL 2 TIMES DAILY
Qty: 60 TABLET | Refills: 3 | Status: SHIPPED | OUTPATIENT
Start: 2024-12-03 | End: 2024-12-06 | Stop reason: HOSPADM

## 2024-12-03 NOTE — PROGRESS NOTES
[facility]             Blossom ZIA Parks Shawn Shadell, MD  Joel Galo  1965 12/03/2024    Patient Active Problem List   Diagnosis    Paroxysmal atrial fibrillation    Obstructive sleep apnea    Hypothyroidism (acquired)    GERD (gastroesophageal reflux disease)    Chronic anticoagulation    Type 2 diabetes mellitus with hyperglycemia, without long-term current use of insulin    Hyperlipidemia LDL goal <70    Primary hypertension    CAD s/p CABG x 4, MAZE, LAAL 10/28/24       Dear Edmundo Boston MD:    Subjective     Chief Complaint   Patient presents with    Follow-up       HPI:     This is a 59 y.o. male with known past medical history of syncope, autonomic orthostatic hypotension, paroxysmal atrial fibrillation, hyperlipidemia, diabetes mellitus type 2 and coronary artery disease status post four-vessel CABG with EVH of the right greater saphenous, maze and TORSTEN L with atrial cure clip by Dr. Max in November 2024.    Joel Galo presents today for routine cardiology follow up.  Patient was admitted in October 2024 at [facility] for which she underwent scheduled four-vessel CABG with EVH of the right greater saphenous vein, maze and left atrial appendage ligation with atrial clip by Dr. Max.  Had a brief episode of atrial fibrillation post procedure with conversion to normal sinus rhythm.  He was eventually discharged to physical rehab.  New Medications on discharge include hydrochlorothiazide 25 mg daily along with Entresto.  He did follow-up with their office on November 13th with concern for incision infection for which patient was placed on antibiotics by the rehab facility.  He then followed up on 11/20/2024 with CT surgery did not feel infection was present therefore they recommended to complete antibiotics and continue with Dial soap and water.  He followed up with cardiology on 11/20/2024 and was noted to be  hypotensive with reported frequent falls at rehab facility.  His hydrochlorothiazide was discontinued and they decrease his amlodipine from 10 mg to 5 mg daily with encouraged hydration.    Patient states he continues to be weak and is currently using a walker to ambulate and is currently in a wheelchair today.  States he was in a rehab facility but left due to feeling like this was not helping him.  States that physical therapy is set to come out to his house next week to work with him.  Continues to have chronic dizziness and orthostatic hypotension resulting in falls.  Blood pressure is mildly improved in the office today compared to his last visit with cardiology.  Biggest complaint today is weakness and dizziness.  Caregiver states he has not been drinking adequately.    Diagnostic Testing  Echocardiogram 9/2020: EF 55% moderate aortic valve sclerosis  Cardiac event monitor 8/2021: Predominantly normal sinus rhythm with no arrhythmias noted  Nuclear stress test 9/2021: Small size mild to moderate degree of ischemia in the lateral wall  Carotid ultrasound 3/2023: No acute findings  Echocardiogram 3/2023: EF 61 to 65%  Loop recorder insertion with Quantivo device 4/2023     All other systems were reviewed and were negative.    Patient Active Problem List   Diagnosis    Paroxysmal atrial fibrillation    Obstructive sleep apnea    Hypothyroidism (acquired)    GERD (gastroesophageal reflux disease)    Chronic anticoagulation    Type 2 diabetes mellitus with hyperglycemia, without long-term current use of insulin    Hyperlipidemia LDL goal <70    Primary hypertension    CAD s/p CABG x 4, MAZE, LAAL 10/28/24       family history includes Coronary artery disease in his father; Diabetes in his father; Heart disease in his mother; Kidney disease in his father.     reports that he has never smoked. He has never been exposed to tobacco smoke. He has never used smokeless tobacco. He reports that he does not drink alcohol and  does not use drugs.    No Known Allergies      Current Outpatient Medications:     aspirin 325 MG EC tablet, Take 1 tablet by mouth Daily., Disp: 90 tablet, Rfl: 0    buPROPion XL (WELLBUTRIN XL) 150 MG 24 hr tablet, Take 1 tablet by mouth Daily., Disp: , Rfl:     docusate sodium (COLACE) 100 MG capsule, Take 1 capsule by mouth 2 (Two) Times a Day., Disp: , Rfl:     DULoxetine (CYMBALTA) 60 MG capsule, Take 1 capsule by mouth Daily., Disp: , Rfl:     empagliflozin (JARDIANCE) 25 MG tablet tablet, Take 1 tablet by mouth Daily., Disp: , Rfl:     finasteride (PROSCAR) 5 MG tablet, TAKE 1 TABLET EVERY DAY, Disp: 90 tablet, Rfl: 0    gabapentin (NEURONTIN) 300 MG capsule, Take 1 capsule by mouth 2 (Two) Times a Day. TAKES 200MG IN AM AND 300MG IN PM USUALLY, Disp: , Rfl:     HYDROcodone-acetaminophen (NORCO)  MG per tablet, Take 1 tablet by mouth Every 8 (Eight) Hours As Needed for Moderate Pain., Disp: , Rfl:     insulin glargine (LANTUS, SEMGLEE) 100 UNIT/ML injection, Inject 20 Units under the skin into the appropriate area as directed Daily., Disp: , Rfl:     Insulin Lispro (humaLOG) 100 UNIT/ML injection, Inject 2-9 Units under the skin into the appropriate area as directed 4 (Four) Times a Day Before Meals & at Bedtime., Disp: , Rfl:     Insulin Lispro (humaLOG) 100 UNIT/ML injection, Inject 2 Units under the skin into the appropriate area as directed 3 (Three) Times a Day With Meals., Disp: , Rfl:     lactulose (CHRONULAC) 10 GM/15ML solution, Take 45 mL by mouth 2 (Two) Times a Day As Needed., Disp: , Rfl:     levothyroxine (SYNTHROID, LEVOTHROID) 50 MCG tablet, Take 1 tablet by mouth Every Morning., Disp: , Rfl:     rosuvastatin (CRESTOR) 20 MG tablet, Take 1 tablet by mouth Daily., Disp: 90 tablet, Rfl: 1    sotalol (BETAPACE) 120 MG tablet, Take 1 tablet by mouth 2 (Two) Times a Day., Disp: 90 tablet, Rfl: 1    tamsulosin (FLOMAX) 0.4 MG capsule 24 hr capsule, TAKE 1 CAPSULE EVERY DAY, Disp: 90 capsule,  Rfl: 3    Vitamin D, Cholecalciferol, (CHOLECALCIFEROL) 10 MCG (400 UNIT) tablet, Take 1 tablet by mouth Daily., Disp: , Rfl:     cefprozil (CEFZIL) 250 MG tablet, Take 2 tablets by mouth 2 (Two) Times a Day. (Patient not taking: Reported on 12/3/2024), Disp: , Rfl:     furosemide (LASIX) 20 MG tablet, Take 1 tablet by mouth Daily. (Patient not taking: Reported on 12/3/2024), Disp: , Rfl:     sacubitril-valsartan (Entresto) 24-26 MG tablet, Take 1 tablet by mouth 2 (Two) Times a Day., Disp: 60 tablet, Rfl: 3  No current facility-administered medications for this visit.    Facility-Administered Medications Ordered in Other Visits:     Chlorhexidine Gluconate Cloth 2 % pads 1 Application, 1 Application, Topical, Q12H PRN, Sridhar, Tatiana, APRN      Physical Exam:  I have reviewed the patient's current vital signs as documented in the patient's EMR.   Vitals:    12/03/24 1019   BP: 102/70   Pulse: 80     Body mass index is 32.85 kg/m².       12/03/24  1019   Weight: 98 kg (216 lb)      Physical Exam  Constitutional:       General: He is not in acute distress.     Appearance: Normal appearance. He is well-developed.   HENT:      Head: Normocephalic and atraumatic.      Mouth/Throat:      Mouth: Mucous membranes are moist.   Eyes:      Extraocular Movements: Extraocular movements intact.      Pupils: Pupils are equal, round, and reactive to light.   Neck:      Vascular: No JVD.   Cardiovascular:      Rate and Rhythm: Normal rate and regular rhythm.      Heart sounds: Normal heart sounds. No murmur heard.     No S3 or S4 sounds.   Pulmonary:      Effort: Pulmonary effort is normal. No respiratory distress.      Breath sounds: Normal breath sounds. No wheezing.   Abdominal:      General: Bowel sounds are normal. There is no distension.      Palpations: Abdomen is soft. There is no hepatomegaly.      Tenderness: There is no abdominal tenderness.   Musculoskeletal:         General: Normal range of motion.      Cervical back:  Normal range of motion and neck supple.   Skin:     General: Skin is warm and dry.      Coloration: Skin is not jaundiced or pale.   Neurological:      General: No focal deficit present.      Mental Status: He is alert and oriented to person, place, and time. Mental status is at baseline.   Psychiatric:         Mood and Affect: Mood normal.         Behavior: Behavior normal.         Thought Content: Thought content normal.         Judgment: Judgment normal.            DATA REVIEWED:     TTE/DARIAN:  Results for orders placed in visit on 10/28/24    Intra-Op Anesthesia DARIAN    Narrative  Intra-Op Anesthesia DARIAN    Procedure Performed: Intra-Op Anesthesia DARIAN  Start Time:  End Time:    Preanesthesia Checklist:  Patient identified, IV assessed, risks and benefits discussed, monitors and equipment assessed, procedure being performed at surgeon's request and anesthesia consent obtained.    General Procedure Information  Diagnostic Indications for Echo:  assessment of ascending aorta, assessment of surgical repair and hemodynamic monitoring  Physician Requesting Echo: Alex Max MD  Location performed:  OR  Intubated  Bite block not placed  Heart visualized  Probe Insertion:  Easy  Probe Type:  Multiplane  Modalities:  Color flow mapping, continuous wave Doppler and pulse wave Doppler    Echocardiographic and Doppler Measurements    Ventricles    Right Ventricle:  Thrombus not present.  Global function normal.  Left Ventricle:  Cavity size normal.  Diastolic dimension 1.6 cm.  Thrombus not present.  Global Function normal.  Ejection Fraction 55%.        Valves    Aortic Valve:  Annulus normal.  Stenosis not present.  Area: 2.2 cm².  Mean Gradient: 2 mmHg.  Regurgitation absent.  Leaflets normal.  Leaflet motions normal.    Mitral Valve:  Annulus normal.  Stenosis not present.  Area: 2.4 cm².  Mean Gradient: 1 mmHg.  Regurgitation trace.  Leaflets normal.  Leaflet motions normal.    Tricuspid Valve:  Annulus normal.   Stenosis not present.  Leaflets normal.  Leaflet motions normal.  Pulmonic Valve:  Annulus normal.      Aorta    Ascending Aorta:  Size normal.  Diameter 3 cm.  Dissection not present.  Plaque thickness less than 3 mm.  Mobile plaque not present.  Aortic Arch:  Size normal.  Dissection not present.  Plaque thickness less than 3 mm.  Mobile plaque not present.  Descending Aorta:  Size normal.  Dissection not present.  Plaque thickness less than 3 mm.  Mobile plaque not present.      Atria    Right Atrium:  Size dilated.  Spontaneous echo contrast not present.  Thrombus not present.  Tumor not present.  Device present.    Left Atrium:  Size dilated.  Spontaneous echo contrast not present.  Thrombus not present.  Tumor not present.  Left atrial appendage normal.      Septa        Ventricular Septum:  Intra-ventricular septum morphology normal.    Diastolic Function Measurements:  Diastolic Dysfunction Grade=  E=  ms  A=  ms  E/A Ratio=  1.7  DT=  ms  S/D=  IVRT=    Other Findings  Pericardium:  normal  Pleural Effusion:  none  Pulmonary Arteries:  normal  Pulmonary Venous Flow:  normal    Anesthesia Information  Performed Personally  Anesthesiologist:  George Morrow MD      Echocardiogram Comments:  Informed consent was obtained preoperatively.  Yahir probe passed without difficulty.  Post CABG, MAZE and TORSTEN ligation:   EF unchanged, no NWMA, obliterartion of TORSTEN by clip      Laboratory evaluations:    Lab Results   Component Value Date    GLUCOSE 123 (H) 11/06/2024    BUN 15 11/06/2024    CREATININE 0.59 (L) 11/06/2024    EGFRIFNONA 86 12/15/2021    EGFRIFAFRI 99 12/15/2021    BCR 25.4 (H) 11/06/2024    K 4.0 11/06/2024    CO2 20.0 (L) 11/06/2024    CALCIUM 9.0 11/06/2024    ALBUMIN 3.4 (L) 11/01/2024     (H) 11/01/2024    ALT 44 (H) 11/01/2024     Lab Results   Component Value Date    WBC 9.65 11/06/2024    HGB 11.0 (L) 11/06/2024    HCT 34.2 (L) 11/06/2024    MCV 92.2 11/06/2024     11/06/2024  "    Lab Results   Component Value Date    CHOL 102 11/03/2024    TRIG 316 (H) 11/03/2024    HDL 18 (L) 11/03/2024    LDL 36 11/03/2024     No results found for: \"TSH\", \"J9BVSTQ\", \"E5OUBRK\", \"THYROIDAB\"  Lab Results   Component Value Date    HGBA1C 11.20 (H) 10/25/2024     Lab Results   Component Value Date    ALT 44 (H) 11/01/2024     Lab Results   Component Value Date    HGBA1C 11.20 (H) 10/25/2024    HGBA1C 12.6 (A) 08/27/2024     Lab Results   Component Value Date    CREATININE 0.59 (L) 11/06/2024     No results found for: \"IRON\", \"TIBC\", \"FERRITIN\"  Lab Results   Component Value Date    INR 1.29 (H) 10/29/2024    INR 1.43 (H) 10/28/2024    INR 1.06 10/25/2024    PROTIME 16.2 (H) 10/29/2024    PROTIME 17.6 (H) 10/28/2024    PROTIME 13.9 10/25/2024      No components found for: \"ABSOLUTELUNG\"    --------------------------------------------------------------------------------------------------------------------------    ASSESSMENT/PLAN:      Diagnosis Plan   1. Coronary artery disease involving native coronary artery of native heart without angina pectoris        2. Hyperlipidemia LDL goal <70        3. Paroxysmal atrial fibrillation        4. Primary hypertension            ASCVD status post CABG  Underwent four-vessel CABG with EVH of the right greater saphenous, maze and left atrial appendage ligation with AtriCure clip in October 2024.  Recently followed up with CT surgery and cardiology.  Patient with longstanding history of orthostatic hypotension and autonomic dysfunction.  This has worsened since his open heart surgery.  Has been having intermittent falls secondary to this.  Hydrochlorothiazide was discontinued and amlodipine decreased to 5 mg daily.  Will decrease Entresto to 24-26mg and stop amlodipine to allow for high blood pressure which may help with his dizziness.  Advised adequate water intake and physical rehab    Paroxysmal atrial fibrillation  Patient is status post maze and left atrial appendage " ligation on 10/28/2024 by Dr. Max.  Currently maintaining normal sinus rhythm.  Continue with sotalol and follow-up with the EP in 3 days.  Eliquis was discontinued at discharge    3.  Hyperlipidemia  Recently panel showed LDL at goal.  Continue statin.        This document has been @Electronically signed by ZIA Melendez, 12/03/24, 8:58 AM EST.       Dictated Utilizing Dragon Dictation: Part of this note may be an electronic transcription/translation of spoken language to printed text using the Dragon Dictation System.    Follow-up appointment and medication changes provided in hand delivered After Visit Summary as well as reviewed in the room.

## 2024-12-04 ENCOUNTER — APPOINTMENT (OUTPATIENT)
Dept: GENERAL RADIOLOGY | Facility: HOSPITAL | Age: 59
DRG: 312 | End: 2024-12-04
Payer: MEDICARE

## 2024-12-04 ENCOUNTER — HOSPITAL ENCOUNTER (INPATIENT)
Facility: HOSPITAL | Age: 59
LOS: 1 days | Discharge: ANOTHER HOSPITAL | DRG: 312 | End: 2024-12-06
Attending: EMERGENCY MEDICINE | Admitting: HOSPITALIST
Payer: MEDICARE

## 2024-12-04 DIAGNOSIS — I95.1 ORTHOSTATIC HYPOTENSION: ICD-10-CM

## 2024-12-04 DIAGNOSIS — R55 SYNCOPE AND COLLAPSE: Primary | ICD-10-CM

## 2024-12-04 LAB
ALBUMIN SERPL-MCNC: 3.3 G/DL (ref 3.5–5.2)
ALBUMIN/GLOB SERPL: 0.7 G/DL
ALP SERPL-CCNC: 149 U/L (ref 39–117)
ALT SERPL W P-5'-P-CCNC: 22 U/L (ref 1–41)
ANION GAP SERPL CALCULATED.3IONS-SCNC: 13.9 MMOL/L (ref 5–15)
AST SERPL-CCNC: 22 U/L (ref 1–40)
BASOPHILS # BLD AUTO: 0.04 10*3/MM3 (ref 0–0.2)
BASOPHILS NFR BLD AUTO: 0.5 % (ref 0–1.5)
BILIRUB SERPL-MCNC: 0.2 MG/DL (ref 0–1.2)
BUN SERPL-MCNC: 25 MG/DL (ref 8–23)
BUN/CREAT SERPL: 21.4 (ref 7–25)
CALCIUM SPEC-SCNC: 9.2 MG/DL (ref 8.6–10.5)
CHLORIDE SERPL-SCNC: 96 MMOL/L (ref 98–107)
CO2 SERPL-SCNC: 27.1 MMOL/L (ref 22–29)
CREAT SERPL-MCNC: 1.17 MG/DL (ref 0.76–1.27)
DEPRECATED RDW RBC AUTO: 44.8 FL (ref 37–54)
EGFRCR SERPLBLD CKD-EPI 2021: 70.9 ML/MIN/1.73
EOSINOPHIL # BLD AUTO: 0.24 10*3/MM3 (ref 0–0.4)
EOSINOPHIL NFR BLD AUTO: 2.9 % (ref 0.3–6.2)
ERYTHROCYTE [DISTWIDTH] IN BLOOD BY AUTOMATED COUNT: 13.4 % (ref 12.3–15.4)
GEN 5 1HR TROPONIN T REFLEX: 25 NG/L
GLOBULIN UR ELPH-MCNC: 4.6 GM/DL
GLUCOSE BLDC GLUCOMTR-MCNC: 284 MG/DL (ref 70–130)
GLUCOSE SERPL-MCNC: 251 MG/DL (ref 65–99)
HCT VFR BLD AUTO: 36.6 % (ref 37.5–51)
HGB BLD-MCNC: 11.6 G/DL (ref 13–17.7)
HOLD SPECIMEN: NORMAL
HOLD SPECIMEN: NORMAL
IMM GRANULOCYTES # BLD AUTO: 0.04 10*3/MM3 (ref 0–0.05)
IMM GRANULOCYTES NFR BLD AUTO: 0.5 % (ref 0–0.5)
LIPASE SERPL-CCNC: 30 U/L (ref 13–60)
LYMPHOCYTES # BLD AUTO: 1.53 10*3/MM3 (ref 0.7–3.1)
LYMPHOCYTES NFR BLD AUTO: 18.6 % (ref 19.6–45.3)
MAGNESIUM SERPL-MCNC: 2.6 MG/DL (ref 1.6–2.4)
MCH RBC QN AUTO: 28.6 PG (ref 26.6–33)
MCHC RBC AUTO-ENTMCNC: 31.7 G/DL (ref 31.5–35.7)
MCV RBC AUTO: 90.4 FL (ref 79–97)
MONOCYTES # BLD AUTO: 0.58 10*3/MM3 (ref 0.1–0.9)
MONOCYTES NFR BLD AUTO: 7.1 % (ref 5–12)
NEUTROPHILS NFR BLD AUTO: 5.78 10*3/MM3 (ref 1.7–7)
NEUTROPHILS NFR BLD AUTO: 70.4 % (ref 42.7–76)
NRBC BLD AUTO-RTO: 0 /100 WBC (ref 0–0.2)
PLATELET # BLD AUTO: 393 10*3/MM3 (ref 140–450)
PMV BLD AUTO: 8.9 FL (ref 6–12)
POTASSIUM SERPL-SCNC: 4.2 MMOL/L (ref 3.5–5.2)
PROT SERPL-MCNC: 7.9 G/DL (ref 6–8.5)
QT INTERVAL: 420 MS
QTC INTERVAL: 493 MS
RBC # BLD AUTO: 4.05 10*6/MM3 (ref 4.14–5.8)
SODIUM SERPL-SCNC: 137 MMOL/L (ref 136–145)
TROPONIN T DELTA: -3 NG/L
TROPONIN T SERPL HS-MCNC: 28 NG/L
TROPONIN T SERPL HS-MCNC: 34 NG/L
WBC NRBC COR # BLD AUTO: 8.21 10*3/MM3 (ref 3.4–10.8)
WHOLE BLOOD HOLD COAG: NORMAL
WHOLE BLOOD HOLD SPECIMEN: NORMAL

## 2024-12-04 PROCEDURE — 85025 COMPLETE CBC W/AUTO DIFF WBC: CPT

## 2024-12-04 PROCEDURE — 93005 ELECTROCARDIOGRAM TRACING: CPT | Performed by: EMERGENCY MEDICINE

## 2024-12-04 PROCEDURE — 36415 COLL VENOUS BLD VENIPUNCTURE: CPT

## 2024-12-04 PROCEDURE — 83735 ASSAY OF MAGNESIUM: CPT | Performed by: EMERGENCY MEDICINE

## 2024-12-04 PROCEDURE — 93005 ELECTROCARDIOGRAM TRACING: CPT

## 2024-12-04 PROCEDURE — 82948 REAGENT STRIP/BLOOD GLUCOSE: CPT

## 2024-12-04 PROCEDURE — 83690 ASSAY OF LIPASE: CPT | Performed by: EMERGENCY MEDICINE

## 2024-12-04 PROCEDURE — 71045 X-RAY EXAM CHEST 1 VIEW: CPT

## 2024-12-04 PROCEDURE — 71045 X-RAY EXAM CHEST 1 VIEW: CPT | Performed by: RADIOLOGY

## 2024-12-04 PROCEDURE — 84484 ASSAY OF TROPONIN QUANT: CPT | Performed by: EMERGENCY MEDICINE

## 2024-12-04 PROCEDURE — 25810000003 SODIUM CHLORIDE 0.9 % SOLUTION: Performed by: EMERGENCY MEDICINE

## 2024-12-04 PROCEDURE — 80053 COMPREHEN METABOLIC PANEL: CPT | Performed by: EMERGENCY MEDICINE

## 2024-12-04 PROCEDURE — 99285 EMERGENCY DEPT VISIT HI MDM: CPT

## 2024-12-04 PROCEDURE — 93010 ELECTROCARDIOGRAM REPORT: CPT | Performed by: INTERNAL MEDICINE

## 2024-12-04 RX ORDER — GLIMEPIRIDE 2 MG/1
4 TABLET ORAL 2 TIMES DAILY
COMMUNITY

## 2024-12-04 RX ORDER — AMLODIPINE BESYLATE 5 MG/1
5 TABLET ORAL DAILY
COMMUNITY
End: 2024-12-05

## 2024-12-04 RX ORDER — LOSARTAN POTASSIUM 25 MG/1
25 TABLET ORAL DAILY
COMMUNITY
End: 2024-12-05

## 2024-12-04 RX ORDER — BUPROPION HYDROCHLORIDE 300 MG/1
300 TABLET ORAL DAILY
COMMUNITY

## 2024-12-04 RX ORDER — GABAPENTIN 100 MG/1
200 CAPSULE ORAL
COMMUNITY

## 2024-12-04 RX ORDER — SODIUM CHLORIDE 0.9 % (FLUSH) 0.9 %
10 SYRINGE (ML) INJECTION AS NEEDED
Status: DISCONTINUED | OUTPATIENT
Start: 2024-12-04 | End: 2024-12-06 | Stop reason: HOSPADM

## 2024-12-04 RX ORDER — GABAPENTIN 100 MG/1
300 CAPSULE ORAL NIGHTLY
COMMUNITY

## 2024-12-04 RX ORDER — NITROGLYCERIN 0.4 MG/1
0.4 TABLET SUBLINGUAL
COMMUNITY

## 2024-12-04 RX ADMIN — SODIUM CHLORIDE 1000 ML: 9 INJECTION, SOLUTION INTRAVENOUS at 20:09

## 2024-12-05 ENCOUNTER — TELEPHONE (OUTPATIENT)
Dept: CARDIOLOGY | Facility: CLINIC | Age: 59
End: 2024-12-05
Payer: MEDICARE

## 2024-12-05 ENCOUNTER — APPOINTMENT (OUTPATIENT)
Dept: CARDIOLOGY | Facility: HOSPITAL | Age: 59
DRG: 312 | End: 2024-12-05
Payer: MEDICARE

## 2024-12-05 ENCOUNTER — TELEPHONE (OUTPATIENT)
Dept: CARDIAC SURGERY | Facility: CLINIC | Age: 59
End: 2024-12-05
Payer: MEDICARE

## 2024-12-05 PROBLEM — I95.1 ORTHOSTATIC HYPOTENSION: Status: ACTIVE | Noted: 2024-12-05

## 2024-12-05 LAB
ALBUMIN SERPL-MCNC: 2.9 G/DL (ref 3.5–5.2)
ALBUMIN/GLOB SERPL: 0.7 G/DL
ALP SERPL-CCNC: 122 U/L (ref 39–117)
ALT SERPL W P-5'-P-CCNC: 18 U/L (ref 1–41)
ANION GAP SERPL CALCULATED.3IONS-SCNC: 12.7 MMOL/L (ref 5–15)
AST SERPL-CCNC: 20 U/L (ref 1–40)
BASOPHILS # BLD AUTO: 0.04 10*3/MM3 (ref 0–0.2)
BASOPHILS NFR BLD AUTO: 0.5 % (ref 0–1.5)
BH CV ECHO MEAS - EDV(CUBED): 64 ML
BH CV ECHO MEAS - EDV(MOD-SP4): 68 ML
BH CV ECHO MEAS - EF(MOD-SP4): 61.6 %
BH CV ECHO MEAS - ESV(CUBED): 13.8 ML
BH CV ECHO MEAS - ESV(MOD-SP4): 26.1 ML
BH CV ECHO MEAS - FS: 40 %
BH CV ECHO MEAS - IVS/LVPW: 1.1 CM
BH CV ECHO MEAS - IVSD: 1.1 CM
BH CV ECHO MEAS - LV DIASTOLIC VOL/BSA (35-75): 32.3 CM2
BH CV ECHO MEAS - LV MASS(C)D: 136.2 GRAMS
BH CV ECHO MEAS - LV SYSTOLIC VOL/BSA (12-30): 12.4 CM2
BH CV ECHO MEAS - LVIDD: 4 CM
BH CV ECHO MEAS - LVIDS: 2.4 CM
BH CV ECHO MEAS - LVPWD: 1 CM
BH CV ECHO MEAS - SV(MOD-SP4): 41.9 ML
BH CV ECHO MEAS - SVI(MOD-SP4): 19.9 ML/M2
BILIRUB SERPL-MCNC: 0.3 MG/DL (ref 0–1.2)
BUN SERPL-MCNC: 26 MG/DL (ref 8–23)
BUN/CREAT SERPL: 29.9 (ref 7–25)
CALCIUM SPEC-SCNC: 8.8 MG/DL (ref 8.6–10.5)
CHLORIDE SERPL-SCNC: 101 MMOL/L (ref 98–107)
CO2 SERPL-SCNC: 23.3 MMOL/L (ref 22–29)
CREAT SERPL-MCNC: 0.87 MG/DL (ref 0.76–1.27)
DEPRECATED RDW RBC AUTO: 46.2 FL (ref 37–54)
EGFRCR SERPLBLD CKD-EPI 2021: 98.2 ML/MIN/1.73
EOSINOPHIL # BLD AUTO: 0.22 10*3/MM3 (ref 0–0.4)
EOSINOPHIL NFR BLD AUTO: 2.9 % (ref 0.3–6.2)
ERYTHROCYTE [DISTWIDTH] IN BLOOD BY AUTOMATED COUNT: 13.4 % (ref 12.3–15.4)
GLOBULIN UR ELPH-MCNC: 4.4 GM/DL
GLUCOSE BLDC GLUCOMTR-MCNC: 113 MG/DL (ref 70–130)
GLUCOSE BLDC GLUCOMTR-MCNC: 117 MG/DL (ref 70–130)
GLUCOSE BLDC GLUCOMTR-MCNC: 118 MG/DL (ref 70–130)
GLUCOSE BLDC GLUCOMTR-MCNC: 124 MG/DL (ref 70–130)
GLUCOSE BLDC GLUCOMTR-MCNC: 218 MG/DL (ref 70–130)
GLUCOSE BLDC GLUCOMTR-MCNC: 308 MG/DL (ref 70–130)
GLUCOSE SERPL-MCNC: 102 MG/DL (ref 65–99)
HCT VFR BLD AUTO: 38.6 % (ref 37.5–51)
HGB BLD-MCNC: 11.7 G/DL (ref 13–17.7)
IMM GRANULOCYTES # BLD AUTO: 0.03 10*3/MM3 (ref 0–0.05)
IMM GRANULOCYTES NFR BLD AUTO: 0.4 % (ref 0–0.5)
LYMPHOCYTES # BLD AUTO: 1.65 10*3/MM3 (ref 0.7–3.1)
LYMPHOCYTES NFR BLD AUTO: 21.4 % (ref 19.6–45.3)
MCH RBC QN AUTO: 28.6 PG (ref 26.6–33)
MCHC RBC AUTO-ENTMCNC: 30.3 G/DL (ref 31.5–35.7)
MCV RBC AUTO: 94.4 FL (ref 79–97)
MONOCYTES # BLD AUTO: 0.68 10*3/MM3 (ref 0.1–0.9)
MONOCYTES NFR BLD AUTO: 8.8 % (ref 5–12)
NEUTROPHILS NFR BLD AUTO: 5.08 10*3/MM3 (ref 1.7–7)
NEUTROPHILS NFR BLD AUTO: 66 % (ref 42.7–76)
NRBC BLD AUTO-RTO: 0 /100 WBC (ref 0–0.2)
PLATELET # BLD AUTO: 318 10*3/MM3 (ref 140–450)
PMV BLD AUTO: 8.9 FL (ref 6–12)
POTASSIUM SERPL-SCNC: 3.9 MMOL/L (ref 3.5–5.2)
PROT SERPL-MCNC: 7.3 G/DL (ref 6–8.5)
QT INTERVAL: 410 MS
QTC INTERVAL: 484 MS
RBC # BLD AUTO: 4.09 10*6/MM3 (ref 4.14–5.8)
SODIUM SERPL-SCNC: 137 MMOL/L (ref 136–145)
WBC NRBC COR # BLD AUTO: 7.7 10*3/MM3 (ref 3.4–10.8)

## 2024-12-05 PROCEDURE — 99223 1ST HOSP IP/OBS HIGH 75: CPT

## 2024-12-05 PROCEDURE — 25010000002 HEPARIN (PORCINE) PER 1000 UNITS: Performed by: HOSPITALIST

## 2024-12-05 PROCEDURE — 80053 COMPREHEN METABOLIC PANEL: CPT | Performed by: HOSPITALIST

## 2024-12-05 PROCEDURE — 93321 DOPPLER ECHO F-UP/LMTD STD: CPT | Performed by: INTERNAL MEDICINE

## 2024-12-05 PROCEDURE — 93308 TTE F-UP OR LMTD: CPT | Performed by: INTERNAL MEDICINE

## 2024-12-05 PROCEDURE — 63710000001 INSULIN LISPRO (HUMAN) PER 5 UNITS: Performed by: HOSPITALIST

## 2024-12-05 PROCEDURE — 85025 COMPLETE CBC W/AUTO DIFF WBC: CPT | Performed by: HOSPITALIST

## 2024-12-05 PROCEDURE — 93321 DOPPLER ECHO F-UP/LMTD STD: CPT

## 2024-12-05 PROCEDURE — 82948 REAGENT STRIP/BLOOD GLUCOSE: CPT

## 2024-12-05 PROCEDURE — 93308 TTE F-UP OR LMTD: CPT

## 2024-12-05 PROCEDURE — 93010 ELECTROCARDIOGRAM REPORT: CPT | Performed by: INTERNAL MEDICINE

## 2024-12-05 PROCEDURE — 25810000003 SODIUM CHLORIDE 0.9 % SOLUTION: Performed by: HOSPITALIST

## 2024-12-05 PROCEDURE — 93005 ELECTROCARDIOGRAM TRACING: CPT | Performed by: HOSPITALIST

## 2024-12-05 PROCEDURE — 99222 1ST HOSP IP/OBS MODERATE 55: CPT | Performed by: INTERNAL MEDICINE

## 2024-12-05 PROCEDURE — 63710000001 INSULIN GLARGINE PER 5 UNITS: Performed by: HOSPITALIST

## 2024-12-05 RX ORDER — NITROGLYCERIN 0.4 MG/1
0.4 TABLET SUBLINGUAL
Status: DISCONTINUED | OUTPATIENT
Start: 2024-12-05 | End: 2024-12-06 | Stop reason: HOSPADM

## 2024-12-05 RX ORDER — NICOTINE POLACRILEX 4 MG
15 LOZENGE BUCCAL
Status: DISCONTINUED | OUTPATIENT
Start: 2024-12-05 | End: 2024-12-06 | Stop reason: HOSPADM

## 2024-12-05 RX ORDER — ASPIRIN 325 MG
325 TABLET, DELAYED RELEASE (ENTERIC COATED) ORAL DAILY
Status: DISCONTINUED | OUTPATIENT
Start: 2024-12-05 | End: 2024-12-06 | Stop reason: HOSPADM

## 2024-12-05 RX ORDER — OMEGA-3S/DHA/EPA/FISH OIL/D3 300MG-1000
400 CAPSULE ORAL DAILY
Status: DISCONTINUED | OUTPATIENT
Start: 2024-12-05 | End: 2024-12-06 | Stop reason: HOSPADM

## 2024-12-05 RX ORDER — AMOXICILLIN 250 MG
2 CAPSULE ORAL 2 TIMES DAILY PRN
Status: DISCONTINUED | OUTPATIENT
Start: 2024-12-05 | End: 2024-12-06 | Stop reason: HOSPADM

## 2024-12-05 RX ORDER — SODIUM CHLORIDE 0.9 % (FLUSH) 0.9 %
10 SYRINGE (ML) INJECTION AS NEEDED
Status: DISCONTINUED | OUTPATIENT
Start: 2024-12-05 | End: 2024-12-06 | Stop reason: HOSPADM

## 2024-12-05 RX ORDER — BUPROPION HYDROCHLORIDE 150 MG/1
300 TABLET ORAL DAILY
Status: DISCONTINUED | OUTPATIENT
Start: 2024-12-05 | End: 2024-12-05

## 2024-12-05 RX ORDER — TAMSULOSIN HYDROCHLORIDE 0.4 MG/1
0.4 CAPSULE ORAL DAILY
Status: DISCONTINUED | OUTPATIENT
Start: 2024-12-05 | End: 2024-12-06 | Stop reason: HOSPADM

## 2024-12-05 RX ORDER — DOCUSATE SODIUM 100 MG/1
100 CAPSULE, LIQUID FILLED ORAL 2 TIMES DAILY PRN
Status: DISCONTINUED | OUTPATIENT
Start: 2024-12-05 | End: 2024-12-06 | Stop reason: HOSPADM

## 2024-12-05 RX ORDER — MIDODRINE HYDROCHLORIDE 2.5 MG/1
5 TABLET ORAL
Status: DISCONTINUED | OUTPATIENT
Start: 2024-12-05 | End: 2024-12-06 | Stop reason: HOSPADM

## 2024-12-05 RX ORDER — SODIUM CHLORIDE 9 MG/ML
40 INJECTION, SOLUTION INTRAVENOUS AS NEEDED
Status: DISCONTINUED | OUTPATIENT
Start: 2024-12-05 | End: 2024-12-06 | Stop reason: HOSPADM

## 2024-12-05 RX ORDER — GABAPENTIN 100 MG/1
200 CAPSULE ORAL
Status: DISCONTINUED | OUTPATIENT
Start: 2024-12-05 | End: 2024-12-06 | Stop reason: HOSPADM

## 2024-12-05 RX ORDER — DEXTROSE MONOHYDRATE 25 G/50ML
25 INJECTION, SOLUTION INTRAVENOUS
Status: DISCONTINUED | OUTPATIENT
Start: 2024-12-05 | End: 2024-12-06 | Stop reason: HOSPADM

## 2024-12-05 RX ORDER — ROSUVASTATIN CALCIUM 20 MG/1
20 TABLET, COATED ORAL DAILY
Status: DISCONTINUED | OUTPATIENT
Start: 2024-12-05 | End: 2024-12-06 | Stop reason: HOSPADM

## 2024-12-05 RX ORDER — DULOXETIN HYDROCHLORIDE 60 MG/1
60 CAPSULE, DELAYED RELEASE ORAL DAILY
Status: DISCONTINUED | OUTPATIENT
Start: 2024-12-05 | End: 2024-12-05

## 2024-12-05 RX ORDER — SODIUM CHLORIDE 0.9 % (FLUSH) 0.9 %
10 SYRINGE (ML) INJECTION EVERY 12 HOURS SCHEDULED
Status: DISCONTINUED | OUTPATIENT
Start: 2024-12-05 | End: 2024-12-06 | Stop reason: HOSPADM

## 2024-12-05 RX ORDER — FINASTERIDE 5 MG/1
5 TABLET, FILM COATED ORAL DAILY
Status: DISCONTINUED | OUTPATIENT
Start: 2024-12-05 | End: 2024-12-06 | Stop reason: HOSPADM

## 2024-12-05 RX ORDER — LEVOTHYROXINE SODIUM 50 UG/1
50 TABLET ORAL
Status: DISCONTINUED | OUTPATIENT
Start: 2024-12-05 | End: 2024-12-06 | Stop reason: HOSPADM

## 2024-12-05 RX ORDER — GLIPIZIDE 5 MG/1
10 TABLET ORAL
Status: CANCELLED | OUTPATIENT
Start: 2024-12-05

## 2024-12-05 RX ORDER — FUROSEMIDE 20 MG/1
20 TABLET ORAL DAILY
COMMUNITY
End: 2024-12-06 | Stop reason: HOSPADM

## 2024-12-05 RX ORDER — GABAPENTIN 300 MG/1
300 CAPSULE ORAL NIGHTLY
Status: DISCONTINUED | OUTPATIENT
Start: 2024-12-05 | End: 2024-12-06 | Stop reason: HOSPADM

## 2024-12-05 RX ORDER — POLYETHYLENE GLYCOL 3350 17 G/17G
17 POWDER, FOR SOLUTION ORAL DAILY PRN
Status: DISCONTINUED | OUTPATIENT
Start: 2024-12-05 | End: 2024-12-06 | Stop reason: HOSPADM

## 2024-12-05 RX ORDER — HYDROCODONE BITARTRATE AND ACETAMINOPHEN 10; 325 MG/1; MG/1
0.5 TABLET ORAL EVERY 8 HOURS PRN
Status: DISCONTINUED | OUTPATIENT
Start: 2024-12-05 | End: 2024-12-06 | Stop reason: HOSPADM

## 2024-12-05 RX ORDER — INSULIN LISPRO 100 [IU]/ML
2-9 INJECTION, SOLUTION INTRAVENOUS; SUBCUTANEOUS
Status: DISCONTINUED | OUTPATIENT
Start: 2024-12-05 | End: 2024-12-05

## 2024-12-05 RX ORDER — BISACODYL 5 MG/1
5 TABLET, DELAYED RELEASE ORAL DAILY PRN
Status: DISCONTINUED | OUTPATIENT
Start: 2024-12-05 | End: 2024-12-06 | Stop reason: HOSPADM

## 2024-12-05 RX ORDER — BISACODYL 10 MG
10 SUPPOSITORY, RECTAL RECTAL DAILY PRN
Status: DISCONTINUED | OUTPATIENT
Start: 2024-12-05 | End: 2024-12-06 | Stop reason: HOSPADM

## 2024-12-05 RX ORDER — INSULIN LISPRO 100 [IU]/ML
2 INJECTION, SOLUTION INTRAVENOUS; SUBCUTANEOUS
Status: DISCONTINUED | OUTPATIENT
Start: 2024-12-05 | End: 2024-12-06 | Stop reason: HOSPADM

## 2024-12-05 RX ORDER — HEPARIN SODIUM 5000 [USP'U]/ML
5000 INJECTION, SOLUTION INTRAVENOUS; SUBCUTANEOUS EVERY 12 HOURS SCHEDULED
Status: DISCONTINUED | OUTPATIENT
Start: 2024-12-05 | End: 2024-12-06

## 2024-12-05 RX ORDER — GLUCAGON 1 MG/ML
1 KIT INJECTION
Status: DISCONTINUED | OUTPATIENT
Start: 2024-12-05 | End: 2024-12-06 | Stop reason: HOSPADM

## 2024-12-05 RX ORDER — BUPROPION HYDROCHLORIDE 150 MG/1
150 TABLET ORAL DAILY
Status: DISCONTINUED | OUTPATIENT
Start: 2024-12-06 | End: 2024-12-06 | Stop reason: HOSPADM

## 2024-12-05 RX ORDER — SODIUM CHLORIDE 9 MG/ML
75 INJECTION, SOLUTION INTRAVENOUS CONTINUOUS
Status: ACTIVE | OUTPATIENT
Start: 2024-12-05 | End: 2024-12-05

## 2024-12-05 RX ORDER — SOTALOL HYDROCHLORIDE 80 MG/1
120 TABLET ORAL 2 TIMES DAILY
Status: DISCONTINUED | OUTPATIENT
Start: 2024-12-05 | End: 2024-12-06 | Stop reason: HOSPADM

## 2024-12-05 RX ORDER — DULOXETIN HYDROCHLORIDE 30 MG/1
30 CAPSULE, DELAYED RELEASE ORAL DAILY
Status: DISCONTINUED | OUTPATIENT
Start: 2024-12-06 | End: 2024-12-06 | Stop reason: HOSPADM

## 2024-12-05 RX ORDER — INSULIN LISPRO 100 [IU]/ML
2-9 INJECTION, SOLUTION INTRAVENOUS; SUBCUTANEOUS
Status: DISCONTINUED | OUTPATIENT
Start: 2024-12-05 | End: 2024-12-06 | Stop reason: HOSPADM

## 2024-12-05 RX ORDER — LACTULOSE 10 G/15ML
30 SOLUTION ORAL 2 TIMES DAILY PRN
Status: DISCONTINUED | OUTPATIENT
Start: 2024-12-05 | End: 2024-12-06 | Stop reason: HOSPADM

## 2024-12-05 RX ADMIN — GABAPENTIN 200 MG: 100 CAPSULE ORAL at 05:28

## 2024-12-05 RX ADMIN — BUPROPION HYDROCHLORIDE 300 MG: 150 TABLET, EXTENDED RELEASE ORAL at 09:15

## 2024-12-05 RX ADMIN — DULOXETINE HYDROCHLORIDE 60 MG: 60 CAPSULE, DELAYED RELEASE ORAL at 09:15

## 2024-12-05 RX ADMIN — INSULIN LISPRO 2 UNITS: 100 INJECTION, SOLUTION INTRAVENOUS; SUBCUTANEOUS at 17:03

## 2024-12-05 RX ADMIN — SOTALOL HYDROCHLORIDE 120 MG: 80 TABLET ORAL at 09:25

## 2024-12-05 RX ADMIN — SOTALOL HYDROCHLORIDE 120 MG: 80 TABLET ORAL at 22:33

## 2024-12-05 RX ADMIN — GABAPENTIN 300 MG: 300 CAPSULE ORAL at 20:42

## 2024-12-05 RX ADMIN — ASPIRIN 325 MG: 325 TABLET, COATED ORAL at 09:15

## 2024-12-05 RX ADMIN — CHOLECALCIFEROL (VITAMIN D3) 10 MCG (400 UNIT) TABLET 400 UNITS: at 09:15

## 2024-12-05 RX ADMIN — INSULIN LISPRO 2 UNITS: 100 INJECTION, SOLUTION INTRAVENOUS; SUBCUTANEOUS at 11:52

## 2024-12-05 RX ADMIN — MIDODRINE HYDROCHLORIDE 5 MG: 2.5 TABLET ORAL at 11:52

## 2024-12-05 RX ADMIN — INSULIN LISPRO 4 UNITS: 100 INJECTION, SOLUTION INTRAVENOUS; SUBCUTANEOUS at 09:16

## 2024-12-05 RX ADMIN — INSULIN LISPRO 7 UNITS: 100 INJECTION, SOLUTION INTRAVENOUS; SUBCUTANEOUS at 11:52

## 2024-12-05 RX ADMIN — TAMSULOSIN HYDROCHLORIDE 0.4 MG: 0.4 CAPSULE ORAL at 09:15

## 2024-12-05 RX ADMIN — HEPARIN SODIUM 5000 UNITS: 5000 INJECTION INTRAVENOUS; SUBCUTANEOUS at 20:42

## 2024-12-05 RX ADMIN — FINASTERIDE 5 MG: 5 TABLET, FILM COATED ORAL at 09:15

## 2024-12-05 RX ADMIN — HEPARIN SODIUM 5000 UNITS: 5000 INJECTION INTRAVENOUS; SUBCUTANEOUS at 09:15

## 2024-12-05 RX ADMIN — INSULIN GLARGINE 20 UNITS: 100 INJECTION, SOLUTION SUBCUTANEOUS at 09:15

## 2024-12-05 RX ADMIN — Medication 10 ML: at 09:18

## 2024-12-05 RX ADMIN — MIDODRINE HYDROCHLORIDE 5 MG: 2.5 TABLET ORAL at 17:03

## 2024-12-05 RX ADMIN — SODIUM CHLORIDE 75 ML/HR: 9 INJECTION, SOLUTION INTRAVENOUS at 03:03

## 2024-12-05 RX ADMIN — LEVOTHYROXINE SODIUM 50 MCG: 0.05 TABLET ORAL at 05:29

## 2024-12-05 RX ADMIN — ROSUVASTATIN 20 MG: 20 TABLET, FILM COATED ORAL at 09:15

## 2024-12-05 RX ADMIN — Medication 10 ML: at 20:42

## 2024-12-05 RX ADMIN — INSULIN LISPRO 2 UNITS: 100 INJECTION, SOLUTION INTRAVENOUS; SUBCUTANEOUS at 09:15

## 2024-12-05 NOTE — NURSING NOTE
Consult received for right upper gluteal pressure injury stage 2, right anterior knee incision, right distal leg incision and sternal incision.  Assessed with SUYAPA Hobbs - stage 3 noted to right upper gluteal with scant amount of serous drainage; base is red/yellow, moist and non-blanching.  Ally wound is intact, pink and blanchable.  Order to cleanse with wound cleanser and apply thera honey directly to wound base and cover with silicone bordered dressing BID.    Sternal incisions, right anterior knee incision and distal leg incision are healing with dry scabbing noted.  No erythema or drainage noted.  Will leave open to air.  Patient is s/p CABG in October of this year.      Tae score 15.  PI prevention orders initiated.       12/05/24 1040   Wound 12/05/24 0514 Right upper gluteal pressure injury   Placement Date/Time: 12/05/24 0514   Side: Right  Orientation: upper  Location: gluteal  Primary Wound Type: Pressure Injury  Type: pressure injury  Present on Original Admission: Yes   Pressure Injury Stage 3   Dressing Appearance intact;moist drainage   Closure None   Base moist;nonblanchable;red;yellow   Periwound intact;pink   Periwound Temperature warm   Periwound Skin Turgor soft   Edges rolled/closed   Wound Length (cm) 1 cm   Wound Width (cm) 1 cm   Wound Depth (cm) 0.2 cm   Wound Surface Area (cm^2) 1 cm^2   Wound Volume (cm^3) 0.2 cm^3   Drainage Characteristics/Odor serous   Drainage Amount scant   Wound 10/28/24 sternal   Placement Date: 10/28/24   Location: sternal  Primary Wound Type: Incision   Base scab   Drainage Amount none   Wound Right anterior knee   No placement date or time found.   Side: Right  Orientation: anterior  Location: knee  Primary Wound Type: Incision   Base scab   Drainage Amount none   Wound Right distal leg   No placement date or time found.   Side: Right  Orientation: distal  Location: leg  Primary Wound Type: Incision   Base scab   Drainage Amount none

## 2024-12-05 NOTE — PLAN OF CARE
Goal Outcome Evaluation:      Pt resting in the bed currently. Pt A/O x3. Routine meds given. Tolerated routine meds. No acute changes noted. Will continue POC

## 2024-12-05 NOTE — TELEPHONE ENCOUNTER
[facility] ([facility]) called and said that he had been passing out and he sent him to the [facility] last night. They said he has been passing out a lot and was not sure if she is aware and they wanted to make sure Blossom knows.     Thanks!

## 2024-12-05 NOTE — NURSING NOTE
Taking over patient care from SUYAPA King. Agree with current assessment charted. Plan of care ongoing.

## 2024-12-05 NOTE — ED PROVIDER NOTES
"Subjective   History of Present Illness  Joel is a 59-year-old male presents to the ED for chief complaint of syncope.  Patient states that his home health physical therapist came to see him today asked him to walk from his bed to his bathroom he states he took a couple steps felt short of breath and unable to walk and then in the sat back in bed and passed out per himself, he does wear oxygen at home 2 L he is not on an increased amount of oxygen.  Patient is not in acute respiratory distress he has no accessory muscle use he has no wheezing he has no pursed lip breathing he is supine in bed and position of comfort.  He is not tachycardic he is satting 99%.  He also of note had a recent hospitalization at an outside facility last week and was discharged with oxygen.  He states he does not have COPD and does not know why they gave him oxygen.         Review of Systems    Past Medical History:   Diagnosis Date    A-fib     Anxiety     Arthritis     DDD (degenerative disc disease), lumbar     Deep vein thrombosis     Depression     Diabetes     Disease of thyroid gland     HYPO    Heart attack     \"5-6 years ago\"    Hyperlipidemia     Hypertension     Kidney stone     Neuropathy     Sleep apnea     DOES NOT USE CPAP    UTI (urinary tract infection)        No Known Allergies    Past Surgical History:   Procedure Laterality Date    ATRIAL APPENDAGE EXCLUSION LEFT WITH TRANSESOPHAGEAL ECHOCARDIOGRAM N/A 10/28/2024    Procedure: ATRIAL APPENDAGE EXCLUSION LEFT WITH TRANSESOPHAGEAL ECHOCARDIOGRAM;  Surgeon: Alex Max MD;  Location: [facility];  Service: Cardiothoracic;  Laterality: N/A;    CARDIAC CATHETERIZATION N/A 08/28/2024    Procedure: Left Heart Cath;  Surgeon: Julio Neff MD;  Location:  [facility];  Service: Cardiology;  Laterality: N/A;    CARDIAC ELECTROPHYSIOLOGY PROCEDURE N/A 04/05/2023    Procedure: Loop insertion;  Surgeon: Tariq Chávez MD;  Location:  [facility] CATH INVASIVE " LOCATION;  Service: Cardiovascular;  Laterality: N/A;    CATARACT EXTRACTION Bilateral     COLONOSCOPY      CORONARY ARTERY BYPASS GRAFT N/A 10/28/2024    Procedure: MEDIAN STERNOTOMY, CORONARY ARTERY BYPASS GRAFTING X4 UTILIZING THE LEFT INTERNAL MAMMARY ARTERY GRAFT, ENDOSCOPIC VEIN HARVEST OF THE GREATER RIGHT SAPHENOUS VEIN;  Surgeon: Alex Max MD;  Location: [facility] OR;  Service: Cardiothoracic;  Laterality: N/A;    ENDOSCOPY      MAZE PROCEDURE N/A 10/28/2024    Procedure: MAZE PROCEDURE;  Surgeon: Alex Max MD;  Location:  [facility];  Service: Cardiothoracic;  Laterality: N/A;    TEETH EXTRACTION         Family History   Problem Relation Age of Onset    Heart disease Mother     Coronary artery disease Father     Diabetes Father     Kidney disease Father        Social History     Socioeconomic History    Marital status:     Number of children: 1   Tobacco Use    Smoking status: Never     Passive exposure: Never    Smokeless tobacco: Never   Vaping Use    Vaping status: Never Used   Substance and Sexual Activity    Alcohol use: No    Drug use: No    Sexual activity: Not Currently           Objective   Physical Exam    Procedures           ED Course  ED Course as of 12/05/24 0227   Wed Dec 04, 2024   2003 Patient's orthostatic vital signs were obtained, he was unable to stand as he felt like he was get a pass out.    Laying he was 124/82 with a heart rate 79, sitting he was 82/59 with a heart rate of 82 [AM]   Thu Dec 05, 2024   0130 Spoke w Dr. Blair; hospitalist at [facility] he and I discussed the case and as there is no rhythm issues as it is likely orthostasis in nature that this could be the cause of his syncope and he states that he does not feel a transfer would be appropriate at this time and feels that cardiology could evaluate him in the morning and then determine if he needs EP afterwards. 2/2 patient case will then be discussed with our cardiologist to determine if we are  able to admit and observe the patient here from their standpoint. [AM]   0139 Spoke with Dr. Vega who states that she is comfortable having the patient be observed here and having cardiology evaluate him in the morning. [AM]   0218 Updated the hospitalist regarding the inability to transfer and he is comfortable admitting the patient here. [AM]      ED Course User Index  [AM] Sudhir Baer MD                                                       Medical Decision Making  Joel is a 59-year-old male presents to the ED for chief complaint of syncope.  Patient have labs and imaging ordered via triage under protocol for syncope.  Patient also have lipase and magnesium added by myself to evaluate for other metabolic causes of his chest pain and syncope.    EKG was obtained and revealed sinus rhythm with a ventricular rate of 83, no acute ST change signify ischemia normal axis normal intervals, subtle T wave inversions in V3 V4 V5 V6  No reciprocal elevations  , , , QTc 493  ----     He followed up with cardiology on 11/20/2024 and was noted to be hypotensive with reported frequent falls at rehab facility.  His hydrochlorothiazide was discontinued and they decrease his amlodipine from 10 mg to 5 mg daily with encouraged hydration.     Patient states he continues to be weak and is currently using a walker to ambulate and is currently in a wheelchair today.  States he was in a rehab facility but left due to feeling like this was not helping him.  States that physical therapy is set to come out to his house next week to work with him.  Continues to have chronic dizziness and orthostatic hypotension resulting in falls.  Blood pressure is mildly improved in the office today compared to his last visit with cardiology.  Biggest complaint today is weakness and dizziness.  Caregiver states he has not been drinking adequately.     · Diagnostic Testing  1. Echocardiogram 9/2020: EF 55% moderate aortic valve  sclerosis  2. Cardiac event monitor 8/2021: Predominantly normal sinus rhythm with no arrhythmias noted  3. Nuclear stress test 9/2021: Small size mild to moderate degree of ischemia in the lateral wall  4. Carotid ultrasound 3/2023: No acute findings  5. Echocardiogram 3/2023: EF 61 to 65%  6. Loop recorder insertion with Potter device 4/2023      All other systems were reviewed and were negative.      Amount and/or Complexity of Data Reviewed  Labs: ordered.  Radiology: ordered.  ECG/medicine tests: ordered.    Risk  Prescription drug management.        Final diagnoses:   Syncope and collapse   Orthostatic hypotension       ED Disposition  ED Disposition       ED Disposition   Decision to Admit    Condition   --    Comment   Level of Care: Telemetry [5]   Diagnosis: Orthostatic hypotension [458.0.ICD-9-CM]   Admitting Physician: SARAHY GLORIA [[id number]]   Attending Physician: SARAHY GLORIA [[id number]]                 No follow-up provider specified.       Medication List        ASK your doctor about these medications      buPROPion  MG 24 hr tablet  Commonly known as: WELLBUTRIN XL  Ask about: Which instructions should I use?     * gabapentin 100 MG capsule  Commonly known as: NEURONTIN  Ask about: Which instructions should I use?     * gabapentin 100 MG capsule  Commonly known as: NEURONTIN  Ask about: Which instructions should I use?           * This list has 2 medication(s) that are the same as other medications prescribed for you. Read the directions carefully, and ask your doctor or other care provider to review them with you.                     Sudhir Baer MD  12/05/24 0228

## 2024-12-05 NOTE — DISCHARGE SUMMARY
[facility] HOSPITALIST MEDICINE DISCHARGE SUMMARY    Patient Identification:  Name:  Joel Galo  Age:  59 y.o.  Sex:  male  :  1965  MRN:  [id number]  Visit Number:  [id number]    Date of Admission: 2024  Date of Discharge: 2024  DISCHARGE DISPOSITION   Transfer to [facility] under Dr. Max service  PCP: Edmundo Boston MD    DISCHARGE DIAGNOSIS :    HOSPITAL COURSE  Patient is a 59 y.o. male presented to [facility] complaining of significant orthostasis, patient had recent CABG, maze and TORSTEN L on 2024.  During clinic follow-up patient noted to be dizzy and difficult orthostatic hypotension with near syncope despite withholding his cardiac medications.  Because of this he was admitted, on admission patient was placed on gentle hydration, cardiology was consulted, 2D echo was ordered, results showing normal ejection fraction with mild concentric hypertrophy, there was more than 2 cm pericardial effusion adjacent to the left ventricle with early diastolic collapse of the right atrium.  Patient was hemodynamically stable,  Case discussed with Dr. Max.  Please see the admitting history and physical for further details.      VITAL SIGNS:      24  1829 24  0445 24  0538   Weight: 97.5 kg (215 lb) 95.2 kg (209 lb 12.8 oz) 95.2 kg (209 lb 12.8 oz) (new admit weight less than 24 hours)     Body mass index is 30.45 kg/m².  Vitals:    24 1601   BP: 120/80   Pulse:    Resp: 16   Temp: 97.3 °F (36.3 °C)   SpO2:      PHYSICAL EXAM:  General: Comfortable,awake, alert, oriented to self, place, and time, well-developed and well-nourished.  No respiratory distress.    Skin:  Skin is warm and dry. No rash noted. No pallor.    HENT:  Head:  Normocephalic and atraumatic.  Mouth:  Moist mucous membranes.    Eyes:  Conjunctivae and EOM are normal.  Pupils are equal, round, and reactive to light.  No scleral icterus.    Neck:   Neck supple.  No JVD present.  He does not appear to have any JVD.  Or hepatojugular reflux  Pulmonary/Chest:  No respiratory distress, no wheezes, no crackles, with normal breath sounds and good air movement.  Sternotomy scar  Cardiovascular:  Normal rate, regular rhythm and normal heart sounds with no murmur.  Abdominal:  Soft.  Bowel sounds are normal.  No distension and no tenderness.   Extremities: Trace edema both lower extremity, no tenderness, and no deformity.  No red or swollen joints anywhere.  Strong pulses in all 4 extremities with no clubbing, no cyanosis,   Neurological:  Motor strength equal no obvious deficit, sensory grossly intact.   No cranial nerve deficit.  No tongue deviation.  No facial droop.  No slurred speech.    Genitourinary: No Prado catheter  Back:  ----------    DISCHARGE MEDICATIONS:     Discharge Medications        Continue These Medications        Instructions Start Date   aspirin 325 MG EC tablet   325 mg, Oral, Daily      buPROPion  MG 24 hr tablet  Commonly known as: WELLBUTRIN XL   300 mg, Oral, Daily      docusate sodium 100 MG capsule  Commonly known as: COLACE   100 mg, Oral, 2 Times Daily PRN      DULoxetine 60 MG capsule  Commonly known as: CYMBALTA   60 mg, Oral, Daily      empagliflozin 25 MG tablet tablet  Commonly known as: JARDIANCE   25 mg, Oral, Daily      finasteride 5 MG tablet  Commonly known as: PROSCAR   TAKE 1 TABLET EVERY DAY      gabapentin 100 MG capsule  Commonly known as: NEURONTIN   200 mg, Oral, Every Early Morning      gabapentin 100 MG capsule  Commonly known as: NEURONTIN   300 mg, Oral, Nightly      glimepiride 2 MG tablet  Commonly known as: AMARYL   4 mg, Oral, 2 Times Daily      HYDROcodone-acetaminophen  MG per tablet  Commonly known as: NORCO   1 tablet, Oral, Every 8 Hours PRN      insulin glargine 100 UNIT/ML injection  Commonly known as: LANTUS, SEMGLEE   20 Units, Subcutaneous, Daily      Insulin Lispro 100 UNIT/ML  injection  Commonly known as: humaLOG   2-9 Units, Subcutaneous, 4 Times Daily Before Meals & Nightly      Insulin Lispro 100 UNIT/ML injection  Commonly known as: humaLOG   2 Units, Subcutaneous, 3 Times Daily With Meals      lactulose 10 GM/15ML solution  Commonly known as: CHRONULAC   30 g, Oral, 2 Times Daily PRN      levothyroxine 50 MCG tablet  Commonly known as: SYNTHROID, LEVOTHROID   50 mcg, Oral, Every Early Morning      nitroglycerin 0.4 MG SL tablet  Commonly known as: NITROSTAT   0.4 mg, Sublingual, Every 5 Minutes PRN, Take no more than 3 doses in 15 minutes.      rosuvastatin 20 MG tablet  Commonly known as: CRESTOR   20 mg, Oral, Daily      sotalol 120 MG tablet  Commonly known as: BETAPACE   120 mg, Oral, 2 Times Daily      tamsulosin 0.4 MG capsule 24 hr capsule  Commonly known as: FLOMAX   TAKE 1 CAPSULE EVERY DAY      Vitamin D (Cholecalciferol) 10 MCG (400 UNIT) tablet  Commonly known as: CHOLECALCIFEROL   400 Units, Oral, Daily             Stop These Medications      Entresto 24-26 MG tablet  Generic drug: sacubitril-valsartan     furosemide 20 MG tablet  Commonly known as: LASIX                Your Scheduled Appointments      Dec 06, 2024 1:45 PM  Follow Up with Musa Fishman DO  [facility] CARDIOLOGY [address] [place] [address] [phone]   -Bring photo ID, insurance card, and list of medications to appointment  -If testing was completed outside of [facility] then patient must bring images on a disc  -Copay will be collected at time of appointment  -Established patients should arrive 15 minutes prior to appointment         Dec 10, 2024 2:30 PM  Post-Op with ZIA Garcia  [facility] CARDIOTHORACIC SURGERY ([place]) [address] [place] [address] [address]  [address]  [facility] [address] [phone]   -Bring photo ID, insurance card, and list of medications to appointment  -If testing was completed outside of [facility] then  patient must bring images on a disc  -Copay will be collected at time of appointment         Jan 14, 2025 10:30 AM  Follow Up with ZIA Flynn  [facility] CARDIOLOGY ([place]) [address] [place] [address] [phone]   -Bring photo ID, insurance card, and list of medications to appointment  -If testing was completed outside of [facility] then patient must bring images on a disc  -Copay will be collected at time of appointment  -Established patients should arrive 10 minutes prior to appointment         Jun 06, 2025 1:45 PM  Follow Up with Musa Fishman DO  [facility] CARDIOLOGY ([place]) [address] [place] [address] [phone]   -Bring photo ID, insurance card, and list of medications to appointment  -If testing was completed outside of [facility] then patient must bring images on a disc  -Copay will be collected at time of appointment  -Established patients should arrive 15 minutes prior to appointment                 Follow-up Information       Edmundo Boston MD .    Specialty: Family Medicine  Contact information:  [address] [place] [address] [phone]                                  Daria Rowe MD  12/05/24  18:21 EST    Please note that this discharge summary required more than 30 minutes to complete.

## 2024-12-05 NOTE — PROGRESS NOTES
Adult Nutrition  Assessment/PES    Patient Name:  Joel Galo  YOB: 1965  MRN: [id number]  Admit Date:  12/4/2024    Assessment Date:  12/5/2024    Comments:  CHRISTOPHER AU    Po intake 50% x 1 meal    Encourage po and voice food prefs    Will add boost glucose with 2 meals per day and add consistent CHO to diet aid BG control     Will cont to follow and monitor.,     Reason for Assessment       Row Name 12/05/24 1445          Reason for Assessment    Reason For Assessment identified at risk by screening criteria  CHRISTOPHER AU; remote [place], [place]     Diagnosis pulmonary disease  orthostatic hypotension hx CABG, DM     Identified At Risk by Screening Criteria MST SCORE 2+                    Nutrition/Diet History       Row Name 12/05/24 1446          Nutrition/Diet History    Typical Intake (Food/Fluid/EN/PN) Called to room no answer                    Labs/Tests/Procedures/Meds       Row Name 12/05/24 1505          Labs/Procedures/Meds    Lab Results Reviewed reviewed     Lab Results Comments glu 308 H        Diagnostic Tests/Procedures    Diagnostic Test/Procedure Reviewed reviewed        Medications    Pertinent Medications Reviewed reviewed     Pertinent Medications Comments vitamin D, lantus, humalog                    Physical Findings       Row Name 12/05/24 1506          Physical Findings    Overall Physical Appearance gluteal PI                    Estimated/Assessed Needs - Anthropometrics       Row Name 12/05/24 1506          Anthropometrics    Calculation Weight 95.2 kg (209 lb 14.1 oz)        Estimated/Assessed Needs    Additional Documentation Estimated Calorie Needs (Group);Fluid Requirements (Group);Protein Requirements (Group)        Estimated Calorie Needs    Estimated Calorie Require (kcal/day) 7060-7279 kcal ( mifflin 1.2-1.4)     Estimated Calorie Need Method [place]-St Jeor        Protein Requirements    Est Protein Requirement Amount (gms/kg) 1.2 gm protein  114 gm pro        Fluid  Requirements    Estimated Fluid Requirement Method RDA Method  9497-2342 ml                    Nutrition Prescription Ordered       Row Name 12/05/24 1509          Nutrition Prescription PO    Common Modifiers Cardiac                    Evaluation of Received Nutrient/Fluid Intake       Row Name 12/05/24 1509          Fluid Intake Evaluation    Oral Fluid (mL) 240  insufficient data        PO Evaluation    Number of Meals 1     % PO Intake 50                       Problem/Interventions:   Problem 1       Row Name 12/05/24 1509          Nutrition Diagnoses Problem 1    Problem 1 Other (comment)  Inadequate energy intake related to orthostasis as evidenced by  po intake                          Intervention Goal       Row Name 12/05/24 1510          Intervention Goal    General Meet nutritional needs for age/condition     PO Increase intake;PO intake (%)     PO Intake % 75 %                    Nutrition Intervention       Row Name 12/05/24 1510          Nutrition Intervention    RD/Tech Action Follow Tx progress;Encourage intake;Recommend/ordered     Recommended/Ordered Supplement                    Nutrition Prescription       Row Name 12/05/24 1510          Nutrition Prescription PO    PO Prescription Begin/change supplement     Supplement Boost Glucose Control     Supplement Frequency 2 times a day                    Education/Evaluation       Row Name 12/05/24 1511          Education    Education Education not appropriate at this time        Monitor/Evaluation    Monitor Per protocol;I&O;PO intake;Supplement intake;Pertinent labs;Weight;Symptoms                     Electronically signed by:  Lara James RD  12/05/24 15:11 EST

## 2024-12-05 NOTE — PROGRESS NOTES
Patient seen and examined assisted for significant orthostasis.  Started on midodrine by cardiology.    -Orthostatic hypotension  -Diabetes insulin requiring  -Hyperlipidemia  -Coronary disease status post CABG  -Hypothyroidism  -Diabetic polyneuropathy  -History of BPH    Fall precautions, PT OT, will attempt to decrease medication that can potentially cause hypotension, orthostasis or dizziness.  Check cortisol level.  Gentle hydration.

## 2024-12-05 NOTE — ED NOTES
Called [facility] for update on Pt consult/transfer to [facility]. Henrietta stated she would contact on call hospitalist again about arranging consult.

## 2024-12-05 NOTE — H&P
[facility] Medicine Services  History & Physical    Patient Identification:  Name:  Joel Galo  Age:  59 y.o.  Sex:  male  :  1965  MRN:  [id number]   Visit Number:  [id number]  Admit Date: 2024   Primary Care Physician:  Edmundo Boston MD    Subjective       Chief Complaint   Patient presents with    Syncope       History of presenting illness:    Joel Galo is a 59 y.o. male who presented for further evaluation of syncope at home during physical therapy.    Patient recently underwent cardiovascular surgery for a four-vessel CABG with MAZE and TORSTEN.  Since then the patient has had episodes of orthostatic hypotension with syncope.   He had been residing at a inpatient rehab facility for 2 weeks but was then discharged to home with physical therapy set up to continue at his residence.  Throughout his recovery he has experienced many episodes of lightheadedness and syncope following any amount of exertion.  This was noted at the skilled nursing facility who referred him to his cardiology team for management of hypotension and syncope.  His cardiology team plans to titrated down his antihypertensive medications in an attempt to allow hypertension and hopefully decrease his syncope symptoms.  Cardiology's office visit note states hydrochlorothiazide was to be stopped, amlodipine to be reduced from 10 mg to 5 mg, and Entresto be reduced to 24-26.  The patient's wife is able to assist him at home, and manages his home medications and has been able to implement the changes discussed.    Today patient was with his outpatient physical therapist who had instructed him to walk from his bed to the bathroom and back, upon which he became symptomatic and syncopized 2 steps.  He states he was caught by physical therapist and gradually lowered to the ground.  His physical therapy recommended that he contact his cardiology team and they recommended he come to the ED for further workup  "and possible admission.     The patient has a healing abrasion above his left eyebrow from a prior fall at the skilled nursing facility.  He also has a abrasion on his right knee and a new abrasion on his left forearm.    Past medical history is significant for syncope, autonomic orthostatic hypotension, paroxysmal atrial fibrillation, hyperlipidemia, type 2 diabetes, CAD s/p four-vessel CABG with MAZE and TORSTEN.  Patient also has a history of hypothyroidism, obstructive sleep apnea, GERD, anxiety, and depression.    Upon arrival to the ED, vital signs were /84   Pulse 82   Temp 97.8 °F (36.6 °C) (Oral)   Resp 17   Ht 172.7 cm (68\")   Wt 97.5 kg (215 lb)   SpO2 99%   BMI 32.69 kg/m²   ED workup significant for:   High-sensitivity troponin initial 34 that trended down to 28 and 25  ECG shows normal sinus rhythm, ST and T wave abnormalities, consider inferior ischemia and consider anterolateral ischemia  CBC shows normocytic anemia  Orthostatics and lying position blood pressure is 124/82 heart rate of 79.  Upon sitting blood pressure dropped to 82/59 with a heart rate of 80.  Patient could not tolerate standing long enough to obtain blood pressure and heart rate in that position.    Known Emergency Department medications received prior to my evaluation included:  Medications   sodium chloride 0.9 % flush 10 mL (has no administration in time range)   sodium chloride 0.9 % infusion (75 mL/hr Intravenous New Bag 12/5/24 6329)   sodium chloride 0.9 % bolus 1,000 mL (0 mL Intravenous Stopped 12/5/24 0142)       Emergency Department Room location at the time of my evaluation was 102.     ---------------------------------------------------------------------------------------------------------------------   Review of Systems   Constitutional:  Negative for chills, fatigue and fever.   HENT:  Negative for postnasal drip, rhinorrhea, sinus pressure, sneezing and sore throat.    Eyes:  Negative for discharge and " "redness.   Respiratory:  Positive for shortness of breath. Negative for cough and wheezing.    Cardiovascular:  Negative for chest pain, palpitations and leg swelling.   Gastrointestinal:  Negative for diarrhea, nausea and vomiting.   Genitourinary:  Negative for difficulty urinating, dysuria, frequency and hematuria.   Musculoskeletal:  Negative for arthralgias and joint swelling.   Skin:  Positive for pallor and wound. Negative for rash.   Neurological:  Positive for dizziness, syncope, weakness and light-headedness. Negative for speech difficulty and headaches.   Hematological:  Does not bruise/bleed easily.   Psychiatric/Behavioral:  Negative for confusion, self-injury and suicidal ideas. The patient is not nervous/anxious.       ---------------------------------------------------------------------------------------------------------------------   Past Medical History:   Diagnosis Date    A-fib     Anxiety     Arthritis     DDD (degenerative disc disease), lumbar     Deep vein thrombosis     Depression     Diabetes     Disease of thyroid gland     HYPO    Heart attack     \"5-6 years ago\"    Hyperlipidemia     Hypertension     Kidney stone     Neuropathy     Sleep apnea     DOES NOT USE CPAP    UTI (urinary tract infection)      Past Surgical History:   Procedure Laterality Date    ATRIAL APPENDAGE EXCLUSION LEFT WITH TRANSESOPHAGEAL ECHOCARDIOGRAM N/A 10/28/2024    Procedure: ATRIAL APPENDAGE EXCLUSION LEFT WITH TRANSESOPHAGEAL ECHOCARDIOGRAM;  Surgeon: Alex Max MD;  Location: [facility];  Service: Cardiothoracic;  Laterality: N/A;    CARDIAC CATHETERIZATION N/A 08/28/2024    Procedure: Left Heart Cath;  Surgeon: Julio eNff MD;  [facility]:  [facility];  Service: Cardiology;  Laterality: N/A;    CARDIAC ELECTROPHYSIOLOGY PROCEDURE N/A 04/05/2023    Procedure: Loop insertion;  Surgeon: Tariq Chávez MD;  Location:  [facility];  Service: Cardiovascular;  Laterality: " N/A;    CATARACT EXTRACTION Bilateral     COLONOSCOPY      CORONARY ARTERY BYPASS GRAFT N/A 10/28/2024    Procedure: MEDIAN STERNOTOMY, CORONARY ARTERY BYPASS GRAFTING X4 UTILIZING THE LEFT INTERNAL MAMMARY ARTERY GRAFT, ENDOSCOPIC VEIN HARVEST OF THE GREATER RIGHT SAPHENOUS VEIN;  Surgeon: Alex Max MD;  Location: [facility];  Service: Cardiothoracic;  Laterality: N/A;    ENDOSCOPY      MAZE PROCEDURE N/A 10/28/2024    Procedure: MAZE PROCEDURE;  Surgeon: Alex Max MD;  Location:  [facility];  Service: Cardiothoracic;  Laterality: N/A;    TEETH EXTRACTION       Family History   Problem Relation Age of Onset    Heart disease Mother     Coronary artery disease Father     Diabetes Father     Kidney disease Father      Social History     Socioeconomic History    Marital status:     Number of children: 1   Tobacco Use    Smoking status: Never     Passive exposure: Never    Smokeless tobacco: Never   Vaping Use    Vaping status: Never Used   Substance and Sexual Activity    Alcohol use: No    Drug use: No    Sexual activity: Not Currently     ---------------------------------------------------------------------------------------------------------------------   Allergies:  Patient has no known allergies.  ---------------------------------------------------------------------------------------------------------------------   Home medications:  Medications below are reported home medications pulling from within the system; at this time, these medications have not been reconciled unless otherwise specified and are in the verification process for further verifcation as current home medications.  (Not in a hospital admission)      Hospital Scheduled Meds:     sodium chloride, 75 mL/hr, Last Rate: 75 mL/hr (12/05/24 0303)        Current listed hospital scheduled medications may not yet reflect those currently placed in orders that are signed and held awaiting patient's arrival to floor.    ---------------------------------------------------------------------------------------------------------------------     Objective     Vital Signs:  Temp:  [97.8 °F (36.6 °C)] 97.8 °F (36.6 °C)  Heart Rate:  [75-85] 82  Resp:  [17] 17  BP: ()/() 142/84      12/04/24  1829   Weight: 97.5 kg (215 lb)     Body mass index is 32.69 kg/m².  ---------------------------------------------------------------------------------------------------------------------     Physical Exam  Vitals reviewed.   Constitutional:       General: He is not in acute distress.     Appearance: Normal appearance.   HENT:      Head: Normocephalic. Abrasion present.        Comments: From prior falls at [facility], [facility]     Nose: Nose normal.      Mouth/Throat:      Mouth: Mucous membranes are moist.   Eyes:      Conjunctiva/sclera: Conjunctivae normal.      Pupils: Pupils are equal, round, and reactive to light.   Cardiovascular:      Rate and Rhythm: Normal rate and regular rhythm.      Pulses:           Radial pulses are 1+ on the right side and 1+ on the left side.        Posterior tibial pulses are 1+ on the right side and 1+ on the left side.      Heart sounds: Normal heart sounds. No murmur heard.  Pulmonary:      Effort: Pulmonary effort is normal. No respiratory distress.      Breath sounds: Normal breath sounds. No wheezing or rales.   Abdominal:      General: There is no distension.      Palpations: Abdomen is soft.      Tenderness: There is no abdominal tenderness.   Musculoskeletal:         General: No deformity.      Right lower leg: No edema.      Left lower leg: No edema.   Skin:     General: Skin is dry.      Findings: Abrasion and bruising present. No erythema, lesion or rash.             Comments: New abrasion on L forearm and Right knee from more recent falls at home witnessed by his wife and his PT.   Neurological:      Mental Status: He is alert and oriented to person, place, and time. Mental status is at baseline.  "  Psychiatric:         Mood and Affect: Mood normal.         Behavior: Behavior normal.           ---------------------------------------------------------------------------------------------------------------------  EKG:        I have personally looked at the EKG.  ---------------------------------------------------------------------------------------------------------------------   Results from last 7 days   Lab Units 12/04/24  1852   WBC 10*3/mm3 8.21   HEMOGLOBIN g/dL 11.6*   HEMATOCRIT % 36.6*   MCV fL 90.4   MCHC g/dL 31.7   PLATELETS 10*3/mm3 393         Results from last 7 days   Lab Units 12/04/24  1852   SODIUM mmol/L 137   POTASSIUM mmol/L 4.2   MAGNESIUM mg/dL 2.6*   CHLORIDE mmol/L 96*   CO2 mmol/L 27.1   BUN mg/dL 25*   CREATININE mg/dL 1.17   CALCIUM mg/dL 9.2   GLUCOSE mg/dL 251*   ALBUMIN g/dL 3.3*   BILIRUBIN mg/dL 0.2   ALK PHOS U/L 149*   AST (SGOT) U/L 22   ALT (SGPT) U/L 22   Estimated Creatinine Clearance: 75 mL/min (by C-G formula based on SCr of 1.17 mg/dL).  No results found for: \"AMMONIA\"  Results from last 7 days   Lab Units 12/04/24 2308 12/04/24 2057 12/04/24  1852   HSTROP T ng/L 25* 28* 34*         Lab Results   Component Value Date    HGBA1C 11.20 (H) 10/25/2024     No results found for: \"TSH\", \"FREET4\"  No results found for: \"PREGTESTUR\", \"PREGSERUM\", \"HCG\", \"HCGQUANT\"  Pain Management Panel          Latest Ref Rng & Units 10/28/2024   Pain Management Panel   Amphetamine, Urine Qual Negative Negative    Barbiturates Screen, Urine Negative Negative    Benzodiazepine Screen, Urine Negative Negative    Buprenorphine, Screen, Urine Negative Negative    Cocaine Screen, Urine Negative Negative    Fentanyl, Urine Negative Negative    Methadone Screen , Urine Negative Negative    Methamphetamine, Ur Negative Negative       Details                 No results found for: \"BLOODCX\"  No results found for: \"URINECX\"  No results found for: \"WOUNDCX\"  No results found for: \"STOOLCX\"    " "  ---------------------------------------------------------------------------------------------------------------------  Imaging Results (Last 7 Days)       Procedure Component Value Units Date/Time    XR Chest AP [[id number]] Collected: 12/04/24 2228     Updated: 12/04/24 2231    Narrative:      EXAMINATION: AP portable chest     CLINICAL HISTORY: Syncope     COMPARISON: 11/6/2024     FINDINGS: Lung volumes are reduced. There has been prior median  sternotomy. An electronic device projects over the left lower chest  wall. A left atrial appendage occlusion device is noted. Mild  hypoventilatory changes are noted. The lungs are otherwise clear. No  pneumothorax or pleural effusion.       Impression:      1. No acute cardiopulmonary disease.     This report was finalized on 12/4/2024 10:29 PM by Fernandez Bah MD.               Cultures:  No results found for: \"BLOODCX\", \"URINECX\", \"WOUNDCX\", \"MRSACX\", \"RESPCX\", \"STOOLCX\"    Last echocardiogram:  Results for orders placed in visit on 10/28/24    Intra-Op Anesthesia DARIAN    Narrative  Intra-Op Anesthesia DARIAN    Procedure Performed: Intra-Op Anesthesia DARIAN  Start Time:  End Time:    Preanesthesia Checklist:  Patient identified, IV assessed, risks and benefits discussed, monitors and equipment assessed, procedure being performed at surgeon's request and anesthesia consent obtained.    General Procedure Information  Diagnostic Indications for Echo:  assessment of ascending aorta, assessment of surgical repair and hemodynamic monitoring  Physician Requesting Echo: Alex Max MD  Location performed:  OR  Intubated  Bite block not placed  Heart visualized  Probe Insertion:  Easy  Probe Type:  Multiplane  Modalities:  Color flow mapping, continuous wave Doppler and pulse wave Doppler    Echocardiographic and Doppler Measurements    Ventricles    Right Ventricle:  Thrombus not present.  Global function normal.  Left Ventricle:  Cavity size normal.  Diastolic dimension 1.6 " cm.  Thrombus not present.  Global Function normal.  Ejection Fraction 55%.        Valves    Aortic Valve:  Annulus normal.  Stenosis not present.  Area: 2.2 cm².  Mean Gradient: 2 mmHg.  Regurgitation absent.  Leaflets normal.  Leaflet motions normal.    Mitral Valve:  Annulus normal.  Stenosis not present.  Area: 2.4 cm².  Mean Gradient: 1 mmHg.  Regurgitation trace.  Leaflets normal.  Leaflet motions normal.    Tricuspid Valve:  Annulus normal.  Stenosis not present.  Leaflets normal.  Leaflet motions normal.  Pulmonic Valve:  Annulus normal.      Aorta    Ascending Aorta:  Size normal.  Diameter 3 cm.  Dissection not present.  Plaque thickness less than 3 mm.  Mobile plaque not present.  Aortic Arch:  Size normal.  Dissection not present.  Plaque thickness less than 3 mm.  Mobile plaque not present.  Descending Aorta:  Size normal.  Dissection not present.  Plaque thickness less than 3 mm.  Mobile plaque not present.      Atria    Right Atrium:  Size dilated.  Spontaneous echo contrast not present.  Thrombus not present.  Tumor not present.  Device present.    Left Atrium:  Size dilated.  Spontaneous echo contrast not present.  Thrombus not present.  Tumor not present.  Left atrial appendage normal.      Septa        Ventricular Septum:  Intra-ventricular septum morphology normal.    Diastolic Function Measurements:  Diastolic Dysfunction Grade=  E=  ms  A=  ms  E/A Ratio=  1.7  DT=  ms  S/D=  IVRT=    Other Findings  Pericardium:  normal  Pleural Effusion:  none  Pulmonary Arteries:  normal  Pulmonary Venous Flow:  normal    Anesthesia Information  Performed Personally  Anesthesiologist:  George Morrow MD      Echocardiogram Comments:  Informed consent was obtained preoperatively.  Yahir probe passed without difficulty.  Post CABG, MAZE and TORSTEN ligation:   EF unchanged, no NWMA, obliterartion of TORSTEN by clip      I have personally reviewed the above radiology images and read the final radiology report on  12/05/24  ---------------------------------------------------------------------------------------------------------------------  Assessment / Plan     Active Hospital Problems    Diagnosis  POA    **Orthostatic hypotension [I95.1]  Yes       ASSESSMENT/PLAN:  Orthostatic hypotension,  Hold all antihypertensive medication. At his last cardiology appointment on 11/20/2024, Patient's hydrochlorothiazide was discontinued, amlodipine was decreased from 10 to 5 mg, and Entresto was decreased to 24-26 due to hypotension and frequent falls at rehab facility.   Initial troponin 34, repeat 28 and 25  EKG shows normal sinus rhythm  Echo from 10/28/2024 reviewed; obtain an updated TTE  Cardiology has been consulted, recommended admission overnight and they will follow-up with the patient in a.m., input/assistance is much appreciated  Continue fall risk precautions, up with assistance as needed  Continue maintenance IV fluids as long as pressures remain low  Continuous telemetry  Orthostatic blood pressures every shift as tolerated.  Continue monitor closely on telemetry     Chronic Conditions  History of A-fib, s/p MAZE and TORSTEN ligation  CAD, s/p CABG  Type 2 diabetes  Hypothyroidism  GERD  Obstructive sleep apnea  Anxiety  Review and continue home medications for above conditions her med rec once completed by pharmacy.      ----------  -DVT prophylaxis: Subcu heparin  -Activity: Up with assistance as needed  -Expected length of stay: INPATIENT status due to the need for care which can only be reasonably provided in an hospital setting such as aggressive/expedited ancillary services and/or consultation services, the necessity for IV medications, close physician monitoring and/or the possible need for procedures.  In such, I feel patient’s risk for adverse outcomes and need for care warrant INPATIENT evaluation and predict the patient’s care encounter to likely last beyond 2 midnights.    -Disposition: Pending clinical course  and cardiology consult    High risk secondary to orthostatic hypotension, frequent falls.    Code Status and Medical Interventions: CPR (Attempt to Resuscitate); Full Support   Ordered at: 12/05/24 0224     Code Status (Patient has no pulse and is not breathing):    CPR (Attempt to Resuscitate)     Medical Interventions (Patient has pulse or is breathing):    Full Support       Rom Cabello PA-C   12/05/24  04:23 EST

## 2024-12-05 NOTE — PLAN OF CARE
Goal Outcome Evaluation:      Patient admitted this shift from ED. Patient currently resting in bed. A&Ox 4. VSS. No visible s/s of acute distress noted at this time. No requests or complaints at this time. Plan of care ongoing.

## 2024-12-05 NOTE — TELEPHONE ENCOUNTER
Our office received a phone call from Eliud (home health PT). He states patient has been passing out and falling. He was evaluated at [facility] ER over the weekend.  His blood pressure today was 98/58. Patient stated today that he felt like he was going to pass out while PT was working with him getting out of bed.   I advised patient to be evaluated in the ER. Eliud to relay message to patient.

## 2024-12-06 ENCOUNTER — ANESTHESIA (OUTPATIENT)
Dept: PERIOP | Facility: HOSPITAL | Age: 59
End: 2024-12-06
Payer: MEDICARE

## 2024-12-06 ENCOUNTER — ANESTHESIA EVENT CONVERTED (OUTPATIENT)
Dept: ANESTHESIOLOGY | Facility: HOSPITAL | Age: 59
End: 2024-12-06
Payer: MEDICARE

## 2024-12-06 ENCOUNTER — HOSPITAL ENCOUNTER (INPATIENT)
Facility: HOSPITAL | Age: 59
LOS: 4 days | Discharge: HOME | End: 2024-12-10
Attending: THORACIC SURGERY (CARDIOTHORACIC VASCULAR SURGERY) | Admitting: THORACIC SURGERY (CARDIOTHORACIC VASCULAR SURGERY)
Payer: MEDICARE

## 2024-12-06 ENCOUNTER — APPOINTMENT (OUTPATIENT)
Dept: GENERAL RADIOLOGY | Facility: HOSPITAL | Age: 59
End: 2024-12-06
Payer: MEDICARE

## 2024-12-06 ENCOUNTER — ANESTHESIA EVENT (OUTPATIENT)
Dept: PERIOP | Facility: HOSPITAL | Age: 59
End: 2024-12-06
Payer: MEDICARE

## 2024-12-06 ENCOUNTER — ANCILLARY PROCEDURE (OUTPATIENT)
Dept: PERIOP | Facility: HOSPITAL | Age: 59
End: 2024-12-06
Payer: MEDICARE

## 2024-12-06 VITALS
HEART RATE: 87 BPM | BODY MASS INDEX: 31.11 KG/M2 | SYSTOLIC BLOOD PRESSURE: 129 MMHG | DIASTOLIC BLOOD PRESSURE: 82 MMHG | TEMPERATURE: 98.2 F | WEIGHT: 217.3 LBS | RESPIRATION RATE: 18 BRPM | HEIGHT: 70 IN | OXYGEN SATURATION: 96 %

## 2024-12-06 DIAGNOSIS — I97.89 PERICARDIAL EFFUSION AFTER OPERATIVE PROCEDURE: ICD-10-CM

## 2024-12-06 DIAGNOSIS — I48.0 PAROXYSMAL ATRIAL FIBRILLATION: Primary | ICD-10-CM

## 2024-12-06 DIAGNOSIS — I31.39 PERICARDIAL EFFUSION AFTER OPERATIVE PROCEDURE: ICD-10-CM

## 2024-12-06 LAB
ABO GROUP BLD: NORMAL
ANION GAP SERPL CALCULATED.3IONS-SCNC: 12.2 MMOL/L (ref 5–15)
BASOPHILS # BLD AUTO: 0.05 10*3/MM3 (ref 0–0.2)
BASOPHILS NFR BLD AUTO: 0.7 % (ref 0–1.5)
BLD GP AB SCN SERPL QL: NEGATIVE
BUN SERPL-MCNC: 21 MG/DL (ref 8–23)
BUN/CREAT SERPL: 27.3 (ref 7–25)
CALCIUM SPEC-SCNC: 8.7 MG/DL (ref 8.6–10.5)
CHLORIDE SERPL-SCNC: 100 MMOL/L (ref 98–107)
CO2 SERPL-SCNC: 21.8 MMOL/L (ref 22–29)
CORTIS SERPL-MCNC: 11.42 MCG/DL
CREAT SERPL-MCNC: 0.77 MG/DL (ref 0.76–1.27)
DEPRECATED RDW RBC AUTO: 45.5 FL (ref 37–54)
EGFRCR SERPLBLD CKD-EPI 2021: 101.9 ML/MIN/1.73
EOSINOPHIL # BLD AUTO: 0.19 10*3/MM3 (ref 0–0.4)
EOSINOPHIL NFR BLD AUTO: 2.5 % (ref 0.3–6.2)
ERYTHROCYTE [DISTWIDTH] IN BLOOD BY AUTOMATED COUNT: 13.5 % (ref 12.3–15.4)
GLUCOSE BLDC GLUCOMTR-MCNC: 112 MG/DL (ref 70–130)
GLUCOSE BLDC GLUCOMTR-MCNC: 133 MG/DL (ref 70–130)
GLUCOSE BLDC GLUCOMTR-MCNC: 181 MG/DL (ref 70–130)
GLUCOSE SERPL-MCNC: 148 MG/DL (ref 65–99)
HCT VFR BLD AUTO: 35.9 % (ref 37.5–51)
HGB BLD-MCNC: 11.2 G/DL (ref 13–17.7)
IMM GRANULOCYTES # BLD AUTO: 0.05 10*3/MM3 (ref 0–0.05)
IMM GRANULOCYTES NFR BLD AUTO: 0.7 % (ref 0–0.5)
LYMPHOCYTES # BLD AUTO: 1.24 10*3/MM3 (ref 0.7–3.1)
LYMPHOCYTES NFR BLD AUTO: 16.2 % (ref 19.6–45.3)
MCH RBC QN AUTO: 28.7 PG (ref 26.6–33)
MCHC RBC AUTO-ENTMCNC: 31.2 G/DL (ref 31.5–35.7)
MCV RBC AUTO: 92.1 FL (ref 79–97)
MONOCYTES # BLD AUTO: 0.64 10*3/MM3 (ref 0.1–0.9)
MONOCYTES NFR BLD AUTO: 8.4 % (ref 5–12)
NEUTROPHILS NFR BLD AUTO: 5.47 10*3/MM3 (ref 1.7–7)
NEUTROPHILS NFR BLD AUTO: 71.5 % (ref 42.7–76)
NRBC BLD AUTO-RTO: 0 /100 WBC (ref 0–0.2)
PLATELET # BLD AUTO: 297 10*3/MM3 (ref 140–450)
PMV BLD AUTO: 8.8 FL (ref 6–12)
POTASSIUM SERPL-SCNC: 4.3 MMOL/L (ref 3.5–5.2)
RBC # BLD AUTO: 3.9 10*6/MM3 (ref 4.14–5.8)
RH BLD: NEGATIVE
SODIUM SERPL-SCNC: 134 MMOL/L (ref 136–145)
T&S EXPIRATION DATE: NORMAL
WBC NRBC COR # BLD AUTO: 7.64 10*3/MM3 (ref 3.4–10.8)

## 2024-12-06 PROCEDURE — 25010000002 NEOSTIGMINE 10 MG/10ML SOLUTION: Performed by: ANESTHESIOLOGY

## 2024-12-06 PROCEDURE — 33025 INCISION OF HEART SAC: CPT | Performed by: THORACIC SURGERY (CARDIOTHORACIC VASCULAR SURGERY)

## 2024-12-06 PROCEDURE — 87205 SMEAR GRAM STAIN: CPT | Performed by: THORACIC SURGERY (CARDIOTHORACIC VASCULAR SURGERY)

## 2024-12-06 PROCEDURE — 93005 ELECTROCARDIOGRAM TRACING: CPT | Performed by: HOSPITALIST

## 2024-12-06 PROCEDURE — 99291 CRITICAL CARE FIRST HOUR: CPT | Performed by: INTERNAL MEDICINE

## 2024-12-06 PROCEDURE — 25010000002 FENTANYL CITRATE (PF) 100 MCG/2ML SOLUTION: Performed by: ANESTHESIOLOGY

## 2024-12-06 PROCEDURE — 25010000002 SUGAMMADEX 200 MG/2ML SOLUTION: Performed by: ANESTHESIOLOGY

## 2024-12-06 PROCEDURE — 0W9D0ZZ DRAINAGE OF PERICARDIAL CAVITY, OPEN APPROACH: ICD-10-PCS | Performed by: THORACIC SURGERY (CARDIOTHORACIC VASCULAR SURGERY)

## 2024-12-06 PROCEDURE — 71045 X-RAY EXAM CHEST 1 VIEW: CPT

## 2024-12-06 PROCEDURE — 25010000002 NICARDIPINE 2.5 MG/ML SOLUTION 10 ML VIAL: Performed by: PHYSICIAN ASSISTANT

## 2024-12-06 PROCEDURE — 86923 COMPATIBILITY TEST ELECTRIC: CPT

## 2024-12-06 PROCEDURE — 93010 ELECTROCARDIOGRAM REPORT: CPT | Performed by: INTERNAL MEDICINE

## 2024-12-06 PROCEDURE — 25010000002 FENTANYL CITRATE (PF) 50 MCG/ML SOLUTION

## 2024-12-06 PROCEDURE — 25010000002 ONDANSETRON PER 1 MG: Performed by: ANESTHESIOLOGY

## 2024-12-06 PROCEDURE — 25010000002 PHENYLEPHRINE 10 MG/ML SOLUTION: Performed by: ANESTHESIOLOGY

## 2024-12-06 PROCEDURE — 80048 BASIC METABOLIC PNL TOTAL CA: CPT | Performed by: HOSPITALIST

## 2024-12-06 PROCEDURE — 25010000002 CEFAZOLIN PER 500 MG: Performed by: PHYSICIAN ASSISTANT

## 2024-12-06 PROCEDURE — 82948 REAGENT STRIP/BLOOD GLUCOSE: CPT

## 2024-12-06 PROCEDURE — 87015 SPECIMEN INFECT AGNT CONCNTJ: CPT | Performed by: THORACIC SURGERY (CARDIOTHORACIC VASCULAR SURGERY)

## 2024-12-06 PROCEDURE — 25010000002 GLYCOPYRROLATE 1 MG/5ML SOLUTION: Performed by: ANESTHESIOLOGY

## 2024-12-06 PROCEDURE — 25810000003 LACTATED RINGERS PER 1000 ML: Performed by: ANESTHESIOLOGY

## 2024-12-06 PROCEDURE — 25810000003 SODIUM CHLORIDE 0.9 % SOLUTION: Performed by: PHYSICIAN ASSISTANT

## 2024-12-06 PROCEDURE — 87206 SMEAR FLUORESCENT/ACID STAI: CPT | Performed by: THORACIC SURGERY (CARDIOTHORACIC VASCULAR SURGERY)

## 2024-12-06 PROCEDURE — 82533 TOTAL CORTISOL: CPT | Performed by: HOSPITALIST

## 2024-12-06 PROCEDURE — 63710000001 INSULIN LISPRO (HUMAN) PER 5 UNITS: Performed by: PHYSICIAN ASSISTANT

## 2024-12-06 PROCEDURE — 25010000002 NICARDIPINE 2.5 MG/ML SOLUTION

## 2024-12-06 PROCEDURE — 25010000002 HYDROMORPHONE 1 MG/ML SOLUTION

## 2024-12-06 PROCEDURE — 86850 RBC ANTIBODY SCREEN: CPT | Performed by: PHYSICIAN ASSISTANT

## 2024-12-06 PROCEDURE — 87102 FUNGUS ISOLATION CULTURE: CPT | Performed by: THORACIC SURGERY (CARDIOTHORACIC VASCULAR SURGERY)

## 2024-12-06 PROCEDURE — 86901 BLOOD TYPING SEROLOGIC RH(D): CPT | Performed by: PHYSICIAN ASSISTANT

## 2024-12-06 PROCEDURE — 87070 CULTURE OTHR SPECIMN AEROBIC: CPT | Performed by: THORACIC SURGERY (CARDIOTHORACIC VASCULAR SURGERY)

## 2024-12-06 PROCEDURE — 25010000002 PROPOFOL 10 MG/ML EMULSION: Performed by: ANESTHESIOLOGY

## 2024-12-06 PROCEDURE — 25010000002 NALOXONE PER 1 MG: Performed by: ANESTHESIOLOGY

## 2024-12-06 PROCEDURE — 25010000002 DEXAMETHASONE PER 1 MG: Performed by: ANESTHESIOLOGY

## 2024-12-06 PROCEDURE — 93318 ECHO TRANSESOPHAGEAL INTRAOP: CPT | Performed by: ANESTHESIOLOGY

## 2024-12-06 PROCEDURE — 86900 BLOOD TYPING SEROLOGIC ABO: CPT | Performed by: PHYSICIAN ASSISTANT

## 2024-12-06 PROCEDURE — 33025 INCISION OF HEART SAC: CPT | Performed by: PHYSICIAN ASSISTANT

## 2024-12-06 PROCEDURE — 25810000003 SODIUM CHLORIDE 0.9 % SOLUTION 250 ML FLEX CONT: Performed by: PHYSICIAN ASSISTANT

## 2024-12-06 PROCEDURE — 93005 ELECTROCARDIOGRAM TRACING: CPT | Performed by: THORACIC SURGERY (CARDIOTHORACIC VASCULAR SURGERY)

## 2024-12-06 PROCEDURE — 99239 HOSP IP/OBS DSCHRG MGMT >30: CPT | Performed by: HOSPITALIST

## 2024-12-06 PROCEDURE — 25010000002 LIDOCAINE (CARDIAC): Performed by: ANESTHESIOLOGY

## 2024-12-06 PROCEDURE — S0260 H&P FOR SURGERY: HCPCS | Performed by: PHYSICIAN ASSISTANT

## 2024-12-06 PROCEDURE — 97162 PT EVAL MOD COMPLEX 30 MIN: CPT

## 2024-12-06 PROCEDURE — 87076 CULTURE ANAEROBE IDENT EACH: CPT | Performed by: THORACIC SURGERY (CARDIOTHORACIC VASCULAR SURGERY)

## 2024-12-06 PROCEDURE — 87116 MYCOBACTERIA CULTURE: CPT | Performed by: THORACIC SURGERY (CARDIOTHORACIC VASCULAR SURGERY)

## 2024-12-06 PROCEDURE — 85025 COMPLETE CBC W/AUTO DIFF WBC: CPT | Performed by: HOSPITALIST

## 2024-12-06 PROCEDURE — 87075 CULTR BACTERIA EXCEPT BLOOD: CPT | Performed by: THORACIC SURGERY (CARDIOTHORACIC VASCULAR SURGERY)

## 2024-12-06 RX ORDER — SOTALOL HYDROCHLORIDE 120 MG/1
120 TABLET ORAL 2 TIMES DAILY
Status: DISCONTINUED | OUTPATIENT
Start: 2024-12-06 | End: 2024-12-10 | Stop reason: HOSPADM

## 2024-12-06 RX ORDER — SODIUM CHLORIDE 9 MG/ML
30 INJECTION, SOLUTION INTRAVENOUS CONTINUOUS
Status: ACTIVE | OUTPATIENT
Start: 2024-12-06 | End: 2024-12-07

## 2024-12-06 RX ORDER — BISACODYL 10 MG
10 SUPPOSITORY, RECTAL RECTAL DAILY PRN
Status: DISCONTINUED | OUTPATIENT
Start: 2024-12-06 | End: 2024-12-10 | Stop reason: HOSPADM

## 2024-12-06 RX ORDER — ONDANSETRON 2 MG/ML
4 INJECTION INTRAMUSCULAR; INTRAVENOUS EVERY 6 HOURS PRN
Status: DISCONTINUED | OUTPATIENT
Start: 2024-12-06 | End: 2024-12-10 | Stop reason: HOSPADM

## 2024-12-06 RX ORDER — LACTULOSE 10 G/15ML
30 SOLUTION ORAL 2 TIMES DAILY PRN
Status: DISCONTINUED | OUTPATIENT
Start: 2024-12-06 | End: 2024-12-10 | Stop reason: HOSPADM

## 2024-12-06 RX ORDER — FINASTERIDE 5 MG/1
5 TABLET, FILM COATED ORAL DAILY
Status: DISCONTINUED | OUTPATIENT
Start: 2024-12-07 | End: 2024-12-10 | Stop reason: HOSPADM

## 2024-12-06 RX ORDER — FENTANYL CITRATE 50 UG/ML
INJECTION, SOLUTION INTRAMUSCULAR; INTRAVENOUS
Status: COMPLETED
Start: 2024-12-06 | End: 2024-12-06

## 2024-12-06 RX ORDER — GLYCOPYRROLATE 0.2 MG/ML
INJECTION INTRAMUSCULAR; INTRAVENOUS AS NEEDED
Status: DISCONTINUED | OUTPATIENT
Start: 2024-12-06 | End: 2024-12-06 | Stop reason: SURG

## 2024-12-06 RX ORDER — ROCURONIUM BROMIDE 10 MG/ML
INJECTION, SOLUTION INTRAVENOUS AS NEEDED
Status: DISCONTINUED | OUTPATIENT
Start: 2024-12-06 | End: 2024-12-06 | Stop reason: SURG

## 2024-12-06 RX ORDER — INSULIN LISPRO 100 [IU]/ML
2-9 INJECTION, SOLUTION INTRAVENOUS; SUBCUTANEOUS
Status: CANCELLED | OUTPATIENT
Start: 2024-12-06

## 2024-12-06 RX ORDER — NITROGLYCERIN 20 MG/100ML
5-200 INJECTION INTRAVENOUS
Status: DISCONTINUED | OUTPATIENT
Start: 2024-12-06 | End: 2024-12-09

## 2024-12-06 RX ORDER — ROSUVASTATIN CALCIUM 20 MG/1
20 TABLET, COATED ORAL DAILY
Status: DISCONTINUED | OUTPATIENT
Start: 2024-12-06 | End: 2024-12-10 | Stop reason: HOSPADM

## 2024-12-06 RX ORDER — DROPERIDOL 2.5 MG/ML
0.62 INJECTION, SOLUTION INTRAMUSCULAR; INTRAVENOUS ONCE AS NEEDED
Status: DISCONTINUED | OUTPATIENT
Start: 2024-12-06 | End: 2024-12-06 | Stop reason: HOSPADM

## 2024-12-06 RX ORDER — DEXAMETHASONE SODIUM PHOSPHATE 4 MG/ML
INJECTION, SOLUTION INTRA-ARTICULAR; INTRALESIONAL; INTRAMUSCULAR; INTRAVENOUS; SOFT TISSUE AS NEEDED
Status: DISCONTINUED | OUTPATIENT
Start: 2024-12-06 | End: 2024-12-06 | Stop reason: SURG

## 2024-12-06 RX ORDER — GABAPENTIN 100 MG/1
200 CAPSULE ORAL
Status: DISCONTINUED | OUTPATIENT
Start: 2024-12-07 | End: 2024-12-10 | Stop reason: HOSPADM

## 2024-12-06 RX ORDER — ONDANSETRON 4 MG/1
4 TABLET, ORALLY DISINTEGRATING ORAL EVERY 6 HOURS PRN
Status: DISCONTINUED | OUTPATIENT
Start: 2024-12-06 | End: 2024-12-10 | Stop reason: HOSPADM

## 2024-12-06 RX ORDER — TAMSULOSIN HYDROCHLORIDE 0.4 MG/1
0.4 CAPSULE ORAL DAILY
Status: DISCONTINUED | OUTPATIENT
Start: 2024-12-07 | End: 2024-12-10 | Stop reason: HOSPADM

## 2024-12-06 RX ORDER — NITROGLYCERIN 0.4 MG/1
0.4 TABLET SUBLINGUAL
Status: DISCONTINUED | OUTPATIENT
Start: 2024-12-06 | End: 2024-12-10 | Stop reason: HOSPADM

## 2024-12-06 RX ORDER — FENTANYL CITRATE 50 UG/ML
INJECTION, SOLUTION INTRAMUSCULAR; INTRAVENOUS AS NEEDED
Status: DISCONTINUED | OUTPATIENT
Start: 2024-12-06 | End: 2024-12-06 | Stop reason: SURG

## 2024-12-06 RX ORDER — GABAPENTIN 300 MG/1
300 CAPSULE ORAL NIGHTLY
Status: DISCONTINUED | OUTPATIENT
Start: 2024-12-07 | End: 2024-12-10 | Stop reason: HOSPADM

## 2024-12-06 RX ORDER — BUPROPION HYDROCHLORIDE 150 MG/1
300 TABLET ORAL DAILY
Status: DISCONTINUED | OUTPATIENT
Start: 2024-12-07 | End: 2024-12-10 | Stop reason: HOSPADM

## 2024-12-06 RX ORDER — DOCUSATE SODIUM 100 MG/1
100 CAPSULE, LIQUID FILLED ORAL 2 TIMES DAILY PRN
Status: DISCONTINUED | OUTPATIENT
Start: 2024-12-06 | End: 2024-12-10 | Stop reason: HOSPADM

## 2024-12-06 RX ORDER — EPHEDRINE SULFATE 50 MG/ML
INJECTION INTRAVENOUS AS NEEDED
Status: DISCONTINUED | OUTPATIENT
Start: 2024-12-06 | End: 2024-12-06 | Stop reason: SURG

## 2024-12-06 RX ORDER — FENTANYL CITRATE 50 UG/ML
50 INJECTION, SOLUTION INTRAMUSCULAR; INTRAVENOUS
Status: DISCONTINUED | OUTPATIENT
Start: 2024-12-06 | End: 2024-12-06 | Stop reason: HOSPADM

## 2024-12-06 RX ORDER — PHENYLEPHRINE HYDROCHLORIDE 10 MG/ML
INJECTION INTRAVENOUS AS NEEDED
Status: DISCONTINUED | OUTPATIENT
Start: 2024-12-06 | End: 2024-12-06 | Stop reason: SURG

## 2024-12-06 RX ORDER — ASPIRIN 325 MG
325 TABLET, DELAYED RELEASE (ENTERIC COATED) ORAL DAILY
Status: DISCONTINUED | OUTPATIENT
Start: 2024-12-07 | End: 2024-12-10 | Stop reason: HOSPADM

## 2024-12-06 RX ORDER — PROPOFOL 10 MG/ML
VIAL (ML) INTRAVENOUS AS NEEDED
Status: DISCONTINUED | OUTPATIENT
Start: 2024-12-06 | End: 2024-12-06 | Stop reason: SURG

## 2024-12-06 RX ORDER — LEVOTHYROXINE SODIUM 50 UG/1
50 TABLET ORAL
Status: DISCONTINUED | OUTPATIENT
Start: 2024-12-07 | End: 2024-12-10 | Stop reason: HOSPADM

## 2024-12-06 RX ORDER — LABETALOL HYDROCHLORIDE 5 MG/ML
10 INJECTION, SOLUTION INTRAVENOUS
Status: DISCONTINUED | OUTPATIENT
Start: 2024-12-06 | End: 2024-12-09

## 2024-12-06 RX ORDER — ETOMIDATE 2 MG/ML
INJECTION INTRAVENOUS AS NEEDED
Status: DISCONTINUED | OUTPATIENT
Start: 2024-12-06 | End: 2024-12-06 | Stop reason: SURG

## 2024-12-06 RX ORDER — ONDANSETRON 2 MG/ML
INJECTION INTRAMUSCULAR; INTRAVENOUS AS NEEDED
Status: DISCONTINUED | OUTPATIENT
Start: 2024-12-06 | End: 2024-12-06 | Stop reason: SURG

## 2024-12-06 RX ORDER — HYDROCODONE BITARTRATE AND ACETAMINOPHEN 5; 325 MG/1; MG/1
1 TABLET ORAL EVERY 6 HOURS PRN
Status: DISCONTINUED | OUTPATIENT
Start: 2024-12-06 | End: 2024-12-10 | Stop reason: HOSPADM

## 2024-12-06 RX ORDER — NICARDIPINE HYDROCHLORIDE 2.5 MG/ML
INJECTION INTRAVENOUS
Status: COMPLETED
Start: 2024-12-06 | End: 2024-12-06

## 2024-12-06 RX ORDER — POLYETHYLENE GLYCOL 3350 17 G/17G
17 POWDER, FOR SOLUTION ORAL DAILY
Status: DISCONTINUED | OUTPATIENT
Start: 2024-12-06 | End: 2024-12-10 | Stop reason: HOSPADM

## 2024-12-06 RX ORDER — HYDROMORPHONE HYDROCHLORIDE 1 MG/ML
0.5 INJECTION, SOLUTION INTRAMUSCULAR; INTRAVENOUS; SUBCUTANEOUS
Status: DISCONTINUED | OUTPATIENT
Start: 2024-12-06 | End: 2024-12-06 | Stop reason: HOSPADM

## 2024-12-06 RX ORDER — COLCHICINE 0.6 MG/1
0.6 TABLET ORAL EVERY 12 HOURS SCHEDULED
Status: DISCONTINUED | OUTPATIENT
Start: 2024-12-07 | End: 2024-12-10 | Stop reason: HOSPADM

## 2024-12-06 RX ORDER — GLIPIZIDE 5 MG/1
5 TABLET ORAL
Status: DISCONTINUED | OUTPATIENT
Start: 2024-12-07 | End: 2024-12-09

## 2024-12-06 RX ORDER — INSULIN LISPRO 100 [IU]/ML
2-9 INJECTION, SOLUTION INTRAVENOUS; SUBCUTANEOUS
Status: DISCONTINUED | OUTPATIENT
Start: 2024-12-06 | End: 2024-12-07

## 2024-12-06 RX ORDER — HYDRALAZINE HYDROCHLORIDE 20 MG/ML
10 INJECTION INTRAMUSCULAR; INTRAVENOUS EVERY 6 HOURS PRN
Status: DISCONTINUED | OUTPATIENT
Start: 2024-12-06 | End: 2024-12-06

## 2024-12-06 RX ORDER — NEOSTIGMINE METHYLSULFATE 1 MG/ML
INJECTION INTRAVENOUS AS NEEDED
Status: DISCONTINUED | OUTPATIENT
Start: 2024-12-06 | End: 2024-12-06 | Stop reason: SURG

## 2024-12-06 RX ORDER — OMEGA-3S/DHA/EPA/FISH OIL/D3 300MG-1000
400 CAPSULE ORAL DAILY
Status: DISCONTINUED | OUTPATIENT
Start: 2024-12-07 | End: 2024-12-10 | Stop reason: HOSPADM

## 2024-12-06 RX ORDER — DULOXETIN HYDROCHLORIDE 60 MG/1
60 CAPSULE, DELAYED RELEASE ORAL DAILY
Status: DISCONTINUED | OUTPATIENT
Start: 2024-12-07 | End: 2024-12-10 | Stop reason: HOSPADM

## 2024-12-06 RX ORDER — HYDRALAZINE HYDROCHLORIDE 20 MG/ML
20 INJECTION INTRAMUSCULAR; INTRAVENOUS EVERY 4 HOURS PRN
Status: DISCONTINUED | OUTPATIENT
Start: 2024-12-06 | End: 2024-12-09

## 2024-12-06 RX ORDER — SODIUM CHLORIDE, SODIUM LACTATE, POTASSIUM CHLORIDE, CALCIUM CHLORIDE 600; 310; 30; 20 MG/100ML; MG/100ML; MG/100ML; MG/100ML
INJECTION, SOLUTION INTRAVENOUS CONTINUOUS PRN
Status: DISCONTINUED | OUTPATIENT
Start: 2024-12-06 | End: 2024-12-06 | Stop reason: SURG

## 2024-12-06 RX ORDER — SUCCINYLCHOLINE/SOD CL,ISO/PF 200MG/10ML
SYRINGE (ML) INTRAVENOUS AS NEEDED
Status: DISCONTINUED | OUTPATIENT
Start: 2024-12-06 | End: 2024-12-06 | Stop reason: SURG

## 2024-12-06 RX ORDER — MORPHINE SULFATE 2 MG/ML
2 INJECTION, SOLUTION INTRAMUSCULAR; INTRAVENOUS
Status: DISPENSED | OUTPATIENT
Start: 2024-12-06 | End: 2024-12-07

## 2024-12-06 RX ORDER — AMOXICILLIN 250 MG
2 CAPSULE ORAL NIGHTLY
Status: DISCONTINUED | OUTPATIENT
Start: 2024-12-06 | End: 2024-12-10 | Stop reason: HOSPADM

## 2024-12-06 RX ORDER — NALOXONE HYDROCHLORIDE 0.4 MG/ML
INJECTION, SOLUTION INTRAMUSCULAR; INTRAVENOUS; SUBCUTANEOUS AS NEEDED
Status: DISCONTINUED | OUTPATIENT
Start: 2024-12-06 | End: 2024-12-06 | Stop reason: SURG

## 2024-12-06 RX ORDER — FAMOTIDINE 10 MG/ML
20 INJECTION, SOLUTION INTRAVENOUS ONCE
Status: COMPLETED | OUTPATIENT
Start: 2024-12-06 | End: 2024-12-06

## 2024-12-06 RX ORDER — COLCHICINE 0.6 MG/1
1.2 TABLET ORAL EVERY 12 HOURS SCHEDULED
Status: COMPLETED | OUTPATIENT
Start: 2024-12-06 | End: 2024-12-07

## 2024-12-06 RX ADMIN — SOTALOL HYDROCHLORIDE 120 MG: 120 TABLET ORAL at 21:26

## 2024-12-06 RX ADMIN — SODIUM CHLORIDE, POTASSIUM CHLORIDE, SODIUM LACTATE AND CALCIUM CHLORIDE: 600; 310; 30; 20 INJECTION, SOLUTION INTRAVENOUS at 17:30

## 2024-12-06 RX ADMIN — NICARDIPINE HYDROCHLORIDE: 25 INJECTION INTRAVENOUS at 21:27

## 2024-12-06 RX ADMIN — GLYCOPYRROLATE 0.2 MG: 0.2 INJECTION, SOLUTION INTRAMUSCULAR; INTRAVENOUS at 18:34

## 2024-12-06 RX ADMIN — ROCURONIUM BROMIDE 10 MG: 10 INJECTION INTRAVENOUS at 17:54

## 2024-12-06 RX ADMIN — FENTANYL CITRATE 50 MCG: 50 INJECTION, SOLUTION INTRAMUSCULAR; INTRAVENOUS at 19:49

## 2024-12-06 RX ADMIN — PHENYLEPHRINE HYDROCHLORIDE 200 MCG: 10 INJECTION INTRAVENOUS at 17:57

## 2024-12-06 RX ADMIN — GABAPENTIN 200 MG: 100 CAPSULE ORAL at 05:15

## 2024-12-06 RX ADMIN — INSULIN LISPRO 2 UNITS: 100 INJECTION, SOLUTION INTRAVENOUS; SUBCUTANEOUS at 21:25

## 2024-12-06 RX ADMIN — FAMOTIDINE 20 MG: 10 INJECTION, SOLUTION INTRAVENOUS at 16:33

## 2024-12-06 RX ADMIN — COLCHICINE 1.2 MG: 0.6 TABLET, FILM COATED ORAL at 21:33

## 2024-12-06 RX ADMIN — SODIUM CHLORIDE 2000 MG: 900 INJECTION INTRAVENOUS at 17:52

## 2024-12-06 RX ADMIN — ETOMIDATE INJECTION 18 MG: 2 SOLUTION INTRAVENOUS at 17:54

## 2024-12-06 RX ADMIN — SENNOSIDES AND DOCUSATE SODIUM 2 TABLET: 50; 8.6 TABLET ORAL at 21:26

## 2024-12-06 RX ADMIN — LEVOTHYROXINE SODIUM 50 MCG: 0.05 TABLET ORAL at 05:15

## 2024-12-06 RX ADMIN — DEXAMETHASONE SODIUM PHOSPHATE 4 MG: 4 INJECTION INTRA-ARTICULAR; INTRALESIONAL; INTRAMUSCULAR; INTRAVENOUS; SOFT TISSUE at 17:57

## 2024-12-06 RX ADMIN — Medication 10 ML: at 10:07

## 2024-12-06 RX ADMIN — HYDROCODONE BITARTRATE AND ACETAMINOPHEN 1 TABLET: 5; 325 TABLET ORAL at 21:25

## 2024-12-06 RX ADMIN — NALXONE HYDROCHLORIDE 0.04 MG: 0.4 INJECTION INTRAMUSCULAR; INTRAVENOUS; SUBCUTANEOUS at 19:09

## 2024-12-06 RX ADMIN — SUGAMMADEX 200 MG: 100 INJECTION, SOLUTION INTRAVENOUS at 19:07

## 2024-12-06 RX ADMIN — EPHEDRINE SULFATE 10 MG: 50 INJECTION INTRAVENOUS at 18:02

## 2024-12-06 RX ADMIN — PHENYLEPHRINE HYDROCHLORIDE 200 MCG: 10 INJECTION INTRAVENOUS at 18:19

## 2024-12-06 RX ADMIN — NALXONE HYDROCHLORIDE 0.04 MG: 0.4 INJECTION INTRAMUSCULAR; INTRAVENOUS; SUBCUTANEOUS at 19:07

## 2024-12-06 RX ADMIN — LIDOCAINE HYDROCHLORIDE 100 MG: 20 INJECTION INTRAVENOUS at 17:54

## 2024-12-06 RX ADMIN — SODIUM CHLORIDE 30 ML/HR: 9 INJECTION, SOLUTION INTRAVENOUS at 21:04

## 2024-12-06 RX ADMIN — FENTANYL CITRATE 100 MCG: 50 INJECTION, SOLUTION INTRAMUSCULAR; INTRAVENOUS at 17:50

## 2024-12-06 RX ADMIN — PHENYLEPHRINE HYDROCHLORIDE 200 MCG: 10 INJECTION INTRAVENOUS at 18:02

## 2024-12-06 RX ADMIN — ONDANSETRON 4 MG: 2 INJECTION INTRAMUSCULAR; INTRAVENOUS at 18:30

## 2024-12-06 RX ADMIN — POLYETHYLENE GLYCOL 3350 17 G: 17 POWDER, FOR SOLUTION ORAL at 21:24

## 2024-12-06 RX ADMIN — EPHEDRINE SULFATE 5 MG: 50 INJECTION INTRAVENOUS at 17:57

## 2024-12-06 RX ADMIN — NEOSTIGMINE 1 MG: 1 INJECTION INTRAVENOUS at 18:34

## 2024-12-06 RX ADMIN — HYDROMORPHONE HYDROCHLORIDE 0.5 MG: 1 INJECTION, SOLUTION INTRAMUSCULAR; INTRAVENOUS; SUBCUTANEOUS at 19:55

## 2024-12-06 RX ADMIN — SODIUM CHLORIDE 5 MG/HR: 9 INJECTION, SOLUTION INTRAVENOUS at 19:49

## 2024-12-06 RX ADMIN — PROPOFOL 50 MG: 10 INJECTION, EMULSION INTRAVENOUS at 17:54

## 2024-12-06 RX ADMIN — Medication 200 MG: at 17:54

## 2024-12-06 RX ADMIN — ROSUVASTATIN 20 MG: 20 TABLET, FILM COATED ORAL at 21:26

## 2024-12-06 RX ADMIN — EPHEDRINE SULFATE 10 MG: 50 INJECTION INTRAVENOUS at 18:38

## 2024-12-06 NOTE — NURSING NOTE
Notified by [facility] that there is a bed available, Patient will go to room 636, [facility]. Report called to Guerline DALE. Patient wife notified by patient of transfer. All belongings accounted for. Air evac scheduled for transport.

## 2024-12-06 NOTE — PAYOR COMM NOTE
"CONTACT:  JEFFREY HAZEL RN  UTILIZATION MANAGEMENT DEPT.   [facility]   [address] [facility], [address]   PHONE:  [phone]   FAX: [phone]         DC TO [facility]--AUTH PENDING    REF # [id number]            BrendaJoel martinez (59 y.o. Male)       Date of Birth   1965    Social Security Number       Address   [address] [place] [address]    Home Phone   [phone]    MRN   [id number]       Congregational   [employer]    Marital Status                               Admission Date   12/4/24    Admission Type   Emergency    Admitting Provider   Mick Bañuelos MD    Attending Provider       Department, Room/Bed   [facility], 3304/2S       Discharge Date   12/6/2024    Discharge Disposition   Short Term Hospital (DC - External)    Discharge Destination   Other                              Attending Provider: (none)   Allergies: No Known Allergies    Isolation: None   Infection: None   Code Status: CPR    Ht: 176.8 cm (69.6\")   Wt: 98.6 kg (217 lb 4.8 oz)    Admission Cmt: None   Principal Problem: Orthostatic hypotension [I95.1]                   Active Insurance as of 12/4/2024       Primary Coverage       Payor Plan Insurance Group Employer/Plan Group    ANTHEM MEDICARE REPLACEMENT ANTHEM MEDICARE ADVANTAGE [id number]       Payor Plan Address Payor Plan Phone Number Payor Plan Fax Number Effective Dates    [address] [phone]  1/1/2024 - None Entered    [facility] [id number]         Subscriber Name Subscriber Birth Date Member ID       BRENDAJOEL MARTINEZ ADAN 1965 [id number]                     Emergency Contacts        (Rel.) Home Phone Work Phone Mobile Phone    LISHA GUTIERREZ (Spouse) [phone] -- [phone]                 Discharge Summary        Daria Rowe MD at 12/05/24 1821              [facility] HOSPITALIST MEDICINE DISCHARGE SUMMARY    Patient Identification:  Name:  Joel ARELLANO " Iraj  Age:  59 y.o.  Sex:  male  :  1965  MRN:  [id number]  Visit Number:  [id number]    Date of Admission: 2024  Date of Discharge: 2024  DISCHARGE DISPOSITION   Transfer to [facility] under Dr. Max service  PCP: Edmundo Boston MD    DISCHARGE DIAGNOSIS :    HOSPITAL COURSE  Patient is a 59 y.o. male presented to [facility] complaining of significant orthostasis, patient had recent CABG, maze and TORSTEN L on 2024.  During clinic follow-up patient noted to be dizzy and difficult orthostatic hypotension with near syncope despite withholding his cardiac medications.  Because of this he was admitted, on admission patient was placed on gentle hydration, cardiology was consulted, 2D echo was ordered, results showing normal ejection fraction with mild concentric hypertrophy, there was more than 2 cm pericardial effusion adjacent to the left ventricle with early diastolic collapse of the right atrium.  Patient was hemodynamically stable, Dr. Alba discussed with Dr. Max.  Please see the admitting history and physical for further details.      VITAL SIGNS:      24  1829 24  0445 24  0538   Weight: 97.5 kg (215 lb) 95.2 kg (209 lb 12.8 oz) 95.2 kg (209 lb 12.8 oz) (new admit weight less than 24 hours)     Body mass index is 30.45 kg/m².  Vitals:    24 1601   BP: 120/80   Pulse:    Resp: 16   Temp: 97.3 °F (36.3 °C)   SpO2:      PHYSICAL EXAM:  General: Comfortable,awake, alert, oriented to self, place, and time, well-developed and well-nourished.  No respiratory distress.    Skin:  Skin is warm and dry. No rash noted. No pallor.    HENT:  Head:  Normocephalic and atraumatic.  Mouth:  Moist mucous membranes.    Eyes:  Conjunctivae and EOM are normal.  Pupils are equal, round, and reactive to light.  No scleral icterus.    Neck:  Neck supple.  No JVD present.  He does not appear to have any JVD.  Or hepatojugular reflux  Pulmonary/Chest:   No respiratory distress, no wheezes, no crackles, with normal breath sounds and good air movement.  Sternotomy scar  Cardiovascular:  Normal rate, regular rhythm and normal heart sounds with no murmur.  Abdominal:  Soft.  Bowel sounds are normal.  No distension and no tenderness.   Extremities: Trace edema both lower extremity, no tenderness, and no deformity.  No red or swollen joints anywhere.  Strong pulses in all 4 extremities with no clubbing, no cyanosis,   Neurological:  Motor strength equal no obvious deficit, sensory grossly intact.   No cranial nerve deficit.  No tongue deviation.  No facial droop.  No slurred speech.    Genitourinary: No Prado catheter  Back:  ----------    DISCHARGE MEDICATIONS:     Discharge Medications        Continue These Medications        Instructions Start Date   aspirin 325 MG EC tablet   325 mg, Oral, Daily      buPROPion  MG 24 hr tablet  Commonly known as: WELLBUTRIN XL   300 mg, Oral, Daily      docusate sodium 100 MG capsule  Commonly known as: COLACE   100 mg, Oral, 2 Times Daily PRN      DULoxetine 60 MG capsule  Commonly known as: CYMBALTA   60 mg, Oral, Daily      empagliflozin 25 MG tablet tablet  Commonly known as: JARDIANCE   25 mg, Oral, Daily      finasteride 5 MG tablet  Commonly known as: PROSCAR   TAKE 1 TABLET EVERY DAY      gabapentin 100 MG capsule  Commonly known as: NEURONTIN   200 mg, Oral, Every Early Morning      gabapentin 100 MG capsule  Commonly known as: NEURONTIN   300 mg, Oral, Nightly      glimepiride 2 MG tablet  Commonly known as: AMARYL   4 mg, Oral, 2 Times Daily      HYDROcodone-acetaminophen  MG per tablet  Commonly known as: NORCO   1 tablet, Oral, Every 8 Hours PRN      insulin glargine 100 UNIT/ML injection  Commonly known as: LANTUS, SEMGLEE   20 Units, Subcutaneous, Daily      Insulin Lispro 100 UNIT/ML injection  Commonly known as: humaLOG   2-9 Units, Subcutaneous, 4 Times Daily Before Meals & Nightly      Insulin Lispro  100 UNIT/ML injection  Commonly known as: humaLOG   2 Units, Subcutaneous, 3 Times Daily With Meals      lactulose 10 GM/15ML solution  Commonly known as: CHRONULAC   30 g, Oral, 2 Times Daily PRN      levothyroxine 50 MCG tablet  Commonly known as: SYNTHROID, LEVOTHROID   50 mcg, Oral, Every Early Morning      nitroglycerin 0.4 MG SL tablet  Commonly known as: NITROSTAT   0.4 mg, Sublingual, Every 5 Minutes PRN, Take no more than 3 doses in 15 minutes.      rosuvastatin 20 MG tablet  Commonly known as: CRESTOR   20 mg, Oral, Daily      sotalol 120 MG tablet  Commonly known as: BETAPACE   120 mg, Oral, 2 Times Daily      tamsulosin 0.4 MG capsule 24 hr capsule  Commonly known as: FLOMAX   TAKE 1 CAPSULE EVERY DAY      Vitamin D (Cholecalciferol) 10 MCG (400 UNIT) tablet  Commonly known as: CHOLECALCIFEROL   400 Units, Oral, Daily             Stop These Medications      Entresto 24-26 MG tablet  Generic drug: sacubitril-valsartan     furosemide 20 MG tablet  Commonly known as: LASIX                Your Scheduled Appointments      Dec 06, 2024 1:45 PM  Follow Up with Musa Fishman DO  [facility] CARDIOLOGY ([facility] [address] [place] [address] [phone]   -Bring photo ID, insurance card, and list of medications to appointment  -If testing was completed outside of [facility] then patient must bring images on a disc  -Copay will be collected at time of appointment  -Established patients should arrive 15 minutes prior to appointment         Dec 10, 2024 2:30 PM  Post-Op with ZIA Garcia  [facility] CARDIOTHORACIC SURGERY ([address]  [facility] [address] [phone]   -Bring photo ID, insurance card, and list of medications to appointment  -If testing was completed outside of [facility] then patient must bring images on a disc  -Copay will be collected at time of appointment         Jan 14, 2025 10:30 AM  Follow Up  with ZIA Flynn  [facility] CARDIOLOGY ([place]) [address] [place] [address] [phone]   -Bring photo ID, insurance card, and list of medications to appointment  -If testing was completed outside of [facility] then patient must bring images on a disc  -Copay will be collected at time of appointment  -Established patients should arrive 10 minutes prior to appointment         Jun 06, 2025 1:45 PM  Follow Up with Musa Fishman DO  [facility] CARDIOLOGY ([place]) [address] [place] [address] [phone]   -Bring photo ID, insurance card, and list of medications to appointment  -If testing was completed outside of [facility] then patient must bring images on a disc  -Copay will be collected at time of appointment  -Established patients should arrive 15 minutes prior to appointment                 Follow-up Information       Edmundo Boston MD .    Specialty: Family Medicine  Contact information:  [address] [place] [address] [phone]                                  Saturnino Harmon MD  12/05/24  18:21 EST    Please note that this discharge summary required more than 30 minutes to complete.      Electronically signed by Saturnino Harmon MD at 12/05/24 1837       Discharge Order (From admission, onward)       Start     Ordered    12/06/24 1040  Discharge patient  Once        Expected Discharge Date: 12/06/24   Discharge Disposition: Short Term Hospital (DC - External)   Physician of Record for Attribution - Please select from Treatment Team: SATURNINO HARMON [[id number]]   Review needed by CMO to determine Physician of Record: No      Question Answer Comment   Physician of Record for Attribution - Please select from Treatment Team SATURNINO HARMON    Review needed by CMO to determine Physician of Record No        12/06/24 1039    12/05/24 1820  Discharge patient  Once,   Status:  Canceled        Expected  Discharge Date: 12/05/24   Discharge Disposition: Short Term Hospital (DC - External)   Physician of Record for Attribution - Please select from Treatment Team: SATURNINO HARMON [[id number]]   Review needed by CMO to determine Physician of Record: No      Question Answer Comment   Physician of Record for Attribution - Please select from Treatment Team SATURNINO HARMON    Review needed by CMO to determine Physician of Record No        12/05/24 6720

## 2024-12-06 NOTE — PLAN OF CARE
Pt resting in bed at this time, pt is A/O X4 and on 2L NC. No complaints or distress noted. VSS afebrile. Plan of care ongoing.

## 2024-12-06 NOTE — PLAN OF CARE
Goal Outcome Evaluation:   Patient to be transferred to [facility]. No questions at this time. No distress noted. Patient remains on 2 L nasal cannula. Air evac arranged for transport.

## 2024-12-06 NOTE — H&P
"Cardiothoracic Surgery History & Physical           Chief complaint: pericardial effusion    HPI:   Mr. Joel Galo is a 58yo male well known to our service, having undergone CABG x4, MAZE, LAAL on 10/28/24 with Dr. Max. His postoperative course was complicated by Afib, urinary retention, and hematuria. He was discharged to rehab on POD9.  He was seen in the clinic by myself on 11/20/24 where there was concern for sternal wound infection [facility]. At that time, he had already been started on antibiotics. The wound had not been washed since discharge. However, there was little concern for infection. Patient denied shortness of breath, chest pain or lower extremity swelling.    On 12/4/24, he had a syncopal event and was brought to [facility] ED. An echo was performed which showed pericardial effusion and early diastolic collapse of the right atrial wall. He was transferred to [facility] for possible pericardial window.    Upon arrival, the patient is short of breath with lying down. He denies chest pain or lower extremity edema. Incisions are healing and chest tube sutures remain in place.             Past Medical History:   Diagnosis Date    A-fib     Anxiety     Arthritis     DDD (degenerative disc disease), lumbar     Deep vein thrombosis     Depression     Diabetes     Disease of thyroid gland     HYPO    Heart attack     \"5-6 years ago\"    Hyperlipidemia     Hypertension     Kidney stone     Neuropathy     Sleep apnea     DOES NOT USE CPAP    UTI (urinary tract infection)      Past Surgical History:   Procedure Laterality Date    ATRIAL APPENDAGE EXCLUSION LEFT WITH TRANSESOPHAGEAL ECHOCARDIOGRAM N/A 10/28/2024    Procedure: ATRIAL APPENDAGE EXCLUSION LEFT WITH TRANSESOPHAGEAL ECHOCARDIOGRAM;  Surgeon: Alex Max MD;  [facility]: [facility];  Service: Cardiothoracic;  Laterality: N/A;    CARDIAC CATHETERIZATION N/A 08/28/2024    Procedure: Left Heart Cath;  Surgeon: Tawanda" MD Julio;  [facility]:  [facility];  Service: Cardiology;  Laterality: N/A;    CARDIAC ELECTROPHYSIOLOGY PROCEDURE N/A 04/05/2023    Procedure: Loop insertion;  Surgeon: Tariq Chávez MD;  Location:  [facility] LOCATION;  Service: Cardiovascular;  Laterality: N/A;    CATARACT EXTRACTION Bilateral     COLONOSCOPY      CORONARY ARTERY BYPASS GRAFT N/A 10/28/2024    Procedure: MEDIAN STERNOTOMY, CORONARY ARTERY BYPASS GRAFTING X4 UTILIZING THE LEFT INTERNAL MAMMARY ARTERY GRAFT, ENDOSCOPIC VEIN HARVEST OF THE GREATER RIGHT SAPHENOUS VEIN;  Surgeon: Alex Max MD;  Location:  [facility];  Service: Cardiothoracic;  Laterality: N/A;    ENDOSCOPY      MAZE PROCEDURE N/A 10/28/2024    Procedure: MAZE PROCEDURE;  Surgeon: Alex Max MD;  Location:  [facility];  Service: Cardiothoracic;  Laterality: N/A;    TEETH EXTRACTION       Family History   Problem Relation Age of Onset    Heart disease Mother     Coronary artery disease Father     Diabetes Father     Kidney disease Father      Social History     Tobacco Use    Smoking status: Never     Passive exposure: Never    Smokeless tobacco: Never   Vaping Use    Vaping status: Never Used   Substance Use Topics    Alcohol use: No    Drug use: No       Medications Prior to Admission   Medication Sig Dispense Refill Last Dose/Taking    aspirin 325 MG EC tablet Take 1 tablet by mouth Daily. 90 tablet 0     buPROPion XL (WELLBUTRIN XL) 300 MG 24 hr tablet Take 1 tablet by mouth Daily.       docusate sodium (COLACE) 100 MG capsule Take 1 capsule by mouth 2 (Two) Times a Day As Needed for Constipation.       DULoxetine (CYMBALTA) 60 MG capsule Take 1 capsule by mouth Daily.       empagliflozin (JARDIANCE) 25 MG tablet tablet Take 1 tablet by mouth Daily.       finasteride (PROSCAR) 5 MG tablet TAKE 1 TABLET EVERY DAY 90 tablet 0     gabapentin (NEURONTIN) 100 MG capsule Take 2 capsules by mouth Every Morning.       gabapentin (NEURONTIN) 100 MG  capsule Take 3 capsules by mouth Every Night.       glimepiride (AMARYL) 2 MG tablet Take 2 tablets by mouth 2 (Two) Times a Day.       HYDROcodone-acetaminophen (NORCO)  MG per tablet Take 1 tablet by mouth Every 8 (Eight) Hours As Needed for Moderate Pain.       insulin glargine (LANTUS, SEMGLEE) 100 UNIT/ML injection Inject 20 Units under the skin into the appropriate area as directed Daily.       Insulin Lispro (humaLOG) 100 UNIT/ML injection Inject 2-9 Units under the skin into the appropriate area as directed 4 (Four) Times a Day Before Meals & at Bedtime.       Insulin Lispro (humaLOG) 100 UNIT/ML injection Inject 2 Units under the skin into the appropriate area as directed 3 (Three) Times a Day With Meals.       lactulose (CHRONULAC) 10 GM/15ML solution Take 45 mL by mouth 2 (Two) Times a Day As Needed.       levothyroxine (SYNTHROID, LEVOTHROID) 50 MCG tablet Take 1 tablet by mouth Every Morning.       nitroglycerin (NITROSTAT) 0.4 MG SL tablet Place 1 tablet under the tongue Every 5 (Five) Minutes As Needed for Chest Pain. Take no more than 3 doses in 15 minutes.       rosuvastatin (CRESTOR) 20 MG tablet Take 1 tablet by mouth Daily. 90 tablet 1     sotalol (BETAPACE) 120 MG tablet Take 1 tablet by mouth 2 (Two) Times a Day. 90 tablet 1     tamsulosin (FLOMAX) 0.4 MG capsule 24 hr capsule TAKE 1 CAPSULE EVERY DAY 90 capsule 3     Vitamin D, Cholecalciferol, (CHOLECALCIFEROL) 10 MCG (400 UNIT) tablet Take 1 tablet by mouth Daily.        Allergies:  Patient has no known allergies.    Review of Systems:  A comprehensive review of systems was negative except for:   Constitutional: shortness of breath, fatigue    All other systems were reviewed and were negative.    Vital Signs:  Temp:  [97.3 °F (36.3 °C)-98.8 °F (37.1 °C)] 97.9 °F (36.6 °C)  Heart Rate:  [78-89] 88  Resp:  [16-20] 18  BP: ()/() 131/57    Physical Exam:  Physical Exam  Vitals reviewed.   Constitutional:       Appearance:  "Normal appearance. He is obese.   Eyes:      Pupils: Pupils are equal, round, and reactive to light.   Cardiovascular:      Rate and Rhythm: Normal rate and regular rhythm.      Pulses: Normal pulses.      Heart sounds: Heart sounds are distant.   Pulmonary:      Effort: Pulmonary effort is normal.      Breath sounds: Normal breath sounds.   Abdominal:      General: Abdomen is flat.      Palpations: Abdomen is soft.   Skin:     Capillary Refill: Capillary refill takes less than 2 seconds.   Neurological:      General: No focal deficit present.      Mental Status: He is alert and oriented to person, place, and time.   Psychiatric:         Mood and Affect: Mood normal.         Behavior: Behavior normal.         Labs:  Results from last 7 days   Lab Units 12/06/24  0418   WBC 10*3/mm3 7.64   HEMOGLOBIN g/dL 11.2*   HEMATOCRIT % 35.9*   PLATELETS 10*3/mm3 297     Results from last 7 days   Lab Units 12/06/24  0110   SODIUM mmol/L 134*   POTASSIUM mmol/L 4.3   CHLORIDE mmol/L 100   CO2 mmol/L 21.8*   BUN mg/dL 21   CREATININE mg/dL 0.77   GLUCOSE mg/dL 148*   CALCIUM mg/dL 8.7         Coagulation: No results found for: \"INR\", \"APTT\"  Cardiac markers:   Results from last 7 days   Lab Units 12/04/24  2308   HSTROP T ng/L 25*     ABGs:       Invalid input(s): \"PO2\"    Imaging:     Interpretation Summary         Left ventricular ejection fraction appears to be 56 - 60%.    Left ventricular wall thickness is consistent with mild concentric hypertrophy.    There is a large (>2cm) pericardial effusion adjacent to the left ventricle.    There is early diastolic collapse of the right atrial wall.    I sent a message to Dr. Max about these findings to see if he could be transferred to [facility] for further intervention as needed.  Patient is currently hemodynamically stable with a blood pressure of 120/80 mmHg and heart rate around 80 bpm.                          Assessment:     Pericardial effusion after " operative procedure       Plan:   Plan for pericardial window and drain placement later today in the OR with Dr. Winter Galaviz NPO         Faby Olvera PA-C  12/06/24  13:59 EST

## 2024-12-06 NOTE — NURSING NOTE
"                             Wound, Ostomy and Continence (WOC) Note    Reason for WOC Consultation: Pressure injury coccyx, POA    Skin/Wound Assessment:     Wound Type: Pressure Injury Stage 2  Location: Right coccyx  Measurements: 1 x 0.2 x 0.2 send  Wound Bed: non-blanchable and dermis  Wound Edges: Open  Periwound Skin: intact   Drainage Characteristics/Odor: none  Drainage Amount: none  Pain: No   Care provided: The wound was cleansed with pH balance cleanser.  There honey sheet applied covered with Allevyn dressing  Image:     Summary and Recommendation(s):     See wound care orders.  2.   Pressure injury prevention protocol throughout hospitalization.  3.   Apply Allevyn dressing to sacrum and heels.  Turn patient per protocol.  Keep heels elevated and offloaded.  4.   Refer to wound care instructions in the \"Orders\" tab  5.   Specialty support surface in place: Foam Mattress         Pressure Injury Prevention Protocol (initiate for a Tae Scale Score of <18):     Most recent Tae Scale score:  Sensory Perception: 3-->slightly limited  Moisture: 3-->occasionally moist  Activity: 2-->chairfast  Mobility: 3-->slightly limited  Nutrition: 3-->adequate  Friction and Shear: 2-->potential problem  Tae Score: 16 (12/06/24 1259)       -Apply Allevyn foam dressing to sacrum/coccyx and heels.     -Turn q 2 hr. using an offloading foam wedge/pillow.    -Apply moisture barrier cream to bottom BID & PRN, if incontinent.      Thank you for consulting the WOC Nurse.  WOC Team will sign off.  Please re consult if the wound(s) worsens.     Pineda Valladares RN, BSN, CCRN, CWOCN  Wound, Ostomy and Continence (WOC) Department  [facility]          "

## 2024-12-06 NOTE — ANESTHESIA PROCEDURE NOTES
Airway  Urgency: elective    Date/Time: 12/6/2024 5:54 PM  Airway not difficult    General Information and Staff    Patient location during procedure: OR  Anesthesiologist: Rohit Talbot Jr., MD    Indications and Patient Condition  Indications for airway management: airway protection    Preoxygenated: yes  MILS not maintained throughout  Mask difficulty assessment: 0 - not attempted    Final Airway Details  Final airway type: endotracheal airway      Successful airway: ETT  Cuffed: yes   Successful intubation technique: RSI and video laryngoscopy  Facilitating devices/methods: intubating stylet  Endotracheal tube insertion site: oral  Blade: Reyes  Blade size: 3  ETT size (mm): 7.5  Cormack-Lehane Classification: grade I - full view of glottis  Placement verified by: chest auscultation and capnometry   Measured from: lips  ETT/EBT  to lips (cm): 22  Number of attempts at approach: 1  Assessment: lips, teeth, and gum same as pre-op and atraumatic intubation    Additional Comments  Negative epigastric sounds, Breath sound equal bilaterally with symmetric chest rise and fall

## 2024-12-06 NOTE — ANESTHESIA PROCEDURE NOTES
Intra-Op Anesthesia DARIAN    Procedure Performed: Intra-Op Anesthesia DARIAN       Start Time:  12/6/2024 6:02 PM       End Time:      Preanesthesia Checklist:  Patient identified, IV assessed, risks and benefits discussed, monitors and equipment assessed, procedure being performed at surgeon's request and anesthesia consent obtained.    General Procedure Information  DARIAN Placed for monitoring purposes only -- This is not a diagnostic DARIAN  Physician Requesting Echo: Alex Max MD  Location performed:  OR  Intubated  Bite block not placed  Heart visualized  Probe Insertion:  Easy  Probe Type:  Multiplane  Modalities:  2D only        Anesthesia Information      Echocardiogram Comments:       Limited exam performed for visualizing pericardial effusion.   Large pericardial effusion noted.

## 2024-12-06 NOTE — NURSING NOTE
Pt being discharged to [facility]. [employer] Stated they do not have enough crews to transport.           Update: [facility] to transport.

## 2024-12-06 NOTE — THERAPY DISCHARGE NOTE
"Acute Care - Physical Therapy Initial Evaluation/Discharge   Shree     Patient Name: Joel Galo  : 1965  MRN: [id number]  Today's Date: 2024   Onset of Illness/Injury or Date of Surgery: (P) 24 (admission date)            Admit Date: 2024    Visit Dx:    ICD-10-CM ICD-9-CM   1. Syncope and collapse  R55 780.2   2. Orthostatic hypotension  I95.1 458.0     Patient Active Problem List   Diagnosis    Paroxysmal atrial fibrillation    Obstructive sleep apnea    Hypothyroidism (acquired)    GERD (gastroesophageal reflux disease)    Chronic anticoagulation    Type 2 diabetes mellitus with hyperglycemia, without long-term current use of insulin    Hyperlipidemia LDL goal <70    Primary hypertension    CAD s/p CABG x 4, MAZE, LAAL 10/28/24    Orthostatic hypotension     Past Medical History:   Diagnosis Date    A-fib     Anxiety     Arthritis     DDD (degenerative disc disease), lumbar     Deep vein thrombosis     Depression     Diabetes     Disease of thyroid gland     HYPO    Heart attack     \"5-6 years ago\"    Hyperlipidemia     Hypertension     Kidney stone     Neuropathy     Sleep apnea     DOES NOT USE CPAP    UTI (urinary tract infection)      Past Surgical History:   Procedure Laterality Date    ATRIAL APPENDAGE EXCLUSION LEFT WITH TRANSESOPHAGEAL ECHOCARDIOGRAM N/A 10/28/2024    Procedure: ATRIAL APPENDAGE EXCLUSION LEFT WITH TRANSESOPHAGEAL ECHOCARDIOGRAM;  Surgeon: Alex Max MD;  Location: [facility] OR;  Service: Cardiothoracic;  Laterality: N/A;    CARDIAC CATHETERIZATION N/A 2024    Procedure: Left Heart Cath;  Surgeon: Julio Neff MD;  Location:  [facility];  Service: Cardiology;  Laterality: N/A;    CARDIAC ELECTROPHYSIOLOGY PROCEDURE N/A 2023    Procedure: Loop insertion;  Surgeon: Tariq Chávez MD;  Location:  [facility] LOCATION;  Service: Cardiovascular;  Laterality: N/A;    CATARACT EXTRACTION Bilateral     COLONOSCOPY      " CORONARY ARTERY BYPASS GRAFT N/A 10/28/2024    Procedure: MEDIAN STERNOTOMY, CORONARY ARTERY BYPASS GRAFTING X4 UTILIZING THE LEFT INTERNAL MAMMARY ARTERY GRAFT, ENDOSCOPIC VEIN HARVEST OF THE GREATER RIGHT SAPHENOUS VEIN;  Surgeon: Alex Max MD;  Location: [facility] OR;  Service: Cardiothoracic;  Laterality: N/A;    ENDOSCOPY      MAZE PROCEDURE N/A 10/28/2024    Procedure: MAZE PROCEDURE;  Surgeon: Alex Max MD;  Location: [facility] OR;  Service: Cardiothoracic;  Laterality: N/A;    TEETH EXTRACTION         PT Assessment (Last 12 Hours)       PT Evaluation and Treatment       Row Name 12/06/24 0910          Physical Therapy Time and Intention    Subjective Information complains of;weakness;dizziness (P)   -HP     Document Type evaluation (P)   -HP     Mode of Treatment physical therapy (P)   -HP     Patient Effort adequate (P)   -HP     Symptoms Noted During/After Treatment dizziness;significant change in vital signs (P)   Pt demonstrated orthostatic hypotension w/ position changes when BP was assessed  -     Comment Pt. seen for PT evaluation this AM. Pt. reports that he lives at home w/ his wife. He reports that he has had ongoing decreased functional mobility, so he has been receiving  PT services. Pt. reports that he uses a RW, and he can care for himself somewhat but has assistance from his wife as well. (P)   -HP       Row Name 12/06/24 0910          General Information    Patient Profile Reviewed yes (P)   -HP     Onset of Illness/Injury or Date of Surgery 12/04/24 (P)   admission date  -HP     Patient Observations alert;cooperative;agree to therapy (P)   -HP     Prior Level of Function -- (P)   Pt. had to have assistance from his wife to complete ADLs  -HP     Equipment Currently Used at Home walker, rolling (P)   -HP     Existing Precautions/Restrictions fall;orthostatic hypotension;oxygen therapy device and L/min (P)   -HP     Risks Reviewed patient:;LOB;dizziness;change in vital signs (P)    -     Benefits Reviewed patient:;improve function;increase independence (P)   -     Barriers to Rehab medically complex (P)   -       Row Name 12/06/24 0910          Living Environment    Current Living Arrangements home (P)   -     People in Home spouse (P)   -     Primary Care Provided by self;spouse/significant other (P)   -       Row Name 12/06/24 0910          Home Use of Assistive/Adaptive Equipment    Equipment Currently Used at Home walker, rolling;oxygen (P)   -       Row Name 12/06/24 0910          Cognition    Affect/Mental Status (Cognition) WFL (P)   -     Orientation Status (Cognition) oriented x 3 (P)   -     Follows Commands (Cognition) WFL (P)   -     Cognitive Function (Cognition) WFL (P)   -     Personal Safety Interventions nonskid shoes/slippers when out of bed;supervised activity (P)   -       Row Name 12/06/24 0910          Strength (Manual Muscle Testing)    Strength (Manual Muscle Testing) bilateral lower extremities (P)   bilateral LE strength >3/5  -       Row Name 12/06/24 0910          Bed Mobility    Bed Mobility supine-sit (P)   -     Supine-Sit [place] (Bed Mobility) contact guard;minimum assist (75% patient effort) (P)   -     Assistive Device (Bed Mobility) bed rails;head of bed elevated (P)   -       Row Name 12/06/24 0910          Transfers    Transfers sit-stand transfer;stand-sit transfer (P)   -       Row Name 12/06/24 0910          Sit-Stand Transfer    Sit-Stand [place] (Transfers) contact guard (P)   -     Assistive Device (Sit-Stand Transfers) other (see comments) (P)   A  -       Row Name 12/06/24 0910          Stand-Sit Transfer    Stand-Sit [place] (Transfers) contact guard (P)   -     Assistive Device (Stand-Sit Transfers) other (see comments) (P)   A  -       Row Name 12/06/24 0910          Gait/Stairs (Locomotion)    Patient was able to Ambulate no, other medical factors prevent ambulation (P)   -      Reason Patient was unable to Ambulate Hypotension (P)   Pt. demonstrated orthostatic hypotension w/ position changes  -       Row Name 12/06/24 0910          Safety Issues/Impairments Affecting Functional Mobility    Impairments Affecting Function (Mobility) endurance/activity tolerance;other (see comments) (P)   change of vitals  -       Row Name 12/06/24 0910          Balance    Balance Assessment sitting static balance;standing static balance (P)   -     Static Sitting Balance independent (P)   -     Position, Sitting Balance sitting edge of bed (P)   -HP     Static Standing Balance contact guard (P)   -       Row Name             Wound 12/05/24 0514 Right upper gluteal pressure injury    Wound - Properties Group Placement Date: 12/05/24  -AJ Placement Time: 0514  -AJ Side: Right  -AJ Orientation: upper  -AJ Location: gluteal  -AJ Primary Wound Type: Pressure inj  -AJ Type: pressure injury  -TW Present on Original Admission: Y  -AJ    Retired Wound - Properties Group Placement Date: 12/05/24  -AJ Placement Time: 0514 -AJ Present on Original Admission: Y  -AJ Side: Right  -AJ Orientation: upper  -AJ Location: gluteal  -AJ Primary Wound Type: Pressure inj  -AJ Type: pressure injury  -TW    Retired Wound - Properties Group Placement Date: 12/05/24  -AJ Placement Time: 0514  -AJ Present on Original Admission: Y  -AJ Side: Right  -AJ Orientation: upper  -AJ Location: gluteal  -AJ Primary Wound Type: Pressure inj  -AJ Type: pressure injury  -TW    Retired Wound - Properties Group Date first assessed: 12/05/24  -AJ Time first assessed: 0514  -AJ Present on Original Admission: Y  -AJ Side: Right  -AJ Location: gluteal  -AJ Primary Wound Type: Pressure inj  -AJ Type: pressure injury  -TW      Row Name             Wound 10/28/24 sternal    Wound - Properties Group Placement Date: 10/28/24  -AB Location: sternal  -AB Primary Wound Type: Incision  -AB    Retired Wound - Properties Group Placement Date: 10/28/24   -AB Location: sternal  -AB Primary Wound Type: Incision  -AB    Retired Wound - Properties Group Placement Date: 10/28/24  -AB Location: sternal  -AB Primary Wound Type: Incision  -AB    Retired Wound - Properties Group Date first assessed: 10/28/24  -AB Location: sternal  -AB Primary Wound Type: Incision  -AB      Row Name             Wound Right anterior knee    Wound - Properties Group Side: Right  -AB Orientation: anterior  -AB Location: knee  -AB Primary Wound Type: Incision  -AB    Retired Wound - Properties Group Side: Right  -AB Orientation: anterior  -AB Location: knee  -AB Primary Wound Type: Incision  -AB    Retired Wound - Properties Group Side: Right  -AB Orientation: anterior  -AB Location: knee  -AB Primary Wound Type: Incision  -AB    Retired Wound - Properties Group Side: Right  -AB Location: knee  -AB Primary Wound Type: Incision  -AB      Row Name             Wound Right distal leg    Wound - Properties Group Side: Right  -AB Orientation: distal  -AB Location: leg  -AB Primary Wound Type: Incision  -AB    Retired Wound - Properties Group Side: Right  -AB Orientation: distal  -AB Location: leg  -AB Primary Wound Type: Incision  -AB    Retired Wound - Properties Group Side: Right  -AB Orientation: distal  -AB Location: leg  -AB Primary Wound Type: Incision  -AB    Retired Wound - Properties Group Side: Right  -AB Location: leg  -AB Primary Wound Type: Incision  -AB      Row Name 12/06/24 0910          Plan of Care Review    Plan of Care Reviewed With patient (P)   -     Outcome Evaluation Pt. seen for PT evaluation this AM. He demonstrated gross bilateral LE strength that was functional. Pt. completed bed mobility w/ CGA-Meme and STS w/ CGA for steadying. He demonstrated orthostatic hypotension when assessed during position changes, so no further ambulation was assessed. Pt. will be transferred to another facility but would benefit from PT services. (P)   -HP       Row Name 12/06/24 0910           Positioning and Restraints    Pre-Treatment Position in bed (P)   -HP     Post Treatment Position bed (P)   -HP     In Bed sitting;patient within staff view (P)   -HP       Row Name 12/06/24 0910          Therapy Assessment/Plan (PT)    Functional Level at Time of Evaluation (PT) CGA for transfers (P)   -HP     PT Diagnosis (PT) Decreased functional mobility (P)   -HP     Therapy Frequency (PT) evaluation only (P)   -HP       Row Name 12/06/24 0910          Therapy Plan Review/Discharge Plan (PT)    Therapy Plan Review (PT) evaluation/treatment results reviewed;patient (P)   -HP       Row Name 12/06/24 0910          Discharge Summary (PT)    Additional Documentation Discharge Summary (PT) (Group) (P)   -HP       Row Name 12/06/24 0910          Discharge Summary (PT)    Reason for Discharge (PT Discharge Summary) patient discharged from this facility (P)   Pt. will be discharged to transfer to another facility  -HP     Outcomes Achieved Upon Discharge (PT Discharge Summary) patient transferred to another care setting/facility (P)   -HP               User Key  (r) = Recorded By, (t) = Taken By, (c) = Cosigned By      Initials Name Provider Type    Swati Trejo, RN Registered Nurse    Rebecca Landry, RN Registered Nurse    Natalie Law, RN Registered Nurse    Radha Gardner, PT Student PT Student                      Physical Therapy Education        No education to display                    PT Recommendation and Plan  Anticipated Discharge Disposition (PT): (P) sub acute care setting, home with home health, home with supervision (pending based on medical status)  Therapy Frequency (PT): (P) evaluation only  Plan of Care Reviewed With: (P) patient  Outcome Evaluation: (P) Pt. seen for PT evaluation this AM. He demonstrated gross bilateral LE strength that was functional. Pt. completed bed mobility w/ CGA-Meme and STS w/ CGA for steadying. He demonstrated orthostatic hypotension when assessed  during position changes, so no further ambulation was assessed. Pt. will be transferred to another facility but would benefit from PT services.         Time Calculation:    PT Charges       Row Name 12/06/24 1117             Time Calculation    Start Time 0910 (P)   -HP      PT Received On 12/06/24 (P)   -HP                User Key  (r) = Recorded By, (t) = Taken By, (c) = Cosigned By      Initials Name Provider Type     Radha Young, PT Student PT Student                  Therapy Charges for Today       Code Description Service Date Service Provider Modifiers Qty    18426803471 HC PT EVAL MOD COMPLEXITY 4 12/6/2024 Radha Young, PT Student GP 1            PT G-Codes  AM-PAC 6 Clicks Score (PT): 13    PT Discharge Summary  Anticipated Discharge Disposition (PT): (P) sub acute care setting, home with home health, home with supervision (pending based on medical status)    Radha Young PT Student  12/6/2024

## 2024-12-06 NOTE — CONSULTS
"Diabetes Education    Patient Name:  Joel Galo  YOB: 1965  MRN: [id number]  Admit Date:  12/6/2024    Consult received for diabetes education. Chart reviewed. Met with patient at bedside. He was seen by outpatient nutrition in June 2024. A1c on 10/25/24 was 11.2%. He is taking Jardiance, Amaryl, Lantus and Lispro. He takes 20 units daily. He takes Lispro by sliding scale.  Discussed and taught patient about type 2 diabetes self-management, risk factors, and importance of blood glucose control to reduce complications. Target blood glucose readings and A1c goals per ADA were reviewed. Reviewed with patient current A1c and discussed its significance. Signs, symptoms and treatment of hyperglycemia and hypoglycemia were discussed. Lifestyle changes such as physical activity with MD approval and healthy eating were encouraged.   Patient has a meter at home. He has tried to get a Deja but still waiting on insurance approval.  Patient was encouraged to keep record of blood glucose readings to take to follow up appointment with PCP.  OP education was also encouraged for additional education once discharged.     Provided \"What is Diabetes\" Target Blood Glucose Goals and A1c.         Electronically signed by:  Jacqueline Villa RN  12/06/24 15:11 EST  "

## 2024-12-06 NOTE — ANESTHESIA PROCEDURE NOTES
Arterial Line      Patient reassessed immediately prior to procedure    Patient location during procedure: pre-op  Start time: 12/6/2024 4:06 PM  Stop Time:12/6/2024 4:16 PM       Line placed for hemodynamic monitoring.  Performed By   Anesthesiologist: Rohit Talbot Jr., MD   Preanesthetic Checklist  Completed: patient identified, IV checked, site marked, risks and benefits discussed, surgical consent, monitors and equipment checked, pre-op evaluation and timeout performed  Arterial Line Prep    Sterile Tech: cap, gloves and sterile barriers  Prep: ChloraPrep  Patient monitoring: blood pressure monitoring, continuous pulse oximetry and EKG  Arterial Line Procedure   Laterality:left  Location:  radial artery  Catheter size: 20 G   Guidance: ultrasound guided  PROCEDURE NOTE/ULTRASOUND INTERPRETATION.  Using ultrasound guidance the potential vascular sites for insertion of the catheter were visualized to determine the patency of the vessel to be used for vascular access.  After selecting the appropriate site for insertion, the needle was visualized under ultrasound being inserted into the radial artery, followed by ultrasound confirmation of wire and catheter placement. There were no abnormalities seen on ultrasound; an image was taken; and the patient tolerated the procedure with no complications.   Number of attempts: 1  Successful placement: yes Images: still images not obtained  Post Assessment   Dressing Type: line sutured, occlusive dressing applied, secured with tape and wrist guard applied.   Complications no  Circ/Move/Sens Assessment: normal and unchanged.   Patient Tolerance: patient tolerated the procedure well with no apparent complications

## 2024-12-06 NOTE — DISCHARGE INSTR - APPOINTMENTS
No  apointments  made  pt  discharged  to  Psychiatric  to  [address] [place] [address]  dr zenaida dowell  accepting

## 2024-12-06 NOTE — ANESTHESIA PREPROCEDURE EVALUATION
Anesthesia Evaluation     Patient summary reviewed and Nursing notes reviewed   no history of anesthetic complications:   NPO Solid Status: > 8 hours  NPO Liquid Status: > 2 hours           Airway   Mallampati: I  TM distance: >3 FB  Neck ROM: full  No difficulty expected  Dental    (+) poor dentition    Comment: Multiple missing teeth    Pulmonary - normal exam   (+) ,sleep apnea  Cardiovascular - normal exam    (+) hypertension, CAD, CABG 0-3 Months, dysrhythmias Atrial Fib, hyperlipidemia      Neuro/Psych  (-) seizures, CVA  GI/Hepatic/Renal/Endo    (+) GERD, diabetes mellitus, thyroid problem hypothyroidism    ROS Comment: Denies pain or difficulty swallowing    Musculoskeletal     Abdominal    Substance History      OB/GYN          Other   arthritis,                 Anesthesia Plan    ASA 3     general and [place]     ([facility] in OR)  intravenous induction     Anesthetic plan, risks, benefits, and alternatives have been provided, discussed and informed consent has been obtained with: patient.  Pre-procedure education provided  Use of blood products discussed with patient  Consented to blood products.    Plan discussed with CRNA.    CODE STATUS:

## 2024-12-06 NOTE — PAYOR COMM NOTE
"CONTACT:  JEFFREY HAZEL, RN  UTILIZATION MANAGEMENT DEPT.   [facility]   [address] [facility], [address]   PHONE:  [phone]   FAX: [phone]       INPATIENT AUTH REQUEST--OBSERVATION 12/04/2024 CONVERTED TO INPATIENT 12/05/2024            BrendaJoel (59 y.o. Male)       Date of Birth   1965    Social Security Number       Address   [address] [place] [address]    Home Phone   [phone]    MRN   [id number]       Yarsanism   Restorationist    Marital Status                               Admission Date   12/5/24    Admission Type   Emergency    Admitting Provider   Mick Bañuelos MD    Attending Provider   Daria Rowe MD    Department, Room/Bed   [address], 3304/2S       Discharge Date       Discharge Disposition   [facility] ([facility] - External)    Discharge Destination                                 Attending Provider: Daria Rowe MD    Allergies: No Known Allergies    Isolation: None   Infection: None   Code Status: CPR    Ht: 176.8 cm (69.6\")   Wt: 98.6 kg (217 lb 4.8 oz)    Admission Cmt: None   Principal Problem: Orthostatic hypotension [I95.1]                   Active Insurance as of 12/4/2024       Primary Coverage       Payor Plan Insurance Group Employer/Plan Group    ANTHEM MEDICARE REPLACEMENT ANTHEM MEDICARE ADVANTAGE [id number]       Payor Plan Address Payor Plan Phone Number Payor Plan Fax Number Effective Dates    [address] [phone]  1/1/2024 - None Entered    [facility] [id number]         Subscriber Name Subscriber Birth Date Member ID       BRENDAJOEL ADAN 1965 [id number]                     Emergency Contacts        (Rel.) Home Phone Work Phone Mobile Phone    LISHA GUTIERREZ (Spouse) [phone] -- [phone]                 History & Physical        Rom Cabello PA-C at 12/05/24 0249       Attestation signed by Mick Bañuelos MD at 12/05/24 0554    I " have discussed this patient with Rom Franks PA-C, and have reviewed patient's chart along with this documentation. To clarify recent medication changes, it appears that after his CABG, MAZE and LAAL procedure in late Oct/early Nov, he was discharged  home with new prescriptions for HCTZ and entresto, and his amlodipine was continued (and possibly increased) while his imdur and losartan were discontinued. Then, due to recurrent episodes of dizziness, falls and syncope, his HCTZ was discontinued and his amlodipine was decreased during follow up with cardiology on 24. He saw cardiology again on 12/3/24, and due to persistent symptoms and orthostatic hypotension, his amlodipine was discontinued and his entresto was decreased. Now, even with these additional changes to his medication regimen, he continues to experience dizziness and syncope with even minimal exertion. He was profoundly orthostatic even going from lying to sitting in the ED. He is scheduled to see EP in [place] later this week; the ED attempted to transfer the patient to a facility with EP but was told that since he was not having any arrhythmias, there was no indication for transfer for EP. Our on-call cardiologist was then notified of the patient and agree to see them in consultation. Continue IV fluids, hold all antihypertensive meds (which at this point may only be entresto), and repeat orthostatics every shift to monitor for improvement. Update ECHO later this morning. Await further recommendations from cardiology.                        [facility]  History & Physical    Patient Identification:  Name:  Joel Galo  Age:  59 y.o.  Sex:  male  :  1965  MRN:  [id number]   Visit Number:  [id number]  Admit Date: 2024   Primary Care Physician:  Edmundo Boston MD    Subjective       Chief Complaint   Patient presents with    Syncope       History of presenting illness:    Joel Lawtonck  is a 59 y.o. male who presented for further evaluation of syncope at home during physical therapy.    Patient recently underwent cardiovascular surgery for a four-vessel CABG with MAZE and TORSTEN.  Since then the patient has had episodes of orthostatic hypotension with syncope.   He had been residing at a inpatient rehab facility for 2 weeks but was then discharged to home with physical therapy set up to continue at his residence.  Throughout his recovery he has experienced many episodes of lightheadedness and syncope following any amount of exertion.  This was noted at the [facility] nursing [facility] who referred him to his cardiology team for management of hypotension and syncope.  His cardiology team plans to titrated down his antihypertensive medications in an attempt to allow hypertension and hopefully decrease his syncope symptoms.  Cardiology's office visit note states hydrochlorothiazide was to be stopped, amlodipine to be reduced from 10 mg to 5 mg, and Entresto be reduced to 24-26.  The patient's wife is able to assist him at home, and manages his home medications and has been able to implement the changes discussed.    Today patient was with his outpatient physical therapist who had instructed him to walk from his bed to the bathroom and back, upon which he became symptomatic and syncopized 2 steps.  He states he was caught by physical therapist and gradually lowered to the ground.  His physical therapy recommended that he contact his cardiology team and they recommended he come to the ED for further workup and possible admission.     The patient has a healing abrasion above his left eyebrow from a prior fall at the [facility] nursing facility.  He also has a abrasion on his right knee and a new abrasion on his left forearm.    Past medical history is significant for syncope, autonomic orthostatic hypotension, paroxysmal atrial fibrillation, hyperlipidemia, type 2 diabetes, CAD s/p four-vessel CABG with MAZE and  "TORSTEN.  Patient also has a history of hypothyroidism, obstructive sleep apnea, GERD, anxiety, and depression.    Upon arrival to the ED, vital signs were /84   Pulse 82   Temp 97.8 °F (36.6 °C) (Oral)   Resp 17   Ht 172.7 cm (68\")   Wt 97.5 kg (215 lb)   SpO2 99%   BMI 32.69 kg/m²   ED workup significant for:   High-sensitivity troponin initial 34 that trended down to 28 and 25  ECG shows normal sinus rhythm, ST and T wave abnormalities, consider inferior ischemia and consider anterolateral ischemia  CBC shows normocytic anemia  Orthostatics and lying position blood pressure is 124/82 heart rate of 79.  Upon sitting blood pressure dropped to 82/59 with a heart rate of 80.  Patient could not tolerate standing long enough to obtain blood pressure and heart rate in that position.    Known Emergency Department medications received prior to my evaluation included:  Medications   sodium chloride 0.9 % flush 10 mL (has no administration in time range)   sodium chloride 0.9 % infusion (75 mL/hr Intravenous New Bag 12/5/24 0303)   sodium chloride 0.9 % bolus 1,000 mL (0 mL Intravenous Stopped 12/5/24 0142)       Emergency Department Room location at the time of my evaluation was 102.     ---------------------------------------------------------------------------------------------------------------------   Review of Systems   Constitutional:  Negative for chills, fatigue and fever.   HENT:  Negative for postnasal drip, rhinorrhea, sinus pressure, sneezing and sore throat.    Eyes:  Negative for discharge and redness.   Respiratory:  Positive for shortness of breath. Negative for cough and wheezing.    Cardiovascular:  Negative for chest pain, palpitations and leg swelling.   Gastrointestinal:  Negative for diarrhea, nausea and vomiting.   Genitourinary:  Negative for difficulty urinating, dysuria, frequency and hematuria.   Musculoskeletal:  Negative for arthralgias and joint swelling.   Skin:  Positive for pallor " "and wound. Negative for rash.   Neurological:  Positive for dizziness, syncope, weakness and light-headedness. Negative for speech difficulty and headaches.   Hematological:  Does not bruise/bleed easily.   Psychiatric/Behavioral:  Negative for confusion, self-injury and suicidal ideas. The patient is not nervous/anxious.       ---------------------------------------------------------------------------------------------------------------------   Past Medical History:   Diagnosis Date    A-fib     Anxiety     Arthritis     DDD (degenerative disc disease), lumbar     Deep vein thrombosis     Depression     Diabetes     Disease of thyroid gland     HYPO    Heart attack     \"5-6 years ago\"    Hyperlipidemia     Hypertension     Kidney stone     Neuropathy     Sleep apnea     DOES NOT USE CPAP    UTI (urinary tract infection)      Past Surgical History:   Procedure Laterality Date    ATRIAL APPENDAGE EXCLUSION LEFT WITH TRANSESOPHAGEAL ECHOCARDIOGRAM N/A 10/28/2024    Procedure: ATRIAL APPENDAGE EXCLUSION LEFT WITH TRANSESOPHAGEAL ECHOCARDIOGRAM;  Surgeon: Alex Max MD;  Location:  [facility];  Service: Cardiothoracic;  Laterality: N/A;    CARDIAC CATHETERIZATION N/A 08/28/2024    Procedure: Left Heart Cath;  Surgeon: Julio Neff MD;  [facility]:  [facility];  Service: Cardiology;  Laterality: N/A;    CARDIAC ELECTROPHYSIOLOGY PROCEDURE N/A 04/05/2023    Procedure: Loop insertion;  Surgeon: Tariq Chávez MD;  Location:  [facility];  Service: Cardiovascular;  Laterality: N/A;    CATARACT EXTRACTION Bilateral     COLONOSCOPY      CORONARY ARTERY BYPASS GRAFT N/A 10/28/2024    Procedure: MEDIAN STERNOTOMY, CORONARY ARTERY BYPASS GRAFTING X4 UTILIZING THE LEFT INTERNAL MAMMARY ARTERY GRAFT, ENDOSCOPIC VEIN HARVEST OF THE GREATER RIGHT SAPHENOUS VEIN;  Surgeon: Alex Max MD;  Location:  [facility];  Service: Cardiothoracic;  Laterality: N/A;    ENDOSCOPY      MAZE PROCEDURE N/A " 10/28/2024    Procedure: MAZE PROCEDURE;  Surgeon: Alex Max MD;  Location: [facility];  Service: Cardiothoracic;  Laterality: N/A;    TEETH EXTRACTION       Family History   Problem Relation Age of Onset    Heart disease Mother     Coronary artery disease Father     Diabetes Father     Kidney disease Father      Social History     Socioeconomic History    Marital status:     Number of children: 1   Tobacco Use    Smoking status: Never     Passive exposure: Never    Smokeless tobacco: Never   Vaping Use    Vaping status: Never Used   Substance and Sexual Activity    Alcohol use: No    Drug use: No    Sexual activity: Not Currently     ---------------------------------------------------------------------------------------------------------------------   Allergies:  Patient has no known allergies.  ---------------------------------------------------------------------------------------------------------------------   Home medications:  Medications below are reported home medications pulling from within the system; at this time, these medications have not been reconciled unless otherwise specified and are in the verification process for further verifcation as current home medications.  (Not in a hospital admission)      Hospital Scheduled Meds:     sodium chloride, 75 mL/hr, Last Rate: 75 mL/hr (12/05/24 0303)        Current listed hospital scheduled medications may not yet reflect those currently placed in orders that are signed and held awaiting patient's arrival to floor.   ---------------------------------------------------------------------------------------------------------------------     Objective     Vital Signs:  Temp:  [97.8 °F (36.6 °C)] 97.8 °F (36.6 °C)  Heart Rate:  [75-85] 82  Resp:  [17] 17  BP: ()/() 142/84      12/04/24  3489   Weight: 97.5 kg (215 lb)     Body mass index is 32.69  kg/m².  ---------------------------------------------------------------------------------------------------------------------     Physical Exam  Vitals reviewed.   Constitutional:       General: He is not in acute distress.     Appearance: Normal appearance.   HENT:      Head: Normocephalic. Abrasion present.        Comments: From prior falls at [facility]     Nose: Nose normal.      Mouth/Throat:      Mouth: Mucous membranes are moist.   Eyes:      Conjunctiva/sclera: Conjunctivae normal.      Pupils: Pupils are equal, round, and reactive to light.   Cardiovascular:      Rate and Rhythm: Normal rate and regular rhythm.      Pulses:           Radial pulses are 1+ on the right side and 1+ on the left side.        Posterior tibial pulses are 1+ on the right side and 1+ on the left side.      Heart sounds: Normal heart sounds. No murmur heard.  Pulmonary:      Effort: Pulmonary effort is normal. No respiratory distress.      Breath sounds: Normal breath sounds. No wheezing or rales.   Abdominal:      General: There is no distension.      Palpations: Abdomen is soft.      Tenderness: There is no abdominal tenderness.   Musculoskeletal:         General: No deformity.      Right lower leg: No edema.      Left lower leg: No edema.   Skin:     General: Skin is dry.      Findings: Abrasion and bruising present. No erythema, lesion or rash.             Comments: New abrasion on L forearm and Right knee from more recent falls at home witnessed by his wife and his PT.   Neurological:      Mental Status: He is alert and oriented to person, place, and time. Mental status is at baseline.   Psychiatric:         Mood and Affect: Mood normal.         Behavior: Behavior normal.           ---------------------------------------------------------------------------------------------------------------------  ANUPAM:        I have personally looked at the  "EKG.  ---------------------------------------------------------------------------------------------------------------------   Results from last 7 days   Lab Units 12/04/24  1852   WBC 10*3/mm3 8.21   HEMOGLOBIN g/dL 11.6*   HEMATOCRIT % 36.6*   MCV fL 90.4   MCHC g/dL 31.7   PLATELETS 10*3/mm3 393         Results from last 7 days   Lab Units 12/04/24  1852   SODIUM mmol/L 137   POTASSIUM mmol/L 4.2   MAGNESIUM mg/dL 2.6*   CHLORIDE mmol/L 96*   CO2 mmol/L 27.1   BUN mg/dL 25*   CREATININE mg/dL 1.17   CALCIUM mg/dL 9.2   GLUCOSE mg/dL 251*   ALBUMIN g/dL 3.3*   BILIRUBIN mg/dL 0.2   ALK PHOS U/L 149*   AST (SGOT) U/L 22   ALT (SGPT) U/L 22   Estimated Creatinine Clearance: 75 mL/min (by C-G formula based on SCr of 1.17 mg/dL).  No results found for: \"AMMONIA\"  Results from last 7 days   Lab Units 12/04/24 2308 12/04/24 2057 12/04/24  1852   HSTROP T ng/L 25* 28* 34*         Lab Results   Component Value Date    HGBA1C 11.20 (H) 10/25/2024     No results found for: \"TSH\", \"FREET4\"  No results found for: \"PREGTESTUR\", \"PREGSERUM\", \"HCG\", \"HCGQUANT\"  Pain Management Panel          Latest Ref Rng & Units 10/28/2024   Pain Management Panel   Amphetamine, Urine Qual Negative Negative    Barbiturates Screen, Urine Negative Negative    Benzodiazepine Screen, Urine Negative Negative    Buprenorphine, Screen, Urine Negative Negative    Cocaine Screen, Urine Negative Negative    Fentanyl, Urine Negative Negative    Methadone Screen , Urine Negative Negative    Methamphetamine, Ur Negative Negative       Details                 No results found for: \"BLOODCX\"  No results found for: \"URINECX\"  No results found for: \"WOUNDCX\"  No results found for: \"STOOLCX\"      ---------------------------------------------------------------------------------------------------------------------  Imaging Results (Last 7 Days)       Procedure Component Value Units Date/Time    XR Chest AP [[id number]] Collected: 12/04/24 2226     Updated: " "12/04/24 2231    Narrative:      EXAMINATION: AP portable chest     CLINICAL HISTORY: Syncope     COMPARISON: 11/6/2024     FINDINGS: Lung volumes are reduced. There has been prior median  sternotomy. An electronic device projects over the left lower chest  wall. A left atrial appendage occlusion device is noted. Mild  hypoventilatory changes are noted. The lungs are otherwise clear. No  pneumothorax or pleural effusion.       Impression:      1. No acute cardiopulmonary disease.     This report was finalized on 12/4/2024 10:29 PM by Fernandez Bah MD.               Cultures:  No results found for: \"BLOODCX\", \"URINECX\", \"WOUNDCX\", \"MRSACX\", \"RESPCX\", \"STOOLCX\"    Last echocardiogram:  Results for orders placed in visit on 10/28/24    Intra-Op Anesthesia DARIAN    Narrative  Intra-Op Anesthesia DARIAN    Procedure Performed: Intra-Op Anesthesia DARIAN  Start Time:  End Time:    Preanesthesia Checklist:  Patient identified, IV assessed, risks and benefits discussed, monitors and equipment assessed, procedure being performed at surgeon's request and anesthesia consent obtained.    General Procedure Information  Diagnostic Indications for Echo:  assessment of ascending aorta, assessment of surgical repair and hemodynamic monitoring  Physician Requesting Echo: Alex Max MD  Location performed:  OR  Intubated  Bite block not placed  Heart visualized  Probe Insertion:  Easy  Probe Type:  Multiplane  Modalities:  Color flow mapping, continuous wave Doppler and pulse wave Doppler    Echocardiographic and Doppler Measurements    Ventricles    Right Ventricle:  Thrombus not present.  Global function normal.  Left Ventricle:  Cavity size normal.  Diastolic dimension 1.6 cm.  Thrombus not present.  Global Function normal.  Ejection Fraction 55%.        Valves    Aortic Valve:  Annulus normal.  Stenosis not present.  Area: 2.2 cm².  Mean Gradient: 2 mmHg.  Regurgitation absent.  Leaflets normal.  Leaflet motions normal.    Mitral " Valve:  Annulus normal.  Stenosis not present.  Area: 2.4 cm².  Mean Gradient: 1 mmHg.  Regurgitation trace.  Leaflets normal.  Leaflet motions normal.    Tricuspid Valve:  Annulus normal.  Stenosis not present.  Leaflets normal.  Leaflet motions normal.  Pulmonic Valve:  Annulus normal.      Aorta    Ascending Aorta:  Size normal.  Diameter 3 cm.  Dissection not present.  Plaque thickness less than 3 mm.  Mobile plaque not present.  Aortic Arch:  Size normal.  Dissection not present.  Plaque thickness less than 3 mm.  Mobile plaque not present.  Descending Aorta:  Size normal.  Dissection not present.  Plaque thickness less than 3 mm.  Mobile plaque not present.      Atria    Right Atrium:  Size dilated.  Spontaneous echo contrast not present.  Thrombus not present.  Tumor not present.  Device present.    Left Atrium:  Size dilated.  Spontaneous echo contrast not present.  Thrombus not present.  Tumor not present.  Left atrial appendage normal.      Septa        Ventricular Septum:  Intra-ventricular septum morphology normal.    Diastolic Function Measurements:  Diastolic Dysfunction Grade=  E=  ms  A=  ms  E/A Ratio=  1.7  DT=  ms  S/D=  IVRT=    Other Findings  Pericardium:  normal  Pleural Effusion:  none  Pulmonary Arteries:  normal  Pulmonary Venous Flow:  normal    Anesthesia Information  Performed Personally  Anesthesiologist:  George Morrow MD      Echocardiogram Comments:  Informed consent was obtained preoperatively.  Yahir probe passed without difficulty.  Post CABG, MAZE and TORSTEN ligation:   EF unchanged, no NWMA, obliterartion of TORSTEN by clip      I have personally reviewed the above radiology images and read the final radiology report on 12/05/24  ---------------------------------------------------------------------------------------------------------------------  Assessment / Plan     Active Hospital Problems    Diagnosis  POA    **Orthostatic hypotension [I95.1]  Yes        ASSESSMENT/PLAN:  Orthostatic hypotension,  Hold all antihypertensive medication. At his last cardiology appointment on 11/20/2024, Patient's hydrochlorothiazide was discontinued, amlodipine was decreased from 10 to 5 mg, and Entresto was decreased to 24-26 due to hypotension and frequent falls at rehab facility.   Initial troponin 34, repeat 28 and 25  EKG shows normal sinus rhythm  Echo from 10/28/2024 reviewed; obtain an updated TTE  Cardiology has been consulted, recommended admission overnight and they will follow-up with the patient in a.m., input/assistance is much appreciated  Continue fall risk precautions, up with assistance as needed  Continue maintenance IV fluids as long as pressures remain low  Continuous telemetry  Orthostatic blood pressures every shift as tolerated.  Continue monitor closely on telemetry     Chronic Conditions  History of A-fib, s/p MAZE and TORSTEN ligation  CAD, s/p CABG  Type 2 diabetes  Hypothyroidism  GERD  Obstructive sleep apnea  Anxiety  Review and continue home medications for above conditions her med rec once completed by pharmacy.      ----------  -DVT prophylaxis: Subcu heparin  -Activity: Up with assistance as needed  -Expected length of stay: INPATIENT status due to the need for care which can only be reasonably provided in an hospital setting such as aggressive/expedited ancillary services and/or consultation services, the necessity for IV medications, close physician monitoring and/or the possible need for procedures.  In such, I feel patient’s risk for adverse outcomes and need for care warrant INPATIENT evaluation and predict the patient’s care encounter to likely last beyond 2 midnights.    -Disposition: Pending clinical course and cardiology consult    High risk secondary to orthostatic hypotension, frequent falls.    Code Status and Medical Interventions: CPR (Attempt to Resuscitate); Full Support   Ordered at: 12/05/24 0224     Code Status (Patient has no  pulse and is not breathing):    CPR (Attempt to Resuscitate)     Medical Interventions (Patient has pulse or is breathing):    Full Support       Rom Cabello PA-C   12/05/24  04:23 EST     Electronically signed by Mick Bañuelos MD at 12/05/24 0549          Emergency Department Notes        Ayaka Farusto PCT at 12/05/24 0148          Accu check done 117 RN Notified    Electronically signed by Ayaka Frausto PCT at 12/05/24 0148       Negrito Canela PCT at 12/04/24 2346          Called [facility] for update on Pt consult/transfer to [facility]. Henrietta stated she would contact on call hospitalist again about arranging consult.    Electronically signed by Negrito Canela PCT at 12/04/24 2347       Negrito Canela PCT at 12/04/24 2151          Awaiting call from [facility] regarding possible Pt transfer    Electronically signed by Negrito Canela PCT at 12/04/24 2151       Sudhir Baer MD at 12/04/24 1921          Subjective   History of Present Illness  Joel is a 59-year-old male presents to the ED for chief complaint of syncope.  Patient states that his home health physical therapist came to see him today asked him to walk from his bed to his bathroom he states he took a couple steps felt short of breath and unable to walk and then in the sat back in bed and passed out per himself, he does wear oxygen at home 2 L he is not on an increased amount of oxygen.  Patient is not in acute respiratory distress he has no accessory muscle use he has no wheezing he has no pursed lip breathing he is supine in bed and position of comfort.  He is not tachycardic he is satting 99%.  He also of note had a recent hospitalization at an outside facility last week and was discharged with oxygen.  He states he does not have COPD and does not know why they gave him oxygen.         Review of Systems    Past Medical History:   Diagnosis Date    A-fib     Anxiety     Arthritis     DDD  "(degenerative disc disease), lumbar     Deep vein thrombosis     Depression     Diabetes     Disease of thyroid gland     HYPO    Heart attack     \"5-6 years ago\"    Hyperlipidemia     Hypertension     Kidney stone     Neuropathy     Sleep apnea     DOES NOT USE CPAP    UTI (urinary tract infection)        No Known Allergies    Past Surgical History:   Procedure Laterality Date    ATRIAL APPENDAGE EXCLUSION LEFT WITH TRANSESOPHAGEAL ECHOCARDIOGRAM N/A 10/28/2024    Procedure: ATRIAL APPENDAGE EXCLUSION LEFT WITH TRANSESOPHAGEAL ECHOCARDIOGRAM;  Surgeon: Alex Max MD;  Location:  [facility];  Service: Cardiothoracic;  Laterality: N/A;    CARDIAC CATHETERIZATION N/A 08/28/2024    Procedure: Left Heart Cath;  Surgeon: Julio Neff MD;  [facility]  [facility];  Service: Cardiology;  Laterality: N/A;    CARDIAC ELECTROPHYSIOLOGY PROCEDURE N/A 04/05/2023    Procedure: Loop insertion;  Surgeon: Tariq Chávez MD;  [facility]:  [facility];  Service: Cardiovascular;  Laterality: N/A;    CATARACT EXTRACTION Bilateral     COLONOSCOPY      CORONARY ARTERY BYPASS GRAFT N/A 10/28/2024    Procedure: MEDIAN STERNOTOMY, CORONARY ARTERY BYPASS GRAFTING X4 UTILIZING THE LEFT INTERNAL MAMMARY ARTERY GRAFT, ENDOSCOPIC VEIN HARVEST OF THE GREATER RIGHT SAPHENOUS VEIN;  Surgeon: Alex Max MD;  Location:  [facility];  Service: Cardiothoracic;  Laterality: N/A;    ENDOSCOPY      MAZE PROCEDURE N/A 10/28/2024    Procedure: MAZE PROCEDURE;  Surgeon: Alex Max MD;  Location:  [facility];  Service: Cardiothoracic;  Laterality: N/A;    TEETH EXTRACTION         Family History   Problem Relation Age of Onset    Heart disease Mother     Coronary artery disease Father     Diabetes Father     Kidney disease Father        Social History     Socioeconomic History    Marital status:     Number of children: 1   Tobacco Use    Smoking status: Never     Passive exposure: Never    Smokeless tobacco: " Never   Vaping Use    Vaping status: Never Used   Substance and Sexual Activity    Alcohol use: No    Drug use: No    Sexual activity: Not Currently           Objective   Physical Exam    Procedures          ED Course  ED Course as of 12/05/24 0227   Wed Dec 04, 2024   2003 Patient's orthostatic vital signs were obtained, he was unable to stand as he felt like he was get a pass out.    Laying he was 124/82 with a heart rate 79, sitting he was 82/59 with a heart rate of 82 [AM]   u Dec 05, 2024   0130 Spoke w Dr. Blair; hospitalist at [facility] he and I discussed the case and as there is no rhythm issues as it is likely orthostasis in nature that this could be the cause of his syncope and he states that he does not feel a transfer would be appropriate at this time and feels that cardiology could evaluate him in the morning and then determine if he needs EP afterwards. 2/2 patient case will then be discussed with our cardiologist to determine if we are able to admit and observe the patient here from their standpoint. [AM]   0139 Spoke with Dr. Vega who states that she is comfortable having the patient be observed here and having cardiology evaluate him in the morning. [AM]   0218 Updated the hospitalist regarding the inability to transfer and he is comfortable admitting the patient here. [AM]      ED Course User Index  [AM] Sudhir Baer MD                                                       Medical Decision Making  Joel is a 59-year-old male presents to the ED for chief complaint of syncope.  Patient have labs and imaging ordered via triage under protocol for syncope.  Patient also have lipase and magnesium added by myself to evaluate for other metabolic causes of his chest pain and syncope.    EKG was obtained and revealed sinus rhythm with a ventricular rate of 83, no acute ST change signify ischemia normal axis normal intervals, subtle T wave inversions in V3 V4 V5 V6  No reciprocal  elevations  , , , QTc 493  ----     He followed up with cardiology on 11/20/2024 and was noted to be hypotensive with reported frequent falls at rehab facility.  His hydrochlorothiazide was discontinued and they decrease his amlodipine from 10 mg to 5 mg daily with encouraged hydration.     Patient states he continues to be weak and is currently using a walker to ambulate and is currently in a wheelchair today.  States he was in a rehab facility but left due to feeling like this was not helping him.  States that physical therapy is set to come out to his house next week to work with him.  Continues to have chronic dizziness and orthostatic hypotension resulting in falls.  Blood pressure is mildly improved in the office today compared to his last visit with cardiology.  Biggest complaint today is weakness and dizziness.  Caregiver states he has not been drinking adequately.     · Diagnostic Testing  1. Echocardiogram 9/2020: EF 55% moderate aortic valve sclerosis  2. Cardiac event monitor 8/2021: Predominantly normal sinus rhythm with no arrhythmias noted  3. Nuclear stress test 9/2021: Small size mild to moderate degree of ischemia in the lateral wall  4. Carotid ultrasound 3/2023: No acute findings  5. Echocardiogram 3/2023: EF 61 to 65%  6. Loop recorder insertion with Potter device 4/2023      All other systems were reviewed and were negative.      Amount and/or Complexity of Data Reviewed  Labs: ordered.  Radiology: ordered.  ECG/medicine tests: ordered.    Risk  Prescription drug management.        Final diagnoses:   Syncope and collapse   Orthostatic hypotension       ED Disposition  ED Disposition       ED Disposition   Decision to Admit    Condition   --    Comment   Level of Care: Telemetry [5]   Diagnosis: Orthostatic hypotension [458.0.ICD-9-CM]   Admitting Physician: SARAHY GLORIA [[id number]]   Attending Physician: SARAHY GLORIA [[id number]]                 No follow-up provider  specified.       Medication List        ASK your doctor about these medications      buPROPion  MG 24 hr tablet  Commonly known as: WELLBUTRIN XL  Ask about: Which instructions should I use?     * gabapentin 100 MG capsule  Commonly known as: NEURONTIN  Ask about: Which instructions should I use?     * gabapentin 100 MG capsule  Commonly known as: NEURONTIN  Ask about: Which instructions should I use?           * This list has 2 medication(s) that are the same as other medications prescribed for you. Read the directions carefully, and ask your doctor or other care provider to review them with you.                     Sudhir Baer MD  12/05/24 0227      Electronically signed by Sudhir Baer MD at 12/05/24 0227       Amy Hidalgo at 12/04/24 1843          Glucose checked it was 284 notified nurse    Electronically signed by Amy Hidalgo at 12/04/24 1843       Facility-Administered Medications as of 12/6/2024   Medication Dose Route Frequency Provider Last Rate Last Admin    aspirin EC tablet 325 mg  325 mg Oral Daily Mick Bañuelos MD   325 mg at 12/05/24 0915    sennosides-docusate (PERICOLACE) 8.6-50 MG per tablet 2 tablet  2 tablet Oral BID PRN Mick Bañuelos MD        And    polyethylene glycol (MIRALAX) packet 17 g  17 g Oral Daily PRN Mick Bañuelos MD        And    bisacodyl (DULCOLAX) EC tablet 5 mg  5 mg Oral Daily PRN Mick Bañuelos MD        And    bisacodyl (DULCOLAX) suppository 10 mg  10 mg Rectal Daily PRN Mick Bañuelos MD        buPROPion XL (WELLBUTRIN XL) 24 hr tablet 150 mg  150 mg Oral Daily Daria Rowe MD        Chlorhexidine Gluconate Cloth 2 % pads 1 Application  1 Application Topical Q12H PRN Tatiana Waller APRN        cholecalciferol (VITAMIN D3) tablet 400 Units  400 Units Oral Daily Mick Bañuelos MD   400 Units at 12/05/24 0915    dextrose (D50W) (25 g/50 mL) IV injection 25 g  25 g Intravenous Q15 Min PRN  Mick Bañuelos MD        dextrose (GLUTOSE) oral gel 15 g  15 g Oral Q15 Min PRN Mick Bañuelos MD        docusate sodium (COLACE) capsule 100 mg  100 mg Oral BID PRN Mick Bañuelos MD        DULoxetine (CYMBALTA) DR capsule 30 mg  30 mg Oral Daily Daria Rowe MD        finasteride (PROSCAR) tablet 5 mg  5 mg Oral Daily Mick Bañuelos MD   5 mg at 12/05/24 0915    gabapentin (NEURONTIN) capsule 200 mg  200 mg Oral Q AM Mick Bañuelos MD   200 mg at 12/06/24 0515    gabapentin (NEURONTIN) capsule 300 mg  300 mg Oral Nightly Mick Bañuelos MD   300 mg at 12/05/24 2042    glucagon HCl (Diagnostic) injection 1 mg  1 mg Intramuscular Q15 Min PRN Mick Bañuelos MD        heparin (porcine) 5000 UNIT/ML injection 5,000 Units  5,000 Units Subcutaneous Q12H Mick Bañuelos MD   5,000 Units at 12/05/24 2042    HYDROcodone-acetaminophen (NORCO)  MG per tablet 0.5 tablet  0.5 tablet Oral Q8H PRN Mick Bañuelos MD        insulin glargine (LANTUS, SEMGLEE) injection 20 Units  20 Units Subcutaneous Daily Mick Bañuelos MD   20 Units at 12/05/24 0915    Insulin Lispro (humaLOG) injection 2 Units  2 Units Subcutaneous TID With Meals Mikc Bañuelos MD   2 Units at 12/05/24 1703    Insulin Lispro (humaLOG) injection 2-9 Units  2-9 Units Subcutaneous 4x Daily AC & at Bedtime Mick Bañuelos MD   7 Units at 12/05/24 1152    lactulose (CHRONULAC) 10 GM/15ML solution 30 g  30 g Oral BID PRN Mick Bañuelos MD        levothyroxine (SYNTHROID, LEVOTHROID) tablet 50 mcg  50 mcg Oral Q AM Mick Bañuelos MD   50 mcg at 12/06/24 0515    midodrine (PROAMATINE) tablet 5 mg  5 mg Oral TID AC Violeta Serrato APRN   5 mg at 12/05/24 1703    nitroglycerin (NITROSTAT) SL tablet 0.4 mg  0.4 mg Sublingual Q5 Min PRN Mick Bañuelos MD        rosuvastatin (CRESTOR) tablet 20 mg  20 mg Oral Daily Mick Bañuelos MD    20 mg at 24 0915    [COMPLETED] sodium chloride 0.9 % bolus 1,000 mL  1,000 mL Intravenous Once Sudhir Baer MD   Stopped at 24 0142    sodium chloride 0.9 % flush 10 mL  10 mL Intravenous PRN Mick Bañuelos MD        sodium chloride 0.9 % flush 10 mL  10 mL Intravenous Q12H Mick Bañuelos MD   10 mL at 24 2042    sodium chloride 0.9 % flush 10 mL  10 mL Intravenous PRN Mick Bañuelos MD        sodium chloride 0.9 % infusion 40 mL  40 mL Intravenous PRN Mick Bañuelos MD        [] sodium chloride 0.9 % infusion  75 mL/hr Intravenous Continuous Mick Bañuelos MD 75 mL/hr at 24 0529 75 mL/hr at 24 0529    sotalol (BETAPACE) tablet 120 mg  120 mg Oral BID Mick Bañuelos MD   120 mg at 24 2233    tamsulosin (FLOMAX) 24 hr capsule 0.4 mg  0.4 mg Oral Daily Mick Bañuelos MD   0.4 mg at 24 0915     Orders (all)        Start     Ordered    24 0900  DULoxetine (CYMBALTA) DR capsule 30 mg  Daily         24 1208    24 0900  buPROPion XL (WELLBUTRIN XL) 24 hr tablet 150 mg  Daily         24 1208    24 0600  Cortisol  Morning Draw         24 1409    24 0600  CBC & Differential  Morning Draw         24 1409    24 0600  Basic Metabolic Panel  Morning Draw         24 1409    24 0600  CBC Auto Differential  PROCEDURE ONCE         24 2202    24 2100  gabapentin (NEURONTIN) capsule 300 mg  Nightly         24 0441    24  POC Glucose Once  PROCEDURE ONCE        Comments: Complete no more than 45 minutes prior to patient eating      24  Wound Care Pressure Injury  Every 12 Hours         24 1123    24 194  ECG 12 Lead QT Measurement  STAT         24 19424 1821  Discontinue IV  Once         24 1820    24 1820  Discharge patient  Once         24 1820    24  1800  Dietary Nutrition Supplements Other (See Comment); BOost GLucose  Daily With Lunch & Dinner       12/05/24 1514    12/05/24 1609  POC Glucose Once  PROCEDURE ONCE        Comments: Complete no more than 45 minutes prior to patient eating      12/05/24 1602    12/05/24 1515  Diet: Cardiac, Diabetic; Healthy Heart (2-3 Na+); Consistent Carbohydrate; Fluid Consistency: Thin (IDDSI 0)  Diet Effective Now         12/05/24 1514    12/05/24 1409  Inpatient Admission  Once         12/05/24 1409    12/05/24 1209  PT Consult: Eval & Treat Functional Mobility Below Baseline  Once        Comments: Reason Why PT Needed: weakness    12/05/24 1208    12/05/24 1142  POC Glucose Once  PROCEDURE ONCE        Comments: Complete no more than 45 minutes prior to patient eating      12/05/24 1135    12/05/24 1130  midodrine (PROAMATINE) tablet 5 mg  3 Times Daily Before Meals         12/05/24 1033    12/05/24 1126  Wound Care  Daily       12/05/24 1125    12/05/24 1123  Use Repositioning Wedge to Position Patient  Continuous         12/05/24 1123    12/05/24 1123  Use Seat Cushion When Up In Chair  Continuous         12/05/24 1123    12/05/24 1123  Do NOT Rub or Massage Any Bony Prominence  Continuous         12/05/24 1123    12/05/24 1122  Elevate Heels Off of Bed  Until Discontinued         12/05/24 1123    12/05/24 1122  Turn Patient  Now Then Every 2 Hours         12/05/24 1123    12/05/24 0900  finasteride (PROSCAR) tablet 5 mg  Daily         12/05/24 0441    12/05/24 0900  DULoxetine (CYMBALTA) DR capsule 60 mg  Daily,   Status:  Discontinued         12/05/24 0441    12/05/24 0900  tamsulosin (FLOMAX) 24 hr capsule 0.4 mg  Daily         12/05/24 0441    12/05/24 0900  rosuvastatin (CRESTOR) tablet 20 mg  Daily         12/05/24 0441    12/05/24 0900  aspirin EC tablet 325 mg  Daily         12/05/24 0441    12/05/24 0900  sotalol (BETAPACE) tablet 120 mg  2 Times Daily         12/05/24 0441    12/05/24 0900  insulin glargine  (LANTUS, SEMGLEE) injection 20 Units  Daily         12/05/24 0441 12/05/24 0900  buPROPion XL (WELLBUTRIN XL) 24 hr tablet 300 mg  Daily,   Status:  Discontinued         12/05/24 0441 12/05/24 0900  cholecalciferol (VITAMIN D3) tablet 400 Units  Daily         12/05/24 0441 12/05/24 0900  sodium chloride 0.9 % flush 10 mL  Every 12 Hours Scheduled         12/05/24 0441 12/05/24 0900  heparin (porcine) 5000 UNIT/ML injection 5,000 Units  Every 12 Hours Scheduled         12/05/24 0441 12/05/24 0800  Insulin Lispro (humaLOG) injection 2 Units  3 Times Daily With Meals         12/05/24 0441 12/05/24 0800  Vital Signs  Every 8 Hours        Comments: Per per hospital policy    12/05/24 0441 12/05/24 0800  Oral Care  2 Times Daily       12/05/24 0441 12/05/24 0730  Insulin Lispro (humaLOG) injection 2-9 Units  4 Times Daily Before Meals & Nightly,   Status:  Discontinued         12/05/24 0441 12/05/24 0730  Insulin Lispro (humaLOG) injection 2-9 Units  4 Times Daily Before Meals & Nightly         12/05/24 0449 12/05/24 0722  POC Glucose Once  PROCEDURE ONCE        Comments: Complete no more than 45 minutes prior to patient eating      12/05/24 0715    12/05/24 0700  Adult Transthoracic Echo Limited W/ Cont if Necessary Per Protocol  Once        Comments: Compare to ECHO from 2 months ago, look for new wall motion abnormality, drop in EF, other abnormalities that could contribute to hypotensoin    12/05/24 0441 12/05/24 0700  POC Glucose 4x Daily Before Meals & at Bedtime  4 Times Daily Before Meals & at Bedtime      Comments: Complete no more than 45 minutes prior to patient eating      12/05/24 0449 12/05/24 0600  levothyroxine (SYNTHROID, LEVOTHROID) tablet 50 mcg  Every Early Morning         12/05/24 0441 12/05/24 0600  gabapentin (NEURONTIN) capsule 200 mg  Every Early Morning         12/05/24 0441 12/05/24 0600  Strict Intake & Output  Every 6 Hours         12/05/24 0441     12/05/24 0600  CBC Auto Differential  Morning Draw         12/05/24 0441    12/05/24 0600  Comprehensive Metabolic Panel  Morning Draw         12/05/24 0441    12/05/24 0538  POC Glucose Once  PROCEDURE ONCE        Comments: Complete no more than 45 minutes prior to patient eating      12/05/24 0531    12/05/24 0517  Wound Ostomy Eval & Treat  Once         12/05/24 0518    12/05/24 0516  Stage II Pressure Ulcer Care Every Other Day  Every Other Day,   Status:  Canceled        Comments: - Gently Cleanse With Normal Saline  - Cover With Silicone Border Dressing (If Indicated)  - For Frequent Incontinence - Apply Skin Protective Barrier Cream Rather Than Silicone Border Dressing    12/05/24 0516    12/05/24 0516  Follow Pressure Ulcer Prevention Measures Policy  Continuous,   Status:  Canceled        Comments: Implement Appropriate Pressure Ulcer Prevention Measures  - Open Order Report to View Full Instructions  Enter Wound LDA & Document Assessment  Add Wound Care Plan  Add Patient Education Per Policy    12/05/24 0516    12/05/24 0515  Stage II Pressure Ulcer Care PRN  As Needed,   Status:  Canceled      Comments: - Gently Cleanse With Normal Saline  - Cover With Silicone Border Dressing (If Indicated)  - For Frequent Incontinence - Apply Skin Protective Barrier Cream Rather Than Silicone Border Dressing    12/05/24 0516    12/05/24 0449  dextrose (GLUTOSE) oral gel 15 g  Every 15 Minutes PRN         12/05/24 0449    12/05/24 0449  dextrose (D50W) (25 g/50 mL) IV injection 25 g  Every 15 Minutes PRN         12/05/24 0449    12/05/24 0449  glucagon HCl (Diagnostic) injection 1 mg  Every 15 Minutes PRN         12/05/24 0449    12/05/24 0442  Notify Physician (with Parameters)  Until Discontinued         12/05/24 0441    12/05/24 0442  Insert Peripheral IV  Once         12/05/24 0441    12/05/24 0442  Saline Lock & Maintain IV Access  Continuous,   Status:  Canceled         12/05/24 0441    12/05/24 0442  Continuous  "Cardiac Monitoring  Continuous        Comments: Follow Standing Orders As Outlined in Process Instructions (Open Order Report to View Full Instructions)    12/05/24 0441 12/05/24 0442  Maintain IV Access  Continuous         12/05/24 0441 12/05/24 0442  Telemetry - Place Orders & Notify Provider of Results When Patient Experiences Acute Chest Pain, Dysrhythmia or Respiratory Distress  Continuous        Comments: Open Order Report to View Parameters Requiring Provider Notification    12/05/24 0441 12/05/24 0442  May Be Off Telemetry for Tests  Continuous         12/05/24 0441 12/05/24 0442  Continuous Pulse Oximetry  Continuous         12/05/24 0441 12/05/24 0442  Daily Weights  Daily       12/05/24 0441 12/05/24 0442  Orthostatic Blood Pressure  Every Shift       12/05/24 0441 12/05/24 0442  Diet: Cardiac; Healthy Heart (2-3 Na+); Fluid Consistency: Thin (IDDSI 0)  Diet Effective Now,   Status:  Canceled         12/05/24 0441 12/05/24 0442  Inpatient Cardiology Consult  Once        Specialty:  Cardiology  Provider:  Violeta Serrato, ZIA    12/05/24 0441 12/05/24 0441  sodium chloride 0.9 % flush 10 mL  As Needed         12/05/24 0441 12/05/24 0441  sodium chloride 0.9 % infusion 40 mL  As Needed         12/05/24 0441 12/05/24 0441  sennosides-docusate (PERICOLACE) 8.6-50 MG per tablet 2 tablet  2 Times Daily PRN        Placed in \"And\" Linked Group    12/05/24 0441    12/05/24 0441  polyethylene glycol (MIRALAX) packet 17 g  Daily PRN        Placed in \"And\" Linked Group    12/05/24 0441    12/05/24 0441  bisacodyl (DULCOLAX) EC tablet 5 mg  Daily PRN        Placed in \"And\" Linked Group    12/05/24 0441    12/05/24 0441  bisacodyl (DULCOLAX) suppository 10 mg  Daily PRN        Placed in \"And\" Linked Group    12/05/24 0441    12/05/24 0441  HYDROcodone-acetaminophen (NORCO)  MG per tablet 0.5 tablet  Every 8 Hours PRN         12/05/24 0441    12/05/24 0441  docusate sodium " (COLACE) capsule 100 mg  2 Times Daily PRN         12/05/24 0441    12/05/24 0441  lactulose (CHRONULAC) 10 GM/15ML solution 30 g  2 Times Daily PRN         12/05/24 0441    12/05/24 0441  nitroglycerin (NITROSTAT) SL tablet 0.4 mg  Every 5 Minutes PRN         12/05/24 0441    12/05/24 0302  sodium chloride 0.9 % infusion  Continuous         12/05/24 0246    12/05/24 0223  Code Status and Medical Interventions: CPR (Attempt to Resuscitate); Full Support  Continuous         12/05/24 0224    12/05/24 0222  Initiate Observation Status  Once         12/05/24 0224    12/05/24 0153  POC Glucose Once  PROCEDURE ONCE        Comments: Complete no more than 45 minutes prior to patient eating      12/05/24 0146    12/04/24 2257  High Sensitivity Troponin T 2Hr  PROCEDURE ONCE         12/04/24 2123    12/04/24 2048  High Sensitivity Troponin T  STAT         12/04/24 2048 12/04/24 2020  sodium chloride 0.9 % bolus 1,000 mL  Once         12/04/24 2004 12/04/24 1930  XR Chest AP  1 Time Imaging         12/04/24 1929    12/04/24 1916  Lipase  STAT         12/04/24 1915    12/04/24 1858  POC Glucose Once  PROCEDURE ONCE        Comments: Complete no more than 45 minutes prior to patient eating      12/04/24 1842    12/04/24 1835  NPO Diet NPO Type: Strict NPO  Diet Effective Now,   Status:  Canceled         12/04/24 1834    12/04/24 1835  Undress & Gown  Once         12/04/24 1834    12/04/24 1835  Cardiac Monitoring  Continuous,   Status:  Canceled        Comments: Follow Standing Orders As Outlined in Process Instructions (Open Order Report to View Full Instructions)    12/04/24 1834    12/04/24 1835  Continuous Pulse Oximetry  Continuous,   Status:  Canceled         12/04/24 1834    12/04/24 1835  Vital Signs  Per Hospital Policy         12/04/24 1834    12/04/24 1835  Orthostatic Blood Pressure  Once         12/04/24 1834    12/04/24 1835  ECG 12 Lead ED Triage Standing Order; Syncope  Once         12/04/24 1834    12/04/24  1835  POC Glucose Once  Once        Comments: Complete no more than 45 minutes prior to patient eating      12/04/24 1834    12/04/24 1835  Insert Peripheral IV  Once         12/04/24 1834    12/04/24 1835  [place] Draw  Once         12/04/24 1834    12/04/24 1835  CBC & Differential  Once         12/04/24 1834    12/04/24 1835  Comprehensive Metabolic Panel  Once         12/04/24 1834    12/04/24 1835  Magnesium  Once         12/04/24 1834    12/04/24 1835  Single High Sensitivity Troponin T  Once         12/04/24 1834    12/04/24 1835  Green Top (Gel)  PROCEDURE ONCE         12/04/24 1835    12/04/24 1835  Lavender Top  PROCEDURE ONCE         12/04/24 1835    12/04/24 1835  Gold Top - SST  PROCEDURE ONCE         12/04/24 1835    12/04/24 1835  Light Blue Top  PROCEDURE ONCE         12/04/24 1835    12/04/24 1835  CBC Auto Differential  PROCEDURE ONCE         12/04/24 1835    12/04/24 1834  sodium chloride 0.9 % flush 10 mL  As Needed         12/04/24 1834    12/04/24 0000  Telemetry Scan  Once         12/04/24 0000    Unscheduled  Oxygen Therapy- Nasal Cannula; Titrate 1-6 LPM Per SpO2; 90 - 95%  Continuous PRN       12/04/24 1834    Unscheduled  Up With Assistance  As Needed       12/05/24 0441    Unscheduled  Follow Hypoglycemia Standing Orders For Blood Glucose <70 & Notify Provider of Treatment  As Needed      Comments: Follow Hypoglycemia Orders As Outlined in Process Instructions (Open Order Report to View Full Instructions)  Notify Provider Any Time Hypoglycemia Treatment is Administered    12/05/24 0449    Unscheduled  Wound Care  As Needed       12/05/24 1123    --  apixaban (ELIQUIS) 5 MG tablet tablet  2 Times Daily,   Status:  Discontinued         12/04/24 2031    --  amLODIPine (NORVASC) 5 MG tablet  Daily,   Status:  Discontinued         12/04/24 2031    --  buPROPion XL (WELLBUTRIN XL) 300 MG 24 hr tablet  Daily         12/04/24 2031    --  gabapentin (NEURONTIN) 100 MG capsule  Every Early Morning          12/04/24 2036    --  gabapentin (NEURONTIN) 100 MG capsule  Nightly         12/04/24 2036    --  glimepiride (AMARYL) 2 MG tablet  2 Times Daily         12/04/24 2036    --  insulin NPH-insulin regular (humuLIN 70/30,novoLIN 70/30) (70-30) 100 UNIT/ML injection  2 Times Daily With Meals,   Status:  Discontinued         12/04/24 2036    --  losartan (COZAAR) 25 MG tablet  Daily,   Status:  Discontinued         12/04/24 2036    --  nitroglycerin (NITROSTAT) 0.4 MG SL tablet  Every 5 Minutes PRN         12/04/24 2036    --  furosemide (LASIX) 20 MG tablet  Daily         12/05/24 1209                     Physician Progress Notes (all)        Daria Rowe MD at 12/05/24 1206          Patient seen and examined assisted for significant orthostasis.  Started on midodrine by cardiology.    -Orthostatic hypotension  -Diabetes insulin requiring  -Hyperlipidemia  -Coronary disease status post CABG  -Hypothyroidism  -Diabetic polyneuropathy  -History of BPH    Fall precautions, PT OT, will attempt to decrease medication that can potentially cause hypotension, orthostasis or dizziness.  Check cortisol level.  Gentle hydration.      Electronically signed by Daria Rowe MD at 12/05/24 1209          Consult Notes (all)        Tariq Chávez MD at 12/05/24 0815        Consult Orders    1. Inpatient Cardiology Consult [[id number]] ordered by Mick Bañuelos MD at 12/05/24 0224                     [facility]  CONSULT  NOTE      Patient information:  Date of Admit: 12/4/2024  Date of Consult: 12/05/24  Hospitalist/Referring MD:Mick Bañuelos MD  PCP: Edmundo Boston MD  MRN:  [id number]  Visit Number:  [id number]    LOS: 0  CODE STATUS:  Code Status and Medical Interventions: CPR (Attempt to Resuscitate); Full Support   Ordered at: 12/05/24 0224     Code Status (Patient has no pulse and is not breathing):    CPR (Attempt to Resuscitate)      Medical Interventions (Patient has pulse or is breathing):    Full Support       PROBLEM LIST: Principal Problem:    Orthostatic hypotension      Reason for Cardiology consultation: Orthostatic hypotension status post recent CABG, MAZE, and LAAL    General Cardiology Consulting Physician: Dr. Tariq Chávez MD, [facility]    Primary Cardiologist: ZIA Melendez      Assessment      Orthostatic hypotension with dizziness and near syncope with some degree of dehydration.  ASCVD, status post CABG on 10/28/2024 with Maze procedure and left atrial appendage ligation.  New finding of large pericardial effusion adjacent to left ventricle with signs of early tamponade with early diastolic collapse of the right atrium and Doppler velocity variation of more than 20 mmHg across the mitral valve.  Type 2 diabetes mellitus  Dyslipidemia.  Mild elevated troponin with a flat trend.  Paroxysmal atrial fibrillation with a IVA3PO2-DLBt score of 2-3, status post left atrial appendage ligation during recent CABG.      Recommendations     Continue with IV fluids as needed and tolerated.  I have reviewed his echo Doppler study which revealed large pericardial effusion adjacent to left ventricle with signs of early tamponade although patient is hemodynamically stable at this time and is not tachycardic.  I have discussed with Dr. Max (cardiothoracic surgeon) at [facility] who is accepted him in transfer when the bed is available.  Will add midodrine for now for his intermittent hypotension.  Hold Entresto and sotalol for now due to hypotension.    Subjective Data     12/5/2024    ADMISSION INFORMATION:  Chief Complaint   Patient presents with    Syncope     History of Present Illness (HPI)  HPI obtained from chart review     Joel Galo is a 59 y.o. male with a past medical history significant for CAD s/p x 4 vessel CABG with EVH of right greater saphenous, maze, and LAAL with atricure clip per Dr. Max on 10/28/2024,  paroxysmal atrial fibrillation off Eliquis since discharge from procedure on 10/28/2024, but continued on sotalol, hypertension, poorly controlled diabetes mellitus type 2, HLD, MAGDALENA, hypothyroidism, longstanding history of orthostatic hypotension and autonomic dysfunction with history of falls recently, and GERD.    Patient presented to [facility] ([facility] emergency department (ED) on 12/4/2024 with complaints of syncopal episodes.Patient stated his home health physical therapist came to see him on 12/04/2024, and asked him to walk from his bed to his bathroom. He stated he took a couple steps felt short of breath and unable to walk and then he sat back down on his bed and passed out. He does wear oxygen at home 2 L and he is not on an increased amount of oxygen. . He also of note had a recent hospitalization at an outside facility last week and was discharged with oxygen. He states he does not have COPD and does not know why they gave him oxygen.     While in the ED, patient's orthostatic vital signs were obtained, he was unable to stand as he felt like he was get a pass out. Laying he was 124/82 with a heart rate 79, sitting he was 82/59 with a heart rate of 82.     ED provider did speak with Dr. Blair at [facility] in regards to potential transfer. As there was no rhythm issues and his syncope was deemed  likely due to orthostasis in nature, this could be the cause of his syncope and he stated he does not feel a transfer would be appropriate at this time and feels like Cardiology could evaluate him in the morning and then determine if he needs EP afterwards.     Cardiology has been consulted for further evaluation and management for orthostatic hypotension status post recent CABG, MAZE, and LAAL.     Primary Cardiologist has been ZIA Melendez and he was last seen in the office on 12/3/2024.  Patient blood pressure during this visit was 102/70.  Of note on this visit,  "hydrochlorothiazide was discontinued and amlodipine decreased to 5 mg daily. Will decrease Entresto to 24-26mg and stop amlodipine to allow for high blood pressure which may help with his dizziness. Advised adequate water intake and physical rehab     Patient is also followed [facility] ([facility]) and was last seen on 11/20/2024.  Blood pressure on this office visit was initially 87/52 and recheck was 98/64. His hydrochlorothiazide was discontinued and they decrease his amlodipine from 10 mg to 5 mg daily with encouraged hydration.    Patient was in room 304 B when he was seen and examined by Dr. Chávez.  Patient is sitting up on he edge of his bed resting quietly.  No acute distress noted at this time.  Nurse is at bedside obtaining orthostatic vital signs and the monitor is showing a BP of 95/61.  Patient's last recorded blood pressure was 115/85.  Patient reports a longstanding history of orthostatic hypotension.  He states, \"way before his surgery in October\".  Patient denies any chest pain but does report some intermittent increased shortness of breath.     ORDERS:  Initiated midodrine 5 mg PO TID.     EVENT TIMELINE:  10/28: DARIAN with a EF of 55%.  Please see full report attached below.    Known medications given enroute via EMS and in the ER:       Personal History     Cardiac risk factors:arteriosclerotic heart disease, diabetes mellitus, hypercholesterolemia, hypertension, and MAGDALENA      Last Echo: Results for orders placed in visit on 10/28/24    Intra-Op Anesthesia DARIAN    Narrative  Intra-Op Anesthesia DARIAN    Procedure Performed: Intra-Op Anesthesia DARIAN  Echocardiographic and Doppler Measurements    Ventricles  Right Ventricle:  Thrombus not present.  Global function normal.  Left Ventricle:  Cavity size normal.  Diastolic dimension 1.6 cm.  Thrombus not present.  Global Function normal.  Ejection Fraction 55%.    Valves    Aortic Valve:  Annulus normal.  Stenosis not present.  Area: 2.2 cm².  Mean " Gradient: 2 mmHg.  Regurgitation absent.  Leaflets normal.  Leaflet motions normal.    Mitral Valve:  Annulus normal.  Stenosis not present.  Area: 2.4 cm².  Mean Gradient: 1 mmHg.  Regurgitation trace.  Leaflets normal.  Leaflet motions normal.    Tricuspid Valve:  Annulus normal.  Stenosis not present.  Leaflets normal.  Leaflet motions normal.  Pulmonic Valve:  Annulus normal.    Aorta  Ascending Aorta:  Size normal.  Diameter 3 cm.  Dissection not present.  Plaque thickness less than 3 mm.  Mobile plaque not present.  Aortic Arch:  Size normal.  Dissection not present.  Plaque thickness less than 3 mm.  Mobile plaque not present.  Descending Aorta:  Size normal.  Dissection not present.  Plaque thickness less than 3 mm.  Mobile plaque not present.    Atria    Right Atrium:  Size dilated.  Spontaneous echo contrast not present.  Thrombus not present.  Tumor not present.  Device present.    Left Atrium:  Size dilated.  Spontaneous echo contrast not present.  Thrombus not present.  Tumor not present.  Left atrial appendage normal.    Septa  Ventricular Septum:  Intra-ventricular septum morphology normal.    Diastolic Function Measurements:  Diastolic Dysfunction Grade=  E=  ms  A=  ms  E/A Ratio=  1.7  DT=  ms  S/D=  IVRT=    Other Findings  Pericardium:  normal  Pleural Effusion:  none  Pulmonary Arteries:  normal  Pulmonary Venous Flow:  normal    Anesthesia Information  Performed Personally  Anesthesiologist:  George Morrow MD      Echocardiogram Comments:  Informed consent was obtained preoperatively.  Yahir probe passed without difficulty.  Post CABG, MAZE and TORSTEN ligation:   EF unchanged, no NWMA, obliterartion of TORSTEN by clip         Last Stress: Results for orders placed during the hospital encounter of 09/03/21    Stress Test With Myocardial Perfusion (1 Day)    Interpretation Summary  · A pharmacological stress test was performed using regadenoson without low-level exercise.  · Findings consistent with a  "normal ECG stress test.  · Myocardial perfusion imaging indicates a small-sized, mild-to-moderate degree of ischemia located in the lateral wall.  · Normal LV cavity size. Normal LV wall motion noted.  · Left ventricular ejection fraction is normal. (Calculated EF = 55%).  · Impressions are consistent with a low to intermediate risk study.        Last Cath: Results for orders placed during the hospital encounter of 08/28/24    Cardiac Catheterization/Vascular Study    Conclusion    Normal systolic function.    Mid LAD lesion is 75% stenosed.    Dist LAD lesion is 95% stenosed.    1st Mrg lesion is 90% stenosed.    3rd Mrg lesion is 99% stenosed.    Ost RCA lesion is 70% stenosed.    Mid RCA lesion is 70% stenosed.    RV Branch lesion is 99% stenosed.    1.  Cardiac.  Patient with significant coronary artery disease involving multiple vessels.  Patient will be referred for bypass surgery.                            Past Medical History:   Diagnosis Date    A-fib     Anxiety     Arthritis     DDD (degenerative disc disease), lumbar     Deep vein thrombosis     Depression     Diabetes     Disease of thyroid gland     HYPO    Heart attack     \"5-6 years ago\"    Hyperlipidemia     Hypertension     Kidney stone     Neuropathy     Sleep apnea     DOES NOT USE CPAP    UTI (urinary tract infection)      Past Surgical History:   Procedure Laterality Date    ATRIAL APPENDAGE EXCLUSION LEFT WITH TRANSESOPHAGEAL ECHOCARDIOGRAM N/A 10/28/2024    Procedure: ATRIAL APPENDAGE EXCLUSION LEFT WITH TRANSESOPHAGEAL ECHOCARDIOGRAM;  Surgeon: Alex Max MD;  Location: [facility] OR;  Service: Cardiothoracic;  Laterality: N/A;    CARDIAC CATHETERIZATION N/A 08/28/2024    Procedure: Left Heart Cath;  Surgeon: Julio Neff MD;  Location: [facility] CATH INVASIVE LOCATION;  Service: Cardiology;  Laterality: N/A;    CARDIAC ELECTROPHYSIOLOGY PROCEDURE N/A 04/05/2023    Procedure: Loop insertion;  Surgeon: Tariq Chávez MD;  Location: [facility]" CATH INVASIVE LOCATION;  Service: Cardiovascular;  Laterality: N/A;    CATARACT EXTRACTION Bilateral     COLONOSCOPY      CORONARY ARTERY BYPASS GRAFT N/A 10/28/2024    Procedure: MEDIAN STERNOTOMY, CORONARY ARTERY BYPASS GRAFTING X4 UTILIZING THE LEFT INTERNAL MAMMARY ARTERY GRAFT, ENDOSCOPIC VEIN HARVEST OF THE GREATER RIGHT SAPHENOUS VEIN;  Surgeon: Alex Max MD;  Location:  [facility] OR;  Service: Cardiothoracic;  Laterality: N/A;    ENDOSCOPY      MAZE PROCEDURE N/A 10/28/2024    Procedure: MAZE PROCEDURE;  Surgeon: Alex Max MD;  Location:  [facility] OR;  Service: Cardiothoracic;  Laterality: N/A;    TEETH EXTRACTION       Family History   Problem Relation Age of Onset    Heart disease Mother     Coronary artery disease Father     Diabetes Father     Kidney disease Father      Social History     Tobacco Use    Smoking status: Never     Passive exposure: Never    Smokeless tobacco: Never   Vaping Use    Vaping status: Never Used   Substance Use Topics    Alcohol use: No    Drug use: No       ALLERGIES: Patient has no known allergies.    Medications listed below are reported home medications pulling from within the system:  Medications Prior to Admission   Medication Sig Dispense Refill Last Dose/Taking    aspirin 325 MG EC tablet Take 1 tablet by mouth Daily. 90 tablet 0 12/4/2024 Morning    buPROPion XL (WELLBUTRIN XL) 300 MG 24 hr tablet Take 1 tablet by mouth Daily.   12/4/2024    docusate sodium (COLACE) 100 MG capsule Take 1 capsule by mouth 2 (Two) Times a Day As Needed for Constipation.   Past Month    DULoxetine (CYMBALTA) 60 MG capsule Take 1 capsule by mouth Daily.   12/4/2024    empagliflozin (JARDIANCE) 25 MG tablet tablet Take 1 tablet by mouth Daily.   12/4/2024    finasteride (PROSCAR) 5 MG tablet TAKE 1 TABLET EVERY DAY 90 tablet 0 12/4/2024    gabapentin (NEURONTIN) 100 MG capsule Take 3 capsules by mouth Every Night.   12/3/2024    glimepiride (AMARYL) 2 MG tablet Take 2 tablets by mouth 2  (Two) Times a Day.   12/4/2024    HYDROcodone-acetaminophen (NORCO)  MG per tablet Take 1 tablet by mouth Every 8 (Eight) Hours As Needed for Moderate Pain.   12/3/2024    insulin glargine (LANTUS, SEMGLEE) 100 UNIT/ML injection Inject 20 Units under the skin into the appropriate area as directed Daily.   12/4/2024    Insulin Lispro (humaLOG) 100 UNIT/ML injection Inject 2-9 Units under the skin into the appropriate area as directed 4 (Four) Times a Day Before Meals & at Bedtime.   12/4/2024    Insulin Lispro (humaLOG) 100 UNIT/ML injection Inject 2 Units under the skin into the appropriate area as directed 3 (Three) Times a Day With Meals.   12/4/2024    lactulose (CHRONULAC) 10 GM/15ML solution Take 45 mL by mouth 2 (Two) Times a Day As Needed.   12/4/2024    levothyroxine (SYNTHROID, LEVOTHROID) 50 MCG tablet Take 1 tablet by mouth Every Morning.   12/4/2024    rosuvastatin (CRESTOR) 20 MG tablet Take 1 tablet by mouth Daily. 90 tablet 1 12/4/2024    sacubitril-valsartan (Entresto) 24-26 MG tablet Take 1 tablet by mouth 2 (Two) Times a Day. 60 tablet 3 12/4/2024    sotalol (BETAPACE) 120 MG tablet Take 1 tablet by mouth 2 (Two) Times a Day. 90 tablet 1 12/4/2024    tamsulosin (FLOMAX) 0.4 MG capsule 24 hr capsule TAKE 1 CAPSULE EVERY DAY 90 capsule 3 12/4/2024    Vitamin D, Cholecalciferol, (CHOLECALCIFEROL) 10 MCG (400 UNIT) tablet Take 1 tablet by mouth Daily.   12/4/2024    gabapentin (NEURONTIN) 100 MG capsule Take 2 capsules by mouth Every Morning.       nitroglycerin (NITROSTAT) 0.4 MG SL tablet Place 1 tablet under the tongue Every 5 (Five) Minutes As Needed for Chest Pain. Take no more than 3 doses in 15 minutes.   Unknown       Review of Systems   Constitutional:  Positive for fatigue. Negative for activity change, diaphoresis and unexpected weight change.   HENT:  Negative for facial swelling and trouble swallowing.    Eyes:  Negative for visual disturbance.   Respiratory:  Positive for shortness  of breath. Negative for apnea, cough, chest tightness, wheezing and stridor.    Cardiovascular:  Negative for chest pain, palpitations and leg swelling.   Gastrointestinal:  Negative for abdominal distention, nausea and vomiting.   Endocrine: Negative.    Genitourinary: Negative.    Musculoskeletal:  Positive for gait problem.   Skin:  Negative for color change.   Neurological:  Positive for syncope and weakness. Negative for dizziness and speech difficulty.   Psychiatric/Behavioral:  Negative for agitation and behavioral problems.      Objective Data      Vital Signs  Temp:  [97.3 °F (36.3 °C)-97.8 °F (36.6 °C)] 97.3 °F (36.3 °C)  Heart Rate:  [75-89] 86  Resp:  [17-18] 18  BP: ()/() 95/61  Flow (L/min) (Oxygen Therapy):  [2] 2  Device (Oxygen Therapy): nasal cannula  Vital Signs (last 72 hrs)         12/02 0700  12/03 0659 12/03 0700  12/04 0659 12/04 0700  12/05 0659 12/05 0700  12/05 0815   Most Recent      Temp (°F)     97.7 -  97.8      97.3     97.3 (36.3) 12/05 0711    Heart Rate     75 -  89       86 12/05 0452    Resp     17 -  18      18     18 12/05 0711    BP     82/59 -  162/98      115/85     115/85 12/05 0711    SpO2 (%)     97 -  100       99 12/05 0452    Flow (L/min) (Oxygen Therapy)       2       2 12/05 0508          BMI:  Body mass index is 30.45 kg/m².  WEIGHT:      12/05/24  0445 12/05/24  0538   Weight: 95.2 kg (209 lb 12.8 oz) 95.2 kg (209 lb 12.8 oz) (new admit weight less than 24 hours)     DIET:  Diet: Cardiac; Healthy Heart (2-3 Na+); Fluid Consistency: Thin (IDDSI 0)  I&O:  Intake & Output (last 3 days)       None          Physical Exam  Constitutional:       General: He is not in acute distress.     Appearance: Normal appearance. He is not ill-appearing, toxic-appearing or diaphoretic.   HENT:      Head: Normocephalic and atraumatic.      Nose: Nose normal.      Mouth/Throat:      Mouth: Mucous membranes are moist.   Eyes:      Extraocular Movements: Extraocular movements  "intact.      Pupils: Pupils are equal, round, and reactive to light.   Neck:      Vascular: No JVD.   Cardiovascular:      Rate and Rhythm: Normal rate and regular rhythm.      Pulses:           Radial pulses are 1+ on the right side and 1+ on the left side.        Dorsalis pedis pulses are 1+ on the right side and 1+ on the left side.        Posterior tibial pulses are 1+ on the right side and 1+ on the left side.      Heart sounds: Normal heart sounds.   Pulmonary:      Effort: Pulmonary effort is normal.      Breath sounds: Normal breath sounds.   Abdominal:      General: Bowel sounds are normal.      Palpations: Abdomen is soft.   Musculoskeletal:         General: Normal range of motion.      Cervical back: Normal range of motion.      Right lower leg: No edema.      Left lower leg: No edema.   Skin:     General: Skin is warm and dry.   Neurological:      General: No focal deficit present.      Mental Status: He is alert and oriented to person, place, and time. Mental status is at baseline.   Psychiatric:         Mood and Affect: Mood normal.         Behavior: Behavior normal.         Thought Content: Thought content normal.         Judgment: Judgment normal.       Results review   Results Review:    I have reviewed the patient's new clinical results. 12/05/24 11:20 EST    Results from last 7 days   Lab Units 12/04/24  2308 12/04/24  2057 12/04/24  1852   HSTROP T ng/L 25* 28* 34*     No results found for: \"PROBNP\"  Results from last 7 days   Lab Units 12/05/24  0455 12/04/24  1852   WBC 10*3/mm3 7.70 8.21   HEMOGLOBIN g/dL 11.7* 11.6*   PLATELETS 10*3/mm3 318 393     Results from last 7 days   Lab Units 12/05/24  0455 12/04/24  1852   SODIUM mmol/L 137 137   POTASSIUM mmol/L 3.9 4.2   CHLORIDE mmol/L 101 96*   CO2 mmol/L 23.3 27.1   BUN mg/dL 26* 25*   CREATININE mg/dL 0.87 1.17   CALCIUM mg/dL 8.8 9.2   GLUCOSE mg/dL 102* 251*   ALT (SGPT) U/L 18 22   AST (SGOT) U/L 20 22     Lab Results   Component Value Date " "   MG 2.6 (H) 2024    MG 2.4 10/29/2024    MG 1.9 10/29/2024     Lab Results   Component Value Date    CHOL 102 2024    TRIG 316 (H) 2024    HDL 18 (L) 2024    LDL 36 2024     Estimated Creatinine Clearance: 102.5 mL/min (by C-G formula based on SCr of 0.87 mg/dL).  Lab Results   Component Value Date    HGBA1C 11.20 (H) 10/25/2024     Lab Results   Component Value Date    INR 1.29 (H) 10/29/2024    INR 1.43 (H) 10/28/2024    INR 1.06 10/25/2024     No results found for: \"LABHEPA\"      Lab Results   Component Value Date    PSA 0.404 2019      No results found for: \"URICACID\"  Pain Management Panel          Latest Ref Rng & Units 10/28/2024   Pain Management Panel   Amphetamine, Urine Qual Negative Negative    Barbiturates Screen, Urine Negative Negative    Benzodiazepine Screen, Urine Negative Negative    Buprenorphine, Screen, Urine Negative Negative    Cocaine Screen, Urine Negative Negative    Fentanyl, Urine Negative Negative    Methadone Screen , Urine Negative Negative    Methamphetamine, Ur Negative Negative       Details                 Microbiology Results (last 10 days)       ** No results found for the last 240 hours. **           No results found for: \"BLOODCX\"      EC2024      ECG/EMG Results (last 24 hours)       Procedure Component Value Units Date/Time    ECG 12 Lead ED Triage Standing Order; Syncope [[id number]] Collected: 24 1840     Updated: 24     QT Interval 420 ms      QTC Interval 493 ms     Narrative:      Test Reason : ED Triage Standing Order~  Blood Pressure :   */*   mmHG  Vent. Rate :  83 BPM     Atrial Rate :  83 BPM     P-R Int : 122 ms          QRS Dur : 104 ms      QT Int : 420 ms       P-R-T Axes :  23  21 218 degrees    QTcB Int : 493 ms    Normal sinus rhythm  ST & T wave abnormality, consider inferior ischemia  ST & T wave abnormality, consider anterolateral ischemia  Abnormal ECG  When compared with ECG of 31-Oct-2024 " 09:06,  ST no longer elevated in Lateral leads  T wave inversion more evident in Inferior leads  T wave inversion now evident in Anterolateral leads  Confirmed by Julio Neff ([id number]) on 12/4/2024 10:27:41 PM    Referred By: AGUSTÍN           Confirmed By: Julio Neff    Telemetry Scan [[id number]] Resulted: 12/04/24     Updated: 12/05/24 0616            TELEMETRY:           RADIOLOGY STUDIES:  Imaging Results (Last 72 Hours)       Procedure Component Value Units Date/Time    XR Chest AP [[id number]] Collected: 12/04/24 2228     Updated: 12/04/24 2231    Narrative:      EXAMINATION: AP portable chest     CLINICAL HISTORY: Syncope     COMPARISON: 11/6/2024     FINDINGS: Lung volumes are reduced. There has been prior median  sternotomy. An electronic device projects over the left lower chest  wall. A left atrial appendage occlusion device is noted. Mild  hypoventilatory changes are noted. The lungs are otherwise clear. No  pneumothorax or pleural effusion.       Impression:      1. No acute cardiopulmonary disease.     This report was finalized on 12/4/2024 10:29 PM by Fernandez Bah MD.               ALLERGIES: Patient has no known allergies.    CURRENT MEDICATIONS:  Current list of medications may not reflect those currently placed in orders that are not signed or are being held.     aspirin, 325 mg, Oral, Daily  buPROPion XL, 300 mg, Oral, Daily  cholecalciferol, 400 Units, Oral, Daily  DULoxetine, 60 mg, Oral, Daily  finasteride, 5 mg, Oral, Daily  gabapentin, 200 mg, Oral, Q AM  gabapentin, 300 mg, Oral, Nightly  heparin (porcine), 5,000 Units, Subcutaneous, Q12H  insulin glargine, 20 Units, Subcutaneous, Daily  Insulin Lispro, 2 Units, Subcutaneous, TID With Meals  insulin lispro, 2-9 Units, Subcutaneous, 4x Daily AC & at Bedtime  levothyroxine, 50 mcg, Oral, Q AM  midodrine, 5 mg, Oral, TID AC  rosuvastatin, 20 mg, Oral, Daily  sodium chloride, 10 mL, Intravenous, Q12H  sotalol, 120 mg, Oral, BID  tamsulosin, 0.4  mg, Oral, Daily      sodium chloride, 75 mL/hr, Last Rate: 75 mL/hr (12/05/24 0529)        senna-docusate sodium **AND** polyethylene glycol **AND** bisacodyl **AND** bisacodyl    dextrose    dextrose    docusate sodium    glucagon (human recombinant)    HYDROcodone-acetaminophen    lactulose    nitroglycerin    sodium chloride    sodium chloride    sodium chloride    Thank you very much for asking us to be involved in this patient's care. Please do not hesitate to call for any questions or concerns.     I have discussed the patients findings and recommendations with the patient and DrOculam..    Electronically signed by ZIA Moya, 12/05/24, 11:20 AM EST.     Electronically signed by Tariq Chávez MD, 12/05/24, 5:59 PM EST.          Please note that portions of this note were copied and has been reviewed and is accurate as of 12/5/2024 .      Please note that portions of this note were completed with a voice recognition program.     Electronically signed by Tariq Chávez MD at 12/05/24 2917

## 2024-12-07 ENCOUNTER — APPOINTMENT (OUTPATIENT)
Dept: GENERAL RADIOLOGY | Facility: HOSPITAL | Age: 59
End: 2024-12-07
Payer: MEDICARE

## 2024-12-07 LAB
ANION GAP SERPL CALCULATED.3IONS-SCNC: 12 MMOL/L (ref 5–15)
BASOPHILS # BLD AUTO: 0.01 10*3/MM3 (ref 0–0.2)
BASOPHILS NFR BLD AUTO: 0.1 % (ref 0–1.5)
BH BB BLOOD EXPIRATION DATE: NORMAL
BH BB BLOOD EXPIRATION DATE: NORMAL
BH BB BLOOD TYPE BARCODE: 1700
BH BB BLOOD TYPE BARCODE: 1700
BH BB DISPENSE STATUS: NORMAL
BH BB DISPENSE STATUS: NORMAL
BH BB PRODUCT CODE: NORMAL
BH BB PRODUCT CODE: NORMAL
BH BB UNIT NUMBER: NORMAL
BH BB UNIT NUMBER: NORMAL
BUN SERPL-MCNC: 25 MG/DL (ref 8–23)
BUN/CREAT SERPL: 34.2 (ref 7–25)
CALCIUM SPEC-SCNC: 8.6 MG/DL (ref 8.6–10.5)
CHLORIDE SERPL-SCNC: 102 MMOL/L (ref 98–107)
CO2 SERPL-SCNC: 24 MMOL/L (ref 22–29)
CREAT SERPL-MCNC: 0.73 MG/DL (ref 0.76–1.27)
CROSSMATCH INTERPRETATION: NORMAL
CROSSMATCH INTERPRETATION: NORMAL
DEPRECATED RDW RBC AUTO: 43.3 FL (ref 37–54)
EGFRCR SERPLBLD CKD-EPI 2021: 103.5 ML/MIN/1.73
EOSINOPHIL # BLD AUTO: 0 10*3/MM3 (ref 0–0.4)
EOSINOPHIL NFR BLD AUTO: 0 % (ref 0.3–6.2)
ERYTHROCYTE [DISTWIDTH] IN BLOOD BY AUTOMATED COUNT: 13.2 % (ref 12.3–15.4)
GLUCOSE BLDC GLUCOMTR-MCNC: 176 MG/DL (ref 70–130)
GLUCOSE BLDC GLUCOMTR-MCNC: 188 MG/DL (ref 70–130)
GLUCOSE BLDC GLUCOMTR-MCNC: 199 MG/DL (ref 70–130)
GLUCOSE BLDC GLUCOMTR-MCNC: 201 MG/DL (ref 70–130)
GLUCOSE BLDC GLUCOMTR-MCNC: 218 MG/DL (ref 70–130)
GLUCOSE SERPL-MCNC: 161 MG/DL (ref 65–99)
HCT VFR BLD AUTO: 33.9 % (ref 37.5–51)
HGB BLD-MCNC: 10.8 G/DL (ref 13–17.7)
IMM GRANULOCYTES # BLD AUTO: 0.05 10*3/MM3 (ref 0–0.05)
IMM GRANULOCYTES NFR BLD AUTO: 0.6 % (ref 0–0.5)
LYMPHOCYTES # BLD AUTO: 0.6 10*3/MM3 (ref 0.7–3.1)
LYMPHOCYTES NFR BLD AUTO: 7.6 % (ref 19.6–45.3)
MAGNESIUM SERPL-MCNC: 2.2 MG/DL (ref 1.6–2.4)
MCH RBC QN AUTO: 29 PG (ref 26.6–33)
MCHC RBC AUTO-ENTMCNC: 31.9 G/DL (ref 31.5–35.7)
MCV RBC AUTO: 90.9 FL (ref 79–97)
MONOCYTES # BLD AUTO: 0.21 10*3/MM3 (ref 0.1–0.9)
MONOCYTES NFR BLD AUTO: 2.7 % (ref 5–12)
NEUTROPHILS NFR BLD AUTO: 7.01 10*3/MM3 (ref 1.7–7)
NEUTROPHILS NFR BLD AUTO: 89 % (ref 42.7–76)
NRBC BLD AUTO-RTO: 0 /100 WBC (ref 0–0.2)
PHOSPHATE SERPL-MCNC: 4 MG/DL (ref 2.5–4.5)
PLATELET # BLD AUTO: 323 10*3/MM3 (ref 140–450)
PMV BLD AUTO: 9 FL (ref 6–12)
POTASSIUM SERPL-SCNC: 5.2 MMOL/L (ref 3.5–5.2)
QT INTERVAL: 400 MS
QTC INTERVAL: 476 MS
RBC # BLD AUTO: 3.73 10*6/MM3 (ref 4.14–5.8)
SODIUM SERPL-SCNC: 138 MMOL/L (ref 136–145)
TSH SERPL DL<=0.05 MIU/L-ACNC: 0.44 UIU/ML (ref 0.27–4.2)
UNIT  ABO: NORMAL
UNIT  ABO: NORMAL
UNIT  RH: NORMAL
UNIT  RH: NORMAL
WBC NRBC COR # BLD AUTO: 7.88 10*3/MM3 (ref 3.4–10.8)

## 2024-12-07 PROCEDURE — 63710000001 INSULIN LISPRO (HUMAN) PER 5 UNITS: Performed by: PHYSICIAN ASSISTANT

## 2024-12-07 PROCEDURE — 25010000002 CEFAZOLIN PER 500 MG: Performed by: PHYSICIAN ASSISTANT

## 2024-12-07 PROCEDURE — 84443 ASSAY THYROID STIM HORMONE: CPT | Performed by: NURSE PRACTITIONER

## 2024-12-07 PROCEDURE — 99232 SBSQ HOSP IP/OBS MODERATE 35: CPT | Performed by: INTERNAL MEDICINE

## 2024-12-07 PROCEDURE — 84100 ASSAY OF PHOSPHORUS: CPT | Performed by: NURSE PRACTITIONER

## 2024-12-07 PROCEDURE — 25010000002 MORPHINE PER 10 MG: Performed by: THORACIC SURGERY (CARDIOTHORACIC VASCULAR SURGERY)

## 2024-12-07 PROCEDURE — 83735 ASSAY OF MAGNESIUM: CPT | Performed by: NURSE PRACTITIONER

## 2024-12-07 PROCEDURE — 63710000001 INSULIN GLARGINE PER 5 UNITS: Performed by: PHYSICIAN ASSISTANT

## 2024-12-07 PROCEDURE — 97116 GAIT TRAINING THERAPY: CPT

## 2024-12-07 PROCEDURE — 25010000002 HYDRALAZINE PER 20 MG: Performed by: NURSE PRACTITIONER

## 2024-12-07 PROCEDURE — 85025 COMPLETE CBC W/AUTO DIFF WBC: CPT | Performed by: PHYSICIAN ASSISTANT

## 2024-12-07 PROCEDURE — 82948 REAGENT STRIP/BLOOD GLUCOSE: CPT

## 2024-12-07 PROCEDURE — 99024 POSTOP FOLLOW-UP VISIT: CPT | Performed by: THORACIC SURGERY (CARDIOTHORACIC VASCULAR SURGERY)

## 2024-12-07 PROCEDURE — 80048 BASIC METABOLIC PNL TOTAL CA: CPT | Performed by: PHYSICIAN ASSISTANT

## 2024-12-07 PROCEDURE — 71045 X-RAY EXAM CHEST 1 VIEW: CPT

## 2024-12-07 PROCEDURE — 97162 PT EVAL MOD COMPLEX 30 MIN: CPT

## 2024-12-07 RX ORDER — INSULIN LISPRO 100 [IU]/ML
2-9 INJECTION, SOLUTION INTRAVENOUS; SUBCUTANEOUS
Status: DISCONTINUED | OUTPATIENT
Start: 2024-12-07 | End: 2024-12-10 | Stop reason: HOSPADM

## 2024-12-07 RX ADMIN — DULOXETINE HYDROCHLORIDE 60 MG: 60 CAPSULE, DELAYED RELEASE ORAL at 08:24

## 2024-12-07 RX ADMIN — Medication 400 UNITS: at 08:26

## 2024-12-07 RX ADMIN — GABAPENTIN 300 MG: 300 CAPSULE ORAL at 21:44

## 2024-12-07 RX ADMIN — Medication 10 MG: at 06:18

## 2024-12-07 RX ADMIN — ASPIRIN 325 MG: 325 TABLET, COATED ORAL at 08:25

## 2024-12-07 RX ADMIN — SOTALOL HYDROCHLORIDE 120 MG: 120 TABLET ORAL at 08:25

## 2024-12-07 RX ADMIN — INSULIN LISPRO 2 UNITS: 100 INJECTION, SOLUTION INTRAVENOUS; SUBCUTANEOUS at 17:07

## 2024-12-07 RX ADMIN — MORPHINE SULFATE 2 MG: 2 INJECTION, SOLUTION INTRAMUSCULAR; INTRAVENOUS at 02:10

## 2024-12-07 RX ADMIN — GABAPENTIN 200 MG: 100 CAPSULE ORAL at 04:33

## 2024-12-07 RX ADMIN — INSULIN LISPRO 2 UNITS: 100 INJECTION, SOLUTION INTRAVENOUS; SUBCUTANEOUS at 12:26

## 2024-12-07 RX ADMIN — BUPROPION HYDROCHLORIDE 300 MG: 150 TABLET, EXTENDED RELEASE ORAL at 08:25

## 2024-12-07 RX ADMIN — MORPHINE SULFATE 2 MG: 2 INJECTION, SOLUTION INTRAMUSCULAR; INTRAVENOUS at 09:22

## 2024-12-07 RX ADMIN — INSULIN LISPRO 4 UNITS: 100 INJECTION, SOLUTION INTRAVENOUS; SUBCUTANEOUS at 21:59

## 2024-12-07 RX ADMIN — INSULIN LISPRO 4 UNITS: 100 INJECTION, SOLUTION INTRAVENOUS; SUBCUTANEOUS at 08:24

## 2024-12-07 RX ADMIN — SODIUM CHLORIDE 2 G: 900 INJECTION INTRAVENOUS at 09:40

## 2024-12-07 RX ADMIN — SODIUM CHLORIDE 2 G: 900 INJECTION INTRAVENOUS at 02:15

## 2024-12-07 RX ADMIN — COLCHICINE 0.6 MG: 0.6 TABLET ORAL at 21:44

## 2024-12-07 RX ADMIN — FINASTERIDE 5 MG: 5 TABLET, FILM COATED ORAL at 08:25

## 2024-12-07 RX ADMIN — HYDROCODONE BITARTRATE AND ACETAMINOPHEN 1 TABLET: 5; 325 TABLET ORAL at 04:34

## 2024-12-07 RX ADMIN — SENNOSIDES AND DOCUSATE SODIUM 2 TABLET: 50; 8.6 TABLET ORAL at 21:44

## 2024-12-07 RX ADMIN — HYDRALAZINE HYDROCHLORIDE 20 MG: 20 INJECTION INTRAMUSCULAR; INTRAVENOUS at 04:34

## 2024-12-07 RX ADMIN — SOTALOL HYDROCHLORIDE 120 MG: 120 TABLET ORAL at 21:44

## 2024-12-07 RX ADMIN — MORPHINE SULFATE 2 MG: 2 INJECTION, SOLUTION INTRAMUSCULAR; INTRAVENOUS at 05:08

## 2024-12-07 RX ADMIN — TAMSULOSIN HYDROCHLORIDE 0.4 MG: 0.4 CAPSULE ORAL at 08:24

## 2024-12-07 RX ADMIN — INSULIN GLARGINE 20 UNITS: 100 INJECTION, SOLUTION SUBCUTANEOUS at 08:24

## 2024-12-07 RX ADMIN — HYDROCODONE BITARTRATE AND ACETAMINOPHEN 1 TABLET: 5; 325 TABLET ORAL at 17:07

## 2024-12-07 RX ADMIN — COLCHICINE 1.2 MG: 0.6 TABLET, FILM COATED ORAL at 08:24

## 2024-12-07 RX ADMIN — ROSUVASTATIN 20 MG: 20 TABLET, FILM COATED ORAL at 08:25

## 2024-12-07 RX ADMIN — POLYETHYLENE GLYCOL 3350 17 G: 17 POWDER, FOR SOLUTION ORAL at 08:25

## 2024-12-07 NOTE — PLAN OF CARE
Goal Outcome Evaluation:  Plan of Care Reviewed With: patient        Progress: no change  Outcome Evaluation: PT evaluation completed. Pt s/p Subxiphoid pericardial window due to pericardial effusion.  Per pt,  had CABGx4 on 10/28/24, discharged to rehab for 2 weeks, then home with HH PT for approximately 2 weeks. While at home patient had syncopal episode with ambulation prompting return to hospital. Pt demonstrates functional mobility below baseline, limited activity tolerance, pain affecting function, and balance deficits, will benefit from IP PT services. Recommend home with assist upon DC and HH PT.    Anticipated Discharge Disposition (PT): home with home health, home with assist

## 2024-12-07 NOTE — OP NOTE
DATE OF PROCEDURE: 12/6/2024     PREOPERATIVE DIAGNOSES:  1. Large pericardial effusion  2. Syncope     POSTOPERATIVE DIAGNOSES:    1. Large pericardial effusion  2. Syncope    PROCEDURES PERFORMED:    1. Subxiphoid pericardial window    SURGEON: Alex Max MD       ASSISTANT: Natasha Adams PA-C was responsible for performing the following activities: Retraction, Suction, Closing, and Placing Dressing and their skilled assistance was necessary for the success of this case.    Circulator: Adán Johnston RN; Peggy Gordillo RN  Scrub Person: Mahamed Campa      ANESTHESIA: General endotracheal anesthesia with Dr. Rohit Talbot MD     ESTIMATED BLOOD LOSS: Less than 25 mL.       INDICATIONS:  59-year-old  male with a history of hypertension, hyperlipidemia, diabetes mellitus, paroxysmal atrial fibrillation and coronary artery disease who underwent CABG x 4, maze and left atrial appendage ligation on 10/28/2024.  The patient returns to the hospital today with complaints of syncope.  He was found to have a large pericardial effusion on echocardiogram along with early diastolic collapse concerning for tamponade.  The patient was felt to be a reasonable candidate for a pericardial window. The risks and benefits of surgery were discussed with the patient including pain, bleeding, infection, recurrent effusion, myocardial infarction and death. The patient understood these risks and wished to proceed with surgery.      DESCRIPTION OF PROCEDURE:  The patient was taken to the operating room and prepped and draped in the usual sterile fashion.  A timeout was performed including the patient's name, procedure and antibiotic administration.  General endotracheal anesthesia was then induced and an incision was subsequently made on the left chest lateral to the previous midline incision.  Electrocautery was utilized to expose the interspace.  A loop recorder was adjacent to the incision and retracted away from  the dissection plane.  There was significant scarring present and dissection was carried down to the pericardium  The epicardium was exposed and no fluid was present in this dissection plane.  I could not finger sweep due to dense adhesions present.  Attention was then turned to a transverse incision made below the previous midline sternotomy incision.  Dissection was carried out along the midline below the sternum.  There was extensive woody scarring present.  Electrocautery was utilized to dissect down to the pericardium.  The pericardium was opened and 330 mL of serous fluid with sediment was evacuated from the mediastinum.  This was sent for cultures and cytology.  There was no studding of the pericardium or epicardium.  Extensive adhesions were present within the pericardial space where fluid was not present.  A 28 Fr channel drain was then placed within the pericardium and secured using a 0 Ethibond suture for later tube site closure.  The midline fascia was closed with a 0 Vicryl suture followed by a 2-0 Vicryl dermal layer and a 4-0 Monocryl subcuticular stitch.  The same sutures were utilized for the left chest wall incision.  Overlying skin glue was applied to the incisions and gauze and tape to the chest tube site.  The patient was extubated in the operating room and subsequently transported to the recovery room in stable condition.

## 2024-12-07 NOTE — THERAPY EVALUATION
"Patient Name: Joel Galo  : 1965    MRN: [id number]                              Today's Date: 2024       Admit Date: 2024    Visit Dx:     ICD-10-CM ICD-9-CM   1. Pericardial effusion after operative procedure  I97.89 997.1    I31.39 423.9     Patient Active Problem List   Diagnosis    Paroxysmal atrial fibrillation    Obstructive sleep apnea    Hypothyroidism (acquired)    GERD (gastroesophageal reflux disease)    Chronic anticoagulation    Type 2 diabetes mellitus with hyperglycemia, without long-term current use of insulin    Hyperlipidemia LDL goal <70    Primary hypertension    CAD s/p CABG x 4, MAZE, LAAL 10/28/24    Orthostatic hypotension    Pericardial effusion after CABG x 4, MAZE, LAAL 10/28/24.  Readmit for Pericardial window 24     Past Medical History:   Diagnosis Date    A-fib     Anxiety     Arthritis     DDD (degenerative disc disease), lumbar     Deep vein thrombosis     Depression     Diabetes     Disease of thyroid gland     HYPO    Heart attack     \"5-6 years ago\"    Hyperlipidemia     Hypertension     Kidney stone     Neuropathy     Sleep apnea     DOES NOT USE CPAP    UTI (urinary tract infection)      Past Surgical History:   Procedure Laterality Date    ATRIAL APPENDAGE EXCLUSION LEFT WITH TRANSESOPHAGEAL ECHOCARDIOGRAM N/A 10/28/2024    Procedure: ATRIAL APPENDAGE EXCLUSION LEFT WITH TRANSESOPHAGEAL ECHOCARDIOGRAM;  Surgeon: Alex Max MD;  Location: [facility];  Service: Cardiothoracic;  Laterality: N/A;    CARDIAC CATHETERIZATION N/A 2024    Procedure: Left Heart Cath;  Surgeon: Julio Neff MD;  [facility]:  [facility];  Service: Cardiology;  Laterality: N/A;    CARDIAC ELECTROPHYSIOLOGY PROCEDURE N/A 2023    Procedure: Loop insertion;  Surgeon: Tariq Chávez MD;  Location:  [facility];  Service: Cardiovascular;  Laterality: N/A;    CATARACT EXTRACTION Bilateral     COLONOSCOPY      CORONARY ARTERY BYPASS " GRAFT N/A 10/28/2024    Procedure: MEDIAN STERNOTOMY, CORONARY ARTERY BYPASS GRAFTING X4 UTILIZING THE LEFT INTERNAL MAMMARY ARTERY GRAFT, ENDOSCOPIC VEIN HARVEST OF THE GREATER RIGHT SAPHENOUS VEIN;  Surgeon: Alex Max MD;  Location:  [facility];  Service: Cardiothoracic;  Laterality: N/A;    ENDOSCOPY      MAZE PROCEDURE N/A 10/28/2024    Procedure: MAZE PROCEDURE;  Surgeon: Alex Max MD;  Location:  [facility] OR;  Service: Cardiothoracic;  Laterality: N/A;    TEETH EXTRACTION        General Information       Row Name 12/07/24 1308          Physical Therapy Time and Intention    Document Type evaluation  -HARISH     Mode of Treatment physical therapy  -HARISH       Row Name 12/07/24 1313 12/07/24 1308       General Information    Patient Profile Reviewed -- yes  -HARISH    Prior Level of Function -- independent:;gait;ADL's;all household mobility;community mobility  -HARISH    Existing Precautions/Restrictions fall;orthostatic hypotension;oxygen therapy device and L/min;other (see comments);cardiac;sternal  CABGx4 on 10/28/24, rehab stay, returned home with HH then had episodes of sycope, rehospitalized pericardial effusion  -HARISH fall;orthostatic hypotension;oxygen therapy device and L/min;other (see comments);cardiac  CABGx4 on 10/28/24, rehab stay, returned home with HH then had episodes of sycope, rehospitalized pericardial effusion  -HARISH    Barriers to Rehab -- medically complex  -HARISH      Row Name 12/07/24 1308          Living Environment    People in Home spouse  -HARISH       Row Name 12/07/24 1308          Home Main Entrance    Number of Stairs, Main Entrance none  -HARISH       Row Name 12/07/24 1308          Stairs Within Home, Primary    Number of Stairs, Within Home, Primary none  -HARISH       Row Name 12/07/24 1308          Cognition    Orientation Status (Cognition) oriented x 3  -HARISH       Row Name 12/07/24 1308          Safety Issues/Impairments Affecting Functional Mobility    Safety Issues Affecting Function (Mobility)  safety precautions follow-through/compliance;awareness of need for assistance  -HARISH     Impairments Affecting Function (Mobility) endurance/activity tolerance;pain  -HARISH               User Key  (r) = Recorded By, (t) = Taken By, (c) = Cosigned By      Initials Name Provider Type    Coleen Lovelace PT Physical Therapist                   Mobility       Row Name 12/07/24 1313          Bed Mobility    Comment, (Bed Mobility) Deferred, pt UIC, returned to chair  -HARISH       Row Name 12/07/24 1314          Transfers    Comment, (Transfers) Cues to maintain sternal precuations and avoid pulling on tripod monitor to stand.  -HARISH       Row Name 12/07/24 1314          Sit-Stand Transfer    Sit-Stand [place] (Transfers) minimum assist (75% patient effort);1 person assist  -HARISH     Comment, (Sit-Stand Transfer) Cues for sequencing  -HARISH       Row Name 12/07/24 1314          Gait/Stairs (Locomotion)    [place] Level (Gait) minimum assist (75% patient effort);1 person assist;1 person to manage equipment  -HARISH     Assistive Device (Gait) other (see comments)  Tripod monitor  -HARISH     Patient was able to Ambulate yes  -HARISH     Distance in Feet (Gait) 60  -HARISH     Deviations/Abnormal Patterns (Gait) weight shifting decreased  -HARISH     Bilateral Gait Deviations forward flexed posture  -HARISH     Comment, (Gait/Stairs) Pt demo forward flexed posture with decreased step length and narrow BAILEY, required cues to take larger steps. Chair to follow for safety as patient states he passed out after 2 steps last attempt. Distance limited by fatigue and weakness  -HARISH               User Key  (r) = Recorded By, (t) = Taken By, (c) = Cosigned By      Initials Name Provider Type    Coleen Lovelace PT Physical Therapist                   Obj/Interventions       Row Name 12/07/24 1316          Range of Motion Comprehensive    General Range of Motion bilateral lower extremity ROM WFL  -HARISH       Row Name 12/07/24 1316          Strength  Comprehensive (MMT)    General Manual Muscle Testing (MMT) Assessment lower extremity strength deficits identified  -HARISH     Comment, General Manual Muscle Testing (MMT) Assessment LEs grossly 4-/5  -HARISH       Row Name 12/07/24 1316          Balance    Balance Assessment sitting static balance;sitting dynamic balance;standing static balance;standing dynamic balance  -HARISH     Static Sitting Balance independent  -HARISH     Dynamic Sitting Balance contact guard  -HARISH     Position, Sitting Balance sitting in chair  -HARISH     Static Standing Balance minimal assist  -HARISH     Dynamic Standing Balance minimal assist  -HARISH     Position/Device Used, Standing Balance other (see comments)  Tripod monitor  -HARISH     Balance Interventions sitting;standing;sit to stand  -HARISH     Comment, Balance No LOB  -HARISH       Row Name 12/07/24 1316          Sensory Assessment (Somatosensory)    Sensory Assessment (Somatosensory) not tested  -HARISH               User Key  (r) = Recorded By, (t) = Taken By, (c) = Cosigned By      Initials Name Provider Type    Coleen Lovelace, PT Physical Therapist                   Goals/Plan       Row Name 12/07/24 1325          Bed Mobility Goal 1 (PT)    Activity/Assistive Device (Bed Mobility Goal 1, PT) sit to supine/supine to sit  -HARISH     [place] Level/Cues Needed (Bed Mobility Goal 1, PT) modified independence  -HARISH     Time Frame (Bed Mobility Goal 1, PT) short term goal (STG);5 days  -HARISH       Row Name 12/07/24 1325          Transfer Goal 1 (PT)    Activity/Assistive Device (Transfer Goal 1, PT) sit-to-stand/stand-to-sit  -HARISH     [place] Level/Cues Needed (Transfer Goal 1, PT) modified independence  -HARISH     Time Frame (Transfer Goal 1, PT) long term goal (LTG);10 days  -HARISH       Row Name 12/07/24 1325          Gait Training Goal 1 (PT)    Activity/Assistive Device (Gait Training Goal 1, PT) assistive device use;gait (walking locomotion)  -HARISH     [place] Level (Gait Training Goal 1, PT) modified  independence  -HARISH     Distance (Gait Training Goal 1, PT) 200  -HARISH     Time Frame (Gait Training Goal 1, PT) long term goal (LTG);10 days  -HARISH       Row Name 12/07/24 1325          Therapy Assessment/Plan (PT)    Planned Therapy Interventions (PT) balance training;bed mobility training;gait training;home exercise program;patient/family education;transfer training;neuromuscular re-education;postural re-education  -HARISH               User Key  (r) = Recorded By, (t) = Taken By, (c) = Cosigned By      Initials Name Provider Type    HARISH Coleen Guillen, PT Physical Therapist                   Clinical Impression       Row Name 12/07/24 1317          Pain    Pretreatment Pain Rating 7/10  -HARISH     Posttreatment Pain Rating 6/10  -HARISH     Pain Location flank;chest  inicisional  -HARISH     Pain Side/Orientation generalized  -HARISH     Pain Management Interventions exercise or physical activity utilized  -HARISH     Response to Pain Interventions activity participation with decreased pain  -HARISH     Pre/Posttreatment Pain Comment Tolerated  -HARISH       Row Name 12/07/24 1317          Plan of Care Review    Plan of Care Reviewed With patient  -HARISH     Progress no change  -HARISH     Outcome Evaluation PT evaluation completed. Pt s/p Subxiphoid pericardial window due to pericardial effusion.  Per pt,  had CABGx4 on 10/28/24, discharged to rehab for 2 weeks, then home with HH PT for approximately 2 weeks. While at home patient had syncopal episode with ambulation prompting return to hospital. Pt demonstrates functional mobility below baseline, limited activity tolerance, pain affecting function, and balance deficits, will benefit from IP PT services. Recommend home with assist upon DC and HH PT.  -HARISH       Row Name 12/07/24 8937          Therapy Assessment/Plan (PT)    Patient/Family Therapy Goals Statement (PT) Return home. Pt states he will NOT go back to rehab.  -HARISH     Rehab Potential (PT) good  -HARISH     Criteria for Skilled Interventions Met  (PT) yes;skilled treatment is necessary  -HARISH     Therapy Frequency (PT) daily  -HARISH       Row Name 12/07/24 1317          Vital Signs    Pre Systolic BP Rehab 112  -HARISH     Pre Treatment Diastolic BP 71  -HARISH     Post Systolic BP Rehab 110  -HARISH     Post Treatment Diastolic BP 61  -HARISH     Pretreatment Heart Rate (beats/min) 84  -HARISH     Posttreatment Heart Rate (beats/min) 87  -HARISH     Pre SpO2 (%) 98  -HARISH     O2 Delivery Pre Treatment nasal cannula  -HARISH     O2 Delivery Intra Treatment nasal cannula  -HARISH     Post SpO2 (%) 95  -HARISH     O2 Delivery Post Treatment nasal cannula  -HARISH     Pre Patient Position Sitting  -HARISH     Intra Patient Position Standing  -HARISH     Post Patient Position Sitting  -HARISH       Row Name 12/07/24 1317          Positioning and Restraints    Pre-Treatment Position sitting in chair/recliner  -HARISH     Post Treatment Position chair  -HARISH     In Chair encouraged to call for assist;notified nsg;call light within reach;reclined;RUE elevated;LUE elevated  -HARISH               User Key  (r) = Recorded By, (t) = Taken By, (c) = Cosigned By      Initials Name Provider Type    Coleen Lovelace, PT Physical Therapist                   Outcome Measures       Row Name 12/07/24 1326          How much help from another person do you currently need...    Turning from your back to your side while in flat bed without using bedrails? 3  -HARISH     Moving from lying on back to sitting on the side of a flat bed without bedrails? 3  -HARISH     Moving to and from a bed to a chair (including a wheelchair)? 3  -HARISH     Standing up from a chair using your arms (e.g., wheelchair, bedside chair)? 3  -HARISH     Climbing 3-5 steps with a railing? 1  -HARISH     To walk in hospital room? 2  -HARISH     AM-PAC 6 Clicks Score (PT) 15  -HARISH     Highest Level of Mobility Goal 4 --> Transfer to chair/commode  -HARISH       Row Name 12/07/24 1326          Functional Assessment    Outcome Measure Options AM-PAC 6 Clicks Basic Mobility (PT)  -HARISH               User  Key  (r) = Recorded By, (t) = Taken By, (c) = Cosigned By      Initials Name Provider Type    Coleen Lovelace PT Physical Therapist                                 Physical Therapy Education       Title: PT OT SLP Therapies (In Progress)       Topic: Physical Therapy (In Progress)       Point: Mobility training (Done)       Learning Progress Summary            Patient Acceptance, E, VU,NR by HARISH at 12/7/2024 1327                      Point: Home exercise program (Not Started)       Learner Progress:  Not documented in this visit.              Point: Body mechanics (Done)       Learning Progress Summary            Patient Acceptance, E, VU,NR by HARISH at 12/7/2024 1327                      Point: Precautions (Done)       Learning Progress Summary            Patient Acceptance, E, VU,NR by HARISH at 12/7/2024 1327                                      User Key       Initials Effective Dates Name Provider Type Discipline    HARISH 06/16/21 -  Coleen Guillen PT Physical Therapist PT                  PT Recommendation and Plan  Planned Therapy Interventions (PT): balance training, bed mobility training, gait training, home exercise program, patient/family education, transfer training, neuromuscular re-education, postural re-education  Progress: no change  Outcome Evaluation: PT evaluation completed. Pt s/p Subxiphoid pericardial window due to pericardial effusion.  Per pt,  had CABGx4 on 10/28/24, discharged to rehab for 2 weeks, then home with HH PT for approximately 2 weeks. While at home patient had syncopal episode with ambulation prompting return to hospital. Pt demonstrates functional mobility below baseline, limited activity tolerance, pain affecting function, and balance deficits, will benefit from IP PT services. Recommend home with assist upon DC and HH PT.     Time Calculation:   PT Evaluation Complexity  History, PT Evaluation Complexity: 1-2 personal factors and/or comorbidities  Examination of Body Systems (PT  Eval Complexity): total of 3 or more elements  Clinical Presentation (PT Evaluation Complexity): evolving  Clinical Decision Making (PT Evaluation Complexity): moderate complexity  Overall Complexity (PT Evaluation Complexity): moderate complexity     PT Charges       Row Name 12/07/24 1327             Time Calculation    Start Time 1016  -HARISH      PT Received On 12/07/24  -HARISH      PT Goal Re-Cert Due Date 12/17/24  -HARISH         Time Calculation- PT    Total Timed Code Minutes- PT 16 minute(s)  -HARISH         Timed Charges    03178 - Gait Training Minutes  16  -HARISH         Untimed Charges    PT Eval/Re-eval Minutes 54  -HARISH         Total Minutes    Timed Charges Total Minutes 16  -HARISH      Untimed Charges Total Minutes 54  -HARISH       Total Minutes 70  -HARISH                User Key  (r) = Recorded By, (t) = Taken By, (c) = Cosigned By      Initials Name Provider Type    Coleen Lovelace, PT Physical Therapist                  Therapy Charges for Today       Code Description Service Date Service Provider Modifiers Qty    58552394538 HC GAIT TRAINING EA 15 MIN 12/7/2024 Coleen Guillen, PT GP 1    94496563980 HC PT EVAL MOD COMPLEXITY 4 12/7/2024 Coleen Guillen, PT GP 1    [id number] HC PT THER SUPP EA 15 MIN 12/7/2024 Coleen Guillen, PT GP 3            PT G-Codes  Outcome Measure Options: AM-PAC 6 Clicks Basic Mobility (PT)  AM-PAC 6 Clicks Score (PT): 15  PT Discharge Summary  Anticipated Discharge Disposition (PT): home with home health, home with assist    Coleen Guillen, PT  12/7/2024

## 2024-12-07 NOTE — PROGRESS NOTES
"Intensive Care Admission Note     Pericardial effusion after operative procedure    History of Present Illness     Joel Galo is a 59 y.o. male who underwent recent CABG x 4, LAAL, & MAZE procedure on 10/28/24 w/ Dr. Max.   Eventually discharged on 11/06/2024.  Patient presented to [facility] with dizziness, near syncope and orthostatic hypotension.  Echocardiogram showed preserved ejection fraction with mild concentric hypertrophy and a large (greater than 2 cm) pericardial effusion adjacent to the left ventricle.  He was subsequently transferred to [facility] by Dr. Max for plans for surgical intervention.  Patient is postop day #0 status post planned pericardial window w/ Dr. Max.  EBL < 25 ml with \"330 mL of serous fluid with sediment was evacuated from the mediastinum.\"    Postoperatively the patient was brought to the ICU for further management monitoring.  We encounter the patient post-operatively in the ICU.  The patient is denying shortness of breath at rest.  He endorses some minor surgical discomfort.  He is on 6 L/min by nasal cannula.    Denies nausea, vomiting, abdominal pain.  Denies palpitations.    Problem List, Surgical History, Family, Social History, and ROS     Past Medical History:   Diagnosis Date    A-fib     Anxiety     Arthritis     DDD (degenerative disc disease), lumbar     Deep vein thrombosis     Depression     Diabetes     Disease of thyroid gland     HYPO    Heart attack     \"5-6 years ago\"    Hyperlipidemia     Hypertension     Kidney stone     Neuropathy     Sleep apnea     DOES NOT USE CPAP    UTI (urinary tract infection)       Past Surgical History:   Procedure Laterality Date    ATRIAL APPENDAGE EXCLUSION LEFT WITH TRANSESOPHAGEAL ECHOCARDIOGRAM N/A 10/28/2024    Procedure: ATRIAL APPENDAGE EXCLUSION LEFT WITH TRANSESOPHAGEAL ECHOCARDIOGRAM;  Surgeon: Alex Max MD;  Location: [facility];  Service: Cardiothoracic;  Laterality: N/A;    " CARDIAC CATHETERIZATION N/A 08/28/2024    Procedure: Left Heart Cath;  Surgeon: Julio Neff MD;  Location:  [facility];  Service: Cardiology;  Laterality: N/A;    CARDIAC ELECTROPHYSIOLOGY PROCEDURE N/A 04/05/2023    Procedure: Loop insertion;  Surgeon: Tariq Chávez MD;  Location:  [facility]ATH [facility]ATION;  Service: Cardiovascular;  Laterality: N/A;    CATARACT EXTRACTION Bilateral     COLONOSCOPY      CORONARY ARTERY BYPASS GRAFT N/A 10/28/2024    Procedure: MEDIAN STERNOTOMY, CORONARY ARTERY BYPASS GRAFTING X4 UTILIZING THE LEFT INTERNAL MAMMARY ARTERY GRAFT, ENDOSCOPIC VEIN HARVEST OF THE GREATER RIGHT SAPHENOUS VEIN;  Surgeon: Alex Max MD;  Location:  [facility];  Service: Cardiothoracic;  Laterality: N/A;    ENDOSCOPY      MAZE PROCEDURE N/A 10/28/2024    Procedure: MAZE PROCEDURE;  Surgeon: Alex Max MD;  Location:  [facility];  Service: Cardiothoracic;  Laterality: N/A;    TEETH EXTRACTION         No Known Allergies  Current Facility-Administered Medications on File Prior to Encounter   Medication    Chlorhexidine Gluconate Cloth 2 % pads 1 Application    [DISCONTINUED] aspirin EC tablet 325 mg    [DISCONTINUED] bisacodyl (DULCOLAX) EC tablet 5 mg    [DISCONTINUED] bisacodyl (DULCOLAX) suppository 10 mg    [DISCONTINUED] buPROPion XL (WELLBUTRIN XL) 24 hr tablet 150 mg    [DISCONTINUED] cholecalciferol (VITAMIN D3) tablet 400 Units    [DISCONTINUED] dextrose (D50W) (25 g/50 mL) IV injection 25 g    [DISCONTINUED] dextrose (GLUTOSE) oral gel 15 g    [DISCONTINUED] docusate sodium (COLACE) capsule 100 mg    [DISCONTINUED] DULoxetine (CYMBALTA) DR capsule 30 mg    [DISCONTINUED] finasteride (PROSCAR) tablet 5 mg    [DISCONTINUED] gabapentin (NEURONTIN) capsule 200 mg    [DISCONTINUED] gabapentin (NEURONTIN) capsule 300 mg    [DISCONTINUED] glucagon HCl (Diagnostic) injection 1 mg    [DISCONTINUED] heparin (porcine) 5000 UNIT/ML injection 5,000 Units    [DISCONTINUED]  HYDROcodone-acetaminophen (NORCO)  MG per tablet 0.5 tablet    [DISCONTINUED] insulin glargine (LANTUS, SEMGLEE) injection 20 Units    [DISCONTINUED] Insulin Lispro (humaLOG) injection 2 Units    [DISCONTINUED] Insulin Lispro (humaLOG) injection 2-9 Units    [DISCONTINUED] lactulose (CHRONULAC) 10 GM/15ML solution 30 g    [DISCONTINUED] levothyroxine (SYNTHROID, LEVOTHROID) tablet 50 mcg    [DISCONTINUED] midodrine (PROAMATINE) tablet 5 mg    [DISCONTINUED] nitroglycerin (NITROSTAT) SL tablet 0.4 mg    [DISCONTINUED] polyethylene glycol (MIRALAX) packet 17 g    [DISCONTINUED] rosuvastatin (CRESTOR) tablet 20 mg    [DISCONTINUED] sennosides-docusate (PERICOLACE) 8.6-50 MG per tablet 2 tablet    [DISCONTINUED] sodium chloride 0.9 % flush 10 mL    [DISCONTINUED] sodium chloride 0.9 % flush 10 mL    [DISCONTINUED] sodium chloride 0.9 % flush 10 mL    [DISCONTINUED] sodium chloride 0.9 % infusion 40 mL    [DISCONTINUED] sotalol (BETAPACE) tablet 120 mg    [DISCONTINUED] tamsulosin (FLOMAX) 24 hr capsule 0.4 mg     Current Outpatient Medications on File Prior to Encounter   Medication Sig    aspirin 325 MG EC tablet Take 1 tablet by mouth Daily.    buPROPion XL (WELLBUTRIN XL) 300 MG 24 hr tablet Take 1 tablet by mouth Daily.    docusate sodium (COLACE) 100 MG capsule Take 1 capsule by mouth 2 (Two) Times a Day As Needed for Constipation.    DULoxetine (CYMBALTA) 60 MG capsule Take 1 capsule by mouth Daily.    empagliflozin (JARDIANCE) 25 MG tablet tablet Take 1 tablet by mouth Daily.    finasteride (PROSCAR) 5 MG tablet TAKE 1 TABLET EVERY DAY    gabapentin (NEURONTIN) 100 MG capsule Take 2 capsules by mouth Every Morning.    gabapentin (NEURONTIN) 100 MG capsule Take 3 capsules by mouth Every Night.    glimepiride (AMARYL) 2 MG tablet Take 2 tablets by mouth 2 (Two) Times a Day.    HYDROcodone-acetaminophen (NORCO)  MG per tablet Take 1 tablet by mouth Every 8 (Eight) Hours As Needed for Moderate Pain.     insulin glargine (LANTUS, SEMGLEE) 100 UNIT/ML injection Inject 20 Units under the skin into the appropriate area as directed Daily.    Insulin Lispro (humaLOG) 100 UNIT/ML injection Inject 2-9 Units under the skin into the appropriate area as directed 4 (Four) Times a Day Before Meals & at Bedtime.    Insulin Lispro (humaLOG) 100 UNIT/ML injection Inject 2 Units under the skin into the appropriate area as directed 3 (Three) Times a Day With Meals.    lactulose (CHRONULAC) 10 GM/15ML solution Take 45 mL by mouth 2 (Two) Times a Day As Needed.    levothyroxine (SYNTHROID, LEVOTHROID) 50 MCG tablet Take 1 tablet by mouth Every Morning.    nitroglycerin (NITROSTAT) 0.4 MG SL tablet Place 1 tablet under the tongue Every 5 (Five) Minutes As Needed for Chest Pain. Take no more than 3 doses in 15 minutes.    rosuvastatin (CRESTOR) 20 MG tablet Take 1 tablet by mouth Daily.    sotalol (BETAPACE) 120 MG tablet Take 1 tablet by mouth 2 (Two) Times a Day.    tamsulosin (FLOMAX) 0.4 MG capsule 24 hr capsule TAKE 1 CAPSULE EVERY DAY    Vitamin D, Cholecalciferol, (CHOLECALCIFEROL) 10 MCG (400 UNIT) tablet Take 1 tablet by mouth Daily.    [DISCONTINUED] furosemide (LASIX) 20 MG tablet Take 1 tablet by mouth Daily.    [DISCONTINUED] sacubitril-valsartan (Entresto) 24-26 MG tablet Take 1 tablet by mouth 2 (Two) Times a Day.     MEDICATION LIST AND ALLERGIES REVIEWED.    Family History   Problem Relation Age of Onset    Heart disease Mother     Coronary artery disease Father     Diabetes Father     Kidney disease Father      Social History     Tobacco Use    Smoking status: Never     Passive exposure: Never    Smokeless tobacco: Never   Vaping Use    Vaping status: Never Used   Substance Use Topics    Alcohol use: No    Drug use: No     Social History     Social History Narrative    Lives in [place], [place] with spouse      FAMILY AND SOCIAL HISTORY REVIEWED.      Physical Exam and Clinical Information   /54   Pulse 84   Temp  "97.4 °F (36.3 °C)   Resp 18   Ht 176.8 cm (69.61\")   Wt 94.3 kg (207 lb 14.4 oz)   SpO2 93%   BMI 30.17 kg/m²   Physical Exam  General: Laying in bed in room 260 in no general or respiratory distress; 6 L/min by nasal cannula in place  Skin: No rash, erythema, cyanosis, jaundice, pallor, breakdown noted on visualized aspects of the skin  Eyes: EOMI, PERRLA; eyes track appropriately; sclera anicteric  ENT: Mucous membranes are moist; oropharynx is crowded but clear without erythema, exudates or thrush  Pulmonary: Shallow effort effort with air entry better on the right than the left; decreased breath sounds in bilateral bases more so on the left than the right; no wheezing, rales, rhonchi appreciated; no significant coarse breath sounds; chest drains with serosanguineous output  Cardiac: Regular rate and rhythm; normal S1 and S2; no murmur, S3, S4 appreciated  Abdomen: Obese, soft, nontender, nondistended; bowel sounds are present x 4; there are no masses or hepatosplenomegaly appreciated  Genitourinary: No suprapubic tenderness to palpation flank or pain to percussion  Musculoskeletal: Extremities are warm and dry; there is no clubbing, cyanosis; 1+ bilateral lower extremity pitting edema; pedal pulses are palpable bilaterally at 1+  Neurologic: Cranial nerves are grossly intact; sensation is grossly intact; strength is grossly intact; range of motion is grossly intact  Hematology/oncology: No evidence of extensive bruising or active bleeding  Results from last 7 days   Lab Units 12/06/24  0418 12/05/24  0455 12/04/24  1852   WBC 10*3/mm3 7.64 7.70 8.21   HEMOGLOBIN g/dL 11.2* 11.7* 11.6*   PLATELETS 10*3/mm3 297 318 393     Results from last 7 days   Lab Units 12/06/24  0110 12/05/24  0455 12/04/24  1852   SODIUM mmol/L 134* 137 137   POTASSIUM mmol/L 4.3 3.9 4.2   CO2 mmol/L 21.8* 23.3 27.1   BUN mg/dL 21 26* 25*   CREATININE mg/dL 0.77 0.87 1.17   MAGNESIUM mg/dL  --   --  2.6*   GLUCOSE mg/dL 148* 102* 251* " "    Estimated Creatinine Clearance: 115.4 mL/min (by C-G formula based on SCr of 0.77 mg/dL).          No results found for: \"LACTATE\"     Images:     I reviewed the patient's results and images.     Impression     Medical Problems       Hospital Problem List       * (Principal) Pericardial effusion after CABG x 4SON LAAL 10/28/24.  Readmit for Pericardial window 12/6/24        Plan/Recommendations   Assessment And Plan:    1: Pulmonary: Atelectasis; history of obstructive sleep apnea with noncompliance  -Patient denies prior pulmonary history  -O2 as needed  -Early ambulation, pain control, aggressive incentive spirometry use to decrease the risk of atelectasis, fever, pneumonia  -If any acute respiratory issues arise may require noninvasive ventilation but at present patient is doing well    2: Cardiovascular: Coronary artery disease status post CABG; history of A-fib; history of hypertension; postop day #0 status post pericardial window for concerns for tamponade physiology postoperatively  -At present patient is hemodynamically stable  -Continue outpatient regimen  -Plan per surgery    3: Infectious disease:  -Patient is afebrile and white blood cell count is within normal limits  -Pericardial fluid has been sent for cultures  -The patient is receiving perioperative cefazolin per protocol    4: Renal/genitourinary/acid-base:  -BUN, creatinine, anion gap within normal limits  -Monitor I's and O's and labs closely    5: Gastrointestinal:  -No acute issues  -Advance diet as per surgery    6: Hematology/oncology: Anemia; listed history of DVT  -Hemoglobin is slightly low but near usual baseline  -Platelets within normal limits  -Does not appear to be on recent anticoagulation    7: Endocrine: History of diabetes mellitus; history of hypothyroidism  -Diabetes mellitus: Hold outpatient regimen; now we will simply monitor POC glucose and supplement with sliding scale insulin if needed  -Thyroid disease: Continue " Synthroid  -Systemic steroids: Not taking    8: Neuropsychiatric: History of anxiety disorder and depression  -Continue outpatient regimen  -Optimize comfort control without overstating the patient compromising hemodynamic status    9: Fluids/electrolytes/nutrition:  -Advance p.o. as per surgery  -Electrolytes are in reasonable range; monitor  -He is knows goal for the next 24 hours = equal    10: Prophylactic measures:  -PUD = N/A  -DVT = SCDs; hold anticoagulants in the perioperative timeframe until cleared by surgery        Time spent 30 (exclusive of procedure time)  including high complexity decision making to assess, manipulate, and support vital organ system failure in this individual who has impairment of one or more vital organ systems such that there is a high probability of imminent or life threatening deterioration in the patient’s condition.      Truong Parnell MD  12/06/24 21:10 EST     CC: Edmundo Boston MD

## 2024-12-07 NOTE — ANESTHESIA POSTPROCEDURE EVALUATION
Patient: Joel Galo    Procedure Summary       Date: 12/06/24 [facility] / [facility]  [facility]    Anesthesia Start: 1730 Anesthesia Stop: 1920    Procedure: PERICARDIAL WINDOW (Chest) Diagnosis:     Surgeons: Alex Max MD Provider: Rohit Talbot Jr., MD    Anesthesia Type: general, Alba ASA Status: 3            Anesthesia Type: general, Alba    Vitals  Vitals Value Taken Time   /69 12/06/24 2015   Temp 98 °F (36.7 °C) 12/06/24 2000   Pulse 85 12/06/24 2030   Resp 16 12/06/24 2000   SpO2 90 % 12/06/24 2030   Vitals shown include unfiled device data.          Post Anesthesia Care and Evaluation    Patient location during evaluation: PACU  Patient participation: complete - patient participated  Level of consciousness: awake and alert  Pain management: adequate    Airway patency: patent  Anesthetic complications: No anesthetic complications  PONV Status: none  Cardiovascular status: hemodynamically stable and acceptable  Respiratory status: nonlabored ventilation, acceptable and nasal cannula  Hydration status: acceptable

## 2024-12-07 NOTE — PROGRESS NOTES
"Intensive Care Follow-up     Hospital:  LOS: 1 day   Mr. Joel Galo, 59 y.o. male is followed for:   Pericardial effusion after operative procedure   Postoperative hemodynamic, electrolyte and respiratory management         History of present illness:    59 y.o. male who underwent recent CABG x 4, LAAL, & MAZE procedure on 10/28/24 w/ Dr. Max.   Eventually discharged on 11/06/2024.  Patient presented to [facility] with dizziness, near syncope and orthostatic hypotension.  Echocardiogram showed preserved ejection fraction with mild concentric hypertrophy and a large (greater than 2 cm) pericardial effusion adjacent to the left ventricle.  He was subsequently transferred to [facility] by Dr. Max for plans for surgical intervention.  Patient is postop day #0 status post planned pericardial window w/ Dr. Max.  EBL < 25 ml with \"330 mL of serous fluid with sediment was evacuated from the mediastinum.\"     Postoperatively the patient was brought to the ICU for further management monitoring.     Subjective   Interval History:  Patient complains of chest discomfort at the chest tube sites.  Serous output noted from the chest tubes.  Nicardipine has been turned off.  Blood pressure is acceptable started on oral medications.                 The patient's past medical, surgical and social history were reviewed and updated in Epic as appropriate.       Objective     Infusions:  niCARdipine, 5-15 mg/hr, Last Rate: 2.5 mg/hr (12/06/24 2103)  nitroglycerin, 5-200 mcg/min      Medications:  aspirin, 325 mg, Oral, Daily  buPROPion XL, 300 mg, Oral, Daily  cholecalciferol, 400 Units, Oral, Daily  colchicine, 0.6 mg, Oral, Q12H  DULoxetine, 60 mg, Oral, Daily  [Held by provider] empagliflozin, 25 mg, Oral, Daily  finasteride, 5 mg, Oral, Daily  gabapentin, 200 mg, Oral, Q AM  gabapentin, 300 mg, Oral, Nightly  [Held by provider] glipizide, 5 mg, Oral, QAM AC  insulin glargine, 20 Units, " "Subcutaneous, Daily  insulin lispro, 2-9 Units, Subcutaneous, 4x Daily AC & at Bedtime  levothyroxine, 50 mcg, Oral, Q AM  polyethylene glycol, 17 g, Oral, Daily  rosuvastatin, 20 mg, Oral, Daily  senna-docusate sodium, 2 tablet, Oral, Nightly  sotalol, 120 mg, Oral, BID  tamsulosin, 0.4 mg, Oral, Daily        Vital Sign Min/Max for last 24 hours  Temp  Min: 97.2 °F (36.2 °C)  Max: 98.2 °F (36.8 °C)   BP  Min: 96/67  Max: 169/99   Pulse  Min: 75  Max: 89   Resp  Min: 12  Max: 20   SpO2  Min: 86 %  Max: 100 %   Flow (L/min) (Oxygen Therapy)  Min: 2  Max: 6       Input/Output for last 24 hour shift  12/06 0701 - 12/07 0700  In: 1271 [I.V.:1171]  Out: 1045 [Urine:950]   S RR:  [6-14] 6  Objective:  Vital signs: (most recent): Blood pressure 112/71, pulse 84, temperature 97.7 °F (36.5 °C), temperature source Axillary, resp. rate 18, height 176.8 cm (69.61\"), weight 94.3 kg (207 lb 14.4 oz), SpO2 98%.            General Appearance: Awake, alert, in no acute distress  Lungs:   B/L Breath sounds present with decreased breath sounds on bases, no wheezing heard, no crackles.  Chest tubes in place and draining serous fluid  Heart: S1 and S2 present, no murmur  Abdomen: Soft, nontender, no guarding or rigidity, bowel sounds positive.  Extremities:  no edema, warm to touch.  Neurologic:  Moving all four extremities. Good strength bilaterally. \  Psychological: Normal affect, Cooperative      Results from last 7 days   Lab Units 12/07/24  0438 12/06/24  0418 12/05/24  0455   WBC 10*3/mm3 7.88 7.64 7.70   HEMOGLOBIN g/dL 10.8* 11.2* 11.7*   PLATELETS 10*3/mm3 323 297 318     Results from last 7 days   Lab Units 12/07/24  0430 12/06/24  0110 12/05/24  0455 12/04/24  1852   SODIUM mmol/L 138 134* 137 137   POTASSIUM mmol/L 5.2 4.3 3.9 4.2   CO2 mmol/L 24.0 21.8* 23.3 27.1   BUN mg/dL 25* 21 26* 25*   CREATININE mg/dL 0.73* 0.77 0.87 1.17   MAGNESIUM mg/dL 2.2  --   --  2.6*   PHOSPHORUS mg/dL 4.0  --   --   --    GLUCOSE mg/dL 161* " 148* 102* 251*     Estimated Creatinine Clearance: 121.7 mL/min (A) (by C-G formula based on SCr of 0.73 mg/dL (L)).          Images:   Chest x-ray done today morning reviewed and showed for respiratory effort.  Chest tubes in place.  Small pleural effusion.  No obvious pneumothorax.    I reviewed the patient's results and images.     Assessment & Plan   Impression        Pericardial effusion after CABG x 4, LAURY CLINE 10/28/24.  Readmit for Pericardial window 12/6/24       Plan        1.  Patient is postpericardiotomy and overall pericardial effusion post coronary bypass graft surgery.  CT surgery following postop course closely.  Chest tube management per them.  2.  Postoperative pain control.  Change Lortab to every 4 hours so that we can avoid IV narcotics.  3.  Started on colchicine for pericarditis.  Continue same.  4.  Work with incentive spirometry and pulmonary toilet.  Wean FiO2 as tolerated to keep saturation of 90% or better.  5.  Mild oozing noted from the previous CABG incision site.  No evidence of cellulitis for now.  Keep close eye on the incision.  Received perioperative antibiotics as well.  6.  Continue sliding scale insulin coverage for hyperglycemia management.  7.  Continue levothyroxine history of hypothyroidism.  8.  Continue aspirin, statin.  Receiving sotalol for A-fib.  Rate is controlled.  QTc acceptable, continue same.  9.  Bowel regimen.  10.  Out of bed and mobilize.    Continue close monitoring.  Check labs in the morning    Plan of care and goals reviewed with multidisciplinary/antibiotic stewardship team during rounds.   I discussed the patient's findings and my recommendations with patient and nursing staff     Derrick Davison MD, [facility]  Pulmonary, Critical care and Sleep Medicine

## 2024-12-07 NOTE — PROGRESS NOTES
CTS Progress Note       LOS: 1 day   Patient Care Team:  Edmundo Boston MD as PCP - General (Family Medicine)  Julio Neff MD as Consulting Physician (Cardiology)  Blossom Parks APRN as Nurse Practitioner (Cardiology)    Chief Complaint: Pericardial effusion after operative procedure    Vital Signs:  Temp:  [97.2 °F (36.2 °C)-98.2 °F (36.8 °C)] 98.1 °F (36.7 °C)  Heart Rate:  [75-89] 80  Resp:  [12-20] 16  BP: ()/() 121/65  Arterial Line BP: (-1-164)/(-1-72) 108/53    Physical Exam: Up in chair no distress breathing unlabored       Results:     Results from last 7 days   Lab Units 12/07/24  0438   WBC 10*3/mm3 7.88   HEMOGLOBIN g/dL 10.8*   HEMATOCRIT % 33.9*   PLATELETS 10*3/mm3 323     Results from last 7 days   Lab Units 12/07/24  0430   SODIUM mmol/L 138   POTASSIUM mmol/L 5.2   CHLORIDE mmol/L 102   CO2 mmol/L 24.0   BUN mg/dL 25*   CREATININE mg/dL 0.73*   GLUCOSE mg/dL 161*   CALCIUM mg/dL 8.6           Imaging Results (Last 24 Hours)       Procedure Component Value Units Date/Time    XR Chest 1 View [[id number]] Resulted: 12/07/24 0211     Updated: 12/07/24 0309    XR Chest 1 View [[id number]] Collected: 12/06/24 1957     Updated: 12/06/24 2002    Narrative:      XR CHEST 1 VW    Date of Exam: 12/6/2024 7:30 PM EST    Indication: postop    Comparison: 12/4/2024 [facility] chest radiograph    Findings:  Sternotomy wires, left atrial appendage exclusion clip, and implanted loop recorder are again noted. Lung volumes are low and there appears to be mild new left basilar discoid atelectasis as a result. The heart is enlarged. There is some crowding the   pulmonary vascular markings, but little if any evidence to suggest significant pulmonary vascular congestion. No pneumothorax is seen.      Impression:      Impression:  Low lung volumes and mild new left basilar discoid atelectasis. No other evidence of active chest disease.        Electronically Signed: Sushil Parsons MD     12/6/2024 7:59 PM EST    Workstation ID: [id number]            Assessment      Pericardial effusion after CABG x 4, MAZE, LAAL 10/28/24.  Readmit for Pericardial window 12/6/24    Patient apparently doing much better following urgent pericardial window last night by Dr. Max    Plan   Continuing supportive care in the ICU    Please note that portions of this note were completed with a voice recognition program. Efforts were made to edit the dictations, but occasionally words are mistranscribed.    Tomer Philip MD  12/07/24  07:27 EST

## 2024-12-08 LAB
ANION GAP SERPL CALCULATED.3IONS-SCNC: 11 MMOL/L (ref 5–15)
BASOPHILS # BLD AUTO: 0.02 10*3/MM3 (ref 0–0.2)
BASOPHILS NFR BLD AUTO: 0.2 % (ref 0–1.5)
BUN SERPL-MCNC: 27 MG/DL (ref 8–23)
BUN/CREAT SERPL: 32.9 (ref 7–25)
CALCIUM SPEC-SCNC: 8.8 MG/DL (ref 8.6–10.5)
CHLORIDE SERPL-SCNC: 101 MMOL/L (ref 98–107)
CO2 SERPL-SCNC: 24 MMOL/L (ref 22–29)
CREAT SERPL-MCNC: 0.82 MG/DL (ref 0.76–1.27)
DEPRECATED RDW RBC AUTO: 47.3 FL (ref 37–54)
EGFRCR SERPLBLD CKD-EPI 2021: 99.9 ML/MIN/1.73
EOSINOPHIL # BLD AUTO: 0.13 10*3/MM3 (ref 0–0.4)
EOSINOPHIL NFR BLD AUTO: 1.1 % (ref 0.3–6.2)
ERYTHROCYTE [DISTWIDTH] IN BLOOD BY AUTOMATED COUNT: 14.1 % (ref 12.3–15.4)
GLUCOSE BLDC GLUCOMTR-MCNC: 176 MG/DL (ref 70–130)
GLUCOSE BLDC GLUCOMTR-MCNC: 182 MG/DL (ref 70–130)
GLUCOSE BLDC GLUCOMTR-MCNC: 210 MG/DL (ref 70–130)
GLUCOSE BLDC GLUCOMTR-MCNC: 217 MG/DL (ref 70–130)
GLUCOSE SERPL-MCNC: 167 MG/DL (ref 65–99)
HCT VFR BLD AUTO: 35.6 % (ref 37.5–51)
HGB BLD-MCNC: 11.1 G/DL (ref 13–17.7)
IMM GRANULOCYTES # BLD AUTO: 0.05 10*3/MM3 (ref 0–0.05)
IMM GRANULOCYTES NFR BLD AUTO: 0.4 % (ref 0–0.5)
LYMPHOCYTES # BLD AUTO: 0.79 10*3/MM3 (ref 0.7–3.1)
LYMPHOCYTES NFR BLD AUTO: 6.6 % (ref 19.6–45.3)
MAGNESIUM SERPL-MCNC: 2.2 MG/DL (ref 1.6–2.4)
MCH RBC QN AUTO: 28.6 PG (ref 26.6–33)
MCHC RBC AUTO-ENTMCNC: 31.2 G/DL (ref 31.5–35.7)
MCV RBC AUTO: 91.8 FL (ref 79–97)
MONOCYTES # BLD AUTO: 0.69 10*3/MM3 (ref 0.1–0.9)
MONOCYTES NFR BLD AUTO: 5.8 % (ref 5–12)
NEUTROPHILS NFR BLD AUTO: 10.29 10*3/MM3 (ref 1.7–7)
NEUTROPHILS NFR BLD AUTO: 85.9 % (ref 42.7–76)
NRBC BLD AUTO-RTO: 0 /100 WBC (ref 0–0.2)
PHOSPHATE SERPL-MCNC: 2.7 MG/DL (ref 2.5–4.5)
PLATELET # BLD AUTO: 312 10*3/MM3 (ref 140–450)
PMV BLD AUTO: 9.3 FL (ref 6–12)
POTASSIUM SERPL-SCNC: 4.5 MMOL/L (ref 3.5–5.2)
RBC # BLD AUTO: 3.88 10*6/MM3 (ref 4.14–5.8)
SODIUM SERPL-SCNC: 136 MMOL/L (ref 136–145)
WBC NRBC COR # BLD AUTO: 11.97 10*3/MM3 (ref 3.4–10.8)

## 2024-12-08 PROCEDURE — 99232 SBSQ HOSP IP/OBS MODERATE 35: CPT | Performed by: INTERNAL MEDICINE

## 2024-12-08 PROCEDURE — 83735 ASSAY OF MAGNESIUM: CPT | Performed by: INTERNAL MEDICINE

## 2024-12-08 PROCEDURE — 82948 REAGENT STRIP/BLOOD GLUCOSE: CPT

## 2024-12-08 PROCEDURE — 63710000001 INSULIN LISPRO (HUMAN) PER 5 UNITS: Performed by: PHYSICIAN ASSISTANT

## 2024-12-08 PROCEDURE — 84100 ASSAY OF PHOSPHORUS: CPT | Performed by: INTERNAL MEDICINE

## 2024-12-08 PROCEDURE — 85025 COMPLETE CBC W/AUTO DIFF WBC: CPT | Performed by: INTERNAL MEDICINE

## 2024-12-08 PROCEDURE — 63710000001 INSULIN GLARGINE PER 5 UNITS: Performed by: PHYSICIAN ASSISTANT

## 2024-12-08 PROCEDURE — 80048 BASIC METABOLIC PNL TOTAL CA: CPT | Performed by: INTERNAL MEDICINE

## 2024-12-08 PROCEDURE — 99024 POSTOP FOLLOW-UP VISIT: CPT | Performed by: THORACIC SURGERY (CARDIOTHORACIC VASCULAR SURGERY)

## 2024-12-08 RX ADMIN — INSULIN GLARGINE 20 UNITS: 100 INJECTION, SOLUTION SUBCUTANEOUS at 08:34

## 2024-12-08 RX ADMIN — ASPIRIN 325 MG: 325 TABLET, COATED ORAL at 08:34

## 2024-12-08 RX ADMIN — DULOXETINE HYDROCHLORIDE 60 MG: 60 CAPSULE, DELAYED RELEASE ORAL at 08:34

## 2024-12-08 RX ADMIN — HYDROCODONE BITARTRATE AND ACETAMINOPHEN 1 TABLET: 5; 325 TABLET ORAL at 01:33

## 2024-12-08 RX ADMIN — SOTALOL HYDROCHLORIDE 120 MG: 120 TABLET ORAL at 20:03

## 2024-12-08 RX ADMIN — COLCHICINE 0.6 MG: 0.6 TABLET ORAL at 08:34

## 2024-12-08 RX ADMIN — GABAPENTIN 300 MG: 300 CAPSULE ORAL at 20:03

## 2024-12-08 RX ADMIN — ROSUVASTATIN 20 MG: 20 TABLET, FILM COATED ORAL at 08:34

## 2024-12-08 RX ADMIN — COLCHICINE 0.6 MG: 0.6 TABLET ORAL at 20:03

## 2024-12-08 RX ADMIN — INSULIN LISPRO 2 UNITS: 100 INJECTION, SOLUTION INTRAVENOUS; SUBCUTANEOUS at 08:33

## 2024-12-08 RX ADMIN — TAMSULOSIN HYDROCHLORIDE 0.4 MG: 0.4 CAPSULE ORAL at 08:34

## 2024-12-08 RX ADMIN — Medication 400 UNITS: at 08:34

## 2024-12-08 RX ADMIN — INSULIN LISPRO 4 UNITS: 100 INJECTION, SOLUTION INTRAVENOUS; SUBCUTANEOUS at 16:55

## 2024-12-08 RX ADMIN — LEVOTHYROXINE SODIUM 50 MCG: 0.05 TABLET ORAL at 05:54

## 2024-12-08 RX ADMIN — INSULIN LISPRO 4 UNITS: 100 INJECTION, SOLUTION INTRAVENOUS; SUBCUTANEOUS at 12:40

## 2024-12-08 RX ADMIN — GABAPENTIN 200 MG: 100 CAPSULE ORAL at 05:55

## 2024-12-08 RX ADMIN — BUPROPION HYDROCHLORIDE 300 MG: 150 TABLET, EXTENDED RELEASE ORAL at 08:34

## 2024-12-08 RX ADMIN — HYDROCODONE BITARTRATE AND ACETAMINOPHEN 1 TABLET: 5; 325 TABLET ORAL at 08:34

## 2024-12-08 RX ADMIN — FINASTERIDE 5 MG: 5 TABLET, FILM COATED ORAL at 08:34

## 2024-12-08 RX ADMIN — SOTALOL HYDROCHLORIDE 120 MG: 120 TABLET ORAL at 08:34

## 2024-12-08 RX ADMIN — INSULIN LISPRO 2 UNITS: 100 INJECTION, SOLUTION INTRAVENOUS; SUBCUTANEOUS at 20:30

## 2024-12-08 NOTE — PROGRESS NOTES
CTS Progress Note       LOS: 2 days   Patient Care Team:  Edmundo Boston MD as PCP - General (Family Medicine)  Julio Neff MD as Consulting Physician (Cardiology)  Blossom Parks APRN as Nurse Practitioner (Cardiology)    Chief Complaint: Pericardial effusion after operative procedure    Vital Signs:  Temp:  [97.7 °F (36.5 °C)-98.4 °F (36.9 °C)] 98.2 °F (36.8 °C)  Heart Rate:  [75-87] 82  Resp:  [16-20] 16  BP: ()/(47-95) 113/72  Arterial Line BP: (104-134)/(48-69) 110/61    Physical Exam: Alert conversant breathing unlabored       Results:     Results from last 7 days   Lab Units 12/07/24  0438   WBC 10*3/mm3 7.88   HEMOGLOBIN g/dL 10.8*   HEMATOCRIT % 33.9*   PLATELETS 10*3/mm3 323     Results from last 7 days   Lab Units 12/07/24  0430   SODIUM mmol/L 138   POTASSIUM mmol/L 5.2   CHLORIDE mmol/L 102   CO2 mmol/L 24.0   BUN mg/dL 25*   CREATININE mg/dL 0.73*   GLUCOSE mg/dL 161*   CALCIUM mg/dL 8.6           Imaging Results (Last 24 Hours)       Procedure Component Value Units Date/Time    XR Chest 1 View [[id number]] Collected: 12/07/24 0829     Updated: 12/07/24 0833    Narrative:      XR CHEST 1 VW    Date of Exam: 12/7/2024 2:11 AM EST    Indication: postop    Comparison: 1 day prior.    Findings:  Stable low lung volumes with left greater than right basilar atelectasis. No enlarging effusion or distinct thorax. Unchanged heart and mediastinal contours.      Impression:      Impression:  No significant interval change.      Electronically Signed: Gary Escobar MD    12/7/2024 8:30 AM EST    Workstation ID: [id number]            Assessment      Pericardial effusion after CABG x 4, LAURY CLINE 10/28/24.  Readmit for Pericardial window 12/6/24        Plan   Discontinue chest tube    Please note that portions of this note were completed with a voice recognition program. Efforts were made to edit the dictations, but occasionally words are mistranscribed.    Tomer Philip,  MD  12/08/24  02:07 EST

## 2024-12-08 NOTE — PROGRESS NOTES
"Intensive Care Follow-up     Hospital:  LOS: 2 days   Mr. Joel Galo, 59 y.o. male is followed for:   Pericardial effusion after operative procedure   Postoperative hemodynamic, electrolyte and respiratory management         History of present illness:    59 y.o. male who underwent recent CABG x 4, LAAL, & MAZE procedure on 10/28/24 w/ Dr. Max.   Eventually discharged on 11/06/2024.  Patient presented to [facility] with dizziness, near syncope and orthostatic hypotension.  Echocardiogram showed preserved ejection fraction with mild concentric hypertrophy and a large (greater than 2 cm) pericardial effusion adjacent to the left ventricle.  He was subsequently transferred to [facility] by Dr. Max for plans for surgical intervention.  Patient is postop day #0 status post planned pericardial window w/ Dr. Max.  EBL < 25 ml with \"330 mL of serous fluid with sediment was evacuated from the mediastinum.\"     Postoperatively the patient was brought to the ICU for further management monitoring.     Subjective   Interval History:  Chest tube has been discontinued.  Patient is getting up and walking around up to 200 feet.  Denies any other acute issues.                 The patient's past medical, surgical and social history were reviewed and updated in Epic as appropriate.       Objective     Infusions:  niCARdipine, 5-15 mg/hr, Last Rate: 2.5 mg/hr (12/06/24 2103)  nitroglycerin, 5-200 mcg/min      Medications:  aspirin, 325 mg, Oral, Daily  buPROPion XL, 300 mg, Oral, Daily  cholecalciferol, 400 Units, Oral, Daily  colchicine, 0.6 mg, Oral, Q12H  DULoxetine, 60 mg, Oral, Daily  [Held by provider] empagliflozin, 25 mg, Oral, Daily  finasteride, 5 mg, Oral, Daily  gabapentin, 200 mg, Oral, Q AM  gabapentin, 300 mg, Oral, Nightly  [Held by provider] glipizide, 5 mg, Oral, QAM AC  insulin glargine, 20 Units, Subcutaneous, Daily  insulin lispro, 2-9 Units, Subcutaneous, 4x Daily AC & at " "Bedtime  levothyroxine, 50 mcg, Oral, Q AM  polyethylene glycol, 17 g, Oral, Daily  rosuvastatin, 20 mg, Oral, Daily  senna-docusate sodium, 2 tablet, Oral, Nightly  sotalol, 120 mg, Oral, BID  tamsulosin, 0.4 mg, Oral, Daily        Vital Sign Min/Max for last 24 hours  Temp  Min: 97.6 °F (36.4 °C)  Max: 98.4 °F (36.9 °C)   BP  Min: 96/65  Max: 137/89   Pulse  Min: 79  Max: 94   Resp  Min: 16  Max: 20   SpO2  Min: 92 %  Max: 99 %   Flow (L/min) (Oxygen Therapy)  Min: 3  Max: 4       Input/Output for last 24 hour shift  12/07 0701 - 12/08 0700  In: 1320 [P.O.:1320]  Out: 1430 [Urine:1300]      Objective:  Vital signs: (most recent): Blood pressure 121/69, pulse 87, temperature 97.9 °F (36.6 °C), temperature source Oral, resp. rate 18, height 176.8 cm (69.61\"), weight 94.3 kg (207 lb 14.4 oz), SpO2 95%.            General Appearance: Awake, alert, in no acute distress  Lungs:   B/L Breath sounds present with decreased breath sounds on bases, no wheezing heard, no crackles.  Heart: S1 and S2 present, no murmur  Abdomen: Soft, nontender, no guarding or rigidity, bowel sounds positive.  Extremities:  no edema, warm to touch.  Neurologic:  Moving all four extremities.   Psychological: Normal affect, Cooperative      Results from last 7 days   Lab Units 12/08/24  0428 12/07/24  0438 12/06/24  0418   WBC 10*3/mm3 11.97* 7.88 7.64   HEMOGLOBIN g/dL 11.1* 10.8* 11.2*   PLATELETS 10*3/mm3 312 323 297     Results from last 7 days   Lab Units 12/08/24  0428 12/07/24  0430 12/06/24  0110 12/05/24  0455 12/04/24  1852   SODIUM mmol/L 136 138 134*   < > 137   POTASSIUM mmol/L 4.5 5.2 4.3   < > 4.2   CO2 mmol/L 24.0 24.0 21.8*   < > 27.1   BUN mg/dL 27* 25* 21   < > 25*   CREATININE mg/dL 0.82 0.73* 0.77   < > 1.17   MAGNESIUM mg/dL 2.2 2.2  --   --  2.6*   PHOSPHORUS mg/dL 2.7 4.0  --   --   --    GLUCOSE mg/dL 167* 161* 148*   < > 251*    < > = values in this interval not displayed.     Estimated Creatinine Clearance: 108.4 mL/min " (by C-G formula based on SCr of 0.82 mg/dL).          Images:   None new    I reviewed the patient's results and images.     Assessment & Plan   Impression        Pericardial effusion after CABG x 4, LAURY CLINE 10/28/24.  Readmit for Pericardial window 12/6/24       Plan        1.  Patient is postpericardiotomy and overall pericardial effusion post coronary bypass graft surgery.  CT surgery following postop course closely. Chest tube has been discontinued.    2.  Judicious pain control..  3.  Started on colchicine for pericarditis.  Continue same.  4.  Continue working with incentive spirometry and pulmonary toilet.  Wean FiO2 as tolerated to keep saturation 90% or better.  5.  Mild oozing noted from the previous CABG incision site.  No evidence of cellulitis for now.  Keep close eye on the incision.  Received perioperative antibiotics as well.  6.  Continue sliding scale insulin coverage for hyperglycemia management.  7.  Continue levothyroxine with history of hypothyroidism.  8.  Continue aspirin, statin.  Receiving sotalol for A-fib.  Rate is controlled.  QTc acceptable, continue same.  9.  Bowel regimen.  10.  Out of bed and mobilize.    Can transfer out of ICU to telemetry floor if okay with CT surgery team.    Plan of care and goals reviewed with multidisciplinary/antibiotic stewardship team during rounds.   I discussed the patient's findings and my recommendations with patient and nursing staff   Above documentation reviewed and reflect accurate information as of 12/8/2024 including copied elements of the note.     Derrick Davison MD, [facility]  Pulmonary, Critical care and Sleep Medicine

## 2024-12-08 NOTE — PLAN OF CARE
Goal Outcome Evaluation:  Plan of Care Reviewed With: patient        Progress: improving        Alert and oriented. Resp unlabored on 3 L nasal cannula. No cough. NSR VSS on no drips. Incisions intact except old incision from CABG 10/28/24. Small lower open area remains with slight green drainage noted. Increased ambulation to 200 and 220 ft today with great encouragement. Continue to monitor.

## 2024-12-09 LAB
ANION GAP SERPL CALCULATED.3IONS-SCNC: 10 MMOL/L (ref 5–15)
BACTERIA FLD CULT: NORMAL
BASOPHILS # BLD AUTO: 0.03 10*3/MM3 (ref 0–0.2)
BASOPHILS NFR BLD AUTO: 0.3 % (ref 0–1.5)
BUN SERPL-MCNC: 26 MG/DL (ref 8–23)
BUN/CREAT SERPL: 34.2 (ref 7–25)
CALCIUM SPEC-SCNC: 9.1 MG/DL (ref 8.6–10.5)
CHLORIDE SERPL-SCNC: 99 MMOL/L (ref 98–107)
CO2 SERPL-SCNC: 26 MMOL/L (ref 22–29)
CREAT SERPL-MCNC: 0.76 MG/DL (ref 0.76–1.27)
DEPRECATED RDW RBC AUTO: 47.3 FL (ref 37–54)
EGFRCR SERPLBLD CKD-EPI 2021: 102.3 ML/MIN/1.73
EOSINOPHIL # BLD AUTO: 0.12 10*3/MM3 (ref 0–0.4)
EOSINOPHIL NFR BLD AUTO: 1.1 % (ref 0.3–6.2)
ERYTHROCYTE [DISTWIDTH] IN BLOOD BY AUTOMATED COUNT: 14 % (ref 12.3–15.4)
GLUCOSE BLDC GLUCOMTR-MCNC: 238 MG/DL (ref 70–130)
GLUCOSE BLDC GLUCOMTR-MCNC: 292 MG/DL (ref 70–130)
GLUCOSE BLDC GLUCOMTR-MCNC: 292 MG/DL (ref 70–130)
GLUCOSE BLDC GLUCOMTR-MCNC: 352 MG/DL (ref 70–130)
GLUCOSE SERPL-MCNC: 179 MG/DL (ref 65–99)
GRAM STN SPEC: NORMAL
HCT VFR BLD AUTO: 36.8 % (ref 37.5–51)
HGB BLD-MCNC: 11.4 G/DL (ref 13–17.7)
IMM GRANULOCYTES # BLD AUTO: 0.05 10*3/MM3 (ref 0–0.05)
IMM GRANULOCYTES NFR BLD AUTO: 0.5 % (ref 0–0.5)
LYMPHOCYTES # BLD AUTO: 1.21 10*3/MM3 (ref 0.7–3.1)
LYMPHOCYTES NFR BLD AUTO: 11.2 % (ref 19.6–45.3)
MCH RBC QN AUTO: 28.7 PG (ref 26.6–33)
MCHC RBC AUTO-ENTMCNC: 31 G/DL (ref 31.5–35.7)
MCV RBC AUTO: 92.7 FL (ref 79–97)
MONOCYTES # BLD AUTO: 0.81 10*3/MM3 (ref 0.1–0.9)
MONOCYTES NFR BLD AUTO: 7.5 % (ref 5–12)
NEUTROPHILS NFR BLD AUTO: 79.4 % (ref 42.7–76)
NEUTROPHILS NFR BLD AUTO: 8.58 10*3/MM3 (ref 1.7–7)
NRBC BLD AUTO-RTO: 0 /100 WBC (ref 0–0.2)
PLATELET # BLD AUTO: 271 10*3/MM3 (ref 140–450)
PMV BLD AUTO: 9 FL (ref 6–12)
POTASSIUM SERPL-SCNC: 4.3 MMOL/L (ref 3.5–5.2)
QT INTERVAL: 408 MS
QTC INTERVAL: 461 MS
RBC # BLD AUTO: 3.97 10*6/MM3 (ref 4.14–5.8)
SODIUM SERPL-SCNC: 135 MMOL/L (ref 136–145)
WBC NRBC COR # BLD AUTO: 10.8 10*3/MM3 (ref 3.4–10.8)

## 2024-12-09 PROCEDURE — 99232 SBSQ HOSP IP/OBS MODERATE 35: CPT | Performed by: INTERNAL MEDICINE

## 2024-12-09 PROCEDURE — 99024 POSTOP FOLLOW-UP VISIT: CPT | Performed by: PHYSICIAN ASSISTANT

## 2024-12-09 PROCEDURE — 63710000001 INSULIN GLARGINE PER 5 UNITS: Performed by: PHYSICIAN ASSISTANT

## 2024-12-09 PROCEDURE — 97530 THERAPEUTIC ACTIVITIES: CPT

## 2024-12-09 PROCEDURE — 85025 COMPLETE CBC W/AUTO DIFF WBC: CPT | Performed by: INTERNAL MEDICINE

## 2024-12-09 PROCEDURE — 63710000001 INSULIN LISPRO (HUMAN) PER 5 UNITS: Performed by: PHYSICIAN ASSISTANT

## 2024-12-09 PROCEDURE — 25010000002 BUMETANIDE PER 0.5 MG: Performed by: THORACIC SURGERY (CARDIOTHORACIC VASCULAR SURGERY)

## 2024-12-09 PROCEDURE — 80048 BASIC METABOLIC PNL TOTAL CA: CPT | Performed by: INTERNAL MEDICINE

## 2024-12-09 PROCEDURE — 82948 REAGENT STRIP/BLOOD GLUCOSE: CPT

## 2024-12-09 RX ORDER — GUAIFENESIN 600 MG/1
600 TABLET, EXTENDED RELEASE ORAL EVERY 12 HOURS SCHEDULED
Status: DISCONTINUED | OUTPATIENT
Start: 2024-12-09 | End: 2024-12-10 | Stop reason: HOSPADM

## 2024-12-09 RX ORDER — BUMETANIDE 0.25 MG/ML
2 INJECTION, SOLUTION INTRAMUSCULAR; INTRAVENOUS ONCE
Status: COMPLETED | OUTPATIENT
Start: 2024-12-09 | End: 2024-12-09

## 2024-12-09 RX ADMIN — INSULIN GLARGINE 20 UNITS: 100 INJECTION, SOLUTION SUBCUTANEOUS at 08:09

## 2024-12-09 RX ADMIN — GUAIFENESIN 600 MG: 600 TABLET, EXTENDED RELEASE ORAL at 08:10

## 2024-12-09 RX ADMIN — TAMSULOSIN HYDROCHLORIDE 0.4 MG: 0.4 CAPSULE ORAL at 08:10

## 2024-12-09 RX ADMIN — HYDROCODONE BITARTRATE AND ACETAMINOPHEN 1 TABLET: 5; 325 TABLET ORAL at 02:30

## 2024-12-09 RX ADMIN — ASPIRIN 325 MG: 325 TABLET, COATED ORAL at 08:09

## 2024-12-09 RX ADMIN — GABAPENTIN 200 MG: 100 CAPSULE ORAL at 05:18

## 2024-12-09 RX ADMIN — INSULIN LISPRO 6 UNITS: 100 INJECTION, SOLUTION INTRAVENOUS; SUBCUTANEOUS at 17:29

## 2024-12-09 RX ADMIN — ROSUVASTATIN 20 MG: 20 TABLET, FILM COATED ORAL at 08:10

## 2024-12-09 RX ADMIN — Medication 400 UNITS: at 08:09

## 2024-12-09 RX ADMIN — GUAIFENESIN 600 MG: 600 TABLET, EXTENDED RELEASE ORAL at 20:46

## 2024-12-09 RX ADMIN — FINASTERIDE 5 MG: 5 TABLET, FILM COATED ORAL at 08:10

## 2024-12-09 RX ADMIN — INSULIN LISPRO 6 UNITS: 100 INJECTION, SOLUTION INTRAVENOUS; SUBCUTANEOUS at 20:47

## 2024-12-09 RX ADMIN — HYDROCODONE BITARTRATE AND ACETAMINOPHEN 1 TABLET: 5; 325 TABLET ORAL at 08:16

## 2024-12-09 RX ADMIN — BUMETANIDE 2 MG: 0.25 INJECTION INTRAMUSCULAR; INTRAVENOUS at 08:10

## 2024-12-09 RX ADMIN — DULOXETINE HYDROCHLORIDE 60 MG: 60 CAPSULE, DELAYED RELEASE ORAL at 08:10

## 2024-12-09 RX ADMIN — EMPAGLIFLOZIN 25 MG: 25 TABLET, FILM COATED ORAL at 11:11

## 2024-12-09 RX ADMIN — INSULIN LISPRO 8 UNITS: 100 INJECTION, SOLUTION INTRAVENOUS; SUBCUTANEOUS at 11:30

## 2024-12-09 RX ADMIN — POLYETHYLENE GLYCOL 3350 17 G: 17 POWDER, FOR SOLUTION ORAL at 08:09

## 2024-12-09 RX ADMIN — BUPROPION HYDROCHLORIDE 300 MG: 150 TABLET, EXTENDED RELEASE ORAL at 08:10

## 2024-12-09 RX ADMIN — GABAPENTIN 300 MG: 300 CAPSULE ORAL at 20:46

## 2024-12-09 RX ADMIN — LEVOTHYROXINE SODIUM 50 MCG: 0.05 TABLET ORAL at 05:18

## 2024-12-09 RX ADMIN — HYDROCODONE BITARTRATE AND ACETAMINOPHEN 1 TABLET: 5; 325 TABLET ORAL at 17:33

## 2024-12-09 RX ADMIN — COLCHICINE 0.6 MG: 0.6 TABLET ORAL at 08:10

## 2024-12-09 RX ADMIN — Medication 5 MG: at 20:46

## 2024-12-09 RX ADMIN — SOTALOL HYDROCHLORIDE 120 MG: 120 TABLET ORAL at 20:46

## 2024-12-09 RX ADMIN — SENNOSIDES AND DOCUSATE SODIUM 2 TABLET: 50; 8.6 TABLET ORAL at 20:46

## 2024-12-09 RX ADMIN — INSULIN LISPRO 4 UNITS: 100 INJECTION, SOLUTION INTRAVENOUS; SUBCUTANEOUS at 08:09

## 2024-12-09 RX ADMIN — SOTALOL HYDROCHLORIDE 120 MG: 120 TABLET ORAL at 08:20

## 2024-12-09 RX ADMIN — COLCHICINE 0.6 MG: 0.6 TABLET ORAL at 20:46

## 2024-12-09 NOTE — PAYOR COMM NOTE
"[facility]   phone: [phone]  fax: [phone]      Joel Gutierrez (59 y.o. Male)       Date of Birth   1965    Social Security Number       Address   [address] [place] [address]    Home Phone   [phone]    MRN   [id number]       Judaism   Synagogue    Marital Status                               Admission Date   12/6/24    Admission Type   Urgent    Admitting Provider   Alex Max MD    Attending Provider   Alex Max MD    Department, Room/Bed   [facility]       Discharge Date       Discharge Disposition       Discharge Destination                                 Attending Provider: Alex Max MD    Allergies: No Known Allergies    Isolation: None   Infection: None   Code Status: CPR    Ht: 176.8 cm (69.61\")   Wt: 94.3 kg (207 lb 14.4 oz)    Admission Cmt: None   Principal Problem: Pericardial effusion after CABG x 4, MAZE, LAAL 10/28/24.  Readmit for Pericardial window 12/6/24 [I97.89,I31.39]                   Active Insurance as of 12/6/2024       Primary Coverage       Payor Plan Insurance Group Employer/Plan Group    ANTHEM MEDICARE REPLACEMENT ANTHEM MEDICARE ADVANTAGE [id number]       Payor Plan Address Payor Plan Phone Number Payor Plan Fax Number Effective Dates    [address] [phone]  1/1/2024 - None Entered    [facility] [id number]         Subscriber Name Subscriber Birth Date Member ID       JOEL GUTIERREZ 1965 [id number]                     Emergency Contacts        (Rel.) Home Phone Work Phone Mobile Phone    LISHA GUTIERREZ (Spouse) [phone] -- [phone]                 History & Physical        Faby Olvera PA-C at 12/06/24 1359       Attestation signed by Alex Max MD at 12/06/24 1718    I have personally evaluated and examined the patient.  The above documentation has been reviewed and I agree.  Mr. Gutierrez returns status post CABG x 4, maze and left atrial appendage " "ligation on 10/28/2024 with syncope.  Echocardiogram was performed in [place] that revealed a large pericardial effusion with early diastolic collapse of the right atrial wall.  The patient was transferred for pericardial window.  I discussed with the patient performing a left anterior thoracotomy with pericardial window.  The risks and benefits of surgery were discussed with the patient including pain, bleeding, infection, myocardial infarction and death.  The patient has to these risks and wished to proceed with surgery.                  Cardiothoracic Surgery History & Physical           Chief complaint: pericardial effusion    HPI:   Mr. Joel Galo is a 60yo male well known to our service, having undergone CABG x4, MAZE, LAAL on 10/28/24 with Dr. Max. His postoperative course was complicated by Afib, urinary retention, and hematuria. He was discharged to rehab on POD9.  He was seen in the clinic by myself on 11/20/24 where there was concern for sternal [facility] infection [facility]. At that time, he had already been started on antibiotics. The wound had not been washed since discharge. However, there was little concern for infection. Patient denied shortness of breath, chest pain or lower extremity swelling.    On 12/4/24, he had a syncopal event and was brought to [facility] ED. An echo was performed which showed pericardial effusion and early diastolic collapse of the right atrial wall. He was transferred to [facility] for possible pericardial window.    Upon arrival, the patient is short of breath with lying down. He denies chest pain or lower extremity edema. Incisions are healing and chest tube sutures remain in place.             Past Medical History:   Diagnosis Date    A-fib     Anxiety     Arthritis     DDD (degenerative disc disease), lumbar     Deep vein thrombosis     Depression     Diabetes     Disease of thyroid gland     HYPO    Heart attack     \"5-6 years ago\"    " Hyperlipidemia     Hypertension     Kidney stone     Neuropathy     Sleep apnea     DOES NOT USE CPAP    UTI (urinary tract infection)      Past Surgical History:   Procedure Laterality Date    ATRIAL APPENDAGE EXCLUSION LEFT WITH TRANSESOPHAGEAL ECHOCARDIOGRAM N/A 10/28/2024    Procedure: ATRIAL APPENDAGE EXCLUSION LEFT WITH TRANSESOPHAGEAL ECHOCARDIOGRAM;  Surgeon: Alex Max MD;  Location:  [facility];  Service: Cardiothoracic;  Laterality: N/A;    CARDIAC CATHETERIZATION N/A 08/28/2024    Procedure: Left Heart Cath;  Surgeon: Julio Neff MD;  Location:  [facility];  Service: Cardiology;  Laterality: N/A;    CARDIAC ELECTROPHYSIOLOGY PROCEDURE N/A 04/05/2023    Procedure: Loop insertion;  Surgeon: Tariq Chávez MD;  Location:  [facility];  Service: Cardiovascular;  Laterality: N/A;    CATARACT EXTRACTION Bilateral     COLONOSCOPY      CORONARY ARTERY BYPASS GRAFT N/A 10/28/2024    Procedure: MEDIAN STERNOTOMY, CORONARY ARTERY BYPASS GRAFTING X4 UTILIZING THE LEFT INTERNAL MAMMARY ARTERY GRAFT, ENDOSCOPIC VEIN HARVEST OF THE GREATER RIGHT SAPHENOUS VEIN;  Surgeon: Alex Max MD;  Location:  [facility];  Service: Cardiothoracic;  Laterality: N/A;    ENDOSCOPY      MAZE PROCEDURE N/A 10/28/2024    Procedure: MAZE PROCEDURE;  Surgeon: Alex Max MD;  Location:  [facility];  Service: Cardiothoracic;  Laterality: N/A;    TEETH EXTRACTION       Family History   Problem Relation Age of Onset    Heart disease Mother     Coronary artery disease Father     Diabetes Father     Kidney disease Father      Social History     Tobacco Use    Smoking status: Never     Passive exposure: Never    Smokeless tobacco: Never   Vaping Use    Vaping status: Never Used   Substance Use Topics    Alcohol use: No    Drug use: No       Medications Prior to Admission   Medication Sig Dispense Refill Last Dose/Taking    aspirin 325 MG EC tablet Take 1 tablet by mouth Daily. 90 tablet 0      buPROPion XL (WELLBUTRIN XL) 300 MG 24 hr tablet Take 1 tablet by mouth Daily.       docusate sodium (COLACE) 100 MG capsule Take 1 capsule by mouth 2 (Two) Times a Day As Needed for Constipation.       DULoxetine (CYMBALTA) 60 MG capsule Take 1 capsule by mouth Daily.       empagliflozin (JARDIANCE) 25 MG tablet tablet Take 1 tablet by mouth Daily.       finasteride (PROSCAR) 5 MG tablet TAKE 1 TABLET EVERY DAY 90 tablet 0     gabapentin (NEURONTIN) 100 MG capsule Take 2 capsules by mouth Every Morning.       gabapentin (NEURONTIN) 100 MG capsule Take 3 capsules by mouth Every Night.       glimepiride (AMARYL) 2 MG tablet Take 2 tablets by mouth 2 (Two) Times a Day.       HYDROcodone-acetaminophen (NORCO)  MG per tablet Take 1 tablet by mouth Every 8 (Eight) Hours As Needed for Moderate Pain.       insulin glargine (LANTUS, SEMGLEE) 100 UNIT/ML injection Inject 20 Units under the skin into the appropriate area as directed Daily.       Insulin Lispro (humaLOG) 100 UNIT/ML injection Inject 2-9 Units under the skin into the appropriate area as directed 4 (Four) Times a Day Before Meals & at Bedtime.       Insulin Lispro (humaLOG) 100 UNIT/ML injection Inject 2 Units under the skin into the appropriate area as directed 3 (Three) Times a Day With Meals.       lactulose (CHRONULAC) 10 GM/15ML solution Take 45 mL by mouth 2 (Two) Times a Day As Needed.       levothyroxine (SYNTHROID, LEVOTHROID) 50 MCG tablet Take 1 tablet by mouth Every Morning.       nitroglycerin (NITROSTAT) 0.4 MG SL tablet Place 1 tablet under the tongue Every 5 (Five) Minutes As Needed for Chest Pain. Take no more than 3 doses in 15 minutes.       rosuvastatin (CRESTOR) 20 MG tablet Take 1 tablet by mouth Daily. 90 tablet 1     sotalol (BETAPACE) 120 MG tablet Take 1 tablet by mouth 2 (Two) Times a Day. 90 tablet 1     tamsulosin (FLOMAX) 0.4 MG capsule 24 hr capsule TAKE 1 CAPSULE EVERY DAY 90 capsule 3     Vitamin D, Cholecalciferol,  "(CHOLECALCIFEROL) 10 MCG (400 UNIT) tablet Take 1 tablet by mouth Daily.        Allergies:  Patient has no known allergies.    Review of Systems:  A comprehensive review of systems was negative except for:   Constitutional: shortness of breath, fatigue    All other systems were reviewed and were negative.    Vital Signs:  Temp:  [97.3 °F (36.3 °C)-98.8 °F (37.1 °C)] 97.9 °F (36.6 °C)  Heart Rate:  [78-89] 88  Resp:  [16-20] 18  BP: ()/() 131/57    Physical Exam:  Physical Exam  Vitals reviewed.   Constitutional:       Appearance: Normal appearance. He is obese.   Eyes:      Pupils: Pupils are equal, round, and reactive to light.   Cardiovascular:      Rate and Rhythm: Normal rate and regular rhythm.      Pulses: Normal pulses.      Heart sounds: Heart sounds are distant.   Pulmonary:      Effort: Pulmonary effort is normal.      Breath sounds: Normal breath sounds.   Abdominal:      General: Abdomen is flat.      Palpations: Abdomen is soft.   Skin:     Capillary Refill: Capillary refill takes less than 2 seconds.   Neurological:      General: No focal deficit present.      Mental Status: He is alert and oriented to person, place, and time.   Psychiatric:         Mood and Affect: Mood normal.         Behavior: Behavior normal.         Labs:  Results from last 7 days   Lab Units 12/06/24  0418   WBC 10*3/mm3 7.64   HEMOGLOBIN g/dL 11.2*   HEMATOCRIT % 35.9*   PLATELETS 10*3/mm3 297     Results from last 7 days   Lab Units 12/06/24  0110   SODIUM mmol/L 134*   POTASSIUM mmol/L 4.3   CHLORIDE mmol/L 100   CO2 mmol/L 21.8*   BUN mg/dL 21   CREATININE mg/dL 0.77   GLUCOSE mg/dL 148*   CALCIUM mg/dL 8.7         Coagulation: No results found for: \"INR\", \"APTT\"  Cardiac markers:   Results from last 7 days   Lab Units 12/04/24  2308   HSTROP T ng/L 25*     ABGs:       Invalid input(s): \"PO2\"    Imaging:     Interpretation Summary         Left ventricular ejection fraction appears to be 56 - 60%.    Left " ventricular wall thickness is consistent with mild concentric hypertrophy.    There is a large (>2cm) pericardial effusion adjacent to the left ventricle.    There is early diastolic collapse of the right atrial wall.    I sent a message to Dr. Max about these findings to see if he could be transferred to [facility] for further intervention as needed.  Patient is currently hemodynamically stable with a blood pressure of 120/80 mmHg and heart rate around 80 bpm.                          Assessment:     Pericardial effusion after operative procedure       Plan:   Plan for pericardial window and drain placement later today in the OR with Dr. Max  Continue NPO         Faby Olvera PA-C  12/06/24  13:59 EST            Electronically signed by Alex Max MD at 12/06/24 1712       Lab Results (last 72 hours)       Procedure Component Value Units Date/Time    POC Glucose Once [[id number]]  (Abnormal) Collected: 12/09/24 1113    Specimen: Blood Updated: 12/09/24 1128     Glucose 352 mg/dL     Anaerobic Culture 10 Day Incubation - Body Fluid, Pericardium [[id number]]  (Normal) Collected: 12/06/24 1925    Specimen: Body Fluid from Pericardium Updated: 12/09/24 1032     Anaerobic Culture No anaerobes isolated at 3 days    Body Fluid Culture - Body Fluid, Pericardium [[id number]] Collected: 12/06/24 1925    Specimen: Body Fluid from Pericardium Updated: 12/09/24 0946     Body Fluid Culture No growth at 3 days     Gram Stain No WBCs or organisms seen    NON-GYN CYTOLOGY, [facility]) [[id number]] Collected: 12/06/24 1925    Specimen: Body Fluid from Pericardium Updated: 12/09/24 0842    POC Glucose Once [[id number]]  (Abnormal) Collected: 12/09/24 0745    Specimen: Blood Updated: 12/09/24 0756     Glucose 238 mg/dL     Basic Metabolic Panel [[id number]]  (Abnormal) Collected: 12/09/24 0306    Specimen: Blood Updated: 12/09/24 0343     Glucose 179 mg/dL      BUN 26 mg/dL      Creatinine 0.76  mg/dL      Sodium 135 mmol/L      Potassium 4.3 mmol/L      Chloride 99 mmol/L      CO2 26.0 mmol/L      Calcium 9.1 mg/dL      BUN/Creatinine Ratio 34.2     Anion Gap 10.0 mmol/L      eGFR 102.3 mL/min/1.73     Narrative:      GFR Normal >60  Chronic Kidney Disease <60  Kidney Failure <15      CBC & Differential [[id number]]  (Abnormal) Collected: 12/09/24 0306    Specimen: Blood Updated: 12/09/24 0319    Narrative:      The following orders were created for panel order CBC & Differential.  Procedure                               Abnormality         Status                     ---------                               -----------         ------                     CBC Auto Differential[[id number]]        Abnormal            Final result                 Please view results for these tests on the individual orders.    CBC Auto Differential [[id number]]  (Abnormal) Collected: 12/09/24 0306    Specimen: Blood Updated: 12/09/24 0319     WBC 10.80 10*3/mm3      RBC 3.97 10*6/mm3      Hemoglobin 11.4 g/dL      Hematocrit 36.8 %      MCV 92.7 fL      MCH 28.7 pg      MCHC 31.0 g/dL      RDW 14.0 %      RDW-SD 47.3 fl      MPV 9.0 fL      Platelets 271 10*3/mm3      Neutrophil % 79.4 %      Lymphocyte % 11.2 %      Monocyte % 7.5 %      Eosinophil % 1.1 %      Basophil % 0.3 %      Immature Grans % 0.5 %      Neutrophils, Absolute 8.58 10*3/mm3      Lymphocytes, Absolute 1.21 10*3/mm3      Monocytes, Absolute 0.81 10*3/mm3      Eosinophils, Absolute 0.12 10*3/mm3      Basophils, Absolute 0.03 10*3/mm3      Immature Grans, Absolute 0.05 10*3/mm3      nRBC 0.0 /100 WBC     POC Glucose Once [[id number]]  (Abnormal) Collected: 12/08/24 2023    Specimen: Blood Updated: 12/08/24 2031     Glucose 176 mg/dL     POC Glucose Once [[id number]]  (Abnormal) Collected: 12/08/24 1643    Specimen: Blood Updated: 12/08/24 1644     Glucose 210 mg/dL     POC Glucose Once [[id number]]  (Abnormal) Collected: 12/08/24 1223    Specimen: Blood Updated:  12/08/24 1224     Glucose 217 mg/dL     POC Glucose Once [[id number]]  (Abnormal) Collected: 12/08/24 0717    Specimen: Blood Updated: 12/08/24 0718     Glucose 182 mg/dL     Phosphorus [[id number]]  (Normal) Collected: 12/08/24 0428    Specimen: Blood Updated: 12/08/24 0645     Phosphorus 2.7 mg/dL     Basic Metabolic Panel [[id number]]  (Abnormal) Collected: 12/08/24 0428    Specimen: Blood Updated: 12/08/24 0640     Glucose 167 mg/dL      BUN 27 mg/dL      Creatinine 0.82 mg/dL      Sodium 136 mmol/L      Potassium 4.5 mmol/L      Comment: Slight hemolysis detected by analyzer. Result may be falsely elevated.        Chloride 101 mmol/L      CO2 24.0 mmol/L      Calcium 8.8 mg/dL      BUN/Creatinine Ratio 32.9     Anion Gap 11.0 mmol/L      eGFR 99.9 mL/min/1.73     Narrative:      GFR Normal >60  Chronic Kidney Disease <60  Kidney Failure <15      Magnesium [[id number]]  (Normal) Collected: 12/08/24 0428    Specimen: Blood Updated: 12/08/24 0640     Magnesium 2.2 mg/dL     CBC & Differential [[id number]]  (Abnormal) Collected: 12/08/24 0428    Specimen: Blood Updated: 12/08/24 0535    Narrative:      The following orders were created for panel order CBC & Differential.  Procedure                               Abnormality         Status                     ---------                               -----------         ------                     CBC Auto Differential[[id number]]        Abnormal            Final result                 Please view results for these tests on the individual orders.    CBC Auto Differential [[id number]]  (Abnormal) Collected: 12/08/24 0428    Specimen: Blood Updated: 12/08/24 0535     WBC 11.97 10*3/mm3      RBC 3.88 10*6/mm3      Hemoglobin 11.1 g/dL      Hematocrit 35.6 %      MCV 91.8 fL      MCH 28.6 pg      MCHC 31.2 g/dL      RDW 14.1 %      RDW-SD 47.3 fl      MPV 9.3 fL      Platelets 312 10*3/mm3      Neutrophil % 85.9 %      Lymphocyte % 6.6 %      Monocyte % 5.8 %      Eosinophil % 1.1  %      Basophil % 0.2 %      Immature Grans % 0.4 %      Neutrophils, Absolute 10.29 10*3/mm3      Lymphocytes, Absolute 0.79 10*3/mm3      Monocytes, Absolute 0.69 10*3/mm3      Eosinophils, Absolute 0.13 10*3/mm3      Basophils, Absolute 0.02 10*3/mm3      Immature Grans, Absolute 0.05 10*3/mm3      nRBC 0.0 /100 WBC     POC Glucose Once [[id number]]  (Abnormal) Collected: 12/07/24 2027    Specimen: Blood Updated: 12/07/24 2029     Glucose 218 mg/dL     POC Glucose Once [[id number]]  (Abnormal) Collected: 12/07/24 1627    Specimen: Blood Updated: 12/07/24 1629     Glucose 188 mg/dL     AFB Culture - Body Fluid, Pericardium [[id number]] Collected: 12/06/24 1925    Specimen: Body Fluid from Pericardium Updated: 12/07/24 1215     AFB Stain No acid fast bacilli seen on concentrated smear    POC Glucose Once [[id number]]  (Abnormal) Collected: 12/07/24 1101    Specimen: Blood Updated: 12/07/24 1102     Glucose 199 mg/dL     POC Glucose Once [[id number]]  (Abnormal) Collected: 12/06/24 2105    Specimen: Blood Updated: 12/07/24 0819     Glucose 176 mg/dL     POC Glucose Once [[id number]]  (Abnormal) Collected: 12/07/24 0729    Specimen: Blood Updated: 12/07/24 0730     Glucose 201 mg/dL     TSH [[id number]]  (Normal) Collected: 12/07/24 0430    Specimen: Blood Updated: 12/07/24 0515     TSH 0.442 uIU/mL     Basic Metabolic Panel [[id number]]  (Abnormal) Collected: 12/07/24 0430    Specimen: Blood Updated: 12/07/24 0509     Glucose 161 mg/dL      BUN 25 mg/dL      Creatinine 0.73 mg/dL      Sodium 138 mmol/L      Potassium 5.2 mmol/L      Chloride 102 mmol/L      CO2 24.0 mmol/L      Calcium 8.6 mg/dL      BUN/Creatinine Ratio 34.2     Anion Gap 12.0 mmol/L      eGFR 103.5 mL/min/1.73     Narrative:      GFR Normal >60  Chronic Kidney Disease <60  Kidney Failure <15      Magnesium [[id number]]  (Normal) Collected: 12/07/24 0430    Specimen: Blood Updated: 12/07/24 0509     Magnesium 2.2 mg/dL     Phosphorus [[id number]]  (Normal)  Collected: 12/07/24 0430    Specimen: Blood Updated: 12/07/24 0509     Phosphorus 4.0 mg/dL     CBC & Differential [[id number]]  (Abnormal) Collected: 12/07/24 0438    Specimen: Blood Updated: 12/07/24 0448    Narrative:      The following orders were created for panel order CBC & Differential.  Procedure                               Abnormality         Status                     ---------                               -----------         ------                     CBC Auto Differential[[id number]]        Abnormal            Final result                 Please view results for these tests on the individual orders.    CBC Auto Differential [[id number]]  (Abnormal) Collected: 12/07/24 0438    Specimen: Blood Updated: 12/07/24 0448     WBC 7.88 10*3/mm3      RBC 3.73 10*6/mm3      Hemoglobin 10.8 g/dL      Hematocrit 33.9 %      MCV 90.9 fL      MCH 29.0 pg      MCHC 31.9 g/dL      RDW 13.2 %      RDW-SD 43.3 fl      MPV 9.0 fL      Platelets 323 10*3/mm3      Neutrophil % 89.0 %      Lymphocyte % 7.6 %      Monocyte % 2.7 %      Eosinophil % 0.0 %      Basophil % 0.1 %      Immature Grans % 0.6 %      Neutrophils, Absolute 7.01 10*3/mm3      Lymphocytes, Absolute 0.60 10*3/mm3      Monocytes, Absolute 0.21 10*3/mm3      Eosinophils, Absolute 0.00 10*3/mm3      Basophils, Absolute 0.01 10*3/mm3      Immature Grans, Absolute 0.05 10*3/mm3      nRBC 0.0 /100 WBC     Fungus Culture - Body Fluid, Pericardium [[id number]] Collected: 12/06/24 1925    Specimen: Body Fluid from Pericardium Updated: 12/06/24 2050    POC Glucose Once [[id number]]  (Normal) Collected: 12/06/24 1630    Specimen: Blood Updated: 12/06/24 1631     Glucose 112 mg/dL           Imaging Results (Last 72 Hours)       Procedure Component Value Units Date/Time    XR Chest 1 View [[id number]] Collected: 12/07/24 0829     Updated: 12/07/24 0833    Narrative:      XR CHEST 1 VW    Date of Exam: 12/7/2024 2:11 AM EST    Indication: postop    Comparison: 1 day  prior.    Findings:  Stable low lung volumes with left greater than right basilar atelectasis. No enlarging effusion or distinct thorax. Unchanged heart and mediastinal contours.      Impression:      Impression:  No significant interval change.      Electronically Signed: Gary Escobar MD    12/7/2024 8:30 AM EST    Workstation ID: [id number]    XR Chest 1 View [[id number]] Collected: 12/06/24 1957     Updated: 12/06/24 2002    Narrative:      XR CHEST 1 VW    Date of Exam: 12/6/2024 7:30 PM EST    Indication: postop    Comparison: 12/4/2024 [facility] chest radiograph    Findings:  Sternotomy wires, left atrial appendage exclusion clip, and implanted loop recorder are again noted. Lung volumes are low and there appears to be mild new left basilar discoid atelectasis as a result. The heart is enlarged. There is some crowding the   pulmonary vascular markings, but little if any evidence to suggest significant pulmonary vascular congestion. No pneumothorax is seen.      Impression:      Impression:  Low lung volumes and mild new left basilar discoid atelectasis. No other evidence of active chest disease.        Electronically Signed: Sushil Parsons MD    12/6/2024 7:59 PM EST    Workstation ID: [id number]             Operative/Procedure Notes (all)        Alex Max MD at 12/06/24 1754  Version 1 of 1         DATE OF PROCEDURE: 12/6/2024     PREOPERATIVE DIAGNOSES:  1. Large pericardial effusion  2. Syncope     POSTOPERATIVE DIAGNOSES:    1. Large pericardial effusion  2. Syncope    PROCEDURES PERFORMED:    1. Subxiphoid pericardial window    SURGEON: Alex Max MD       ASSISTANT: Natasha Adams PA-C was responsible for performing the following activities: Retraction, Suction, Closing, and Placing Dressing and their skilled assistance was necessary for the success of this case.    Circulator: Adán Johnston RN; Peggy Gordillo RN  Scrub Person: Mahamed Campa      ANESTHESIA: General endotracheal  anesthesia with Dr. Rohit Talbot MD     ESTIMATED BLOOD LOSS: Less than 25 mL.       INDICATIONS:  59-year-old  male with a history of hypertension, hyperlipidemia, diabetes mellitus, paroxysmal atrial fibrillation and coronary artery disease who underwent CABG x 4, maze and left atrial appendage ligation on 10/28/2024.  The patient returns to the hospital today with complaints of syncope.  He was found to have a large pericardial effusion on echocardiogram along with early diastolic collapse concerning for tamponade.  The patient was felt to be a reasonable candidate for a pericardial window. The risks and benefits of surgery were discussed with the patient including pain, bleeding, infection, recurrent effusion, myocardial infarction and death. The patient understood these risks and wished to proceed with surgery.      DESCRIPTION OF PROCEDURE:  The patient was taken to the operating room and prepped and draped in the usual sterile fashion.  A timeout was performed including the patient's name, procedure and antibiotic administration.  General endotracheal anesthesia was then induced and an incision was subsequently made on the left chest lateral to the previous midline incision.  Electrocautery was utilized to expose the interspace.  A loop recorder was adjacent to the incision and retracted away from the dissection plane.  There was significant scarring present and dissection was carried down to the pericardium  The epicardium was exposed and no fluid was present in this dissection plane.  I could not finger sweep due to dense adhesions present.  Attention was then turned to a transverse incision made below the previous midline sternotomy incision.  Dissection was carried out along the midline below the sternum.  There was extensive woody scarring present.  Electrocautery was utilized to dissect down to the pericardium.  The pericardium was opened and 330 mL of serous fluid with sediment was  evacuated from the mediastinum.  This was sent for cultures and cytology.  There was no studding of the pericardium or epicardium.  Extensive adhesions were present within the pericardial space where fluid was not present.  A 28 Fr channel drain was then placed within the pericardium and secured using a 0 Ethibond suture for later tube site closure.  The midline fascia was closed with a 0 Vicryl suture followed by a 2-0 Vicryl dermal layer and a 4-0 Monocryl subcuticular stitch.  The same sutures were utilized for the left chest wall incision.  Overlying skin glue was applied to the incisions and gauze and tape to the chest tube site.  The patient was extubated in the operating room and subsequently transported to the recovery room in stable condition.            Electronically signed by Alex Max MD at 12/06/24 1911          Physician Progress Notes (last 72 hours)        Natasha Adams PA-C at 12/09/24 0739          Cardiothoracic Surgery Progress Note      POD # 3 s/p Subxiphoid pericardial window     LOS: 3 days      Subjective:  No acute overnight events. Patient reports feeling overall improved. Denies fever, chest pain, shortness of breath. He reports mild productive cough after speaking.     Objective:  Vital Signs  Temp:  [97.7 °F (36.5 °C)-98.7 °F (37.1 °C)] 97.7 °F (36.5 °C)  Heart Rate:  [80-94] 80  Resp:  [18-20] 20  BP: ()/() 90/64    Physical Exam:   General Appearance: alert, appears stated age and cooperative   Lungs: clear to auscultation, respirations regular, respirations even, and respirations unlabored, SpO2 97% on 4L O2 via NC. Intermittent cough.    Heart: regular rhythm & normal rate, normal S1, S2, no murmur, no gallop, no rub, and no click   Extremities: moves all four, warm, well perfused, no peripheral edema   Skin: Two transverse chest incisions from 12/3 c/d/I, with glue. Median sternotomy incision appears to be healing well. Small area mid chest with scab  de-roofed, no erythema or drainage. Right medial lower leg EVH incision with scab de-roofed.     Results:    Results from last 7 days   Lab Units 12/09/24  0306   WBC 10*3/mm3 10.80   HEMOGLOBIN g/dL 11.4*   HEMATOCRIT % 36.8*   PLATELETS 10*3/mm3 271     Results from last 7 days   Lab Units 12/09/24  0306   SODIUM mmol/L 135*   POTASSIUM mmol/L 4.3   CHLORIDE mmol/L 99   CO2 mmol/L 26.0   BUN mg/dL 26*   CREATININE mg/dL 0.76   GLUCOSE mg/dL 179*   CALCIUM mg/dL 9.1       Assessment:  POD 3 s/p subxiphoid pericardial window    CAD S/P CABG x 4, MAZE, LAAL 10/28/2024  Large pericardial effusion s/p subxiphoid pericardial window 12/3/2024  Syncope  Obstructive sleep apnea with noncompliance  Diabetes mellitus    Plan:  Diuresis with Bumex 2 Mg IV  Wean oxygen supplementation as tolerated  Pulmonary toilet  Ambulate  Add Mucinex  Continue colchicine  ASA, statin, BB        Natasha Adams PA-C  12/09/24  07:17 EST          Electronically signed by Natasha Adams PA-C at 12/09/24 0800       Derrick Davison MD at 12/08/24 1134            Intensive Care Follow-up     Hospital:  LOS: 2 days   Mr. Joel Galo, 59 y.o. male is followed for:   Pericardial effusion after operative procedure   Postoperative hemodynamic, electrolyte and respiratory management  Subjective       History of present illness:    59 y.o. male who underwent recent CABG x 4, LAAL, & MAZE procedure on 10/28/24 w/ Dr. Max.   Eventually discharged on 11/06/2024.  Patient presented to [facility] with dizziness, near syncope and orthostatic hypotension.  Echocardiogram showed preserved ejection fraction with mild concentric hypertrophy and a large (greater than 2 cm) pericardial effusion adjacent to the left ventricle.  He was subsequently transferred to [facility] by Dr. Max for plans for surgical intervention.  Patient is postop day #0 status post planned pericardial window w/ Dr. Max.  EBL < 25 ml with  "\"330 mL of serous fluid with sediment was evacuated from the mediastinum.\"     Postoperatively the patient was brought to the ICU for further management monitoring.     Subjective   Interval History:  Chest tube has been discontinued.  Patient is getting up and walking around up to 200 feet.  Denies any other acute issues.                 The patient's past medical, surgical and social history were reviewed and updated in Epic as appropriate.    Objective   Objective     Infusions:  niCARdipine, 5-15 mg/hr, Last Rate: 2.5 mg/hr (12/06/24 2103)  nitroglycerin, 5-200 mcg/min      Medications:  aspirin, 325 mg, Oral, Daily  buPROPion XL, 300 mg, Oral, Daily  cholecalciferol, 400 Units, Oral, Daily  colchicine, 0.6 mg, Oral, Q12H  DULoxetine, 60 mg, Oral, Daily  [Held by provider] empagliflozin, 25 mg, Oral, Daily  finasteride, 5 mg, Oral, Daily  gabapentin, 200 mg, Oral, Q AM  gabapentin, 300 mg, Oral, Nightly  [Held by provider] glipizide, 5 mg, Oral, QAM AC  insulin glargine, 20 Units, Subcutaneous, Daily  insulin lispro, 2-9 Units, Subcutaneous, 4x Daily AC & at Bedtime  levothyroxine, 50 mcg, Oral, Q AM  polyethylene glycol, 17 g, Oral, Daily  rosuvastatin, 20 mg, Oral, Daily  senna-docusate sodium, 2 tablet, Oral, Nightly  sotalol, 120 mg, Oral, BID  tamsulosin, 0.4 mg, Oral, Daily        Vital Sign Min/Max for last 24 hours  Temp  Min: 97.6 °F (36.4 °C)  Max: 98.4 °F (36.9 °C)   BP  Min: 96/65  Max: 137/89   Pulse  Min: 79  Max: 94   Resp  Min: 16  Max: 20   SpO2  Min: 92 %  Max: 99 %   Flow (L/min) (Oxygen Therapy)  Min: 3  Max: 4       Input/Output for last 24 hour shift  12/07 0701 - 12/08 0700  In: 1320 [P.O.:1320]  Out: 1430 [Urine:1300]      Objective:  Vital signs: (most recent): Blood pressure 121/69, pulse 87, temperature 97.9 °F (36.6 °C), temperature source Oral, resp. rate 18, height 176.8 cm (69.61\"), weight 94.3 kg (207 lb 14.4 oz), SpO2 95%.            General Appearance: Awake, alert, in no acute " distress  Lungs:   B/L Breath sounds present with decreased breath sounds on bases, no wheezing heard, no crackles.  Heart: S1 and S2 present, no murmur  Abdomen: Soft, nontender, no guarding or rigidity, bowel sounds positive.  Extremities:  no edema, warm to touch.  Neurologic:  Moving all four extremities.   Psychological: Normal affect, Cooperative      Results from last 7 days   Lab Units 12/08/24  0428 12/07/24  0438 12/06/24  0418   WBC 10*3/mm3 11.97* 7.88 7.64   HEMOGLOBIN g/dL 11.1* 10.8* 11.2*   PLATELETS 10*3/mm3 312 323 297     Results from last 7 days   Lab Units 12/08/24  0428 12/07/24  0430 12/06/24  0110 12/05/24  0455 12/04/24  1852   SODIUM mmol/L 136 138 134*   < > 137   POTASSIUM mmol/L 4.5 5.2 4.3   < > 4.2   CO2 mmol/L 24.0 24.0 21.8*   < > 27.1   BUN mg/dL 27* 25* 21   < > 25*   CREATININE mg/dL 0.82 0.73* 0.77   < > 1.17   MAGNESIUM mg/dL 2.2 2.2  --   --  2.6*   PHOSPHORUS mg/dL 2.7 4.0  --   --   --    GLUCOSE mg/dL 167* 161* 148*   < > 251*    < > = values in this interval not displayed.     Estimated Creatinine Clearance: 108.4 mL/min (by C-G formula based on SCr of 0.82 mg/dL).          Images:   None new    I reviewed the patient's results and images.     Assessment & Plan   Impression        Pericardial effusion after CABG x 4, MAZE, LAAL 10/28/24.  Readmit for Pericardial window 12/6/24       Plan        1.  Patient is postpericardiotomy and overall pericardial effusion post coronary bypass graft surgery.  CT surgery following postop course closely. Chest tube has been discontinued.    2.  Judicious pain control..  3.  Started on colchicine for pericarditis.  Continue same.  4.  Continue working with incentive spirometry and pulmonary toilet.  Wean FiO2 as tolerated to keep saturation 90% or better.  5.  Mild oozing noted from the previous CABG incision site.  No evidence of cellulitis for now.  Keep close eye on the incision.  Received perioperative antibiotics as well.  6.   Continue sliding scale insulin coverage for hyperglycemia management.  7.  Continue levothyroxine with history of hypothyroidism.  8.  Continue aspirin, statin.  Receiving sotalol for A-fib.  Rate is controlled.  QTc acceptable, continue same.  9.  Bowel regimen.  10.  Out of bed and mobilize.    Can transfer out of ICU to telemetry floor if okay with CT surgery team.    Plan of care and goals reviewed with multidisciplinary/antibiotic stewardship team during rounds.   I discussed the patient's findings and my recommendations with patient and nursing staff   Above documentation reviewed and reflect accurate information as of 12/8/2024 including copied elements of the note.     Derrick Davison MD, [facility]  Pulmonary, Critical care and Sleep Medicine         Electronically signed by Derrick Davison MD at 12/08/24 1136       Tomer Philip MD at 12/08/24 0207          CTS Progress Note       LOS: 2 days   Patient Care Team:  Edmundo Boston MD as PCP - General (Family Medicine)  Julio Neff MD as Consulting Physician (Cardiology)  Blossom Parks APRN as Nurse Practitioner (Cardiology)    Chief Complaint: Pericardial effusion after operative procedure    Vital Signs:  Temp:  [97.7 °F (36.5 °C)-98.4 °F (36.9 °C)] 98.2 °F (36.8 °C)  Heart Rate:  [75-87] 82  Resp:  [16-20] 16  BP: ()/(47-95) 113/72  Arterial Line BP: (104-134)/(48-69) 110/61    Physical Exam: Alert conversant breathing unlabored       Results:     Results from last 7 days   Lab Units 12/07/24  0438   WBC 10*3/mm3 7.88   HEMOGLOBIN g/dL 10.8*   HEMATOCRIT % 33.9*   PLATELETS 10*3/mm3 323     Results from last 7 days   Lab Units 12/07/24  0430   SODIUM mmol/L 138   POTASSIUM mmol/L 5.2   CHLORIDE mmol/L 102   CO2 mmol/L 24.0   BUN mg/dL 25*   CREATININE mg/dL 0.73*   GLUCOSE mg/dL 161*   CALCIUM mg/dL 8.6           Imaging Results (Last 24 Hours)       Procedure Component Value Units Date/Time    XR Chest 1 View [[id number]]  "Collected: 12/07/24 0829     Updated: 12/07/24 0833    Narrative:      XR CHEST 1 VW    Date of Exam: 12/7/2024 2:11 AM EST    Indication: postop    Comparison: 1 day prior.    Findings:  Stable low lung volumes with left greater than right basilar atelectasis. No enlarging effusion or distinct thorax. Unchanged heart and mediastinal contours.      Impression:      Impression:  No significant interval change.      Electronically Signed: Gary Escobar MD    12/7/2024 8:30 AM EST    Workstation ID: [id number]            Assessment      Pericardial effusion after CABG x 4, MAZE, LAAL 10/28/24.  Readmit for Pericardial window 12/6/24        Plan   Discontinue chest tube    Please note that portions of this note were completed with a voice recognition program. Efforts were made to edit the dictations, but occasionally words are mistranscribed.    Tomer Philip MD  12/08/24  02:07 EST        Electronically signed by Tomer Philip MD at 12/08/24 0208       Derrick Davison MD at 12/07/24 1041            Intensive Care Follow-up     Hospital:  LOS: 1 day   Mr. Joel Galo, 59 y.o. male is followed for:   Pericardial effusion after operative procedure   Postoperative hemodynamic, electrolyte and respiratory management  Subjective       History of present illness:    59 y.o. male who underwent recent CABG x 4, LAAL, & MAZE procedure on 10/28/24 w/ Dr. Max.   Eventually discharged on 11/06/2024.  Patient presented to [facility] with dizziness, near syncope and orthostatic hypotension.  Echocardiogram showed preserved ejection fraction with mild concentric hypertrophy and a large (greater than 2 cm) pericardial effusion adjacent to the left ventricle.  He was subsequently transferred to [facility] by Dr. Max for plans for surgical intervention.  Patient is postop day #0 status post planned pericardial window w/ Dr. Max.  EBL < 25 ml with \"330 mL of serous fluid with sediment was " "evacuated from the mediastinum.\"     Postoperatively the patient was brought to the ICU for further management monitoring.     Subjective   Interval History:  Patient complains of chest discomfort at the chest tube sites.  Serous output noted from the chest tubes.  Nicardipine has been turned off.  Blood pressure is acceptable started on oral medications.                 The patient's past medical, surgical and social history were reviewed and updated in Epic as appropriate.    Objective   Objective     Infusions:  niCARdipine, 5-15 mg/hr, Last Rate: 2.5 mg/hr (12/06/24 2103)  nitroglycerin, 5-200 mcg/min      Medications:  aspirin, 325 mg, Oral, Daily  buPROPion XL, 300 mg, Oral, Daily  cholecalciferol, 400 Units, Oral, Daily  colchicine, 0.6 mg, Oral, Q12H  DULoxetine, 60 mg, Oral, Daily  [Held by provider] empagliflozin, 25 mg, Oral, Daily  finasteride, 5 mg, Oral, Daily  gabapentin, 200 mg, Oral, Q AM  gabapentin, 300 mg, Oral, Nightly  [Held by provider] glipizide, 5 mg, Oral, QAM AC  insulin glargine, 20 Units, Subcutaneous, Daily  insulin lispro, 2-9 Units, Subcutaneous, 4x Daily AC & at Bedtime  levothyroxine, 50 mcg, Oral, Q AM  polyethylene glycol, 17 g, Oral, Daily  rosuvastatin, 20 mg, Oral, Daily  senna-docusate sodium, 2 tablet, Oral, Nightly  sotalol, 120 mg, Oral, BID  tamsulosin, 0.4 mg, Oral, Daily        Vital Sign Min/Max for last 24 hours  Temp  Min: 97.2 °F (36.2 °C)  Max: 98.2 °F (36.8 °C)   BP  Min: 96/67  Max: 169/99   Pulse  Min: 75  Max: 89   Resp  Min: 12  Max: 20   SpO2  Min: 86 %  Max: 100 %   Flow (L/min) (Oxygen Therapy)  Min: 2  Max: 6       Input/Output for last 24 hour shift  12/06 0701 - 12/07 0700  In: 1271 [I.V.:1171]  Out: 1045 [Urine:950]   S RR:  [6-14] 6  Objective:  Vital signs: (most recent): Blood pressure 112/71, pulse 84, temperature 97.7 °F (36.5 °C), temperature source Axillary, resp. rate 18, height 176.8 cm (69.61\"), weight 94.3 kg (207 lb 14.4 oz), SpO2 98%.  "           General Appearance: Awake, alert, in no acute distress  Lungs:   B/L Breath sounds present with decreased breath sounds on bases, no wheezing heard, no crackles.  Chest tubes in place and draining serous fluid  Heart: S1 and S2 present, no murmur  Abdomen: Soft, nontender, no guarding or rigidity, bowel sounds positive.  Extremities:  no edema, warm to touch.  Neurologic:  Moving all four extremities. Good strength bilaterally. \  Psychological: Normal affect, Cooperative      Results from last 7 days   Lab Units 12/07/24  0438 12/06/24  0418 12/05/24  0455   WBC 10*3/mm3 7.88 7.64 7.70   HEMOGLOBIN g/dL 10.8* 11.2* 11.7*   PLATELETS 10*3/mm3 323 297 318     Results from last 7 days   Lab Units 12/07/24  0430 12/06/24  0110 12/05/24  0455 12/04/24  1852   SODIUM mmol/L 138 134* 137 137   POTASSIUM mmol/L 5.2 4.3 3.9 4.2   CO2 mmol/L 24.0 21.8* 23.3 27.1   BUN mg/dL 25* 21 26* 25*   CREATININE mg/dL 0.73* 0.77 0.87 1.17   MAGNESIUM mg/dL 2.2  --   --  2.6*   PHOSPHORUS mg/dL 4.0  --   --   --    GLUCOSE mg/dL 161* 148* 102* 251*     Estimated Creatinine Clearance: 121.7 mL/min (A) (by C-G formula based on SCr of 0.73 mg/dL (L)).          Images:   Chest x-ray done today morning reviewed and showed for respiratory effort.  Chest tubes in place.  Small pleural effusion.  No obvious pneumothorax.    I reviewed the patient's results and images.     Assessment & Plan   Impression        Pericardial effusion after CABG x 4, MAZE, LAAL 10/28/24.  Readmit for Pericardial window 12/6/24       Plan        1.  Patient is postpericardiotomy and overall pericardial effusion post coronary bypass graft surgery.  CT surgery following postop course closely.  Chest tube management per them.  2.  Postoperative pain control.  Change Lortab to every 4 hours so that we can avoid IV narcotics.  3.  Started on colchicine for pericarditis.  Continue same.  4.  Work with incentive spirometry and pulmonary toilet.  Wean FiO2 as  tolerated to keep saturation of 90% or better.  5.  Mild oozing noted from the previous CABG incision site.  No evidence of cellulitis for now.  Keep close eye on the incision.  Received perioperative antibiotics as well.  6.  Continue sliding scale insulin coverage for hyperglycemia management.  7.  Continue levothyroxine history of hypothyroidism.  8.  Continue aspirin, statin.  Receiving sotalol for A-fib.  Rate is controlled.  QTc acceptable, continue same.  9.  Bowel regimen.  10.  Out of bed and mobilize.    Continue close monitoring.  Check labs in the morning    Plan of care and goals reviewed with multidisciplinary/antibiotic stewardship team during rounds.   I discussed the patient's findings and my recommendations with patient and nursing staff     Derrick Davison MD, [facility]  Pulmonary, Critical care and Sleep Medicine         Electronically signed by Derrick Davison MD at 12/07/24 1047       Tomre Philip MD at 12/07/24 0737          [facility] Progress Note       LOS: 1 day   Patient Care Team:  Edmundo Boston MD as PCP - General (Family Medicine)  Julio Neff MD as Consulting Physician (Cardiology)  Blossom Parks APRN as Nurse Practitioner (Cardiology)    Chief Complaint: Pericardial effusion after operative procedure    Vital Signs:  Temp:  [97.2 °F (36.2 °C)-98.2 °F (36.8 °C)] 98.1 °F (36.7 °C)  Heart Rate:  [75-89] 80  Resp:  [12-20] 16  BP: ()/() 121/65  Arterial Line BP: (-1-164)/(-1-72) 108/53    Physical Exam: Up in chair no distress breathing unlabored       Results:     Results from last 7 days   Lab Units 12/07/24  0438   WBC 10*3/mm3 7.88   HEMOGLOBIN g/dL 10.8*   HEMATOCRIT % 33.9*   PLATELETS 10*3/mm3 323     Results from last 7 days   Lab Units 12/07/24  0430   SODIUM mmol/L 138   POTASSIUM mmol/L 5.2   CHLORIDE mmol/L 102   CO2 mmol/L 24.0   BUN mg/dL 25*   CREATININE mg/dL 0.73*   GLUCOSE mg/dL 161*   CALCIUM mg/dL 8.6           Imaging Results (Last 24  Hours)       Procedure Component Value Units Date/Time    XR Chest 1 View [[id number]] Resulted: 12/07/24 0211     Updated: 12/07/24 0309    XR Chest 1 View [[id number]] Collected: 12/06/24 1957     Updated: 12/06/24 2002    Narrative:      XR CHEST 1 VW    Date of Exam: 12/6/2024 7:30 PM EST    Indication: postop    Comparison: 12/4/2024 [facility] chest radiograph    Findings:  Sternotomy wires, left atrial appendage exclusion clip, and implanted loop recorder are again noted. Lung volumes are low and there appears to be mild new left basilar discoid atelectasis as a result. The heart is enlarged. There is some crowding the   pulmonary vascular markings, but little if any evidence to suggest significant pulmonary vascular congestion. No pneumothorax is seen.      Impression:      Impression:  Low lung volumes and mild new left basilar discoid atelectasis. No other evidence of active chest disease.        Electronically Signed: Sushil Parsons MD    12/6/2024 7:59 PM EST    Workstation ID: [id number]            Assessment      Pericardial effusion after CABG x 4, MAZE, LAAL 10/28/24.  Readmit for Pericardial window 12/6/24    Patient apparently doing much better following urgent pericardial window last night by Dr. Max    Plan   Continuing supportive care in the ICU    Please note that portions of this note were completed with a voice recognition program. Efforts were made to edit the dictations, but occasionally words are mistranscribed.    Tomer Philip MD  12/07/24  07:27 EST        Electronically signed by Tomer Philip MD at 12/07/24 0728       Truong Parnell MD at 12/06/24 2410            Intensive Care Admission Note     Pericardial effusion after operative procedure    History of Present Illness     Joel Galo is a 59 y.o. male who underwent recent CABG x 4, LAAL, & MAZE procedure on 10/28/24 w/ Dr. Max.   Eventually discharged on 11/06/2024.  Patient presented to [facility]  "with dizziness, near syncope and orthostatic hypotension.  Echocardiogram showed preserved ejection fraction with mild concentric hypertrophy and a large (greater than 2 cm) pericardial effusion adjacent to the left ventricle.  He was subsequently transferred to [facility] by Dr. Max for plans for surgical intervention.  Patient is postop day #0 status post planned pericardial window w/ Dr. Max.  EBL < 25 ml with \"330 mL of serous fluid with sediment was evacuated from the mediastinum.\"    Postoperatively the patient was brought to the ICU for further management monitoring.  We encounter the patient post-operatively in the ICU.  The patient is denying shortness of breath at rest.  He endorses some minor surgical discomfort.  He is on 6 L/min by nasal cannula.    Denies nausea, vomiting, abdominal pain.  Denies palpitations.    Problem List, Surgical History, Family, Social History, and ROS     Past Medical History:   Diagnosis Date    A-fib     Anxiety     Arthritis     DDD (degenerative disc disease), lumbar     Deep vein thrombosis     Depression     Diabetes     Disease of thyroid gland     HYPO    Heart attack     \"5-6 years ago\"    Hyperlipidemia     Hypertension     Kidney stone     Neuropathy     Sleep apnea     DOES NOT USE CPAP    UTI (urinary tract infection)       Past Surgical History:   Procedure Laterality Date    ATRIAL APPENDAGE EXCLUSION LEFT WITH TRANSESOPHAGEAL ECHOCARDIOGRAM N/A 10/28/2024    Procedure: ATRIAL APPENDAGE EXCLUSION LEFT WITH TRANSESOPHAGEAL ECHOCARDIOGRAM;  Surgeon: Alex Max MD;  [facility];  Service: Cardiothoracic;  Laterality: N/A;    CARDIAC CATHETERIZATION N/A 08/28/2024    Procedure: Left Heart Cath;  Surgeon: Julio Neff MD;  Location: [facility] INVASIVE LOCATION;  Service: Cardiology;  Laterality: N/A;    CARDIAC ELECTROPHYSIOLOGY PROCEDURE N/A 04/05/2023    Procedure: Loop insertion;  Surgeon: Tariq Chávez MD;  Location: [facility]" CATH INVASIVE LOCATION;  Service: Cardiovascular;  Laterality: N/A;    CATARACT EXTRACTION Bilateral     COLONOSCOPY      CORONARY ARTERY BYPASS GRAFT N/A 10/28/2024    Procedure: MEDIAN STERNOTOMY, CORONARY ARTERY BYPASS GRAFTING X4 UTILIZING THE LEFT INTERNAL MAMMARY ARTERY GRAFT, ENDOSCOPIC VEIN HARVEST OF THE GREATER RIGHT SAPHENOUS VEIN;  Surgeon: Alex Max MD;  Location:  [facility];  Service: Cardiothoracic;  Laterality: N/A;    ENDOSCOPY      MAZE PROCEDURE N/A 10/28/2024    Procedure: MAZE PROCEDURE;  Surgeon: Alex Max MD;  Location:  [facility];  Service: Cardiothoracic;  Laterality: N/A;    TEETH EXTRACTION         No Known Allergies  Current Facility-Administered Medications on File Prior to Encounter   Medication    Chlorhexidine Gluconate Cloth 2 % pads 1 Application    [DISCONTINUED] aspirin EC tablet 325 mg    [DISCONTINUED] bisacodyl (DULCOLAX) EC tablet 5 mg    [DISCONTINUED] bisacodyl (DULCOLAX) suppository 10 mg    [DISCONTINUED] buPROPion XL (WELLBUTRIN XL) 24 hr tablet 150 mg    [DISCONTINUED] cholecalciferol (VITAMIN D3) tablet 400 Units    [DISCONTINUED] dextrose (D50W) (25 g/50 mL) IV injection 25 g    [DISCONTINUED] dextrose (GLUTOSE) oral gel 15 g    [DISCONTINUED] docusate sodium (COLACE) capsule 100 mg    [DISCONTINUED] DULoxetine (CYMBALTA) DR capsule 30 mg    [DISCONTINUED] finasteride (PROSCAR) tablet 5 mg    [DISCONTINUED] gabapentin (NEURONTIN) capsule 200 mg    [DISCONTINUED] gabapentin (NEURONTIN) capsule 300 mg    [DISCONTINUED] glucagon HCl (Diagnostic) injection 1 mg    [DISCONTINUED] heparin (porcine) 5000 UNIT/ML injection 5,000 Units    [DISCONTINUED] HYDROcodone-acetaminophen (NORCO)  MG per tablet 0.5 tablet    [DISCONTINUED] insulin glargine (LANTUS, SEMGLEE) injection 20 Units    [DISCONTINUED] Insulin Lispro (humaLOG) injection 2 Units    [DISCONTINUED] Insulin Lispro (humaLOG) injection 2-9 Units    [DISCONTINUED] lactulose (CHRONULAC) 10 GM/15ML  solution 30 g    [DISCONTINUED] levothyroxine (SYNTHROID, LEVOTHROID) tablet 50 mcg    [DISCONTINUED] midodrine (PROAMATINE) tablet 5 mg    [DISCONTINUED] nitroglycerin (NITROSTAT) SL tablet 0.4 mg    [DISCONTINUED] polyethylene glycol (MIRALAX) packet 17 g    [DISCONTINUED] rosuvastatin (CRESTOR) tablet 20 mg    [DISCONTINUED] sennosides-docusate (PERICOLACE) 8.6-50 MG per tablet 2 tablet    [DISCONTINUED] sodium chloride 0.9 % flush 10 mL    [DISCONTINUED] sodium chloride 0.9 % flush 10 mL    [DISCONTINUED] sodium chloride 0.9 % flush 10 mL    [DISCONTINUED] sodium chloride 0.9 % infusion 40 mL    [DISCONTINUED] sotalol (BETAPACE) tablet 120 mg    [DISCONTINUED] tamsulosin (FLOMAX) 24 hr capsule 0.4 mg     Current Outpatient Medications on File Prior to Encounter   Medication Sig    aspirin 325 MG EC tablet Take 1 tablet by mouth Daily.    buPROPion XL (WELLBUTRIN XL) 300 MG 24 hr tablet Take 1 tablet by mouth Daily.    docusate sodium (COLACE) 100 MG capsule Take 1 capsule by mouth 2 (Two) Times a Day As Needed for Constipation.    DULoxetine (CYMBALTA) 60 MG capsule Take 1 capsule by mouth Daily.    empagliflozin (JARDIANCE) 25 MG tablet tablet Take 1 tablet by mouth Daily.    finasteride (PROSCAR) 5 MG tablet TAKE 1 TABLET EVERY DAY    gabapentin (NEURONTIN) 100 MG capsule Take 2 capsules by mouth Every Morning.    gabapentin (NEURONTIN) 100 MG capsule Take 3 capsules by mouth Every Night.    glimepiride (AMARYL) 2 MG tablet Take 2 tablets by mouth 2 (Two) Times a Day.    HYDROcodone-acetaminophen (NORCO)  MG per tablet Take 1 tablet by mouth Every 8 (Eight) Hours As Needed for Moderate Pain.    insulin glargine (LANTUS, SEMGLEE) 100 UNIT/ML injection Inject 20 Units under the skin into the appropriate area as directed Daily.    Insulin Lispro (humaLOG) 100 UNIT/ML injection Inject 2-9 Units under the skin into the appropriate area as directed 4 (Four) Times a Day Before Meals & at Bedtime.    Insulin  "Lispro (humaLOG) 100 UNIT/ML injection Inject 2 Units under the skin into the appropriate area as directed 3 (Three) Times a Day With Meals.    lactulose (CHRONULAC) 10 GM/15ML solution Take 45 mL by mouth 2 (Two) Times a Day As Needed.    levothyroxine (SYNTHROID, LEVOTHROID) 50 MCG tablet Take 1 tablet by mouth Every Morning.    nitroglycerin (NITROSTAT) 0.4 MG SL tablet Place 1 tablet under the tongue Every 5 (Five) Minutes As Needed for Chest Pain. Take no more than 3 doses in 15 minutes.    rosuvastatin (CRESTOR) 20 MG tablet Take 1 tablet by mouth Daily.    sotalol (BETAPACE) 120 MG tablet Take 1 tablet by mouth 2 (Two) Times a Day.    tamsulosin (FLOMAX) 0.4 MG capsule 24 hr capsule TAKE 1 CAPSULE EVERY DAY    Vitamin D, Cholecalciferol, (CHOLECALCIFEROL) 10 MCG (400 UNIT) tablet Take 1 tablet by mouth Daily.    [DISCONTINUED] furosemide (LASIX) 20 MG tablet Take 1 tablet by mouth Daily.    [DISCONTINUED] sacubitril-valsartan (Entresto) 24-26 MG tablet Take 1 tablet by mouth 2 (Two) Times a Day.     MEDICATION LIST AND ALLERGIES REVIEWED.    Family History   Problem Relation Age of Onset    Heart disease Mother     Coronary artery disease Father     Diabetes Father     Kidney disease Father      Social History     Tobacco Use    Smoking status: Never     Passive exposure: Never    Smokeless tobacco: Never   Vaping Use    Vaping status: Never Used   Substance Use Topics    Alcohol use: No    Drug use: No     Social History     Social History Narrative    Lives in [place], [place] with spouse      FAMILY AND SOCIAL HISTORY REVIEWED.      Physical Exam and Clinical Information   /54   Pulse 84   Temp 97.4 °F (36.3 °C)   Resp 18   Ht 176.8 cm (69.61\")   Wt 94.3 kg (207 lb 14.4 oz)   SpO2 93%   BMI 30.17 kg/m²   Physical Exam  General: Laying in bed in room 260 in no general or respiratory distress; 6 L/min by nasal cannula in place  Skin: No rash, erythema, cyanosis, jaundice, pallor, breakdown noted on " "visualized aspects of the skin  Eyes: EOMI, PERRLA; eyes track appropriately; sclera anicteric  ENT: Mucous membranes are moist; oropharynx is crowded but clear without erythema, exudates or thrush  Pulmonary: Shallow effort effort with air entry better on the right than the left; decreased breath sounds in bilateral bases more so on the left than the right; no wheezing, rales, rhonchi appreciated; no significant coarse breath sounds; chest drains with serosanguineous output  Cardiac: Regular rate and rhythm; normal S1 and S2; no murmur, S3, S4 appreciated  Abdomen: Obese, soft, nontender, nondistended; bowel sounds are present x 4; there are no masses or hepatosplenomegaly appreciated  Genitourinary: No suprapubic tenderness to palpation flank or pain to percussion  Musculoskeletal: Extremities are warm and dry; there is no clubbing, cyanosis; 1+ bilateral lower extremity pitting edema; pedal pulses are palpable bilaterally at 1+  Neurologic: Cranial nerves are grossly intact; sensation is grossly intact; strength is grossly intact; range of motion is grossly intact  Hematology/oncology: No evidence of extensive bruising or active bleeding  Results from last 7 days   Lab Units 12/06/24  0418 12/05/24  0455 12/04/24  1852   WBC 10*3/mm3 7.64 7.70 8.21   HEMOGLOBIN g/dL 11.2* 11.7* 11.6*   PLATELETS 10*3/mm3 297 318 393     Results from last 7 days   Lab Units 12/06/24  0110 12/05/24  0455 12/04/24  1852   SODIUM mmol/L 134* 137 137   POTASSIUM mmol/L 4.3 3.9 4.2   CO2 mmol/L 21.8* 23.3 27.1   BUN mg/dL 21 26* 25*   CREATININE mg/dL 0.77 0.87 1.17   MAGNESIUM mg/dL  --   --  2.6*   GLUCOSE mg/dL 148* 102* 251*     Estimated Creatinine Clearance: 115.4 mL/min (by C-G formula based on SCr of 0.77 mg/dL).          No results found for: \"LACTATE\"     Images:     I reviewed the patient's results and images.     Impression     Medical Problems       Hospital Problem List       * (Principal) Pericardial effusion after CABG " x 4, LAURY CLINE 10/28/24.  Readmit for Pericardial window 12/6/24        Plan/Recommendations   Assessment And Plan:    1: Pulmonary: Atelectasis; history of obstructive sleep apnea with noncompliance  -Patient denies prior pulmonary history  -O2 as needed  -Early ambulation, pain control, aggressive incentive spirometry use to decrease the risk of atelectasis, fever, pneumonia  -If any acute respiratory issues arise may require noninvasive ventilation but at present patient is doing well    2: Cardiovascular: Coronary artery disease status post CABG; history of A-fib; history of hypertension; postop day #0 status post pericardial window for concerns for tamponade physiology postoperatively  -At present patient is hemodynamically stable  -Continue outpatient regimen  -Plan per surgery    3: Infectious disease:  -Patient is afebrile and white blood cell count is within normal limits  -Pericardial fluid has been sent for cultures  -The patient is receiving perioperative cefazolin per protocol    4: Renal/genitourinary/acid-base:  -BUN, creatinine, anion gap within normal limits  -Monitor I's and O's and labs closely    5: Gastrointestinal:  -No acute issues  -Advance diet as per surgery    6: Hematology/oncology: Anemia; listed history of DVT  -Hemoglobin is slightly low but near usual baseline  -Platelets within normal limits  -Does not appear to be on recent anticoagulation    7: Endocrine: History of diabetes mellitus; history of hypothyroidism  -Diabetes mellitus: Hold outpatient regimen; now we will simply monitor POC glucose and supplement with sliding scale insulin if needed  -Thyroid disease: Continue Synthroid  -Systemic steroids: Not taking    8: Neuropsychiatric: History of anxiety disorder and depression  -Continue outpatient regimen  -Optimize comfort control without overstating the patient compromising hemodynamic status    9: Fluids/electrolytes/nutrition:  -Advance p.o. as per surgery  -Electrolytes  are in reasonable range; monitor  -He is knows goal for the next 24 hours = equal    10: Prophylactic measures:  -PUD = N/A  -DVT = SCDs; hold anticoagulants in the perioperative timeframe until cleared by surgery        Time spent 30 (exclusive of procedure time)  including high complexity decision making to assess, manipulate, and support vital organ system failure in this individual who has impairment of one or more vital organ systems such that there is a high probability of imminent or life threatening deterioration in the patient’s condition.      Truong Parnell MD  12/06/24 21:10 EST     CC: Edmundo Boston MD                Electronically signed by Truong Parnell MD at 12/07/24 0800       Consult Notes (last 72 hours)  Notes from 12/06/24 1331 through 12/09/24 1331   No notes of this type exist for this encounter.

## 2024-12-09 NOTE — PROGRESS NOTES
"          Clinical Nutrition Assessment     Patient Name: Joel Galo  YOB: 1965  MRN: [id number]  Date of Encounter: 12/09/24 08:04 EST  Admission date: 12/6/2024  Reason for Visit: MDR, MST score 2+, Unintentional weight loss, Reduced oral intake    Assessment   Nutrition Assessment   Admission Diagnosis:  Pericardial effusion after operative procedure [I97.89, I31.39]    Problem List:    Pericardial effusion after CABG x 4, MAZE, LAAL 10/28/24.  Readmit for Pericardial window 12/6/24      PMH:   He  has a past medical history of A-fib, Anxiety, Arthritis, DDD (degenerative disc disease), lumbar, Deep vein thrombosis, Depression, Diabetes, Disease of thyroid gland, Heart attack, Hyperlipidemia, Hypertension, Kidney stone, Neuropathy, Sleep apnea, and UTI (urinary tract infection).    PSH:  He  has a past surgical history that includes Esophagogastroduodenoscopy; Colonoscopy; Cardiac electrophysiology procedure (N/A, 04/05/2023); Cataract extraction (Bilateral); Cardiac catheterization (N/A, 08/28/2024); Teeth Extraction; Coronary artery bypass graft (N/A, 10/28/2024); Atrial Appendage Exclusion Left (N/A, 10/28/2024); Maze Procedure (N/A, 10/28/2024); and Pericardial window (N/A, 12/6/2024).    Applicable Nutrition History:   (12/6) s/p pericardial window     Anthropometrics     Height: Height: 176.8 cm (69.61\")  Last Filed Weight: Weight: 94.3 kg (207 lb 14.4 oz) (12/06/24 1255)  Method: Weight Method: Bed scale  BMI: BMI (Calculated): 30.2    UBW:     Weight      Weight (kg) Weight (lbs) Weight Method   7/30/2024 106.505 kg  234 lb 12.8 oz     8/16/2024 106.051 kg  233 lb 12.8 oz     8/28/2024 105.235 kg  232 lb  Stated    9/3/2024 106.414 kg  234 lb 9.6 oz     9/24/2024 103.964 kg  229 lb 3.2 oz     10/25/2024 104.7 kg  230 lb 13.2 oz  Standing scale    11/1/2024 104.327 kg  230 lb     11/6/2024 104.962 kg  231 lb 6.4 oz  Bed scale    11/20/2024 97.977 kg  216 lb     12/3/2024 97.977 kg  216 lb   "   12/4/2024 97.523 kg  215 lb  Stated    12/5/2024 95.165 kg  209 lb 12.8 oz  Bed scale    12/6/2024 94.303 kg  207 lb 14.4 oz  Bed scale      Weight change: weight loss of 24 lbs (10.4%) over 1 month(s)    Significant?  Yes    Nutrition Focused Physical Exam    Date:  12/9       Patient meets criteria for malnutrition diagnosis, see MSA note.     Subjective   Reported/Observed/Food/Nutrition Related History:     Pt up in chair at time of visit, able to provide weight/nutrition hx. Pt s/p CABG at the end of October however reports long standing post-op anorexia. Has hx of distaste for hospital food and does not like regular Boost. Denies dysphagia or N/V. No BM at this time - reports starting bowel regimen. NKFA     Current Nutrition Prescription   PO: Diet: Cardiac, Diabetic; Healthy Heart (2-3 Na+); Consistent Carbohydrate; Fluid Consistency: Thin (IDDSI 0)  Oral Nutrition Supplement: N/A  Intake:  25% x last 3 meals documented    Assessment & Plan   Nutrition Diagnosis   Date:  12/9 Updated:  Problem Malnutrition, acute severe   Etiology Energy in < energy out in presence of post op anorexia   Signs/Symptoms </=50% of EEN x >/=5d, significant weight loss of 10.4% x 1 month, moderate muscle wasting, and mild subcutaneous fat loss   Status: New    Goal:   Nutrition to support treatment and Increase intake      Nutrition Intervention      Follow treatment progress, Care plan reviewed, Encourage intake, Supplement provided    Encouraged PO intake on current diet as tolerated  Provide Boost Breeze 2x daily  May d/c if dislikes  Educated on menus/availability    Monitoring/Evaluation:   Per protocol, PO intake, Supplement intake, Pertinent labs    Erin Hull, MS,RD,LD  Time Spent: 25min

## 2024-12-09 NOTE — PROGRESS NOTES
Cardiothoracic Surgery Progress Note      POD # 3 s/p Subxiphoid pericardial window     LOS: 3 days      Subjective:  No acute overnight events. Patient reports feeling overall improved. Denies fever, chest pain, shortness of breath. He reports mild productive cough after speaking.     Objective:  Vital Signs  Temp:  [97.7 °F (36.5 °C)-98.7 °F (37.1 °C)] 97.7 °F (36.5 °C)  Heart Rate:  [80-94] 80  Resp:  [18-20] 20  BP: ()/() 90/64    Physical Exam:   General Appearance: alert, appears stated age and cooperative   Lungs: clear to auscultation, respirations regular, respirations even, and respirations unlabored, SpO2 97% on 4L O2 via NC. Intermittent cough.    Heart: regular rhythm & normal rate, normal S1, S2, no murmur, no gallop, no rub, and no click   Extremities: moves all four, warm, well perfused, no peripheral edema   Skin: Two transverse chest incisions from 12/3 c/d/I, with glue. Median sternotomy incision appears to be healing well. Small area mid chest with scab de-roofed, no erythema or drainage. Right medial lower leg EVH incision with scab de-roofed.     Results:    Results from last 7 days   Lab Units 12/09/24  0306   WBC 10*3/mm3 10.80   HEMOGLOBIN g/dL 11.4*   HEMATOCRIT % 36.8*   PLATELETS 10*3/mm3 271     Results from last 7 days   Lab Units 12/09/24  0306   SODIUM mmol/L 135*   POTASSIUM mmol/L 4.3   CHLORIDE mmol/L 99   CO2 mmol/L 26.0   BUN mg/dL 26*   CREATININE mg/dL 0.76   GLUCOSE mg/dL 179*   CALCIUM mg/dL 9.1       Assessment:  POD 3 s/p subxiphoid pericardial window    CAD S/P CABG x 4, MAZE, LAAL 10/28/2024  Large pericardial effusion s/p subxiphoid pericardial window 12/3/2024  Syncope  Obstructive sleep apnea with noncompliance  Diabetes mellitus    Plan:  Diuresis with Bumex 2 Mg IV  Wean oxygen supplementation as tolerated  Pulmonary toilet  Ambulate  Add Mucinex  Continue colchicine  ASA, statin, BB  Transfer to telemetry    Alex Max MD  12/09/24  07:17  EST

## 2024-12-09 NOTE — PROGRESS NOTES
INTENSIVIST   PROGRESS NOTE     Hospital:  LOS: 3 days     Mr. Joel Galo, 59 y.o. male is followed for a Chief Complaint of: Postoperative medical management      Subjective   S     Interval History:  No acute events overnight.        The patient's relevant past medical, surgical and social history were reviewed and updated in Epic as appropriate.      ROS:   Constitutional: Negative for fever.   Respiratory: Negative for dyspnea.   Cardiovascular: Negative for chest pain.   Gastrointestinal: Negative for  nausea, vomiting and diarrhea.     Objective   O     Vitals:  Temp  Min: 97.7 °F (36.5 °C)  Max: 98.7 °F (37.1 °C)  BP  Min: 83/63  Max: 148/97  Pulse  Min: 80  Max: 87  Resp  Min: 18  Max: 20  SpO2  Min: 91 %  Max: 98 % Flow (L/min) (Oxygen Therapy)  Min: 2  Max: 4    Intake/Ouptut 24 hrs (7:00AM - 6:59 AM)  Intake & Output (last 3 days)         12/06 0701  12/07 0700 12/07 0701  12/08 0700 12/08 0701  12/09 0700 12/09 0701  12/10 0700    P.O.  1320 910     I.V. (mL/kg) 1171 (12.4)       IV Piggyback 100       Total Intake(mL/kg) 1271 (13.5) 1320 (14) 910 (9.7)     Urine (mL/kg/hr) 950 1300 (0.6) 900 (0.4)     Chest Tube 95 130      Total Output 1045 1430 900     Net +226 -110 +10             Urine Unmeasured Occurrence  1 x 2 x               Physical Examination  Telemetry:  Normal sinus rhythm.    Constitutional:  No acute distress.  Sitting up in a chair on 4L nasal cannula.    Eyes: No scleral icterus.   PERRL, EOM intact.    Neck:  Supple, FROM   Cardiovascular: Normal rate, regular and rhythm. Normal heart sounds.  No murmurs, gallop or rub.   Respiratory: No respiratory distress. Normal respiratory effort.  Diminished. No wheezing.    Abdominal:  Soft. No masses. Nontender. No distension. No HSM.   Extremities: No digital cyanosis. No clubbing.  No peripheral edema.   Skin: No rashes, lesions or ulcers   Neurological:   Alert and interactive.              Results from last 7 days   Lab Units  12/09/24  0306 12/08/24 0428 12/07/24  0438   WBC 10*3/mm3 10.80 11.97* 7.88   HEMOGLOBIN g/dL 11.4* 11.1* 10.8*   MCV fL 92.7 91.8 90.9   PLATELETS 10*3/mm3 271 312 323     Results from last 7 days   Lab Units 12/09/24  0306 12/08/24  0428 12/07/24  0430 12/05/24  0455 12/04/24  1852   SODIUM mmol/L 135* 136 138   < > 137   POTASSIUM mmol/L 4.3 4.5 5.2   < > 4.2   CO2 mmol/L 26.0 24.0 24.0   < > 27.1   CREATININE mg/dL 0.76 0.82 0.73*   < > 1.17   GLUCOSE mg/dL 179* 167* 161*   < > 251*   MAGNESIUM mg/dL  --  2.2 2.2  --  2.6*   PHOSPHORUS mg/dL  --  2.7 4.0  --   --     < > = values in this interval not displayed.     Estimated Creatinine Clearance: 116.9 mL/min (by C-G formula based on SCr of 0.76 mg/dL).  Results from last 7 days   Lab Units 12/05/24 0455 12/04/24  1852   ALK PHOS U/L 122* 149*   BILIRUBIN mg/dL 0.3 0.2   ALT (SGPT) U/L 18 22   AST (SGOT) U/L 20 22             Images:  Imaging Results (Last 24 Hours)       ** No results found for the last 24 hours. **               Results: Reviewed.  I reviewed the patient's new laboratory and imaging results.  I independently reviewed the patient's new images.    Medications: Reviewed.    Assessment & Plan   A / P     Mr. Galo is a 58yo M who has a history of CABG x 4, TORSTEN and MAZE procedure by Dr. Max on 10/28/24 who presented to [facility] with dizziness, near syncope and orthostatic hypotension. Echocardiogram was performed and showed a large (> 2cm) pericardial effusion adjacent to the left ventricle.     He was transferred to [facility] and underwent pericardial window by Dr. Max with 330ml of serous fluid with sediment evacuated from the mediastinum.     He has been in the ICU for monitoring.     Nutrition:   Diet: Cardiac, Diabetic; Healthy Heart (2-3 Na+); Consistent Carbohydrate; Fluid Consistency: Thin (IDDSI 0)  Advance Directives:   Code Status and Medical Interventions: CPR (Attempt to Resuscitate); Full Support   Ordered at: 12/06/24 1918     Code  Status (Patient has no pulse and is not breathing):    CPR (Attempt to Resuscitate)     Medical Interventions (Patient has pulse or is breathing):    Full Support       Active Hospital Problems    Diagnosis     **Pericardial effusion after CABG x 4, LAURY CLINE 10/28/24.  Readmit for Pericardial window 12/6/24        Assessment / Plan:    Pain control.   On Colchicine for pericarditis.   Restart Jardiance. Add SSI for glucose management.   Sotalol for Afib.   Bumex x 1 today.   Wean supplemental oxygen as tolerated. He does have O2 at home.   AM labs  Okay to transfer to telemetry when okay with CT Surgery.     High level of risk due to:  severe exacerbation of chronic illness and illness with threat to life or bodily function.    Plan of care and goals reviewed during interdisciplinary rounds.  I discussed the patient's findings and my recommendations with patient and nursing staff      Jo Ann Alba, DO    Intensive Care Medicine and Pulmonary Medicine

## 2024-12-09 NOTE — PLAN OF CARE
Goal Outcome Evaluation:  Plan of Care Reviewed With: patient        Progress: improving  Outcome Evaluation: Pt increased ambulation distance to 220ft with Meme x2 and B UE support on tripod monitor. Progressed to periods of Meme x1. Pt with adequate stability, but demonstrated increased difficulty steering monitor to navigate around obstacles. Required encouragement for continued forward ambulation. Limited by c/o increased fatigue. Continue to recommend PT skilled care and D/C home with assistance and  PT services.

## 2024-12-09 NOTE — THERAPY TREATMENT NOTE
"Patient Name: Joel Galo  : 1965    MRN: [id number]                              Today's Date: 2024       Admit Date: 2024    Visit Dx:     ICD-10-CM ICD-9-CM   1. Pericardial effusion after operative procedure  I97.89 997.1    I31.39 423.9     Patient Active Problem List   Diagnosis    Paroxysmal atrial fibrillation    Obstructive sleep apnea    Hypothyroidism (acquired)    GERD (gastroesophageal reflux disease)    Chronic anticoagulation    Type 2 diabetes mellitus with hyperglycemia, without long-term current use of insulin    Hyperlipidemia LDL goal <70    Primary hypertension    CAD s/p CABG x 4, MAZE, LAAL 10/28/24    Orthostatic hypotension    Pericardial effusion after CABG x 4, MAZE, LAAL 10/28/24.  Readmit for Pericardial window 24     Past Medical History:   Diagnosis Date    A-fib     Anxiety     Arthritis     DDD (degenerative disc disease), lumbar     Deep vein thrombosis     Depression     Diabetes     Disease of thyroid gland     HYPO    Heart attack     \"5-6 years ago\"    Hyperlipidemia     Hypertension     Kidney stone     Neuropathy     Sleep apnea     DOES NOT USE CPAP    UTI (urinary tract infection)      Past Surgical History:   Procedure Laterality Date    ATRIAL APPENDAGE EXCLUSION LEFT WITH TRANSESOPHAGEAL ECHOCARDIOGRAM N/A 10/28/2024    Procedure: ATRIAL APPENDAGE EXCLUSION LEFT WITH TRANSESOPHAGEAL ECHOCARDIOGRAM;  Surgeon: Alex Max MD;  Location: [facility] OR;  Service: Cardiothoracic;  Laterality: N/A;    CARDIAC CATHETERIZATION N/A 2024    Procedure: Left Heart Cath;  Surgeon: Julio Neff MD;  Location:  [facility];  Service: Cardiology;  Laterality: N/A;    CARDIAC ELECTROPHYSIOLOGY PROCEDURE N/A 2023    Procedure: Loop insertion;  Surgeon: Tariq Chávez MD;  Location:  [facility];  Service: Cardiovascular;  Laterality: N/A;    CATARACT EXTRACTION Bilateral     COLONOSCOPY      CORONARY ARTERY BYPASS " GRAFT N/A 10/28/2024    Procedure: MEDIAN STERNOTOMY, CORONARY ARTERY BYPASS GRAFTING X4 UTILIZING THE LEFT INTERNAL MAMMARY ARTERY GRAFT, ENDOSCOPIC VEIN HARVEST OF THE GREATER RIGHT SAPHENOUS VEIN;  Surgeon: Alex Max MD;  Location:  [facility];  Service: Cardiothoracic;  Laterality: N/A;    ENDOSCOPY      MAZE PROCEDURE N/A 10/28/2024    Procedure: MAZE PROCEDURE;  Surgeon: Alex Max MD;  Location:  [facility] OR;  Service: Cardiothoracic;  Laterality: N/A;    PERICARDIAL WINDOW N/A 12/6/2024    Procedure: PERICARDIAL WINDOW;  Surgeon: Alex Max MD;  Location:  [facility] OR;  Service: Cardiothoracic;  Laterality: N/A;    TEETH EXTRACTION        General Information       Row Name 12/09/24 1305          Physical Therapy Time and Intention    Document Type therapy note (daily note)  -KG     Mode of Treatment physical therapy  -KG       Row Name 12/09/24 1305          General Information    Existing Precautions/Restrictions cardiac;fall;orthostatic hypotension;oxygen therapy device and L/min;sternal;other (see comments)  CABG x4 on 10/28/24  -KG     Barriers to Rehab medically complex  -KG       Row Name 12/09/24 1305          Cognition    Orientation Status (Cognition) oriented x 3  -KG       Row Name 12/09/24 1305          Safety Issues/Impairments Affecting Functional Mobility    Safety Issues Affecting Function (Mobility) awareness of need for assistance;insight into deficits/self-awareness;safety precaution awareness;safety precautions follow-through/compliance  -KG     Impairments Affecting Function (Mobility) balance;coordination;endurance/activity tolerance;postural/trunk control;shortness of breath;strength  -KG               User Key  (r) = Recorded By, (t) = Taken By, (c) = Cosigned By      Initials Name Provider Type    KG Lois Santos, PT Physical Therapist                   Mobility       Row Name 12/09/24 1306          Bed Mobility    Comment, (Bed Mobility) UIC  -KG       Row Name  12/09/24 1306          Transfers    Comment, (Transfers) VC's for sequencing and safe hand placement to maintain sternal precautions.  -KG       Row Name 12/09/24 1306          Sit-Stand Transfer    Sit-Stand Grady (Transfers) minimum assist (75% patient effort);2 person assist;verbal cues  -KG       Row Name 12/09/24 1306          Gait/Stairs (Locomotion)    Grady Level (Gait) minimum assist (75% patient effort);2 person assist;verbal cues  progressed to periods of Meme x1  -KG     Assistive Device (Gait) other (see comments)  B UE support on tripod monitor  -KG     Distance in Feet (Gait) 220  -KG     Deviations/Abnormal Patterns (Gait) lesley decreased;stride length decreased  -KG     Bilateral Gait Deviations forward flexed posture;heel strike decreased  -KG     Comment, (Gait/Stairs) Pt demonstrated step through gait pattern with slow lesley and decreased step length. VC's for upright posture. Pt with adequate stability and no LOB, but demonstrated increased difficulty steering monitor to navigate around obstacles. Pt required encouragement for continued forward ambulation. Limited by c/o increased fatigue.  -KG               User Key  (r) = Recorded By, (t) = Taken By, (c) = Cosigned By      Initials Name Provider Type    KG Lois Santos, PT Physical Therapist                   Obj/Interventions       Row Name 12/09/24 1308          Balance    Balance Assessment sitting static balance;standing static balance;standing dynamic balance  -KG     Static Sitting Balance standby assist  -KG     Position, Sitting Balance unsupported;sitting in chair  -KG     Static Standing Balance minimal assist  -KG     Dynamic Standing Balance minimal assist  -KG     Position/Device Used, Standing Balance supported  -KG               User Key  (r) = Recorded By, (t) = Taken By, (c) = Cosigned By      Initials Name Provider Type    KG Lois Santos, PT Physical Therapist                   Goals/Plan     No documentation.                  Clinical Impression       Row Name 12/09/24 1309          Pain    Additional Documentation Pain Scale: FACES Pre/Post-Treatment (Group)  -KG       Row Name 12/09/24 1309          Pain Scale: FACES Pre/Post-Treatment    Pain: FACES Scale, Pretreatment 4-->hurts little more  -KG     Posttreatment Pain Rating 4-->hurts little more  -KG       Row Name 12/09/24 1309          Plan of Care Review    Plan of Care Reviewed With patient  -KG     Progress improving  -KG     Outcome Evaluation Pt increased ambulation distance to 220ft with Meme x2 and B UE support on tripod monitor. Progressed to periods of Meme x1. Pt with adequate stability, but demonstrated increased difficulty steering monitor to navigate around obstacles. Required encouragement for continued forward ambulation. Limited by c/o increased fatigue. Continue to recommend PT skilled care and D/C home with assistance and  PT services.  -KG       Row Name 12/09/24 1309          Vital Signs    Pre Systolic BP Rehab 103  -KG     Pre Treatment Diastolic BP 66  -KG     Post Systolic BP Rehab 107  -KG     Post Treatment Diastolic BP 80  -KG     Pretreatment Heart Rate (beats/min) 82  -KG     Posttreatment Heart Rate (beats/min) 83  -KG     Pre SpO2 (%) 93  -KG     O2 Delivery Pre Treatment supplemental O2  -KG     Post SpO2 (%) 94  -KG     O2 Delivery Post Treatment supplemental O2  -KG     Pre Patient Position Sitting  -KG     Intra Patient Position Standing  -KG     Post Patient Position Sitting  -KG       Row Name 12/09/24 1309          Positioning and Restraints    Pre-Treatment Position sitting in chair/recliner  -KG     Post Treatment Position chair  -KG     In Chair notified nsg;reclined;call light within reach;encouraged to call for assist;RUE elevated;LUE elevated;legs elevated  -KG               User Key  (r) = Recorded By, (t) = Taken By, (c) = Cosigned By      Initials Name Provider Type    KG Lois Santos, PT  Physical Therapist                   Outcome Measures       Row Name 12/09/24 1321          How much help from another person do you currently need...    Turning from your back to your side while in flat bed without using bedrails? 3  -KG     Moving from lying on back to sitting on the side of a flat bed without bedrails? 2  -KG     Moving to and from a bed to a chair (including a wheelchair)? 3  -KG     Standing up from a chair using your arms (e.g., wheelchair, bedside chair)? 3  -KG     Climbing 3-5 steps with a railing? 2  -KG     To walk in hospital room? 3  -KG     AM-PAC 6 Clicks Score (PT) 16  -KG     Highest Level of Mobility Goal 5 --> Static standing  -KG       Row Name 12/09/24 1321          Functional Assessment    Outcome Measure Options AM-PAC 6 Clicks Basic Mobility (PT)  -KG               User Key  (r) = Recorded By, (t) = Taken By, (c) = Cosigned By      Initials Name Provider Type    KG Lois Santos, PT Physical Therapist                                 Physical Therapy Education       Title: PT OT SLP Therapies (In Progress)       Topic: Physical Therapy (In Progress)       Point: Mobility training (In Progress)       Learning Progress Summary            Patient Acceptance, E, NR by KG at 12/9/2024 0917    Acceptance, E, VU,NR by HARISH at 12/7/2024 1327                      Point: Home exercise program (In Progress)       Learning Progress Summary            Patient Acceptance, E, NR by KG at 12/9/2024 0917                      Point: Body mechanics (In Progress)       Learning Progress Summary            Patient Acceptance, E, NR by KG at 12/9/2024 0917    Acceptance, E, VU,NR by HARISH at 12/7/2024 1327                      Point: Precautions (In Progress)       Learning Progress Summary            Patient Acceptance, E, NR by KG at 12/9/2024 0917    Acceptance, E, VU,NR by HARISH at 12/7/2024 1327                                      User Key       Initials Effective Dates Name Provider Type  Discipline    KG 05/22/20 -  Lois Santos, PT Physical Therapist PT    HARISH 06/16/21 -  Coleen Guillen PT Physical Therapist PT                  PT Recommendation and Plan     Progress: improving  Outcome Evaluation: Pt increased ambulation distance to 220ft with Meme x2 and B UE support on tripod monitor. Progressed to periods of Meme x1. Pt with adequate stability, but demonstrated increased difficulty steering monitor to navigate around obstacles. Required encouragement for continued forward ambulation. Limited by c/o increased fatigue. Continue to recommend PT skilled care and D/C home with assistance and  PT services.     Time Calculation:         PT Charges       Row Name 12/09/24 0917             Time Calculation    Start Time 0917  -KG      PT Received On 12/09/24  -KG      PT Goal Re-Cert Due Date 12/17/24  -KG         Time Calculation- PT    Total Timed Code Minutes- PT 23 minute(s)  -KG         Timed Charges    00504 - PT Therapeutic Activity Minutes 23  -KG         Total Minutes    Timed Charges Total Minutes 23  -KG       Total Minutes 23  -KG                User Key  (r) = Recorded By, (t) = Taken By, (c) = Cosigned By      Initials Name Provider Type    KG Lois Santos, PT Physical Therapist                  Therapy Charges for Today       Code Description Service Date Service Provider Modifiers Qty    37789928421 HC PT THERAPEUTIC ACT EA 15 MIN 12/9/2024 Lois Santos, PT GP 2            PT G-Codes  Outcome Measure Options: AM-PAC 6 Clicks Basic Mobility (PT)  AM-PAC 6 Clicks Score (PT): 16       Yvette Santos PT  12/9/2024

## 2024-12-09 NOTE — CASE MANAGEMENT/SOCIAL WORK
Continued Stay Note  [facility]     Patient Name: Joel Galo  MRN: [id number]  Today's Date: 12/9/2024    Admit Date: 12/6/2024    Plan: IDP   Discharge Plan       Row Name 12/09/24 1145       Plan    Plan IDP    Plan Comments Spoke with patient at bedside for IDP. Pt was here in October and discharged to rehab. Pt and spouse live in [facility] in double wide trailer. Pt is assist with ADL's. TW, BSC, WC, cane, raised toilet seat, O2 @ 2L though [employer]. Pt states he has HH service but unsure of company. [facility] with scripts filled at [facility]. Plan is home with family to transport. CM will cont to follow.                   Discharge Codes    No documentation.                       Aneta Grijalva RN

## 2024-12-09 NOTE — PROGRESS NOTES
Malnutrition Severity Assessment    Patient Name:  Joel Galo  YOB: 1965  MRN: [id number]  Admit Date:  12/6/2024    Patient meets criteria for : Severe Malnutrition    Comments:  Pt meets criteria for severe, acute malnutrition with the indicators of </=50% of EEN x >/=5d, significant weight loss of 10.4% x 1 month, moderate muscle wasting, and mild subcutaneous fat loss. Of note, Pt with recent CABG resulting in post-op anorexia which is likely primary cause for muscle wasting.    Malnutrition Severity Assessment  Malnutrition Type: Acute Disease or Injury - Related Malnutrition  Malnutrition Type (Last 8 Hours)       Malnutrition Severity Assessment       Row Name 12/09/24 1133       Malnutrition Severity Assessment    Malnutrition Type Acute Disease or Injury - Related Malnutrition      Row Name 12/09/24 1133       Insufficient Energy Intake     Insufficient Energy Intake Findings Severe    Insufficient Energy Intake  < or equal to 50% of est. energy requirement for > or equal to 5d)      Row Name 12/09/24 1133       Unintentional Weight Loss     Unintentional Weight Loss Findings Severe    Unintentional Weight Loss  Weight loss greater than 5% in one month  10.4% x 1 month; 11.5% x 3 months      Row Name 12/09/24 1133       Muscle Loss    Loss of Muscle Mass Findings Moderate    Gnosticism Region --  mild    Clavicle Bone Region --  mild    Dorsal Hand Region Moderate - slight depression    Patellar Region Moderate - patella more prominent, less muscle definition around patella    Anterior Thigh Region Moderate - mild depression on inner thigh    Posterior Calf Region Moderate - some roundness, slight firmness      Row Name 12/09/24 1133       Fat Loss    Subcutaneous Fat Loss Findings Mild    Orbital Region  Moderate -  somewhat hollowness, slightly dark circles    Upper Arm Region --  mild      Row Name 12/09/24 1133       Criteria Met (Must meet criteria for severity in at least 2 of these  categories: M Wasting, Fat Loss, Fluid, Secondary Signs, Wt. Status, Intake)    Patient meets criteria for  Severe Malnutrition                    Electronically signed by:  Erin Hull MS,RD,LD  12/09/24 11:57 EST

## 2024-12-10 ENCOUNTER — READMISSION MANAGEMENT (OUTPATIENT)
Dept: CALL CENTER | Facility: HOSPITAL | Age: 59
End: 2024-12-10
Payer: MEDICARE

## 2024-12-10 VITALS
WEIGHT: 207.9 LBS | HEART RATE: 75 BPM | HEIGHT: 70 IN | RESPIRATION RATE: 16 BRPM | DIASTOLIC BLOOD PRESSURE: 65 MMHG | SYSTOLIC BLOOD PRESSURE: 101 MMHG | BODY MASS INDEX: 29.76 KG/M2 | TEMPERATURE: 97.8 F | OXYGEN SATURATION: 92 %

## 2024-12-10 LAB
ANION GAP SERPL CALCULATED.3IONS-SCNC: 8 MMOL/L (ref 5–15)
BUN SERPL-MCNC: 27 MG/DL (ref 8–23)
BUN/CREAT SERPL: 32.9 (ref 7–25)
CALCIUM SPEC-SCNC: 8.9 MG/DL (ref 8.6–10.5)
CHLORIDE SERPL-SCNC: 100 MMOL/L (ref 98–107)
CO2 SERPL-SCNC: 30 MMOL/L (ref 22–29)
CREAT SERPL-MCNC: 0.82 MG/DL (ref 0.76–1.27)
EGFRCR SERPLBLD CKD-EPI 2021: 99.9 ML/MIN/1.73
GLUCOSE BLDC GLUCOMTR-MCNC: 214 MG/DL (ref 70–130)
GLUCOSE BLDC GLUCOMTR-MCNC: 306 MG/DL (ref 70–130)
GLUCOSE SERPL-MCNC: 164 MG/DL (ref 65–99)
MAGNESIUM SERPL-MCNC: 2.1 MG/DL (ref 1.6–2.4)
PHOSPHATE SERPL-MCNC: 4.2 MG/DL (ref 2.5–4.5)
POTASSIUM SERPL-SCNC: 4.3 MMOL/L (ref 3.5–5.2)
REF LAB TEST METHOD: NORMAL
SODIUM SERPL-SCNC: 138 MMOL/L (ref 136–145)

## 2024-12-10 PROCEDURE — 83735 ASSAY OF MAGNESIUM: CPT | Performed by: INTERNAL MEDICINE

## 2024-12-10 PROCEDURE — 63710000001 INSULIN LISPRO (HUMAN) PER 5 UNITS: Performed by: PHYSICIAN ASSISTANT

## 2024-12-10 PROCEDURE — 97530 THERAPEUTIC ACTIVITIES: CPT

## 2024-12-10 PROCEDURE — 80048 BASIC METABOLIC PNL TOTAL CA: CPT | Performed by: INTERNAL MEDICINE

## 2024-12-10 PROCEDURE — 63710000001 INSULIN GLARGINE PER 5 UNITS: Performed by: PHYSICIAN ASSISTANT

## 2024-12-10 PROCEDURE — 82948 REAGENT STRIP/BLOOD GLUCOSE: CPT

## 2024-12-10 PROCEDURE — 99024 POSTOP FOLLOW-UP VISIT: CPT | Performed by: PHYSICIAN ASSISTANT

## 2024-12-10 PROCEDURE — 84100 ASSAY OF PHOSPHORUS: CPT | Performed by: INTERNAL MEDICINE

## 2024-12-10 RX ORDER — COLCHICINE 0.6 MG/1
0.6 TABLET ORAL EVERY 12 HOURS SCHEDULED
Qty: 60 TABLET | Refills: 1 | Status: SHIPPED | OUTPATIENT
Start: 2024-12-10

## 2024-12-10 RX ORDER — FUROSEMIDE 10 MG/ML
40 INJECTION INTRAMUSCULAR; INTRAVENOUS ONCE
Status: DISCONTINUED | OUTPATIENT
Start: 2024-12-10 | End: 2024-12-10

## 2024-12-10 RX ORDER — POTASSIUM CHLORIDE 750 MG/1
20 CAPSULE, EXTENDED RELEASE ORAL DAILY
Qty: 28 CAPSULE | Refills: 0 | Status: SHIPPED | OUTPATIENT
Start: 2024-12-10

## 2024-12-10 RX ORDER — FUROSEMIDE 40 MG/1
40 TABLET ORAL DAILY
Qty: 14 TABLET | Refills: 0 | Status: SHIPPED | OUTPATIENT
Start: 2024-12-10

## 2024-12-10 RX ORDER — GUAIFENESIN 600 MG/1
600 TABLET, EXTENDED RELEASE ORAL EVERY 12 HOURS SCHEDULED
Qty: 60 TABLET | Refills: 2 | Status: SHIPPED | OUTPATIENT
Start: 2024-12-10

## 2024-12-10 RX ORDER — POTASSIUM CHLORIDE 750 MG/1
20 CAPSULE, EXTENDED RELEASE ORAL ONCE
Status: COMPLETED | OUTPATIENT
Start: 2024-12-10 | End: 2024-12-10

## 2024-12-10 RX ORDER — FUROSEMIDE 40 MG/1
40 TABLET ORAL DAILY
Status: DISCONTINUED | OUTPATIENT
Start: 2024-12-10 | End: 2024-12-10 | Stop reason: HOSPADM

## 2024-12-10 RX ADMIN — GABAPENTIN 200 MG: 100 CAPSULE ORAL at 06:10

## 2024-12-10 RX ADMIN — HYDROCODONE BITARTRATE AND ACETAMINOPHEN 1 TABLET: 5; 325 TABLET ORAL at 11:55

## 2024-12-10 RX ADMIN — INSULIN GLARGINE 20 UNITS: 100 INJECTION, SOLUTION SUBCUTANEOUS at 08:01

## 2024-12-10 RX ADMIN — COLCHICINE 0.6 MG: 0.6 TABLET ORAL at 08:01

## 2024-12-10 RX ADMIN — EMPAGLIFLOZIN 25 MG: 25 TABLET, FILM COATED ORAL at 09:51

## 2024-12-10 RX ADMIN — BUPROPION HYDROCHLORIDE 300 MG: 150 TABLET, EXTENDED RELEASE ORAL at 08:01

## 2024-12-10 RX ADMIN — INSULIN LISPRO 4 UNITS: 100 INJECTION, SOLUTION INTRAVENOUS; SUBCUTANEOUS at 08:01

## 2024-12-10 RX ADMIN — INSULIN LISPRO 7 UNITS: 100 INJECTION, SOLUTION INTRAVENOUS; SUBCUTANEOUS at 12:40

## 2024-12-10 RX ADMIN — FUROSEMIDE 40 MG: 40 TABLET ORAL at 09:51

## 2024-12-10 RX ADMIN — POTASSIUM CHLORIDE 20 MEQ: 750 CAPSULE, EXTENDED RELEASE ORAL at 09:51

## 2024-12-10 RX ADMIN — FINASTERIDE 5 MG: 5 TABLET, FILM COATED ORAL at 08:01

## 2024-12-10 RX ADMIN — ASPIRIN 325 MG: 325 TABLET, COATED ORAL at 08:01

## 2024-12-10 RX ADMIN — DULOXETINE HYDROCHLORIDE 60 MG: 60 CAPSULE, DELAYED RELEASE ORAL at 08:00

## 2024-12-10 RX ADMIN — Medication 400 UNITS: at 09:51

## 2024-12-10 RX ADMIN — LEVOTHYROXINE SODIUM 50 MCG: 0.05 TABLET ORAL at 06:10

## 2024-12-10 RX ADMIN — SOTALOL HYDROCHLORIDE 120 MG: 120 TABLET ORAL at 09:51

## 2024-12-10 RX ADMIN — ROSUVASTATIN 20 MG: 20 TABLET, FILM COATED ORAL at 08:01

## 2024-12-10 RX ADMIN — GUAIFENESIN 600 MG: 600 TABLET, EXTENDED RELEASE ORAL at 08:01

## 2024-12-10 RX ADMIN — TAMSULOSIN HYDROCHLORIDE 0.4 MG: 0.4 CAPSULE ORAL at 08:01

## 2024-12-10 NOTE — PLAN OF CARE
Goal Outcome Evaluation:      VSS, A/ox4, 2L NC. BM x1. UOP 800ml. Ambulated 200ft. Up in chair this am.                                        [place]

## 2024-12-10 NOTE — PROGRESS NOTES
Cardiothoracic Surgery Progress Note      POD # 4 s/p Subxiphoid pericardial window     LOS: 4 days      Subjective:  No acute events overnight.  Patient denies any pain or dyspnea - admits to mild cough.    Objective:  Vital Signs  Temp:  [97.1 °F (36.2 °C)-98.2 °F (36.8 °C)] 97.8 °F (36.6 °C)  Heart Rate:  [75-97] 75  Resp:  [16-20] 16  BP: ()/(61-88) 114/74    Physical Exam:   General Appearance: Well-developed, well-nourished in no acute distress   Lungs: Respirations even and unlabored and clear to auscultation bilaterally no wheezes, rales or rhonchi   Heart: Regular rate and rhythm no murmurs, rubs or gallops   Extremities: Warm with good color perfused with no bilateral lower extremity edema   Skin: Sternotomy incision is healing well with small area crusted over the distal third of incision with no drainage, right lower extremity EVH incision crusted over with mild dehiscence with no evidence of erythema or drainage      Results:    Results from last 7 days   Lab Units 12/09/24  0306   WBC 10*3/mm3 10.80   HEMOGLOBIN g/dL 11.4*   HEMATOCRIT % 36.8*   PLATELETS 10*3/mm3 271     Results from last 7 days   Lab Units 12/10/24  0407   SODIUM mmol/L 138   POTASSIUM mmol/L 4.3   CHLORIDE mmol/L 100   CO2 mmol/L 30.0*   BUN mg/dL 27*   CREATININE mg/dL 0.82   GLUCOSE mg/dL 164*   CALCIUM mg/dL 8.9       Assessment:  POD 4 s/p subxiphoid pericardial window    CAD S/P CABG x 4, MAZE, LAAL 10/28/2024  Large pericardial effusion s/p subxiphoid pericardial window 12/3/2024  Syncope  Obstructive sleep apnea with noncompliance  Diabetes mellitus    Plan:  Diuresis with Bumex 2 Mg IV  Wean oxygen supplementation as tolerated  Pulmonary toilet  Ambulate  Add Mucinex  Continue colchicine  ASA, statin, BB  DC home  Follow-up in office in 2 weeks with chest x-ray and removal of chest tube sutures.    Alex Max MD  12/10/24  07:32 EST

## 2024-12-10 NOTE — THERAPY DISCHARGE NOTE
"Patient Name: Joel Galo  : 1965    MRN: [id number]                              Today's Date: 12/10/2024       Admit Date: 2024    Visit Dx:     ICD-10-CM ICD-9-CM   1. Paroxysmal atrial fibrillation  I48.0 427.31   2. Pericardial effusion after operative procedure  I97.89 997.1    I31.39 423.9     Patient Active Problem List   Diagnosis    Paroxysmal atrial fibrillation    Obstructive sleep apnea    Hypothyroidism (acquired)    GERD (gastroesophageal reflux disease)    Chronic anticoagulation    Type 2 diabetes mellitus with hyperglycemia, without long-term current use of insulin    Hyperlipidemia LDL goal <70    Primary hypertension    CAD s/p CABG x 4, MAZE, LAAL 10/28/24    Orthostatic hypotension    Pericardial effusion after CABG x 4, MAZE, LAAL 10/28/24.  Readmit for Pericardial window 24     Past Medical History:   Diagnosis Date    A-fib     Anxiety     Arthritis     DDD (degenerative disc disease), lumbar     Deep vein thrombosis     Depression     Diabetes     Disease of thyroid gland     HYPO    Heart attack     \"5-6 years ago\"    Hyperlipidemia     Hypertension     Kidney stone     Neuropathy     Sleep apnea     DOES NOT USE CPAP    UTI (urinary tract infection)      Past Surgical History:   Procedure Laterality Date    ATRIAL APPENDAGE EXCLUSION LEFT WITH TRANSESOPHAGEAL ECHOCARDIOGRAM N/A 10/28/2024    Procedure: ATRIAL APPENDAGE EXCLUSION LEFT WITH TRANSESOPHAGEAL ECHOCARDIOGRAM;  Surgeon: Alex Max MD;  Location: [facility];  Service: Cardiothoracic;  Laterality: N/A;    CARDIAC CATHETERIZATION N/A 2024    Procedure: Left Heart Cath;  Surgeon: Julio Neff MD;  Location:  [facility];  Service: Cardiology;  Laterality: N/A;    CARDIAC ELECTROPHYSIOLOGY PROCEDURE N/A 2023    Procedure: Loop insertion;  Surgeon: Tariq Chávez MD;  Location:  [facility];  Service: Cardiovascular;  Laterality: N/A;    CATARACT EXTRACTION " Bilateral     COLONOSCOPY      CORONARY ARTERY BYPASS GRAFT N/A 10/28/2024    Procedure: MEDIAN STERNOTOMY, CORONARY ARTERY BYPASS GRAFTING X4 UTILIZING THE LEFT INTERNAL MAMMARY ARTERY GRAFT, ENDOSCOPIC VEIN HARVEST OF THE GREATER RIGHT SAPHENOUS VEIN;  Surgeon: Alex Max MD;  Location:  [facility];  Service: Cardiothoracic;  Laterality: N/A;    ENDOSCOPY      MAZE PROCEDURE N/A 10/28/2024    Procedure: MAZE PROCEDURE;  Surgeon: Alex Max MD;  Location:  [facility];  Service: Cardiothoracic;  Laterality: N/A;    PERICARDIAL WINDOW N/A 12/6/2024    Procedure: PERICARDIAL WINDOW;  Surgeon: Alex Max MD;  Location:  [facility];  Service: Cardiothoracic;  Laterality: N/A;    TEETH EXTRACTION        General Information       Row Name 12/10/24 0948          Physical Therapy Time and Intention    Document Type discharge treatment  -HARISH     Mode of Treatment physical therapy  -HARISH       Row Name 12/10/24 0948          General Information    Patient Profile Reviewed yes  -HARISH     Existing Precautions/Restrictions cardiac;fall;oxygen therapy device and L/min;sternal  -HARISH     Barriers to Rehab medically complex  -HARISH       Row Name 12/10/24 0948          Living Environment    People in Home spouse  -HARISH       Row Name 12/10/24 0948          Home Main Entrance    Number of Stairs, Main Entrance none  -HARISH       Row Name 12/10/24 0948          Cognition    Orientation Status (Cognition) oriented x 4  -HARISH       Row Name 12/10/24 0948          Safety Issues/Impairments Affecting Functional Mobility    Safety Issues Affecting Function (Mobility) safety precautions follow-through/compliance  -HARISH     Impairments Affecting Function (Mobility) balance;endurance/activity tolerance;shortness of breath  -HARISH               User Key  (r) = Recorded By, (t) = Taken By, (c) = Cosigned By      Initials Name Provider Type    HARISH Suzi Don PT Physical Therapist                   Mobility       Row Name 12/10/24 0948          Bed  Mobility    Comment, (Bed Mobility) patient is OOB and returns to the chair  -HARISH       Row Name 12/10/24 0948          Bed-Chair Transfer    Bed-Chair [place] (Transfers) independent  -HARISH     Assistive Device (Bed-Chair Transfers) walker, front-wheeled  -HARISH       Row Name 12/10/24 0948          Sit-Stand Transfer    Sit-Stand [place] (Transfers) independent  -HARISH     Assistive Device (Sit-Stand Transfers) walker, front-wheeled  -HARISH       Row Name 12/10/24 0948          Gait/Stairs (Locomotion)    [place] Level (Gait) modified independence  -HARISH     Assistive Device (Gait) walker, front-wheeled  -HARISH     Distance in Feet (Gait) 220  -HRAISH     Deviations/Abnormal Patterns (Gait) lesley decreased;stride length decreased  -HARISH     Bilateral Gait Deviations forward flexed posture;heel strike decreased  -HARISH     Comment, (Gait/Stairs) patient continues to have decreased endurance with SOA and fatigue patient has improved gait pattern today with walker and has a walker for home use.  -HARISH               User Key  (r) = Recorded By, (t) = Taken By, (c) = Cosigned By      Initials Name Provider Type    Suzi Higgins PT Physical Therapist                   Obj/Interventions       Row Name 12/10/24 0935          Balance    Balance Assessment sitting static balance;sitting dynamic balance;standing static balance;standing dynamic balance  -HARISH     Static Sitting Balance independent  -HARISH     Dynamic Sitting Balance independent  -HARISH     Position, Sitting Balance unsupported;sitting edge of bed  -HARISH     Static Standing Balance contact guard  -HARISH     Dynamic Standing Balance contact guard  -HARISH     Position/Device Used, Standing Balance supported;walker, front-wheeled  -HARISH               User Key  (r) = Recorded By, (t) = Taken By, (c) = Cosigned By      Initials Name Provider Type    Suzi Higgins PT Physical Therapist                   Goals/Plan       Row Name 12/10/24 0947          Transfer Goal 1 (PT)     Activity/Assistive Device (Transfer Goal 1, PT) sit-to-stand/stand-to-sit  -HARISH     [place] Level/Cues Needed (Transfer Goal 1, PT) modified independence  -HARISH     Time Frame (Transfer Goal 1, PT) long term goal (LTG);10 days  -HARISH     Progress/Outcome (Transfer Goal 1, PT) goal met  -HARISH       Row Name 12/10/24 0954          Gait Training Goal 1 (PT)    Activity/Assistive Device (Gait Training Goal 1, PT) assistive device use;gait (walking locomotion)  -HARISH     [place] Level (Gait Training Goal 1, PT) modified independence  -HARISH     Distance (Gait Training Goal 1, PT) 200  -HARISH     Time Frame (Gait Training Goal 1, PT) long term goal (LTG);10 days  -HARISH     Progress/Outcome (Gait Training Goal 1, PT) goal met  -HARISH               User Key  (r) = Recorded By, (t) = Taken By, (c) = Cosigned By      Initials Name Provider Type    HARISH Suzi Don, PT Physical Therapist                   Clinical Impression       Row Name 12/10/24 0984          Pain    Pretreatment Pain Rating 0/10 - no pain  -HARISH     Posttreatment Pain Rating 0/10 - no pain  -HARISH       Row Name 12/10/24 0921          Plan of Care Review    Plan of Care Reviewed With patient  -HARISH     Progress improving  -HARISH     Outcome Evaluation patient is able to ambulate 220 ft with rolling walker and CGA stable vitals with activity patient will be D/C home today with  services. patient was progressing toward all mobility goals and is safe to go home  -HARISH       Row Name 12/10/24 0918          Therapy Assessment/Plan (PT)    Patient/Family Therapy Goals Statement (PT) go home  -HARISH     Rehab Potential (PT) good  -HARISH     Criteria for Skilled Interventions Met (PT) yes;skilled treatment is necessary  -HARISH     Therapy Frequency (PT) daily  -HARISH       Row Name 12/10/24 0958          Vital Signs    Pre Systolic BP Rehab 129  -HARISH     Pre Treatment Diastolic BP 76  -HARISH     Post Systolic BP Rehab 122  -HARISH     Post Treatment Diastolic BP 83  -HARISH     Pretreatment Heart Rate  (beats/min) 77  -HARISH     Posttreatment Heart Rate (beats/min) 89  -HARISH     Pre SpO2 (%) 96  -HARISH     O2 Delivery Pre Treatment nasal cannula  -HARISH     Post SpO2 (%) 98  -HARISH     O2 Delivery Post Treatment nasal cannula  -HARISH     Pre Patient Position Sitting  -HARISH     Intra Patient Position Standing  -HARISH     Post Patient Position Sitting  -HARISH       Row Name 12/10/24 0951          Positioning and Restraints    Pre-Treatment Position sitting in chair/recliner  -HARISH     Post Treatment Position chair  -HARISH     In Chair notified nsg;reclined;sitting;call light within reach;encouraged to call for assist;with nsg  -HARISH               User Key  (r) = Recorded By, (t) = Taken By, (c) = Cosigned By      Initials Name Provider Type    Suzi Higgins PT Physical Therapist                   Outcome Measures       Row Name 12/10/24 0954 12/10/24 0800       How much help from another person do you currently need...    Turning from your back to your side while in flat bed without using bedrails? 4  -HARISH 4  -DENNIS    Moving from lying on back to sitting on the side of a flat bed without bedrails? 3  -HARISH 4  -DENNIS    Moving to and from a bed to a chair (including a wheelchair)? 4  -HARISH 3  -DENNIS    Standing up from a chair using your arms (e.g., wheelchair, bedside chair)? 4  -HARISH 3  -DENNIS    Climbing 3-5 steps with a railing? 3  -HARISH 3  -DENNIS    To walk in hospital room? 3  -HARISH 3  -DENNIS    AM-PAC 6 Clicks Score (PT) 21  -HARISH 20  -DENNIS    Highest Level of Mobility Goal 6 --> Walk 10 steps or more  -HARISH 6 --> Walk 10 steps or more  -DENNIS              User Key  (r) = Recorded By, (t) = Taken By, (c) = Cosigned By      Initials Name Provider Type    Suzi Higgins PT Physical Therapist    Gabrielle Cunningham RN Registered Nurse                  Physical Therapy Education       Title: PT OT SLP Therapies (Resolved)       Topic: Physical Therapy (Resolved)       Point: Mobility training (Resolved)       Learning Progress Summary            Patient  Acceptance, E, VU by HARISH at 12/10/2024 0900    Acceptance, E, NR by KG at 12/9/2024 0917    Acceptance, E, VU,NR by JB1 at 12/7/2024 1327                      Point: Home exercise program (Resolved)       Learning Progress Summary            Patient Acceptance, E, VU by HARISH at 12/10/2024 0900    Acceptance, E, NR by KG at 12/9/2024 0917                      Point: Body mechanics (Resolved)       Learning Progress Summary            Patient Acceptance, E, VU by HARISH at 12/10/2024 0900    Acceptance, E, NR by KG at 12/9/2024 0917    Acceptance, E, VU,NR by JB1 at 12/7/2024 1327                      Point: Precautions (Resolved)       Learning Progress Summary            Patient Acceptance, E, VU by HARISH at 12/10/2024 0900    Acceptance, E, NR by KG at 12/9/2024 0917    Acceptance, E, VU,NR by JB1 at 12/7/2024 1327                                      User Key       Initials Effective Dates Name Provider Type Discipline    HARISH 02/03/23 -  Suzi Don, PT Physical Therapist PT    KG 05/22/20 -  Lois Santos, PT Physical Therapist PT    JB1 06/16/21 -  Coleen Guillen, PT Physical Therapist PT                  PT Recommendation and Plan     Progress: improving  Outcome Evaluation: patient is able to ambulate 220 ft with rolling walker and CGA stable vitals with activity patient will be D/C home today with  services. patient was progressing toward all mobility goals and is safe to go home     Time Calculation:         PT Charges       Row Name 12/10/24 0957             Time Calculation    Start Time 0900  -HARISH      PT Received On 12/10/24  -HARISH      PT Goal Re-Cert Due Date 12/17/24  -HARISH         Time Calculation- PT    Total Timed Code Minutes- PT 23 minute(s)  -HARISH         Timed Charges    12597 - PT Therapeutic Activity Minutes 23  -HARISH         Total Minutes    Timed Charges Total Minutes 23  -HARISH       Total Minutes 23  -HARISH                User Key  (r) = Recorded By, (t) = Taken By, (c) = Cosigned By       Initials Name Provider Type    Suzi Higgins PT Physical Therapist                  Therapy Charges for Today       Code Description Service Date Service Provider Modifiers Qty    [id number] HC PT THERAPEUTIC ACT EA 15 MIN 12/10/2024 Suzi Don, PT GP 2            PT G-Codes  Outcome Measure Options: AM-PAC 6 Clicks Basic Mobility (PT)  AM-PAC 6 Clicks Score (PT): 21    PT Discharge Summary  Anticipated Discharge Disposition (PT): home with assist, home with home health  Reason for Discharge: All goals achieved  Outcomes Achieved: Able to achieve all goals within established timeline  Discharge Destination: Home with assist, Home with home health    Suzi Don, PT  12/10/2024

## 2024-12-10 NOTE — PLAN OF CARE
Goal Outcome Evaluation:  Plan of Care Reviewed With: patient        Progress: improving  Outcome Evaluation: Pt neuro intact.  Pt ambulated x2.  UOP 1550ml.  Pain meds given for discomfort.  will continue to monitor.  at 12/9/2024 2136

## 2024-12-10 NOTE — CASE MANAGEMENT/SOCIAL WORK
Continued Stay Note  [facility]     Patient Name: Joel Galo  MRN: [id number]  Today's Date: 12/10/2024    Admit Date: 12/6/2024    Plan: Home HH   Discharge Plan       Row Name 12/10/24 1032       Plan    Plan Home HH    Patient/Family in Agreement with Plan yes    Plan Comments Spoke with wife via phone to remind her to bring pt portable O2 tank for pending discharge. Unsure of how long it will be before she is here as she is awaiting sister and [facility] to transport her here. Discussed pt current HH agency that was set up for him when he was discharged from rehab.  Wife is unsure of who the company is but states he is seen by SN and PT. Resume orders placed and printed for pt to give to HH agency.    Final Discharge Disposition Code 06 - home with home health care                   Discharge Codes    No documentation.                 Expected Discharge Date and Time       Expected Discharge Date Expected Discharge Time    Dec 10, 2024               Aneta Grijalva RN

## 2024-12-10 NOTE — DISCHARGE INSTR - OTHER ORDERS
[address]  [address] [facility] [address]  Phone:  [phone]  Fax:  [phone] Date: Dec 10, 2024      Ambulatory Referral to Home Health     Patient:  Joel Galo MRN:  [id number]   [address] [facility] [address] :  1965  SSN:    Phone: [phone] Sex:  M      INSURANCE PAYOR PLAN GROUP # SUBSCRIBER ID   Primary:    [employer] REPLACEMENT [id number]      Referring Provider Information:  EDU CALZADA Phone: [phone] Fax: [phone]       Referral Information:   # Visits:  999 Referral Type: Home Health [42]   Urgency:  Routine Referral Reason: Specialty Services Required   Start Date: Dec 10, 2024 End Date:  To be determined by Insurer   Diagnosis: Pericardial effusion after operative procedure (I97.89,I31.39 [ICD-10-CM] 997.1,423.9 [ICD-9-CM])  Paroxysmal atrial fibrillation (I48.0 [ICD-10-CM] 427.31 [ICD-9-CM])      Refer to Dept:   Refer to Provider:   Refer to Provider Phone:   Refer to Facility:       Face to Face Visit Date: 12/10/2024  Follow-up provider for Plan of Care? I treated the patient in an acute care facility and will not continue treatment after discharge.  Follow-up provider: MONY ADAMSON [[id number]]  Reason/Clinical Findings: pericardial effusion  Describe mobility limitations that make leaving home difficult: impaired functional mobility, gait, balance and endurance  Nursing/Therapeutic Services Requested: Skilled Nursing  Nursing/Therapeutic Services Requested: Physical Therapy  Skilled nursing orders: Neurovascular assessments  Skilled nursing orders: Cardiopulmonary assessments  PT orders: Home safety assessment  PT orders: Strengthening  Frequency: 1 Week 1     This document serves as a request of services and does not constitute Insurance authorization or approval of services.  To determine eligibility, please contact the members Insurance carrier to verify and review coverage.      If you have medical questions regarding this request for services. Please contact [address] at [phone] during normal business hours.        Verbal Order Mode: Telephone with readback   Authorizing Provider: Alex Max MD  Authorizing Provider's NPI: [id number]     Order Entered By: Aneta Grijalva RN 12/10/2024  9:31 AM     Electronically signed by:

## 2024-12-10 NOTE — PLAN OF CARE
Goal Outcome Evaluation:  Plan of Care Reviewed With: patient        Progress: improving  Outcome Evaluation: patient is able to ambulate 220 ft with rolling walker and CGA stable vitals with activity patient will be D/C home today with  services. patient was progressing toward all mobility goals and is safe to go home    Anticipated Discharge Disposition (PT): home with home health, home with assist

## 2024-12-11 NOTE — DISCHARGE SUMMARY
"CTS Discharge Summary    Patient Care Team:  Edmundo Boston MD as PCP - General (Family Medicine)  Julio Neff MD as Consulting Physician (Cardiology)  Blossom Parks APRN as Nurse Practitioner (Cardiology)      Date of Admission: 12/6/2024 12:46 PM  Date of Discharge: 12/10/2024    Discharge Diagnosis  Past Medical History:   Diagnosis Date    A-fib     Anxiety     Arthritis     DDD (degenerative disc disease), lumbar     Deep vein thrombosis     Depression     Diabetes     Disease of thyroid gland     HYPO    Heart attack     \"5-6 years ago\"    Hyperlipidemia     Hypertension     Kidney stone     Neuropathy     Sleep apnea     DOES NOT USE CPAP    UTI (urinary tract infection)          Pericardial effusion after CABG x 4, MAZE, LAAL 10/28/24.  Readmit for Pericardial window 12/6/24      History of Present Illness  Mr. Joel Galo is a 58yo male well known to our service, having undergone CABG x4, MAZE, LAAL on 10/28/24 with Dr. Max. His postoperative course was complicated by Afib, urinary retention, and hematuria. He was discharged to rehab on POD9.  He was seen in the clinic by myself on 11/20/24 where there was concern for sternal wound infection [facility]. At that time, he had already been started on antibiotics. The wound had not been washed since discharge. However, there was little concern for infection. Patient denied shortness of breath, chest pain or lower extremity swelling.     On 12/4/24, he had a syncopal event and was brought to [facility] ED. An echo was performed which showed pericardial effusion and early diastolic collapse of the right atrial wall. He was transferred to [facility] for possible pericardial window.     Upon arrival, the patient is short of breath with lying down. He denies chest pain or lower extremity edema. Incisions are healing and chest tube sutures remain in place.       Hospital Course  The patient is a 59-year-old male who " was a direct admit from the office on 12/6/2024 and underwent a subxiphoid with a left anterior thoracotomy pericardial window form Dr. Alex Max.  The patient tolerated the procedure well without any immediate complications and was transferred to PACU in stable condition.  On postoperative day #1, the patient was doing well having previously been extubated.  Over the next several days, the patient worked with the nursing staff and physical therapy to ambulate the hallway improve his functional status.  Patient was volume overloaded and successfully diuresed.  On postoperative day #2, the patient had the mediastinal tubes removed without difficulty.  On 12/10/2024, the patient met criteria for discharge and was discharged home without difficulty.    Procedures Performed  Procedure(s):  PERICARDIAL WINDOW       Consults:   Consults       Date and Time Order Name Status Description    12/5/2024  4:41 AM Inpatient Cardiology Consult Completed           Intensivist    Discharge Medications     Discharge Medications        New Medications        Instructions Start Date   colchicine 0.6 MG tablet   0.6 mg, Oral, Every 12 Hours Scheduled      furosemide 40 MG tablet  Commonly known as: LASIX   Take 1 tablet by mouth Daily with potassium for 14 days and then discontinue      guaiFENesin 600 MG 12 hr tablet  Commonly known as: MUCINEX   600 mg, Oral, Every 12 Hours Scheduled      potassium chloride 10 MEQ CR capsule  Commonly known as: MICRO-K   Take 2 capsules by mouth Daily with Lasix (furosemide) for 14 days and then discontinue             Continue These Medications        Instructions Start Date   aspirin 325 MG EC tablet   325 mg, Oral, Daily      buPROPion  MG 24 hr tablet  Commonly known as: WELLBUTRIN XL   300 mg, Oral, Daily      docusate sodium 100 MG capsule  Commonly known as: COLACE   100 mg, Oral, 2 Times Daily PRN      DULoxetine 60 MG capsule  Commonly known as: CYMBALTA   60 mg, Oral, Daily       empagliflozin 25 MG tablet tablet  Commonly known as: JARDIANCE   25 mg, Oral, Daily      finasteride 5 MG tablet  Commonly known as: PROSCAR   TAKE 1 TABLET EVERY DAY      gabapentin 100 MG capsule  Commonly known as: NEURONTIN   200 mg, Oral, Every Early Morning      gabapentin 100 MG capsule  Commonly known as: NEURONTIN   300 mg, Oral, Nightly      glimepiride 2 MG tablet  Commonly known as: AMARYL   4 mg, Oral, 2 Times Daily      HYDROcodone-acetaminophen  MG per tablet  Commonly known as: NORCO   1 tablet, Oral, Every 8 Hours PRN      insulin glargine 100 UNIT/ML injection  Commonly known as: LANTUS, SEMGLEE   20 Units, Subcutaneous, Daily      Insulin Lispro 100 UNIT/ML injection  Commonly known as: humaLOG   2-9 Units, Subcutaneous, 4 Times Daily Before Meals & Nightly      Insulin Lispro 100 UNIT/ML injection  Commonly known as: humaLOG   2 Units, Subcutaneous, 3 Times Daily With Meals      lactulose 10 GM/15ML solution  Commonly known as: CHRONULAC   30 g, Oral, 2 Times Daily PRN      levothyroxine 50 MCG tablet  Commonly known as: SYNTHROID, LEVOTHROID   50 mcg, Oral, Every Early Morning      nitroglycerin 0.4 MG SL tablet  Commonly known as: NITROSTAT   0.4 mg, Sublingual, Every 5 Minutes PRN, Take no more than 3 doses in 15 minutes.      rosuvastatin 20 MG tablet  Commonly known as: CRESTOR   20 mg, Oral, Daily      sotalol 120 MG tablet  Commonly known as: BETAPACE   120 mg, Oral, 2 Times Daily      tamsulosin 0.4 MG capsule 24 hr capsule  Commonly known as: FLOMAX   TAKE 1 CAPSULE EVERY DAY      Vitamin D (Cholecalciferol) 10 MCG (400 UNIT) tablet  Commonly known as: CHOLECALCIFEROL   400 Units, Oral, Daily               Discharge Diet:   Diet Instructions       Diet: Cardiac Diets, Diabetic Diets; Healthy Heart (2-3 Na+); Thin (IDDSI 0); Consistent Carbohydrate      Discharge Diet:  Cardiac Diets  Diabetic Diets       Cardiac Diet: Healthy Heart (2-3 Na+)    Fluid Consistency: Thin (IDDSI 0)     Diabetic Diet: Consistent Carbohydrate            Activity at Discharge:   Activity Instructions       Activity as Tolerated      Other Activity Instructions      Activity Instructions: No lifting greater than 10 pounds and no driving until follow-up appointment.  No driving while taking narcotics.          Do not drive while taking narcotics    Wound Care:  Monitor surgical wounds daily. Keep incisons clean and dry.   Call CT Surgery office [phone]  with any questions or concerns, specifically let them know of increased redness, drainage, or opening up of incision.       Follow-up Appointments  Future Appointments   Date Time Provider Department Center   1/14/2025 10:30 AM Blossom Parks APRN [facility]   6/6/2025  1:45 PM Musa Fishman DO [facility]     Follow-up with Dr. Max in office in 4 weeks.     INDERJIT Arguelles  12/11/24  15:44 EST

## 2024-12-11 NOTE — OUTREACH NOTE
Prep Survey      Flowsheet Row Responses   [facility] patient discharged from? [facility]   Is LACE score < 7 ? No   Eligibility Readm Mgmt   Discharge diagnosis Pericardial effusion after CABGx4, Readmit for pericardial window   Does the patient have one of the following disease processes/diagnoses(primary or secondary)? Cardiothoracic surgery   Does the patient have Home health ordered? No   Is there a DME ordered? No   Prep survey completed? Yes            SUGAR STEVE - Registered Nurse

## 2024-12-11 NOTE — CASE MANAGEMENT/SOCIAL WORK
Continued Stay Note  [facility]     Patient Name: Joel Galo  MRN: [id number]  Today's Date: 12/11/2024    Admit Date: 12/6/2024    Plan: Unable to determine HH provider   Discharge Plan       Row Name 12/11/24 0949       Plan    Plan Unable to determine HH provider    Plan Comments SW called [facility] and was told they sent a referral to [facility] in [place].  [facility] said they declined the referral because pt was out of their service region and that pt resides in [facility].  There are two HH agencies in [facility], both [facility] and [facility] have denied having pt in their care.    Final Discharge Disposition Code 01 - home or self-care                   Discharge Codes    No documentation.                 Expected Discharge Date and Time       Expected Discharge Date Expected Discharge Time    Dec 10, 2024               CLEO Dunne

## 2024-12-13 LAB
BACTERIA SPEC ANAEROBE CULT: ABNORMAL
FUNGUS WND CULT: NORMAL
MYCOBACTERIUM SPEC CULT: NORMAL
NIGHT BLUE STAIN TISS: NORMAL

## 2024-12-16 ENCOUNTER — TELEPHONE (OUTPATIENT)
Dept: CARDIAC SURGERY | Facility: CLINIC | Age: 59
End: 2024-12-16
Payer: MEDICARE

## 2024-12-16 DIAGNOSIS — R89.5 POSITIVE CULTURE FINDING: Primary | ICD-10-CM

## 2024-12-16 RX ORDER — AMOXICILLIN 500 MG/1
1000 CAPSULE ORAL 2 TIMES DAILY
Qty: 40 CAPSULE | Refills: 0 | Status: SHIPPED | OUTPATIENT
Start: 2024-12-16 | End: 2024-12-26

## 2024-12-16 NOTE — TELEPHONE ENCOUNTER
Spoke with Abner Iraj about his anaerobic culture coming back positive for cutibacterium acnes.  Will prescribe 10-day course of amoxicillin.  Also informed patient that we would refer him to infectious disease for further evaluation and treatment.  He is agreeable to this plan.  Will notify his primary care doctor as well.

## 2024-12-16 NOTE — TELEPHONE ENCOUNTER
Referral sent to [facility] per , Marisa. I contacted PCP and spoke with VIKTOR Raphael. She took message to relay to Dr. Boston. Will fax culture result to their office as well.

## 2024-12-17 ENCOUNTER — READMISSION MANAGEMENT (OUTPATIENT)
Dept: CALL CENTER | Facility: HOSPITAL | Age: 59
End: 2024-12-17
Payer: MEDICARE

## 2024-12-17 NOTE — OUTREACH NOTE
CT Surgery Week 1 Survey      Flowsheet Row Responses   [facility] patient discharged from? [place]   Does the patient have one of the following disease processes/diagnoses(primary or secondary)? Cardiothoracic surgery   Week 1 attempt successful? Yes   Call start time 1543   Call end time 1547   Discharge diagnosis Pericardial effusion after CABGx4, Readmit for pericardial window   Person spoke with today (if not patient) and relationship pt   Meds reviewed with patient/caregiver? Yes   Is the patient having any side effects they believe may be caused by any medication additions or changes? No   Does the patient have all medications related to this admission filled (includes all antibiotics, pain medications, cardiac medications, etc.) Yes   Is the patient taking all medications as directed (includes completed medication regime)? Yes   Does the patient have a primary care provider?  Yes   Does the patient have an appointment scheduled with their C/T surgeon? Yes   Has the patient kept scheduled appointments due by today? Yes   Psychosocial issues? No   Did the patient receive a copy of their discharge instructions? Yes   What is the patient's perception of their health status since discharge? Improving   Nursing interventions Nurse provided patient education   Nursing interventions Reassured on normal signs of recovery   Is the patient /caregiver able to teach back basic post-op care? Continue use of incentive spirometry at least 1 week post discharge, Practice cough and deep breath every 4 hours while awake, Lifting as instructed by MD in discharge instructions, Use a clean wash cloth and antibacterial bar or liquid soap to clean incisions   Is the patient/caregiver able to teach back signs and symptoms of incisional infection? Increased redness, swelling or pain at the incisonal site, Increased drainage or bleeding, Incisional warmth, Pus or odor from incision, Fever   Is the patient/caregiver able to  teach back steps to recovery at home? Rest and rebuild strength, gradually increase activity, Set small, achievable goals for return to baseline health, Make a list of questions for surgeon's appointment   Is the patient /caregiver able to teach back the importance of cardiac rehab? Yes   If the patient is a current smoker, are they able to teach back resources for cessation? Not a smoker   Is the patient/caregiver able to teach back the hierarchy of who to call/visit for symptoms/problems? PCP, Specialist, Home health nurse, Urgent Care, ED, 911 Yes   Week 1 call completed? Yes   Is the patient interested in additional calls from an ambulatory ? No   Would this patient benefit from a Referral to [facility] Social Work? No   Wrap up additional comments pt stated he wa doing ok. Has seen pcp has other visits scheduled   Call end time 9710              DIVINA MORENO - Registered Nurse

## 2024-12-19 ENCOUNTER — TRANSCRIBE ORDERS (OUTPATIENT)
Dept: LAB | Facility: HOSPITAL | Age: 59
End: 2024-12-19
Payer: MEDICARE

## 2024-12-19 ENCOUNTER — LAB (OUTPATIENT)
Dept: LAB | Facility: HOSPITAL | Age: 59
End: 2024-12-19
Payer: MEDICARE

## 2024-12-19 DIAGNOSIS — I31.9 PERICARDITIS, UNSPECIFIED CHRONICITY, UNSPECIFIED TYPE: Primary | ICD-10-CM

## 2024-12-19 DIAGNOSIS — B96.89 BACTERIAL CHOLANGITIS: ICD-10-CM

## 2024-12-19 DIAGNOSIS — I30.1 INFECTIOUS PERICARDITIS, UNSPECIFIED CHRONICITY, UNSPECIFIED INFECTIOUS ETIOLOGY: Primary | ICD-10-CM

## 2024-12-19 DIAGNOSIS — I25.810 ATHEROSCLEROSIS OF AUTOLOGOUS VEIN CORONARY ARTERY BYPASS GRAFT, UNSPECIFIED WHETHER ANGINA PRESENT: ICD-10-CM

## 2024-12-19 DIAGNOSIS — K83.09 BACTERIAL CHOLANGITIS: ICD-10-CM

## 2024-12-19 DIAGNOSIS — E11.42 DIABETIC POLYNEUROPATHY ASSOCIATED WITH TYPE 2 DIABETES MELLITUS: ICD-10-CM

## 2024-12-19 DIAGNOSIS — I32 PERICARDITIS IN DISEASES CLASSIFIED ELSEWHERE: ICD-10-CM

## 2024-12-19 DIAGNOSIS — I30.1 INFECTIOUS PERICARDITIS, UNSPECIFIED CHRONICITY, UNSPECIFIED INFECTIOUS ETIOLOGY: ICD-10-CM

## 2024-12-19 LAB
ALBUMIN SERPL-MCNC: 3.3 G/DL (ref 3.5–5.2)
ALBUMIN/GLOB SERPL: 0.8 G/DL
ALP SERPL-CCNC: 163 U/L (ref 39–117)
ALT SERPL W P-5'-P-CCNC: 21 U/L (ref 1–41)
ANION GAP SERPL CALCULATED.3IONS-SCNC: 8 MMOL/L (ref 5–15)
AST SERPL-CCNC: 24 U/L (ref 1–40)
BASOPHILS # BLD AUTO: 0.02 10*3/MM3 (ref 0–0.2)
BASOPHILS NFR BLD AUTO: 0.3 % (ref 0–1.5)
BILIRUB SERPL-MCNC: 0.2 MG/DL (ref 0–1.2)
BUN SERPL-MCNC: 11 MG/DL (ref 8–23)
BUN/CREAT SERPL: 12.9 (ref 7–25)
CALCIUM SPEC-SCNC: 8.9 MG/DL (ref 8.6–10.5)
CHLORIDE SERPL-SCNC: 95 MMOL/L (ref 98–107)
CO2 SERPL-SCNC: 33 MMOL/L (ref 22–29)
CREAT SERPL-MCNC: 0.85 MG/DL (ref 0.76–1.27)
CRP SERPL-MCNC: 2.2 MG/DL (ref 0–0.5)
DEPRECATED RDW RBC AUTO: 43.5 FL (ref 37–54)
EGFRCR SERPLBLD CKD-EPI 2021: 98.9 ML/MIN/1.73
EOSINOPHIL # BLD AUTO: 0.23 10*3/MM3 (ref 0–0.4)
EOSINOPHIL NFR BLD AUTO: 3.8 % (ref 0.3–6.2)
ERYTHROCYTE [DISTWIDTH] IN BLOOD BY AUTOMATED COUNT: 13.6 % (ref 12.3–15.4)
ERYTHROCYTE [SEDIMENTATION RATE] IN BLOOD: 101 MM/HR (ref 0–20)
GLOBULIN UR ELPH-MCNC: 4.3 GM/DL
GLUCOSE SERPL-MCNC: 357 MG/DL (ref 65–99)
HCT VFR BLD AUTO: 34.5 % (ref 37.5–51)
HGB BLD-MCNC: 11.2 G/DL (ref 13–17.7)
IMM GRANULOCYTES # BLD AUTO: 0.02 10*3/MM3 (ref 0–0.05)
IMM GRANULOCYTES NFR BLD AUTO: 0.3 % (ref 0–0.5)
LYMPHOCYTES # BLD AUTO: 1.22 10*3/MM3 (ref 0.7–3.1)
LYMPHOCYTES NFR BLD AUTO: 20.3 % (ref 19.6–45.3)
MCH RBC QN AUTO: 28.7 PG (ref 26.6–33)
MCHC RBC AUTO-ENTMCNC: 32.5 G/DL (ref 31.5–35.7)
MCV RBC AUTO: 88.5 FL (ref 79–97)
MONOCYTES # BLD AUTO: 0.42 10*3/MM3 (ref 0.1–0.9)
MONOCYTES NFR BLD AUTO: 7 % (ref 5–12)
NEUTROPHILS NFR BLD AUTO: 4.1 10*3/MM3 (ref 1.7–7)
NEUTROPHILS NFR BLD AUTO: 68.3 % (ref 42.7–76)
NRBC BLD AUTO-RTO: 0 /100 WBC (ref 0–0.2)
PLATELET # BLD AUTO: 296 10*3/MM3 (ref 140–450)
PMV BLD AUTO: 8.9 FL (ref 6–12)
POTASSIUM SERPL-SCNC: 4.5 MMOL/L (ref 3.5–5.2)
PROT SERPL-MCNC: 7.6 G/DL (ref 6–8.5)
RBC # BLD AUTO: 3.9 10*6/MM3 (ref 4.14–5.8)
SODIUM SERPL-SCNC: 136 MMOL/L (ref 136–145)
WBC NRBC COR # BLD AUTO: 6.01 10*3/MM3 (ref 3.4–10.8)

## 2024-12-19 PROCEDURE — 86140 C-REACTIVE PROTEIN: CPT

## 2024-12-19 PROCEDURE — 85652 RBC SED RATE AUTOMATED: CPT

## 2024-12-19 PROCEDURE — 85025 COMPLETE CBC W/AUTO DIFF WBC: CPT

## 2024-12-19 PROCEDURE — 36415 COLL VENOUS BLD VENIPUNCTURE: CPT

## 2024-12-19 PROCEDURE — 80053 COMPREHEN METABOLIC PANEL: CPT

## 2024-12-20 ENCOUNTER — HOSPITAL ENCOUNTER (OUTPATIENT)
Dept: INFUSION THERAPY | Facility: HOSPITAL | Age: 59
Discharge: HOME | End: 2024-12-20
Payer: MEDICARE

## 2024-12-20 VITALS
OXYGEN SATURATION: 97 % | DIASTOLIC BLOOD PRESSURE: 99 MMHG | RESPIRATION RATE: 18 BRPM | HEART RATE: 93 BPM | SYSTOLIC BLOOD PRESSURE: 157 MMHG

## 2024-12-20 DIAGNOSIS — I30.1 ACUTE INFECTIVE PERICARDITIS, UNSPECIFIED INFECTIOUS ETIOLOGY: Primary | ICD-10-CM

## 2024-12-20 LAB
FUNGUS WND CULT: NORMAL
MYCOBACTERIUM SPEC CULT: NORMAL
NIGHT BLUE STAIN TISS: NORMAL

## 2024-12-20 PROCEDURE — 25010000002 CEFTRIAXONE PER 250 MG: Performed by: INTERNAL MEDICINE

## 2024-12-20 PROCEDURE — 96365 THER/PROPH/DIAG IV INF INIT: CPT

## 2024-12-20 RX ADMIN — SODIUM CHLORIDE 2000 MG: 9 INJECTION, SOLUTION INTRAVENOUS at 09:35

## 2024-12-21 ENCOUNTER — HOSPITAL ENCOUNTER (OUTPATIENT)
Dept: INFUSION THERAPY | Facility: HOSPITAL | Age: 59
Discharge: HOME | End: 2024-12-21
Payer: MEDICARE

## 2024-12-21 DIAGNOSIS — I30.1 ACUTE INFECTIVE PERICARDITIS, UNSPECIFIED INFECTIOUS ETIOLOGY: Primary | ICD-10-CM

## 2024-12-21 PROCEDURE — 25010000002 CEFTRIAXONE PER 250 MG: Performed by: INTERNAL MEDICINE

## 2024-12-21 PROCEDURE — 96365 THER/PROPH/DIAG IV INF INIT: CPT

## 2024-12-21 RX ADMIN — SODIUM CHLORIDE 2000 MG: 9 INJECTION, SOLUTION INTRAVENOUS at 09:44

## 2024-12-22 ENCOUNTER — HOSPITAL ENCOUNTER (OUTPATIENT)
Dept: INFUSION THERAPY | Facility: HOSPITAL | Age: 59
Discharge: HOME | End: 2024-12-22
Admitting: INTERNAL MEDICINE
Payer: MEDICARE

## 2024-12-22 DIAGNOSIS — I30.1 ACUTE INFECTIVE PERICARDITIS, UNSPECIFIED INFECTIOUS ETIOLOGY: Primary | ICD-10-CM

## 2024-12-22 PROCEDURE — 96365 THER/PROPH/DIAG IV INF INIT: CPT

## 2024-12-22 PROCEDURE — 25010000002 CEFTRIAXONE PER 250 MG: Performed by: INTERNAL MEDICINE

## 2024-12-22 RX ADMIN — SODIUM CHLORIDE 2000 MG: 9 INJECTION, SOLUTION INTRAVENOUS at 08:57

## 2024-12-23 ENCOUNTER — HOSPITAL ENCOUNTER (OUTPATIENT)
Dept: INFUSION THERAPY | Facility: HOSPITAL | Age: 59
Discharge: HOME | End: 2024-12-23
Admitting: INTERNAL MEDICINE
Payer: MEDICARE

## 2024-12-23 VITALS — DIASTOLIC BLOOD PRESSURE: 77 MMHG | OXYGEN SATURATION: 98 % | HEART RATE: 86 BPM | SYSTOLIC BLOOD PRESSURE: 125 MMHG

## 2024-12-23 DIAGNOSIS — I30.1 ACUTE INFECTIVE PERICARDITIS, UNSPECIFIED INFECTIOUS ETIOLOGY: Primary | ICD-10-CM

## 2024-12-23 PROCEDURE — 25010000002 CEFTRIAXONE PER 250 MG: Performed by: INTERNAL MEDICINE

## 2024-12-23 PROCEDURE — 96365 THER/PROPH/DIAG IV INF INIT: CPT

## 2024-12-23 PROCEDURE — C1751 CATH, INF, PER/CENT/MIDLINE: HCPCS

## 2024-12-23 PROCEDURE — 36410 VNPNXR 3YR/> PHY/QHP DX/THER: CPT

## 2024-12-23 RX ADMIN — SODIUM CHLORIDE 2000 MG: 9 INJECTION, SOLUTION INTRAVENOUS at 10:06

## 2024-12-23 NOTE — CONSULTS
Received call from patient nurse, Sidra DALE, that patient difficult to obtain iv access. Requesting US PIV.    Patient agreeable to ultrasound guided iv access.    AccuCath Ace Intravascular Catheter; 20 g; 2.25 in   Lower Right Forearm    AccuCath Ace Intravascular Catheter placed with ultrasound guidance and verified by blood return. Minimal blood loss noted. Catheter flushes easily. Blood return noted. Patient nurse, Sidra DALE, made aware that catheter is in R forearm and ready for use.    NIKITA Rooney RN

## 2024-12-24 ENCOUNTER — HOSPITAL ENCOUNTER (OUTPATIENT)
Dept: INFUSION THERAPY | Facility: HOSPITAL | Age: 59
Discharge: HOME | End: 2024-12-24
Admitting: INTERNAL MEDICINE
Payer: MEDICARE

## 2024-12-24 DIAGNOSIS — I30.1 ACUTE INFECTIVE PERICARDITIS, UNSPECIFIED INFECTIOUS ETIOLOGY: Primary | ICD-10-CM

## 2024-12-24 PROCEDURE — 96365 THER/PROPH/DIAG IV INF INIT: CPT

## 2024-12-24 PROCEDURE — 25010000002 CEFTRIAXONE PER 250 MG: Performed by: INTERNAL MEDICINE

## 2024-12-24 RX ADMIN — SODIUM CHLORIDE 2000 MG: 9 INJECTION, SOLUTION INTRAVENOUS at 08:55

## 2024-12-25 ENCOUNTER — HOSPITAL ENCOUNTER (OUTPATIENT)
Dept: INFUSION THERAPY | Facility: HOSPITAL | Age: 59
Discharge: HOME | End: 2024-12-25
Admitting: INTERNAL MEDICINE
Payer: MEDICARE

## 2024-12-25 DIAGNOSIS — I30.1 ACUTE INFECTIVE PERICARDITIS, UNSPECIFIED INFECTIOUS ETIOLOGY: Primary | ICD-10-CM

## 2024-12-25 PROCEDURE — 96365 THER/PROPH/DIAG IV INF INIT: CPT

## 2024-12-25 PROCEDURE — 25010000002 CEFTRIAXONE PER 250 MG: Performed by: INTERNAL MEDICINE

## 2024-12-25 RX ADMIN — SODIUM CHLORIDE 2000 MG: 9 INJECTION, SOLUTION INTRAVENOUS at 09:15

## 2024-12-26 ENCOUNTER — HOSPITAL ENCOUNTER (OUTPATIENT)
Dept: INFUSION THERAPY | Facility: HOSPITAL | Age: 59
Discharge: HOME | End: 2024-12-26
Admitting: INTERNAL MEDICINE
Payer: MEDICARE

## 2024-12-26 ENCOUNTER — APPOINTMENT (OUTPATIENT)
Dept: CARDIOLOGY | Facility: HOSPITAL | Age: 59
End: 2024-12-26
Payer: MEDICARE

## 2024-12-26 VITALS — SYSTOLIC BLOOD PRESSURE: 147 MMHG | DIASTOLIC BLOOD PRESSURE: 100 MMHG | HEART RATE: 90 BPM | OXYGEN SATURATION: 97 %

## 2024-12-26 DIAGNOSIS — I30.1 ACUTE INFECTIVE PERICARDITIS, UNSPECIFIED INFECTIOUS ETIOLOGY: Primary | ICD-10-CM

## 2024-12-26 PROCEDURE — 25010000002 CEFTRIAXONE PER 250 MG: Performed by: INTERNAL MEDICINE

## 2024-12-26 PROCEDURE — 96365 THER/PROPH/DIAG IV INF INIT: CPT

## 2024-12-26 RX ADMIN — SODIUM CHLORIDE 2000 MG: 9 INJECTION, SOLUTION INTRAVENOUS at 12:24

## 2024-12-27 ENCOUNTER — HOSPITAL ENCOUNTER (OUTPATIENT)
Dept: INFUSION THERAPY | Facility: HOSPITAL | Age: 59
Discharge: HOME | End: 2024-12-27
Payer: MEDICARE

## 2024-12-27 ENCOUNTER — HOSPITAL ENCOUNTER (OUTPATIENT)
Dept: CARDIOLOGY | Facility: HOSPITAL | Age: 59
Discharge: HOME | End: 2024-12-27
Payer: MEDICARE

## 2024-12-27 ENCOUNTER — TRANSCRIBE ORDERS (OUTPATIENT)
Dept: LAB | Facility: HOSPITAL | Age: 59
End: 2024-12-27
Payer: MEDICARE

## 2024-12-27 ENCOUNTER — LAB (OUTPATIENT)
Dept: LAB | Facility: HOSPITAL | Age: 59
End: 2024-12-27
Payer: MEDICARE

## 2024-12-27 VITALS
HEART RATE: 87 BPM | DIASTOLIC BLOOD PRESSURE: 97 MMHG | SYSTOLIC BLOOD PRESSURE: 162 MMHG | OXYGEN SATURATION: 95 % | RESPIRATION RATE: 18 BRPM

## 2024-12-27 DIAGNOSIS — B96.89 BACTERIAL CHOLANGITIS: ICD-10-CM

## 2024-12-27 DIAGNOSIS — I32 PERICARDITIS IN DISEASES CLASSIFIED ELSEWHERE: ICD-10-CM

## 2024-12-27 DIAGNOSIS — I30.1 ACUTE INFECTIVE PERICARDITIS, UNSPECIFIED INFECTIOUS ETIOLOGY: Primary | ICD-10-CM

## 2024-12-27 DIAGNOSIS — K83.09 BACTERIAL CHOLANGITIS: ICD-10-CM

## 2024-12-27 DIAGNOSIS — I30.1 INFECTIOUS PERICARDITIS, UNSPECIFIED CHRONICITY, UNSPECIFIED INFECTIOUS ETIOLOGY: ICD-10-CM

## 2024-12-27 DIAGNOSIS — I30.1 INFECTIOUS PERICARDITIS, UNSPECIFIED CHRONICITY, UNSPECIFIED INFECTIOUS ETIOLOGY: Primary | ICD-10-CM

## 2024-12-27 DIAGNOSIS — I31.9 PERICARDITIS, UNSPECIFIED CHRONICITY, UNSPECIFIED TYPE: ICD-10-CM

## 2024-12-27 LAB
ALBUMIN SERPL-MCNC: 3.5 G/DL (ref 3.5–5.2)
ALBUMIN/GLOB SERPL: 0.9 G/DL
ALP SERPL-CCNC: 162 U/L (ref 39–117)
ALT SERPL W P-5'-P-CCNC: 16 U/L (ref 1–41)
ANION GAP SERPL CALCULATED.3IONS-SCNC: 10 MMOL/L (ref 5–15)
AST SERPL-CCNC: 21 U/L (ref 1–40)
AV MEAN PRESS GRAD SYS DOP V1V2: 3 MMHG
AV VMAX SYS DOP: 112 CM/SEC
BASOPHILS # BLD AUTO: 0.03 10*3/MM3 (ref 0–0.2)
BASOPHILS NFR BLD AUTO: 0.6 % (ref 0–1.5)
BH CV ECHO MEAS - ACS: 2 CM
BH CV ECHO MEAS - AO MAX PG: 5 MMHG
BH CV ECHO MEAS - AO ROOT DIAM: 3.5 CM
BH CV ECHO MEAS - AO V2 VTI: 19.1 CM
BH CV ECHO MEAS - AVA(I,D): 3.1 CM2
BH CV ECHO MEAS - EDV(CUBED): 59.3 ML
BH CV ECHO MEAS - EDV(MOD-SP2): 127 ML
BH CV ECHO MEAS - EDV(MOD-SP4): 102 ML
BH CV ECHO MEAS - EF(MOD-SP2): 55 %
BH CV ECHO MEAS - EF(MOD-SP4): 56.5 %
BH CV ECHO MEAS - ESV(CUBED): 19.7 ML
BH CV ECHO MEAS - ESV(MOD-SP2): 57.1 ML
BH CV ECHO MEAS - ESV(MOD-SP4): 44.4 ML
BH CV ECHO MEAS - FS: 30.8 %
BH CV ECHO MEAS - IVS/LVPW: 1 CM
BH CV ECHO MEAS - IVSD: 1.3 CM
BH CV ECHO MEAS - LA DIMENSION: 3.1 CM
BH CV ECHO MEAS - LAT PEAK E' VEL: 15 CM/SEC
BH CV ECHO MEAS - LV DIASTOLIC VOL/BSA (35-75): 48.7 CM2
BH CV ECHO MEAS - LV MASS(C)D: 179.7 GRAMS
BH CV ECHO MEAS - LV MAX PG: 2.6 MMHG
BH CV ECHO MEAS - LV MEAN PG: 1 MMHG
BH CV ECHO MEAS - LV SYSTOLIC VOL/BSA (12-30): 21.2 CM2
BH CV ECHO MEAS - LV V1 MAX: 80.3 CM/SEC
BH CV ECHO MEAS - LV V1 VTI: 15.8 CM
BH CV ECHO MEAS - LVIDD: 3.9 CM
BH CV ECHO MEAS - LVIDS: 2.7 CM
BH CV ECHO MEAS - LVOT AREA: 3.8 CM2
BH CV ECHO MEAS - LVOT DIAM: 2.2 CM
BH CV ECHO MEAS - LVPWD: 1.3 CM
BH CV ECHO MEAS - MED PEAK E' VEL: 8.5 CM/SEC
BH CV ECHO MEAS - MR MAX PG: 11.2 MMHG
BH CV ECHO MEAS - MR MAX VEL: 167.5 CM/SEC
BH CV ECHO MEAS - MV A MAX VEL: 54.4 CM/SEC
BH CV ECHO MEAS - MV DEC SLOPE: 403 CM/SEC2
BH CV ECHO MEAS - MV DEC TIME: 0.21 SEC
BH CV ECHO MEAS - MV E MAX VEL: 85.7 CM/SEC
BH CV ECHO MEAS - MV E/A: 1.58
BH CV ECHO MEAS - MV MAX PG: 2.6 MMHG
BH CV ECHO MEAS - MV MEAN PG: 1 MMHG
BH CV ECHO MEAS - MV V2 VTI: 16 CM
BH CV ECHO MEAS - MVA(VTI): 3.8 CM2
BH CV ECHO MEAS - PA ACC TIME: 0.12 SEC
BH CV ECHO MEAS - PA V2 MAX: 93.3 CM/SEC
BH CV ECHO MEAS - RAP SYSTOLE: 10 MMHG
BH CV ECHO MEAS - RVSP: 30.8 MMHG
BH CV ECHO MEAS - SV(LVOT): 60.1 ML
BH CV ECHO MEAS - SV(MOD-SP2): 69.9 ML
BH CV ECHO MEAS - SV(MOD-SP4): 57.6 ML
BH CV ECHO MEAS - SVI(LVOT): 28.7 ML/M2
BH CV ECHO MEAS - SVI(MOD-SP2): 33.3 ML/M2
BH CV ECHO MEAS - SVI(MOD-SP4): 27.5 ML/M2
BH CV ECHO MEAS - TAPSE (>1.6): 1.42 CM
BH CV ECHO MEAS - TR MAX PG: 20.8 MMHG
BH CV ECHO MEAS - TR MAX VEL: 228 CM/SEC
BH CV ECHO MEASUREMENTS AVERAGE E/E' RATIO: 7.29
BILIRUB SERPL-MCNC: <0.2 MG/DL (ref 0–1.2)
BUN SERPL-MCNC: 13 MG/DL (ref 8–23)
BUN/CREAT SERPL: 14.9 (ref 7–25)
CALCIUM SPEC-SCNC: 9.5 MG/DL (ref 8.6–10.5)
CHLORIDE SERPL-SCNC: 97 MMOL/L (ref 98–107)
CO2 SERPL-SCNC: 32 MMOL/L (ref 22–29)
CREAT SERPL-MCNC: 0.87 MG/DL (ref 0.76–1.27)
CRP SERPL-MCNC: 0.47 MG/DL (ref 0–0.5)
DEPRECATED RDW RBC AUTO: 43.4 FL (ref 37–54)
EGFRCR SERPLBLD CKD-EPI 2021: 98.2 ML/MIN/1.73
EOSINOPHIL # BLD AUTO: 0.21 10*3/MM3 (ref 0–0.4)
EOSINOPHIL NFR BLD AUTO: 3.9 % (ref 0.3–6.2)
ERYTHROCYTE [DISTWIDTH] IN BLOOD BY AUTOMATED COUNT: 13.4 % (ref 12.3–15.4)
ERYTHROCYTE [SEDIMENTATION RATE] IN BLOOD: 62 MM/HR (ref 0–20)
FUNGUS WND CULT: NORMAL
GLOBULIN UR ELPH-MCNC: 3.7 GM/DL
GLUCOSE SERPL-MCNC: 345 MG/DL (ref 65–99)
HCT VFR BLD AUTO: 35.5 % (ref 37.5–51)
HGB BLD-MCNC: 11.5 G/DL (ref 13–17.7)
IMM GRANULOCYTES # BLD AUTO: 0.03 10*3/MM3 (ref 0–0.05)
IMM GRANULOCYTES NFR BLD AUTO: 0.6 % (ref 0–0.5)
LEFT ATRIUM VOLUME INDEX: 21.9 ML/M2
LV EF BIPLANE MOD: 53.5 %
LYMPHOCYTES # BLD AUTO: 1.45 10*3/MM3 (ref 0.7–3.1)
LYMPHOCYTES NFR BLD AUTO: 27 % (ref 19.6–45.3)
MCH RBC QN AUTO: 28.5 PG (ref 26.6–33)
MCHC RBC AUTO-ENTMCNC: 32.4 G/DL (ref 31.5–35.7)
MCV RBC AUTO: 87.9 FL (ref 79–97)
MONOCYTES # BLD AUTO: 0.58 10*3/MM3 (ref 0.1–0.9)
MONOCYTES NFR BLD AUTO: 10.8 % (ref 5–12)
MYCOBACTERIUM SPEC CULT: NORMAL
NEUTROPHILS NFR BLD AUTO: 3.07 10*3/MM3 (ref 1.7–7)
NEUTROPHILS NFR BLD AUTO: 57.1 % (ref 42.7–76)
NIGHT BLUE STAIN TISS: NORMAL
NRBC BLD AUTO-RTO: 0 /100 WBC (ref 0–0.2)
PLATELET # BLD AUTO: 196 10*3/MM3 (ref 140–450)
PMV BLD AUTO: 9.9 FL (ref 6–12)
POTASSIUM SERPL-SCNC: 3.9 MMOL/L (ref 3.5–5.2)
PROT SERPL-MCNC: 7.2 G/DL (ref 6–8.5)
RBC # BLD AUTO: 4.04 10*6/MM3 (ref 4.14–5.8)
SODIUM SERPL-SCNC: 139 MMOL/L (ref 136–145)
WBC NRBC COR # BLD AUTO: 5.37 10*3/MM3 (ref 3.4–10.8)

## 2024-12-27 PROCEDURE — 85025 COMPLETE CBC W/AUTO DIFF WBC: CPT

## 2024-12-27 PROCEDURE — 36415 COLL VENOUS BLD VENIPUNCTURE: CPT

## 2024-12-27 PROCEDURE — 93306 TTE W/DOPPLER COMPLETE: CPT

## 2024-12-27 PROCEDURE — 25010000002 CEFTRIAXONE PER 250 MG: Performed by: INTERNAL MEDICINE

## 2024-12-27 PROCEDURE — 80053 COMPREHEN METABOLIC PANEL: CPT

## 2024-12-27 PROCEDURE — 85652 RBC SED RATE AUTOMATED: CPT

## 2024-12-27 PROCEDURE — 86140 C-REACTIVE PROTEIN: CPT

## 2024-12-27 PROCEDURE — 96365 THER/PROPH/DIAG IV INF INIT: CPT

## 2024-12-27 RX ADMIN — SODIUM CHLORIDE 2000 MG: 9 INJECTION, SOLUTION INTRAVENOUS at 08:10

## 2024-12-28 ENCOUNTER — HOSPITAL ENCOUNTER (OUTPATIENT)
Dept: INFUSION THERAPY | Facility: HOSPITAL | Age: 59
Discharge: HOME | End: 2024-12-28
Admitting: INTERNAL MEDICINE
Payer: MEDICARE

## 2024-12-28 DIAGNOSIS — I30.1 ACUTE INFECTIVE PERICARDITIS, UNSPECIFIED INFECTIOUS ETIOLOGY: Primary | ICD-10-CM

## 2024-12-28 PROCEDURE — 25010000002 CEFTRIAXONE PER 250 MG: Performed by: INTERNAL MEDICINE

## 2024-12-28 PROCEDURE — 96365 THER/PROPH/DIAG IV INF INIT: CPT

## 2024-12-28 RX ADMIN — SODIUM CHLORIDE 2000 MG: 9 INJECTION, SOLUTION INTRAVENOUS at 07:59

## 2024-12-29 ENCOUNTER — HOSPITAL ENCOUNTER (OUTPATIENT)
Dept: INFUSION THERAPY | Facility: HOSPITAL | Age: 59
Discharge: HOME | End: 2024-12-29
Admitting: INTERNAL MEDICINE
Payer: MEDICARE

## 2024-12-29 DIAGNOSIS — I30.1 ACUTE INFECTIVE PERICARDITIS, UNSPECIFIED INFECTIOUS ETIOLOGY: Primary | ICD-10-CM

## 2024-12-29 PROCEDURE — 25010000002 CEFTRIAXONE PER 250 MG: Performed by: INTERNAL MEDICINE

## 2024-12-29 PROCEDURE — 96365 THER/PROPH/DIAG IV INF INIT: CPT

## 2024-12-29 RX ADMIN — SODIUM CHLORIDE 2000 MG: 9 INJECTION, SOLUTION INTRAVENOUS at 08:23

## 2024-12-30 ENCOUNTER — HOSPITAL ENCOUNTER (OUTPATIENT)
Dept: INFUSION THERAPY | Facility: HOSPITAL | Age: 59
Discharge: HOME | End: 2024-12-30
Admitting: INTERNAL MEDICINE
Payer: MEDICARE

## 2024-12-30 VITALS
SYSTOLIC BLOOD PRESSURE: 163 MMHG | HEART RATE: 81 BPM | OXYGEN SATURATION: 97 % | RESPIRATION RATE: 18 BRPM | DIASTOLIC BLOOD PRESSURE: 103 MMHG

## 2024-12-30 DIAGNOSIS — I30.1 ACUTE INFECTIVE PERICARDITIS, UNSPECIFIED INFECTIOUS ETIOLOGY: Primary | ICD-10-CM

## 2024-12-30 PROCEDURE — 96365 THER/PROPH/DIAG IV INF INIT: CPT

## 2024-12-30 PROCEDURE — 25010000002 CEFTRIAXONE PER 250 MG: Performed by: INTERNAL MEDICINE

## 2024-12-30 RX ADMIN — SODIUM CHLORIDE 2000 MG: 9 INJECTION, SOLUTION INTRAVENOUS at 09:11

## 2024-12-31 ENCOUNTER — APPOINTMENT (OUTPATIENT)
Dept: GENERAL RADIOLOGY | Facility: HOSPITAL | Age: 59
End: 2024-12-31
Payer: MEDICARE

## 2024-12-31 ENCOUNTER — HOSPITAL ENCOUNTER (OUTPATIENT)
Dept: INFUSION THERAPY | Facility: HOSPITAL | Age: 59
Discharge: HOME | End: 2024-12-31
Admitting: INTERNAL MEDICINE
Payer: MEDICARE

## 2024-12-31 ENCOUNTER — HOSPITAL ENCOUNTER (EMERGENCY)
Facility: HOSPITAL | Age: 59
Discharge: HOME | End: 2024-12-31
Attending: EMERGENCY MEDICINE | Admitting: EMERGENCY MEDICINE
Payer: MEDICARE

## 2024-12-31 VITALS
SYSTOLIC BLOOD PRESSURE: 169 MMHG | HEART RATE: 98 BPM | DIASTOLIC BLOOD PRESSURE: 107 MMHG | OXYGEN SATURATION: 94 % | RESPIRATION RATE: 18 BRPM

## 2024-12-31 VITALS
RESPIRATION RATE: 17 BRPM | SYSTOLIC BLOOD PRESSURE: 109 MMHG | DIASTOLIC BLOOD PRESSURE: 74 MMHG | OXYGEN SATURATION: 95 % | WEIGHT: 207 LBS | HEIGHT: 69 IN | HEART RATE: 90 BPM | BODY MASS INDEX: 30.66 KG/M2 | TEMPERATURE: 97.3 F

## 2024-12-31 DIAGNOSIS — I10 HYPERTENSION, UNSPECIFIED TYPE: Primary | ICD-10-CM

## 2024-12-31 DIAGNOSIS — I30.1 ACUTE INFECTIVE PERICARDITIS, UNSPECIFIED INFECTIOUS ETIOLOGY: Primary | ICD-10-CM

## 2024-12-31 LAB
ALBUMIN SERPL-MCNC: 3.4 G/DL (ref 3.5–5.2)
ALBUMIN/GLOB SERPL: 0.8 G/DL
ALP SERPL-CCNC: 141 U/L (ref 39–117)
ALT SERPL W P-5'-P-CCNC: 14 U/L (ref 1–41)
ANION GAP SERPL CALCULATED.3IONS-SCNC: 10.5 MMOL/L (ref 5–15)
AST SERPL-CCNC: 19 U/L (ref 1–40)
BASOPHILS # BLD AUTO: 0.03 10*3/MM3 (ref 0–0.2)
BASOPHILS NFR BLD AUTO: 0.5 % (ref 0–1.5)
BILIRUB SERPL-MCNC: 0.2 MG/DL (ref 0–1.2)
BUN SERPL-MCNC: 10 MG/DL (ref 8–23)
BUN/CREAT SERPL: 13.2 (ref 7–25)
CALCIUM SPEC-SCNC: 9.4 MG/DL (ref 8.6–10.5)
CHLORIDE SERPL-SCNC: 100 MMOL/L (ref 98–107)
CO2 SERPL-SCNC: 29.5 MMOL/L (ref 22–29)
CREAT SERPL-MCNC: 0.76 MG/DL (ref 0.76–1.27)
DEPRECATED RDW RBC AUTO: 44.2 FL (ref 37–54)
EGFRCR SERPLBLD CKD-EPI 2021: 102.3 ML/MIN/1.73
EOSINOPHIL # BLD AUTO: 0.22 10*3/MM3 (ref 0–0.4)
EOSINOPHIL NFR BLD AUTO: 3.9 % (ref 0.3–6.2)
ERYTHROCYTE [DISTWIDTH] IN BLOOD BY AUTOMATED COUNT: 13.8 % (ref 12.3–15.4)
GEN 5 1HR TROPONIN T REFLEX: 23 NG/L
GLOBULIN UR ELPH-MCNC: 4.4 GM/DL
GLUCOSE SERPL-MCNC: 149 MG/DL (ref 65–99)
HCT VFR BLD AUTO: 39.8 % (ref 37.5–51)
HGB BLD-MCNC: 12.5 G/DL (ref 13–17.7)
HOLD SPECIMEN: NORMAL
HOLD SPECIMEN: NORMAL
IMM GRANULOCYTES # BLD AUTO: 0.03 10*3/MM3 (ref 0–0.05)
IMM GRANULOCYTES NFR BLD AUTO: 0.5 % (ref 0–0.5)
LYMPHOCYTES # BLD AUTO: 1.34 10*3/MM3 (ref 0.7–3.1)
LYMPHOCYTES NFR BLD AUTO: 23.6 % (ref 19.6–45.3)
MAGNESIUM SERPL-MCNC: 1.8 MG/DL (ref 1.6–2.4)
MCH RBC QN AUTO: 27.8 PG (ref 26.6–33)
MCHC RBC AUTO-ENTMCNC: 31.4 G/DL (ref 31.5–35.7)
MCV RBC AUTO: 88.4 FL (ref 79–97)
MONOCYTES # BLD AUTO: 0.62 10*3/MM3 (ref 0.1–0.9)
MONOCYTES NFR BLD AUTO: 10.9 % (ref 5–12)
NEUTROPHILS NFR BLD AUTO: 3.45 10*3/MM3 (ref 1.7–7)
NEUTROPHILS NFR BLD AUTO: 60.6 % (ref 42.7–76)
NRBC BLD AUTO-RTO: 0 /100 WBC (ref 0–0.2)
NT-PROBNP SERPL-MCNC: 860.4 PG/ML (ref 0–900)
PLATELET # BLD AUTO: 201 10*3/MM3 (ref 140–450)
PMV BLD AUTO: 10 FL (ref 6–12)
POTASSIUM SERPL-SCNC: 3.5 MMOL/L (ref 3.5–5.2)
PROT SERPL-MCNC: 7.8 G/DL (ref 6–8.5)
QT INTERVAL: 368 MS
QTC INTERVAL: 452 MS
RBC # BLD AUTO: 4.5 10*6/MM3 (ref 4.14–5.8)
SODIUM SERPL-SCNC: 140 MMOL/L (ref 136–145)
TROPONIN T % DELTA: -4 %
TROPONIN T NUMERIC DELTA: -1 NG/L
TROPONIN T SERPL HS-MCNC: 24 NG/L
TSH SERPL DL<=0.05 MIU/L-ACNC: 1.76 UIU/ML (ref 0.27–4.2)
WBC NRBC COR # BLD AUTO: 5.69 10*3/MM3 (ref 3.4–10.8)
WHOLE BLOOD HOLD COAG: NORMAL
WHOLE BLOOD HOLD SPECIMEN: NORMAL

## 2024-12-31 PROCEDURE — 25010000002 CEFTRIAXONE PER 250 MG: Performed by: INTERNAL MEDICINE

## 2024-12-31 PROCEDURE — 80053 COMPREHEN METABOLIC PANEL: CPT | Performed by: NURSE PRACTITIONER

## 2024-12-31 PROCEDURE — 71045 X-RAY EXAM CHEST 1 VIEW: CPT

## 2024-12-31 PROCEDURE — 96365 THER/PROPH/DIAG IV INF INIT: CPT

## 2024-12-31 PROCEDURE — 25010000002 HYDRALAZINE PER 20 MG: Performed by: NURSE PRACTITIONER

## 2024-12-31 PROCEDURE — 93005 ELECTROCARDIOGRAM TRACING: CPT | Performed by: NURSE PRACTITIONER

## 2024-12-31 PROCEDURE — 99284 EMERGENCY DEPT VISIT MOD MDM: CPT

## 2024-12-31 PROCEDURE — 85025 COMPLETE CBC W/AUTO DIFF WBC: CPT | Performed by: NURSE PRACTITIONER

## 2024-12-31 PROCEDURE — 71045 X-RAY EXAM CHEST 1 VIEW: CPT | Performed by: RADIOLOGY

## 2024-12-31 PROCEDURE — 84443 ASSAY THYROID STIM HORMONE: CPT | Performed by: NURSE PRACTITIONER

## 2024-12-31 PROCEDURE — 96374 THER/PROPH/DIAG INJ IV PUSH: CPT

## 2024-12-31 PROCEDURE — 84484 ASSAY OF TROPONIN QUANT: CPT | Performed by: NURSE PRACTITIONER

## 2024-12-31 PROCEDURE — 83735 ASSAY OF MAGNESIUM: CPT | Performed by: NURSE PRACTITIONER

## 2024-12-31 PROCEDURE — 83880 ASSAY OF NATRIURETIC PEPTIDE: CPT | Performed by: NURSE PRACTITIONER

## 2024-12-31 PROCEDURE — 36415 COLL VENOUS BLD VENIPUNCTURE: CPT

## 2024-12-31 RX ORDER — CLONIDINE HYDROCHLORIDE 0.1 MG/1
0.1 TABLET ORAL 2 TIMES DAILY
Qty: 20 TABLET | Refills: 0 | Status: SHIPPED | OUTPATIENT
Start: 2024-12-31 | End: 2025-01-10

## 2024-12-31 RX ORDER — CLONIDINE HYDROCHLORIDE 0.1 MG/1
0.1 TABLET ORAL ONCE
Status: COMPLETED | OUTPATIENT
Start: 2024-12-31 | End: 2024-12-31

## 2024-12-31 RX ORDER — HYDRALAZINE HYDROCHLORIDE 20 MG/ML
20 INJECTION INTRAMUSCULAR; INTRAVENOUS ONCE
Status: COMPLETED | OUTPATIENT
Start: 2024-12-31 | End: 2024-12-31

## 2024-12-31 RX ADMIN — HYDRALAZINE HYDROCHLORIDE 20 MG: 20 INJECTION INTRAMUSCULAR; INTRAVENOUS at 13:56

## 2024-12-31 RX ADMIN — CLONIDINE HYDROCHLORIDE 0.1 MG: 0.1 TABLET ORAL at 13:05

## 2024-12-31 RX ADMIN — SODIUM CHLORIDE 2000 MG: 9 INJECTION, SOLUTION INTRAVENOUS at 09:11

## 2024-12-31 NOTE — ED PROVIDER NOTES
"Subjective   History of Present Illness  Patient is a 59-year-old male with significant past medical history positive for A-fib, anxiety, degenerative disc disease, hypertension, hyperlipidemia, sleep apnea presenting to the ER complaints of hypertension.  Patient has bacteremia and is being treated by outpatient infusion clinic.  Patient came for his infusion this morning and was told to come to the ER for hypertension.  Patient does report he forgot to take his daily medicine this morning prior to coming for his infusion.  Patient denies chest pain, swelling, headache, nausea, vomiting, dizziness or any additional symptoms at this time.  Patient denies any alleviating or worsening factors.    History provided by:  Patient   used: No        Review of Systems   Constitutional: Negative.  Negative for fever.   HENT: Negative.     Respiratory: Negative.     Cardiovascular: Negative.  Negative for chest pain.   Gastrointestinal: Negative.  Negative for abdominal pain.   Endocrine: Negative.    Genitourinary: Negative.  Negative for dysuria.   Skin: Negative.    Neurological: Negative.    Psychiatric/Behavioral: Negative.     All other systems reviewed and are negative.      Past Medical History:   Diagnosis Date    A-fib     Anxiety     Arthritis     DDD (degenerative disc disease), lumbar     Deep vein thrombosis     Depression     Diabetes     Disease of thyroid gland     HYPO    Heart attack     \"5-6 years ago\"    Hyperlipidemia     Hypertension     Kidney stone     Neuropathy     Sleep apnea     DOES NOT USE CPAP    UTI (urinary tract infection)        No Known Allergies    Past Surgical History:   Procedure Laterality Date    ATRIAL APPENDAGE EXCLUSION LEFT WITH TRANSESOPHAGEAL ECHOCARDIOGRAM N/A 10/28/2024    Procedure: ATRIAL APPENDAGE EXCLUSION LEFT WITH TRANSESOPHAGEAL ECHOCARDIOGRAM;  Surgeon: Alex Max MD;  Location: [facility];  Service: Cardiothoracic;  Laterality: N/A;    CARDIAC " CATHETERIZATION N/A 08/28/2024    Procedure: Left Heart Cath;  Surgeon: Julio Neff MD;  [facility]  [facility];  Service: Cardiology;  Laterality: N/A;    CARDIAC ELECTROPHYSIOLOGY PROCEDURE N/A 04/05/2023    Procedure: Loop insertion;  Surgeon: Tariq Chávez MD;  Location:  [facility];  Service: Cardiovascular;  Laterality: N/A;    CATARACT EXTRACTION Bilateral     COLONOSCOPY      CORONARY ARTERY BYPASS GRAFT N/A 10/28/2024    Procedure: MEDIAN STERNOTOMY, CORONARY ARTERY BYPASS GRAFTING X4 UTILIZING THE LEFT INTERNAL MAMMARY ARTERY GRAFT, ENDOSCOPIC VEIN HARVEST OF THE GREATER RIGHT SAPHENOUS VEIN;  Surgeon: Alex Max MD;  Location:  [facility];  Service: Cardiothoracic;  Laterality: N/A;    ENDOSCOPY      MAZE PROCEDURE N/A 10/28/2024    Procedure: MAZE PROCEDURE;  Surgeon: Alex Max MD;  Location:  [facility];  Service: Cardiothoracic;  Laterality: N/A;    PERICARDIAL WINDOW N/A 12/6/2024    Procedure: PERICARDIAL WINDOW;  Surgeon: Alex Max MD;  Location:  [facility];  Service: Cardiothoracic;  Laterality: N/A;    TEETH EXTRACTION         Family History   Problem Relation Age of Onset    Heart disease Mother     Coronary artery disease Father     Diabetes Father     Kidney disease Father        Social History     Socioeconomic History    Marital status:     Number of children: 1   Tobacco Use    Smoking status: Never     Passive exposure: Never    Smokeless tobacco: Never   Vaping Use    Vaping status: Never Used   Substance and Sexual Activity    Alcohol use: No    Drug use: No    Sexual activity: Not Currently           Objective   Physical Exam  Vitals and nursing note reviewed.   Constitutional:       General: He is not in acute distress.     Appearance: He is well-developed. He is obese. He is not diaphoretic.   HENT:      Head: Normocephalic and atraumatic.      Right Ear: External ear normal.      Left Ear: External ear normal.      Nose: Nose  normal.      Mouth/Throat:      Mouth: Mucous membranes are dry.   Eyes:      Conjunctiva/sclera: Conjunctivae normal.      Pupils: Pupils are equal, round, and reactive to light.   Neck:      Vascular: No JVD.      Trachea: No tracheal deviation.   Cardiovascular:      Rate and Rhythm: Normal rate and regular rhythm.      Heart sounds: Normal heart sounds. No murmur heard.  Pulmonary:      Effort: Pulmonary effort is normal. No respiratory distress.      Breath sounds: Normal breath sounds. No wheezing.   Abdominal:      General: Bowel sounds are normal.      Palpations: Abdomen is soft.      Tenderness: There is no abdominal tenderness.   Musculoskeletal:         General: No deformity. Normal range of motion.      Cervical back: Normal range of motion and neck supple.   Skin:     General: Skin is warm and dry.      Capillary Refill: Capillary refill takes 2 to 3 seconds.      Coloration: Skin is pale.      Findings: No erythema or rash.   Neurological:      Mental Status: He is alert and oriented to person, place, and time.      Cranial Nerves: No cranial nerve deficit.   Psychiatric:         Behavior: Behavior normal.         Thought Content: Thought content normal.         Procedures       Results for orders placed or performed during the hospital encounter of 12/31/24   ECG 12 Lead Syncope    Collection Time: 12/31/24 10:26 AM   Result Value Ref Range    QT Interval 368 ms    QTC Interval 452 ms   Comprehensive Metabolic Panel    Collection Time: 12/31/24 10:38 AM    Specimen: Blood   Result Value Ref Range    Glucose 149 (H) 65 - 99 mg/dL    BUN 10 8 - 23 mg/dL    Creatinine 0.76 0.76 - 1.27 mg/dL    Sodium 140 136 - 145 mmol/L    Potassium 3.5 3.5 - 5.2 mmol/L    Chloride 100 98 - 107 mmol/L    CO2 29.5 (H) 22.0 - 29.0 mmol/L    Calcium 9.4 8.6 - 10.5 mg/dL    Total Protein 7.8 6.0 - 8.5 g/dL    Albumin 3.4 (L) 3.5 - 5.2 g/dL    ALT (SGPT) 14 1 - 41 U/L    AST (SGOT) 19 1 - 40 U/L    Alkaline Phosphatase 141  (H) 39 - 117 U/L    Total Bilirubin 0.2 0.0 - 1.2 mg/dL    Globulin 4.4 gm/dL    A/G Ratio 0.8 g/dL    BUN/Creatinine Ratio 13.2 7.0 - 25.0    Anion Gap 10.5 5.0 - 15.0 mmol/L    eGFR 102.3 >60.0 mL/min/1.73   BNP    Collection Time: 12/31/24 10:38 AM    Specimen: Blood   Result Value Ref Range    proBNP 860.4 0.0 - 900.0 pg/mL   High Sensitivity Troponin T    Collection Time: 12/31/24 10:38 AM    Specimen: Blood   Result Value Ref Range    HS Troponin T 24 (H) <22 ng/L   Magnesium    Collection Time: 12/31/24 10:38 AM    Specimen: Blood   Result Value Ref Range    Magnesium 1.8 1.6 - 2.4 mg/dL   TSH    Collection Time: 12/31/24 10:38 AM    Specimen: Blood   Result Value Ref Range    TSH 1.760 0.270 - 4.200 uIU/mL   CBC Auto Differential    Collection Time: 12/31/24 10:38 AM    Specimen: Blood   Result Value Ref Range    WBC 5.69 3.40 - 10.80 10*3/mm3    RBC 4.50 4.14 - 5.80 10*6/mm3    Hemoglobin 12.5 (L) 13.0 - 17.7 g/dL    Hematocrit 39.8 37.5 - 51.0 %    MCV 88.4 79.0 - 97.0 fL    MCH 27.8 26.6 - 33.0 pg    MCHC 31.4 (L) 31.5 - 35.7 g/dL    RDW 13.8 12.3 - 15.4 %    RDW-SD 44.2 37.0 - 54.0 fl    MPV 10.0 6.0 - 12.0 fL    Platelets 201 140 - 450 10*3/mm3    Neutrophil % 60.6 42.7 - 76.0 %    Lymphocyte % 23.6 19.6 - 45.3 %    Monocyte % 10.9 5.0 - 12.0 %    Eosinophil % 3.9 0.3 - 6.2 %    Basophil % 0.5 0.0 - 1.5 %    Immature Grans % 0.5 0.0 - 0.5 %    Neutrophils, Absolute 3.45 1.70 - 7.00 10*3/mm3    Lymphocytes, Absolute 1.34 0.70 - 3.10 10*3/mm3    Monocytes, Absolute 0.62 0.10 - 0.90 10*3/mm3    Eosinophils, Absolute 0.22 0.00 - 0.40 10*3/mm3    Basophils, Absolute 0.03 0.00 - 0.20 10*3/mm3    Immature Grans, Absolute 0.03 0.00 - 0.05 10*3/mm3    nRBC 0.0 0.0 - 0.2 /100 WBC   Green Top (Gel)    Collection Time: 12/31/24 10:38 AM   Result Value Ref Range    Extra Tube Hold for add-ons.    Lavender Top    Collection Time: 12/31/24 10:38 AM   Result Value Ref Range    Extra Tube hold for add-on    Gold Top -  SST    Collection Time: 12/31/24 10:38 AM   Result Value Ref Range    Extra Tube Hold for add-ons.    Light Blue Top    Collection Time: 12/31/24 10:38 AM   Result Value Ref Range    Extra Tube Hold for add-ons.    High Sensitivity Troponin T 1Hr    Collection Time: 12/31/24 11:47 AM    Specimen: Hand, Left; Blood   Result Value Ref Range    HS Troponin T 23 (H) <22 ng/L    Troponin T Numeric Delta -1 ng/L    Troponin T % Delta -4 Abnormal if >/= 20% %      XR Chest 1 View   Final Result   No acute cardiopulmonary process.           This report was finalized on 12/31/2024 11:22 AM by Constantine Gan M.D..               ED Course  ED Course as of 12/31/24 1333   Tue Dec 31, 2024   1100 EKG reveals a normal sinus rhythm at 91.  Nonspecific ST-T abnormality without ST elevation.  WA interval 158.  QTc 452. Electronically signed by Ryan Beckford MD, 12/31/24, 11:01 AM EST.  [MD]   1249 XR Chest 1 View [SS]      ED Course User Index  [MD] Ryan Beckford MD  [SS] Concha Ibarra APRN                                                       Medical Decision Making  Patient is a 59-year-old male with significant past medical history positive for A-fib, anxiety, degenerative disc disease, hypertension, hyperlipidemia, sleep apnea presenting to the ER complaints of hypertension.  Patient has bacteremia and is being treated by outpatient infusion clinic.  Patient came for his infusion this morning and was told to come to the ER for hypertension.  Patient does report he forgot to take his daily medicine this morning prior to coming for his infusion.  Patient denies chest pain, swelling, headache, nausea, vomiting, dizziness or any additional symptoms at this time.  Patient denies any alleviating or worsening factors.    Work up and results were discussed throughly with the patients.  The patient will be discharged for further monitoring and management with their PCP.  Red flags, warning signs, worsening  symptoms, and when to return to the ER discussed with and understood by the patients.  Patient will follow up with their PCP in a timely manner.  Vitals stable at discharge.    Problems Addressed:  Hypertension, unspecified type: complicated acute illness or injury    Amount and/or Complexity of Data Reviewed  Labs: ordered.  Radiology: ordered. Decision-making details documented in ED Course.  ECG/medicine tests: ordered.    Risk  Prescription drug management.        Final diagnoses:   Hypertension, unspecified type       ED Disposition  ED Disposition       ED Disposition   Discharge    Condition   Stable    Comment   --               Edmundo Boston MD  [address] [place] [id number] [phone]    Schedule an appointment as soon as possible for a visit            Medication List        New Prescriptions      cloNIDine 0.1 MG tablet  Commonly known as: CATAPRES  Take 1 tablet by mouth 2 (Two) Times a Day for 10 days. Only take if BP is 160/90 or higher               Where to Get Your Medications        These medications were sent to [facility] [place] [id number]      Hours: Monday to Friday 7 AM to 6 PM Phone: [phone]   cloNIDine 0.1 MG tablet            Concha Ibarra, APRN  12/31/24 2562

## 2025-01-01 ENCOUNTER — HOSPITAL ENCOUNTER (OUTPATIENT)
Dept: INFUSION THERAPY | Facility: HOSPITAL | Age: 60
Discharge: HOME | End: 2025-01-01
Admitting: INTERNAL MEDICINE
Payer: MEDICARE

## 2025-01-01 DIAGNOSIS — I30.1 ACUTE INFECTIVE PERICARDITIS, UNSPECIFIED INFECTIOUS ETIOLOGY: Primary | ICD-10-CM

## 2025-01-01 PROCEDURE — 96365 THER/PROPH/DIAG IV INF INIT: CPT

## 2025-01-01 PROCEDURE — 25010000002 CEFTRIAXONE PER 250 MG: Performed by: INTERNAL MEDICINE

## 2025-01-01 RX ADMIN — SODIUM CHLORIDE 2000 MG: 9 INJECTION, SOLUTION INTRAVENOUS at 07:57

## 2025-01-02 ENCOUNTER — HOSPITAL ENCOUNTER (OUTPATIENT)
Dept: INFUSION THERAPY | Facility: HOSPITAL | Age: 60
Discharge: HOME | End: 2025-01-02
Admitting: INTERNAL MEDICINE
Payer: MEDICARE

## 2025-01-02 VITALS
OXYGEN SATURATION: 97 % | RESPIRATION RATE: 18 BRPM | HEART RATE: 78 BPM | SYSTOLIC BLOOD PRESSURE: 116 MMHG | DIASTOLIC BLOOD PRESSURE: 73 MMHG

## 2025-01-02 DIAGNOSIS — I30.1 ACUTE INFECTIVE PERICARDITIS, UNSPECIFIED INFECTIOUS ETIOLOGY: Primary | ICD-10-CM

## 2025-01-02 PROCEDURE — 25010000002 CEFTRIAXONE PER 250 MG: Performed by: INTERNAL MEDICINE

## 2025-01-02 PROCEDURE — 96365 THER/PROPH/DIAG IV INF INIT: CPT

## 2025-01-02 RX ADMIN — SODIUM CHLORIDE 2000 MG: 9 INJECTION, SOLUTION INTRAVENOUS at 08:58

## 2025-01-03 ENCOUNTER — HOSPITAL ENCOUNTER (OUTPATIENT)
Dept: INFUSION THERAPY | Facility: HOSPITAL | Age: 60
Discharge: HOME | End: 2025-01-03
Admitting: INTERNAL MEDICINE
Payer: MEDICARE

## 2025-01-03 VITALS
RESPIRATION RATE: 18 BRPM | DIASTOLIC BLOOD PRESSURE: 96 MMHG | OXYGEN SATURATION: 99 % | SYSTOLIC BLOOD PRESSURE: 159 MMHG | HEART RATE: 82 BPM

## 2025-01-03 DIAGNOSIS — I30.1 ACUTE INFECTIVE PERICARDITIS, UNSPECIFIED INFECTIOUS ETIOLOGY: Primary | ICD-10-CM

## 2025-01-03 LAB
ALBUMIN SERPL-MCNC: 3.2 G/DL (ref 3.5–5.2)
ALBUMIN/GLOB SERPL: 0.9 G/DL
ALP SERPL-CCNC: 141 U/L (ref 39–117)
ALT SERPL W P-5'-P-CCNC: 11 U/L (ref 1–41)
ANION GAP SERPL CALCULATED.3IONS-SCNC: 10 MMOL/L (ref 5–15)
AST SERPL-CCNC: 18 U/L (ref 1–40)
BASOPHILS # BLD AUTO: 0.02 10*3/MM3 (ref 0–0.2)
BASOPHILS NFR BLD AUTO: 0.4 % (ref 0–1.5)
BILIRUB SERPL-MCNC: 0.2 MG/DL (ref 0–1.2)
BUN SERPL-MCNC: 20 MG/DL (ref 8–23)
BUN/CREAT SERPL: 20.8 (ref 7–25)
CALCIUM SPEC-SCNC: 9.1 MG/DL (ref 8.6–10.5)
CHLORIDE SERPL-SCNC: 100 MMOL/L (ref 98–107)
CO2 SERPL-SCNC: 30 MMOL/L (ref 22–29)
CREAT SERPL-MCNC: 0.96 MG/DL (ref 0.76–1.27)
CRP SERPL-MCNC: 0.85 MG/DL (ref 0–0.5)
DEPRECATED RDW RBC AUTO: 44.9 FL (ref 37–54)
EGFRCR SERPLBLD CKD-EPI 2021: 89.9 ML/MIN/1.73
EOSINOPHIL # BLD AUTO: 0.25 10*3/MM3 (ref 0–0.4)
EOSINOPHIL NFR BLD AUTO: 5.5 % (ref 0.3–6.2)
ERYTHROCYTE [DISTWIDTH] IN BLOOD BY AUTOMATED COUNT: 13.7 % (ref 12.3–15.4)
ERYTHROCYTE [SEDIMENTATION RATE] IN BLOOD: 75 MM/HR (ref 0–20)
FUNGUS WND CULT: NORMAL
GLOBULIN UR ELPH-MCNC: 3.6 GM/DL
GLUCOSE SERPL-MCNC: 317 MG/DL (ref 65–99)
HCT VFR BLD AUTO: 35.8 % (ref 37.5–51)
HGB BLD-MCNC: 11.2 G/DL (ref 13–17.7)
IMM GRANULOCYTES # BLD AUTO: 0.02 10*3/MM3 (ref 0–0.05)
IMM GRANULOCYTES NFR BLD AUTO: 0.4 % (ref 0–0.5)
LYMPHOCYTES # BLD AUTO: 1.38 10*3/MM3 (ref 0.7–3.1)
LYMPHOCYTES NFR BLD AUTO: 30.6 % (ref 19.6–45.3)
MCH RBC QN AUTO: 28 PG (ref 26.6–33)
MCHC RBC AUTO-ENTMCNC: 31.3 G/DL (ref 31.5–35.7)
MCV RBC AUTO: 89.5 FL (ref 79–97)
MONOCYTES # BLD AUTO: 0.48 10*3/MM3 (ref 0.1–0.9)
MONOCYTES NFR BLD AUTO: 10.6 % (ref 5–12)
MYCOBACTERIUM SPEC CULT: NORMAL
NEUTROPHILS NFR BLD AUTO: 2.36 10*3/MM3 (ref 1.7–7)
NEUTROPHILS NFR BLD AUTO: 52.5 % (ref 42.7–76)
NIGHT BLUE STAIN TISS: NORMAL
NRBC BLD AUTO-RTO: 0 /100 WBC (ref 0–0.2)
PLATELET # BLD AUTO: 165 10*3/MM3 (ref 140–450)
PMV BLD AUTO: 10.5 FL (ref 6–12)
POTASSIUM SERPL-SCNC: 3.7 MMOL/L (ref 3.5–5.2)
PROT SERPL-MCNC: 6.8 G/DL (ref 6–8.5)
RBC # BLD AUTO: 4 10*6/MM3 (ref 4.14–5.8)
SODIUM SERPL-SCNC: 140 MMOL/L (ref 136–145)
WBC NRBC COR # BLD AUTO: 4.51 10*3/MM3 (ref 3.4–10.8)

## 2025-01-03 PROCEDURE — 25010000002 CEFTRIAXONE PER 250 MG: Performed by: INTERNAL MEDICINE

## 2025-01-03 PROCEDURE — 96365 THER/PROPH/DIAG IV INF INIT: CPT

## 2025-01-03 PROCEDURE — 80053 COMPREHEN METABOLIC PANEL: CPT | Performed by: INTERNAL MEDICINE

## 2025-01-03 PROCEDURE — 86140 C-REACTIVE PROTEIN: CPT | Performed by: INTERNAL MEDICINE

## 2025-01-03 PROCEDURE — C1751 CATH, INF, PER/CENT/MIDLINE: HCPCS

## 2025-01-03 PROCEDURE — 85025 COMPLETE CBC W/AUTO DIFF WBC: CPT | Performed by: INTERNAL MEDICINE

## 2025-01-03 PROCEDURE — 36410 VNPNXR 3YR/> PHY/QHP DX/THER: CPT

## 2025-01-03 PROCEDURE — 85652 RBC SED RATE AUTOMATED: CPT | Performed by: INTERNAL MEDICINE

## 2025-01-03 RX ADMIN — SODIUM CHLORIDE 2000 MG: 9 INJECTION, SOLUTION INTRAVENOUS at 09:39

## 2025-01-03 NOTE — CONSULTS
Received call from patient nurse, Tiffany DALE, requesting US PIV as patient difficult to obtain iv access.    Patient agreeable to ultrasound guided iv access.    AccuCath Ace Intravascular Catheter; 18 g; 2.25 in   Lower Left Forearm    AccuCath Ace Intravascular Catheter placed with ultrasound guidance and verified by blood return. Minimal blood loss noted. Catheter flushes easily. Blood return noted. Patient nurse, Tiffany DALE, made aware that catheter is in L forearm and ready for use.    NIKITA Rooney RN

## 2025-01-04 ENCOUNTER — HOSPITAL ENCOUNTER (OUTPATIENT)
Dept: INFUSION THERAPY | Facility: HOSPITAL | Age: 60
Discharge: HOME | End: 2025-01-04
Admitting: INTERNAL MEDICINE
Payer: MEDICARE

## 2025-01-04 DIAGNOSIS — I30.1 ACUTE INFECTIVE PERICARDITIS, UNSPECIFIED INFECTIOUS ETIOLOGY: Primary | ICD-10-CM

## 2025-01-04 PROCEDURE — 96365 THER/PROPH/DIAG IV INF INIT: CPT

## 2025-01-04 PROCEDURE — 25010000002 CEFTRIAXONE PER 250 MG: Performed by: INTERNAL MEDICINE

## 2025-01-04 RX ADMIN — SODIUM CHLORIDE 2000 MG: 9 INJECTION, SOLUTION INTRAVENOUS at 08:26

## 2025-01-05 ENCOUNTER — HOSPITAL ENCOUNTER (OUTPATIENT)
Dept: INFUSION THERAPY | Facility: HOSPITAL | Age: 60
Discharge: HOME | End: 2025-01-05
Admitting: INTERNAL MEDICINE
Payer: MEDICARE

## 2025-01-05 DIAGNOSIS — I30.1 ACUTE INFECTIVE PERICARDITIS, UNSPECIFIED INFECTIOUS ETIOLOGY: Primary | ICD-10-CM

## 2025-01-05 PROCEDURE — 25010000002 CEFTRIAXONE PER 250 MG: Performed by: INTERNAL MEDICINE

## 2025-01-05 PROCEDURE — 96365 THER/PROPH/DIAG IV INF INIT: CPT

## 2025-01-05 RX ADMIN — SODIUM CHLORIDE 2000 MG: 9 INJECTION, SOLUTION INTRAVENOUS at 09:43

## 2025-01-06 ENCOUNTER — HOSPITAL ENCOUNTER (OUTPATIENT)
Dept: INFUSION THERAPY | Facility: HOSPITAL | Age: 60
Discharge: HOME | End: 2025-01-06
Admitting: INTERNAL MEDICINE
Payer: MEDICARE

## 2025-01-06 VITALS — HEART RATE: 101 BPM | DIASTOLIC BLOOD PRESSURE: 93 MMHG | OXYGEN SATURATION: 93 % | SYSTOLIC BLOOD PRESSURE: 143 MMHG

## 2025-01-06 DIAGNOSIS — I30.1 ACUTE INFECTIVE PERICARDITIS, UNSPECIFIED INFECTIOUS ETIOLOGY: Primary | ICD-10-CM

## 2025-01-06 PROCEDURE — 96365 THER/PROPH/DIAG IV INF INIT: CPT

## 2025-01-06 PROCEDURE — 25010000002 CEFTRIAXONE PER 250 MG: Performed by: INTERNAL MEDICINE

## 2025-01-06 RX ADMIN — SODIUM CHLORIDE 2000 MG: 9 INJECTION, SOLUTION INTRAVENOUS at 09:08

## 2025-01-07 ENCOUNTER — HOSPITAL ENCOUNTER (OUTPATIENT)
Dept: INFUSION THERAPY | Facility: HOSPITAL | Age: 60
Discharge: HOME | End: 2025-01-07
Admitting: INTERNAL MEDICINE
Payer: MEDICARE

## 2025-01-07 VITALS — DIASTOLIC BLOOD PRESSURE: 102 MMHG | SYSTOLIC BLOOD PRESSURE: 151 MMHG | OXYGEN SATURATION: 94 % | HEART RATE: 96 BPM

## 2025-01-07 DIAGNOSIS — I30.1 ACUTE INFECTIVE PERICARDITIS, UNSPECIFIED INFECTIOUS ETIOLOGY: Primary | ICD-10-CM

## 2025-01-07 PROCEDURE — 25010000002 CEFTRIAXONE PER 250 MG: Performed by: INTERNAL MEDICINE

## 2025-01-07 PROCEDURE — 96365 THER/PROPH/DIAG IV INF INIT: CPT

## 2025-01-07 RX ADMIN — SODIUM CHLORIDE 2000 MG: 9 INJECTION, SOLUTION INTRAVENOUS at 09:12

## 2025-01-08 ENCOUNTER — HOSPITAL ENCOUNTER (OUTPATIENT)
Dept: INFUSION THERAPY | Facility: HOSPITAL | Age: 60
Discharge: HOME | End: 2025-01-08
Admitting: INTERNAL MEDICINE
Payer: MEDICARE

## 2025-01-08 VITALS
OXYGEN SATURATION: 98 % | RESPIRATION RATE: 18 BRPM | HEART RATE: 96 BPM | SYSTOLIC BLOOD PRESSURE: 141 MMHG | DIASTOLIC BLOOD PRESSURE: 78 MMHG

## 2025-01-08 DIAGNOSIS — I30.1 ACUTE INFECTIVE PERICARDITIS, UNSPECIFIED INFECTIOUS ETIOLOGY: Primary | ICD-10-CM

## 2025-01-08 PROCEDURE — 96365 THER/PROPH/DIAG IV INF INIT: CPT

## 2025-01-08 PROCEDURE — 25010000002 CEFTRIAXONE PER 250 MG: Performed by: INTERNAL MEDICINE

## 2025-01-08 RX ADMIN — SODIUM CHLORIDE 2000 MG: 9 INJECTION, SOLUTION INTRAVENOUS at 09:24

## 2025-01-09 ENCOUNTER — TRANSCRIBE ORDERS (OUTPATIENT)
Dept: LAB | Facility: HOSPITAL | Age: 60
End: 2025-01-09
Payer: MEDICARE

## 2025-01-09 ENCOUNTER — LAB (OUTPATIENT)
Dept: LAB | Facility: HOSPITAL | Age: 60
End: 2025-01-09
Payer: MEDICARE

## 2025-01-09 DIAGNOSIS — E11.42 DIABETIC POLYNEUROPATHY ASSOCIATED WITH TYPE 2 DIABETES MELLITUS: ICD-10-CM

## 2025-01-09 DIAGNOSIS — I30.1 PNEUMOPYOPERICARDIUM: ICD-10-CM

## 2025-01-09 DIAGNOSIS — I30.1 PNEUMOPYOPERICARDIUM: Primary | ICD-10-CM

## 2025-01-09 DIAGNOSIS — K83.09 BACTERIAL CHOLANGITIS: ICD-10-CM

## 2025-01-09 DIAGNOSIS — B96.89 BACTERIAL CHOLANGITIS: ICD-10-CM

## 2025-01-09 DIAGNOSIS — I25.810 ATHEROSCLEROSIS OF AUTOLOGOUS VEIN CORONARY ARTERY BYPASS GRAFT, UNSPECIFIED WHETHER ANGINA PRESENT: ICD-10-CM

## 2025-01-09 LAB
ALBUMIN SERPL-MCNC: 3.4 G/DL (ref 3.5–5.2)
ALBUMIN/GLOB SERPL: 0.9 G/DL
ALP SERPL-CCNC: 133 U/L (ref 39–117)
ALT SERPL W P-5'-P-CCNC: 11 U/L (ref 1–41)
ANION GAP SERPL CALCULATED.3IONS-SCNC: 11 MMOL/L (ref 5–15)
AST SERPL-CCNC: 19 U/L (ref 1–40)
BASOPHILS # BLD AUTO: 0.03 10*3/MM3 (ref 0–0.2)
BASOPHILS NFR BLD AUTO: 0.6 % (ref 0–1.5)
BILIRUB SERPL-MCNC: 0.3 MG/DL (ref 0–1.2)
BUN SERPL-MCNC: 10 MG/DL (ref 8–23)
BUN/CREAT SERPL: 12 (ref 7–25)
CALCIUM SPEC-SCNC: 9.7 MG/DL (ref 8.6–10.5)
CHLORIDE SERPL-SCNC: 99 MMOL/L (ref 98–107)
CO2 SERPL-SCNC: 29 MMOL/L (ref 22–29)
CREAT SERPL-MCNC: 0.83 MG/DL (ref 0.76–1.27)
CRP SERPL-MCNC: 0.7 MG/DL (ref 0–0.5)
DEPRECATED RDW RBC AUTO: 42.5 FL (ref 37–54)
EGFRCR SERPLBLD CKD-EPI 2021: 99.6 ML/MIN/1.73
EOSINOPHIL # BLD AUTO: 0.15 10*3/MM3 (ref 0–0.4)
EOSINOPHIL NFR BLD AUTO: 3 % (ref 0.3–6.2)
ERYTHROCYTE [DISTWIDTH] IN BLOOD BY AUTOMATED COUNT: 13.6 % (ref 12.3–15.4)
ERYTHROCYTE [SEDIMENTATION RATE] IN BLOOD: 69 MM/HR (ref 0–20)
GLOBULIN UR ELPH-MCNC: 3.8 GM/DL
GLUCOSE SERPL-MCNC: 305 MG/DL (ref 65–99)
HCT VFR BLD AUTO: 38.6 % (ref 37.5–51)
HGB BLD-MCNC: 12.4 G/DL (ref 13–17.7)
IMM GRANULOCYTES # BLD AUTO: 0.03 10*3/MM3 (ref 0–0.05)
IMM GRANULOCYTES NFR BLD AUTO: 0.6 % (ref 0–0.5)
LYMPHOCYTES # BLD AUTO: 1.52 10*3/MM3 (ref 0.7–3.1)
LYMPHOCYTES NFR BLD AUTO: 30.2 % (ref 19.6–45.3)
MCH RBC QN AUTO: 27.7 PG (ref 26.6–33)
MCHC RBC AUTO-ENTMCNC: 32.1 G/DL (ref 31.5–35.7)
MCV RBC AUTO: 86.4 FL (ref 79–97)
MONOCYTES # BLD AUTO: 0.43 10*3/MM3 (ref 0.1–0.9)
MONOCYTES NFR BLD AUTO: 8.5 % (ref 5–12)
NEUTROPHILS NFR BLD AUTO: 2.87 10*3/MM3 (ref 1.7–7)
NEUTROPHILS NFR BLD AUTO: 57.1 % (ref 42.7–76)
NRBC BLD AUTO-RTO: 0 /100 WBC (ref 0–0.2)
PLATELET # BLD AUTO: 192 10*3/MM3 (ref 140–450)
PMV BLD AUTO: 9.8 FL (ref 6–12)
POTASSIUM SERPL-SCNC: 3.6 MMOL/L (ref 3.5–5.2)
PROT SERPL-MCNC: 7.2 G/DL (ref 6–8.5)
RBC # BLD AUTO: 4.47 10*6/MM3 (ref 4.14–5.8)
SODIUM SERPL-SCNC: 139 MMOL/L (ref 136–145)
WBC NRBC COR # BLD AUTO: 5.03 10*3/MM3 (ref 3.4–10.8)

## 2025-01-09 PROCEDURE — 80053 COMPREHEN METABOLIC PANEL: CPT

## 2025-01-09 PROCEDURE — 86140 C-REACTIVE PROTEIN: CPT

## 2025-01-09 PROCEDURE — 36415 COLL VENOUS BLD VENIPUNCTURE: CPT

## 2025-01-09 PROCEDURE — 85652 RBC SED RATE AUTOMATED: CPT

## 2025-01-09 PROCEDURE — 85025 COMPLETE CBC W/AUTO DIFF WBC: CPT

## 2025-01-10 LAB
FUNGUS WND CULT: NORMAL
MYCOBACTERIUM SPEC CULT: NORMAL
NIGHT BLUE STAIN TISS: NORMAL

## 2025-01-14 ENCOUNTER — OFFICE VISIT (OUTPATIENT)
Dept: CARDIOLOGY | Facility: CLINIC | Age: 60
End: 2025-01-14
Payer: MEDICARE

## 2025-01-14 VITALS
WEIGHT: 195 LBS | DIASTOLIC BLOOD PRESSURE: 97 MMHG | HEIGHT: 69 IN | OXYGEN SATURATION: 95 % | HEART RATE: 84 BPM | SYSTOLIC BLOOD PRESSURE: 158 MMHG | BODY MASS INDEX: 28.88 KG/M2

## 2025-01-14 DIAGNOSIS — I10 PRIMARY HYPERTENSION: Primary | ICD-10-CM

## 2025-01-14 DIAGNOSIS — I25.10 CORONARY ARTERY DISEASE INVOLVING NATIVE CORONARY ARTERY OF NATIVE HEART WITHOUT ANGINA PECTORIS: ICD-10-CM

## 2025-01-14 RX ORDER — CLONIDINE HYDROCHLORIDE 0.1 MG/1
0.1 TABLET ORAL 2 TIMES DAILY
Qty: 30 TABLET | Refills: 3 | Status: SHIPPED | OUTPATIENT
Start: 2025-01-14

## 2025-01-14 NOTE — PROGRESS NOTES
[facility]             Blossom ZIA Parks Shawn Shadell, MD  Joel Galo  1965 01/14/2025    Patient Active Problem List   Diagnosis    Paroxysmal atrial fibrillation    Obstructive sleep apnea    Hypothyroidism (acquired)    GERD (gastroesophageal reflux disease)    Chronic anticoagulation    Type 2 diabetes mellitus with hyperglycemia, without long-term current use of insulin    Hyperlipidemia LDL goal <70    Primary hypertension    CAD s/p CABG x 4, MAZE, LAAL 10/28/24    Orthostatic hypotension    Pericardial effusion after CABG x 4, MAZE, LAAL 10/28/24.  Readmit for Pericardial window 12/6/24    Infectious pericarditis       Dear Edmundo Boston MD:    Subjective     Chief Complaint   Patient presents with    Follow-up       HPI:     This is a 59 y.o. male with known past medical history of syncope, autonomic orthostatic hypotension, paroxysmal atrial fibrillation, hyperlipidemia, diabetes mellitus type 2 and coronary artery disease status post four-vessel CABG with EVH of the right greater saphenous, maze and TORSTEN L with atrial cure clip by Dr. Max in November 2024.     Joel Galo presents today for routine cardiology follow up.   History of Present Illness  The patient presents for evaluation of blood pressure management, coronary artery disease, and atrial fibrillation.  Since patient's last office visit he did suffer a syncopal episode on 12/4/2024 and was brought to the ED at [facility].  An echocardiogram was performed which showed pericardial effusion and early diastolic collapse of the right atrial wall and therefore he was transferred to [facility] for which he underwent subxiphoid with a left anterior thoracotomy pericardial window from Dr. Alex Max.  He was discharged home on colchicine and a 14-day regimen of Lasix 40 mg daily for which he has completed along with colchicine.  He states since that  discharge he is feeling much better and now is using a cane to ambulate.  Denies any recent syncope or dizziness.  He did present to the ED on 12/31/2024 for hypertension.  He was started on clonidine 0.1 mg twice daily and states that this time his blood pressure has been overall controlled.  He is scheduled to see CT surgery on Thursday.  He was contacted in December 2024 due to anaerobic culture coming back positive for cutibacterium acnes for which she was started on amoxicillin.  Followed up with ID recently for which patient states he is going to have to be on long-term antibiotics given his atrial clip.  He states that since being off of most of his cardiac medications he has had no further syncope or dizziness and feels like he is finally getting back to normal.      MEDICATIONS  Current: sotalol, clonidine, aspirin, Jardiance, penicillin  Discontinued: Lasix, Entresto, carvedilol       Diagnostic Testing  Echocardiogram 9/2020: EF 55% moderate aortic valve sclerosis  Cardiac event monitor 8/2021: Predominantly normal sinus rhythm with no arrhythmias noted  Nuclear stress test 9/2021: Small size mild to moderate degree of ischemia in the lateral wall  Carotid ultrasound 3/2023: No acute findings  Echocardiogram 3/2023: EF 61 to 65%  Loop recorder insertion with Potter device 4/2023     All other systems were reviewed and were negative.    Patient Active Problem List   Diagnosis    Paroxysmal atrial fibrillation    Obstructive sleep apnea    Hypothyroidism (acquired)    GERD (gastroesophageal reflux disease)    Chronic anticoagulation    Type 2 diabetes mellitus with hyperglycemia, without long-term current use of insulin    Hyperlipidemia LDL goal <70    Primary hypertension    CAD s/p CABG x 4, MAZE, LAAL 10/28/24    Orthostatic hypotension    Pericardial effusion after CABG x 4, MAZE, LAAL 10/28/24.  Readmit for Pericardial window 12/6/24    Infectious pericarditis       family history includes Coronary  artery disease in his father; Diabetes in his father; Heart disease in his mother; Kidney disease in his father.     reports that he has never smoked. He has never been exposed to tobacco smoke. He has never used smokeless tobacco. He reports that he does not drink alcohol and does not use drugs.    No Known Allergies      Current Outpatient Medications:     aspirin 325 MG EC tablet, Take 1 tablet by mouth Daily., Disp: 90 tablet, Rfl: 0    buPROPion XL (WELLBUTRIN XL) 300 MG 24 hr tablet, Take 1 tablet by mouth Daily., Disp: , Rfl:     cloNIDine (CATAPRES) 0.1 MG tablet, Take 1 tablet by mouth 2 (Two) Times a Day., Disp: 30 tablet, Rfl: 3    colchicine 0.6 MG tablet, Take 1 tablet by mouth Every 12 (Twelve) Hours., Disp: 60 tablet, Rfl: 1    docusate sodium (COLACE) 100 MG capsule, Take 1 capsule by mouth 2 (Two) Times a Day As Needed for Constipation., Disp: , Rfl:     DULoxetine (CYMBALTA) 60 MG capsule, Take 1 capsule by mouth Daily., Disp: , Rfl:     empagliflozin (JARDIANCE) 25 MG tablet tablet, Take 1 tablet by mouth Daily., Disp: , Rfl:     finasteride (PROSCAR) 5 MG tablet, TAKE 1 TABLET EVERY DAY, Disp: 90 tablet, Rfl: 0    gabapentin (NEURONTIN) 100 MG capsule, Take 2 capsules by mouth Every Morning., Disp: , Rfl:     gabapentin (NEURONTIN) 100 MG capsule, Take 3 capsules by mouth Every Night., Disp: , Rfl:     glimepiride (AMARYL) 2 MG tablet, Take 2 tablets by mouth 2 (Two) Times a Day., Disp: , Rfl:     guaiFENesin (MUCINEX) 600 MG 12 hr tablet, Take 1 tablet by mouth Every 12 (Twelve) Hours., Disp: 60 tablet, Rfl: 2    HYDROcodone-acetaminophen (NORCO)  MG per tablet, Take 1 tablet by mouth Every 8 (Eight) Hours As Needed for Moderate Pain., Disp: , Rfl:     insulin glargine (LANTUS, SEMGLEE) 100 UNIT/ML injection, Inject 20 Units under the skin into the appropriate area as directed Daily., Disp: , Rfl:     Insulin Lispro (humaLOG) 100 UNIT/ML injection, Inject 2-9 Units under the skin into the  appropriate area as directed 4 (Four) Times a Day Before Meals & at Bedtime., Disp: , Rfl:     Insulin Lispro (humaLOG) 100 UNIT/ML injection, Inject 2 Units under the skin into the appropriate area as directed 3 (Three) Times a Day With Meals., Disp: , Rfl:     lactulose (CHRONULAC) 10 GM/15ML solution, Take 45 mL by mouth 2 (Two) Times a Day As Needed., Disp: , Rfl:     levothyroxine (SYNTHROID, LEVOTHROID) 50 MCG tablet, Take 1 tablet by mouth Every Morning., Disp: , Rfl:     nitroglycerin (NITROSTAT) 0.4 MG SL tablet, Place 1 tablet under the tongue Every 5 (Five) Minutes As Needed for Chest Pain. Take no more than 3 doses in 15 minutes., Disp: , Rfl:     rosuvastatin (CRESTOR) 20 MG tablet, Take 1 tablet by mouth Daily., Disp: 90 tablet, Rfl: 1    sotalol (BETAPACE) 120 MG tablet, Take 1 tablet by mouth 2 (Two) Times a Day., Disp: 90 tablet, Rfl: 1    tamsulosin (FLOMAX) 0.4 MG capsule 24 hr capsule, TAKE 1 CAPSULE EVERY DAY, Disp: 90 capsule, Rfl: 3    Vitamin D, Cholecalciferol, (CHOLECALCIFEROL) 10 MCG (400 UNIT) tablet, Take 1 tablet by mouth Daily., Disp: , Rfl:     potassium chloride (MICRO-K) 10 MEQ CR capsule, Take 2 capsules by mouth Daily with Lasix (furosemide) for 14 days and then discontinue (Patient not taking: Reported on 1/14/2025), Disp: 28 capsule, Rfl: 0  No current facility-administered medications for this visit.    Facility-Administered Medications Ordered in Other Visits:     Chlorhexidine Gluconate Cloth 2 % pads 1 Application, 1 Application, Topical, Q12H PRN, Sridhar, Tatiana, ZIA      Physical Exam:  I have reviewed the patient's current vital signs as documented in the patient's EMR.   Vitals:    01/14/25 1006   BP: 158/97   Pulse: 84   SpO2: 95%     Body mass index is 28.78 kg/m².       01/14/25  1006   Weight: 88.5 kg (195 lb)      Physical Exam  Constitutional:       General: He is not in acute distress.     Appearance: Normal appearance. He is well-developed.   HENT:      Head:  Normocephalic and atraumatic.      Mouth/Throat:      Mouth: Mucous membranes are moist.   Eyes:      Extraocular Movements: Extraocular movements intact.      Pupils: Pupils are equal, round, and reactive to light.   Neck:      Vascular: No JVD.   Cardiovascular:      Rate and Rhythm: Normal rate and regular rhythm.      Heart sounds: Normal heart sounds. No murmur heard.     No S3 or S4 sounds.   Pulmonary:      Effort: Pulmonary effort is normal. No respiratory distress.      Breath sounds: Normal breath sounds. No wheezing.   Abdominal:      General: Bowel sounds are normal. There is no distension.      Palpations: Abdomen is soft. There is no hepatomegaly.      Tenderness: There is no abdominal tenderness.   Musculoskeletal:         General: Normal range of motion.      Cervical back: Normal range of motion and neck supple.   Skin:     General: Skin is warm and dry.      Coloration: Skin is not jaundiced or pale.   Neurological:      General: No focal deficit present.      Mental Status: He is alert and oriented to person, place, and time. Mental status is at baseline.   Psychiatric:         Mood and Affect: Mood normal.         Behavior: Behavior normal.         Thought Content: Thought content normal.         Judgment: Judgment normal.            DATA REVIEWED:     TTE/DARIAN:  Results for orders placed during the hospital encounter of 12/27/24    Adult Transthoracic Echo Complete W/ Cont if Necessary Per Protocol    Interpretation Summary    Normal left ventricular cavity size with moderate concentric left ventricular hypertrophy noted.    The following left ventricular wall segments are hypokinetic: apical septal, mid inferoseptal and mid anteroseptal.    Left ventricular systolic function is normal. Left ventricular ejection fraction appears to be 56 - 60%.    Left ventricular diastolic function was normal.    There is mild calcification of the aortic valve mainly affecting the left coronary cusp(s).  No  "evidence of aortic stenosis or aortic regurgitation detected.    No significant valvular heart disease detected.    No pericardial effusion noted.    Compared to last echo dated December 5, 2024 significant change noted.  No pericardial effusion detected.      Laboratory evaluations:    Lab Results   Component Value Date    GLUCOSE 305 (H) 01/09/2025    BUN 10 01/09/2025    CREATININE 0.83 01/09/2025    EGFRIFNONA 86 12/15/2021    EGFRIFAFRI 99 12/15/2021    BCR 12.0 01/09/2025    K 3.6 01/09/2025    CO2 29.0 01/09/2025    CALCIUM 9.7 01/09/2025    ALBUMIN 3.4 (L) 01/09/2025    AST 19 01/09/2025    ALT 11 01/09/2025     Lab Results   Component Value Date    WBC 5.03 01/09/2025    HGB 12.4 (L) 01/09/2025    HCT 38.6 01/09/2025    MCV 86.4 01/09/2025     01/09/2025     Lab Results   Component Value Date    CHOL 102 11/03/2024    TRIG 316 (H) 11/03/2024    HDL 18 (L) 11/03/2024    LDL 36 11/03/2024     Lab Results   Component Value Date    TSH 1.760 12/31/2024     Lab Results   Component Value Date    HGBA1C 10.6 (H) 12/16/2024     Lab Results   Component Value Date    ALT 11 01/09/2025     Lab Results   Component Value Date    HGBA1C 10.6 (H) 12/16/2024    HGBA1C 11.20 (H) 10/25/2024    HGBA1C 12.6 (A) 08/27/2024     Lab Results   Component Value Date    CREATININE 0.83 01/09/2025     No results found for: \"IRON\", \"TIBC\", \"FERRITIN\"  Lab Results   Component Value Date    INR 1.29 (H) 10/29/2024    INR 1.43 (H) 10/28/2024    INR 1.06 10/25/2024    PROTIME 16.2 (H) 10/29/2024    PROTIME 17.6 (H) 10/28/2024    PROTIME 13.9 10/25/2024      No components found for: \"ABSOLUTELUNG\"    --------------------------------------------------------------------------------------------------------------------------    ASSESSMENT/PLAN:      Diagnosis Plan   1. Primary hypertension  Blood Pressure Device      2. Coronary artery disease involving native coronary artery of native heart without angina pectoris          Assessment & " Plan  1. Hypertension   Patient has a longstanding history of orthostatic hypotension and autonomic dysfunction.  He has essentially been taken off of most of his cardiac medications carvedilol, HCTZ, amlodipine and Entresto given while on these medications he had worsening dizziness and increased syncopal episodes.  His blood pressure readings have been inconsistent, with systolic values fluctuating between 120s, 130s, and 140s, and a recent reading of 180/110.  Currently, he is only on clonidine for blood pressure management and feels that this has kept his blood pressure from dropping too low and he has been feeling overall well with no further syncopal episodes. Given his longstanding history of syncope while on multiple blood pressure medications for now we will continue with clonidine 0.1 mg twice daily and he is to monitor his blood pressure at home and call our office if systolic remains above 140 and we will adjust further.  A prescription was written for a new blood pressure device for home.      2. ASCVD  3.  Recent pericardial window  He is currently on high-dose aspirin (325 mg).  We did discuss that I would like for him to decrease to 81 mg daily after discussion with CT surgery.  His kidney function and blood count are stable. His LDL levels are within the normal range, but his triglycerides are slightly elevated, likely due to hyperglycemia. His echocardiogram shows normal cardiac function after pericardial window, with mild cardiac hypertrophy but no pericardial effusion. His valves appear normal. He is advised to consult with his cardiothoracic surgeon regarding the possibility of reducing his aspirin dosage to 81 mg. He will continue his colchicine regimen and discuss the duration of this treatment with his cardiothoracic surgeon.    3. Paroxysmal Atrial fibrillation  4.  History of maze with left atrial appendage ligation with AtriCure clip  Currently normal sinus rhythm with a controlled rate.   He has an upcoming appointment with Dr. Fishman in June 2025 to discuss the possibility of discontinuing sotalol given his maze procedure.  Eliquis was discontinued secondary to left atrial appendage ligation on 10/28/2024.      This document has been @Electronically signed by ZIA Melendez, 01/14/25, 10:08 AM EST.       Dictated Utilizing Dragon Dictation: Part of this note may be an electronic transcription/translation of spoken language to printed text using the Dragon Dictation System.    Patient or patient representative verbalized consent for the use of Ambient Listening during the visit with  ZIA Melendez for chart documentation. 1/14/2025  11:01 EST    Follow-up appointment and medication changes provided in hand delivered After Visit Summary as well as reviewed in the room.

## 2025-01-16 ENCOUNTER — OFFICE VISIT (OUTPATIENT)
Dept: CARDIAC SURGERY | Facility: CLINIC | Age: 60
End: 2025-01-16
Payer: MEDICARE

## 2025-01-16 VITALS
BODY MASS INDEX: 30.72 KG/M2 | SYSTOLIC BLOOD PRESSURE: 108 MMHG | HEIGHT: 69 IN | WEIGHT: 207.4 LBS | OXYGEN SATURATION: 95 % | DIASTOLIC BLOOD PRESSURE: 60 MMHG | TEMPERATURE: 96.8 F | HEART RATE: 82 BPM

## 2025-01-16 DIAGNOSIS — I97.89 PERICARDIAL EFFUSION AFTER OPERATIVE PROCEDURE: Primary | ICD-10-CM

## 2025-01-16 DIAGNOSIS — I31.39 PERICARDIAL EFFUSION AFTER OPERATIVE PROCEDURE: Primary | ICD-10-CM

## 2025-01-16 DIAGNOSIS — T81.89XA DELAYED SURGICAL WOUND HEALING, INITIAL ENCOUNTER: ICD-10-CM

## 2025-01-16 PROCEDURE — 99024 POSTOP FOLLOW-UP VISIT: CPT

## 2025-01-16 RX ORDER — ASPIRIN 81 MG/1
81 TABLET ORAL DAILY
Start: 2025-01-16

## 2025-01-16 NOTE — PROGRESS NOTES
[facility] Office Follow Up Note     Date of Encounter: 2025     Name: Joel Galo  : 1965     Referred By: No ref. provider found  PCP: Edmundo Boston MD    Chief Complaint:    Chief Complaint   Patient presents with    Follow-up     Hospital follow up s/p CABG, MAZE, LAAL on 10/28/2024 and pericardial window 2024.  He is continuing to follow with ID for positive cultures from surgical infection       Subjective      History of Present Illness:    Joel Galo is a 59 y.o. male s/p CABG x4 with EVH of the right greater saphenous vein, MAZE, and LAAL (#35 mm AtriCure clip) with Dr. Max on 10/28/2024 and s/p pericardial window 2024 with Dr. Max. PMH: PAF, HTN, uncontrolled type 2 DM (A1C 10.6), HLD, and CAD. Hospital stay complicated with hematuria and urinary retention and he was discharged to rehab on POD#9. He was last seen in our office  with sternal incision redness and had been started on Cefprozolin by provider at rehab facility.  He was taken to the ER at [facility] on 12 and transferred to [facility] with pericardial effusion and early diastolic collapse. A pericardial window was performed where 330 mL of serous fluid was evacuated from the mediastinum. Patient was discharged on POD#4 in stable condition. He presents today for follow up. He has been following up with infectious disease due to pericardial fluid culture positive for cutibacterium acnes. He has completed all IV ABX and is now taking oral pencillin four times daily.  He denies fevers/chills. Reports generally feeling well. He has followed up with general cardiology, ZIA Melendez with repeat echocardiogram demonstrating no pericardial effusion. CXR was done  on ER visit for hypertension with improvement in left lung volume without effusions or pneumothorax, sternotomy wires intact. He does have some concerns with delayed healing on sternal incision,  pericardial window incision, and right EVH site.     Review of Systems:  Review of Systems   Constitutional: Positive for decreased appetite. Negative for chills, diaphoresis, fever, malaise/fatigue, night sweats, weight gain and weight loss.   HENT: Negative.  Negative for hoarse voice.    Eyes: Negative.  Negative for blurred vision, double vision and visual disturbance.   Cardiovascular:  Positive for claudication (LLE) and dyspnea on exertion. Negative for chest pain, irregular heartbeat, leg swelling, near-syncope, orthopnea, palpitations, paroxysmal nocturnal dyspnea and syncope.   Respiratory:  Positive for shortness of breath. Negative for cough, hemoptysis, sputum production and wheezing.    Hematologic/Lymphatic: Negative for adenopathy and bleeding problem. Bruises/bleeds easily.   Skin:  Negative for color change, nail changes, poor wound healing and rash.   Musculoskeletal:  Positive for arthritis (diffuse) and back pain (lumbar -chronic). Negative for falls and muscle cramps.   Gastrointestinal:  Positive for constipation. Negative for abdominal pain, dysphagia and heartburn.   Genitourinary: Negative.  Negative for flank pain.   Neurological:  Positive for dizziness and paresthesias (BLE and BUE - neuropathy). Negative for brief paralysis, disturbances in coordination, focal weakness, headaches, light-headedness, loss of balance, numbness, sensory change, vertigo and weakness.   Psychiatric/Behavioral:  Positive for depression. Negative for suicidal ideas. The patient has insomnia and is nervous/anxious.    Allergic/Immunologic: Negative for persistent infections.       I have reviewed the following portions of the patient's history: problem list, current medications, allergies, past surgical history, past medical history, past social history, past family history, and ROS and confirm it's accurate.    Allergies:  No Known Allergies    Medications:      Current Outpatient Medications:     aspirin 81 MG  EC tablet, Take 1 tablet by mouth Daily., Disp: , Rfl:     buPROPion XL (WELLBUTRIN XL) 300 MG 24 hr tablet, Take 1 tablet by mouth Daily., Disp: , Rfl:     cloNIDine (CATAPRES) 0.1 MG tablet, Take 1 tablet by mouth 2 (Two) Times a Day., Disp: 30 tablet, Rfl: 3    colchicine 0.6 MG tablet, Take 1 tablet by mouth Every 12 (Twelve) Hours., Disp: 60 tablet, Rfl: 1    docusate sodium (COLACE) 100 MG capsule, Take 1 capsule by mouth 2 (Two) Times a Day As Needed for Constipation., Disp: , Rfl:     DULoxetine (CYMBALTA) 60 MG capsule, Take 1 capsule by mouth Daily., Disp: , Rfl:     empagliflozin (JARDIANCE) 25 MG tablet tablet, Take 1 tablet by mouth Daily., Disp: , Rfl:     finasteride (PROSCAR) 5 MG tablet, TAKE 1 TABLET EVERY DAY, Disp: 90 tablet, Rfl: 0    gabapentin (NEURONTIN) 100 MG capsule, Take 2 capsules by mouth Every Morning., Disp: , Rfl:     gabapentin (NEURONTIN) 100 MG capsule, Take 3 capsules by mouth Every Night., Disp: , Rfl:     glimepiride (AMARYL) 2 MG tablet, Take 2 tablets by mouth 2 (Two) Times a Day., Disp: , Rfl:     guaiFENesin (MUCINEX) 600 MG 12 hr tablet, Take 1 tablet by mouth Every 12 (Twelve) Hours., Disp: 60 tablet, Rfl: 2    HYDROcodone-acetaminophen (NORCO)  MG per tablet, Take 1 tablet by mouth Every 8 (Eight) Hours As Needed for Moderate Pain., Disp: , Rfl:     insulin glargine (LANTUS, SEMGLEE) 100 UNIT/ML injection, Inject 20 Units under the skin into the appropriate area as directed Daily., Disp: , Rfl:     Insulin Lispro (humaLOG) 100 UNIT/ML injection, Inject 2-9 Units under the skin into the appropriate area as directed 4 (Four) Times a Day Before Meals & at Bedtime., Disp: , Rfl:     Insulin Lispro (humaLOG) 100 UNIT/ML injection, Inject 2 Units under the skin into the appropriate area as directed 3 (Three) Times a Day With Meals., Disp: , Rfl:     lactulose (CHRONULAC) 10 GM/15ML solution, Take 45 mL by mouth 2 (Two) Times a Day As Needed., Disp: , Rfl:      "levothyroxine (SYNTHROID, LEVOTHROID) 50 MCG tablet, Take 1 tablet by mouth Every Morning., Disp: , Rfl:     nitroglycerin (NITROSTAT) 0.4 MG SL tablet, Place 1 tablet under the tongue Every 5 (Five) Minutes As Needed for Chest Pain. Take no more than 3 doses in 15 minutes., Disp: , Rfl:     rosuvastatin (CRESTOR) 20 MG tablet, Take 1 tablet by mouth Daily., Disp: 90 tablet, Rfl: 1    sotalol (BETAPACE) 120 MG tablet, Take 1 tablet by mouth 2 (Two) Times a Day., Disp: 90 tablet, Rfl: 1    tamsulosin (FLOMAX) 0.4 MG capsule 24 hr capsule, TAKE 1 CAPSULE EVERY DAY, Disp: 90 capsule, Rfl: 3    Vitamin D, Cholecalciferol, (CHOLECALCIFEROL) 10 MCG (400 UNIT) tablet, Take 1 tablet by mouth Daily., Disp: , Rfl:   No current facility-administered medications for this visit.    Facility-Administered Medications Ordered in Other Visits:     Chlorhexidine Gluconate Cloth 2 % pads 1 Application, 1 Application, Topical, Q12H PRN, Tatiana Waller, ZIA    History:   Past Medical History:   Diagnosis Date    A-fib     Anxiety     Arthritis     DDD (degenerative disc disease), lumbar     Deep vein thrombosis     Depression     Diabetes     Disease of thyroid gland     HYPO    Heart attack     \"5-6 years ago\"    Hyperlipidemia     Hypertension     Kidney stone     Neuropathy     Sleep apnea     DOES NOT USE CPAP    UTI (urinary tract infection)        Past Surgical History:   Procedure Laterality Date    ATRIAL APPENDAGE EXCLUSION LEFT WITH TRANSESOPHAGEAL ECHOCARDIOGRAM N/A 10/28/2024    Procedure: ATRIAL APPENDAGE EXCLUSION LEFT WITH TRANSESOPHAGEAL ECHOCARDIOGRAM;  Surgeon: Alex Max MD;  Location:  [facility];  Service: Cardiothoracic;  Laterality: N/A;    CARDIAC CATHETERIZATION N/A 08/28/2024    Procedure: Left Heart Cath;  Surgeon: Julio Neff MD;  [facility]:  [facility];  Service: Cardiology;  Laterality: N/A;    CARDIAC ELECTROPHYSIOLOGY PROCEDURE N/A 04/05/2023    Procedure: Loop insertion;  " "Surgeon: Tariq Chávez MD;  Location:  [facility];  Service: Cardiovascular;  Laterality: N/A;    CATARACT EXTRACTION Bilateral     COLONOSCOPY      CORONARY ARTERY BYPASS GRAFT N/A 10/28/2024    Procedure: MEDIAN STERNOTOMY, CORONARY ARTERY BYPASS GRAFTING X4 UTILIZING THE LEFT INTERNAL MAMMARY ARTERY GRAFT, ENDOSCOPIC VEIN HARVEST OF THE GREATER RIGHT SAPHENOUS VEIN;  Surgeon: Alex Max MD;  Location:  [facility];  Service: Cardiothoracic;  Laterality: N/A;    ENDOSCOPY      MAZE PROCEDURE N/A 10/28/2024    Procedure: MAZE PROCEDURE;  Surgeon: Alex Max MD;  Location:  [facility];  Service: Cardiothoracic;  Laterality: N/A;    PERICARDIAL WINDOW N/A 12/6/2024    Procedure: PERICARDIAL WINDOW;  Surgeon: Alex Max MD;  Location:  [facility];  Service: Cardiothoracic;  Laterality: N/A;    TEETH EXTRACTION         Social History     Socioeconomic History    Marital status:     Number of children: 1   Tobacco Use    Smoking status: Never     Passive exposure: Never    Smokeless tobacco: Never   Vaping Use    Vaping status: Never Used   Substance and Sexual Activity    Alcohol use: No    Drug use: No    Sexual activity: Not Currently        Family History   Problem Relation Age of Onset    Heart disease Mother     Coronary artery disease Father     Diabetes Father     Kidney disease Father        Objective   Physical Exam:  Vitals:    01/16/25 1411   BP: 108/60   BP Location: Right arm   Patient Position: Sitting   Pulse: 82   Temp: 96.8 °F (36 °C)   SpO2: 95%   Weight: 94.1 kg (207 lb 6.4 oz)   Height: 175.3 cm (69.02\")  Comment: pt reported      Body mass index is 30.61 kg/m².    Physical Exam  Vitals and nursing note reviewed.   Constitutional:       General: He is not in acute distress.  HENT:      Head: Normocephalic and atraumatic.   Neck:      Vascular: No carotid bruit or JVD.   Cardiovascular:      Rate and Rhythm: Normal rate.      Pulses:           Radial pulses are 2+ " on the right side and 2+ on the left side.        Dorsalis pedis pulses are 2+ on the right side and 2+ on the left side.        Posterior tibial pulses are 2+ on the right side and 2+ on the left side.      Heart sounds: Normal heart sounds. No murmur heard.  Pulmonary:      Effort: Pulmonary effort is normal.      Breath sounds: Normal breath sounds.   Chest:      Comments: No sternal instability  Musculoskeletal:      Right lower leg: No edema.      Left lower leg: No edema.   Skin:     General: Skin is warm.      Comments: Sternal incision and chest tube incisions and right EVH site: see photos. No active drainage, redness, tenderness, or swelling.    Neurological:      Mental Status: He is alert.      Gait: Gait is intact.   Psychiatric:         Attention and Perception: Attention normal.         Behavior: Behavior is cooperative.                 Imaging/Labs:    XR Chest 1 View (12/31/2024 11:06)   IMPRESSION:  No acute cardiopulmonary process.  This report was finalized on 12/31/2024 11:22 AM by Constantine Gan M.D..    Adult Transthoracic Echo Complete W/ Cont if Necessary Per Protocol (12/27/2024 09:52)   Interpretation Summary    Normal left ventricular cavity size with moderate concentric left ventricular hypertrophy noted.    The following left ventricular wall segments are hypokinetic: apical septal, mid inferoseptal and mid anteroseptal.    Left ventricular systolic function is normal. Left ventricular ejection fraction appears to be 56 - 60%.    Left ventricular diastolic function was normal.    There is mild calcification of the aortic valve mainly affecting the left coronary cusp(s).  No evidence of aortic stenosis or aortic regurgitation detected.    No significant valvular heart disease detected.    No pericardial effusion noted.    Compared to last echo dated December 5, 2024 significant change noted.  No pericardial effusion detected.    US Carotid Bilateral (10/17/2024 12:48)    IMPRESSION:  1. Mild-moderate plaque right greater than left carotid systems. No  occlusion.  2. No hemodynamically significant stenosis of either RIGHT or LEFT ICA.  3. Antegrade flow noted both vertebral arteries.  This report was finalized on 10/17/2024 1:59 PM by Dr. Nicanor Molina MD.      Assessment / Plan    Joel Galo is a 59 y.o. male s/p CABG x4 with EVH of the right greater saphenous vein, MAZE, and LAAL (#35 mm AtriCure clip) with Dr. Max on 10/28/2024 and s/p pericardial window 12/06/2024 with Dr. Max. PMH: PAF, HTN, uncontrolled type 2 DM (A1C 10.6), HLD, and CAD. Hospital stay complicated with hematuria and urinary retention and he was discharged to rehab on POD#9. He was last seen in our office 11/20 with sternal incision redness and had been started on Cefprozolin by provider at rehab facility.  He was taken to the ER at [facility] on 12/6 and transferred to [facility] with pericardial effusion and early diastolic collapse. A pericardial window was performed where 330 mL of serous fluid was evacuated from the mediastinum. Patient was discharged on POD#4 in stable condition. He presents today for follow up. He does have some concerns with delayed healing on sternal incision, pericardial window incision, and right EVH site.     Assessment / Plan:  1. Pericardial effusion after CABG x 4, MAZE, LAAL 10/28/24.  Readmit for Pericardial window 12/6/24   2. Delayed surgical wound healing  Followed up with general cardiology, ZIA Melendez, wishes to decrease patient's ASA to 81mg daily. OK to decrease due to history of pericardial effusion  Repeat echocardiogram without pericaridal effusion  CXR on 12/31 with no pneumothorax or pleural effusion, sternal wires intact. Improvement in left lung volume  Pericardial fluid culture positive for cutibacterium acnes  Patient has been following with infectious disease. Completed all IV ABX. Now taking oral penicillin 4x daily.   Denies  "fevers/chills, reports generally feeling well  Pericardial window incision slightly dehisced without drainage, redness, tenderness or swelling. Proximal sternal incision also with open area without redness, tenderness, drainage, or swelling. Right EVH site with large dehiscence without drainage, redness, tenderness, or swelling. Patient's wife reports she has been applying a cream and bandage to the incisions as patient \"picks\" them frequently.  Advised to shower with antibacterial soap, cleanse with iodine swabs twice daily, and avoid touching incisions at this time and no creams or lotions should be applied to incisions. Will place orders to PT/wound care for patient in [place], [place] for additional assessment and treatment of incision.   Wounds cleansed with iodine in clinic  Chest tube sutures removed without complication in clinic  Will see patient in 4 weeks for wound check. Call for any new/worsening symptoms     Other orders  -     aspirin 81 MG EC tablet; Take 1 tablet by mouth Daily.      Patient Education: Wound Care: continue to keep your incisions clean and dry. You may use plain antibacterial soap (i.e. dial) and water to clean and pat dry. No lotions, creams, oils, powders, salves, or ointments. Please watch for signs and symptoms of infection which can include but are not limited to: increased pain or tenderness around your incision, warmth to the site or fever, redness or red streaking, and foul smelling or appearing drainage. Clear drainage and bloody drainage are not worrisome. If your wound opens up please contact our office.      Follow Up:   Return in about 4 weeks (around 2/13/2025) for Wound Check.   Or sooner for any further concerns or worsening sign and symptoms. If unable to reach us in the office please dial 911 or go to the nearest emergency department.      ZIA Kc  [facility] Cardiothoracic Surgery    Time Spent: I spent 42 minutes caring for Joel on this date of " service. This time includes time spent by me in the following activities: preparing for the visit, reviewing tests, obtaining and/or reviewing a separately obtained history, performing a medically appropriate examination and/or evaluation, counseling and educating the patient/family/caregiver, referring and communicating with other health care professionals, documenting information in the medical record, and care coordination.

## 2025-01-17 PROBLEM — T81.89XA DELAYED SURGICAL WOUND HEALING: Status: ACTIVE | Noted: 2025-01-17

## 2025-01-17 LAB
FUNGUS WND CULT: NORMAL
MYCOBACTERIUM SPEC CULT: NORMAL
NIGHT BLUE STAIN TISS: NORMAL

## 2025-01-30 ENCOUNTER — LAB (OUTPATIENT)
Dept: LAB | Facility: HOSPITAL | Age: 60
End: 2025-01-30
Payer: MEDICARE

## 2025-01-30 ENCOUNTER — TRANSCRIBE ORDERS (OUTPATIENT)
Dept: LAB | Facility: HOSPITAL | Age: 60
End: 2025-01-30
Payer: MEDICARE

## 2025-01-30 DIAGNOSIS — Z79.2 LONG TERM CURRENT USE OF ANTIBIOTICS: ICD-10-CM

## 2025-01-30 DIAGNOSIS — Z79.2 LONG TERM CURRENT USE OF ANTIBIOTICS: Primary | ICD-10-CM

## 2025-01-30 DIAGNOSIS — I30.1 INFECTIOUS PERICARDITIS, UNSPECIFIED CHRONICITY, UNSPECIFIED INFECTIOUS ETIOLOGY: ICD-10-CM

## 2025-01-30 DIAGNOSIS — K83.09 BACTERIAL CHOLANGITIS: ICD-10-CM

## 2025-01-30 DIAGNOSIS — B96.89 BACTERIAL CHOLANGITIS: ICD-10-CM

## 2025-01-30 LAB
ALBUMIN SERPL-MCNC: 3.7 G/DL (ref 3.5–5.2)
ALBUMIN/GLOB SERPL: 1.1 G/DL
ALP SERPL-CCNC: 130 U/L (ref 39–117)
ALT SERPL W P-5'-P-CCNC: 13 U/L (ref 1–41)
ANION GAP SERPL CALCULATED.3IONS-SCNC: 13 MMOL/L (ref 5–15)
AST SERPL-CCNC: 18 U/L (ref 1–40)
BASOPHILS # BLD AUTO: 0.03 10*3/MM3 (ref 0–0.2)
BASOPHILS NFR BLD AUTO: 0.5 % (ref 0–1.5)
BILIRUB SERPL-MCNC: <0.2 MG/DL (ref 0–1.2)
BUN SERPL-MCNC: 11 MG/DL (ref 8–23)
BUN/CREAT SERPL: 14.7 (ref 7–25)
CALCIUM SPEC-SCNC: 9.8 MG/DL (ref 8.6–10.5)
CHLORIDE SERPL-SCNC: 96 MMOL/L (ref 98–107)
CO2 SERPL-SCNC: 26 MMOL/L (ref 22–29)
CREAT SERPL-MCNC: 0.75 MG/DL (ref 0.76–1.27)
CRP SERPL-MCNC: 0.35 MG/DL (ref 0–0.5)
DEPRECATED RDW RBC AUTO: 47.4 FL (ref 37–54)
EGFRCR SERPLBLD CKD-EPI 2021: 102.7 ML/MIN/1.73
EOSINOPHIL # BLD AUTO: 0.28 10*3/MM3 (ref 0–0.4)
EOSINOPHIL NFR BLD AUTO: 4.5 % (ref 0.3–6.2)
ERYTHROCYTE [DISTWIDTH] IN BLOOD BY AUTOMATED COUNT: 14.6 % (ref 12.3–15.4)
ERYTHROCYTE [SEDIMENTATION RATE] IN BLOOD: 75 MM/HR (ref 0–20)
GLOBULIN UR ELPH-MCNC: 3.5 GM/DL
GLUCOSE SERPL-MCNC: 474 MG/DL (ref 65–99)
HCT VFR BLD AUTO: 43 % (ref 37.5–51)
HGB BLD-MCNC: 13.4 G/DL (ref 13–17.7)
IMM GRANULOCYTES # BLD AUTO: 0.03 10*3/MM3 (ref 0–0.05)
IMM GRANULOCYTES NFR BLD AUTO: 0.5 % (ref 0–0.5)
LYMPHOCYTES # BLD AUTO: 1.59 10*3/MM3 (ref 0.7–3.1)
LYMPHOCYTES NFR BLD AUTO: 25.6 % (ref 19.6–45.3)
MCH RBC QN AUTO: 27.4 PG (ref 26.6–33)
MCHC RBC AUTO-ENTMCNC: 31.2 G/DL (ref 31.5–35.7)
MCV RBC AUTO: 87.9 FL (ref 79–97)
MONOCYTES # BLD AUTO: 0.5 10*3/MM3 (ref 0.1–0.9)
MONOCYTES NFR BLD AUTO: 8.1 % (ref 5–12)
NEUTROPHILS NFR BLD AUTO: 3.78 10*3/MM3 (ref 1.7–7)
NEUTROPHILS NFR BLD AUTO: 60.8 % (ref 42.7–76)
NRBC BLD AUTO-RTO: 0 /100 WBC (ref 0–0.2)
PLATELET # BLD AUTO: 246 10*3/MM3 (ref 140–450)
PMV BLD AUTO: 10 FL (ref 6–12)
POTASSIUM SERPL-SCNC: 4.4 MMOL/L (ref 3.5–5.2)
PROT SERPL-MCNC: 7.2 G/DL (ref 6–8.5)
RBC # BLD AUTO: 4.89 10*6/MM3 (ref 4.14–5.8)
SODIUM SERPL-SCNC: 135 MMOL/L (ref 136–145)
WBC NRBC COR # BLD AUTO: 6.21 10*3/MM3 (ref 3.4–10.8)

## 2025-01-30 PROCEDURE — 85025 COMPLETE CBC W/AUTO DIFF WBC: CPT

## 2025-01-30 PROCEDURE — 86140 C-REACTIVE PROTEIN: CPT

## 2025-01-30 PROCEDURE — 36415 COLL VENOUS BLD VENIPUNCTURE: CPT

## 2025-01-30 PROCEDURE — 80053 COMPREHEN METABOLIC PANEL: CPT

## 2025-01-30 PROCEDURE — 85652 RBC SED RATE AUTOMATED: CPT

## 2025-02-11 ENCOUNTER — TELEPHONE (OUTPATIENT)
Dept: CARDIOLOGY | Facility: CLINIC | Age: 60
End: 2025-02-11
Payer: MEDICARE

## 2025-02-11 NOTE — TELEPHONE ENCOUNTER
Last received remote transmission of loop recorder was 10/26/2024. Device was nearing ALF at that time. Called patient, no answer.

## 2025-03-04 NOTE — PROGRESS NOTES
[facility] Office Follow Up Note     Date of Encounter: 2025     Name: Joel Galo  : 1965     Referred By: No ref. provider found  PCP: Edmundo Boston MD    Chief Complaint:    Chief Complaint   Patient presents with    Pericardial Effusion     4 week follow up for wound check - s/p pericardial window 24 and CABGx4 10/28/24       Subjective      History of Present Illness:    Joel Galo is a 59 y.o. male s/p CABG x4 with EVH of the right greater saphenous vein, MAZE, and LAAL (#35 mm AtriCure clip) with Dr. Max on 10/28/2024 and s/p pericardial window 2024 with Dr. Max. PMH: PAF, HTN, uncontrolled type 2 DM (A1C 10.6), HLD, and CAD. Hospital stay complicated with hematuria and urinary retention and he was discharged to rehab on POD#9. He was last seen in our office  with sternal incision redness and had been started on Cefprozolin by provider at rehab facility. He was taken to the ER at [facility] on  and transferred to [facility] with pericardial effusion and early diastolic collapse. A pericardial window was performed where 330 mL of serous fluid was evacuated from the mediastinum. Patient was discharged on POD#4 in stable condition. He presents today for follow up. He has been following up with infectious disease due to pericardial fluid culture positive for cutibacterium acnes. Completed all IV ABX and taking oral penicillin 4 times daily. Follow up with cardiology, ZIA Melendez, with repeat echocardiogram demonstrating no pericardial effusion. Last seen in clinic 2025 with delayed wound healing to sternal incision, pericardial window incision, and right EVH site. Discussed proper wound cleansing as well as cleansing twice daily with iodine swabs and avoiding any creams/lotions. Referral to PT/wound care in [place] was placed at that time. Presents today for scheduled follow up. Denies fevers/chills. Reports still  taking penicillin 4x daily, needs to follow up with infectious disease. Reports significant improvement in wound healing and has been compliant with cleansing with iodine swabs.    Review of Systems:  Review of Systems   Constitutional: Negative for chills, decreased appetite, diaphoresis, fever, malaise/fatigue, night sweats, weight gain and weight loss.   HENT:  Negative for hoarse voice.    Eyes:  Negative for blurred vision, double vision and visual disturbance.   Cardiovascular:  Positive for dyspnea on exertion and syncope (3x since last visit, most recent was 3/3/25). Negative for chest pain, claudication, irregular heartbeat, leg swelling, near-syncope, orthopnea, palpitations and paroxysmal nocturnal dyspnea.   Respiratory:  Negative for cough, hemoptysis, shortness of breath, sputum production and wheezing.    Hematologic/Lymphatic: Negative for adenopathy and bleeding problem. Does not bruise/bleed easily.   Skin:  Negative for color change, nail changes, poor wound healing and rash.   Musculoskeletal:  Positive for muscle weakness (occurse right before syncope episode). Negative for back pain, falls and muscle cramps.   Gastrointestinal:  Negative for abdominal pain, dysphagia and heartburn.   Genitourinary:  Negative for flank pain.   Neurological:  Negative for brief paralysis, disturbances in coordination, dizziness, focal weakness, headaches, light-headedness, loss of balance, numbness, paresthesias, sensory change, vertigo and weakness.   Psychiatric/Behavioral:  Negative for depression and suicidal ideas.    Allergic/Immunologic: Negative for persistent infections.       I have reviewed the following portions of the patient's history: problem list, current medications, allergies, past surgical history, past medical history, past social history, past family history, and ROS and confirm it's accurate.    Allergies:  No Known Allergies    Medications:      Current Outpatient Medications:     aspirin  81 MG EC tablet, Take 1 tablet by mouth Daily., Disp: , Rfl:     buPROPion XL (WELLBUTRIN XL) 300 MG 24 hr tablet, Take 1 tablet by mouth Daily., Disp: , Rfl:     cloNIDine (CATAPRES) 0.1 MG tablet, Take 1 tablet by mouth 2 (Two) Times a Day., Disp: 30 tablet, Rfl: 3    colchicine 0.6 MG tablet, Take 1 tablet by mouth Every 12 (Twelve) Hours., Disp: 60 tablet, Rfl: 1    docusate sodium (COLACE) 100 MG capsule, Take 1 capsule by mouth 2 (Two) Times a Day As Needed for Constipation., Disp: , Rfl:     DULoxetine (CYMBALTA) 60 MG capsule, Take 1 capsule by mouth Daily., Disp: , Rfl:     empagliflozin (JARDIANCE) 25 MG tablet tablet, Take 1 tablet by mouth Daily., Disp: , Rfl:     finasteride (PROSCAR) 5 MG tablet, TAKE 1 TABLET EVERY DAY, Disp: 90 tablet, Rfl: 0    gabapentin (NEURONTIN) 100 MG capsule, Take 2 capsules by mouth Every Morning., Disp: , Rfl:     gabapentin (NEURONTIN) 100 MG capsule, Take 3 capsules by mouth Every Night., Disp: , Rfl:     glimepiride (AMARYL) 2 MG tablet, Take 2 tablets by mouth 2 (Two) Times a Day., Disp: , Rfl:     guaiFENesin (MUCINEX) 600 MG 12 hr tablet, Take 1 tablet by mouth Every 12 (Twelve) Hours., Disp: 60 tablet, Rfl: 2    HYDROcodone-acetaminophen (NORCO)  MG per tablet, Take 1 tablet by mouth Every 8 (Eight) Hours As Needed for Moderate Pain., Disp: , Rfl:     insulin glargine (LANTUS, SEMGLEE) 100 UNIT/ML injection, Inject 20 Units under the skin into the appropriate area as directed Daily., Disp: , Rfl:     Insulin Lispro (humaLOG) 100 UNIT/ML injection, Inject 2-9 Units under the skin into the appropriate area as directed 4 (Four) Times a Day Before Meals & at Bedtime., Disp: , Rfl:     Insulin Lispro (humaLOG) 100 UNIT/ML injection, Inject 2 Units under the skin into the appropriate area as directed 3 (Three) Times a Day With Meals., Disp: , Rfl:     lactulose (CHRONULAC) 10 GM/15ML solution, Take 45 mL by mouth 2 (Two) Times a Day As Needed., Disp: , Rfl:      "levothyroxine (SYNTHROID, LEVOTHROID) 50 MCG tablet, Take 1 tablet by mouth Every Morning., Disp: , Rfl:     nitroglycerin (NITROSTAT) 0.4 MG SL tablet, Place 1 tablet under the tongue Every 5 (Five) Minutes As Needed for Chest Pain. Take no more than 3 doses in 15 minutes., Disp: , Rfl:     rosuvastatin (CRESTOR) 20 MG tablet, Take 1 tablet by mouth Daily., Disp: 90 tablet, Rfl: 1    sotalol (BETAPACE) 120 MG tablet, Take 1 tablet by mouth 2 (Two) Times a Day., Disp: 90 tablet, Rfl: 1    tamsulosin (FLOMAX) 0.4 MG capsule 24 hr capsule, TAKE 1 CAPSULE EVERY DAY, Disp: 90 capsule, Rfl: 3    Vitamin D, Cholecalciferol, (CHOLECALCIFEROL) 10 MCG (400 UNIT) tablet, Take 1 tablet by mouth Daily., Disp: , Rfl:   No current facility-administered medications for this visit.    Facility-Administered Medications Ordered in Other Visits:     Chlorhexidine Gluconate Cloth 2 % pads 1 Application, 1 Application, Topical, Q12H PRN, Tatiana Waller, ZIA    History:   Past Medical History:   Diagnosis Date    A-fib     Anxiety     Arthritis     DDD (degenerative disc disease), lumbar     Deep vein thrombosis     Depression     Diabetes     Disease of thyroid gland     HYPO    Heart attack     \"5-6 years ago\"    Hyperlipidemia     Hypertension     Kidney stone     Neuropathy     Sleep apnea     DOES NOT USE CPAP    UTI (urinary tract infection)        Past Surgical History:   Procedure Laterality Date    ATRIAL APPENDAGE EXCLUSION LEFT WITH TRANSESOPHAGEAL ECHOCARDIOGRAM N/A 10/28/2024    Procedure: ATRIAL APPENDAGE EXCLUSION LEFT WITH TRANSESOPHAGEAL ECHOCARDIOGRAM;  Surgeon: Alex Max MD;  Location:  [facility];  Service: Cardiothoracic;  Laterality: N/A;    CARDIAC CATHETERIZATION N/A 08/28/2024    Procedure: Left Heart Cath;  Surgeon: Julio Neff MD;  [facility]:  [facility];  Service: Cardiology;  Laterality: N/A;    CARDIAC ELECTROPHYSIOLOGY PROCEDURE N/A 04/05/2023    Procedure: Loop insertion;  " Surgeon: Tariq Chávez MD;  Location:  [facility];  Service: Cardiovascular;  Laterality: N/A;    CATARACT EXTRACTION Bilateral     COLONOSCOPY      CORONARY ARTERY BYPASS GRAFT N/A 10/28/2024    Procedure: MEDIAN STERNOTOMY, CORONARY ARTERY BYPASS GRAFTING X4 UTILIZING THE LEFT INTERNAL MAMMARY ARTERY GRAFT, ENDOSCOPIC VEIN HARVEST OF THE GREATER RIGHT SAPHENOUS VEIN;  Surgeon: Alex Max MD;  Location:  [facility];  Service: Cardiothoracic;  Laterality: N/A;    ENDOSCOPY      MAZE PROCEDURE N/A 10/28/2024    Procedure: MAZE PROCEDURE;  Surgeon: Alex Max MD;  Location:  [facility];  Service: Cardiothoracic;  Laterality: N/A;    PERICARDIAL WINDOW N/A 12/6/2024    Procedure: PERICARDIAL WINDOW;  Surgeon: Alex Max MD;  Location:  [facility];  Service: Cardiothoracic;  Laterality: N/A;    TEETH EXTRACTION         Social History     Socioeconomic History    Marital status:     Number of children: 1   Tobacco Use    Smoking status: Never     Passive exposure: Never    Smokeless tobacco: Never   Vaping Use    Vaping status: Never Used   Substance and Sexual Activity    Alcohol use: No    Drug use: No    Sexual activity: Not Currently        Family History   Problem Relation Age of Onset    Heart disease Mother     Coronary artery disease Father     Diabetes Father     Kidney disease Father        Objective     Imaging/Labs:    XR Chest 1 View (12/31/2024 11:06)   IMPRESSION:  No acute cardiopulmonary process.  This report was finalized on 12/31/2024 11:22 AM by Constantine Gan M.D..     Adult Transthoracic Echo Complete W/ Cont if Necessary Per Protocol (12/27/2024 09:52)   Interpretation Summary    Normal left ventricular cavity size with moderate concentric left ventricular hypertrophy noted.    The following left ventricular wall segments are hypokinetic: apical septal, mid inferoseptal and mid anteroseptal.    Left ventricular systolic function is normal. Left ventricular  "ejection fraction appears to be 56 - 60%.    Left ventricular diastolic function was normal.    There is mild calcification of the aortic valve mainly affecting the left coronary cusp(s).  No evidence of aortic stenosis or aortic regurgitation detected.    No significant valvular heart disease detected.    No pericardial effusion noted.    Compared to last echo dated December 5, 2024 significant change noted.  No pericardial effusion detected.     US Carotid Bilateral (10/17/2024 12:48)   IMPRESSION:  1. Mild-moderate plaque right greater than left carotid systems. No  occlusion.  2. No hemodynamically significant stenosis of either RIGHT or LEFT ICA.  3. Antegrade flow noted both vertebral arteries.  This report was finalized on 10/17/2024 1:59 PM by Dr. Nicanor Molina MD.    Physical Exam:  Vitals:    03/05/25 0900   BP: 128/92   BP Location: Right arm   Patient Position: Sitting   Pulse: 65   Temp: 97.3 °F (36.3 °C)   SpO2: 98%   Weight: 88 kg (194 lb)   Height: 172.7 cm (68\")      Body mass index is 29.5 kg/m².          Physical Exam  Vitals and nursing note reviewed.   Constitutional:       General: He is not in acute distress.  HENT:      Head: Normocephalic and atraumatic.   Cardiovascular:      Rate and Rhythm: Normal rate and regular rhythm.      Pulses:           Radial pulses are 2+ on the right side and 2+ on the left side.      Heart sounds: Normal heart sounds. No murmur heard.  Pulmonary:      Effort: Pulmonary effort is normal. No respiratory distress.      Breath sounds: Normal breath sounds.   Musculoskeletal:      Right lower leg: No edema.      Left lower leg: No edema.   Skin:     General: Skin is warm.      Comments: Sternal incision, MT incisions, and pericardial window incisions all well healed without drainage, redness, tenderness, swelling, or areas of fluctuance    EVH site with scabbing and mild redness around incision without tenderness, warmth, drainage, swelling, or fluctuance "   Neurological:      Mental Status: He is alert.      Gait: Gait is intact.   Psychiatric:         Attention and Perception: Attention normal.         Behavior: Behavior is cooperative.                   Assessment / Plan      Assessment / Plan:  1. Coronary artery disease involving native coronary artery of native heart without angina pectoris   2. Acute infective pericarditis, unspecified infectious etiology  3. Delayed surgical wound healing, subsequent encounter  Denies fevers/chills  Reports still taking penicillin 4x daily, needs follow up with ID. Wife will call to make appt  Reports significant improvement in wound healing  Sternal incision and pericardial window incisions well healed, no s/s infection (see photos and physical exam)  Right EVH site incision with scabbing present and mild erythema around incision, no warmth, tenderness, swelling, drainage, or areas of fluctuance (see photos and physical exam)  Reports compliance with wound cleansing, continue cleansing with iodine swabs twice daily and showering with dial soap  Does report recently recovered from influenza A infection and has had 3 syncopal epidoses, advised ER visit for any additional syncopal episodes. Patient and wife are agreeable  Return in 4 weeks to ensure complete healing of EVH site as well as to ensure patient has followed up with ID    Patient Education:  Wound Care: continue to keep your incisions clean and dry. You may use plain antibacterial soap (i.e. dial) and water to clean and pat dry. No lotions, creams, oils, powders, salves, or ointments. Please watch for signs and symptoms of infection which can include but are not limited to: increased pain or tenderness around your incision, warmth to the site or fever, redness or red streaking, and foul smelling or appearing drainage. Clear drainage and bloody drainage are not worrisome. If your wound opens up please contact our office.      Follow Up:   Return in about 4 weeks  (around 4/2/2025) for Wound Check.   Or sooner for any further concerns or worsening sign and symptoms. If unable to reach us in the office please dial 911 or go to the nearest emergency department.      ZIA Kc  [facility] Cardioth[facility]    Time Spent: I spent 32 minutes caring for Joel on this date of service. This time includes time spent by me in the following activities: preparing for the visit, reviewing tests, obtaining and/or reviewing a separately obtained history, performing a medically appropriate examination and/or evaluation, counseling and educating the patient/family/caregiver, documenting information in the medical record, and care coordination.

## 2025-03-05 ENCOUNTER — TELEPHONE (OUTPATIENT)
Dept: CARDIOLOGY | Facility: CLINIC | Age: 60
End: 2025-03-05
Payer: MEDICARE

## 2025-03-05 ENCOUNTER — OFFICE VISIT (OUTPATIENT)
Dept: CARDIAC SURGERY | Facility: CLINIC | Age: 60
End: 2025-03-05
Payer: MEDICARE

## 2025-03-05 VITALS
BODY MASS INDEX: 29.4 KG/M2 | SYSTOLIC BLOOD PRESSURE: 128 MMHG | OXYGEN SATURATION: 98 % | HEART RATE: 65 BPM | WEIGHT: 194 LBS | TEMPERATURE: 97.3 F | HEIGHT: 68 IN | DIASTOLIC BLOOD PRESSURE: 92 MMHG

## 2025-03-05 DIAGNOSIS — T81.89XD DELAYED SURGICAL WOUND HEALING, SUBSEQUENT ENCOUNTER: ICD-10-CM

## 2025-03-05 DIAGNOSIS — I25.10 CORONARY ARTERY DISEASE INVOLVING NATIVE CORONARY ARTERY OF NATIVE HEART WITHOUT ANGINA PECTORIS: Primary | ICD-10-CM

## 2025-03-05 DIAGNOSIS — I30.1 ACUTE INFECTIVE PERICARDITIS, UNSPECIFIED INFECTIOUS ETIOLOGY: ICD-10-CM

## 2025-03-19 ENCOUNTER — TELEPHONE (OUTPATIENT)
Dept: CARDIOLOGY | Facility: CLINIC | Age: 60
End: 2025-03-19
Payer: MEDICARE

## 2025-03-19 NOTE — TELEPHONE ENCOUNTER
Pt called and wanted to know if the surgeron that did his open heart surgery took his loop recorder out. I advised him that he would need to contact them and see. I also advised him I see where they have been trying to contact him to about readings. He expressed understanding.

## 2025-04-02 ENCOUNTER — TELEPHONE (OUTPATIENT)
Dept: CARDIAC SURGERY | Facility: CLINIC | Age: 60
End: 2025-04-02

## 2025-04-02 NOTE — TELEPHONE ENCOUNTER
Caller: Joel Galo    Relationship to patient: Self    Best call back number: [phone]     Chief complaint: TRANSPORTATION     Type of visit: FOLLOW UP     Requested date: RESCHEDULED TO  4/9/25    If rescheduling, when is the original appointment: 4/2/25     Additional notes:PER PATIENT REQUEST RESCHEDULED TO 4/9/25. PLEASE CONTACT PATIENT IF SOONER APPOINTMENT IS NEEDED.

## 2025-04-02 NOTE — TELEPHONE ENCOUNTER
Attempted to contact patient per ZIA Kc, to advise that he needed to follow up with [facility]. I contacted [facility] and they scheduled patient a follow up appointment with Dr. Sarkar on 4/8/25 @ 3:30pm. Phone call to patient was answered but I was unable to hear patient. I spoke and asked that patient call us back and then disconnected the call. Will attempt to contact patient again later today.

## 2025-04-03 RX ORDER — CLONIDINE HYDROCHLORIDE 0.1 MG/1
0.1 TABLET ORAL 2 TIMES DAILY
Qty: 30 TABLET | Refills: 3 | Status: SHIPPED | OUTPATIENT
Start: 2025-04-03

## 2025-04-07 NOTE — TELEPHONE ENCOUNTER
Spoke to pt and confirmed that he was aware of this appt but he has rescheduled as he will not have transportation

## 2025-04-08 NOTE — PROGRESS NOTES
[facility] Cardiothoracic Surgery Office Follow Up Note     Date of Encounter: 2025     Name: Joel Galo  : 1965     Referred By: No ref. provider found  PCP: Edmundo Boston MD    Chief Complaint:    Chief Complaint   Patient presents with    Coronary Artery Disease     4 week follow up for wound check - s/p pericardial window on on 24, CABGx4 on 10/28/24       Subjective      History of Present Illness:    Joel Galo is a 59 y.o. male s/p CABG x4 with EVH of the right greater saphenous vein, MAZE, and LAAL (#35 mm AtriCure clip) with Dr. Max on 10/28/2024 and s/p pericardial window 2024 with Dr. Max. PMH: PAF, HTN, uncontrolled type 2 DM (A1C 10.6), HLD, and CAD. Hospital stay complicated with hematuria and urinary retention and he was discharged to rehab on POD#9 and subsequently readmitted with a pericardial effusion and discharged on POD#4 after pericardial window. He has been seen on multiple occasions in office postop incisional infections and was still taking oral ABX for positive pericardial fluid cultures but had not followed up with infectious disease. Repeat echocardiogram without evidence of pericardial effusion. He presents today for follow up to ensure delayed wound healing and follow up with ID has occurred. He reports he is still taking ABX daily, has follow up with ID next week. Denies fevers/chills. Reports incisions have all healed.    Review of Systems:  Review of Systems   Constitutional: Positive for decreased appetite and malaise/fatigue. Negative for chills, diaphoresis, fever, night sweats, weight gain and weight loss.   HENT:  Negative for hoarse voice.    Eyes:  Negative for blurred vision, double vision and visual disturbance.   Cardiovascular:  Positive for dyspnea on exertion and leg swelling (in feet). Negative for chest pain, claudication, irregular heartbeat, near-syncope, orthopnea, palpitations, paroxysmal nocturnal dyspnea and  syncope.   Respiratory:  Negative for cough, hemoptysis, shortness of breath, sputum production and wheezing.    Hematologic/Lymphatic: Negative for adenopathy and bleeding problem. Does not bruise/bleed easily.   Skin:  Negative for color change, nail changes, poor wound healing and rash.   Musculoskeletal:  Positive for falls (most recent 3 weeks ago) and muscle weakness (in legs). Negative for back pain and muscle cramps.   Gastrointestinal:  Negative for abdominal pain, dysphagia and heartburn.   Genitourinary:  Negative for flank pain.   Neurological:  Positive for dizziness and light-headedness. Negative for brief paralysis, disturbances in coordination, focal weakness, headaches, loss of balance, numbness, paresthesias, sensory change, vertigo and weakness.   Psychiatric/Behavioral:  Negative for depression and suicidal ideas.    Allergic/Immunologic: Negative for persistent infections.       I have reviewed the following portions of the patient's history: problem list, current medications, allergies, past surgical history, past medical history, past social history, past family history, and ROS and confirm it's accurate.    Allergies:  No Known Allergies    Medications:      Current Outpatient Medications:     aspirin 81 MG EC tablet, Take 1 tablet by mouth Daily., Disp: , Rfl:     buPROPion XL (WELLBUTRIN XL) 300 MG 24 hr tablet, Take 1 tablet by mouth Daily., Disp: , Rfl:     cloNIDine (CATAPRES) 0.1 MG tablet, TAKE 1 TABLET BY MOUTH 2 (TWO) TIMES A DAY., Disp: 30 tablet, Rfl: 3    colchicine 0.6 MG tablet, Take 1 tablet by mouth Every 12 (Twelve) Hours., Disp: 60 tablet, Rfl: 1    docusate sodium (COLACE) 100 MG capsule, Take 1 capsule by mouth 2 (Two) Times a Day As Needed for Constipation., Disp: , Rfl:     DULoxetine (CYMBALTA) 60 MG capsule, Take 1 capsule by mouth Daily., Disp: , Rfl:     empagliflozin (JARDIANCE) 25 MG tablet tablet, Take 1 tablet by mouth Daily., Disp: , Rfl:     finasteride  (PROSCAR) 5 MG tablet, TAKE 1 TABLET EVERY DAY, Disp: 90 tablet, Rfl: 0    gabapentin (NEURONTIN) 100 MG capsule, Take 2 capsules by mouth Every Morning., Disp: , Rfl:     gabapentin (NEURONTIN) 100 MG capsule, Take 3 capsules by mouth Every Night., Disp: , Rfl:     glimepiride (AMARYL) 2 MG tablet, Take 2 tablets by mouth 2 (Two) Times a Day., Disp: , Rfl:     guaiFENesin (MUCINEX) 600 MG 12 hr tablet, Take 1 tablet by mouth Every 12 (Twelve) Hours., Disp: 60 tablet, Rfl: 2    HYDROcodone-acetaminophen (NORCO)  MG per tablet, Take 1 tablet by mouth Every 8 (Eight) Hours As Needed for Moderate Pain., Disp: , Rfl:     insulin glargine (LANTUS, SEMGLEE) 100 UNIT/ML injection, Inject 20 Units under the skin into the appropriate area as directed Daily., Disp: , Rfl:     Insulin Lispro (humaLOG) 100 UNIT/ML injection, Inject 2-9 Units under the skin into the appropriate area as directed 4 (Four) Times a Day Before Meals & at Bedtime., Disp: , Rfl:     Insulin Lispro (humaLOG) 100 UNIT/ML injection, Inject 2 Units under the skin into the appropriate area as directed 3 (Three) Times a Day With Meals., Disp: , Rfl:     lactulose (CHRONULAC) 10 GM/15ML solution, Take 45 mL by mouth 2 (Two) Times a Day As Needed., Disp: , Rfl:     levothyroxine (SYNTHROID, LEVOTHROID) 50 MCG tablet, Take 1 tablet by mouth Every Morning., Disp: , Rfl:     nitroglycerin (NITROSTAT) 0.4 MG SL tablet, Place 1 tablet under the tongue Every 5 (Five) Minutes As Needed for Chest Pain. Take no more than 3 doses in 15 minutes., Disp: , Rfl:     rosuvastatin (CRESTOR) 20 MG tablet, Take 1 tablet by mouth Daily., Disp: 90 tablet, Rfl: 1    sotalol (BETAPACE) 120 MG tablet, Take 1 tablet by mouth 2 (Two) Times a Day., Disp: 90 tablet, Rfl: 1    tamsulosin (FLOMAX) 0.4 MG capsule 24 hr capsule, TAKE 1 CAPSULE EVERY DAY, Disp: 90 capsule, Rfl: 3    Vitamin D, Cholecalciferol, (CHOLECALCIFEROL) 10 MCG (400 UNIT) tablet, Take 1 tablet by mouth Daily.,  "Disp: , Rfl:   No current facility-administered medications for this visit.    Facility-Administered Medications Ordered in Other Visits:     Chlorhexidine Gluconate Cloth 2 % pads 1 Application, 1 Application, Topical, Q12H PRN, Tatiana Waller APRN    History:   Past Medical History:   Diagnosis Date    A-fib     Anxiety     Arthritis     DDD (degenerative disc disease), lumbar     Deep vein thrombosis     Depression     Diabetes     Disease of thyroid gland     HYPO    Heart attack     \"5-6 years ago\"    Hyperlipidemia     Hypertension     Kidney stone     Neuropathy     Sleep apnea     DOES NOT USE CPAP    UTI (urinary tract infection)        Past Surgical History:   Procedure Laterality Date    ATRIAL APPENDAGE EXCLUSION LEFT WITH TRANSESOPHAGEAL ECHOCARDIOGRAM N/A 10/28/2024    Procedure: ATRIAL APPENDAGE EXCLUSION LEFT WITH TRANSESOPHAGEAL ECHOCARDIOGRAM;  Surgeon: Alex Max MD;  Location:  [facility];  Service: Cardiothoracic;  Laterality: N/A;    CARDIAC CATHETERIZATION N/A 08/28/2024    Procedure: Left Heart Cath;  Surgeon: Julio Neff MD;  [facility]:  [facility];  Service: Cardiology;  Laterality: N/A;    CARDIAC ELECTROPHYSIOLOGY PROCEDURE N/A 04/05/2023    Procedure: Loop insertion;  Surgeon: Tariq Chávez MD;  Location:  [facility];  Service: Cardiovascular;  Laterality: N/A;    CATARACT EXTRACTION Bilateral     COLONOSCOPY      CORONARY ARTERY BYPASS GRAFT N/A 10/28/2024    Procedure: MEDIAN STERNOTOMY, CORONARY ARTERY BYPASS GRAFTING X4 UTILIZING THE LEFT INTERNAL MAMMARY ARTERY GRAFT, ENDOSCOPIC VEIN HARVEST OF THE GREATER RIGHT SAPHENOUS VEIN;  Surgeon: Alex Max MD;  Location:  [facility];  Service: Cardiothoracic;  Laterality: N/A;    ENDOSCOPY      MAZE PROCEDURE N/A 10/28/2024    Procedure: MAZE PROCEDURE;  Surgeon: Alex Max MD;  Location:  [facility];  Service: Cardiothoracic;  Laterality: N/A;    PERICARDIAL WINDOW N/A 12/6/2024    " Procedure: PERICARDIAL WINDOW;  Surgeon: Alex Max MD;  [facility];  Service: Cardiothoracic;  Laterality: N/A;    TEETH EXTRACTION         Social History     Socioeconomic History    Marital status:     Number of children: 1   Tobacco Use    Smoking status: Never     Passive exposure: Never    Smokeless tobacco: Never   Vaping Use    Vaping status: Never Used   Substance and Sexual Activity    Alcohol use: No    Drug use: No    Sexual activity: Not Currently        Family History   Problem Relation Age of Onset    Heart disease Mother     Coronary artery disease Father     Diabetes Father     Kidney disease Father        Objective     Imaging/Labs:    XR Chest 1 View (12/31/2024 11:06)   IMPRESSION:  No acute cardiopulmonary process.  This report was finalized on 12/31/2024 11:22 AM by Constantine Gan M.D..     Adult Transthoracic Echo Complete W/ Cont if Necessary Per Protocol (12/27/2024 09:52)   Interpretation Summary    Normal left ventricular cavity size with moderate concentric left ventricular hypertrophy noted.    The following left ventricular wall segments are hypokinetic: apical septal, mid inferoseptal and mid anteroseptal.    Left ventricular systolic function is normal. Left ventricular ejection fraction appears to be 56 - 60%.    Left ventricular diastolic function was normal.    There is mild calcification of the aortic valve mainly affecting the left coronary cusp(s).  No evidence of aortic stenosis or aortic regurgitation detected.    No significant valvular heart disease detected.    No pericardial effusion noted.    Compared to last echo dated December 5, 2024 significant change noted.  No pericardial effusion detected.     US Carotid Bilateral (10/17/2024 12:48)   IMPRESSION:  1. Mild-moderate plaque right greater than left carotid systems. No  occlusion.  2. No hemodynamically significant stenosis of either RIGHT or LEFT ICA.  3. Antegrade flow noted both vertebral  "arteries.  This report was finalized on 10/17/2024 1:59 PM by Dr. Nicanor Molina MD.           Physical Exam:  Vitals:    04/09/25 0943   BP: 142/100   BP Location: Left arm   Patient Position: Sitting   Pulse: 72   Temp: 97.8 °F (36.6 °C)   SpO2: 99%   Weight: 91 kg (200 lb 9.6 oz)   Height: 172.7 cm (68\")      Body mass index is 30.5 kg/m².          Physical Exam  Vitals and nursing note reviewed.   Constitutional:       General: He is not in acute distress.     Comments: Appears older than stated age, seated in wheelchair   HENT:      Head: Normocephalic and atraumatic.      Mouth/Throat:      Dentition: Abnormal dentition.   Neck:      Vascular: No carotid bruit or JVD.   Cardiovascular:      Rate and Rhythm: Normal rate and regular rhythm.      Pulses:           Radial pulses are 2+ on the right side and 2+ on the left side.        Dorsalis pedis pulses are 2+ on the right side and 2+ on the left side.        Posterior tibial pulses are 1+ on the right side and 1+ on the left side.      Heart sounds: Normal heart sounds. No murmur heard.  Pulmonary:      Effort: Pulmonary effort is normal.      Breath sounds: Normal breath sounds.   Abdominal:      General: There is no abdominal bruit.      Palpations: There is no pulsatile mass.   Musculoskeletal:      Right lower leg: No edema.      Left lower leg: No edema.   Skin:     General: Skin is warm.      Capillary Refill: Capillary refill takes less than 2 seconds.      Comments: Well healed sternal and pericardial window incisions without evidence of infection, scarring present    Scarred evh site without redness, swelling, or draiange   Neurological:      Mental Status: He is alert and oriented to person, place, and time.      Gait: Gait is intact.   Psychiatric:         Attention and Perception: Attention normal.         Behavior: Behavior is cooperative.                   Assessment / Plan    Joel Galo is a 59 y.o. male s/p CABG x4 with EVH of the right " greater saphenous vein, MAZE, and LAAL (#35 mm AtriCure clip) with Dr. Max on 10/28/2024 and s/p pericardial window 12/06/2024 with Dr. Max. PMH: PAF, HTN, uncontrolled type 2 DM (A1C 10.6), HLD, and CAD. Hospital stay complicated with hematuria and urinary retention and he was discharged to rehab on POD#9 and subsequently readmitted with a pericardial effusion and discharged on POD#4 after pericardial window. He has been seen on multiple occasions in office postop incisional infections and was still taking oral ABX for positive pericardial fluid cultures but had not followed up with infectious disease. Repeat echocardiogram without evidence of pericardial effusion. He presents today for follow up to ensure delayed wound healing and follow up with ID has occurred.     Assessment / Plan:  1. CAD s/p CABG x4  2. Pericardial effusion after CABG x 4, MAZE, LAAL 10/28/24.  Readmit for Pericardial window 12/6/24  Compliant with abx regimen, scheduled follow up with ID next week  No fevers/chills  Complete healing of all incisions, no evidence of infection. See photos and physical exam  All restrictions lifted regarding CABG restrictions  Return PRN at request of cardiology       Follow Up:   Return PRN at request of cardiology.   Or sooner for any further concerns or worsening sign and symptoms. If unable to reach us in the office please dial 911 or go to the nearest emergency department.      ZIA Kc  [facility] Cardiothoracic [facility]    Time Spent: I spent 29 minutes caring for Joel on this date of service. This time includes time spent by me in the following activities: preparing for the visit, reviewing tests, obtaining and/or reviewing a separately obtained history, performing a medically appropriate examination and/or evaluation, counseling and educating the patient/family/caregiver, referring and communicating with other health care professionals, documenting information in the medical  record, and care coordination.

## 2025-04-09 ENCOUNTER — OFFICE VISIT (OUTPATIENT)
Dept: CARDIAC SURGERY | Facility: CLINIC | Age: 60
End: 2025-04-09
Payer: MEDICARE

## 2025-04-09 VITALS
TEMPERATURE: 97.8 F | HEART RATE: 72 BPM | DIASTOLIC BLOOD PRESSURE: 100 MMHG | WEIGHT: 200.6 LBS | OXYGEN SATURATION: 99 % | BODY MASS INDEX: 30.4 KG/M2 | HEIGHT: 68 IN | SYSTOLIC BLOOD PRESSURE: 142 MMHG

## 2025-04-09 DIAGNOSIS — I25.10 CORONARY ARTERY DISEASE INVOLVING NATIVE CORONARY ARTERY OF NATIVE HEART WITHOUT ANGINA PECTORIS: Primary | ICD-10-CM

## 2025-04-09 DIAGNOSIS — I31.39 PERICARDIAL EFFUSION AFTER OPERATIVE PROCEDURE: ICD-10-CM

## 2025-04-09 DIAGNOSIS — I97.89 PERICARDIAL EFFUSION AFTER OPERATIVE PROCEDURE: ICD-10-CM

## 2025-04-11 RX ORDER — SOTALOL HYDROCHLORIDE 80 MG/1
TABLET ORAL
Qty: 270 TABLET | Refills: 0 | OUTPATIENT
Start: 2025-04-11

## 2025-04-14 ENCOUNTER — OFFICE VISIT (OUTPATIENT)
Dept: CARDIOLOGY | Facility: CLINIC | Age: 60
End: 2025-04-14
Payer: MEDICARE

## 2025-04-14 VITALS
WEIGHT: 195 LBS | HEART RATE: 77 BPM | OXYGEN SATURATION: 96 % | SYSTOLIC BLOOD PRESSURE: 124 MMHG | BODY MASS INDEX: 29.55 KG/M2 | HEIGHT: 68 IN | DIASTOLIC BLOOD PRESSURE: 85 MMHG

## 2025-04-14 DIAGNOSIS — I97.89 PERICARDIAL EFFUSION AFTER OPERATIVE PROCEDURE: Primary | ICD-10-CM

## 2025-04-14 DIAGNOSIS — I25.10 CORONARY ARTERY DISEASE INVOLVING NATIVE CORONARY ARTERY OF NATIVE HEART WITHOUT ANGINA PECTORIS: ICD-10-CM

## 2025-04-14 DIAGNOSIS — I48.0 PAROXYSMAL ATRIAL FIBRILLATION: ICD-10-CM

## 2025-04-14 DIAGNOSIS — I95.1 ORTHOSTATIC HYPOTENSION: ICD-10-CM

## 2025-04-14 DIAGNOSIS — E78.5 HYPERLIPIDEMIA LDL GOAL <70: ICD-10-CM

## 2025-04-14 DIAGNOSIS — I31.39 PERICARDIAL EFFUSION AFTER OPERATIVE PROCEDURE: Primary | ICD-10-CM

## 2025-04-14 DIAGNOSIS — R06.02 SHORTNESS OF BREATH: ICD-10-CM

## 2025-04-14 NOTE — PROGRESS NOTES
[facility]             [facility] ZIA Parks Shawn Shadell, MD  Joel Galo  1965 04/14/2025    Patient Active Problem List   Diagnosis    Paroxysmal atrial fibrillation    Obstructive sleep apnea    Hypothyroidism (acquired)    GERD (gastroesophageal reflux disease)    Chronic anticoagulation    Type 2 diabetes mellitus with hyperglycemia, without long-term current use of insulin    Hyperlipidemia LDL goal <70    Primary hypertension    CAD s/p CABG x 4, MAZE, LAAL 10/28/24    Orthostatic hypotension    Pericardial effusion after CABG x 4, MAZE, LAAL 10/28/24.  Readmit for Pericardial window 12/6/24    Infectious pericarditis    Delayed surgical wound healing       Dear Edmundo Boston MD:    Subjective     Chief Complaint   Patient presents with    Follow-up     routine       HPI:     This is a 59 y.o. male with known past medical history of  syncope, autonomic orthostatic hypotension, paroxysmal atrial fibrillation, hyperlipidemia, diabetes mellitus type 2 and coronary artery disease status post four-vessel CABG with EVH of the right greater saphenous, maze and TORSTEN L with atrial cure clip by Dr. Max in November 2024.     Joel Galo presents today for routine cardiology follow up.   History of Present Illness  He reports persistent weakness and a lack of strength, which has been ongoing for approximately 5 months since having surgery. He is not currently engaged in physical therapy but attempts to maintain mobility through walking. He experiences difficulty with ambulation and has had near-fall incidents, including one the previous night. He also reports shortness of breath during exertion, such as walking around the house. He has a history of fluid accumulation around the heart, which was previously managed with a pericardial window. He has been under the care of an infectious disease specialist and is scheduled for a follow-up appointment on  Wednesday. He is uncertain about the continuation of his antibiotic therapy. He spends the majority of his time either lying in bed or sitting in a recliner. He is actively managing his blood glucose levels with his primary care physician, who is adjusting his medications accordingly. He is on insulin therapy at home.    MEDICATIONS  Current: Clonidine, baby aspirin, Jardiance, sotalol, insulin.  Discontinued: Colchicine, Eliquis.       Diagnostic Testing  Echocardiogram 9/2020: EF 55% moderate aortic valve sclerosis  Cardiac event monitor 8/2021: Predominantly normal sinus rhythm with no arrhythmias noted  Nuclear stress test 9/2021: Small size mild to moderate degree of ischemia in the lateral wall  Carotid ultrasound 3/2023: No acute findings  Echocardiogram 3/2023: EF 61 to 65%  Loop recorder insertion with [employer] device 4/2023       All other systems were reviewed and were negative.    Patient Active Problem List   Diagnosis    Paroxysmal atrial fibrillation    Obstructive sleep apnea    Hypothyroidism (acquired)    GERD (gastroesophageal reflux disease)    Chronic anticoagulation    Type 2 diabetes mellitus with hyperglycemia, without long-term current use of insulin    Hyperlipidemia LDL goal <70    Primary hypertension    CAD s/p CABG x 4, MAZE, LAAL 10/28/24    Orthostatic hypotension    Pericardial effusion after CABG x 4, MAZE, LAAL 10/28/24.  Readmit for Pericardial window 12/6/24    Infectious pericarditis    Delayed surgical wound healing       family history includes Coronary artery disease in his father; Diabetes in his father; Heart disease in his mother; Kidney disease in his father.     reports that he has never smoked. He has never been exposed to tobacco smoke. He has never used smokeless tobacco. He reports that he does not drink alcohol and does not use drugs.    No Known Allergies      Current Outpatient Medications:     aspirin 81 MG EC tablet, Take 1 tablet by mouth Daily., Disp: , Rfl:      buPROPion XL (WELLBUTRIN XL) 300 MG 24 hr tablet, Take 1 tablet by mouth Daily., Disp: , Rfl:     cloNIDine (CATAPRES) 0.1 MG tablet, TAKE 1 TABLET BY MOUTH 2 (TWO) TIMES A DAY., Disp: 30 tablet, Rfl: 3    docusate sodium (COLACE) 100 MG capsule, Take 1 capsule by mouth 2 (Two) Times a Day As Needed for Constipation., Disp: , Rfl:     DULoxetine (CYMBALTA) 60 MG capsule, Take 1 capsule by mouth Daily., Disp: , Rfl:     empagliflozin (JARDIANCE) 25 MG tablet tablet, Take 1 tablet by mouth Daily., Disp: , Rfl:     finasteride (PROSCAR) 5 MG tablet, TAKE 1 TABLET EVERY DAY, Disp: 90 tablet, Rfl: 0    gabapentin (NEURONTIN) 100 MG capsule, Take 2 capsules by mouth Every Morning., Disp: , Rfl:     gabapentin (NEURONTIN) 100 MG capsule, Take 3 capsules by mouth Every Night., Disp: , Rfl:     glimepiride (AMARYL) 2 MG tablet, Take 2 tablets by mouth 2 (Two) Times a Day., Disp: , Rfl:     guaiFENesin (MUCINEX) 600 MG 12 hr tablet, Take 1 tablet by mouth Every 12 (Twelve) Hours., Disp: 60 tablet, Rfl: 2    HYDROcodone-acetaminophen (NORCO)  MG per tablet, Take 1 tablet by mouth Every 8 (Eight) Hours As Needed for Moderate Pain., Disp: , Rfl:     insulin glargine (LANTUS, SEMGLEE) 100 UNIT/ML injection, Inject 20 Units under the skin into the appropriate area as directed Daily., Disp: , Rfl:     Insulin Lispro (humaLOG) 100 UNIT/ML injection, Inject 2-9 Units under the skin into the appropriate area as directed 4 (Four) Times a Day Before Meals & at Bedtime., Disp: , Rfl:     Insulin Lispro (humaLOG) 100 UNIT/ML injection, Inject 2 Units under the skin into the appropriate area as directed 3 (Three) Times a Day With Meals., Disp: , Rfl:     lactulose (CHRONULAC) 10 GM/15ML solution, Take 45 mL by mouth 2 (Two) Times a Day As Needed., Disp: , Rfl:     levothyroxine (SYNTHROID, LEVOTHROID) 50 MCG tablet, Take 1 tablet by mouth Every Morning., Disp: , Rfl:     nitroglycerin (NITROSTAT) 0.4 MG SL tablet, Place 1 tablet  under the tongue Every 5 (Five) Minutes As Needed for Chest Pain. Take no more than 3 doses in 15 minutes., Disp: , Rfl:     rosuvastatin (CRESTOR) 20 MG tablet, Take 1 tablet by mouth Daily., Disp: 90 tablet, Rfl: 1    sotalol (BETAPACE) 120 MG tablet, Take 1 tablet by mouth 2 (Two) Times a Day., Disp: 90 tablet, Rfl: 1    tamsulosin (FLOMAX) 0.4 MG capsule 24 hr capsule, TAKE 1 CAPSULE EVERY DAY, Disp: 90 capsule, Rfl: 3    Vitamin D, Cholecalciferol, (CHOLECALCIFEROL) 10 MCG (400 UNIT) tablet, Take 1 tablet by mouth Daily., Disp: , Rfl:   No current facility-administered medications for this visit.    Facility-Administered Medications Ordered in Other Visits:     Chlorhexidine Gluconate Cloth 2 % pads 1 Application, 1 Application, Topical, Q12H PRN, Tatiana Waller, ZIA      Physical Exam:  I have reviewed the patient's current vital signs as documented in the patient's EMR.   Vitals:    04/14/25 0934   BP: 124/85   Pulse: 77   SpO2: 96%     Body mass index is 29.66 kg/m².       04/14/25  0934   Weight: 88.5 kg (195 lb)      Physical Exam  Constitutional:       General: He is not in acute distress.     Appearance: Normal appearance. He is well-developed.   HENT:      Head: Normocephalic and atraumatic.      Mouth/Throat:      Mouth: Mucous membranes are moist.   Eyes:      Extraocular Movements: Extraocular movements intact.      Pupils: Pupils are equal, round, and reactive to light.   Neck:      Vascular: No JVD.   Cardiovascular:      Rate and Rhythm: Normal rate and regular rhythm.      Heart sounds: Normal heart sounds. No murmur heard.     No S3 or S4 sounds.   Pulmonary:      Effort: Pulmonary effort is normal. No respiratory distress.      Breath sounds: Normal breath sounds. No wheezing.   Abdominal:      General: Bowel sounds are normal. There is no distension.      Palpations: Abdomen is soft. There is no hepatomegaly.      Tenderness: There is no abdominal tenderness.   Musculoskeletal:          General: Normal range of motion.      Cervical back: Normal range of motion and neck supple.   Skin:     General: Skin is warm and dry.      Coloration: Skin is not jaundiced or pale.   Neurological:      General: No focal deficit present.      Mental Status: He is alert and oriented to person, place, and time. Mental status is at baseline.   Psychiatric:         Mood and Affect: Mood normal.         Behavior: Behavior normal.         Thought Content: Thought content normal.         Judgment: Judgment normal.            DATA REVIEWED:     TTE/DARIAN:  Results for orders placed during the hospital encounter of 12/27/24    Adult Transthoracic Echo Complete W/ Cont if Necessary Per Protocol    Interpretation Summary    Normal left ventricular cavity size with moderate concentric left ventricular hypertrophy noted.    The following left ventricular wall segments are hypokinetic: apical septal, mid inferoseptal and mid anteroseptal.    Left ventricular systolic function is normal. Left ventricular ejection fraction appears to be 56 - 60%.    Left ventricular diastolic function was normal.    There is mild calcification of the aortic valve mainly affecting the left coronary cusp(s).  No evidence of aortic stenosis or aortic regurgitation detected.    No significant valvular heart disease detected.    No pericardial effusion noted.    Compared to last echo dated December 5, 2024 significant change noted.  No pericardial effusion detected.      Laboratory evaluations:    Lab Results   Component Value Date    GLUCOSE 474 (C) 01/30/2025    BUN 11 01/30/2025    CREATININE 0.75 (L) 01/30/2025    EGFRIFNONA 86 12/15/2021    EGFRIFAFRI 99 12/15/2021    BCR 14.7 01/30/2025    K 4.4 01/30/2025    CO2 26.0 01/30/2025    CALCIUM 9.8 01/30/2025    ALBUMIN 3.7 01/30/2025    AST 18 01/30/2025    ALT 13 01/30/2025     Lab Results   Component Value Date    WBC 6.7 03/19/2025    HGB 13.8 03/19/2025    HCT 44 03/19/2025    MCV 92 03/19/2025     " 03/19/2025     Lab Results   Component Value Date    CHOL 102 11/03/2024    TRIG 316 (H) 11/03/2024    HDL 18 (L) 11/03/2024    LDL 36 11/03/2024     Lab Results   Component Value Date    TSH 1.760 12/31/2024     Lab Results   Component Value Date    HGBA1C 10.6 (H) 12/16/2024     Lab Results   Component Value Date    ALT 13 01/30/2025     Lab Results   Component Value Date    HGBA1C 10.6 (H) 12/16/2024    HGBA1C 11.20 (H) 10/25/2024    HGBA1C 12.6 (A) 08/27/2024     Lab Results   Component Value Date    CREATININE 0.75 (L) 01/30/2025     No results found for: \"IRON\", \"TIBC\", \"FERRITIN\"  Lab Results   Component Value Date    INR 1.29 (H) 10/29/2024    INR 1.43 (H) 10/28/2024    INR 1.06 10/25/2024    PROTIME 16.2 (H) 10/29/2024    PROTIME 17.6 (H) 10/28/2024    PROTIME 13.9 10/25/2024      No components found for: \"ABSOLUTELUNG\"    --------------------------------------------------------------------------------------------------------------------------    ASSESSMENT/PLAN:      Diagnosis Plan   1. Pericardial effusion after CABG x 4, MAZE, LAAL 10/28/24.  Readmit for Pericardial window 12/6/24  Adult Transthoracic Echo Limited W/ Cont if Necessary Per Protocol      2. Shortness of breath  Adult Transthoracic Echo Limited W/ Cont if Necessary Per Protocol      3. Coronary artery disease involving native coronary artery of native heart without angina pectoris        4. Hyperlipidemia LDL goal <70        5. Paroxysmal atrial fibrillation        6. Orthostatic hypotension          Assessment & Plan  1. Hypertension.  2.  Episodic hypotension  Patient has a longstanding history of orthostatic hypotension and autonomic dysfunction. He has essentially been taken off of most of his cardiac medications carvedilol, HCTZ, amlodipine and Entresto given while on these medications he had worsening dizziness and increased syncopal episodes.  These episodes have been better.  Blood pressure well-controlled.  Currently on " clonidine for blood pressure management and feels that this has kept his blood pressure from dropping too low and has been feeling overall well with no further significant syncopal episodes.    2. Shortness of breath  3.  History of pericardial window  He experiences shortness of breath with exertion and weakness, which is likely due to deconditioning from prolonged inactivity. He was advised to gradually increase physical activity to improve his conditioning. A repeat echocardiogram will be ordered to ensure there is no fluid around the heart.    4.  Paroxysmal atrial fibrillation  5. History of maze with left atrial appendage ligation with AtriCure clip   Currently normal sinus rhythm with a controlled rate.  He has an upcoming appointment with Dr. Fishman in June 2025 to discuss the possibility of discontinuing sotalol given his maze procedure.  Eliquis was discontinued secondary to left atrial appendage ligation on 10/28/2024.     6.  Loop recorder  Patient had loop recorder placed in October 2024.  Has not been interrogated recently therefore we will see him back for the next clinic.  Patient states the phone with the loop recorder is not currently working and therefore I have asked him to bring the phone with him to the interrogation.    This document has been @Electronically signed by ZIA Melendez, 04/14/25, 9:49 AM EDT.       Dictated Utilizing Dragon Dictation: Part of this note may be an electronic transcription/translation of spoken language to printed text using the Dragon Dictation System.    Patient or patient representative verbalized consent for the use of Ambient Listening during the visit with  ZIA Melendez for chart documentation. 4/14/2025  10:25 EDT    Follow-up appointment and medication changes provided in hand delivered After Visit Summary as well as reviewed in the room.

## 2025-04-15 ENCOUNTER — TELEPHONE (OUTPATIENT)
Dept: CARDIOLOGY | Facility: CLINIC | Age: 60
End: 2025-04-15
Payer: MEDICARE

## 2025-04-15 NOTE — TELEPHONE ENCOUNTER
----- Message from Blossom Parks sent at 4/14/2025  9:58 AM EDT -----  Please request heart monitor from PCP

## 2025-04-18 RX ORDER — SOTALOL HYDROCHLORIDE 80 MG/1
TABLET ORAL
Qty: 270 TABLET | Refills: 0 | Status: SHIPPED | OUTPATIENT
Start: 2025-04-18

## 2025-04-22 ENCOUNTER — LAB (OUTPATIENT)
Dept: LAB | Facility: HOSPITAL | Age: 60
End: 2025-04-22
Payer: MEDICARE

## 2025-04-22 ENCOUNTER — TRANSCRIBE ORDERS (OUTPATIENT)
Dept: LAB | Facility: HOSPITAL | Age: 60
End: 2025-04-22
Payer: MEDICARE

## 2025-04-22 DIAGNOSIS — I30.1 PNEUMOPYOPERICARDIUM: ICD-10-CM

## 2025-04-22 DIAGNOSIS — Z79.2 ENCOUNTER FOR LONG-TERM (CURRENT) USE OF ANTIBIOTICS: ICD-10-CM

## 2025-04-22 DIAGNOSIS — B96.89 BACTERIAL CHOLANGITIS: ICD-10-CM

## 2025-04-22 DIAGNOSIS — K83.09 BACTERIAL CHOLANGITIS: ICD-10-CM

## 2025-04-22 DIAGNOSIS — Z79.2 ENCOUNTER FOR LONG-TERM (CURRENT) USE OF ANTIBIOTICS: Primary | ICD-10-CM

## 2025-04-22 LAB
ALBUMIN SERPL-MCNC: 3.7 G/DL (ref 3.5–5.2)
ALBUMIN/GLOB SERPL: 0.9 G/DL
ALP SERPL-CCNC: 144 U/L (ref 39–117)
ALT SERPL W P-5'-P-CCNC: 11 U/L (ref 1–41)
ANION GAP SERPL CALCULATED.3IONS-SCNC: 11 MMOL/L (ref 5–15)
AST SERPL-CCNC: 12 U/L (ref 1–40)
BASOPHILS # BLD AUTO: 0.04 10*3/MM3 (ref 0–0.2)
BASOPHILS NFR BLD AUTO: 0.5 % (ref 0–1.5)
BILIRUB SERPL-MCNC: 0.3 MG/DL (ref 0–1.2)
BUN SERPL-MCNC: 9 MG/DL (ref 8–23)
BUN/CREAT SERPL: 11.7 (ref 7–25)
CALCIUM SPEC-SCNC: 9.9 MG/DL (ref 8.6–10.5)
CHLORIDE SERPL-SCNC: 96 MMOL/L (ref 98–107)
CO2 SERPL-SCNC: 26 MMOL/L (ref 22–29)
CREAT SERPL-MCNC: 0.77 MG/DL (ref 0.76–1.27)
CRP SERPL-MCNC: 0.47 MG/DL (ref 0–0.5)
DEPRECATED RDW RBC AUTO: 47.7 FL (ref 37–54)
EGFRCR SERPLBLD CKD-EPI 2021: 101.9 ML/MIN/1.73
EOSINOPHIL # BLD AUTO: 0.13 10*3/MM3 (ref 0–0.4)
EOSINOPHIL NFR BLD AUTO: 1.6 % (ref 0.3–6.2)
ERYTHROCYTE [DISTWIDTH] IN BLOOD BY AUTOMATED COUNT: 14.1 % (ref 12.3–15.4)
ERYTHROCYTE [SEDIMENTATION RATE] IN BLOOD: 26 MM/HR (ref 0–20)
GLOBULIN UR ELPH-MCNC: 4 GM/DL
GLUCOSE SERPL-MCNC: 499 MG/DL (ref 65–99)
HCT VFR BLD AUTO: 47.4 % (ref 37.5–51)
HGB BLD-MCNC: 14.8 G/DL (ref 13–17.7)
IMM GRANULOCYTES # BLD AUTO: 0.05 10*3/MM3 (ref 0–0.05)
IMM GRANULOCYTES NFR BLD AUTO: 0.6 % (ref 0–0.5)
LYMPHOCYTES # BLD AUTO: 1.64 10*3/MM3 (ref 0.7–3.1)
LYMPHOCYTES NFR BLD AUTO: 20.2 % (ref 19.6–45.3)
MCH RBC QN AUTO: 28.7 PG (ref 26.6–33)
MCHC RBC AUTO-ENTMCNC: 31.2 G/DL (ref 31.5–35.7)
MCV RBC AUTO: 92 FL (ref 79–97)
MONOCYTES # BLD AUTO: 0.56 10*3/MM3 (ref 0.1–0.9)
MONOCYTES NFR BLD AUTO: 6.9 % (ref 5–12)
NEUTROPHILS NFR BLD AUTO: 5.68 10*3/MM3 (ref 1.7–7)
NEUTROPHILS NFR BLD AUTO: 70.2 % (ref 42.7–76)
NRBC BLD AUTO-RTO: 0 /100 WBC (ref 0–0.2)
PLATELET # BLD AUTO: 210 10*3/MM3 (ref 140–450)
PMV BLD AUTO: 9.8 FL (ref 6–12)
POTASSIUM SERPL-SCNC: 4.5 MMOL/L (ref 3.5–5.2)
PROT SERPL-MCNC: 7.7 G/DL (ref 6–8.5)
RBC # BLD AUTO: 5.15 10*6/MM3 (ref 4.14–5.8)
SODIUM SERPL-SCNC: 133 MMOL/L (ref 136–145)
WBC NRBC COR # BLD AUTO: 8.1 10*3/MM3 (ref 3.4–10.8)

## 2025-04-22 PROCEDURE — 36415 COLL VENOUS BLD VENIPUNCTURE: CPT

## 2025-04-22 PROCEDURE — 86140 C-REACTIVE PROTEIN: CPT

## 2025-04-22 PROCEDURE — 85652 RBC SED RATE AUTOMATED: CPT

## 2025-04-22 PROCEDURE — 80053 COMPREHEN METABOLIC PANEL: CPT

## 2025-04-22 PROCEDURE — 85025 COMPLETE CBC W/AUTO DIFF WBC: CPT

## 2025-05-14 ENCOUNTER — CLINICAL SUPPORT NO REQUIREMENTS (OUTPATIENT)
Dept: CARDIOLOGY | Facility: CLINIC | Age: 60
End: 2025-05-14
Payer: MEDICARE

## 2025-05-14 DIAGNOSIS — R00.2 PALPITATIONS: Primary | ICD-10-CM

## 2025-05-15 ENCOUNTER — TELEPHONE (OUTPATIENT)
Dept: CARDIOLOGY | Facility: CLINIC | Age: 60
End: 2025-05-15
Payer: MEDICARE

## 2025-05-15 NOTE — TELEPHONE ENCOUNTER
Per remote transmission, patients loop recorder has reached ALF. Called patient, Left message for return call. Will send MyChart message also.

## 2025-05-23 ENCOUNTER — TELEPHONE (OUTPATIENT)
Dept: CARDIOLOGY | Facility: CLINIC | Age: 60
End: 2025-05-23
Payer: MEDICARE

## 2025-05-28 DIAGNOSIS — Z45.09 ENCOUNTER FOR LOOP RECORDER AT END OF BATTERY LIFE: Primary | ICD-10-CM

## 2025-06-05 DIAGNOSIS — I48.0 PAROXYSMAL ATRIAL FIBRILLATION: Primary | ICD-10-CM

## 2025-06-12 ENCOUNTER — HOSPITAL ENCOUNTER (OUTPATIENT)
Facility: HOSPITAL | Age: 60
Setting detail: HOSPITAL OUTPATIENT SURGERY
Discharge: HOME | End: 2025-06-12
Attending: INTERNAL MEDICINE | Admitting: INTERNAL MEDICINE
Payer: MEDICARE

## 2025-06-12 DIAGNOSIS — I48.0 PAROXYSMAL ATRIAL FIBRILLATION: ICD-10-CM

## 2025-06-12 PROCEDURE — 33286 RMVL SUBQ CAR RHYTHM MNTR: CPT | Performed by: INTERNAL MEDICINE

## 2025-06-12 PROCEDURE — 25010000002 LIDOCAINE 2% SOLUTION: Performed by: INTERNAL MEDICINE

## 2025-06-12 RX ORDER — LIDOCAINE HYDROCHLORIDE 20 MG/ML
INJECTION, SOLUTION INFILTRATION; PERINEURAL
Status: DISCONTINUED | OUTPATIENT
Start: 2025-06-12 | End: 2025-06-12 | Stop reason: HOSPADM

## 2025-06-12 NOTE — H&P
"History and Physical:  Date of Admit: 6/12/2025    Patient Active Problem List   Diagnosis    Paroxysmal atrial fibrillation    Obstructive sleep apnea    Hypothyroidism (acquired)    GERD (gastroesophageal reflux disease)    Chronic anticoagulation    Type 2 diabetes mellitus with hyperglycemia, without long-term current use of insulin    Hyperlipidemia LDL goal <70    Primary hypertension    CAD s/p CABG x 4, MAZE, LAAL 10/28/24    Orthostatic hypotension    Pericardial effusion after CABG x 4, MAZE, LAAL 10/28/24.  Readmit for Pericardial window 12/6/24    Infectious pericarditis    Delayed surgical wound healing    Encounter for loop recorder at end of battery life         Chief complaint: Loop recorder at end of life with the battery.      Subjective     History of Present Illness: Mr. Joel Galo is a 60-year-old  male who has had internal loop recorder placed on 4/5/2023.  On recent evaluation he was noted to have depletion of the loop recorder battery and reaching end-of-life.  He is brought in today to remove the internal loop recorder.  Currently has no cardiac symptoms.      Past Medical History:   Diagnosis Date    A-fib     Anxiety     Arthritis     DDD (degenerative disc disease), lumbar     Deep vein thrombosis     Depression     Diabetes     Disease of thyroid gland     HYPO    Heart attack     \"5-6 years ago\"    Hyperlipidemia     Hypertension     Kidney stone     Neuropathy     Sleep apnea     DOES NOT USE CPAP    UTI (urinary tract infection)      Past Surgical History:   Procedure Laterality Date    ATRIAL APPENDAGE EXCLUSION LEFT WITH TRANSESOPHAGEAL ECHOCARDIOGRAM N/A 10/28/2024    Procedure: ATRIAL APPENDAGE EXCLUSION LEFT WITH TRANSESOPHAGEAL ECHOCARDIOGRAM;  Surgeon: Alex Max MD;  Location: [facility] OR;  Service: Cardiothoracic;  Laterality: N/A;    CARDIAC CATHETERIZATION N/A 08/28/2024    Procedure: Left Heart Cath;  Surgeon: Julio Neff MD;  Location:  [facility] " [facility]ATION;  Service: Cardiology;  Laterality: N/A;    CARDIAC ELECTROPHYSIOLOGY PROCEDURE N/A 04/05/2023    Procedure: Loop insertion;  Surgeon: Tariq Chávez MD;  Location:  [facility];  Service: Cardiovascular;  Laterality: N/A;    CATARACT EXTRACTION Bilateral     COLONOSCOPY      CORONARY ARTERY BYPASS GRAFT N/A 10/28/2024    Procedure: MEDIAN STERNOTOMY, CORONARY ARTERY BYPASS GRAFTING X4 UTILIZING THE LEFT INTERNAL MAMMARY ARTERY GRAFT, ENDOSCOPIC VEIN HARVEST OF THE GREATER RIGHT SAPHENOUS VEIN;  Surgeon: Alex Max MD;  Location:  [facility];  Service: Cardiothoracic;  Laterality: N/A;    ENDOSCOPY      MAZE PROCEDURE N/A 10/28/2024    Procedure: MAZE PROCEDURE;  Surgeon: Alex Max MD;  Location:  [facility];  Service: Cardiothoracic;  Laterality: N/A;    PERICARDIAL WINDOW N/A 12/6/2024    Procedure: PERICARDIAL WINDOW;  Surgeon: Alex Max MD;  Location:  [facility];  Service: Cardiothoracic;  Laterality: N/A;    TEETH EXTRACTION       Family History   Problem Relation Age of Onset    Heart disease Mother     Coronary artery disease Father     Diabetes Father     Kidney disease Father      Social History     Tobacco Use    Smoking status: Never     Passive exposure: Never    Smokeless tobacco: Never   Vaping Use    Vaping status: Never Used   Substance Use Topics    Alcohol use: No    Drug use: No       Medications Prior to Admission   Medication Sig Dispense Refill Last Dose/Taking    aspirin 81 MG EC tablet Take 1 tablet by mouth Daily.   6/11/2025    buPROPion XL (WELLBUTRIN XL) 300 MG 24 hr tablet Take 1 tablet by mouth Daily.   6/11/2025    cloNIDine (CATAPRES) 0.1 MG tablet TAKE 1 TABLET BY MOUTH 2 (TWO) TIMES A DAY. 30 tablet 3 6/11/2025    docusate sodium (COLACE) 100 MG capsule Take 1 capsule by mouth 2 (Two) Times a Day As Needed for Constipation.   6/11/2025    DULoxetine (CYMBALTA) 60 MG capsule Take 1 capsule by mouth Daily.   6/11/2025    empagliflozin  (JARDIANCE) 25 MG tablet tablet Take 1 tablet by mouth Daily.   6/11/2025    finasteride (PROSCAR) 5 MG tablet TAKE 1 TABLET EVERY DAY 90 tablet 0 6/11/2025    gabapentin (NEURONTIN) 100 MG capsule Take 2 capsules by mouth Every Morning.   6/11/2025    gabapentin (NEURONTIN) 100 MG capsule Take 3 capsules by mouth Every Night.   6/11/2025    glimepiride (AMARYL) 2 MG tablet Take 2 tablets by mouth 2 (Two) Times a Day.   6/11/2025    HYDROcodone-acetaminophen (NORCO)  MG per tablet Take 1 tablet by mouth Every 8 (Eight) Hours As Needed for Moderate Pain.   6/11/2025    insulin glargine (LANTUS, SEMGLEE) 100 UNIT/ML injection Inject 20 Units under the skin into the appropriate area as directed Daily.   6/11/2025    Insulin Lispro (humaLOG) 100 UNIT/ML injection Inject 2-9 Units under the skin into the appropriate area as directed 4 (Four) Times a Day Before Meals & at Bedtime.   6/11/2025    Insulin Lispro (humaLOG) 100 UNIT/ML injection Inject 2 Units under the skin into the appropriate area as directed 3 (Three) Times a Day With Meals.   6/11/2025    lactulose (CHRONULAC) 10 GM/15ML solution Take 45 mL by mouth 2 (Two) Times a Day As Needed.   6/11/2025    levothyroxine (SYNTHROID, LEVOTHROID) 50 MCG tablet Take 1 tablet by mouth Every Morning.   6/11/2025    rosuvastatin (CRESTOR) 20 MG tablet Take 1 tablet by mouth Daily. 90 tablet 1 6/11/2025    sotalol (BETAPACE) 80 MG tablet TAKE ONE AND A HALF (1 & 1/2) TABLETS BY MOUTH TWICE DAILY 270 tablet 0 6/11/2025    tamsulosin (FLOMAX) 0.4 MG capsule 24 hr capsule TAKE 1 CAPSULE EVERY DAY 90 capsule 3 6/11/2025    Vitamin D, Cholecalciferol, (CHOLECALCIFEROL) 10 MCG (400 UNIT) tablet Take 1 tablet by mouth Daily.   6/11/2025    nitroglycerin (NITROSTAT) 0.4 MG SL tablet Place 1 tablet under the tongue Every 5 (Five) Minutes As Needed for Chest Pain. Take no more than 3 doses in 15 minutes.   More than a month    sotalol (BETAPACE) 120 MG tablet Take 1 tablet by  mouth 2 (Two) Times a Day. 90 tablet 1        Allergies:  Patient has no known allergies.    Review of Systems   Constitutional:  Negative for appetite change, chills and fever.   HENT:  Negative for congestion, ear discharge, ear pain and sore throat.    Eyes:  Negative for pain and redness.   Respiratory:  Negative for cough, shortness of breath and wheezing.    Cardiovascular:  Negative for palpitations and leg swelling.   Gastrointestinal:  Negative for abdominal pain, diarrhea, nausea and vomiting.   Endocrine: Negative for cold intolerance, heat intolerance, polydipsia and polyuria.   Genitourinary:  Negative for dysuria and hematuria.   Skin:  Negative for rash.   Neurological:  Negative for seizures, syncope and headaches.   Psychiatric/Behavioral:  Negative for confusion. The patient is not nervous/anxious.            Objective      Vital Signs     There is no height or weight on file to calculate BMI.  No intake or output data in the 24 hours ending 06/12/25 0976    Physical Exam  Constitutional:       Appearance: He is well-developed.   HENT:      Head: Normocephalic and atraumatic.   Eyes:      General:         Right eye: No discharge.         Left eye: No discharge.   Neck:      Thyroid: No thyromegaly.      Vascular: No JVD.      Trachea: No tracheal deviation.   Cardiovascular:      Chest Wall: PMI is not displaced.      Heart sounds: S1 normal and S2 normal. Murmur heard.      Systolic murmur is present with a grade of 2/6.      No friction rub. No gallop.   Pulmonary:      Effort: No respiratory distress.      Breath sounds: No stridor. No wheezing or rales.   Chest:      Chest wall: No tenderness.   Abdominal:      General: There is no distension.      Palpations: Abdomen is soft. There is no mass.      Tenderness: There is no abdominal tenderness. There is no guarding.   Skin:     General: Skin is warm and dry.      Findings: No erythema.   Neurological:      Mental Status: He is alert.         "  Results Review:    I reviewed the patient's new clinical results.              Lab Results   Component Value Date    INR 1.29 (H) 10/29/2024    INR 1.43 (H) 10/28/2024    INR 1.06 10/25/2024     Lab Results   Component Value Date    MG 1.8 12/31/2024    MG 2.1 12/10/2024    MG 2.2 12/08/2024     Lab Results   Component Value Date    TSH 1.760 12/31/2024    PSA 0.404 05/09/2019    TRIG 316 (H) 11/03/2024    HDL 18 (L) 11/03/2024    LDL 36 11/03/2024      No results found for: \"BNP\"    Assessment & Plan    Assessment:      Internal loop recorder at the end of life      Assessment & Plan   Plan:   Removal of the internal loop recorder.  The procedure, potential risks and benefits were explained to Mr. Galo and he expressed understanding of same and wanted to proceed.      Thank you very much for asking us to be involved in this patient's care.  We will follow along with you.      Tariq Chávez MD  06/12/25  09:29 EDT    Please note that portions of this note were completed with a voice recognition program.       "

## 2025-06-12 NOTE — DISCHARGE INSTR - ACTIVITY
Keep dressing clean and dry for 1 week.     Report to Dr. Chávez's office on Tuesday, June 17, 2025 for an incision site check.

## 2025-06-17 ENCOUNTER — CLINICAL SUPPORT (OUTPATIENT)
Dept: CARDIOLOGY | Facility: CLINIC | Age: 60
End: 2025-06-17
Payer: MEDICARE

## 2025-06-17 DIAGNOSIS — Z51.89 VISIT FOR WOUND CHECK: Primary | ICD-10-CM

## 2025-06-17 NOTE — PROGRESS NOTES
Patient was here for a wound check today, wound was healing with no sign of infection or redness/puffiness.  Discarded old bandage, cleaned with peroxide around site and applied new bandage.  Patient education was given.

## 2025-06-26 RX ORDER — CLONIDINE HYDROCHLORIDE 0.1 MG/1
0.1 TABLET ORAL 2 TIMES DAILY
Qty: 30 TABLET | Refills: 3 | Status: SHIPPED | OUTPATIENT
Start: 2025-06-26

## 2025-06-30 RX ORDER — ROSUVASTATIN CALCIUM 20 MG/1
20 TABLET, COATED ORAL DAILY
Qty: 90 TABLET | Refills: 0 | Status: SHIPPED | OUTPATIENT
Start: 2025-06-30

## 2025-07-08 ENCOUNTER — LAB (OUTPATIENT)
Dept: LAB | Facility: HOSPITAL | Age: 60
End: 2025-07-08
Payer: MEDICARE

## 2025-07-08 ENCOUNTER — TRANSCRIBE ORDERS (OUTPATIENT)
Dept: LAB | Facility: HOSPITAL | Age: 60
End: 2025-07-08
Payer: MEDICARE

## 2025-07-08 DIAGNOSIS — I30.1 INFECTIOUS PERICARDITIS, UNSPECIFIED CHRONICITY, UNSPECIFIED INFECTIOUS ETIOLOGY: ICD-10-CM

## 2025-07-08 DIAGNOSIS — I30.1 INFECTIOUS PERICARDITIS, UNSPECIFIED CHRONICITY, UNSPECIFIED INFECTIOUS ETIOLOGY: Primary | ICD-10-CM

## 2025-07-08 DIAGNOSIS — B96.89 BACTERIAL CHOLANGITIS: ICD-10-CM

## 2025-07-08 DIAGNOSIS — K83.09 BACTERIAL CHOLANGITIS: ICD-10-CM

## 2025-07-08 DIAGNOSIS — I25.810 CORONARY ARTERY DISEASE INVOLVING CORONARY BYPASS GRAFT, UNSPECIFIED WHETHER ANGINA PRESENT, UNSPECIFIED WHETHER NATIVE OR TRANSPLANTED HEART: ICD-10-CM

## 2025-07-08 LAB
ALBUMIN SERPL-MCNC: 3.4 G/DL (ref 3.5–5.2)
ALBUMIN/GLOB SERPL: 1.1 G/DL
ALP SERPL-CCNC: 122 U/L (ref 39–117)
ALT SERPL W P-5'-P-CCNC: 28 U/L (ref 1–41)
ANION GAP SERPL CALCULATED.3IONS-SCNC: 9 MMOL/L (ref 5–15)
AST SERPL-CCNC: 70 U/L (ref 1–40)
BASOPHILS # BLD AUTO: 0.03 10*3/MM3 (ref 0–0.2)
BASOPHILS NFR BLD AUTO: 0.5 % (ref 0–1.5)
BILIRUB SERPL-MCNC: 0.2 MG/DL (ref 0–1.2)
BUN SERPL-MCNC: 8.1 MG/DL (ref 8–23)
BUN/CREAT SERPL: 11.3 (ref 7–25)
CALCIUM SPEC-SCNC: 9 MG/DL (ref 8.6–10.5)
CHLORIDE SERPL-SCNC: 100 MMOL/L (ref 98–107)
CO2 SERPL-SCNC: 30 MMOL/L (ref 22–29)
CREAT SERPL-MCNC: 0.72 MG/DL (ref 0.76–1.27)
CRP SERPL-MCNC: <0.3 MG/DL (ref 0–0.5)
DEPRECATED RDW RBC AUTO: 42.7 FL (ref 37–54)
EGFRCR SERPLBLD CKD-EPI 2021: 103.3 ML/MIN/1.73
EOSINOPHIL # BLD AUTO: 0.17 10*3/MM3 (ref 0–0.4)
EOSINOPHIL NFR BLD AUTO: 2.8 % (ref 0.3–6.2)
ERYTHROCYTE [DISTWIDTH] IN BLOOD BY AUTOMATED COUNT: 12.7 % (ref 12.3–15.4)
ERYTHROCYTE [SEDIMENTATION RATE] IN BLOOD: 32 MM/HR (ref 0–20)
GLOBULIN UR ELPH-MCNC: 3.1 GM/DL
GLUCOSE SERPL-MCNC: 250 MG/DL (ref 65–99)
HCT VFR BLD AUTO: 44.3 % (ref 37.5–51)
HGB BLD-MCNC: 14.4 G/DL (ref 13–17.7)
IMM GRANULOCYTES # BLD AUTO: 0.03 10*3/MM3 (ref 0–0.05)
IMM GRANULOCYTES NFR BLD AUTO: 0.5 % (ref 0–0.5)
LYMPHOCYTES # BLD AUTO: 1.61 10*3/MM3 (ref 0.7–3.1)
LYMPHOCYTES NFR BLD AUTO: 26.5 % (ref 19.6–45.3)
MCH RBC QN AUTO: 29.9 PG (ref 26.6–33)
MCHC RBC AUTO-ENTMCNC: 32.5 G/DL (ref 31.5–35.7)
MCV RBC AUTO: 91.9 FL (ref 79–97)
MONOCYTES # BLD AUTO: 0.45 10*3/MM3 (ref 0.1–0.9)
MONOCYTES NFR BLD AUTO: 7.4 % (ref 5–12)
NEUTROPHILS NFR BLD AUTO: 3.79 10*3/MM3 (ref 1.7–7)
NEUTROPHILS NFR BLD AUTO: 62.3 % (ref 42.7–76)
NRBC BLD AUTO-RTO: 0 /100 WBC (ref 0–0.2)
PLATELET # BLD AUTO: 141 10*3/MM3 (ref 140–450)
PMV BLD AUTO: 9.5 FL (ref 6–12)
POTASSIUM SERPL-SCNC: 3.8 MMOL/L (ref 3.5–5.2)
PROT SERPL-MCNC: 6.5 G/DL (ref 6–8.5)
RBC # BLD AUTO: 4.82 10*6/MM3 (ref 4.14–5.8)
SODIUM SERPL-SCNC: 139 MMOL/L (ref 136–145)
WBC NRBC COR # BLD AUTO: 6.08 10*3/MM3 (ref 3.4–10.8)

## 2025-07-08 PROCEDURE — 80053 COMPREHEN METABOLIC PANEL: CPT

## 2025-07-08 PROCEDURE — 36415 COLL VENOUS BLD VENIPUNCTURE: CPT

## 2025-07-08 PROCEDURE — 86140 C-REACTIVE PROTEIN: CPT

## 2025-07-08 PROCEDURE — 85652 RBC SED RATE AUTOMATED: CPT

## 2025-07-08 PROCEDURE — 85025 COMPLETE CBC W/AUTO DIFF WBC: CPT

## 2025-07-21 RX ORDER — SOTALOL HYDROCHLORIDE 80 MG/1
TABLET ORAL
Qty: 270 TABLET | Refills: 0 | Status: SHIPPED | OUTPATIENT
Start: 2025-07-21

## 2025-07-24 ENCOUNTER — TELEPHONE (OUTPATIENT)
Dept: CARDIOLOGY | Facility: CLINIC | Age: 60
End: 2025-07-24
Payer: MEDICARE

## 2025-07-24 DIAGNOSIS — I25.10 CORONARY ARTERY DISEASE INVOLVING NATIVE CORONARY ARTERY OF NATIVE HEART WITHOUT ANGINA PECTORIS: Primary | ICD-10-CM

## 2025-07-24 NOTE — TELEPHONE ENCOUNTER
Hub to relay     Called patient to reschedule appointment on 7/28 due to no show echo ,patient needs to call hospital get echo rescheduled, after echo is rescheduled call our office for f/u appointment no answer vm full

## 2025-07-25 ENCOUNTER — TELEPHONE (OUTPATIENT)
Dept: CARDIOLOGY | Facility: CLINIC | Age: 60
End: 2025-07-25
Payer: MEDICARE

## 2025-07-25 NOTE — TELEPHONE ENCOUNTER
Called patient to cancel appointment until after echo is scheduled, patient expressed understanding

## 2025-08-11 ENCOUNTER — HOSPITAL ENCOUNTER (OUTPATIENT)
Dept: CARDIOLOGY | Facility: HOSPITAL | Age: 60
Discharge: HOME | End: 2025-08-11
Admitting: NURSE PRACTITIONER
Payer: MEDICARE

## 2025-08-11 DIAGNOSIS — I25.10 CORONARY ARTERY DISEASE INVOLVING NATIVE CORONARY ARTERY OF NATIVE HEART WITHOUT ANGINA PECTORIS: ICD-10-CM

## 2025-08-11 LAB
AORTIC DIMENSIONLESS INDEX: 0.56 (DI)
AV MEAN PRESS GRAD SYS DOP V1V2: 3 MMHG
AV VMAX SYS DOP: 109 CM/SEC
BH CV ECHO MEAS - AO MAX PG: 4.8 MMHG
BH CV ECHO MEAS - AO ROOT DIAM: 3 CM
BH CV ECHO MEAS - AO V2 VTI: 21.7 CM
BH CV ECHO MEAS - AVA(I,D): 2.14 CM2
BH CV ECHO MEAS - EDV(CUBED): 46.7 ML
BH CV ECHO MEAS - EDV(MOD-SP4): 42.8 ML
BH CV ECHO MEAS - EF(MOD-SP4): 47 %
BH CV ECHO MEAS - ESV(CUBED): 54.9 ML
BH CV ECHO MEAS - ESV(MOD-SP4): 22.7 ML
BH CV ECHO MEAS - FS: -5.6 %
BH CV ECHO MEAS - IVS/LVPW: 1.12 CM
BH CV ECHO MEAS - IVSD: 1.4 CM
BH CV ECHO MEAS - LA DIMENSION: 4 CM
BH CV ECHO MEAS - LAT PEAK E' VEL: 8.8 CM/SEC
BH CV ECHO MEAS - LV DIASTOLIC VOL/BSA (35-75): 21.4 CM2
BH CV ECHO MEAS - LV MASS(C)D: 272.3 GRAMS
BH CV ECHO MEAS - LV MAX PG: 1.73 MMHG
BH CV ECHO MEAS - LV MEAN PG: 1 MMHG
BH CV ECHO MEAS - LV SYSTOLIC VOL/BSA (12-30): 11.3 CM2
BH CV ECHO MEAS - LV V1 MAX: 65.8 CM/SEC
BH CV ECHO MEAS - LV V1 VTI: 12.2 CM
BH CV ECHO MEAS - LVIDD: 3.6 CM
BH CV ECHO MEAS - LVIDS: 4.5 CM
BH CV ECHO MEAS - LVOT AREA: 3.8 CM2
BH CV ECHO MEAS - LVOT DIAM: 2.2 CM
BH CV ECHO MEAS - LVPWD: 1.2 CM
BH CV ECHO MEAS - MED PEAK E' VEL: 5.9 CM/SEC
BH CV ECHO MEAS - MV A MAX VEL: 31.7 CM/SEC
BH CV ECHO MEAS - MV E MAX VEL: 55.8 CM/SEC
BH CV ECHO MEAS - MV E/A: 1.76
BH CV ECHO MEAS - PA ACC TIME: 0.08 SEC
BH CV ECHO MEAS - PA V2 MAX: 106 CM/SEC
BH CV ECHO MEAS - RAP SYSTOLE: 10 MMHG
BH CV ECHO MEAS - RVSP: 26.6 MMHG
BH CV ECHO MEAS - SV(LVOT): 46.4 ML
BH CV ECHO MEAS - SV(MOD-SP4): 20.1 ML
BH CV ECHO MEAS - SVI(LVOT): 23.2 ML/M2
BH CV ECHO MEAS - SVI(MOD-SP4): 10 ML/M2
BH CV ECHO MEAS - TAPSE (>1.6): 1.43 CM
BH CV ECHO MEAS - TR MAX PG: 16.6 MMHG
BH CV ECHO MEAS - TR MAX VEL: 204 CM/SEC
BH CV ECHO MEASUREMENTS AVERAGE E/E' RATIO: 7.59
LEFT ATRIUM VOLUME INDEX: 19.2 ML/M2

## 2025-08-11 PROCEDURE — 93306 TTE W/DOPPLER COMPLETE: CPT

## 2025-08-11 PROCEDURE — 93306 TTE W/DOPPLER COMPLETE: CPT | Performed by: SPECIALIST

## 2025-08-26 ENCOUNTER — OFFICE VISIT (OUTPATIENT)
Dept: CARDIOLOGY | Facility: CLINIC | Age: 60
End: 2025-08-26
Payer: MEDICARE

## 2025-08-26 ENCOUNTER — TELEPHONE (OUTPATIENT)
Dept: CARDIOLOGY | Facility: CLINIC | Age: 60
End: 2025-08-26
Payer: MEDICARE

## 2025-08-26 VITALS
HEIGHT: 68 IN | OXYGEN SATURATION: 98 % | BODY MASS INDEX: 31.16 KG/M2 | SYSTOLIC BLOOD PRESSURE: 133 MMHG | DIASTOLIC BLOOD PRESSURE: 84 MMHG | HEART RATE: 71 BPM | WEIGHT: 205.6 LBS

## 2025-08-26 DIAGNOSIS — I10 PRIMARY HYPERTENSION: ICD-10-CM

## 2025-08-26 DIAGNOSIS — I48.0 PAROXYSMAL ATRIAL FIBRILLATION: ICD-10-CM

## 2025-08-26 DIAGNOSIS — I97.89 PERICARDIAL EFFUSION AFTER OPERATIVE PROCEDURE: ICD-10-CM

## 2025-08-26 DIAGNOSIS — E78.5 HYPERLIPIDEMIA LDL GOAL <70: ICD-10-CM

## 2025-08-26 DIAGNOSIS — I31.39 PERICARDIAL EFFUSION AFTER OPERATIVE PROCEDURE: ICD-10-CM

## 2025-08-26 DIAGNOSIS — I25.10 CORONARY ARTERY DISEASE INVOLVING NATIVE CORONARY ARTERY OF NATIVE HEART WITHOUT ANGINA PECTORIS: Primary | ICD-10-CM

## 2025-08-26 PROCEDURE — 3075F SYST BP GE 130 - 139MM HG: CPT | Performed by: NURSE PRACTITIONER

## 2025-08-26 PROCEDURE — 1160F RVW MEDS BY RX/DR IN RCRD: CPT | Performed by: NURSE PRACTITIONER

## 2025-08-26 PROCEDURE — 3079F DIAST BP 80-89 MM HG: CPT | Performed by: NURSE PRACTITIONER

## 2025-08-26 PROCEDURE — 93000 ELECTROCARDIOGRAM COMPLETE: CPT | Performed by: NURSE PRACTITIONER

## 2025-08-26 PROCEDURE — 1159F MED LIST DOCD IN RCRD: CPT | Performed by: NURSE PRACTITIONER

## 2025-08-26 PROCEDURE — 99214 OFFICE O/P EST MOD 30 MIN: CPT | Performed by: NURSE PRACTITIONER

## 2025-08-26 RX ORDER — SOTALOL HYDROCHLORIDE 120 MG/1
120 TABLET ORAL 2 TIMES DAILY
Qty: 90 TABLET | Refills: 1 | Status: SHIPPED | OUTPATIENT
Start: 2025-08-26

## 2025-08-26 RX ORDER — ROSUVASTATIN CALCIUM 40 MG/1
40 TABLET, COATED ORAL DAILY
Qty: 90 TABLET | Refills: 1 | Status: SHIPPED | OUTPATIENT
Start: 2025-08-26

## 2025-08-27 RX ORDER — CLONIDINE HYDROCHLORIDE 0.1 MG/1
0.1 TABLET ORAL 2 TIMES DAILY
Qty: 180 TABLET | Refills: 2 | Status: SHIPPED | OUTPATIENT
Start: 2025-08-27

## (undated) DEVICE — [HIGH FLOW INSUFFLATOR,  DO NOT USE IF PACKAGE IS DAMAGED,  KEEP DRY,  KEEP AWAY FROM SUNLIGHT,  PROTECT FROM HEAT AND RADIOACTIVE SOURCES.]: Brand: PNEUMOSURE

## (undated) DEVICE — OASIS DRAIN, SINGLE, INLINE & ATS COMPATIBLE: Brand: OASIS

## (undated) DEVICE — 3M™ MEDIPORE™ H SOFT CLOTH SURGICAL TAPE, 2863, 3 IN X 10 YD, 12/CASE: Brand: 3M™ MEDIPORE™

## (undated) DEVICE — APPL CHLORAPREP TINTED 26ML TEAL

## (undated) DEVICE — SUT SILK 4/0 TIES 18IN A183H

## (undated) DEVICE — SUT ETHIB 1 CT1 30IN  X425H

## (undated) DEVICE — TRAP FLD MINIVAC MEGADYNE 100ML

## (undated) DEVICE — DRSNG SURESITE WNDW 2.38X2.75

## (undated) DEVICE — PK CATH CARD 70

## (undated) DEVICE — AVID DUAL STAGE VENOUS DRAINAGE CANNULA: Brand: AVID DUAL STAGE VENOUS DRAINAGE CANNULA

## (undated) DEVICE — SUT PROLN 4/0 V7 36IN 8975H

## (undated) DEVICE — PK ATS CUST W CARDIOTOMY RESEVOIR

## (undated) DEVICE — MANIFLD NAMIC PRECEPTOR INTEGR/TRANSD RT/HND 1/PRT 500PSI

## (undated) DEVICE — Device

## (undated) DEVICE — PENCL ROCKRSWCH MEGADYNE W/HOLSTR 10FT SS

## (undated) DEVICE — SUT MONOCRYL PLS ANTIB UND 3/0  PS1 27IN

## (undated) DEVICE — SUT SILK 3/0 TIES 18IN A184H

## (undated) DEVICE — RADIAL RUNWAY TOP PADS: Brand: RADIAL RUNWAY TOP PADS

## (undated) DEVICE — LN FLTR ORL/NASL MICROSTREAM NONINTUB A/LNG

## (undated) DEVICE — ST EXT IV SMRTSTE 2VLV FIX M LL 6ML 41

## (undated) DEVICE — FOGARTY SPRING CLIPS 6MM: Brand: FOGARTY SOFTJAW

## (undated) DEVICE — CATH F5 INF JL 3.5 100CM: Brand: INFINITI

## (undated) DEVICE — PENCL SMOKE/EVAC MEGADYNE TELESCP 10FT

## (undated) DEVICE — CLMP ABLAT ISOL SYNERGY SHRT CRV LT

## (undated) DEVICE — FLTR RESERV PERFUS INTERSEPT 02 STRL

## (undated) DEVICE — LACERATION TRAY: Brand: MEDLINE INDUSTRIES, INC.

## (undated) DEVICE — SUCTION CANISTER 2500CC: Brand: DEROYAL

## (undated) DEVICE — DEFOGGER!" ANTI FOG KIT: Brand: DEROYAL

## (undated) DEVICE — DISPOSABLE TOURNIQUET CUFF SINGLE BLADDER, DUAL PORT AND QUICK CONNECT CONNECTOR: Brand: COLOR CUFF

## (undated) DEVICE — BNDR ABD PREMIUM/UNIV 10IN 27TO48IN

## (undated) DEVICE — SHROD PENCL MEGADYNE ATTACHAVAC W/CONN/22MM

## (undated) DEVICE — DRN WND CH RND FUL/FLUT NO/TROC 3/8IN 28F

## (undated) DEVICE — SHEET,DRAPE,53X77,STERILE: Brand: MEDLINE

## (undated) DEVICE — BLAKE CARDIO CONNECTOR 1:1: Brand: J-VAC

## (undated) DEVICE — DRSNG SURESITE WNDW 4X4.5

## (undated) DEVICE — 12 FOOT DISPOSABLE EXTENSION CABLE WITH SAFE CONNECT / SCREW-DOWN

## (undated) DEVICE — ADHS SKIN PREMIERPRO EXOFIN TOPICAL HI/VISC .5ML

## (undated) DEVICE — EZ GLIDE AORTIC CANNULA: Brand: EDWARDS LIFESCIENCES EZ GLIDE AORTIC CANNULA

## (undated) DEVICE — MARKER,SKIN,W/RULER,DUAL,STOP: Brand: MEDLINE

## (undated) DEVICE — PAD, DEFIB, ADULT, RADIOTRANS, ZOLL: Brand: MEDLINE

## (undated) DEVICE — SUT PROLN CARDIO V5 3/0 36IN 8936H

## (undated) DEVICE — CATH F5 INF JR 4 100CM: Brand: INFINITI

## (undated) DEVICE — ADULT DISPOSABLE SINGLE-PATIENT USE PULSE OXIMETER SENSOR: Brand: NONIN

## (undated) DEVICE — PATIENT RETURN ELECTRODE, SINGLE-USE, CONTACT QUALITY MONITORING, ADULT, WITH 9FT CORD, FOR PATIENTS WEIGING OVER 33LBS. (15KG): Brand: MEGADYNE

## (undated) DEVICE — SYR LUERLOK 30CC

## (undated) DEVICE — BANDAGE,GAUZE,BULKEE II,4.5"X4.1YD,STRL: Brand: MEDLINE

## (undated) DEVICE — SUT PROLN 3/0 V7 D/A 36IN 8976H

## (undated) DEVICE — SUT PROLN SURGILENE SURGIPRO 8 0 24IN BL

## (undated) DEVICE — CANN AORT ROOT DLP VNT 14G 7F

## (undated) DEVICE — GLIDESHEATH SLENDER STAINLESS STEEL KIT: Brand: GLIDESHEATH SLENDER

## (undated) DEVICE — UNDYED BRAIDED (POLYGLACTIN 910), SYNTHETIC ABSORBABLE SUTURE: Brand: COATED VICRYL

## (undated) DEVICE — CATHETER,FOLEY,100%SILICONE,18FR,10ML,LF: Brand: MEDLINE

## (undated) DEVICE — DRAPE,REIN 53X77,STERILE: Brand: MEDLINE

## (undated) DEVICE — ST INF PRI SMRTSTE 20DRP 2VLV 24ML 117

## (undated) DEVICE — SUT SILK 2 SUTUPAK TIE 60IN SA8H 2STRAND

## (undated) DEVICE — TB SXN SURG DLP MALL/CARD SFT/TP 6F 6IN

## (undated) DEVICE — ANTIBACTERIAL UNDYED BRAIDED (POLYGLACTIN 910), SYNTHETIC ABSORBABLE SUTURE: Brand: COATED VICRYL

## (undated) DEVICE — SUT SILK 2/0 TIES 18IN A185H

## (undated) DEVICE — INTENDED FOR TISSUE SEPARATION, AND OTHER PROCEDURES THAT REQUIRE A SHARP SURGICAL BLADE TO PUNCTURE OR CUT.: Brand: BARD-PARKER ® STAINLESS STEEL BLADES

## (undated) DEVICE — ELECTRD DEFIB M/FUNC PROPADZ RADIOL 2PK

## (undated) DEVICE — SUT PROLN 7/0 BV1 D/A 24IN 8702H

## (undated) DEVICE — BANDAGE,ELASTIC,ESMARK,STERILE,4"X9',LF: Brand: MEDLINE

## (undated) DEVICE — SUT SILK 2/0 CT1 CR8 18IN C022D

## (undated) DEVICE — SUT SILK 0 SH 30IN K834H

## (undated) DEVICE — SUT MNCRYL PLS ANTIB UD 4/0 PS2 18IN

## (undated) DEVICE — BNDG,ELSTC,MATRIX,STRL,4"X5YD,LF,HOOK&LP: Brand: MEDLINE

## (undated) DEVICE — PAD,ARMBOARD,CONV,FOAM,2X8X20",12PR/CS: Brand: MEDLINE

## (undated) DEVICE — ADAPT ANTEGRADE RETRGR

## (undated) DEVICE — PK THORACOTOMY 10

## (undated) DEVICE — 360 - 360I 10"/10" CMSQ 8RA: Brand: MEDLINE

## (undated) DEVICE — CANN VESL DLP 1WY BLNT/TP 3MM

## (undated) DEVICE — PEN ABLAT ISOL TRNSPOLAR 19CM

## (undated) DEVICE — GLV SURG BIOGELULTRATOUCH POLYISPRN PF LF SZ7 STRL

## (undated) DEVICE — LEVEL SENSORS PADS ARE USED TO ATTACH THE LEVEL SENSORS TO A HARD SHELL RESERVOIR. INCLUDES COUPLING GEL.: Brand: TERUMO® ADVANCED PERFUSION SYSTEM 1

## (undated) DEVICE — SYS VASOVIEW2 HEMOPRO ENDOSCOPIC HARVST VESL

## (undated) DEVICE — TB SXN DLP RIGD MACROSUCKER 6F 3IN

## (undated) DEVICE — SUT PROLN 6/0 C1 D/A 30IN 8706H

## (undated) DEVICE — GW INQW FIX/CORE PTFE J/3MM .035 260CM

## (undated) DEVICE — SENSR CERBRL O2 PK/2

## (undated) DEVICE — SPNG GZ WOVN 4X4IN 12PLY 10/BX STRL

## (undated) DEVICE — CVR PROB ULTRASND/TRANSD W/GEL LNG 18X250CM STRL

## (undated) DEVICE — TUBING, SUCTION, 1/4" X 10', STRAIGHT: Brand: MEDLINE

## (undated) DEVICE — PROB CRYO ABL ICE MALL LSS BEND 10CM

## (undated) DEVICE — MYOCARDIAL PROBE NON-PYROGENIC: Brand: DEROYAL

## (undated) DEVICE — DRAPE, RADIAL, STERILE: Brand: MEDLINE

## (undated) DEVICE — CLTH CLENS READYCLEANSE PERI CARE PK/5

## (undated) DEVICE — 3M™ IOBAN™ 2 ANTIMICROBIAL INCISE DRAPE 6650EZ: Brand: IOBAN™ 2

## (undated) DEVICE — ELECTRD BLD EZ CLN MOD XLNG 2.75IN

## (undated) DEVICE — SUT SILK 0/0 CT2 18IN C027D

## (undated) DEVICE — TOWEL,OR,DSP,ST,NATURAL,DLX,4/PK,20PK/CS: Brand: MEDLINE

## (undated) DEVICE — COVER,MAYO STAND,STERILE: Brand: MEDLINE

## (undated) DEVICE — TRAP,MUCUS SPECIMEN,40CC: Brand: MEDLINE

## (undated) DEVICE — PK HEART OPN 10

## (undated) DEVICE — BLD SCLPL BEAVR MINI STR 2BVL 180D LF

## (undated) DEVICE — ASMBL SPK CONTRST CONTRL

## (undated) DEVICE — PK PERFUS CUST W/CARDIOPLEGIA

## (undated) DEVICE — GUIDE SELECT ATRICLIP